# Patient Record
Sex: FEMALE | Race: WHITE | Employment: OTHER | URBAN - METROPOLITAN AREA
[De-identification: names, ages, dates, MRNs, and addresses within clinical notes are randomized per-mention and may not be internally consistent; named-entity substitution may affect disease eponyms.]

---

## 2017-08-06 ENCOUNTER — HOSPITAL ENCOUNTER (EMERGENCY)
Facility: CLINIC | Age: 67
Discharge: ANOTHER HOSPITAL | End: 2017-08-06
Attending: EMERGENCY MEDICINE
Payer: COMMERCIAL

## 2017-08-06 ENCOUNTER — APPOINTMENT (OUTPATIENT)
Dept: GENERAL RADIOLOGY | Facility: CLINIC | Age: 67
End: 2017-08-06
Payer: COMMERCIAL

## 2017-08-06 ENCOUNTER — HOSPITAL ENCOUNTER (INPATIENT)
Age: 67
LOS: 5 days | Discharge: HOME | DRG: 287 | End: 2017-08-11
Attending: INTERNAL MEDICINE | Admitting: INTERNAL MEDICINE
Payer: COMMERCIAL

## 2017-08-06 VITALS
TEMPERATURE: 97.9 F | SYSTOLIC BLOOD PRESSURE: 177 MMHG | WEIGHT: 192 LBS | BODY MASS INDEX: 35.33 KG/M2 | OXYGEN SATURATION: 95 % | HEIGHT: 62 IN | DIASTOLIC BLOOD PRESSURE: 94 MMHG | RESPIRATION RATE: 16 BRPM | HEART RATE: 76 BPM

## 2017-08-06 DIAGNOSIS — L97.929 ULCERS OF BOTH LOWER LEGS (HCC): ICD-10-CM

## 2017-08-06 DIAGNOSIS — I16.0 HYPERTENSIVE URGENCY: ICD-10-CM

## 2017-08-06 DIAGNOSIS — I50.23 ACUTE ON CHRONIC SYSTOLIC CONGESTIVE HEART FAILURE (HCC): ICD-10-CM

## 2017-08-06 DIAGNOSIS — L97.919 ULCERS OF BOTH LOWER LEGS (HCC): ICD-10-CM

## 2017-08-06 DIAGNOSIS — I89.0 LYMPHEDEMA: Primary | ICD-10-CM

## 2017-08-06 DIAGNOSIS — M79.89 LEG SWELLING: Primary | ICD-10-CM

## 2017-08-06 LAB
ABSOLUTE EOS #: 0.1 K/UL (ref 0–0.4)
ABSOLUTE LYMPH #: 0.8 K/UL (ref 1–4.8)
ABSOLUTE MONO #: 0.3 K/UL (ref 0.1–1.2)
ALBUMIN SERPL-MCNC: 3.8 G/DL (ref 3.5–5.2)
ALBUMIN/GLOBULIN RATIO: 1.1 (ref 1–2.5)
ALP BLD-CCNC: 74 U/L (ref 35–104)
ALT SERPL-CCNC: 11 U/L (ref 5–33)
ANION GAP SERPL CALCULATED.3IONS-SCNC: 16 MMOL/L (ref 9–17)
AST SERPL-CCNC: 18 U/L
BASOPHILS # BLD: 1 %
BASOPHILS ABSOLUTE: 0 K/UL (ref 0–0.2)
BILIRUB SERPL-MCNC: 0.6 MG/DL (ref 0.3–1.2)
BNP INTERPRETATION: ABNORMAL
BUN BLDV-MCNC: 18 MG/DL (ref 8–23)
BUN/CREAT BLD: ABNORMAL (ref 9–20)
CALCIUM SERPL-MCNC: 8.4 MG/DL (ref 8.6–10.4)
CHLORIDE BLD-SCNC: 104 MMOL/L (ref 98–107)
CO2: 23 MMOL/L (ref 20–31)
CREAT SERPL-MCNC: 0.8 MG/DL (ref 0.5–0.9)
DIFFERENTIAL TYPE: ABNORMAL
EOSINOPHILS RELATIVE PERCENT: 2 %
GFR AFRICAN AMERICAN: >60 ML/MIN
GFR NON-AFRICAN AMERICAN: >60 ML/MIN
GFR SERPL CREATININE-BSD FRML MDRD: ABNORMAL ML/MIN/{1.73_M2}
GFR SERPL CREATININE-BSD FRML MDRD: ABNORMAL ML/MIN/{1.73_M2}
GLUCOSE BLD-MCNC: 103 MG/DL (ref 70–99)
GLUCOSE BLD-MCNC: 127 MG/DL (ref 65–105)
HCT VFR BLD CALC: 40.1 % (ref 36–46)
HEMOGLOBIN: 12.8 G/DL (ref 12–16)
LYMPHOCYTES # BLD: 19 %
MCH RBC QN AUTO: 27 PG (ref 26–34)
MCHC RBC AUTO-ENTMCNC: 31.9 G/DL (ref 31–37)
MCV RBC AUTO: 84.5 FL (ref 80–100)
MONOCYTES # BLD: 8 %
PDW BLD-RTO: 16.7 % (ref 12.5–15.4)
PLATELET # BLD: 193 K/UL (ref 140–450)
PLATELET ESTIMATE: ABNORMAL
PMV BLD AUTO: 9 FL (ref 6–12)
POTASSIUM SERPL-SCNC: 3.7 MMOL/L (ref 3.7–5.3)
PRO-BNP: 3690 PG/ML
RBC # BLD: 4.75 M/UL (ref 4–5.2)
RBC # BLD: ABNORMAL 10*6/UL
SEG NEUTROPHILS: 70 %
SEGMENTED NEUTROPHILS ABSOLUTE COUNT: 3.2 K/UL (ref 1.8–7.7)
SODIUM BLD-SCNC: 143 MMOL/L (ref 135–144)
TOTAL PROTEIN: 7.4 G/DL (ref 6.4–8.3)
TROPONIN INTERP: NORMAL
TROPONIN T: <0.03 NG/ML
TSH SERPL DL<=0.05 MIU/L-ACNC: 3.29 MIU/L (ref 0.3–5)
WBC # BLD: 4.5 K/UL (ref 3.5–11)
WBC # BLD: ABNORMAL 10*3/UL

## 2017-08-06 PROCEDURE — 84484 ASSAY OF TROPONIN QUANT: CPT

## 2017-08-06 PROCEDURE — 83036 HEMOGLOBIN GLYCOSYLATED A1C: CPT

## 2017-08-06 PROCEDURE — 2580000003 HC RX 258: Performed by: FAMILY MEDICINE

## 2017-08-06 PROCEDURE — 36415 COLL VENOUS BLD VENIPUNCTURE: CPT

## 2017-08-06 PROCEDURE — 2060000000 HC ICU INTERMEDIATE R&B

## 2017-08-06 PROCEDURE — 99285 EMERGENCY DEPT VISIT HI MDM: CPT

## 2017-08-06 PROCEDURE — 85025 COMPLETE CBC W/AUTO DIFF WBC: CPT

## 2017-08-06 PROCEDURE — 87040 BLOOD CULTURE FOR BACTERIA: CPT

## 2017-08-06 PROCEDURE — 6370000000 HC RX 637 (ALT 250 FOR IP): Performed by: FAMILY MEDICINE

## 2017-08-06 PROCEDURE — 82947 ASSAY GLUCOSE BLOOD QUANT: CPT

## 2017-08-06 PROCEDURE — 80053 COMPREHEN METABOLIC PANEL: CPT

## 2017-08-06 PROCEDURE — 6360000002 HC RX W HCPCS

## 2017-08-06 PROCEDURE — 71010 XR CHEST PORTABLE: CPT

## 2017-08-06 PROCEDURE — 93005 ELECTROCARDIOGRAM TRACING: CPT

## 2017-08-06 PROCEDURE — 6360000002 HC RX W HCPCS: Performed by: FAMILY MEDICINE

## 2017-08-06 PROCEDURE — 83880 ASSAY OF NATRIURETIC PEPTIDE: CPT

## 2017-08-06 PROCEDURE — 84443 ASSAY THYROID STIM HORMONE: CPT

## 2017-08-06 RX ORDER — SODIUM CHLORIDE 0.9 % (FLUSH) 0.9 %
10 SYRINGE (ML) INJECTION EVERY 12 HOURS SCHEDULED
Status: DISCONTINUED | OUTPATIENT
Start: 2017-08-06 | End: 2017-08-11 | Stop reason: HOSPADM

## 2017-08-06 RX ORDER — SPIRONOLACTONE 25 MG/1
25 TABLET ORAL DAILY
Status: DISCONTINUED | OUTPATIENT
Start: 2017-08-06 | End: 2017-08-11 | Stop reason: HOSPADM

## 2017-08-06 RX ORDER — FAMOTIDINE 20 MG/1
20 TABLET, FILM COATED ORAL 2 TIMES DAILY
Status: DISCONTINUED | OUTPATIENT
Start: 2017-08-06 | End: 2017-08-11 | Stop reason: HOSPADM

## 2017-08-06 RX ORDER — POTASSIUM CHLORIDE 7.45 MG/ML
10 INJECTION INTRAVENOUS PRN
Status: DISCONTINUED | OUTPATIENT
Start: 2017-08-06 | End: 2017-08-11 | Stop reason: HOSPADM

## 2017-08-06 RX ORDER — CARVEDILOL 3.12 MG/1
3.12 TABLET ORAL 2 TIMES DAILY WITH MEALS
Status: DISCONTINUED | OUTPATIENT
Start: 2017-08-07 | End: 2017-08-07

## 2017-08-06 RX ORDER — MAGNESIUM SULFATE 1 G/100ML
1 INJECTION INTRAVENOUS PRN
Status: DISCONTINUED | OUTPATIENT
Start: 2017-08-06 | End: 2017-08-11 | Stop reason: HOSPADM

## 2017-08-06 RX ORDER — BISACODYL 10 MG
10 SUPPOSITORY, RECTAL RECTAL DAILY PRN
Status: DISCONTINUED | OUTPATIENT
Start: 2017-08-06 | End: 2017-08-11 | Stop reason: HOSPADM

## 2017-08-06 RX ORDER — ONDANSETRON 2 MG/ML
4 INJECTION INTRAMUSCULAR; INTRAVENOUS EVERY 6 HOURS PRN
Status: DISCONTINUED | OUTPATIENT
Start: 2017-08-06 | End: 2017-08-11

## 2017-08-06 RX ORDER — NITROGLYCERIN 0.4 MG/1
0.4 TABLET SUBLINGUAL EVERY 5 MIN PRN
Status: DISCONTINUED | OUTPATIENT
Start: 2017-08-06 | End: 2017-08-11 | Stop reason: HOSPADM

## 2017-08-06 RX ORDER — SODIUM CHLORIDE 0.9 % (FLUSH) 0.9 %
10 SYRINGE (ML) INJECTION PRN
Status: DISCONTINUED | OUTPATIENT
Start: 2017-08-06 | End: 2017-08-11 | Stop reason: HOSPADM

## 2017-08-06 RX ORDER — FUROSEMIDE 10 MG/ML
40 INJECTION INTRAMUSCULAR; INTRAVENOUS 2 TIMES DAILY
Status: DISCONTINUED | OUTPATIENT
Start: 2017-08-06 | End: 2017-08-08

## 2017-08-06 RX ORDER — DOCUSATE SODIUM 100 MG/1
100 CAPSULE, LIQUID FILLED ORAL 2 TIMES DAILY
Status: DISCONTINUED | OUTPATIENT
Start: 2017-08-06 | End: 2017-08-11 | Stop reason: HOSPADM

## 2017-08-06 RX ORDER — LISINOPRIL 5 MG/1
5 TABLET ORAL DAILY
Status: DISCONTINUED | OUTPATIENT
Start: 2017-08-07 | End: 2017-08-10

## 2017-08-06 RX ORDER — POTASSIUM CHLORIDE 20 MEQ/1
40 TABLET, EXTENDED RELEASE ORAL PRN
Status: DISCONTINUED | OUTPATIENT
Start: 2017-08-06 | End: 2017-08-11 | Stop reason: HOSPADM

## 2017-08-06 RX ORDER — ACETAMINOPHEN 325 MG/1
650 TABLET ORAL EVERY 4 HOURS PRN
Status: DISCONTINUED | OUTPATIENT
Start: 2017-08-06 | End: 2017-08-11 | Stop reason: HOSPADM

## 2017-08-06 RX ORDER — POTASSIUM CHLORIDE 20MEQ/15ML
40 LIQUID (ML) ORAL PRN
Status: DISCONTINUED | OUTPATIENT
Start: 2017-08-06 | End: 2017-08-11 | Stop reason: HOSPADM

## 2017-08-06 RX ADMIN — SPIRONOLACTONE 25 MG: 25 TABLET ORAL at 21:33

## 2017-08-06 RX ADMIN — FAMOTIDINE 20 MG: 20 TABLET, FILM COATED ORAL at 21:32

## 2017-08-06 RX ADMIN — Medication 10 ML: at 21:34

## 2017-08-06 RX ADMIN — FUROSEMIDE 40 MG: 10 INJECTION, SOLUTION INTRAMUSCULAR; INTRAVENOUS at 21:32

## 2017-08-06 RX ADMIN — ENOXAPARIN SODIUM 40 MG: 40 INJECTION SUBCUTANEOUS at 21:32

## 2017-08-06 ASSESSMENT — PAIN DESCRIPTION - ORIENTATION
ORIENTATION: MID;LOWER
ORIENTATION: LOWER;MID

## 2017-08-06 ASSESSMENT — PAIN DESCRIPTION - DESCRIPTORS
DESCRIPTORS: ACHING;CONSTANT
DESCRIPTORS: ACHING;THROBBING

## 2017-08-06 ASSESSMENT — PAIN DESCRIPTION - PAIN TYPE
TYPE: CHRONIC PAIN
TYPE: CHRONIC PAIN

## 2017-08-06 ASSESSMENT — PAIN DESCRIPTION - FREQUENCY
FREQUENCY: CONTINUOUS
FREQUENCY: CONTINUOUS

## 2017-08-06 ASSESSMENT — PAIN DESCRIPTION - LOCATION
LOCATION: BACK
LOCATION: BACK

## 2017-08-06 ASSESSMENT — PAIN DESCRIPTION - ONSET: ONSET: ON-GOING

## 2017-08-06 ASSESSMENT — PAIN SCALES - GENERAL
PAINLEVEL_OUTOF10: 5
PAINLEVEL_OUTOF10: 0
PAINLEVEL_OUTOF10: 10
PAINLEVEL_OUTOF10: 10

## 2017-08-06 NOTE — ED PROVIDER NOTES
Allergies. FAMILY HISTORY     has no family status information on file. family history is not on file. SOCIAL HISTORY      reports that she has never smoked. She does not have any smokeless tobacco history on file. She reports that she does not drink alcohol or use illicit drugs. PHYSICAL EXAM     INITIAL VITALS:  height is 5' 2\" (1.575 m) and weight is 87.1 kg (192 lb). Her oral temperature is 98.1 °F (36.7 °C). Her blood pressure is 174/100 (abnormal) and her pulse is 65. Her respiration is 18 and oxygen saturation is 96%. Patient is alert and oriented, in no apparent distress. HEENT is atraumatic. Pupils are PERRL at 4 mm. Mucous membranes moist.    Neck is supple with no lymphadenopathy. No JVD. No meningismus. Heart sounds regular rate and rhythm with no gallops, murmurs, or rubs. Lungs clear, no wheezes, rales or rhonchi. Abdomen: soft, nontender with no pain to palpation. Normal bowel sounds are noted. No rebound or guarding. Musculoskeletal exam shows edema and skin breakdown as noted below. Skin: 3+ edema in both legs with weeping and skin breakdown, without deep ulceration. Maceration of tissues and redness noted, circumferentially. Neurological exam reveals cranial nerves 2 through 12 grossly intact. Patient has equal  and normal deep tendon reflexes. Psychiatric: no hallucinations or suicidal ideation. Lymphatics.:  No lymphadenopathy. DIFFERENTIAL DIAGNOSIS/ MDM:     Lymphedema with skin maceration and breakdown, cellulitis, CHF, DM    DIAGNOSTIC RESULTS     EKG: All EKG's are interpreted by the Emergency Department Physician who either signs or Co-signs this chart in the absence of a cardiologist.    Sinus 63 with occasional PVCs. Nonspecific ST change. No old for comparison. Axis -21, , , .     RADIOLOGY:   I directly visualized the following  images and reviewed the radiologist interpretations:  XR Chest Portable   Final (Please note that portions of this note were completed with a voice recognition program.  Efforts were made to edit the dictations but occasionally words are mis-transcribed.)    Lomas MD   Attending Emergency Physician       Cheli Kasper MD  08/06/17 7113

## 2017-08-07 ENCOUNTER — APPOINTMENT (OUTPATIENT)
Dept: GENERAL RADIOLOGY | Age: 67
DRG: 287 | End: 2017-08-07
Attending: INTERNAL MEDICINE
Payer: COMMERCIAL

## 2017-08-07 PROBLEM — M79.89 LEG SWELLING: Status: ACTIVE | Noted: 2017-08-07

## 2017-08-07 PROBLEM — I16.0 HYPERTENSIVE URGENCY: Status: ACTIVE | Noted: 2017-08-07

## 2017-08-07 LAB
ALBUMIN SERPL-MCNC: 3.8 G/DL (ref 3.5–5.2)
ALBUMIN/GLOBULIN RATIO: 1.2 (ref 1–2.5)
ALP BLD-CCNC: 72 U/L (ref 35–104)
ALT SERPL-CCNC: 9 U/L (ref 5–33)
ANION GAP SERPL CALCULATED.3IONS-SCNC: 18 MMOL/L (ref 9–17)
AST SERPL-CCNC: 18 U/L
BILIRUB SERPL-MCNC: 0.57 MG/DL (ref 0.3–1.2)
BNP INTERPRETATION: ABNORMAL
BUN BLDV-MCNC: 14 MG/DL (ref 8–23)
BUN/CREAT BLD: ABNORMAL (ref 9–20)
CALCIUM SERPL-MCNC: 8.8 MG/DL (ref 8.6–10.4)
CHLORIDE BLD-SCNC: 103 MMOL/L (ref 98–107)
CHOLESTEROL/HDL RATIO: 4.7
CHOLESTEROL: 179 MG/DL
CO2: 23 MMOL/L (ref 20–31)
CREAT SERPL-MCNC: 0.74 MG/DL (ref 0.5–0.9)
ESTIMATED AVERAGE GLUCOSE: 117 MG/DL
GFR AFRICAN AMERICAN: >60 ML/MIN
GFR NON-AFRICAN AMERICAN: >60 ML/MIN
GFR SERPL CREATININE-BSD FRML MDRD: ABNORMAL ML/MIN/{1.73_M2}
GFR SERPL CREATININE-BSD FRML MDRD: ABNORMAL ML/MIN/{1.73_M2}
GLUCOSE BLD-MCNC: 89 MG/DL (ref 65–105)
GLUCOSE BLD-MCNC: 89 MG/DL (ref 70–99)
HBA1C MFR BLD: 5.7 % (ref 4–6)
HCT VFR BLD CALC: 37.8 % (ref 36–46)
HDLC SERPL-MCNC: 38 MG/DL
HEMOGLOBIN: 12.3 G/DL (ref 12–16)
LDL CHOLESTEROL: 117 MG/DL (ref 0–130)
LV EF: 29 %
LVEF MODALITY: NORMAL
MAGNESIUM: 2.1 MG/DL (ref 1.6–2.6)
MCH RBC QN AUTO: 27.1 PG (ref 26–34)
MCHC RBC AUTO-ENTMCNC: 32.5 G/DL (ref 31–37)
MCV RBC AUTO: 83.3 FL (ref 80–100)
PDW BLD-RTO: 17 % (ref 12.5–15.4)
PLATELET # BLD: 194 K/UL (ref 140–450)
PMV BLD AUTO: 9.4 FL (ref 6–12)
POTASSIUM SERPL-SCNC: 3.6 MMOL/L (ref 3.7–5.3)
PRO-BNP: 3845 PG/ML
RBC # BLD: 4.54 M/UL (ref 4–5.2)
SODIUM BLD-SCNC: 144 MMOL/L (ref 135–144)
TOTAL PROTEIN: 6.9 G/DL (ref 6.4–8.3)
TRIGL SERPL-MCNC: 122 MG/DL
TROPONIN INTERP: NORMAL
TROPONIN T: <0.03 NG/ML
TSH SERPL DL<=0.05 MIU/L-ACNC: 2.43 MIU/L (ref 0.3–5)
VLDLC SERPL CALC-MCNC: ABNORMAL MG/DL (ref 1–30)
WBC # BLD: 4.7 K/UL (ref 3.5–11)

## 2017-08-07 PROCEDURE — 83880 ASSAY OF NATRIURETIC PEPTIDE: CPT

## 2017-08-07 PROCEDURE — 97530 THERAPEUTIC ACTIVITIES: CPT

## 2017-08-07 PROCEDURE — 99211 OFF/OP EST MAY X REQ PHY/QHP: CPT

## 2017-08-07 PROCEDURE — 2060000000 HC ICU INTERMEDIATE R&B

## 2017-08-07 PROCEDURE — 99223 1ST HOSP IP/OBS HIGH 75: CPT | Performed by: INTERNAL MEDICINE

## 2017-08-07 PROCEDURE — 6370000000 HC RX 637 (ALT 250 FOR IP): Performed by: INTERNAL MEDICINE

## 2017-08-07 PROCEDURE — 36415 COLL VENOUS BLD VENIPUNCTURE: CPT

## 2017-08-07 PROCEDURE — G8988 SELF CARE GOAL STATUS: HCPCS

## 2017-08-07 PROCEDURE — G8978 MOBILITY CURRENT STATUS: HCPCS

## 2017-08-07 PROCEDURE — 83735 ASSAY OF MAGNESIUM: CPT

## 2017-08-07 PROCEDURE — G8979 MOBILITY GOAL STATUS: HCPCS

## 2017-08-07 PROCEDURE — 84484 ASSAY OF TROPONIN QUANT: CPT

## 2017-08-07 PROCEDURE — 2580000003 HC RX 258: Performed by: FAMILY MEDICINE

## 2017-08-07 PROCEDURE — 71020 XR CHEST STANDARD TWO VW: CPT

## 2017-08-07 PROCEDURE — 6360000002 HC RX W HCPCS: Performed by: FAMILY MEDICINE

## 2017-08-07 PROCEDURE — 82947 ASSAY GLUCOSE BLOOD QUANT: CPT

## 2017-08-07 PROCEDURE — 97535 SELF CARE MNGMENT TRAINING: CPT

## 2017-08-07 PROCEDURE — 6360000002 HC RX W HCPCS: Performed by: INTERNAL MEDICINE

## 2017-08-07 PROCEDURE — 93005 ELECTROCARDIOGRAM TRACING: CPT

## 2017-08-07 PROCEDURE — 85027 COMPLETE CBC AUTOMATED: CPT

## 2017-08-07 PROCEDURE — 97166 OT EVAL MOD COMPLEX 45 MIN: CPT

## 2017-08-07 PROCEDURE — 6370000000 HC RX 637 (ALT 250 FOR IP): Performed by: FAMILY MEDICINE

## 2017-08-07 PROCEDURE — G8987 SELF CARE CURRENT STATUS: HCPCS

## 2017-08-07 PROCEDURE — 97162 PT EVAL MOD COMPLEX 30 MIN: CPT

## 2017-08-07 PROCEDURE — 80053 COMPREHEN METABOLIC PANEL: CPT

## 2017-08-07 PROCEDURE — 84443 ASSAY THYROID STIM HORMONE: CPT

## 2017-08-07 PROCEDURE — 93306 TTE W/DOPPLER COMPLETE: CPT

## 2017-08-07 PROCEDURE — 80061 LIPID PANEL: CPT

## 2017-08-07 RX ORDER — CARVEDILOL 25 MG/1
25 TABLET ORAL 2 TIMES DAILY WITH MEALS
Status: DISCONTINUED | OUTPATIENT
Start: 2017-08-07 | End: 2017-08-08

## 2017-08-07 RX ADMIN — FUROSEMIDE 40 MG: 10 INJECTION, SOLUTION INTRAMUSCULAR; INTRAVENOUS at 13:36

## 2017-08-07 RX ADMIN — Medication 10 ML: at 21:33

## 2017-08-07 RX ADMIN — FUROSEMIDE 40 MG: 10 INJECTION, SOLUTION INTRAMUSCULAR; INTRAVENOUS at 09:32

## 2017-08-07 RX ADMIN — LISINOPRIL 5 MG: 5 TABLET ORAL at 09:25

## 2017-08-07 RX ADMIN — DOCUSATE SODIUM 100 MG: 100 CAPSULE, LIQUID FILLED ORAL at 09:26

## 2017-08-07 RX ADMIN — Medication 10 ML: at 09:31

## 2017-08-07 RX ADMIN — FAMOTIDINE 20 MG: 20 TABLET, FILM COATED ORAL at 09:25

## 2017-08-07 RX ADMIN — SPIRONOLACTONE 25 MG: 25 TABLET ORAL at 09:25

## 2017-08-07 RX ADMIN — ENOXAPARIN SODIUM 40 MG: 40 INJECTION SUBCUTANEOUS at 09:29

## 2017-08-07 RX ADMIN — CARVEDILOL 3.12 MG: 3.12 TABLET, FILM COATED ORAL at 09:26

## 2017-08-07 RX ADMIN — CARVEDILOL 25 MG: 25 TABLET, FILM COATED ORAL at 16:53

## 2017-08-07 ASSESSMENT — ENCOUNTER SYMPTOMS
VOMITING: 0
TROUBLE SWALLOWING: 0
SHORTNESS OF BREATH: 0
CHOKING: 0
SORE THROAT: 0
ABDOMINAL PAIN: 0
COUGH: 0
ABDOMINAL DISTENTION: 0
EYES NEGATIVE: 1
DIARRHEA: 0
NAUSEA: 0

## 2017-08-07 ASSESSMENT — PAIN DESCRIPTION - DESCRIPTORS: DESCRIPTORS: ACHING;THROBBING

## 2017-08-07 ASSESSMENT — PAIN DESCRIPTION - PAIN TYPE
TYPE: CHRONIC PAIN
TYPE: CHRONIC PAIN

## 2017-08-07 ASSESSMENT — PAIN SCALES - GENERAL
PAINLEVEL_OUTOF10: 8
PAINLEVEL_OUTOF10: 0
PAINLEVEL_OUTOF10: 8
PAINLEVEL_OUTOF10: 0

## 2017-08-07 ASSESSMENT — PAIN DESCRIPTION - LOCATION: LOCATION: BACK;NECK

## 2017-08-08 ENCOUNTER — APPOINTMENT (OUTPATIENT)
Dept: NUCLEAR MEDICINE | Age: 67
DRG: 287 | End: 2017-08-08
Attending: INTERNAL MEDICINE
Payer: COMMERCIAL

## 2017-08-08 PROBLEM — I50.23 ACUTE ON CHRONIC SYSTOLIC CONGESTIVE HEART FAILURE (HCC): Status: ACTIVE | Noted: 2017-08-08

## 2017-08-08 LAB
ANION GAP SERPL CALCULATED.3IONS-SCNC: 19 MMOL/L (ref 9–17)
BNP INTERPRETATION: ABNORMAL
BUN BLDV-MCNC: 18 MG/DL (ref 8–23)
BUN/CREAT BLD: ABNORMAL (ref 9–20)
CALCIUM SERPL-MCNC: 8.8 MG/DL (ref 8.6–10.4)
CHLORIDE BLD-SCNC: 104 MMOL/L (ref 98–107)
CO2: 22 MMOL/L (ref 20–31)
CREAT SERPL-MCNC: 0.87 MG/DL (ref 0.5–0.9)
GFR AFRICAN AMERICAN: >60 ML/MIN
GFR NON-AFRICAN AMERICAN: >60 ML/MIN
GFR SERPL CREATININE-BSD FRML MDRD: ABNORMAL ML/MIN/{1.73_M2}
GFR SERPL CREATININE-BSD FRML MDRD: ABNORMAL ML/MIN/{1.73_M2}
GLUCOSE BLD-MCNC: 103 MG/DL (ref 70–99)
POTASSIUM SERPL-SCNC: 3.7 MMOL/L (ref 3.7–5.3)
PRO-BNP: 2321 PG/ML
SODIUM BLD-SCNC: 145 MMOL/L (ref 135–144)
TROPONIN INTERP: NORMAL
TROPONIN INTERP: NORMAL
TROPONIN T: <0.03 NG/ML
TROPONIN T: <0.03 NG/ML

## 2017-08-08 PROCEDURE — 99232 SBSQ HOSP IP/OBS MODERATE 35: CPT | Performed by: INTERNAL MEDICINE

## 2017-08-08 PROCEDURE — 6370000000 HC RX 637 (ALT 250 FOR IP): Performed by: NURSE PRACTITIONER

## 2017-08-08 PROCEDURE — 80048 BASIC METABOLIC PNL TOTAL CA: CPT

## 2017-08-08 PROCEDURE — 6370000000 HC RX 637 (ALT 250 FOR IP): Performed by: FAMILY MEDICINE

## 2017-08-08 PROCEDURE — 6370000000 HC RX 637 (ALT 250 FOR IP): Performed by: INTERNAL MEDICINE

## 2017-08-08 PROCEDURE — 84484 ASSAY OF TROPONIN QUANT: CPT

## 2017-08-08 PROCEDURE — 2580000003 HC RX 258: Performed by: FAMILY MEDICINE

## 2017-08-08 PROCEDURE — 36415 COLL VENOUS BLD VENIPUNCTURE: CPT

## 2017-08-08 PROCEDURE — 6360000002 HC RX W HCPCS: Performed by: FAMILY MEDICINE

## 2017-08-08 PROCEDURE — A9500 TC99M SESTAMIBI: HCPCS | Performed by: NURSE PRACTITIONER

## 2017-08-08 PROCEDURE — 2060000000 HC ICU INTERMEDIATE R&B

## 2017-08-08 PROCEDURE — 2580000003 HC RX 258: Performed by: NURSE PRACTITIONER

## 2017-08-08 PROCEDURE — 3430000000 HC RX DIAGNOSTIC RADIOPHARMACEUTICAL: Performed by: NURSE PRACTITIONER

## 2017-08-08 PROCEDURE — 83880 ASSAY OF NATRIURETIC PEPTIDE: CPT

## 2017-08-08 PROCEDURE — 6360000002 HC RX W HCPCS: Performed by: INTERNAL MEDICINE

## 2017-08-08 RX ORDER — LIDOCAINE 50 MG/G
1 PATCH TOPICAL DAILY
Status: DISCONTINUED | OUTPATIENT
Start: 2017-08-08 | End: 2017-08-11 | Stop reason: HOSPADM

## 2017-08-08 RX ORDER — FUROSEMIDE 40 MG/1
40 TABLET ORAL 2 TIMES DAILY
Status: DISCONTINUED | OUTPATIENT
Start: 2017-08-08 | End: 2017-08-11 | Stop reason: HOSPADM

## 2017-08-08 RX ORDER — CYCLOBENZAPRINE HCL 10 MG
10 TABLET ORAL 3 TIMES DAILY PRN
Status: DISCONTINUED | OUTPATIENT
Start: 2017-08-08 | End: 2017-08-11

## 2017-08-08 RX ORDER — CARVEDILOL 6.25 MG/1
6.25 TABLET ORAL 2 TIMES DAILY WITH MEALS
Status: DISCONTINUED | OUTPATIENT
Start: 2017-08-08 | End: 2017-08-11 | Stop reason: HOSPADM

## 2017-08-08 RX ADMIN — FAMOTIDINE 20 MG: 20 TABLET, FILM COATED ORAL at 09:05

## 2017-08-08 RX ADMIN — ENOXAPARIN SODIUM 40 MG: 40 INJECTION SUBCUTANEOUS at 09:05

## 2017-08-08 RX ADMIN — TETRAKIS(2-METHOXYISOBUTYLISOCYANIDE)COPPER(I) TETRAFLUOROBORATE 32 MILLICURIE: 1 INJECTION, POWDER, LYOPHILIZED, FOR SOLUTION INTRAVENOUS at 12:55

## 2017-08-08 RX ADMIN — SODIUM CHLORIDE, PRESERVATIVE FREE 10 ML: 5 INJECTION INTRAVENOUS at 12:55

## 2017-08-08 RX ADMIN — SPIRONOLACTONE 25 MG: 25 TABLET ORAL at 09:05

## 2017-08-08 RX ADMIN — CARVEDILOL 6.25 MG: 6.25 TABLET, FILM COATED ORAL at 16:57

## 2017-08-08 RX ADMIN — CYCLOBENZAPRINE 10 MG: 10 TABLET, FILM COATED ORAL at 16:53

## 2017-08-08 RX ADMIN — Medication 10 ML: at 09:05

## 2017-08-08 RX ADMIN — Medication 10 ML: at 22:30

## 2017-08-08 RX ADMIN — FUROSEMIDE 40 MG: 40 TABLET ORAL at 16:57

## 2017-08-08 RX ADMIN — LISINOPRIL 5 MG: 5 TABLET ORAL at 09:05

## 2017-08-08 ASSESSMENT — ENCOUNTER SYMPTOMS
CHOKING: 0
COUGH: 0
TROUBLE SWALLOWING: 0
ABDOMINAL DISTENTION: 0
WHEEZING: 0
NAUSEA: 0
BACK PAIN: 1
VOMITING: 0
ALLERGIC/IMMUNOLOGIC NEGATIVE: 1
SHORTNESS OF BREATH: 0
SORE THROAT: 0
DIARRHEA: 0
ABDOMINAL PAIN: 0
EYES NEGATIVE: 1

## 2017-08-09 LAB
ANION GAP SERPL CALCULATED.3IONS-SCNC: 15 MMOL/L (ref 9–17)
BNP INTERPRETATION: ABNORMAL
BUN BLDV-MCNC: 20 MG/DL (ref 8–23)
BUN/CREAT BLD: ABNORMAL (ref 9–20)
CALCIUM SERPL-MCNC: 8.8 MG/DL (ref 8.6–10.4)
CHLORIDE BLD-SCNC: 103 MMOL/L (ref 98–107)
CO2: 26 MMOL/L (ref 20–31)
CREAT SERPL-MCNC: 0.87 MG/DL (ref 0.5–0.9)
EKG ATRIAL RATE: 55 BPM
EKG ATRIAL RATE: 63 BPM
EKG P AXIS: 55 DEGREES
EKG P AXIS: 68 DEGREES
EKG P-R INTERVAL: 134 MS
EKG P-R INTERVAL: 170 MS
EKG Q-T INTERVAL: 438 MS
EKG Q-T INTERVAL: 524 MS
EKG QRS DURATION: 108 MS
EKG QRS DURATION: 112 MS
EKG QTC CALCULATION (BAZETT): 448 MS
EKG QTC CALCULATION (BAZETT): 501 MS
EKG R AXIS: -21 DEGREES
EKG R AXIS: -3 DEGREES
EKG T AXIS: -164 DEGREES
EKG T AXIS: 144 DEGREES
EKG VENTRICULAR RATE: 55 BPM
EKG VENTRICULAR RATE: 63 BPM
GFR AFRICAN AMERICAN: >60 ML/MIN
GFR NON-AFRICAN AMERICAN: >60 ML/MIN
GFR SERPL CREATININE-BSD FRML MDRD: ABNORMAL ML/MIN/{1.73_M2}
GFR SERPL CREATININE-BSD FRML MDRD: ABNORMAL ML/MIN/{1.73_M2}
GLUCOSE BLD-MCNC: 82 MG/DL (ref 65–105)
GLUCOSE BLD-MCNC: 97 MG/DL (ref 70–99)
LV EF: 33 %
LVEF MODALITY: NORMAL
POTASSIUM SERPL-SCNC: 3.5 MMOL/L (ref 3.7–5.3)
PRO-BNP: 1194 PG/ML
SODIUM BLD-SCNC: 144 MMOL/L (ref 135–144)

## 2017-08-09 PROCEDURE — 6370000000 HC RX 637 (ALT 250 FOR IP): Performed by: FAMILY MEDICINE

## 2017-08-09 PROCEDURE — 36415 COLL VENOUS BLD VENIPUNCTURE: CPT

## 2017-08-09 PROCEDURE — 2580000003 HC RX 258: Performed by: FAMILY MEDICINE

## 2017-08-09 PROCEDURE — 97110 THERAPEUTIC EXERCISES: CPT

## 2017-08-09 PROCEDURE — A9500 TC99M SESTAMIBI: HCPCS | Performed by: NURSE PRACTITIONER

## 2017-08-09 PROCEDURE — 93017 CV STRESS TEST TRACING ONLY: CPT | Performed by: NURSE PRACTITIONER

## 2017-08-09 PROCEDURE — 99232 SBSQ HOSP IP/OBS MODERATE 35: CPT | Performed by: INTERNAL MEDICINE

## 2017-08-09 PROCEDURE — 6360000002 HC RX W HCPCS: Performed by: NURSE PRACTITIONER

## 2017-08-09 PROCEDURE — 2060000000 HC ICU INTERMEDIATE R&B

## 2017-08-09 PROCEDURE — 80048 BASIC METABOLIC PNL TOTAL CA: CPT

## 2017-08-09 PROCEDURE — 82947 ASSAY GLUCOSE BLOOD QUANT: CPT

## 2017-08-09 PROCEDURE — 83880 ASSAY OF NATRIURETIC PEPTIDE: CPT

## 2017-08-09 PROCEDURE — 6370000000 HC RX 637 (ALT 250 FOR IP): Performed by: NURSE PRACTITIONER

## 2017-08-09 PROCEDURE — 3430000000 HC RX DIAGNOSTIC RADIOPHARMACEUTICAL: Performed by: NURSE PRACTITIONER

## 2017-08-09 PROCEDURE — 6360000002 HC RX W HCPCS: Performed by: FAMILY MEDICINE

## 2017-08-09 PROCEDURE — 97116 GAIT TRAINING THERAPY: CPT

## 2017-08-09 PROCEDURE — 6370000000 HC RX 637 (ALT 250 FOR IP): Performed by: INTERNAL MEDICINE

## 2017-08-09 PROCEDURE — 2580000003 HC RX 258: Performed by: NURSE PRACTITIONER

## 2017-08-09 PROCEDURE — 6360000002 HC RX W HCPCS: Performed by: INTERNAL MEDICINE

## 2017-08-09 RX ORDER — SODIUM CHLORIDE 0.9 % (FLUSH) 0.9 %
10 SYRINGE (ML) INJECTION PRN
Status: DISCONTINUED | OUTPATIENT
Start: 2017-08-09 | End: 2017-08-09

## 2017-08-09 RX ORDER — SODIUM CHLORIDE 0.9 % (FLUSH) 0.9 %
10 SYRINGE (ML) INJECTION 2 TIMES DAILY
Status: DISCONTINUED | OUTPATIENT
Start: 2017-08-09 | End: 2017-08-11 | Stop reason: HOSPADM

## 2017-08-09 RX ORDER — HYDRALAZINE HYDROCHLORIDE 20 MG/ML
10 INJECTION INTRAMUSCULAR; INTRAVENOUS EVERY 6 HOURS PRN
Status: DISCONTINUED | OUTPATIENT
Start: 2017-08-09 | End: 2017-08-11 | Stop reason: HOSPADM

## 2017-08-09 RX ORDER — METOPROLOL TARTRATE 5 MG/5ML
2.5 INJECTION INTRAVENOUS PRN
Status: DISCONTINUED | OUTPATIENT
Start: 2017-08-09 | End: 2017-08-09

## 2017-08-09 RX ORDER — NITROGLYCERIN 0.4 MG/1
0.4 TABLET SUBLINGUAL EVERY 5 MIN PRN
Status: DISCONTINUED | OUTPATIENT
Start: 2017-08-09 | End: 2017-08-09

## 2017-08-09 RX ORDER — SODIUM CHLORIDE 9 MG/ML
INJECTION, SOLUTION INTRAVENOUS ONCE
Status: COMPLETED | OUTPATIENT
Start: 2017-08-09 | End: 2017-08-09

## 2017-08-09 RX ORDER — AMINOPHYLLINE DIHYDRATE 25 MG/ML
100 INJECTION, SOLUTION INTRAVENOUS
Status: COMPLETED | OUTPATIENT
Start: 2017-08-09 | End: 2017-08-09

## 2017-08-09 RX ADMIN — ENOXAPARIN SODIUM 40 MG: 40 INJECTION SUBCUTANEOUS at 09:08

## 2017-08-09 RX ADMIN — REGADENOSON 0.4 MG: 0.08 INJECTION, SOLUTION INTRAVENOUS at 11:35

## 2017-08-09 RX ADMIN — LISINOPRIL 5 MG: 5 TABLET ORAL at 09:08

## 2017-08-09 RX ADMIN — Medication 10 ML: at 11:35

## 2017-08-09 RX ADMIN — Medication 10 ML: at 09:09

## 2017-08-09 RX ADMIN — FUROSEMIDE 40 MG: 40 TABLET ORAL at 17:33

## 2017-08-09 RX ADMIN — SODIUM CHLORIDE: 9 INJECTION, SOLUTION INTRAVENOUS at 11:02

## 2017-08-09 RX ADMIN — ACETAMINOPHEN 650 MG: 325 TABLET ORAL at 20:44

## 2017-08-09 RX ADMIN — CYCLOBENZAPRINE 10 MG: 10 TABLET, FILM COATED ORAL at 15:30

## 2017-08-09 RX ADMIN — SPIRONOLACTONE 25 MG: 25 TABLET ORAL at 14:30

## 2017-08-09 RX ADMIN — FAMOTIDINE 20 MG: 20 TABLET, FILM COATED ORAL at 09:08

## 2017-08-09 RX ADMIN — Medication 10 ML: at 11:00

## 2017-08-09 RX ADMIN — CYCLOBENZAPRINE 10 MG: 10 TABLET, FILM COATED ORAL at 09:08

## 2017-08-09 RX ADMIN — HYDRALAZINE HYDROCHLORIDE 10 MG: 20 INJECTION INTRAMUSCULAR; INTRAVENOUS at 05:10

## 2017-08-09 RX ADMIN — POTASSIUM CHLORIDE 40 MEQ: 1500 TABLET, EXTENDED RELEASE ORAL at 15:28

## 2017-08-09 RX ADMIN — TETRAKIS(2-METHOXYISOBUTYLISOCYANIDE)COPPER(I) TETRAFLUOROBORATE 36.9 MILLICURIE: 1 INJECTION, POWDER, LYOPHILIZED, FOR SOLUTION INTRAVENOUS at 11:35

## 2017-08-09 RX ADMIN — Medication 10 ML: at 20:44

## 2017-08-09 RX ADMIN — AMINOPHYLLINE 100 MG: 25 INJECTION, SOLUTION INTRAVENOUS at 11:37

## 2017-08-09 RX ADMIN — CARVEDILOL 6.25 MG: 6.25 TABLET, FILM COATED ORAL at 17:33

## 2017-08-09 ASSESSMENT — ENCOUNTER SYMPTOMS
ABDOMINAL DISTENTION: 0
VOMITING: 0
WHEEZING: 0
BACK PAIN: 1
SHORTNESS OF BREATH: 0
ABDOMINAL PAIN: 0
EYES NEGATIVE: 1
NAUSEA: 0
CHOKING: 0
ALLERGIC/IMMUNOLOGIC NEGATIVE: 1
SORE THROAT: 0
DIARRHEA: 0
TROUBLE SWALLOWING: 0
COUGH: 0

## 2017-08-09 ASSESSMENT — PAIN SCALES - GENERAL
PAINLEVEL_OUTOF10: 3
PAINLEVEL_OUTOF10: 9
PAINLEVEL_OUTOF10: 3
PAINLEVEL_OUTOF10: 3

## 2017-08-10 LAB
ANION GAP SERPL CALCULATED.3IONS-SCNC: 17 MMOL/L (ref 9–17)
BNP INTERPRETATION: ABNORMAL
BUN BLDV-MCNC: 22 MG/DL (ref 8–23)
BUN/CREAT BLD: NORMAL (ref 9–20)
CALCIUM SERPL-MCNC: 9.2 MG/DL (ref 8.6–10.4)
CHLORIDE BLD-SCNC: 104 MMOL/L (ref 98–107)
CO2: 20 MMOL/L (ref 20–31)
CREAT SERPL-MCNC: 0.78 MG/DL (ref 0.5–0.9)
GFR AFRICAN AMERICAN: >60 ML/MIN
GFR NON-AFRICAN AMERICAN: >60 ML/MIN
GFR SERPL CREATININE-BSD FRML MDRD: NORMAL ML/MIN/{1.73_M2}
GFR SERPL CREATININE-BSD FRML MDRD: NORMAL ML/MIN/{1.73_M2}
GLUCOSE BLD-MCNC: 97 MG/DL (ref 70–99)
POTASSIUM SERPL-SCNC: 3.7 MMOL/L (ref 3.7–5.3)
PRO-BNP: 1139 PG/ML
SODIUM BLD-SCNC: 141 MMOL/L (ref 135–144)

## 2017-08-10 PROCEDURE — B2151ZZ FLUOROSCOPY OF LEFT HEART USING LOW OSMOLAR CONTRAST: ICD-10-PCS | Performed by: INTERNAL MEDICINE

## 2017-08-10 PROCEDURE — 6360000002 HC RX W HCPCS: Performed by: FAMILY MEDICINE

## 2017-08-10 PROCEDURE — 2580000003 HC RX 258: Performed by: FAMILY MEDICINE

## 2017-08-10 PROCEDURE — 6360000002 HC RX W HCPCS: Performed by: INTERNAL MEDICINE

## 2017-08-10 PROCEDURE — 6370000000 HC RX 637 (ALT 250 FOR IP): Performed by: INTERNAL MEDICINE

## 2017-08-10 PROCEDURE — 97110 THERAPEUTIC EXERCISES: CPT

## 2017-08-10 PROCEDURE — 97116 GAIT TRAINING THERAPY: CPT

## 2017-08-10 PROCEDURE — 80048 BASIC METABOLIC PNL TOTAL CA: CPT

## 2017-08-10 PROCEDURE — 4A023N7 MEASUREMENT OF CARDIAC SAMPLING AND PRESSURE, LEFT HEART, PERCUTANEOUS APPROACH: ICD-10-PCS | Performed by: INTERNAL MEDICINE

## 2017-08-10 PROCEDURE — 36415 COLL VENOUS BLD VENIPUNCTURE: CPT

## 2017-08-10 PROCEDURE — 6370000000 HC RX 637 (ALT 250 FOR IP): Performed by: NURSE PRACTITIONER

## 2017-08-10 PROCEDURE — B2111ZZ FLUOROSCOPY OF MULTIPLE CORONARY ARTERIES USING LOW OSMOLAR CONTRAST: ICD-10-PCS | Performed by: INTERNAL MEDICINE

## 2017-08-10 PROCEDURE — 83880 ASSAY OF NATRIURETIC PEPTIDE: CPT

## 2017-08-10 PROCEDURE — 99232 SBSQ HOSP IP/OBS MODERATE 35: CPT | Performed by: INTERNAL MEDICINE

## 2017-08-10 PROCEDURE — 6370000000 HC RX 637 (ALT 250 FOR IP): Performed by: FAMILY MEDICINE

## 2017-08-10 PROCEDURE — 2060000000 HC ICU INTERMEDIATE R&B

## 2017-08-10 RX ORDER — LISINOPRIL 10 MG/1
10 TABLET ORAL DAILY
Status: DISCONTINUED | OUTPATIENT
Start: 2017-08-11 | End: 2017-08-11 | Stop reason: HOSPADM

## 2017-08-10 RX ADMIN — FUROSEMIDE 40 MG: 40 TABLET ORAL at 09:31

## 2017-08-10 RX ADMIN — FAMOTIDINE 20 MG: 20 TABLET, FILM COATED ORAL at 09:31

## 2017-08-10 RX ADMIN — HYDRALAZINE HYDROCHLORIDE 10 MG: 20 INJECTION INTRAMUSCULAR; INTRAVENOUS at 07:16

## 2017-08-10 RX ADMIN — Medication 10 ML: at 20:39

## 2017-08-10 RX ADMIN — CARVEDILOL 6.25 MG: 6.25 TABLET, FILM COATED ORAL at 09:31

## 2017-08-10 RX ADMIN — FUROSEMIDE 40 MG: 40 TABLET ORAL at 18:15

## 2017-08-10 RX ADMIN — ENOXAPARIN SODIUM 40 MG: 40 INJECTION SUBCUTANEOUS at 09:31

## 2017-08-10 RX ADMIN — LISINOPRIL 5 MG: 5 TABLET ORAL at 09:31

## 2017-08-10 RX ADMIN — CARVEDILOL 6.25 MG: 6.25 TABLET, FILM COATED ORAL at 18:15

## 2017-08-10 RX ADMIN — SPIRONOLACTONE 25 MG: 25 TABLET ORAL at 09:31

## 2017-08-10 ASSESSMENT — ENCOUNTER SYMPTOMS
ABDOMINAL PAIN: 0
ALLERGIC/IMMUNOLOGIC NEGATIVE: 1
NAUSEA: 0
DIARRHEA: 0
SHORTNESS OF BREATH: 0
COUGH: 0
VOMITING: 0
BACK PAIN: 1
CHOKING: 0
EYES NEGATIVE: 1
TROUBLE SWALLOWING: 0
SORE THROAT: 0
ABDOMINAL DISTENTION: 0
WHEEZING: 0

## 2017-08-10 ASSESSMENT — PAIN DESCRIPTION - LOCATION: LOCATION: BACK;NECK

## 2017-08-10 ASSESSMENT — PAIN SCALES - GENERAL: PAINLEVEL_OUTOF10: 8

## 2017-08-10 ASSESSMENT — PAIN DESCRIPTION - PAIN TYPE: TYPE: CHRONIC PAIN

## 2017-08-11 ENCOUNTER — APPOINTMENT (OUTPATIENT)
Dept: CARDIAC CATH/INVASIVE PROCEDURES | Age: 67
DRG: 287 | End: 2017-08-11
Attending: INTERNAL MEDICINE
Payer: COMMERCIAL

## 2017-08-11 ENCOUNTER — APPOINTMENT (OUTPATIENT)
Dept: GENERAL RADIOLOGY | Age: 67
DRG: 287 | End: 2017-08-11
Attending: INTERNAL MEDICINE
Payer: COMMERCIAL

## 2017-08-11 VITALS
HEART RATE: 57 BPM | HEIGHT: 62 IN | TEMPERATURE: 97.7 F | RESPIRATION RATE: 16 BRPM | DIASTOLIC BLOOD PRESSURE: 49 MMHG | BODY MASS INDEX: 32.39 KG/M2 | OXYGEN SATURATION: 98 % | WEIGHT: 176 LBS | SYSTOLIC BLOOD PRESSURE: 116 MMHG

## 2017-08-11 LAB
ANION GAP SERPL CALCULATED.3IONS-SCNC: 15 MMOL/L (ref 9–17)
BNP INTERPRETATION: ABNORMAL
BUN BLDV-MCNC: 29 MG/DL (ref 8–23)
BUN/CREAT BLD: ABNORMAL (ref 9–20)
CALCIUM SERPL-MCNC: 9 MG/DL (ref 8.6–10.4)
CHLORIDE BLD-SCNC: 100 MMOL/L (ref 98–107)
CO2: 25 MMOL/L (ref 20–31)
CREAT SERPL-MCNC: 1.16 MG/DL (ref 0.5–0.9)
GFR AFRICAN AMERICAN: 56 ML/MIN
GFR NON-AFRICAN AMERICAN: 47 ML/MIN
GFR SERPL CREATININE-BSD FRML MDRD: ABNORMAL ML/MIN/{1.73_M2}
GFR SERPL CREATININE-BSD FRML MDRD: ABNORMAL ML/MIN/{1.73_M2}
GLUCOSE BLD-MCNC: 100 MG/DL (ref 70–99)
POTASSIUM SERPL-SCNC: 3.7 MMOL/L (ref 3.7–5.3)
PRO-BNP: 1021 PG/ML
SODIUM BLD-SCNC: 140 MMOL/L (ref 135–144)

## 2017-08-11 PROCEDURE — C1769 GUIDE WIRE: HCPCS

## 2017-08-11 PROCEDURE — 83880 ASSAY OF NATRIURETIC PEPTIDE: CPT

## 2017-08-11 PROCEDURE — 6370000000 HC RX 637 (ALT 250 FOR IP): Performed by: NURSE PRACTITIONER

## 2017-08-11 PROCEDURE — C1894 INTRO/SHEATH, NON-LASER: HCPCS

## 2017-08-11 PROCEDURE — 72100 X-RAY EXAM L-S SPINE 2/3 VWS: CPT

## 2017-08-11 PROCEDURE — C1760 CLOSURE DEV, VASC: HCPCS

## 2017-08-11 PROCEDURE — 6370000000 HC RX 637 (ALT 250 FOR IP): Performed by: INTERNAL MEDICINE

## 2017-08-11 PROCEDURE — 6370000000 HC RX 637 (ALT 250 FOR IP): Performed by: FAMILY MEDICINE

## 2017-08-11 PROCEDURE — C1725 CATH, TRANSLUMIN NON-LASER: HCPCS

## 2017-08-11 PROCEDURE — 80048 BASIC METABOLIC PNL TOTAL CA: CPT

## 2017-08-11 PROCEDURE — 93005 ELECTROCARDIOGRAM TRACING: CPT

## 2017-08-11 PROCEDURE — 93458 L HRT ARTERY/VENTRICLE ANGIO: CPT

## 2017-08-11 PROCEDURE — 97535 SELF CARE MNGMENT TRAINING: CPT

## 2017-08-11 PROCEDURE — 6360000002 HC RX W HCPCS: Performed by: FAMILY MEDICINE

## 2017-08-11 PROCEDURE — 99232 SBSQ HOSP IP/OBS MODERATE 35: CPT | Performed by: INTERNAL MEDICINE

## 2017-08-11 PROCEDURE — 36415 COLL VENOUS BLD VENIPUNCTURE: CPT

## 2017-08-11 RX ORDER — LISINOPRIL 10 MG/1
10 TABLET ORAL DAILY
Qty: 30 TABLET | Refills: 3 | Status: SHIPPED | OUTPATIENT
Start: 2017-08-11 | End: 2017-08-22 | Stop reason: SDUPTHER

## 2017-08-11 RX ORDER — METAXALONE 800 MG/1
800 TABLET ORAL 3 TIMES DAILY
Qty: 30 TABLET | Refills: 0 | Status: SHIPPED | OUTPATIENT
Start: 2017-08-11 | End: 2017-08-21

## 2017-08-11 RX ORDER — FUROSEMIDE 40 MG/1
40 TABLET ORAL 2 TIMES DAILY
Qty: 60 TABLET | Refills: 3 | Status: SHIPPED | OUTPATIENT
Start: 2017-08-11 | End: 2018-01-10 | Stop reason: SDUPTHER

## 2017-08-11 RX ORDER — CYCLOBENZAPRINE HCL 10 MG
10 TABLET ORAL 3 TIMES DAILY PRN
Qty: 30 TABLET | Refills: 0 | Status: SHIPPED | OUTPATIENT
Start: 2017-08-11 | End: 2017-08-11 | Stop reason: HOSPADM

## 2017-08-11 RX ORDER — CARVEDILOL 6.25 MG/1
6.25 TABLET ORAL 2 TIMES DAILY WITH MEALS
Qty: 60 TABLET | Refills: 3 | Status: SHIPPED | OUTPATIENT
Start: 2017-08-11 | End: 2017-08-22

## 2017-08-11 RX ORDER — LIDOCAINE 50 MG/G
1 PATCH TOPICAL DAILY
Qty: 30 PATCH | Refills: 0 | Status: ON HOLD | OUTPATIENT
Start: 2017-08-11 | End: 2019-03-21 | Stop reason: ALTCHOICE

## 2017-08-11 RX ORDER — ATORVASTATIN CALCIUM 20 MG/1
20 TABLET, FILM COATED ORAL NIGHTLY
Status: DISCONTINUED | OUTPATIENT
Start: 2017-08-11 | End: 2017-08-11 | Stop reason: HOSPADM

## 2017-08-11 RX ORDER — SPIRONOLACTONE 25 MG/1
25 TABLET ORAL DAILY
Qty: 30 TABLET | Refills: 3 | Status: SHIPPED | OUTPATIENT
Start: 2017-08-11 | End: 2018-01-10 | Stop reason: SDUPTHER

## 2017-08-11 RX ORDER — ATORVASTATIN CALCIUM 20 MG/1
20 TABLET, FILM COATED ORAL NIGHTLY
Qty: 30 TABLET | Refills: 3 | Status: SHIPPED | OUTPATIENT
Start: 2017-08-11 | End: 2017-12-14 | Stop reason: SDUPTHER

## 2017-08-11 RX ORDER — ASPIRIN 81 MG/1
81 TABLET ORAL DAILY
Status: DISCONTINUED | OUTPATIENT
Start: 2017-08-11 | End: 2017-08-11 | Stop reason: HOSPADM

## 2017-08-11 RX ADMIN — CARVEDILOL 6.25 MG: 6.25 TABLET, FILM COATED ORAL at 10:29

## 2017-08-11 RX ADMIN — ENOXAPARIN SODIUM 40 MG: 40 INJECTION SUBCUTANEOUS at 10:29

## 2017-08-11 RX ADMIN — SPIRONOLACTONE 25 MG: 25 TABLET ORAL at 10:29

## 2017-08-11 RX ADMIN — LISINOPRIL 10 MG: 10 TABLET ORAL at 10:29

## 2017-08-11 RX ADMIN — FUROSEMIDE 40 MG: 40 TABLET ORAL at 10:29

## 2017-08-11 RX ADMIN — FAMOTIDINE 20 MG: 20 TABLET, FILM COATED ORAL at 10:28

## 2017-08-11 RX ADMIN — ASPIRIN 81 MG: 81 TABLET, COATED ORAL at 10:29

## 2017-08-11 RX ADMIN — CARVEDILOL 6.25 MG: 6.25 TABLET, FILM COATED ORAL at 17:32

## 2017-08-11 RX ADMIN — FUROSEMIDE 40 MG: 40 TABLET ORAL at 17:32

## 2017-08-11 ASSESSMENT — PAIN DESCRIPTION - PAIN TYPE
TYPE: CHRONIC PAIN
TYPE: CHRONIC PAIN

## 2017-08-11 ASSESSMENT — ENCOUNTER SYMPTOMS
SHORTNESS OF BREATH: 0
NAUSEA: 0
TROUBLE SWALLOWING: 0
VOMITING: 0
DIARRHEA: 0
SORE THROAT: 0
ALLERGIC/IMMUNOLOGIC NEGATIVE: 1
EYES NEGATIVE: 1
BACK PAIN: 1
COUGH: 0
CHOKING: 0
ABDOMINAL DISTENTION: 0
WHEEZING: 0
ABDOMINAL PAIN: 0

## 2017-08-11 ASSESSMENT — PAIN SCALES - GENERAL
PAINLEVEL_OUTOF10: 8
PAINLEVEL_OUTOF10: 8
PAINLEVEL_OUTOF10: 7

## 2017-08-11 ASSESSMENT — PAIN DESCRIPTION - LOCATION: LOCATION: BACK

## 2017-08-11 ASSESSMENT — PAIN DESCRIPTION - FREQUENCY: FREQUENCY: CONTINUOUS

## 2017-08-11 ASSESSMENT — PAIN DESCRIPTION - DESCRIPTORS: DESCRIPTORS: ACHING;DISCOMFORT

## 2017-08-11 ASSESSMENT — PAIN DESCRIPTION - ORIENTATION: ORIENTATION: MID;LOWER

## 2017-08-12 LAB
CULTURE: NORMAL
Lab: NORMAL
SPECIMEN DESCRIPTION: NORMAL
STATUS: NORMAL
STATUS: NORMAL

## 2017-08-13 LAB
EKG ATRIAL RATE: 57 BPM
EKG P AXIS: 76 DEGREES
EKG P-R INTERVAL: 158 MS
EKG Q-T INTERVAL: 468 MS
EKG QRS DURATION: 106 MS
EKG QTC CALCULATION (BAZETT): 455 MS
EKG R AXIS: 62 DEGREES
EKG T AXIS: 159 DEGREES
EKG VENTRICULAR RATE: 57 BPM

## 2017-08-22 ENCOUNTER — OFFICE VISIT (OUTPATIENT)
Dept: FAMILY MEDICINE CLINIC | Age: 67
End: 2017-08-22
Payer: COMMERCIAL

## 2017-08-22 ENCOUNTER — HOSPITAL ENCOUNTER (OUTPATIENT)
Age: 67
Setting detail: SPECIMEN
Discharge: HOME | End: 2017-08-22
Payer: COMMERCIAL

## 2017-08-22 VITALS
DIASTOLIC BLOOD PRESSURE: 88 MMHG | HEART RATE: 84 BPM | BODY MASS INDEX: 32.85 KG/M2 | SYSTOLIC BLOOD PRESSURE: 157 MMHG | WEIGHT: 174 LBS | RESPIRATION RATE: 16 BRPM | HEIGHT: 61 IN

## 2017-08-22 DIAGNOSIS — Z11.59 NEED FOR HEPATITIS C SCREENING TEST: ICD-10-CM

## 2017-08-22 DIAGNOSIS — I50.23 ACUTE ON CHRONIC SYSTOLIC CONGESTIVE HEART FAILURE (HCC): ICD-10-CM

## 2017-08-22 DIAGNOSIS — Z09 HOSPITAL DISCHARGE FOLLOW-UP: Primary | ICD-10-CM

## 2017-08-22 DIAGNOSIS — R60.0 EDEMA OF BOTH LEGS: ICD-10-CM

## 2017-08-22 DIAGNOSIS — E87.6 HYPOKALEMIA: Primary | ICD-10-CM

## 2017-08-22 DIAGNOSIS — Z13.820 SCREENING FOR OSTEOPOROSIS: ICD-10-CM

## 2017-08-22 DIAGNOSIS — I10 ESSENTIAL HYPERTENSION: ICD-10-CM

## 2017-08-22 DIAGNOSIS — Z12.11 SCREENING FOR COLON CANCER: ICD-10-CM

## 2017-08-22 DIAGNOSIS — Z23 NEED FOR INFLUENZA VACCINATION: ICD-10-CM

## 2017-08-22 DIAGNOSIS — M47.26 OSTEOARTHRITIS OF SPINE WITH RADICULOPATHY, LUMBAR REGION: ICD-10-CM

## 2017-08-22 DIAGNOSIS — Z12.39 SCREENING FOR BREAST CANCER: ICD-10-CM

## 2017-08-22 LAB
ALBUMIN SERPL-MCNC: 4 G/DL (ref 3.5–5.2)
ANION GAP SERPL CALCULATED.3IONS-SCNC: 17 MMOL/L (ref 9–17)
BUN BLDV-MCNC: 23 MG/DL (ref 8–23)
BUN/CREAT BLD: ABNORMAL (ref 9–20)
CALCIUM SERPL-MCNC: 9.3 MG/DL (ref 8.6–10.4)
CHLORIDE BLD-SCNC: 103 MMOL/L (ref 98–107)
CO2: 22 MMOL/L (ref 20–31)
CREAT SERPL-MCNC: 1.03 MG/DL (ref 0.5–0.9)
GFR AFRICAN AMERICAN: >60 ML/MIN
GFR NON-AFRICAN AMERICAN: 53 ML/MIN
GFR SERPL CREATININE-BSD FRML MDRD: ABNORMAL ML/MIN/{1.73_M2}
GFR SERPL CREATININE-BSD FRML MDRD: ABNORMAL ML/MIN/{1.73_M2}
GLUCOSE BLD-MCNC: 102 MG/DL (ref 70–99)
HEPATITIS C ANTIBODY: NONREACTIVE
PHOSPHORUS: 2.5 MG/DL (ref 2.6–4.5)
POTASSIUM SERPL-SCNC: 3.7 MMOL/L (ref 3.7–5.3)
SODIUM BLD-SCNC: 142 MMOL/L (ref 135–144)

## 2017-08-22 PROCEDURE — 99495 TRANSJ CARE MGMT MOD F2F 14D: CPT | Performed by: FAMILY MEDICINE

## 2017-08-22 PROCEDURE — 90471 IMMUNIZATION ADMIN: CPT | Performed by: FAMILY MEDICINE

## 2017-08-22 PROCEDURE — 90662 IIV NO PRSV INCREASED AG IM: CPT | Performed by: FAMILY MEDICINE

## 2017-08-22 RX ORDER — LISINOPRIL 40 MG/1
40 TABLET ORAL DAILY
Qty: 30 TABLET | Refills: 0 | Status: SHIPPED | OUTPATIENT
Start: 2017-08-22 | End: 2017-09-18 | Stop reason: SDUPTHER

## 2017-08-22 RX ORDER — TRAMADOL HYDROCHLORIDE 50 MG/1
50 TABLET ORAL EVERY 6 HOURS PRN
Qty: 120 TABLET | Refills: 0 | Status: SHIPPED | OUTPATIENT
Start: 2017-08-22 | End: 2017-11-16 | Stop reason: SDUPTHER

## 2017-08-22 RX ORDER — POTASSIUM CHLORIDE 750 MG/1
10 CAPSULE, EXTENDED RELEASE ORAL DAILY
Qty: 30 CAPSULE | Refills: 5 | Status: SHIPPED | OUTPATIENT
Start: 2017-08-22 | End: 2018-01-10 | Stop reason: SDUPTHER

## 2017-08-22 RX ORDER — CYCLOBENZAPRINE HCL 10 MG
TABLET ORAL
Status: ON HOLD | COMMUNITY
Start: 2017-08-11 | End: 2019-03-21

## 2017-08-22 ASSESSMENT — PATIENT HEALTH QUESTIONNAIRE - PHQ9
1. LITTLE INTEREST OR PLEASURE IN DOING THINGS: 0
SUM OF ALL RESPONSES TO PHQ9 QUESTIONS 1 & 2: 0
2. FEELING DOWN, DEPRESSED OR HOPELESS: 0
SUM OF ALL RESPONSES TO PHQ QUESTIONS 1-9: 0

## 2017-09-18 DIAGNOSIS — I10 ESSENTIAL HYPERTENSION: ICD-10-CM

## 2017-09-18 RX ORDER — LISINOPRIL 40 MG/1
TABLET ORAL
Qty: 30 TABLET | Refills: 0 | Status: SHIPPED | OUTPATIENT
Start: 2017-09-18 | End: 2017-09-26

## 2017-09-19 ENCOUNTER — HOSPITAL ENCOUNTER (OUTPATIENT)
Dept: MAMMOGRAPHY | Facility: CLINIC | Age: 67
Discharge: HOME | End: 2017-09-19
Payer: COMMERCIAL

## 2017-09-19 DIAGNOSIS — Z13.820 SCREENING FOR OSTEOPOROSIS: ICD-10-CM

## 2017-09-19 PROCEDURE — 77080 DXA BONE DENSITY AXIAL: CPT

## 2017-09-26 ENCOUNTER — OFFICE VISIT (OUTPATIENT)
Dept: FAMILY MEDICINE CLINIC | Age: 67
End: 2017-09-26
Payer: COMMERCIAL

## 2017-09-26 VITALS
HEART RATE: 72 BPM | SYSTOLIC BLOOD PRESSURE: 184 MMHG | BODY MASS INDEX: 32.47 KG/M2 | DIASTOLIC BLOOD PRESSURE: 96 MMHG | RESPIRATION RATE: 16 BRPM | WEIGHT: 172 LBS | HEIGHT: 61 IN

## 2017-09-26 DIAGNOSIS — I10 ESSENTIAL HYPERTENSION: Primary | ICD-10-CM

## 2017-09-26 DIAGNOSIS — M79.604 LEG PAIN, ANTERIOR, RIGHT: ICD-10-CM

## 2017-09-26 PROCEDURE — 3017F COLORECTAL CA SCREEN DOC REV: CPT | Performed by: FAMILY MEDICINE

## 2017-09-26 PROCEDURE — 1123F ACP DISCUSS/DSCN MKR DOCD: CPT | Performed by: FAMILY MEDICINE

## 2017-09-26 PROCEDURE — 99214 OFFICE O/P EST MOD 30 MIN: CPT | Performed by: FAMILY MEDICINE

## 2017-09-26 PROCEDURE — 1090F PRES/ABSN URINE INCON ASSESS: CPT | Performed by: FAMILY MEDICINE

## 2017-09-26 PROCEDURE — 1036F TOBACCO NON-USER: CPT | Performed by: FAMILY MEDICINE

## 2017-09-26 PROCEDURE — G8417 CALC BMI ABV UP PARAM F/U: HCPCS | Performed by: FAMILY MEDICINE

## 2017-09-26 PROCEDURE — G8399 PT W/DXA RESULTS DOCUMENT: HCPCS | Performed by: FAMILY MEDICINE

## 2017-09-26 PROCEDURE — 3014F SCREEN MAMMO DOC REV: CPT | Performed by: FAMILY MEDICINE

## 2017-09-26 PROCEDURE — 4040F PNEUMOC VAC/ADMIN/RCVD: CPT | Performed by: FAMILY MEDICINE

## 2017-09-26 PROCEDURE — G8427 DOCREV CUR MEDS BY ELIG CLIN: HCPCS | Performed by: FAMILY MEDICINE

## 2017-09-26 RX ORDER — AMLODIPINE BESYLATE 10 MG/1
10 TABLET ORAL DAILY
Qty: 30 TABLET | Refills: 3 | Status: CANCELLED | OUTPATIENT
Start: 2017-09-26

## 2017-09-26 RX ORDER — AMLODIPINE BESYLATE 5 MG/1
5 TABLET ORAL DAILY
Qty: 30 TABLET | Refills: 3 | Status: SHIPPED | OUTPATIENT
Start: 2017-09-26 | End: 2017-11-07

## 2017-09-26 RX ORDER — VALSARTAN AND HYDROCHLOROTHIAZIDE 160; 25 MG/1; MG/1
1 TABLET ORAL DAILY
Qty: 30 TABLET | Refills: 3 | Status: SHIPPED | OUTPATIENT
Start: 2017-09-26 | End: 2017-11-07

## 2017-09-26 RX ORDER — VALSARTAN AND HYDROCHLOROTHIAZIDE 160; 12.5 MG/1; MG/1
1 TABLET, FILM COATED ORAL DAILY
Qty: 30 TABLET | Refills: 3 | Status: CANCELLED | OUTPATIENT
Start: 2017-09-26

## 2017-09-27 DIAGNOSIS — M79.604 LEG PAIN, ANTERIOR, RIGHT: ICD-10-CM

## 2017-11-07 ENCOUNTER — NURSE ONLY (OUTPATIENT)
Dept: FAMILY MEDICINE CLINIC | Age: 67
End: 2017-11-07
Payer: COMMERCIAL

## 2017-11-07 VITALS
DIASTOLIC BLOOD PRESSURE: 72 MMHG | HEART RATE: 52 BPM | SYSTOLIC BLOOD PRESSURE: 146 MMHG | RESPIRATION RATE: 16 BRPM | HEIGHT: 61 IN

## 2017-11-07 DIAGNOSIS — Z23 NEED FOR 23-POLYVALENT PNEUMOCOCCAL POLYSACCHARIDE VACCINE: ICD-10-CM

## 2017-11-07 DIAGNOSIS — I10 ESSENTIAL HYPERTENSION: Primary | ICD-10-CM

## 2017-11-07 DIAGNOSIS — I49.5 SICK SINUS SYNDROME (HCC): ICD-10-CM

## 2017-11-07 DIAGNOSIS — F42.9 OBSESSIVE-COMPULSIVE DISORDER, UNSPECIFIED TYPE: ICD-10-CM

## 2017-11-07 PROCEDURE — 99213 OFFICE O/P EST LOW 20 MIN: CPT | Performed by: FAMILY MEDICINE

## 2017-11-07 PROCEDURE — 90732 PPSV23 VACC 2 YRS+ SUBQ/IM: CPT | Performed by: FAMILY MEDICINE

## 2017-11-07 PROCEDURE — 90471 IMMUNIZATION ADMIN: CPT | Performed by: FAMILY MEDICINE

## 2017-11-07 RX ORDER — RISPERIDONE 1 MG/1
1 TABLET, FILM COATED ORAL 2 TIMES DAILY
Qty: 60 TABLET | Refills: 11 | Status: ON HOLD | OUTPATIENT
Start: 2017-11-07 | End: 2019-03-21 | Stop reason: ALTCHOICE

## 2017-11-07 RX ORDER — VALSARTAN AND HYDROCHLOROTHIAZIDE 320; 25 MG/1; MG/1
1 TABLET, FILM COATED ORAL DAILY
Qty: 30 TABLET | Refills: 3 | Status: SHIPPED | OUTPATIENT
Start: 2017-11-07 | End: 2017-12-13 | Stop reason: SDUPTHER

## 2017-11-07 NOTE — PROGRESS NOTES
[facility]  COMPREHENSIVE CARE  [phone]  [address] [address]  Dept: [phone]      Axel Lindo is a 79 y.o. female who presents today for follow up on her  medical conditions as noted below. Chief Complaint   Patient presents with    Blood Pressure Check     bp check, pt states feels fine, says taking medications. Patient Active Problem List:     Leg swelling     Hypertensive urgency     Acute on chronic systolic congestive heart failure [facility])     Past Medical History:   Diagnosis Date    Acute on chronic systolic congestive heart failure ([address] 8/8/2017    Anxiety     [place] (hard of hearing)     Hyperlipidemia     Hypertension     Hypertensive urgency 8/7/2017    Leg swelling 8/7/2017      Past Surgical History:   Procedure Laterality Date    CATARACT REMOVAL      CORNEAL TRANSPLANT      EYE SURGERY      NASAL SINUS SURGERY      TONSILLECTOMY AND ADENOIDECTOMY       No family history on file. Current Outpatient Prescriptions   Medication Sig Dispense Refill    valsartan-hydrochlorothiazide (DIOVAN-HCT) 320-25 MG per tablet Take 1 tablet by mouth daily 30 tablet 3    risperiDONE (RISPERDAL) 1 MG tablet Take 1 tablet by mouth 2 times daily 60 tablet 11    cyclobenzaprine (FLEXERIL) 10 MG tablet       aspirin 81 MG tablet Take 81 mg by mouth daily      potassium chloride (MICRO-K) 10 MEQ extended release capsule Take 1 capsule by mouth daily 30 capsule 5    atorvastatin (LIPITOR) 20 MG tablet Take 1 tablet by mouth nightly 30 tablet 3    lidocaine (LIDODERM) 5 % Place 1 patch onto the skin daily 12 hours on, 12 hours off. 30 patch 0    furosemide (LASIX) 40 MG tablet Take 1 tablet by mouth 2 times daily 60 tablet 3    spironolactone (ALDACTONE) 25 MG tablet Take 1 tablet by mouth daily 30 tablet 3     No current facility-administered medications for this visit.       ALLERGIES:  No Known Allergies    Social History   Substance Use Topics    Smoking status: Never Smoker    Smokeless tobacco: Not on file    Alcohol use No        LDL Cholesterol (mg/dL)   Date Value   08/07/2017 117     HDL (mg/dL)   Date Value   08/07/2017 38 (L)     BUN (mg/dL)   Date Value   08/22/2017 23     CREATININE (mg/dL)   Date Value   08/22/2017 1.03 (H)     Glucose (mg/dL)   Date Value   08/22/2017 102 (H)     Hemoglobin A1C (%)   Date Value   08/06/2017 5.7              Subjective:      HPI  She is here today for blood pressure check by nurses thought that her heart sound a little regular so they had her see me. She states she is feeling much better I had added amlodipine to her at the last blood pressure check and it is dropping her heart rate down as the beta blockers did. She also states that she would like some Risperdal to help her with OCD and anxiety she has not been on this on the past and really worked wonderful for her    Review of Systems:     Constitutional: Negative for fever, appetite change and fatigue. Family social and medical history reviewed and unchanged     HENT: Negative. Negative for nosebleeds, trouble swallowing and neck pain. Eyes: Negative for photophobia and visual disturbance. Respiratory: Negative. Negative for chest tightness and shortness of breath. Cardiovascular: Negative. Negative for chest pain and leg swelling. Gastrointestinal: Negative. Negative for abdominal pain and blood in stool. Endocrine: Negative for cold intolerance and polyuria. Genitourinary: Negative for dysuria and hematuria. Musculoskeletal: Negative. Skin: Negative for rash. Allergic/Immunologic: Negative. Neurological: Negative. Negative for dizziness, weakness and numbness. Hematological: Negative. Negative for adenopathy. Does not bruise/bleed easily. Psychiatric/Behavioral: Negative for sleep disturbance, dysphoric mood and  decreased concentration. The patient is not nervous/anxious.         Objective:     Physical Exam: Medications    valsartan-hydrochlorothiazide (DIOVAN-HCT) 320-25 MG per tablet     Sig: Take 1 tablet by mouth daily     Dispense:  30 tablet     Refill:  3    risperiDONE (RISPERDAL) 1 MG tablet     Sig: Take 1 tablet by mouth 2 times daily     Dispense:  60 tablet     Refill:  11      stop NOrvasc   Fu in 3-4 weeks   Given prevnar 23   Electronically signed by Kaitlin Guerra DO on 11/7/2017 at 9:53 AM

## 2017-11-16 DIAGNOSIS — M47.26 OSTEOARTHRITIS OF SPINE WITH RADICULOPATHY, LUMBAR REGION: ICD-10-CM

## 2017-11-16 RX ORDER — TRAMADOL HYDROCHLORIDE 50 MG/1
50 TABLET ORAL EVERY 6 HOURS PRN
Qty: 120 TABLET | Refills: 0 | Status: SHIPPED | OUTPATIENT
Start: 2017-11-16 | End: 2017-12-16

## 2017-12-13 ENCOUNTER — OFFICE VISIT (OUTPATIENT)
Dept: FAMILY MEDICINE CLINIC | Age: 67
End: 2017-12-13
Payer: COMMERCIAL

## 2017-12-13 VITALS
RESPIRATION RATE: 16 BRPM | WEIGHT: 171.96 LBS | HEIGHT: 61 IN | BODY MASS INDEX: 32.47 KG/M2 | HEART RATE: 64 BPM | DIASTOLIC BLOOD PRESSURE: 90 MMHG | SYSTOLIC BLOOD PRESSURE: 142 MMHG

## 2017-12-13 DIAGNOSIS — I10 ESSENTIAL HYPERTENSION: ICD-10-CM

## 2017-12-13 PROCEDURE — 99212 OFFICE O/P EST SF 10 MIN: CPT | Performed by: FAMILY MEDICINE

## 2017-12-13 RX ORDER — VALSARTAN AND HYDROCHLOROTHIAZIDE 320; 25 MG/1; MG/1
1 TABLET, FILM COATED ORAL DAILY
Qty: 30 TABLET | Refills: 3 | Status: SHIPPED | OUTPATIENT
Start: 2017-12-13 | End: 2018-02-01 | Stop reason: SDUPTHER

## 2017-12-13 RX ORDER — ATORVASTATIN CALCIUM 20 MG/1
20 TABLET, FILM COATED ORAL NIGHTLY
Qty: 90 TABLET | Refills: 1 | Status: CANCELLED | OUTPATIENT
Start: 2017-12-13

## 2017-12-13 RX ORDER — AMLODIPINE BESYLATE 5 MG/1
5 TABLET ORAL DAILY
Qty: 30 TABLET | Refills: 3 | Status: SHIPPED | OUTPATIENT
Start: 2017-12-13 | End: 2018-01-10 | Stop reason: SDUPTHER

## 2017-12-14 RX ORDER — ATORVASTATIN CALCIUM 20 MG/1
20 TABLET, FILM COATED ORAL NIGHTLY
Qty: 30 TABLET | Refills: 3 | Status: SHIPPED | OUTPATIENT
Start: 2017-12-14 | End: 2018-03-19 | Stop reason: SDUPTHER

## 2018-01-10 ENCOUNTER — OFFICE VISIT (OUTPATIENT)
Dept: FAMILY MEDICINE CLINIC | Age: 68
End: 2018-01-10
Payer: COMMERCIAL

## 2018-01-10 VITALS
OXYGEN SATURATION: 100 % | HEIGHT: 61 IN | SYSTOLIC BLOOD PRESSURE: 150 MMHG | BODY MASS INDEX: 35.12 KG/M2 | WEIGHT: 186 LBS | DIASTOLIC BLOOD PRESSURE: 80 MMHG | RESPIRATION RATE: 16 BRPM

## 2018-01-10 DIAGNOSIS — I10 ESSENTIAL HYPERTENSION: ICD-10-CM

## 2018-01-10 DIAGNOSIS — E87.6 HYPOKALEMIA: ICD-10-CM

## 2018-01-10 DIAGNOSIS — M79.89 LEG SWELLING: Primary | ICD-10-CM

## 2018-01-10 PROCEDURE — 99214 OFFICE O/P EST MOD 30 MIN: CPT | Performed by: FAMILY MEDICINE

## 2018-01-10 RX ORDER — AMLODIPINE BESYLATE 10 MG/1
10 TABLET ORAL DAILY
Qty: 30 TABLET | Refills: 0 | Status: SHIPPED | OUTPATIENT
Start: 2018-01-10 | End: 2018-02-01 | Stop reason: SDUPTHER

## 2018-01-10 RX ORDER — POTASSIUM CHLORIDE 750 MG/1
20 CAPSULE, EXTENDED RELEASE ORAL DAILY
Qty: 60 CAPSULE | Refills: 5 | Status: SHIPPED | OUTPATIENT
Start: 2018-01-10 | End: 2018-02-01 | Stop reason: SDUPTHER

## 2018-01-10 RX ORDER — SPIRONOLACTONE 25 MG/1
25 TABLET ORAL DAILY
Qty: 30 TABLET | Refills: 3 | Status: SHIPPED | OUTPATIENT
Start: 2018-01-10 | End: 2018-02-01 | Stop reason: SDUPTHER

## 2018-01-10 RX ORDER — FUROSEMIDE 40 MG/1
40 TABLET ORAL 2 TIMES DAILY
Qty: 60 TABLET | Refills: 3 | Status: SHIPPED | OUTPATIENT
Start: 2018-01-10 | End: 2018-02-01 | Stop reason: SDUPTHER

## 2018-01-10 NOTE — PROGRESS NOTES
reviewed. BP (!) 150/80   Resp 16   Ht 5' 1.02\" (1.55 m)   Wt 186 lb (84.4 kg)   SpO2 100%   Breastfeeding? No   BMI 35.12 kg/m²   Constitutional: She is oriented to person, place, and time. She   appears well-developed and well-nourished. HENT:   Head: Normocephalic and atraumatic. Right Ear: External ear normal. Tympanic membrane is not erythematous. No middle ear effusion. Left Ear: External ear normal. Tympanic membrane is not erythematous. No middle ear effusion. Nose: No mucosal edema. Mouth/Throat: Oropharynx is clear and moist. No posterior oropharyngeal erythema. Eyes: Conjunctivae and EOM are normal. Pupils are equal, round, and reactive to light. Neck: Normal range of motion. Neck supple. No thyromegaly present. Cardiovascular: Normal rate, regular rhythm and normal heart sounds. No murmur heard. Pulmonary/Chest: Effort normal and breath sounds normal. She has no wheezes. Shehas no rales. Abdominal: Soft. Bowel sounds are normal. She exhibits no distension and no mass. There is no tenderness. There is no rebound and no guarding. Genitourinary/Anorectal:deferred  Musculoskeletal: Normal range of motion. She exhibits 3+  Edema also has erythema and scaling of her entire lower extremities or  notenderness. Lymphadenopathy: She has no cervical adenopathy. Neurological: She is alert and oriented to person, place, and time. She has normal reflexes. Skin: Skin is warm and dry. No rash noted. Psychiatric: She has a normal mood and affect. Her   behavior is normal.       Assessment:      1. Leg swelling    2. Essential hypertension    3. Hypokalemia          Plan:      Call or return to clinic prn if these symptoms worsen or fail to improve as anticipated. I have reviewed the instructions with the patient, answering all questions to her satisfaction. No Follow-up on file. No orders of the defined types were placed in this encounter.     Orders Placed This Encounter Medications    amLODIPine (NORVASC) 10 MG tablet     Sig: Take 1 tablet by mouth daily     Dispense:  30 tablet     Refill:  0    spironolactone (ALDACTONE) 25 MG tablet     Sig: Take 1 tablet by mouth daily     Dispense:  30 tablet     Refill:  3    furosemide (LASIX) 40 MG tablet     Sig: Take 1 tablet by mouth 2 times daily     Dispense:  60 tablet     Refill:  3    potassium chloride (MICRO-K) 10 MEQ extended release capsule     Sig: Take 2 capsules by mouth daily     Dispense:  60 capsule     Refill:  5     As with patient the imperative nature of her needing to take these water pills.   And she needs to follow-up in the office in the next 2 weeks for recheck    Electronically signed by Adam Montes DO on 1/10/2018 at 11:40 AM

## 2018-02-01 DIAGNOSIS — M79.89 LEG SWELLING: ICD-10-CM

## 2018-02-01 DIAGNOSIS — I10 ESSENTIAL HYPERTENSION: ICD-10-CM

## 2018-02-01 DIAGNOSIS — E87.6 HYPOKALEMIA: ICD-10-CM

## 2018-02-01 RX ORDER — FUROSEMIDE 40 MG/1
40 TABLET ORAL 2 TIMES DAILY
Qty: 180 TABLET | Refills: 0 | Status: SHIPPED | OUTPATIENT
Start: 2018-02-01 | End: 2018-02-08 | Stop reason: SDUPTHER

## 2018-02-01 RX ORDER — SPIRONOLACTONE 25 MG/1
25 TABLET ORAL DAILY
Qty: 90 TABLET | Refills: 0 | Status: SHIPPED | OUTPATIENT
Start: 2018-02-01 | End: 2018-02-08 | Stop reason: SDUPTHER

## 2018-02-01 RX ORDER — AMLODIPINE BESYLATE 10 MG/1
10 TABLET ORAL DAILY
Qty: 90 TABLET | Refills: 0 | Status: SHIPPED | OUTPATIENT
Start: 2018-02-01 | End: 2018-02-08 | Stop reason: SDUPTHER

## 2018-02-01 RX ORDER — VALSARTAN AND HYDROCHLOROTHIAZIDE 320; 25 MG/1; MG/1
1 TABLET, FILM COATED ORAL DAILY
Qty: 90 TABLET | Refills: 0 | Status: SHIPPED | OUTPATIENT
Start: 2018-02-01 | End: 2018-02-08 | Stop reason: SDUPTHER

## 2018-02-01 RX ORDER — POTASSIUM CHLORIDE 750 MG/1
20 CAPSULE, EXTENDED RELEASE ORAL DAILY
Qty: 180 CAPSULE | Refills: 0 | Status: SHIPPED | OUTPATIENT
Start: 2018-02-01 | End: 2018-02-08 | Stop reason: SDUPTHER

## 2018-02-08 RX ORDER — SPIRONOLACTONE 25 MG/1
25 TABLET ORAL DAILY
Qty: 90 TABLET | Refills: 1 | Status: ON HOLD | OUTPATIENT
Start: 2018-02-08 | End: 2019-03-29 | Stop reason: HOSPADM

## 2018-02-08 RX ORDER — FUROSEMIDE 40 MG/1
40 TABLET ORAL 2 TIMES DAILY
Qty: 180 TABLET | Refills: 1 | Status: ON HOLD | OUTPATIENT
Start: 2018-02-08 | End: 2019-03-21 | Stop reason: DRUGHIGH

## 2018-02-08 RX ORDER — AMLODIPINE BESYLATE 10 MG/1
10 TABLET ORAL DAILY
Qty: 90 TABLET | Refills: 1 | Status: ON HOLD | OUTPATIENT
Start: 2018-02-08 | End: 2019-03-21 | Stop reason: ALTCHOICE

## 2018-02-08 RX ORDER — POTASSIUM CHLORIDE 750 MG/1
20 CAPSULE, EXTENDED RELEASE ORAL DAILY
Qty: 180 CAPSULE | Refills: 1 | Status: ON HOLD | OUTPATIENT
Start: 2018-02-08 | End: 2019-04-04 | Stop reason: SDUPTHER

## 2018-02-08 RX ORDER — VALSARTAN AND HYDROCHLOROTHIAZIDE 320; 25 MG/1; MG/1
1 TABLET, FILM COATED ORAL DAILY
Qty: 90 TABLET | Refills: 1 | Status: ON HOLD | OUTPATIENT
Start: 2018-02-08 | End: 2019-03-21 | Stop reason: CLARIF

## 2018-02-08 NOTE — TELEPHONE ENCOUNTER
Patient called in and said that these medicines were supposed to be sent to [facility]. Please resend them - because she says if she has to keep waiting for them, she is going to just stop taking them all together.

## 2018-03-19 RX ORDER — ATORVASTATIN CALCIUM 20 MG/1
20 TABLET, FILM COATED ORAL NIGHTLY
Qty: 90 TABLET | Refills: 1 | Status: ON HOLD | OUTPATIENT
Start: 2018-03-19 | End: 2019-03-21 | Stop reason: DRUGHIGH

## 2018-10-02 ENCOUNTER — TELEPHONE (OUTPATIENT)
Dept: FAMILY MEDICINE CLINIC | Age: 68
End: 2018-10-02

## 2019-03-21 ENCOUNTER — APPOINTMENT (OUTPATIENT)
Dept: GENERAL RADIOLOGY | Facility: CLINIC | Age: 69
DRG: 226 | End: 2019-03-21
Payer: MEDICARE

## 2019-03-21 ENCOUNTER — APPOINTMENT (OUTPATIENT)
Dept: ULTRASOUND IMAGING | Facility: CLINIC | Age: 69
DRG: 226 | End: 2019-03-21
Payer: MEDICARE

## 2019-03-21 ENCOUNTER — HOSPITAL ENCOUNTER (INPATIENT)
Age: 69
LOS: 8 days | Discharge: NURSING, REHAB OR LONG-TERM CARE | DRG: 226 | End: 2019-03-29
Attending: EMERGENCY MEDICINE | Admitting: INTERNAL MEDICINE
Payer: MEDICARE

## 2019-03-21 DIAGNOSIS — F41.0 PANIC DISORDER: ICD-10-CM

## 2019-03-21 DIAGNOSIS — I50.9 ACUTE ON CHRONIC CONGESTIVE HEART FAILURE, UNSPECIFIED HEART FAILURE TYPE (HCC): Primary | ICD-10-CM

## 2019-03-21 DIAGNOSIS — L03.115 CELLULITIS OF BOTH LOWER EXTREMITIES: ICD-10-CM

## 2019-03-21 DIAGNOSIS — L03.116 CELLULITIS OF BOTH LOWER EXTREMITIES: ICD-10-CM

## 2019-03-21 PROBLEM — F32.A DEPRESSION WITH SUICIDAL IDEATION: Status: ACTIVE | Noted: 2019-03-21

## 2019-03-21 PROBLEM — I21.4 NSTEMI (NON-ST ELEVATED MYOCARDIAL INFARCTION) (HCC): Status: ACTIVE | Noted: 2019-03-21

## 2019-03-21 PROBLEM — I50.23 ACUTE ON CHRONIC SYSTOLIC CONGESTIVE HEART FAILURE (HCC): Status: ACTIVE | Noted: 2019-03-21

## 2019-03-21 PROBLEM — R45.851 DEPRESSION WITH SUICIDAL IDEATION: Status: ACTIVE | Noted: 2019-03-21

## 2019-03-21 PROBLEM — I48.91 NEW ONSET A-FIB (HCC): Status: ACTIVE | Noted: 2019-03-21

## 2019-03-21 PROBLEM — I89.0 LYMPHEDEMA OF BOTH LOWER EXTREMITIES: Status: ACTIVE | Noted: 2019-03-21

## 2019-03-21 PROBLEM — I25.10 CORONARY ARTERY DISEASE INVOLVING NATIVE CORONARY ARTERY OF NATIVE HEART WITHOUT ANGINA PECTORIS: Status: ACTIVE | Noted: 2019-03-21

## 2019-03-21 LAB
-: ABNORMAL
-: NORMAL
ABSOLUTE EOS #: 0 K/UL (ref 0–0.4)
ABSOLUTE IMMATURE GRANULOCYTE: ABNORMAL K/UL (ref 0–0.3)
ABSOLUTE LYMPH #: 0.8 K/UL (ref 1–4.8)
ABSOLUTE MONO #: 0.4 K/UL (ref 0.1–1.2)
ALBUMIN SERPL-MCNC: 2.8 G/DL (ref 3.5–5.2)
ALBUMIN/GLOBULIN RATIO: 1 (ref 1–2.5)
ALP BLD-CCNC: 82 U/L (ref 35–104)
ALT SERPL-CCNC: 13 U/L (ref 5–33)
AMORPHOUS: ABNORMAL
ANION GAP SERPL CALCULATED.3IONS-SCNC: 14 MMOL/L (ref 9–17)
AST SERPL-CCNC: 20 U/L
BACTERIA: ABNORMAL
BASOPHILS # BLD: 1 % (ref 0–2)
BASOPHILS ABSOLUTE: 0.1 K/UL (ref 0–0.2)
BILIRUB SERPL-MCNC: 0.9 MG/DL (ref 0.3–1.2)
BILIRUBIN URINE: ABNORMAL
BNP INTERPRETATION: ABNORMAL
BUN BLDV-MCNC: 20 MG/DL (ref 8–23)
BUN/CREAT BLD: ABNORMAL (ref 9–20)
CALCIUM SERPL-MCNC: 7 MG/DL (ref 8.6–10.4)
CASTS UA: ABNORMAL /LPF (ref 0–2)
CHLORIDE BLD-SCNC: 115 MMOL/L (ref 98–107)
CO2: 17 MMOL/L (ref 20–31)
COLOR: YELLOW
COMMENT UA: ABNORMAL
CREAT SERPL-MCNC: 0.8 MG/DL (ref 0.5–0.9)
CRYSTALS, UA: ABNORMAL /HPF
DIFFERENTIAL TYPE: ABNORMAL
EOSINOPHILS RELATIVE PERCENT: 0 % (ref 1–4)
EPITHELIAL CELLS UA: ABNORMAL /HPF (ref 0–5)
GFR AFRICAN AMERICAN: >60 ML/MIN
GFR NON-AFRICAN AMERICAN: >60 ML/MIN
GFR SERPL CREATININE-BSD FRML MDRD: ABNORMAL ML/MIN/{1.73_M2}
GFR SERPL CREATININE-BSD FRML MDRD: ABNORMAL ML/MIN/{1.73_M2}
GLUCOSE BLD-MCNC: 140 MG/DL (ref 70–99)
GLUCOSE URINE: NEGATIVE
HCT VFR BLD CALC: 40.9 % (ref 36–46)
HEMOGLOBIN: 12.7 G/DL (ref 12–16)
IMMATURE GRANULOCYTES: ABNORMAL %
INR BLD: 1.4
KETONES, URINE: ABNORMAL
LACTIC ACID: 2.7 MMOL/L (ref 0.5–2.2)
LEUKOCYTE ESTERASE, URINE: NEGATIVE
LYMPHOCYTES # BLD: 10 % (ref 24–44)
MCH RBC QN AUTO: 26.8 PG (ref 26–34)
MCHC RBC AUTO-ENTMCNC: 31 G/DL (ref 31–37)
MCV RBC AUTO: 86.4 FL (ref 80–100)
MONOCYTES # BLD: 5 % (ref 2–11)
MUCUS: ABNORMAL
MYOGLOBIN: 26 NG/ML (ref 25–58)
NITRITE, URINE: NEGATIVE
NRBC AUTOMATED: ABNORMAL PER 100 WBC
OTHER OBSERVATIONS UA: ABNORMAL
PDW BLD-RTO: 17.4 % (ref 12.5–15.4)
PH UA: 5 (ref 5–8)
PLATELET # BLD: 239 K/UL (ref 140–450)
PLATELET ESTIMATE: ABNORMAL
PMV BLD AUTO: 9.4 FL (ref 6–12)
POTASSIUM SERPL-SCNC: 3.7 MMOL/L (ref 3.7–5.3)
PRO-BNP: ABNORMAL PG/ML
PROTEIN UA: ABNORMAL
PROTHROMBIN TIME: 13.9 SEC (ref 9.4–12.6)
RBC # BLD: 4.73 M/UL (ref 4–5.2)
RBC # BLD: ABNORMAL 10*6/UL
RBC UA: ABNORMAL /HPF (ref 0–2)
REASON FOR REJECTION: NORMAL
RENAL EPITHELIAL, UA: ABNORMAL /HPF
SEG NEUTROPHILS: 84 % (ref 36–66)
SEGMENTED NEUTROPHILS ABSOLUTE COUNT: 6.3 K/UL (ref 1.8–7.7)
SODIUM BLD-SCNC: 146 MMOL/L (ref 135–144)
SPECIFIC GRAVITY UA: 1.03 (ref 1–1.03)
TOTAL PROTEIN: 5.6 G/DL (ref 6.4–8.3)
TRICHOMONAS: ABNORMAL
TROPONIN INTERP: ABNORMAL
TROPONIN T: ABNORMAL NG/ML
TROPONIN, HIGH SENSITIVITY: 24 NG/L (ref 0–14)
TROPONIN, HIGH SENSITIVITY: 31 NG/L (ref 0–14)
TROPONIN, HIGH SENSITIVITY: 40 NG/L (ref 0–14)
TURBIDITY: CLEAR
URINE HGB: ABNORMAL
UROBILINOGEN, URINE: NORMAL
WBC # BLD: 7.5 K/UL (ref 3.5–11)
WBC # BLD: ABNORMAL 10*3/UL
WBC UA: ABNORMAL /HPF (ref 0–5)
YEAST: ABNORMAL
ZZ NTE CLEAN UP: ORDERED TEST: NORMAL
ZZ NTE WITH NAME CLEAN UP: SPECIMEN SOURCE: NORMAL

## 2019-03-21 PROCEDURE — 6360000002 HC RX W HCPCS: Performed by: INTERNAL MEDICINE

## 2019-03-21 PROCEDURE — 2580000003 HC RX 258: Performed by: EMERGENCY MEDICINE

## 2019-03-21 PROCEDURE — 84484 ASSAY OF TROPONIN QUANT: CPT

## 2019-03-21 PROCEDURE — 6370000000 HC RX 637 (ALT 250 FOR IP): Performed by: INTERNAL MEDICINE

## 2019-03-21 PROCEDURE — 81001 URINALYSIS AUTO W/SCOPE: CPT

## 2019-03-21 PROCEDURE — 99285 EMERGENCY DEPT VISIT HI MDM: CPT

## 2019-03-21 PROCEDURE — 96365 THER/PROPH/DIAG IV INF INIT: CPT

## 2019-03-21 PROCEDURE — 87040 BLOOD CULTURE FOR BACTERIA: CPT

## 2019-03-21 PROCEDURE — 80053 COMPREHEN METABOLIC PANEL: CPT

## 2019-03-21 PROCEDURE — 1200000000 HC SEMI PRIVATE

## 2019-03-21 PROCEDURE — 83605 ASSAY OF LACTIC ACID: CPT

## 2019-03-21 PROCEDURE — 6360000002 HC RX W HCPCS: Performed by: EMERGENCY MEDICINE

## 2019-03-21 PROCEDURE — 2580000003 HC RX 258: Performed by: INTERNAL MEDICINE

## 2019-03-21 PROCEDURE — 83874 ASSAY OF MYOGLOBIN: CPT

## 2019-03-21 PROCEDURE — 93970 EXTREMITY STUDY: CPT

## 2019-03-21 PROCEDURE — 99223 1ST HOSP IP/OBS HIGH 75: CPT | Performed by: INTERNAL MEDICINE

## 2019-03-21 PROCEDURE — 85610 PROTHROMBIN TIME: CPT

## 2019-03-21 PROCEDURE — 71045 X-RAY EXAM CHEST 1 VIEW: CPT

## 2019-03-21 PROCEDURE — 93005 ELECTROCARDIOGRAM TRACING: CPT

## 2019-03-21 PROCEDURE — 85025 COMPLETE CBC W/AUTO DIFF WBC: CPT

## 2019-03-21 PROCEDURE — 2500000003 HC RX 250 WO HCPCS: Performed by: INTERNAL MEDICINE

## 2019-03-21 PROCEDURE — 36415 COLL VENOUS BLD VENIPUNCTURE: CPT

## 2019-03-21 PROCEDURE — 96375 TX/PRO/DX INJ NEW DRUG ADDON: CPT

## 2019-03-21 PROCEDURE — 83880 ASSAY OF NATRIURETIC PEPTIDE: CPT

## 2019-03-21 RX ORDER — FUROSEMIDE 10 MG/ML
40 INJECTION INTRAMUSCULAR; INTRAVENOUS 2 TIMES DAILY
Status: DISCONTINUED | OUTPATIENT
Start: 2019-03-21 | End: 2019-03-23

## 2019-03-21 RX ORDER — SODIUM CHLORIDE 0.9 % (FLUSH) 0.9 %
10 SYRINGE (ML) INJECTION PRN
Status: DISCONTINUED | OUTPATIENT
Start: 2019-03-21 | End: 2019-03-29 | Stop reason: SDUPTHER

## 2019-03-21 RX ORDER — MULTIVITAMIN WITH FOLIC ACID 400 MCG
1 TABLET ORAL DAILY
Status: DISCONTINUED | OUTPATIENT
Start: 2019-03-21 | End: 2019-03-29 | Stop reason: HOSPADM

## 2019-03-21 RX ORDER — METOPROLOL SUCCINATE 25 MG/1
25 TABLET, EXTENDED RELEASE ORAL DAILY
Status: ON HOLD | COMMUNITY
End: 2019-03-29 | Stop reason: HOSPADM

## 2019-03-21 RX ORDER — METOPROLOL SUCCINATE 25 MG/1
25 TABLET, EXTENDED RELEASE ORAL DAILY
Status: DISCONTINUED | OUTPATIENT
Start: 2019-03-22 | End: 2019-03-23

## 2019-03-21 RX ORDER — ACETAMINOPHEN 325 MG/1
650 TABLET ORAL EVERY 4 HOURS PRN
Status: DISCONTINUED | OUTPATIENT
Start: 2019-03-21 | End: 2019-03-29 | Stop reason: SDUPTHER

## 2019-03-21 RX ORDER — ASPIRIN 81 MG/1
81 TABLET ORAL DAILY
Status: DISCONTINUED | OUTPATIENT
Start: 2019-03-21 | End: 2019-03-22

## 2019-03-21 RX ORDER — ATORVASTATIN CALCIUM 80 MG/1
80 TABLET, FILM COATED ORAL NIGHTLY
Status: DISCONTINUED | OUTPATIENT
Start: 2019-03-21 | End: 2019-03-29 | Stop reason: HOSPADM

## 2019-03-21 RX ORDER — ATORVASTATIN CALCIUM 20 MG/1
20 TABLET, FILM COATED ORAL NIGHTLY
Status: DISCONTINUED | OUTPATIENT
Start: 2019-03-21 | End: 2019-03-21

## 2019-03-21 RX ORDER — AMLODIPINE BESYLATE 10 MG/1
10 TABLET ORAL DAILY
Status: DISCONTINUED | OUTPATIENT
Start: 2019-03-21 | End: 2019-03-21 | Stop reason: CLARIF

## 2019-03-21 RX ORDER — ONDANSETRON 2 MG/ML
4 INJECTION INTRAMUSCULAR; INTRAVENOUS EVERY 6 HOURS PRN
Status: DISCONTINUED | OUTPATIENT
Start: 2019-03-21 | End: 2019-03-21 | Stop reason: CLARIF

## 2019-03-21 RX ORDER — SODIUM CHLORIDE 0.9 % (FLUSH) 0.9 %
10 SYRINGE (ML) INJECTION EVERY 12 HOURS SCHEDULED
Status: DISCONTINUED | OUTPATIENT
Start: 2019-03-21 | End: 2019-03-29 | Stop reason: SDUPTHER

## 2019-03-21 RX ORDER — VALSARTAN 320 MG/1
320 TABLET ORAL DAILY
Status: DISCONTINUED | OUTPATIENT
Start: 2019-03-21 | End: 2019-03-21 | Stop reason: CLARIF

## 2019-03-21 RX ORDER — ONDANSETRON 4 MG/1
4 TABLET, ORALLY DISINTEGRATING ORAL EVERY 6 HOURS PRN
Status: DISCONTINUED | OUTPATIENT
Start: 2019-03-21 | End: 2019-03-29 | Stop reason: HOSPADM

## 2019-03-21 RX ORDER — CARVEDILOL 3.12 MG/1
3.12 TABLET ORAL 2 TIMES DAILY WITH MEALS
Status: DISCONTINUED | OUTPATIENT
Start: 2019-03-22 | End: 2019-03-21 | Stop reason: ALTCHOICE

## 2019-03-21 RX ORDER — VALSARTAN AND HYDROCHLOROTHIAZIDE 320; 25 MG/1; MG/1
1 TABLET, FILM COATED ORAL DAILY
Status: DISCONTINUED | OUTPATIENT
Start: 2019-03-21 | End: 2019-03-21

## 2019-03-21 RX ORDER — ONDANSETRON 2 MG/ML
4 INJECTION INTRAMUSCULAR; INTRAVENOUS EVERY 6 HOURS PRN
Status: DISCONTINUED | OUTPATIENT
Start: 2019-03-21 | End: 2019-03-29 | Stop reason: HOSPADM

## 2019-03-21 RX ORDER — SPIRONOLACTONE 25 MG/1
25 TABLET ORAL DAILY
Status: DISCONTINUED | OUTPATIENT
Start: 2019-03-21 | End: 2019-03-26

## 2019-03-21 RX ORDER — ATORVASTATIN CALCIUM 80 MG/1
80 TABLET, FILM COATED ORAL NIGHTLY
Status: ON HOLD | COMMUNITY
End: 2019-03-29 | Stop reason: HOSPADM

## 2019-03-21 RX ORDER — FUROSEMIDE 10 MG/ML
40 INJECTION INTRAMUSCULAR; INTRAVENOUS ONCE
Status: COMPLETED | OUTPATIENT
Start: 2019-03-21 | End: 2019-03-21

## 2019-03-21 RX ORDER — RISPERIDONE 1 MG/1
1 TABLET, FILM COATED ORAL 2 TIMES DAILY
Status: DISCONTINUED | OUTPATIENT
Start: 2019-03-21 | End: 2019-03-21 | Stop reason: CLARIF

## 2019-03-21 RX ORDER — M-VIT,TX,IRON,MINS/CALC/FOLIC 27MG-0.4MG
1 TABLET ORAL DAILY
Status: ON HOLD | COMMUNITY
End: 2019-03-29 | Stop reason: HOSPADM

## 2019-03-21 RX ORDER — FUROSEMIDE 40 MG/1
40 TABLET ORAL DAILY
Status: ON HOLD | COMMUNITY
End: 2019-03-21 | Stop reason: DRUGHIGH

## 2019-03-21 RX ORDER — NICOTINE 21 MG/24HR
1 PATCH, TRANSDERMAL 24 HOURS TRANSDERMAL DAILY PRN
Status: DISCONTINUED | OUTPATIENT
Start: 2019-03-21 | End: 2019-03-29 | Stop reason: HOSPADM

## 2019-03-21 RX ORDER — HYDROCHLOROTHIAZIDE 25 MG/1
25 TABLET ORAL DAILY
Status: DISCONTINUED | OUTPATIENT
Start: 2019-03-21 | End: 2019-03-21 | Stop reason: CLARIF

## 2019-03-21 RX ORDER — FUROSEMIDE 20 MG/1
20 TABLET ORAL DAILY
Status: ON HOLD | COMMUNITY
End: 2019-03-29 | Stop reason: HOSPADM

## 2019-03-21 RX ADMIN — MULTIVITAMIN TABLET 1 TABLET: TABLET at 18:04

## 2019-03-21 RX ADMIN — TICAGRELOR 90 MG: 90 TABLET ORAL at 20:26

## 2019-03-21 RX ADMIN — ATORVASTATIN CALCIUM 80 MG: 80 TABLET, FILM COATED ORAL at 20:26

## 2019-03-21 RX ADMIN — MICONAZOLE NITRATE: 20.6 POWDER TOPICAL at 20:39

## 2019-03-21 RX ADMIN — Medication 10 ML: at 20:29

## 2019-03-21 RX ADMIN — SACUBITRIL AND VALSARTAN 1 TABLET: 49; 51 TABLET, FILM COATED ORAL at 20:35

## 2019-03-21 RX ADMIN — SPIRONOLACTONE 25 MG: 25 TABLET ORAL at 18:05

## 2019-03-21 RX ADMIN — ASPIRIN 81 MG: 81 TABLET ORAL at 18:05

## 2019-03-21 RX ADMIN — FUROSEMIDE 40 MG: 10 INJECTION, SOLUTION INTRAVENOUS at 10:15

## 2019-03-21 RX ADMIN — FUROSEMIDE 40 MG: 10 INJECTION, SOLUTION INTRAMUSCULAR; INTRAVENOUS at 20:26

## 2019-03-21 RX ADMIN — ENOXAPARIN SODIUM 40 MG: 40 INJECTION SUBCUTANEOUS at 18:05

## 2019-03-21 RX ADMIN — VANCOMYCIN HYDROCHLORIDE 2500 MG: 1 INJECTION, POWDER, LYOPHILIZED, FOR SOLUTION INTRAVENOUS at 11:48

## 2019-03-21 ASSESSMENT — PAIN SCALES - GENERAL
PAINLEVEL_OUTOF10: 3
PAINLEVEL_OUTOF10: 3
PAINLEVEL_OUTOF10: 4
PAINLEVEL_OUTOF10: 4

## 2019-03-21 ASSESSMENT — ENCOUNTER SYMPTOMS
EYE PAIN: 0
CONSTIPATION: 0
BACK PAIN: 0
VOMITING: 0
NAUSEA: 0
DIARRHEA: 0
COUGH: 0
ABDOMINAL PAIN: 0
SHORTNESS OF BREATH: 0
BLOOD IN STOOL: 0

## 2019-03-21 ASSESSMENT — PAIN DESCRIPTION - ORIENTATION: ORIENTATION: LOWER

## 2019-03-21 ASSESSMENT — PAIN DESCRIPTION - FREQUENCY
FREQUENCY: CONTINUOUS

## 2019-03-21 ASSESSMENT — PAIN DESCRIPTION - PROGRESSION
CLINICAL_PROGRESSION: NOT CHANGED
CLINICAL_PROGRESSION: NOT CHANGED

## 2019-03-21 ASSESSMENT — PAIN - FUNCTIONAL ASSESSMENT: PAIN_FUNCTIONAL_ASSESSMENT: INTOLERABLE, UNABLE TO DO ANY ACTIVE OR PASSIVE ACTIVITIES

## 2019-03-21 ASSESSMENT — PAIN DESCRIPTION - ONSET
ONSET: ON-GOING

## 2019-03-21 ASSESSMENT — PAIN DESCRIPTION - DESCRIPTORS
DESCRIPTORS: ACHING
DESCRIPTORS: SORE;BURNING

## 2019-03-21 ASSESSMENT — PAIN DESCRIPTION - PAIN TYPE
TYPE: ACUTE PAIN

## 2019-03-21 ASSESSMENT — PAIN DESCRIPTION - LOCATION
LOCATION: GENERALIZED;BACK;LEG
LOCATION: GENERALIZED
LOCATION: LEG
LOCATION: GENERALIZED;BACK

## 2019-03-21 NOTE — FLOWSHEET NOTE
RN contacted wound care via perfect serve message regarding new consult for desquamation edema of BLE. Awaiting response at this time. Will continue to monitor closely.

## 2019-03-21 NOTE — ED NOTES
Pt placed on continuous cardiac and pulse oximetry monitoring.         Amador Thomas RN  03/21/19 [id number]

## 2019-03-21 NOTE — ED NOTES
Pt presents to the ED with a c/c of weakness and not feeling well for the past few days. Pt states she has not been able to get up out of her chair for the past 2 days and she has not been taking her medicines. Pt states that she has been incontinent. Pt states she has lymphedema and a wound on her lower right extremity that has been weeping and needing dressed. Pt states that she has thought about ending her life \"to just get out of this mess. \" Pt states she has not made any plans however she does have a history of a suicide attempt a few years ago by taking pills. PT states that upon arrival of EMS she had \"crushing chest pain\" and EMS states that they gave her 4 baby ASA. PT denies any chest pain at this time however she states that gets SOB with talking and walking. Pt denies nausea, vomiting, diarrhea, fevers, or chills. Pt resting in bed in NAD, skin pink warm and dry, respirations even and unlabored and call light within reach.      Adal Kaur RN  03/21/19 [id number]

## 2019-03-21 NOTE — PROGRESS NOTES
Transitions of Care Pharmacy Service   Medication Review    The patient's list of current home medications has been reviewed. Source(s) of information: Patient/ [facility]/ [facility]    Based on information provided by the above source(s), I have updated the patient's home med list as described below. Please review the ACTION REQUESTED BY PHYSICIAN section of this note below for any discrepancies on current hospital orders. I changed or updated the following medications on the patient's home medication list:  Discontinued Norvasc 10mg daily - tx completed  Diovan -HCTZ 320/25mg daily - changed to Entresto  Risperal 1mg BID - taken over 1 yr ago  Flexeril 10mg - tx completed  Lidoderm patch - tx completed     Added Toprol XL 25mg daily  Entresto 49/51 1 BID  Multivitamin daily     Adjusted   Lasix from 40mg BID to 20mg daily  Lipitor from 20mg to 80 mg nightly   Other Notes Pt states both Plavix and Brilinta cause her shortness of breath so she stopped both. PHYSICIAN ACTION REQUESTED  Discrepancies on current hospital orders that need to be addressed by a physician:    Medication Action Requested   Norvasc  Diovan-HCTZ  Risperal  Toprol XL  Entresto  Multivitamin  Lipitor     Please DC as not current      Please review/reconcile and order as appropriate    Please adjust dose to home dosing as appropriate. Please feel free to call me with any questions about this encounter. Thank you.     Yon Olivares [facility]   Transitions of Care Pharmacy Service  Phone:  [phone]  Fax: [phone]      Electronically signed by Yon Olivares [facility] on 3/21/2019 at 5:40 PM           Medications Prior to Admission: atorvastatin (LIPITOR) 80 MG tablet, Take 80 mg by mouth nightly  metoprolol succinate (TOPROL XL) 25 MG extended release tablet, Take 25 mg by mouth daily  sacubitril-valsartan (ENTRESTO) 49-51 MG per tablet, Take 1 tablet by mouth 2 times daily  Multiple Vitamins-Minerals (THERAPEUTIC MULTIVITAMIN-MINERALS) tablet, Take 1 tablet by mouth daily  furosemide (LASIX) 20 MG tablet, Take 20 mg by mouth daily  potassium chloride (MICRO-K) 10 MEQ extended release capsule, Take 2 capsules by mouth daily  spironolactone (ALDACTONE) 25 MG tablet, Take 1 tablet by mouth daily  aspirin 81 MG tablet, Take 81 mg by mouth daily  [DISCONTINUED] cyclobenzaprine (FLEXERIL) 10 MG tablet,

## 2019-03-21 NOTE — H&P
[facility]    HISTORY AND PHYSICAL EXAMINATION            Date:   3/21/2019  Patient name:  Jersey Stubbs  Date of admission:  3/21/2019  8:40 AM  MRN:   [id number]  Account:  [de-identified]  YOB: 1950  PCP:    Irlanda Romeo DO  Room:   2036/2036-01  Code Status:    Full Code    Chief Complaint:     Chief Complaint   Patient presents with    Illness       History Obtained From:     patient, electronic medical record    History of Present Illness:   75 y/o brought in to [facility] ED with c/o SOB, generalized weakness , incontinence of urine in last 2 d. Patient indicates she has been off her medications for >1month , but unable to tell me why she chose to not take them. She states has been ' feeling miserable since last cath in 10/2018 and ' may be I saw this as a way out of my misery'. ' Felt like I was terminal and wanted to make some kind of arrangement'. She then went on to say' I want to feel better though!' Very emotional. Indicates has very minimal help at home from , son ' keeps busy with his work'. ' Have been peeing in bed unable to get up and use bathroom'. Per EMS house was filled with trash and 'essentially uninhabitable' . ED w/u: Afib, , SBP 170s, hypoxia 80s on RA, Na 146, CO2 17, BNP 59100, Hb 12.7, WBC 7.5, u/a unremarkable, HS trop 31, EKG: Afib, unspecific ST/T changes. CXR compatible with CHF .  - Has prior h/o CAD s/p PCI x2 latest 10/2018, chronic Sys CHF, HTN, HPL. Patient indicates she did not tolerate plavix/ bryllinta :unable to elaborate.     Past Medical History:     Past Medical History:   Diagnosis Date    Acute on chronic systolic congestive heart failure ([address] 8/8/2017    Anxiety     [place] (hard of hearing)     Hyperlipidemia     Hypertension     Hypertensive urgency 8/7/2017    Leg swelling 8/7/2017        Past Surgical History:     Past Surgical History:   Procedure Laterality Date  CATARACT REMOVAL      CORNEAL TRANSPLANT      EYE SURGERY      NASAL SINUS SURGERY      TONSILLECTOMY AND ADENOIDECTOMY          Medications Prior to Admission:     Prior to Admission medications    Medication Sig Start Date End Date Taking? Authorizing Provider   atorvastatin (LIPITOR) 80 MG tablet Take 80 mg by mouth nightly   Yes Historical Provider, MD   metoprolol succinate (TOPROL XL) 25 MG extended release tablet Take 25 mg by mouth daily   Yes Historical Provider, MD   sacubitril-valsartan (ENTRESTO) 49-51 MG per tablet Take 1 tablet by mouth 2 times daily   Yes Historical Provider, MD   Multiple Vitamins-Minerals (THERAPEUTIC MULTIVITAMIN-MINERALS) tablet Take 1 tablet by mouth daily   Yes Historical Provider, MD   furosemide (LASIX) 20 MG tablet Take 20 mg by mouth daily   Yes Historical Provider, MD   potassium chloride (MICRO-K) 10 MEQ extended release capsule Take 2 capsules by mouth daily 2/8/18  Yes Elise Muir,    spironolactone (ALDACTONE) 25 MG tablet Take 1 tablet by mouth daily 2/8/18  Yes Elise Muir, DO   aspirin 81 MG tablet Take 81 mg by mouth daily   Yes Historical Provider, MD        Allergies:     Patient has no known allergies. Social History:     Tobacco:    reports that she has never smoked. She has never used smokeless tobacco.  Alcohol:      reports that she does not drink alcohol. Drug Use:  reports that she does not use drugs. Family History:     History reviewed. No pertinent family history. Review of Systems:     Positive and Negative as described in HPI. CONSTITUTIONAL:  negative for fevers, chills, sweats, fatigue, weight loss  HEENT:  negative for vision, hearing changes, runny nose, throat pain  RESPIRATORY:  negative for  cough, congestion, wheezing. CARDIOVASCULAR:  negative for chest pain, palpitations.   GASTROINTESTINAL:  negative for nausea, vomiting, diarrhea, constipation, change in bowel habits, abdominal pain   GENITOURINARY:  negative for into lower thigh and oozing. Rt LE post calf with area of induration, warmth and small area of fluctuance. Investigations:      Laboratory Testing:  Recent Results (from the past 24 hour(s))   Comprehensive Metabolic Panel    Collection Time: 03/21/19  8:45 AM   Result Value Ref Range    Glucose 140 (H) 70 - 99 mg/dL    BUN 20 8 - 23 mg/dL    CREATININE 0.80 0.50 - 0.90 mg/dL    Bun/Cre Ratio NOT REPORTED 9 - 20    Calcium 7.0 (L) 8.6 - 10.4 mg/dL    Sodium 146 (H) 135 - 144 mmol/L    Potassium 3.7 3.7 - 5.3 mmol/L    Chloride 115 (H) 98 - 107 mmol/L    CO2 17 (L) 20 - 31 mmol/L    Anion Gap 14 9 - 17 mmol/L    Alkaline Phosphatase 82 35 - 104 U/L    ALT 13 5 - 33 U/L    AST 20 <32 U/L    Total Bilirubin 0.90 0.3 - 1.2 mg/dL    Total Protein 5.6 (L) 6.4 - 8.3 g/dL    Alb 2.8 (L) 3.5 - 5.2 g/dL    Albumin/Globulin Ratio 1.0 1.0 - 2.5    GFR Non-African American >60 >60 mL/min    GFR African American >60 >60 mL/min    GFR Comment          GFR Staging NOT REPORTED    Lactic Acid    Collection Time: 03/21/19  8:45 AM   Result Value Ref Range    Lactic Acid 2.7 (H) 0.5 - 2.2 mmol/L   Protime-INR    Collection Time: 03/21/19  8:45 AM   Result Value Ref Range    Protime 13.9 (H) 9.4 - 12.6 sec    INR 1.4    Myoglobin, Serum    Collection Time: 03/21/19  8:45 AM   Result Value Ref Range    Myoglobin 26 25 - 58 ng/mL   Troponin    Collection Time: 03/21/19  8:45 AM   Result Value Ref Range    Troponin, High Sensitivity 24 (H) 0 - 14 ng/L    Troponin T NOT REPORTED <0.03 ng/mL    Troponin Interp NOT REPORTED    Brain Natriuretic Peptide    Collection Time: 03/21/19  8:45 AM   Result Value Ref Range    Pro-BNP 12,553 (H) <300 pg/mL    BNP Interpretation Pro-BNP Reference Range:    SPECIMEN REJECTION    Collection Time: 03/21/19  8:45 AM   Result Value Ref Range    Specimen Source . BLOOD     Ordered Test CDP     Reason for Rejection Unable to perform testing: Specimen clotted.      - NOT REPORTED    EKG 12 Lead    Collection Time: 03/21/19  8:48 AM   Result Value Ref Range    Ventricular Rate 84 BPM    Atrial Rate 102 BPM    QRS Duration 102 ms    Q-T Interval 390 ms    QTc Calculation (Bazett) 460 ms    R Axis -20 degrees    T Axis 133 degrees   Culture Blood #1    Collection Time: 03/21/19  9:05 AM   Result Value Ref Range    Specimen Description . BLOOD     Special Requests LEFT ANTECUBE 20CC     Culture NO GROWTH 3 HOURS    Culture Blood #1    Collection Time: 03/21/19  9:15 AM   Result Value Ref Range    Specimen Description . BLOOD     Special Requests RIGHT HAND 20CC     Culture NO GROWTH 3 HOURS    CBC Auto Differential    Collection Time: 03/21/19  9:34 AM   Result Value Ref Range    WBC 7.5 3.5 - 11.0 k/uL    RBC 4.73 4.0 - 5.2 m/uL    Hemoglobin 12.7 12.0 - 16.0 g/dL    Hematocrit 40.9 36 - 46 %    MCV 86.4 80 - 100 fL    MCH 26.8 26 - 34 pg    MCHC 31.0 31 - 37 g/dL    RDW 17.4 (H) 12.5 - 15.4 %    Platelets 036 045 - 136 k/uL    MPV 9.4 6.0 - 12.0 fL    NRBC Automated NOT REPORTED per 100 WBC    Differential Type NOT REPORTED     Seg Neutrophils 84 (H) 36 - 66 %    Lymphocytes 10 (L) 24 - 44 %    Monocytes 5 2 - 11 %    Eosinophils % 0 (L) 1 - 4 %    Basophils 1 0 - 2 %    Immature Granulocytes NOT REPORTED 0 %    Segs Absolute 6.30 1.8 - 7.7 k/uL    Absolute Lymph # 0.80 (L) 1.0 - 4.8 k/uL    Absolute Mono # 0.40 0.1 - 1.2 k/uL    Absolute Eos # 0.00 0.0 - 0.4 k/uL    Basophils # 0.10 0.0 - 0.2 k/uL    Absolute Immature Granulocyte NOT REPORTED 0.00 - 0.30 k/uL    WBC Morphology NOT REPORTED     RBC Morphology NOT REPORTED     Platelet Estimate NOT REPORTED    Urinalysis    Collection Time: 03/21/19 10:00 AM   Result Value Ref Range    Color, UA YELLOW YELLOW    Turbidity UA CLEAR CLEAR    Glucose, Ur NEGATIVE NEGATIVE    Bilirubin Urine NEGATIVE  Verified by ictotest. (A) NEGATIVE    Ketones, Urine TRACE (A) NEGATIVE    Specific Gravity, UA 1.029 1.005 - 1.030    Urine Hgb TRACE (A) NEGATIVE    pH, UA 5.0 5.0 - 8.0 Protein, UA 3+ (A) NEGATIVE    Urobilinogen, Urine Normal Normal    Nitrite, Urine NEGATIVE NEGATIVE    Leukocyte Esterase, Urine NEGATIVE NEGATIVE    Urinalysis Comments NOT REPORTED    Microscopic Urinalysis    Collection Time: 03/21/19 10:00 AM   Result Value Ref Range    -          WBC, UA 0 TO 2 0 - 5 /HPF    RBC, UA None 0 - 2 /HPF    Casts UA NOT REPORTED 0 - 2 /LPF    Crystals UA NOT REPORTED None /HPF    Epithelial Cells UA 5 TO 10 0 - 5 /HPF    Renal Epithelial, Urine NOT REPORTED 0 /HPF    Bacteria, UA None None    Mucus, UA NOT REPORTED None    Trichomonas, UA NOT REPORTED None    Amorphous, UA NOT REPORTED None    Other Observations UA (A) NOT REQ. Utilizing a urinalysis as the only screening method to exclude a potential uropathogen can be unreliable in many patient populations. Rapid screening tests are less sensitive than culture and if UTI is a clinical possibility, culture should be considered despite a negative urinalysis.     Yeast, UA NOT REPORTED None   Troponin    Collection Time: 03/21/19  2:48 PM   Result Value Ref Range    Troponin, High Sensitivity 31 (H) 0 - 14 ng/L    Troponin T NOT REPORTED <0.03 ng/mL    Troponin Interp NOT REPORTED          Assessment :      Primary Problem  Acute on chronic systolic congestive heart failure [facility])    Active Hospital Problems    Diagnosis Date Noted    Acute on chronic systolic congestive heart failure (HCC) [I50.23] 03/21/2019    Lymphedema of both lower extremities [I89.0] 03/21/2019    Cellulitis of right leg [L03.115] 03/21/2019    Coronary artery disease involving native coronary artery of native heart without angina pectoris [I25.10] 03/21/2019    Depression with suicidal ideation [F32.9, R45.851] 03/21/2019    New onset a-fib [facility]) [I48.91] 03/21/2019    NSTEMI (non-ST elevated myocardial infarction) [facility]) [I21.4] 03/21/2019       Plan:     Patient status Admit as inpatient in the  Progressive Unit/Step down  - Resume ASA, Brillinta, Lasix 40mg IV BID, monitor I/O, daily weights. Continue Statin, BB, entresto. - Empiric abx:pharmacy to dose. Received vancomycin in ED. Continue and will monitor clinical progress. - Wound care consult for macerated LE and back. - Psychiatry consult, suicide precautions . - unclear if Afib is new. Discussed anticoagulation:patient asking to talk to cardiology and will ' think about it'   Consult Cardiology. - Discussed importance of compliance with medications an diet for managing symptoms of CHF .  - DVT prophylaxis. - monitor CBC, BMP.   - PT/OT:will benefit from placement. Consultations:   PHARMACY TO DOSE VANCOMYCIN  IP CONSULT TO HEART FAILURE NURSE/COORDINATOR  IP CONSULT TO DIETITIAN  PHARMACY TO DOSE VANCOMYCIN  IP CONSULT TO CARDIOLOGY  IP CONSULT TO PSYCHIATRY  IP CONSULT TO SOCIAL WORK  IP CONSULT TO [place]     Patient is admitted as inpatient status because of co-morbidities listed above, severity of signs and symptoms as outlined, requirement for current medical therapies and most importantly because of direct risk to patient if care not provided in a hospital setting.     Sinai Stanton MD  3/21/2019  6:56 PM    Copy sent to Dr. Charissa Dominguez DO

## 2019-03-21 NOTE — FLOWSHEET NOTE
RN paged cardiology Michael Edwards regarding new consult and troponin result of 3.1. Awaiting response at this time.

## 2019-03-21 NOTE — FLOWSHEET NOTE
1439: RN notified Dr. Gerlean Collet via perfect serve message of new consult for suicidal ideation. Awaiting response at this time. Will continue to monitor closely. 1655: Dr. Gerlean Collet returned message. Video meeting scheduled for March 22nd 2019 at 8:45 a.m. Consent obtained and placed in patient's chart.

## 2019-03-21 NOTE — ED PROVIDER NOTES
[facility]  [facility] [address]  Phone: [phone]        Pt Name: Christella Severin  MRN: [id number]  [place] 1950  Date of evaluation: 3/21/19      CHIEF COMPLAINT       Chief Complaint   Patient presents with    Illness         HISTORY OF PRESENT ILLNESS    Christella Severin is a 76 y.o. female who presents with complaints of weakness and inability to ambulate patient states that she has a history of congestive heart failure she's not taken her meds quite some time she doesn't like taking her meds because with her difficulty ambulating she can't make it to the bathroom in time she's not been out to get out of her chair for the last 2 or 3 days and has been urinating in the chair her  and son at home and would not be able to help her. She's had increased swelling of both legs on the right leg she said it started to blister and drained and she has been dressing herself    There has been no shortness of breath no fevers or chills just global weakness  Discussed the case with paramedics she was sitting in a chair that was soaked with urine the house was very disheveled A don't believe that she be have a little and also concerning that her son and  was there and were not able to help her with her activities of daily living  REVIEW OF SYSTEMS         Review of Systems   Constitutional: Positive for fever. Negative for chills. HENT: Negative for congestion and ear pain. Eyes: Negative for pain and visual disturbance. Respiratory: Negative for cough and shortness of breath. Cardiovascular: Positive for leg swelling. Negative for chest pain and palpitations. Gastrointestinal: Negative for abdominal pain, blood in stool, constipation, diarrhea, nausea and vomiting. Endocrine: Negative for polydipsia and polyuria. Genitourinary: Negative for difficulty urinating, dysuria, frequency, vaginal bleeding and vaginal discharge.    Musculoskeletal: Negative for back pain, joint swelling, myalgias, neck pain and neck stiffness. Leg swelling as mentioned both legs swelling especially right she says informs blisters and then weeps she's been using diapers his dressings on her right legs and bandage on the left leg   Skin: Negative for rash. Neurological: Negative for dizziness, weakness and headaches. Hematological: Negative for adenopathy. Does not bruise/bleed easily. Psychiatric/Behavioral: Negative for confusion, self-injury and suicidal ideas. PAST MEDICAL HISTORY    has a past medical history of Acute on chronic systolic congestive heart failure ([address] Anxiety, [place] (hard of hearing), Hyperlipidemia, Hypertension, Hypertensive urgency, and Leg swelling. SURGICAL HISTORY      has a past surgical history that includes Cataract removal; Corneal transplant; Nasal sinus surgery; eye surgery; and Tonsillectomy and adenoidectomy. CURRENT MEDICATIONS       Current Discharge Medication List      CONTINUE these medications which have NOT CHANGED    Details   atorvastatin (LIPITOR) 80 MG tablet Take 80 mg by mouth nightly      metoprolol succinate (TOPROL XL) 25 MG extended release tablet Take 25 mg by mouth daily      sacubitril-valsartan (ENTRESTO) 49-51 MG per tablet Take 1 tablet by mouth 2 times daily      Multiple Vitamins-Minerals (THERAPEUTIC MULTIVITAMIN-MINERALS) tablet Take 1 tablet by mouth daily      furosemide (LASIX) 20 MG tablet Take 20 mg by mouth daily      potassium chloride (MICRO-K) 10 MEQ extended release capsule Take 2 capsules by mouth daily  Qty: 180 capsule, Refills: 1    Associated Diagnoses: Hypokalemia      spironolactone (ALDACTONE) 25 MG tablet Take 1 tablet by mouth daily  Qty: 90 tablet, Refills: 1    Associated Diagnoses: Essential hypertension; Leg swelling      aspirin 81 MG tablet Take 81 mg by mouth daily             ALLERGIES     has No Known Allergies. FAMILY HISTORY     has no family status information on file.       family history is not on file. SOCIAL HISTORY      reports that she has never smoked. She has never used smokeless tobacco. She reports that she does not drink alcohol or use drugs. PHYSICAL EXAM     INITIAL VITALS:  height is 5' 2\" (1.575 m) and weight is 206 lb 11.2 oz (93.8 kg). Her temporal temperature is 97.9 °F (36.6 °C). Her blood pressure is 146/74 (abnormal) and her pulse is 61. Her respiration is 20 and oxygen saturation is 95%. Physical Exam   Constitutional: She is oriented to person, place, and time. She appears well-developed and well-nourished. HENT:   Head: Normocephalic and atraumatic. Right Ear: External ear normal.   Left Ear: External ear normal.   Mouth/Throat: Oropharynx is clear and moist.   Poor dentition   Eyes: Pupils are equal, round, and reactive to light. Conjunctivae and EOM are normal.   Neck: Normal range of motion. Cardiovascular: Normal rate. Irregularly irregular rhythm   Pulmonary/Chest: Effort normal. She has rales. Moving air well rales in the bases   Abdominal: Soft. Bowel sounds are normal.   Genitourinary:   Genitourinary Comments: Patient has some stage I pressure ulcers to the sacral area there is no ulcers   Musculoskeletal: Normal range of motion. She exhibits edema. She exhibits no tenderness. Both legs are edematous and erythematous the right leg worse than the left with erythema she has some serosanguineous weeping they were both dressed quite well with adult diapers dressing the right leg and a Band-Aid stressing the left there is no crepitus no bony deformity the edema is pitting and above the knees   Neurological: She is alert and oriented to person, place, and time. Skin: Skin is warm and dry. Psychiatric: She has a normal mood and affect.  Her behavior is normal.         DIFFERENTIAL DIAGNOSIS/ MDM:     Acute exacerbation of chronic CHF, A. fib, cellulitis of both lower extremities rule out DVT, failure to care for herself  Will do a workup treat CHF and infection and plan admission  DIAGNOSTIC RESULTS     EKG: All EKG's are interpreted by the Emergency Department Physician who either signs or Co-signs this chart in the absence of a cardiologist.    Atrial fibrillation rate of 84 bpm, QRS durations 2 ms QT corrected 460 ms axis is -20 is no acute ST or T-wave changes    RADIOLOGY:   Non-plain film images such as CT, Ultrasound and MRI are read by the radiologist. Plain radiographic images are visualized and the radiologist interpretations are reviewed as follows:        EXAMINATION:   SINGLE XRAY VIEW OF THE CHEST       3/21/2019 9:10 am       COMPARISON:   August 7, 2017       HISTORY:   ORDERING SYSTEM PROVIDED HISTORY: CHf   TECHNOLOGIST PROVIDED HISTORY:   CHf   Ordering Physician Provided Reason for Exam: Fatigue, weakness.    Acuity: Acute   Type of Exam: Initial   Relevant Medical/Surgical History: Heart attack x6 months ago       FINDINGS:   Cardiac silhouette enlargement is again noted although this appears more   prominent.  Findings of pulmonary edema are noted with probable small   effusions.  No pneumothorax identified.  No osseous abnormality identified.           Impression   Findings compatible pulmonary edema with probable small effusions.       Cardiac silhouette enlargement, which appears more prominent in the interval.   Underlying pericardial effusion should be considered.            EXAMINATION:   DUPLEX VENOUS ULTRASOUND OF THE BILATERAL LOWER EXTREMITIES, 3/21/2019 10:31   am       TECHNIQUE:   Duplex ultrasound and Doppler images were obtained of the bilateral lower   extremities       COMPARISON:   None.       HISTORY:   ORDERING SYSTEM PROVIDED HISTORY: Rule out DVT   TECHNOLOGIST PROVIDED HISTORY:   Rule out DVT   Ordering Physician Provided Reason for Exam: Bilateral leg, swelling,   redness, with blistering   Acuity: Acute   Type of Exam: Initial       FINDINGS:   Examination of the calves bilaterally limited by edema and hard blistering   scan with resultant non visualization of the bilateral proximal posterior   tibial and peroneal veins.       The visualized veins of the bilateral lower extremities are patent and free   of echogenic thrombus. The veins are normally compressible and have normal   phasic flow.           Impression   Suboptimal exam with no evidence of DVT in either visualized lower extremity.             LABS:  Results for orders placed or performed during the hospital encounter of 03/21/19   Culture Blood #1   Result Value Ref Range    Specimen Description . BLOOD     Special Requests LEFT ANTECUBE 20CC     Culture NO GROWTH 10 HOURS    Culture Blood #1   Result Value Ref Range    Specimen Description . BLOOD     Special Requests RIGHT HAND 20CC     Culture NO GROWTH 10 HOURS    Comprehensive Metabolic Panel   Result Value Ref Range    Glucose 140 (H) 70 - 99 mg/dL    BUN 20 8 - 23 mg/dL    CREATININE 0.80 0.50 - 0.90 mg/dL    Bun/Cre Ratio NOT REPORTED 9 - 20    Calcium 7.0 (L) 8.6 - 10.4 mg/dL    Sodium 146 (H) 135 - 144 mmol/L    Potassium 3.7 3.7 - 5.3 mmol/L    Chloride 115 (H) 98 - 107 mmol/L    CO2 17 (L) 20 - 31 mmol/L    Anion Gap 14 9 - 17 mmol/L    Alkaline Phosphatase 82 35 - 104 U/L    ALT 13 5 - 33 U/L    AST 20 <32 U/L    Total Bilirubin 0.90 0.3 - 1.2 mg/dL    Total Protein 5.6 (L) 6.4 - 8.3 g/dL    Alb 2.8 (L) 3.5 - 5.2 g/dL    Albumin/Globulin Ratio 1.0 1.0 - 2.5    GFR Non-African American >60 >60 mL/min    GFR African American >60 >60 mL/min    GFR Comment          GFR Staging NOT REPORTED    Urinalysis   Result Value Ref Range    Color, UA YELLOW YELLOW    Turbidity UA CLEAR CLEAR    Glucose, Ur NEGATIVE NEGATIVE    Bilirubin Urine NEGATIVE  Verified by ictotest. (A) NEGATIVE    Ketones, Urine TRACE (A) NEGATIVE    Specific Gravity, UA 1.029 1.005 - 1.030    Urine Hgb TRACE (A) NEGATIVE    pH, UA 5.0 5.0 - 8.0    Protein, UA 3+ (A) NEGATIVE    Urobilinogen, Urine Normal Normal    Nitrite, Urine NEGATIVE NEGATIVE    Leukocyte Esterase, Urine NEGATIVE NEGATIVE    Urinalysis Comments NOT REPORTED    Lactic Acid   Result Value Ref Range    Lactic Acid 2.7 (H) 0.5 - 2.2 mmol/L   Protime-INR   Result Value Ref Range    Protime 13.9 (H) 9.4 - 12.6 sec    INR 1.4    Myoglobin, Serum   Result Value Ref Range    Myoglobin 26 25 - 58 ng/mL   Troponin   Result Value Ref Range    Troponin, High Sensitivity 24 (H) 0 - 14 ng/L    Troponin T NOT REPORTED <0.03 ng/mL    Troponin Interp NOT REPORTED    Brain Natriuretic Peptide   Result Value Ref Range    Pro-BNP 12,553 (H) <300 pg/mL    BNP Interpretation Pro-BNP Reference Range:    SPECIMEN REJECTION   Result Value Ref Range    Specimen Source . BLOOD     Ordered Test CDP     Reason for Rejection Unable to perform testing: Specimen clotted.      - NOT REPORTED    CBC Auto Differential   Result Value Ref Range    WBC 7.5 3.5 - 11.0 k/uL    RBC 4.73 4.0 - 5.2 m/uL    Hemoglobin 12.7 12.0 - 16.0 g/dL    Hematocrit 40.9 36 - 46 %    MCV 86.4 80 - 100 fL    MCH 26.8 26 - 34 pg    MCHC 31.0 31 - 37 g/dL    RDW 17.4 (H) 12.5 - 15.4 %    Platelets 209 726 - 364 k/uL    MPV 9.4 6.0 - 12.0 fL    NRBC Automated NOT REPORTED per 100 WBC    Differential Type NOT REPORTED     Seg Neutrophils 84 (H) 36 - 66 %    Lymphocytes 10 (L) 24 - 44 %    Monocytes 5 2 - 11 %    Eosinophils % 0 (L) 1 - 4 %    Basophils 1 0 - 2 %    Immature Granulocytes NOT REPORTED 0 %    Segs Absolute 6.30 1.8 - 7.7 k/uL    Absolute Lymph # 0.80 (L) 1.0 - 4.8 k/uL    Absolute Mono # 0.40 0.1 - 1.2 k/uL    Absolute Eos # 0.00 0.0 - 0.4 k/uL    Basophils # 0.10 0.0 - 0.2 k/uL    Absolute Immature Granulocyte NOT REPORTED 0.00 - 0.30 k/uL    WBC Morphology NOT REPORTED     RBC Morphology NOT REPORTED     Platelet Estimate NOT REPORTED    Microscopic Urinalysis   Result Value Ref Range    -          WBC, UA 0 TO 2 0 - 5 /HPF    RBC, UA None 0 - 2 /HPF    Casts UA NOT REPORTED 0 - 2 /LPF    Crystals UA NOT REPORTED None /HPF    Epithelial Cells UA 5 TO 10 0 - 5 /HPF    Renal Epithelial, Urine NOT REPORTED 0 /HPF    Bacteria, UA None None    Mucus, UA NOT REPORTED None    Trichomonas, UA NOT REPORTED None    Amorphous, UA NOT REPORTED None    Other Observations UA (A) NOT REQ. Utilizing a urinalysis as the only screening method to exclude a potential uropathogen can be unreliable in many patient populations. Rapid screening tests are less sensitive than culture and if UTI is a clinical possibility, culture should be considered despite a negative urinalysis.     Yeast, UA NOT REPORTED None   Troponin   Result Value Ref Range    Troponin, High Sensitivity 31 (H) 0 - 14 ng/L    Troponin T NOT REPORTED <0.03 ng/mL    Troponin Interp NOT REPORTED    Troponin   Result Value Ref Range    Troponin, High Sensitivity 40 (H) 0 - 14 ng/L    Troponin T NOT REPORTED <0.03 ng/mL    Troponin Interp NOT REPORTED    Basic Metabolic Panel w/ Reflex to MG   Result Value Ref Range    Glucose 153 (H) 70 - 99 mg/dL    BUN 25 (H) 8 - 23 mg/dL    CREATININE 0.91 (H) 0.50 - 0.90 mg/dL    Bun/Cre Ratio 27 (H) 9 - 20    Calcium 8.4 (L) 8.6 - 10.4 mg/dL    Sodium 143 135 - 144 mmol/L    Potassium 3.3 (L) 3.7 - 5.3 mmol/L    Chloride 105 98 - 107 mmol/L    CO2 26 20 - 31 mmol/L    Anion Gap 12 9 - 17 mmol/L    GFR Non-African American >60 >60 mL/min    GFR African American >60 >60 mL/min    GFR Comment          GFR Staging NOT REPORTED    Magnesium   Result Value Ref Range    Magnesium 1.9 1.6 - 2.6 mg/dL   TSH without Reflex   Result Value Ref Range    TSH 2.14 0.30 - 5.00 mIU/L   Brain Natriuretic Peptide   Result Value Ref Range    Pro-BNP 10,467 (H) <300 pg/mL    BNP Interpretation Pro-BNP Reference Range:    Lipid panel - fasting   Result Value Ref Range    Cholesterol 116 <200 mg/dL    HDL 31 (L) >40 mg/dL    LDL Cholesterol 68 0 - 130 mg/dL    Chol/HDL Ratio 3.7 <5    Triglycerides 84 <150 mg/dL    VLDL NOT REPORTED 1 - 30 mg/dL   CBC   Result Value Ref Range    WBC 8.1 3.5 - 11.0 k/uL    RBC 4.40 4.0 - 5.2 m/uL    Hemoglobin 11.8 (L) 12.0 - 16.0 g/dL    Hematocrit 36.9 36 - 46 %    MCV 83.7 80 - 100 fL    MCH 26.7 26 - 34 pg    MCHC 31.9 31 - 37 g/dL    RDW 17.9 (H) 11.5 - 14.5 %    Platelets 314 060 - 077 k/uL    MPV 9.2 6.0 - 12.0 fL    NRBC Automated NOT REPORTED per 100 WBC   EKG 12 Lead   Result Value Ref Range    Ventricular Rate 84 BPM    Atrial Rate 102 BPM    QRS Duration 102 ms    Q-T Interval 390 ms    QTc Calculation (Bazett) 460 ms    R Axis -20 degrees    T Axis 133 degrees       Elevated lactic acid elevated BNP, normal renal function    EMERGENCY DEPARTMENT COURSE:   Vitals:    Vitals:    03/21/19 2000 03/22/19 0029 03/22/19 0410 03/22/19 0747   BP: 119/66 136/77 121/78 (!) 146/74   Pulse: 69 88 70 61   Resp: 16 16 16 20   Temp: 97.4 °F (36.3 °C) 98.2 °F (36.8 °C) 98.8 °F (37.1 °C) 97.9 °F (36.6 °C)   TempSrc: Temporal Temporal Temporal Temporal   SpO2: 93% 96% 99% 95%   Weight:       Height:         -------------------------  BP: (!) 146/74, Temp: 97.9 °F (36.6 °C), Pulse: 61, Resp: 20          CONSULTS:  Case was discussed with , hospitalist for [facility] and she is willing to admit    PROCEDURES:  None    FINAL IMPRESSION      1. Acute on chronic congestive heart failure, unspecified heart failure type ([facility]    2. Cellulitis of both lower extremities          DISPOSITION/PLAN   Admitted to [facility] in stabilized condition    PATIENT REFERRED TO:  Alfonso Alcazar DO  [address] [place] [address] 22.  [phone]            DISCHARGE MEDICATIONS:  Current Discharge Medication List          (Please note that portions of this note were completed with a voice recognition program.  Efforts were made to edit the dictations but occasionally words are mis-transcribed.)    Chavarria MD, F.A.A.E.M.   Attending Emergency Medicine Physician      Nguyen Lin MD  03/22/19 1476

## 2019-03-21 NOTE — ED NOTES
Dr. Roxana Coto speaking on the phone with Dr. Chava Ortiz for admitting patient to [facility].      Adal Kaur RN  03/21/19 5327

## 2019-03-21 NOTE — FLOWSHEET NOTE
Writer to unit after call from RN requesting that code status options be explained to patient. Patient resting in bed. One-on-one staff person present. Patient is receptive, and engages in conversation with writer. She shares about her health, as well as about he  and son. Writer explains DNR, DNRCC-A, and [facility] designations to patient, and explains that code status changes happen by virtue of a physician's order. Writer encourages patient to speak with a physician here about this if she has further questions, etc.    Patient also asks about medical decision making, She is unsure if she would like her  to be her decision maker, due to his reluctance to [facility] about these things. \" She has one son, with whom she will discuss the possibility of him assuming this responsbility. Writer offers to bring Advance Directive packet, but patient declines at this time. Writer offers information, support, and listening presence. Writer updates RN. Spiritual Care will follow as needed. 03/21/19 1611   Encounter Summary   Services provided to: Patient   Referral/Consult From: Nurse   Support System Spouse; Children   Continue Visiting   (3/21/19)   Complexity of Encounter Moderate   Length of Encounter 30 minutes   Spiritual Assessment Completed Yes   Routine   Type Initial   Assessment Approachable; Anxious   Intervention Active listening;Explored feelings, thoughts, concerns; Discussed illness/injury and it's impact   Outcome Engaged in conversation;Expressed feelings/needs/concerns;Expressed gratitude

## 2019-03-21 NOTE — ED NOTES
[employer] and spoke with Kavya Calzada RN to get patient admitted to [facility].  Tylor ennis hospitalist.     Scott Lucas RN  03/21/19 3000

## 2019-03-22 ENCOUNTER — APPOINTMENT (OUTPATIENT)
Dept: GENERAL RADIOLOGY | Age: 69
DRG: 226 | End: 2019-03-22
Payer: MEDICARE

## 2019-03-22 LAB
ANION GAP SERPL CALCULATED.3IONS-SCNC: 12 MMOL/L (ref 9–17)
BNP INTERPRETATION: ABNORMAL
BUN BLDV-MCNC: 25 MG/DL (ref 8–23)
BUN/CREAT BLD: 27 (ref 9–20)
CALCIUM SERPL-MCNC: 8.4 MG/DL (ref 8.6–10.4)
CHLORIDE BLD-SCNC: 105 MMOL/L (ref 98–107)
CHOLESTEROL/HDL RATIO: 3.7
CHOLESTEROL: 116 MG/DL
CO2: 26 MMOL/L (ref 20–31)
CREAT SERPL-MCNC: 0.91 MG/DL (ref 0.5–0.9)
EKG ATRIAL RATE: 102 BPM
EKG Q-T INTERVAL: 390 MS
EKG QRS DURATION: 102 MS
EKG QTC CALCULATION (BAZETT): 460 MS
EKG R AXIS: -20 DEGREES
EKG T AXIS: 133 DEGREES
EKG VENTRICULAR RATE: 84 BPM
GFR AFRICAN AMERICAN: >60 ML/MIN
GFR NON-AFRICAN AMERICAN: >60 ML/MIN
GFR SERPL CREATININE-BSD FRML MDRD: ABNORMAL ML/MIN/{1.73_M2}
GFR SERPL CREATININE-BSD FRML MDRD: ABNORMAL ML/MIN/{1.73_M2}
GLUCOSE BLD-MCNC: 153 MG/DL (ref 70–99)
HCT VFR BLD CALC: 36.9 % (ref 36–46)
HDLC SERPL-MCNC: 31 MG/DL
HEMOGLOBIN: 11.8 G/DL (ref 12–16)
LDL CHOLESTEROL: 68 MG/DL (ref 0–130)
LV EF: 25 %
LVEF MODALITY: NORMAL
MAGNESIUM: 1.9 MG/DL (ref 1.6–2.6)
MCH RBC QN AUTO: 26.7 PG (ref 26–34)
MCHC RBC AUTO-ENTMCNC: 31.9 G/DL (ref 31–37)
MCV RBC AUTO: 83.7 FL (ref 80–100)
NRBC AUTOMATED: ABNORMAL PER 100 WBC
PDW BLD-RTO: 17.9 % (ref 11.5–14.5)
PLATELET # BLD: 223 K/UL (ref 130–400)
PMV BLD AUTO: 9.2 FL (ref 6–12)
POTASSIUM SERPL-SCNC: 3.3 MMOL/L (ref 3.7–5.3)
PRO-BNP: ABNORMAL PG/ML
RBC # BLD: 4.4 M/UL (ref 4–5.2)
SODIUM BLD-SCNC: 143 MMOL/L (ref 135–144)
TRIGL SERPL-MCNC: 84 MG/DL
TSH SERPL DL<=0.05 MIU/L-ACNC: 2.14 MIU/L (ref 0.3–5)
VLDLC SERPL CALC-MCNC: ABNORMAL MG/DL (ref 1–30)
WBC # BLD: 8.1 K/UL (ref 3.5–11)

## 2019-03-22 PROCEDURE — 83735 ASSAY OF MAGNESIUM: CPT

## 2019-03-22 PROCEDURE — 97530 THERAPEUTIC ACTIVITIES: CPT

## 2019-03-22 PROCEDURE — 36415 COLL VENOUS BLD VENIPUNCTURE: CPT

## 2019-03-22 PROCEDURE — 99233 SBSQ HOSP IP/OBS HIGH 50: CPT | Performed by: INTERNAL MEDICINE

## 2019-03-22 PROCEDURE — 80061 LIPID PANEL: CPT

## 2019-03-22 PROCEDURE — 85027 COMPLETE CBC AUTOMATED: CPT

## 2019-03-22 PROCEDURE — 97116 GAIT TRAINING THERAPY: CPT

## 2019-03-22 PROCEDURE — 2580000003 HC RX 258: Performed by: INTERNAL MEDICINE

## 2019-03-22 PROCEDURE — 6360000002 HC RX W HCPCS: Performed by: INTERNAL MEDICINE

## 2019-03-22 PROCEDURE — 97110 THERAPEUTIC EXERCISES: CPT

## 2019-03-22 PROCEDURE — 6370000000 HC RX 637 (ALT 250 FOR IP): Performed by: NURSE PRACTITIONER

## 2019-03-22 PROCEDURE — 1200000000 HC SEMI PRIVATE

## 2019-03-22 PROCEDURE — 6370000000 HC RX 637 (ALT 250 FOR IP): Performed by: INTERNAL MEDICINE

## 2019-03-22 PROCEDURE — 71045 X-RAY EXAM CHEST 1 VIEW: CPT

## 2019-03-22 PROCEDURE — 80048 BASIC METABOLIC PNL TOTAL CA: CPT

## 2019-03-22 PROCEDURE — 99211 OFF/OP EST MAY X REQ PHY/QHP: CPT

## 2019-03-22 PROCEDURE — 83880 ASSAY OF NATRIURETIC PEPTIDE: CPT

## 2019-03-22 PROCEDURE — 84443 ASSAY THYROID STIM HORMONE: CPT

## 2019-03-22 PROCEDURE — 97163 PT EVAL HIGH COMPLEX 45 MIN: CPT

## 2019-03-22 PROCEDURE — 97166 OT EVAL MOD COMPLEX 45 MIN: CPT

## 2019-03-22 PROCEDURE — 90792 PSYCH DIAG EVAL W/MED SRVCS: CPT | Performed by: PSYCHIATRY & NEUROLOGY

## 2019-03-22 PROCEDURE — 93306 TTE W/DOPPLER COMPLETE: CPT

## 2019-03-22 RX ORDER — POTASSIUM CHLORIDE 7.45 MG/ML
10 INJECTION INTRAVENOUS PRN
Status: DISCONTINUED | OUTPATIENT
Start: 2019-03-22 | End: 2019-03-29 | Stop reason: HOSPADM

## 2019-03-22 RX ORDER — POTASSIUM CHLORIDE 20 MEQ/1
40 TABLET, EXTENDED RELEASE ORAL PRN
Status: DISCONTINUED | OUTPATIENT
Start: 2019-03-22 | End: 2019-03-29 | Stop reason: HOSPADM

## 2019-03-22 RX ORDER — HYDROXYZINE HYDROCHLORIDE 25 MG/1
25 TABLET, FILM COATED ORAL 3 TIMES DAILY PRN
Status: DISCONTINUED | OUTPATIENT
Start: 2019-03-22 | End: 2019-03-23

## 2019-03-22 RX ADMIN — Medication 10 ML: at 09:54

## 2019-03-22 RX ADMIN — VANCOMYCIN HYDROCHLORIDE 1500 MG: 1 INJECTION, POWDER, LYOPHILIZED, FOR SOLUTION INTRAVENOUS at 11:36

## 2019-03-22 RX ADMIN — SPIRONOLACTONE 25 MG: 25 TABLET ORAL at 09:57

## 2019-03-22 RX ADMIN — TICAGRELOR 90 MG: 90 TABLET ORAL at 09:57

## 2019-03-22 RX ADMIN — HYDROXYZINE HYDROCHLORIDE 25 MG: 25 TABLET ORAL at 20:38

## 2019-03-22 RX ADMIN — METOPROLOL SUCCINATE 25 MG: 25 TABLET, EXTENDED RELEASE ORAL at 09:58

## 2019-03-22 RX ADMIN — ASPIRIN 81 MG: 81 TABLET ORAL at 09:57

## 2019-03-22 RX ADMIN — MICONAZOLE NITRATE: 20.6 POWDER TOPICAL at 10:14

## 2019-03-22 RX ADMIN — POTASSIUM CHLORIDE 40 MEQ: 20 TABLET, EXTENDED RELEASE ORAL at 06:02

## 2019-03-22 RX ADMIN — FUROSEMIDE 40 MG: 10 INJECTION, SOLUTION INTRAMUSCULAR; INTRAVENOUS at 20:37

## 2019-03-22 RX ADMIN — MULTIVITAMIN TABLET 1 TABLET: TABLET at 17:46

## 2019-03-22 RX ADMIN — SERTRALINE HYDROCHLORIDE 50 MG: 50 TABLET ORAL at 09:58

## 2019-03-22 RX ADMIN — APIXABAN 5 MG: 5 TABLET, FILM COATED ORAL at 20:37

## 2019-03-22 RX ADMIN — TICAGRELOR 90 MG: 90 TABLET ORAL at 20:37

## 2019-03-22 RX ADMIN — FUROSEMIDE 40 MG: 10 INJECTION, SOLUTION INTRAMUSCULAR; INTRAVENOUS at 09:54

## 2019-03-22 RX ADMIN — ENOXAPARIN SODIUM 40 MG: 40 INJECTION SUBCUTANEOUS at 09:57

## 2019-03-22 RX ADMIN — SACUBITRIL AND VALSARTAN 1 TABLET: 49; 51 TABLET, FILM COATED ORAL at 09:57

## 2019-03-22 RX ADMIN — Medication 10 ML: at 20:38

## 2019-03-22 RX ADMIN — ATORVASTATIN CALCIUM 80 MG: 80 TABLET, FILM COATED ORAL at 20:37

## 2019-03-22 RX ADMIN — HYDROXYZINE HYDROCHLORIDE 25 MG: 25 TABLET ORAL at 11:58

## 2019-03-22 RX ADMIN — SACUBITRIL AND VALSARTAN 1 TABLET: 49; 51 TABLET, FILM COATED ORAL at 20:37

## 2019-03-22 ASSESSMENT — PAIN DESCRIPTION - LOCATION
LOCATION: BUTTOCKS
LOCATION: LEG

## 2019-03-22 ASSESSMENT — PAIN SCALES - GENERAL
PAINLEVEL_OUTOF10: 4
PAINLEVEL_OUTOF10: 0

## 2019-03-22 ASSESSMENT — PAIN DESCRIPTION - ORIENTATION: ORIENTATION: RIGHT;LOWER

## 2019-03-22 ASSESSMENT — PAIN DESCRIPTION - PAIN TYPE
TYPE: ACUTE PAIN
TYPE: ACUTE PAIN

## 2019-03-22 NOTE — PROGRESS NOTES
Reviewed: Yes  Patient assessed for rehabilitation services?: Yes  Additional Pertinent Hx: Lymphedema, history of attempted suicide  Response To Previous Treatment: Not applicable  Family / Caregiver Present: No  Diagnosis: CHF, a-fib, NSTEMI  Follows Commands: Within Functional Limits  General Comment  Comments: Lopez Liao RN reports patient appropriate for PT. Subjective  Subjective: Patient pleasant and agreeable to PT. Patient very appreciative of car and reports she feels much more hopeful than when she was at home. Pain Screening  Patient Currently in Pain: Denies  Vital Signs  Patient Currently in Pain: Denies  Pre Treatment Pain Screening  Intervention List: Patient able to continue with treatment    Orientation  Orientation  Overall Orientation Status: Within Functional Limits  Social/Functional History  Social/Functional History  Lives With: Spouse  Type of Home: House  Home Layout: Two level, Able to Live on Main level with bedroom/bathroom  Home Access: Stairs to enter with rails  Entrance Stairs - Number of Steps: 7  Entrance Stairs - Rails: Right  Bathroom Shower/Tub: Tub/Shower unit  Bathroom Toilet: Standard  Bathroom Equipment: (None)  Home Equipment: Cane(Rollator)  ADL Assistance: Independent  Homemaking Assistance: Independent(Light, son does heavier work)  Ambulation Assistance: Independent  Transfer Assistance: Independent  Active : No  Patient's  Info: Family drives- pt states she hasn't driven since recent eye surgeries  Occupation: Retired  Additional Comments: Patient had one fall 2 weeks ago, tripped on rug. Johananet feels she has been declining since heart attack 6 months ago. Cognition   Cognition  Overall Cognitive Status: WFL    Objective     Observation/Palpation  Posture: Fair(Flexed posture)  Observation: BLE with edema and very red.     AROM RLE (degrees)  RLE General AROM: Hip and knee WFL, ankle to neutral DF only  AROM LLE (degrees)  LLE AROM : WFL  AROM RUE goals: 12 visits  Short term goal 1: Patient will be indep with bed mobility and transfers. Short term goal 2: Patient will amb 200 feet with RW and indep. Short term goal 3: Patient will tolerate 30 minutes of ther-ex and ther-act. Short term goal 4: Patient will be indep with HEP.   Patient Goals   Patient goals : Improve breathing       Therapy Time   Individual Concurrent Group Co-treatment   Time In 1753         Time Out 1126         Minutes [address], [address] [facility]

## 2019-03-22 NOTE — PROGRESS NOTES
Occupational Therapy   Occupational Therapy Initial Assessment  Date: 3/22/2019   Patient Name: Maribell Eng  MRN: [id number]     : 1950    Date of Service: 3/22/2019    Discharge Recommendations:  Subacute/Skilled Nursing Facility       Assessment   Performance deficits / Impairments: Decreased functional mobility ; Decreased strength;Decreased endurance;Decreased ADL status; Decreased balance  Decision Making: Medium Complexity  Patient Education: Pt. educated re: OT goals/poc, pacing, pursed lip breathing, BUE exer, safety, relaxation techniques and dc planning. REQUIRES OT FOLLOW UP: Yes  Activity Tolerance  Activity Tolerance: Patient limited by fatigue  Safety Devices  Safety Devices in place: Yes  Type of devices: All fall risk precautions in place;Nurse notified; Patient at risk for falls;Call light within reach; Left in bed           Patient Diagnosis(es): The primary encounter diagnosis was Acute on chronic congestive heart failure, unspecified heart failure type ([address] A diagnosis of Cellulitis of both lower extremities was also pertinent to this visit. has a past medical history of Acute on chronic systolic congestive heart failure ([address] Anxiety, [place] (hard of hearing), Hyperlipidemia, Hypertension, Hypertensive urgency, and Leg swelling.   has a past surgical history that includes Cataract removal; Corneal transplant; Nasal sinus surgery; eye surgery; and Tonsillectomy and adenoidectomy. Restrictions  Restrictions/Precautions  Restrictions/Precautions: General Precautions, Up as Tolerated(with assist)  Required Braces or Orthoses?: No  Position Activity Restriction  Other position/activity restrictions:  sera    Subjective   General  Chart Reviewed: Yes  Patient assessed for rehabilitation services?: Yes  Family / Caregiver Present: No  Subjective  Subjective: \"I'm just dealing with some anxiety. \"  General Comment  Comments: Writer consulted Mekhi Patterson RN whom indicated pt. was medically stable for OT this date. Pain Assessment  Pain Assessment: 0-10  Pain Level: 4  Patient's Stated Pain Goal: No pain  Pain Type: Acute pain  Pain Location: Leg  Pain Orientation: Right, Lower  Pain Descriptors: Sore, Burning  Pain Frequency: Continuous  Pain Onset: On-going  Clinical Progression: Not changed  Functional Pain Assessment: Intolerable, unable to do any active or passive activities  Non-Pharmaceutical Pain Intervention(s): Repositioned  Response to Pain Intervention: Patient Satisfied  Oxygen Therapy  SpO2: 95 %  O2 Device: Nasal cannula  O2 Flow Rate (L/min): 3 L/min  Social/Functional History  Social/Functional History  Lives With: Spouse  Type of Home: House  Home Layout: Two level, Able to Live on Main level with bedroom/bathroom  Home Access: Stairs to enter with rails  Entrance Stairs - Number of Steps: 7  Entrance Stairs - Rails: Right  Bathroom Shower/Tub: Tub/Shower unit(sponge bathes)  Bathroom Toilet: Standard  Bathroom Equipment: (None)  Home Equipment: Cane(Rollator)  ADL Assistance: Independent  Homemaking Assistance: Independent(Light, son does heavier work)  Ambulation Assistance: Independent(used rollator in community otherwise just uses cane outside in yard and no device inside the home--just leans on surfaces and furniture)  Transfer Assistance: Independent  Active : No  Patient's  Info: Family drives- pt states she hasn't driven since recent eye surgeries  Occupation: Retired  Additional Comments: Patient had one fall 2 weeks ago, tripped on rug.  Patient feels she has been declining since heart attack ~6 months ago; pt. sleeps in a recliner       Objective   Vision: Impaired(Patient does not see well even with glasses, thinks she needs a new prescription)  Vision Exceptions: Wears glasses at all times  Hearing: Exceptions to [facility]  Hearing Exceptions: Hard of hearing/hearing concerns;Bilateral hearing aid    Orientation  Overall Orientation Status: Within Functional Limits  Observation/Palpation  Posture: Fair(Flexed posture)  Observation: BLE with edema and very red. Balance  Sitting Balance: Supervision  Standing Balance: Minimal assistance  Standing Balance  Time: ~1 min  Activity: standing assessment  Sit to stand: Minimal assistance  Stand to sit: Minimal assistance  Comment: with RW  ADL  Feeding: Setup  Grooming: Setup  UE Bathing: Minimal assistance  LE Bathing: Moderate assistance  UE Dressing: Minimal assistance  LE Dressing: Moderate assistance  Toileting: (zamora+)  Tone RUE  RUE Tone: Normotonic  Tone LUE  LUE Tone: Normotonic     Bed mobility  Rolling to Left: Minimal assistance  Rolling to Right: Minimal assistance  Supine to Sit: Minimal assistance  Sit to Supine: Minimal assistance  Transfers  Sit to stand: Minimal assistance  Stand to sit: Minimal assistance  Transfer Comments: with RW     Cognition  Overall Cognitive Status: WFL        Sensation  Overall Sensation Status: WNL  Type of ROM/Therapeutic Exercise  Type of ROM/Therapeutic Exercise: AROM  Comment: pt. instructed in BUE ther exer     LUE AROM (degrees)  LUE AROM : WFL  RUE AROM (degrees)  RUE AROM : WFL  LUE Strength  Gross LUE Strength: Exceptions to WFL(shoulder 4-/5)  RUE Strength  Gross RUE Strength: Exceptions to WFL(shoulder 4-/5)                   Plan   Plan  Times per week: 4-6x/week  Times per day: Daily  Current Treatment Recommendations: Strengthening, Functional Mobility Training, Endurance Training, Balance Training, Safety Education & Training, Self-Care / ADL, Patient/Caregiver Education & Training    G-Code     OutComes Score                                                  AM-PAC Score             Goals  Short term goals  Short term goal 1: Pt. will demo Mod-I with ADL transfers using RW with good safety. Short term goal 2: Pt. will demo Mod-I with functional mob using RW for ADL completion with good safety/pacing.   Short term goal 3: Pt. will demo Mod-I with toileting routine using RW and appropriate DME. Short term goal 4: Pt. will improve standing tolerance to 6-8 min for increased activity ginny for completion of sink side ADLs. Short term goal 5: Pt. will engage in 20 min< BUE ther activity/exer to improve activity tolerance to [facility] for increased INDEP with func mob and ADLs.        Therapy Time   Individual Concurrent Group Co-treatment   Time In 1600         Time Out 1640         Minutes [facility], OT

## 2019-03-22 NOTE — PROGRESS NOTES
[facility]    Progress Note    3/22/2019     Name:   Marco Antonio Waddell  MRN:     [id number]     Acct:      [de-identified]   Room:   2036/2036-01  IP Day:  1  Admit Date:  3/21/2019  8:40 AM    PCP:   Rayne Burgos DO  Code Status:  Full Code    Subjective:     C/C:   Chief Complaint   Patient presents with    Illness     Interval History Status: improved. Patient had issues with insomnia, anxiety last night. Sitter at bedside.  ' I think I am having panic attack and feel like this is the end of road for me'. Denies chest pain/ palpitations. Afebrile, hemodynamically stable,   I/O: 3.4 L. Labs: Na 143, K 3.3, BUN/Cr 25/0.91, LDL 68, hb 11.8,   Rpt CX: partial clearing pulm edema. Brief History:   77 y/o brought in to [facility] ED with c/o SOB, generalized weakness , incontinence of urine in last 2 d. Noncomplaince with medications in last 1-2 mo. Per EMS house was filled with trash and 'essentially uninhabitable' . ED w/u: Afib, , SBP 170s, hypoxia 80s on RA, Na 146, CO2 17, BNP 16144, Hb 12.7, WBC 7.5, u/a unremarkable, HS trop 31, EKG: Afib, unspecific ST/T changes. CXR compatible with CHF .  - Has prior h/o CAD s/p PCI x2 latest 10/2018, chronic Sys CHF, HTN, HPL. Patient indicates she did not tolerate plavix/ bryllinta :unable to elaborate    Review of Systems:     Constitutional:  negative for chills, fevers, sweats  Respiratory:  negative for cough, dyspnea on exertion, +shortness of breath, no wheezing  Cardiovascular:  negative for chest pain, chest pressure/discomfort,+lower extremity edema, no palpitations  Gastrointestinal:  negative for abdominal pain, constipation, diarrhea, nausea, vomiting  Neurological:  negative for dizziness, headache    Medications:      Allergies:  No Known Allergies    Current Meds:   Scheduled Meds:    sertraline  50 mg Oral Daily    aspirin  81 mg Oral Daily    spironolactone  25 mg Oral Daily    murmur  Abdomen:  soft, nontender, nondistended, normal bowel sounds, no masses, hepatomegaly, splenomegaly  Extremities:  +b/l LE  edema, +redness is improved, Skin is peeling now with no seeping noted. no tenderness in the calves    Assessment:        Primary Problem  Acute on chronic systolic congestive heart failure [facility])    Active Hospital Problems    Diagnosis Date Noted    Acute on chronic congestive heart failure (HCC) [I50.9]     Acute on chronic systolic congestive heart failure (HCC) [I50.23] 03/21/2019    Lymphedema of both lower extremities [I89.0] 03/21/2019    Cellulitis of right leg [L03.115] 03/21/2019    Coronary artery disease involving native coronary artery of native heart without angina pectoris [I25.10] 03/21/2019    Depression with suicidal ideation [F32.9, R45.851] 03/21/2019    New onset a-fib [address] [I48.91] 03/21/2019    NSTEMI (non-ST elevated myocardial infarction) ([address] [I21.4] 03/21/2019       Plan:        - continue ASA, Brillinta, Lasix 40mg IV BID,Statin, BB, entresto. monitor I/O, daily weights,   A/w ECHO and cardiology eval.  - continue Vancomycin for 1 today: will likely d/c in AM.   - Psychiatric eval pending. Continue suicide precautions for now. - Afib is rate controlled. Patient wants to discuss with cardiologist before deciding on [facility].   - PT/OT.        Salina Crowe MD  3/22/2019

## 2019-03-22 NOTE — PROGRESS NOTES
[facility] Nurse  Consult Note       NAME:  Abe Sainz  MEDICAL RECORD NUMBER:  [id number]  AGE: 76 y.o. GENDER: female  : 1950  TODAY'S DATE:  3/22/2019    Subjective:     Reason for WOCN Evaluation and Assessment: BLE edema, lower extremity venous disease, right lower leg venous ulcerations. Abe Sainz is a 76 y.o. female referred by:   [x] Physician  [] Nursing  [] Other:     Wound Identification:  Wound Type: venous  Contributing Factors: edema, venous stasis, decreased mobility, obesity, decreased tissue oxygenation, poor hygiene and non-adherence     Patient reports long history of LEVD. Has been prescribed compression garments. Did not obtain due to cost. Does utilize a 10-15 mmhg hose she purchased OTC. PAST MEDICAL HISTORY        Diagnosis Date    Acute on chronic systolic congestive heart failure ([address] 2017    Anxiety     [place] (hard of hearing)     Hyperlipidemia     Hypertension     Hypertensive urgency 2017    Leg swelling 2017       PAST SURGICAL HISTORY    Past Surgical History:   Procedure Laterality Date    CATARACT REMOVAL      CORNEAL TRANSPLANT      EYE SURGERY      NASAL SINUS SURGERY      TONSILLECTOMY AND ADENOIDECTOMY         FAMILY HISTORY    History reviewed. No pertinent family history.     SOCIAL HISTORY    Social History     Tobacco Use    Smoking status: Never Smoker    Smokeless tobacco: Never Used   Substance Use Topics    Alcohol use: No    Drug use: No       ALLERGIES    No Known Allergies        Objective:      BP (!) 146/74   Pulse 61   Temp 97.9 °F (36.6 °C) (Temporal)   Resp 20   Ht 5' 2\" (1.575 m)   Wt 206 lb 11.2 oz (93.8 kg)   SpO2 95%   BMI 37.81 kg/m²   David Risk Score: David Scale Score: 13    LABS    CBC:   Lab Results   Component Value Date    WBC 8.1 2019    RBC 4.40 2019    HGB 11.8 2019     CMP:  Albumin:    Lab Results   Component Value Date    LABALBU 2.8 03/21/2019     PT/INR:    Lab Results   Component Value Date    PROTIME 13.9 03/21/2019    INR 1.4 03/21/2019     HgBA1c:    Lab Results   Component Value Date    LABA1C 5.7 08/06/2017     PTT: No components found for: LABPTT      Assessment:     Patient Active Problem List   Diagnosis    Leg swelling    Hypertensive urgency    Acute on chronic systolic congestive heart failure (HCC)    Acute on chronic systolic congestive heart failure (HCC)    Lymphedema of both lower extremities    Cellulitis of right leg    Coronary artery disease involving native coronary artery of native heart without angina pectoris    Depression with suicidal ideation    New onset a-fib ([address]    NSTEMI (non-ST elevated myocardial infarction) ([facility]       Measurements:     03/22/19 0834   Wound 03/22/19 Leg Right;Medial   Date First Assessed/Time First Assessed: 03/22/19 0833   Present on Hospital Admission: Yes  Primary Wound Type: Venous Ulcer  Location: Leg  Wound Location Orientation: Right;Medial   Wound Venous   Dressing Status Changed   Dressing Changed Changed/New   Dressing/Treatment Foam   Wound Cleansed Rinsed/Irrigated with saline   Dressing Change Due 03/25/19   Wound Length (cm) 9 cm   Wound Width (cm) 11 cm   Wound Depth (cm) 0.2 cm   Wound Surface Area (cm^2) 99 cm^2   Wound Volume (cm^3) 19.8 cm^3   Wound Assessment [place];Fibrin   Drainage Amount Small   Drainage Description Serous   Odor None   Susanne-wound Assessment Dry;Edema; Hyperpigmented  (flaking epidermis)   [place]%Wound Bed 100   Wound 03/22/19 Leg Proximal;Right; Lower   Date First Assessed/Time First Assessed: 03/22/19 0833   Present on Hospital Admission: Yes  Primary Wound Type: Venous Ulcer  Location: Leg  Wound Location Orientation: Proximal;Right; Lower   Wound Venous   Dressing Status Changed   Dressing Changed Changed/New   Dressing/Treatment Foam   Wound Cleansed Rinsed/Irrigated with saline   Dressing Change Due 03/25/19   Wound Length (cm) 1.5 cm Wound Width (cm) 2 cm   Wound Depth (cm) 0.2 cm   Wound Surface Area (cm^2) 3 cm^2   Wound Volume (cm^3) 0.6 cm^3   Wound Assessment Dry;Fibrin;Yellow   Drainage Amount None   Odor None   Susanne-wound Assessment Dry;Hyperpigmented;Edema  (flaking epidermis)       Response to treatment:  Well tolerated by patient. Plan:     Plan of Care:   Cleanse BLE with foam cleanser or blue package dimethicone wipes daily. Change the Mepilex foam dressings every 3 days and prn soilage. Elevate BLE at rest  Encourage ambulation  PINKY hose at discretion of physician.     Specialty Bed Required : No   [] Low Air Loss   [] Pressure Redistribution  [] Fluid Immersion  [] Bariatric  [] Total Pressure Relief  [] Other:     Discharge Plan:  TBD    Patient/Caregiver Teaching:    [] Indicates understanding       [] Needs reinforcement  [] Unsuccessful      [x] Verbal Understanding  [] Demonstrated understanding       [] No evidence of learning  [] Refused teaching         [] N/A       Electronically signed by Medina Rahman RN, CWON on 3/22/2019 at 8:36 AM

## 2019-03-22 NOTE — PLAN OF CARE
Problem: Falls - Risk of:  Goal: Absence of physical injury  Description  Absence of physical injury  Outcome: Ongoing     Problem: Risk for Impaired Skin Integrity  Goal: Tissue integrity - skin and mucous membranes  Description  Structural intactness and normal physiological function of skin and  mucous membranes. 3/22/2019 1148 by Murray Cohen RN  Outcome: Ongoing  3/22/2019 0035 by Nancy Jacobson RN  Outcome: Ongoing  Note:   Mepilex in place on sacrum. Turning pt q 2 hours. Skin assessment ongoing. Pressure ulcer preventions being practiced - see charting for details. Patient turned every two hours. Problem: Pain:  Goal: Pain level will decrease  Description  Pain level will decrease  3/22/2019 1148 by Murray Cohen RN  Outcome: Ongoing  3/22/2019 0035 by Nancy Jacobson RN  Outcome: Ongoing     Problem: Pain:  Goal: Pain level will decrease  Description  Pain level will decrease  3/22/2019 1148 by Murray Cohen RN  Outcome: Ongoing  3/22/2019 0035 by Nancy Jacobson RN  Outcome: Ongoing   Pt medicated with pain medication prn. Assessed all pain characteristics including level, type, location, frequency, and onset. Non-pharmacologic interventions offered to pt as well. Pt states pain is tolerable at this time.  Will continue to monitor

## 2019-03-22 NOTE — CONSULTS
Provider, MD   potassium chloride (MICRO-K) 10 MEQ extended release capsule Take 2 capsules by mouth daily 2/8/18  Yes Elise Muir, DO   spironolactone (ALDACTONE) 25 MG tablet Take 1 tablet by mouth daily 2/8/18  Yes Elise Muir, DO   aspirin 81 MG tablet Take 81 mg by mouth daily   Yes Historical Provider, MD     Current Medications:    Current Facility-Administered Medications   Medication Dose Route Frequency Provider Last Rate Last Dose    potassium chloride (KLOR-CON M) extended release tablet 40 mEq  40 mEq Oral PRN Lucille Zeus APRN - CNP   40 mEq at 03/22/19 0602    Or    potassium bicarb-citric acid (EFFER-K) effervescent tablet 40 mEq  40 mEq Oral PRN Lucille Zeus APRN - CNP        Or    potassium chloride 10 mEq/100 mL IVPB (Peripheral Line)  10 mEq Intravenous PRN Lucille Schulz, APRN - CNP        sertraline (ZOLOFT) tablet 50 mg  50 mg Oral Daily Pa Goodman MD   50 mg at 03/22/19 0958    hydrOXYzine (ATARAX) tablet 25 mg  25 mg Oral TID PRN Pa Goodman MD   25 mg at 03/22/19 1158    aspirin EC tablet 81 mg  81 mg Oral Daily Brittni Rust MD   81 mg at 03/22/19 0957    spironolactone (ALDACTONE) tablet 25 mg  25 mg Oral Daily Pa Goodman MD   25 mg at 03/22/19 0957    sodium chloride flush 0.9 % injection 10 mL  10 mL Intravenous 2 times per day Pa Goodman MD   10 mL at 03/22/19 0954    sodium chloride flush 0.9 % injection 10 mL  10 mL Intravenous PRN Pa Goodman MD        magnesium hydroxide (MILK OF MAGNESIA) 400 MG/5ML suspension 30 mL  30 mL Oral Daily PRN Pa Goodman MD        nicotine (NICODERM CQ) 21 MG/24HR 1 patch  1 patch Transdermal Daily PRN Brittni Garcia MD        enoxaparin (LOVENOX) injection 40 mg  40 mg Subcutaneous Daily Pa Goodman MD   40 mg at 03/22/19 0957    acetaminophen (TYLENOL) tablet 650 mg  650 mg Oral Q4H PRN Pa Goodman MD        furosemide (LASIX) injection 40 mg  40 mg Intravenous BID Brittni Relationships    Social connections:     Talks on phone: Not on file     Gets together: Not on file     Attends Islam service: Not on file     Active member of club or organization: Not on file     Attends meetings of clubs or organizations: Not on file     Relationship status: Not on file    Intimate partner violence:     Fear of current or ex partner: Not on file     Emotionally abused: Not on file     Physically abused: Not on file     Forced sexual activity: Not on file   Other Topics Concern    Not on file   Social History Narrative    Not on file     Family History:   History reviewed. No pertinent family history.     · REVIEW OF SYSTEMS   CONSTITUTIONAL: positive for  fatigue  EYES:  negative  HEENT:  negative  RESPIRATORY: positive for  dyspnea   CARDIOVASCULAR:  positive for  dyspnea  negative for  chest pain, no syncope any syncope  GASTROINTESTINAL:  negative  INTEGUMENT/BREAST:  negative  HEMATOLOGIC/LYMPHATIC:  negative  ALLERGIC/IMMUNOLOGIC:  negative  ENDOCRINE:  negative  MUSCULOSKELETAL:  negative  NEUROLOGICAL:  negative  BEHAVIOR/PSYCH:  positive for anxiety    PHYSICAL EXAM:    Vitals:    VITALS:  BP (!) 119/54   Pulse 78   Temp 97.4 °F (36.3 °C) (Temporal)   Resp 20   Ht 5' 2\" (1.575 m)   Wt 206 lb 11.2 oz (93.8 kg)   SpO2 95%   BMI 37.81 kg/m²   24HR INTAKE/OUTPUT:      Intake/Output Summary (Last 24 hours) at 3/22/2019 1855  Last data filed at 3/22/2019 1833  Gross per 24 hour   Intake 175 ml   Output 7550 ml   Net -7375 ml       CONSTITUTIONAL:  awake, alert, cooperative, no apparent distress, and appears stated age  EYES: Pupils equal, round and reactive to light, extra ocular muscles intact, sclera clear, conjunctiva normal  ENT:  normocepalic, without obvious abnormality  NECK:  supple, symmetrical, trachea midline, no carotid bruit ,   No  JVD  BACK:  symmetric  LUNGS: Non-labored, good air exchange, clear to auscultation bilaterally, no crackles or wheezing  CARDIOVASCULAR:  Normal apical impulse, irregular rate and rhythm, normal S1 and S2, no S3  and no 1/6 apical systolic murmur   dorsalis pedis, posterior tibial and bilateralbarely palpable  ABDOMEN:  No scars, normal bowel sounds, soft. MUSCULOSKELETAL:  there is no redness, warmth, or swelling of the joints   1+ leg edema. NEUROLOGIC:  Awake, alert, oriented to name, place and time. SKIN:  no bruising or bleeding, normal skin color, texture, turgor and no jaundice    DATA:   ECG:  Date:  March 21, 2019  I have reviewed EKG with the following interpretation: Atrial fibrillation with moderate ventricular response, one PVC, incomplete right bundle-branch block  ECHO: Date 3/22/2019  Left ventricle is mildly enlarged. Mild to moderate left ventricular hypertrophy. Global left ventricular systolic function is severely reduced with an  estimated ejection fraction of 25 % . Severe global hypokinesis. Grade III diastolic dysfunction. Left atrium is severely dilated. Right atrium is severely dilated . Right ventricle is normal in size with mildly reduced function. Thickened mitral valve leaflets. Moderate mitral regurgitation. Mild tricuspid regurgitation. Estimated right ventricular systolic pressure  of 34 mmHg.     Cardiology Labs:  Recent Labs     03/21/19  0845 03/21/19  1448 03/21/19  1812   MYOGLOBIN 26  --   --    TROPONINT NOT REPORTED NOT REPORTED NOT REPORTED     Warfarin PT/INR:  Lab Results   Component Value Date    PROTIME 13.9 03/21/2019    INR 1.4 03/21/2019     CBC:  Lab Results   Component Value Date    WBC 8.1 03/22/2019    RBC 4.40 03/22/2019    HGB 11.8 03/22/2019    HCT 36.9 03/22/2019    MCV 83.7 03/22/2019    MCH 26.7 03/22/2019    MCHC 31.9 03/22/2019    RDW 17.9 03/22/2019     03/22/2019    MPV 9.2 03/22/2019     CMP:  Lab Results   Component Value Date     03/22/2019    K 3.3 03/22/2019     03/22/2019    CO2 26 03/22/2019    BUN 25 03/22/2019    CREATININE 0.91 03/22/2019    GFRAA >60 03/22/2019    LABGLOM >60 03/22/2019    GLUCOSE 153 03/22/2019    CALCIUM 8.4 03/22/2019     Magnesium:    Lab Results   Component Value Date    MG 1.9 03/22/2019     PTT:  No results found for: APTT, PTT  TSH:    Lab Results   Component Value Date    TSH 2.14 03/22/2019     BMP:  Lab Results   Component Value Date     03/22/2019    K 3.3 03/22/2019     03/22/2019    CO2 26 03/22/2019    BUN 25 03/22/2019    LABALBU 2.8 03/21/2019    CREATININE 0.91 03/22/2019    CALCIUM 8.4 03/22/2019    GFRAA >60 03/22/2019    LABGLOM >60 03/22/2019    GLUCOSE 153 03/22/2019     LIVER PROFILE:  Recent Labs     03/21/19  0845   AST 20   ALT 13   LABALBU 2.8*   ALKPHOS 80   BILITOT 0.90   PROT 5.6*   ALBUMIN 1.0     FLP:    Lab Results   Component Value Date    CHOL 116 03/22/2019    TRIG 84 03/22/2019    HDL 31 03/22/2019    LDLCHOLESTEROL 68 03/22/2019             IMPRESSION  · Coronary artery disease with prior coronary stenting done in September 2018 by Dr. Marianela Strong, at [facility]  · Atrial fibrillation of unknown duration  · Hypertension  · Systolic heart failure acute on chronic, severe cardiomyopathy with ejection fraction of 25% on echocardiogram done on 3/22/2019  · Lymphedema  · Dyslipidemia  · History of depression  · Implants with medications      Patient Active Problem List   Diagnosis    Leg swelling    Hypertensive urgency    Acute on chronic systolic congestive heart failure ([address]    Acute on chronic systolic congestive heart failure (HCC)    Lymphedema of both lower extremities    Cellulitis of right leg    Coronary artery disease involving native coronary artery of native heart without angina pectoris    Depression with suicidal ideation    New onset a-fib ([address]    NSTEMI (non-ST elevated myocardial infarction) ([address]    Acute on chronic congestive heart failure ([address]           RECOMMENDATIONS:     Continue current medications, Discussed patient atrial fibrillation importance of oral anticoagulation, risks and benefits were explained to him prefers DOAC her Coumadin. So emphasized the importance of compliance with medications. We'll treat with diuretics and I'm resuming antiplatelet therapy was initiated, we'll stop the aspirin and start Eliquis 5 mg by mouth twice a day   Management plan was discussed with patient Her nurse. I'm hoping for discharge and they are to and patient will follow-up with her cardiologist Dr. Vicky Mercedes.           Electronically signed by Pierre Roper MD on 3/22/2019 at 6:55 PM     CC: Baldev Cross DO

## 2019-03-22 NOTE — CARE COORDINATION
Social work: Advised by Yoselyn/anson planner patient interested in SNF. Gave choices of 1. [facility] 2. [facility] 3. [facility]. Referral faxed to all three to check benefits. ZO started.

## 2019-03-22 NOTE — CARE COORDINATION
Case Management Initial Discharge Plan  [facility],         Readmission Risk              Risk of Unplanned Readmission:        16             Met with:patient to discuss discharge plans. Information verified: address, contacts, phone number, , insurance Yes  PCP: Lea Churchill DO  Date of last visit: about 1-2 years,   Sees her cardiologist that she not recall the name    Insurance Provider: Meghan Jett Medicare    Discharge Planning  Current Residence:  house  Living Arrangements:    family  Home has 2 stories/5-7 stairs to climb  Support Systems:     Current Services PTA:  none Agency: none  Patient able to perform ADL's:Assisted by son at times  DME in home:  [employer] w/ seat, canes  DME used to aid ambulation prior to admission:   walker  DME used during admission:  tbd    Potential Assistance Needed:       Pharmacy: [facility]   Potential Assistance Purchasing Medications:     Does patient want to participate in local refill/ meds to beds program?       Patient agreeable to home care: No  [place] of choice provided:  no      Type of Home Care Services:     Patient expects to be discharged to:       Prior SNF/Rehab Placement and Facility: none  Agreeable to SNF/Rehab: Yes  [place] of choice provided: yes   Evaluation: yes    Expected Discharge date:      Follow Up Appointment: Best Day/ Time:      Transportation provider: yes  Transportation arrangements needed for discharge: Yes will need to be arranged    Discharge Plan: placement  Lives w/ spouse and son in a 2 level home  Her b/b main floor w/ 5-7 steps w/ rails to get into the home  Has not been out of the house for about 2 months d/t leg swelling and drainage and son will help when needed   Asked why she did not go to her doctor or er, said no body to help her, did not get along w/ her doctor and wants a new pcp, list provided  Pt/ot rec placement  List of snf/ecf provided, she picked 3 places= [facility]

## 2019-03-22 NOTE — DISCHARGE INSTR - COC
Continuity of Care Form    Patient Name: Gretchen Galvin   :  1950  MRN:  [id number]    Admit date:  3/21/2019  Discharge date:  3/29/2019    Code Status Order: Full Code   Advance Directives:   [facility] Documentation     Date/Time Healthcare Directive Type of Healthcare Directive Copy in [address] Agent's Name Healthcare Agent's Phone Number    19 1443  No, patient does not have an advance directive for healthcare treatment -- -- -- -- --          Admitting Physician:  Darline Gibbs MD  PCP: Sha Grace DO    Discharging Nurse: [facility] Unit/Room#: 2036/2036-01  Discharging Unit Phone Number: [phone]    Emergency Contact:   Extended Emergency Contact Information  Primary Emergency Contact: Deangelo Ramires [address] Phone: [phone]  Relation: Child    Past Surgical History:  Past Surgical History:   Procedure Laterality Date    CATARACT REMOVAL      CORNEAL TRANSPLANT      EYE SURGERY      NASAL SINUS SURGERY      TONSILLECTOMY AND ADENOIDECTOMY         Immunization History:   Immunization History   Administered Date(s) Administered    Influenza, High Dose (Fluzone 65 yrs and older) 2017    Pneumococcal Polysaccharide (Dnmneqchv44) 2017    Zoster Live (Zostavax) 2014       Active Problems:  Patient Active Problem List   Diagnosis Code    Leg swelling M79.89    Hypertensive urgency I16.0    Acute on chronic systolic congestive heart failure ([address] I50.23    Acute on chronic systolic congestive heart failure (HCC) I50.23    Lymphedema of both lower extremities I89.0    Cellulitis of right leg L03.115    Coronary artery disease involving native coronary artery of native heart without angina pectoris I25.10    Depression with suicidal ideation F32.9, R45.851    New onset a-fib ([address] I48.91    NSTEMI (non-ST elevated myocardial infarction) ([address] I21.4    Acute on chronic {[id number]}    CASE MANAGEMENT/SOCIAL WORK SECTION    Inpatient Status Date: ***    Readmission Risk Assessment Score:  Readmission Risk              Risk of Unplanned Readmission:        16           Discharging to Facility/ Agency   · Name: [facility]   · Address: [address] [place] [address]   · Phone: [phone]  · Fax: [phone]    Dialysis Facility (if applicable)   · Name:  · Address:  · Dialysis Schedule:  · Phone:  · Fax:    / signature: Electronically signed by AB Boone on 3/22/19 at 4:11 PM    PHYSICIAN SECTION    Prognosis: Fair    Condition at Discharge: Stable    Rehab Potential (if transferring to Rehab): Good    Recommended Labs or Other Treatments After Discharge: CBC, BMP in 1 week    Physician Certification: I certify the above information and transfer of Eliza Dumont  is necessary for the continuing treatment of the diagnosis listed and that she requires [place] for less 30 days.      Update Admission H&P: No change in H&P    PHYSICIAN SIGNATURE:  Electronically signed by Rica Guadarrama DO 3/29/2019

## 2019-03-22 NOTE — VIRTUAL HEALTH
Inpatient consult to Psychiatry  Consult performed by: Albert Aguilar MD  Consult ordered by: Laura Wolff MD  Reason for consult: SI  Assessment/Recommendations: Persistent Depressive Disorder  Generalized Anxiety Disorder  Insomnia related to mood disorder    1. Pt is currently not suicidal and has no plan or thoughts of self harm. I do not believe that she would meet commitment criteria  2. I do suggest that the pt be started on an SSRI while in the hospital. I would recommend zoloft 50mg Qday for anxiety and depression  3. I also recommend that she be started on something as needed for anxiety such as vistaril 25mg PO TID PRN anxiety  4. For sleep I would recommend starting with melatonin 5-10mg HS as it wouldn't be to sedating  5. If she is able to go to a short term rehab I think it would really help and also home health sounds like it would be beneficial.  6. When she is medically stable she did agree to get back into mental health treatment. Please have her scheduled for outpatient mental health. I would recommend she get therapy, case management and medication. Thank you for the consult. If any further questions or issues arise please reconsult        Patient Location:  [address] [place] [address]    Provider Location ([facility]/State):   [place], [place]    This virtual visit was conducted via interactive/real-time audio/video. Department of Psychiatry  Attending Psychiatric Assessment       CHIEF COMPLAINT:  RFC: SI    History obtained from:  patient, electronic medical record    HISTORY OF PRESENT ILLNESS:          The patient is a 76 y.o. female with significant past psych hx of depression who was admitted to the hospital for SOB, weakness and inconteneince. She was a hx of depression but is not on any medications. I did see that she was once on risperdal. She reported that she hasn't taken any medications in over a month.  She was reported saying that she thought it would be a [place] out of my misery\". EMS noted that her house was filled with trash and she was laying in the bed which was soiled with urine. She lives with  but he doesn't help care for patient. Pt tells me that she had given up hope of ever getting better. She said that she didn't really want to die but felt like that may have been her only way out. She said that she never had any plan of hurting herself or suicide though. Depression: Pt said that she has been depressed for about the last 35 years. She said that it will vary in intensitiy but never really goes away. Keyla Forget that it has been worseing since she her health started to decline the last year or two. Pt said that she thought that she would already be dead due to her health problems. She said that she doesn't sleep much at all. Maybe only a few hours a night. Her appetite is up and down. She reports very low energy and motivation. She denies any SI/HI currently. Had been having thoughts of wishing she was dead but not thoughts of acutally hurting or killing self. Anxiety: Pt said that her anxiety is really bad. She reports that she will worry about her declining health all the time. She said that is what keeps her up at night as well. Said that she always worries about it and always feels uneasy. Has been this way for last year. Said that at times she will worry so much that she gets SOB and her heart will race. Denies any psychosis or vani sxs    PSYCHIATRIC HISTORY:      Anatoly Servin said that she has been in and out of treatment for the last 35 years. She said that she hasn't found anyone or any combination of meds that have helped for long. She said that she stopped mental health tx about 3 or 4 years ago because she didn't see the point. She has done therapy and med management with multiple providers. She said that she had one SA via OD in the early [de-identified]. None since    The patient is not currently receiving care for the above psychiatric illness.     Past mental health outpatient care includes:  [facility] 5 treatment centers    Past mental health hospitalizations: 1-5 admissions    Past psychiatric medications include:  Xanax, Ativan, Risperdal, Prozac and doesn't remember the rest    Adverse reactions from psychotropic medications:  prozac made her no have emotions    Past Medical History:        Diagnosis Date    Acute on chronic systolic congestive heart failure ([address] 8/8/2017    Anxiety     [place] (hard of hearing)     Hyperlipidemia     Hypertension     Hypertensive urgency 8/7/2017    Leg swelling 8/7/2017     Past Surgical History:        Procedure Laterality Date    CATARACT REMOVAL      CORNEAL TRANSPLANT      EYE SURGERY      NASAL SINUS SURGERY      TONSILLECTOMY AND ADENOIDECTOMY       Medications Prior to Admission:   Medications Prior to Admission: atorvastatin (LIPITOR) 80 MG tablet, Take 80 mg by mouth nightly  metoprolol succinate (TOPROL XL) 25 MG extended release tablet, Take 25 mg by mouth daily  sacubitril-valsartan (ENTRESTO) 49-51 MG per tablet, Take 1 tablet by mouth 2 times daily  Multiple Vitamins-Minerals (THERAPEUTIC MULTIVITAMIN-MINERALS) tablet, Take 1 tablet by mouth daily  furosemide (LASIX) 20 MG tablet, Take 20 mg by mouth daily  potassium chloride (MICRO-K) 10 MEQ extended release capsule, Take 2 capsules by mouth daily  spironolactone (ALDACTONE) 25 MG tablet, Take 1 tablet by mouth daily  aspirin 81 MG tablet, Take 81 mg by mouth daily  [DISCONTINUED] cyclobenzaprine (FLEXERIL) 10 MG tablet,   Allergies:  Patient has no known allergies. Social History:  Lives with . Has a son age 43. Used to work as medical . Denies any drugs, etoh or nicotine use. Family History:   History reviewed. No pertinent family history.   Psychiatric Family History  Son-Depression, anxiety and OCD    REVIEW OF SYSTEMS:  BEHAVIOR/PSYCH:  positive for poor appetite, decreased sleep, decreased energy level, depressed mood and anxiety    PHYSICAL EXAM:    Vitals:  BP (!) 146/74   Pulse 61   Temp 97.9 °F (36.6 °C) (Temporal)   Resp 20   Ht 5' 2\" (1.575 m)   Wt 206 lb 11.2 oz (93.8 kg)   SpO2 95%   BMI 37.81 kg/m²     Mental Status Examination:  Level of consciousness:  within normal limits  Appearance:  well-appearing, hospital attire and seated in bed  Behavior/Motor:  no abnormalities noted  Attitude toward examiner:  cooperative, attentive and good eye contact  Speech:  spontaneous, normal rate, normal volume and well articulated  Mood:  \"A little better\"  Affect:  mood congruent  Thought processes:  linear, goal directed and coherent  Thought content:  Suicidal Ideation:  denies suicidal ideation  Cognition:  Insight:  Fair, Judgement: Poor    CONSTITUTIONAL:  awake, alert, cooperative, no apparent distress, and appears stated age    DATA:  Labs:    CBC with Differential:    Lab Results   Component Value Date    WBC 8.1 03/22/2019    RBC 4.40 03/22/2019    HGB 11.8 03/22/2019    HCT 36.9 03/22/2019     03/22/2019    MCV 83.7 03/22/2019    MCH 26.7 03/22/2019    MCHC 31.9 03/22/2019    RDW 17.9 03/22/2019    LYMPHOPCT 10 03/21/2019    MONOPCT 5 03/21/2019    BASOPCT 1 03/21/2019    MONOSABS 0.40 03/21/2019    LYMPHSABS 0.80 03/21/2019    EOSABS 0.00 03/21/2019    BASOSABS 0.10 03/21/2019    DIFFTYPE NOT REPORTED 03/21/2019     CMP:    Lab Results   Component Value Date     03/22/2019    K 3.3 03/22/2019     03/22/2019    CO2 26 03/22/2019    BUN 25 03/22/2019    CREATININE 0.91 03/22/2019    GFRAA >60 03/22/2019    LABGLOM >60 03/22/2019    GLUCOSE 153 03/22/2019    PROT 5.6 03/21/2019    LABALBU 2.8 03/21/2019    CALCIUM 8.4 03/22/2019    BILITOT 0.90 03/21/2019    ALKPHOS 82 03/21/2019    AST 20 03/21/2019    ALT 13 03/21/2019     U/A:    Lab Results   Component Value Date    COLORU YELLOW 03/21/2019    PROTEINU 3+ 03/21/2019    PHUR 5.0 03/21/2019    WBCUA 0 TO 2 03/21/2019    RBCUA None 03/21/2019 MUCUS NOT REPORTED 03/21/2019    TRICHOMONAS NOT REPORTED 03/21/2019    YEAST NOT REPORTED 03/21/2019    BACTERIA None 03/21/2019    SPECGRAV 1.029 03/21/2019    LEUKOCYTESUR NEGATIVE 03/21/2019    UROBILINOGEN Normal 03/21/2019    BILIRUBINUR NEGATIVE  Verified by ictotest. 03/21/2019    GLUCOSEU NEGATIVE 03/21/2019    AMORPHOUS NOT REPORTED 03/21/2019     TSH:    Lab Results   Component Value Date    TSH 2.14 03/22/2019     VITAMIN B12: No components found for: B12  FOLATE:  No results found for: FOLATE  Urine Toxicology:  No components found for: IAMMENTA, IBARBIT, IBENZO, ICOCAINE, IMARTHC, IOPIATES, IPHENCYC    DSM-V DIAGNOSIS:    Impression     ASSESSMENT AND PLAN:    Persistent Depressive Disorder  Generalized Anxiety Disorder  Insomnia related to mood disorder    1. Pt is currently not suicidal and has no plan or thoughts of self harm. I do not believe that she would meet commitment criteria  2. I do suggest that the pt be started on an SSRI while in the hospital. I would recommend zoloft 50mg Qday for anxiety and depression  3. I also recommend that she be started on something as needed for anxiety such as vistaril 25mg PO TID PRN anxiety  4. For sleep I would recommend starting with melatonin 5-10mg HS as it wouldn't be to sedating  5. If she is able to go to a short term rehab I think it would really help and also home health sounds like it would be beneficial.  6. When she is medically stable she did agree to get back into mental health treatment. Please have her scheduled for outpatient mental health. I would recommend she get therapy, case management and medication. Thank you for the consult.  If any further questions or issues arise please reconsult

## 2019-03-22 NOTE — CARE COORDINATION
Social work: Lorrie/shobha here for onsite visit. Delisa Curiel states they are out of network but patient has same in/out of network benefits, total OOP for patient is $1,500. Lorrie explained financial information to patient and her son and patient is agreeable. Leesa will start precert. ZO done.

## 2019-03-22 NOTE — PROGRESS NOTES
Pharmacy Note  Vancomycin Consult - Brief Note     Vancomycin Day: 2  Current Dose:  Vancomycin 1500mg IV q 24 hours  Patient's labs, cultures, vitals, and vancomycin regimen reviewed. No changes today. Vancomycin trough ordered for tomorrow 3/23 at 1100. Current diagnosis for which MRSA is suspected/confirmed: cellulitis           Temp Readings from Last 3 Encounters:   03/22/19 97.7 °F (36.5 °C) (Infrared)   08/11/17 97.7 °F (36.5 °C) (Oral)   08/06/17 97.9 °F (36.6 °C)     Recent Labs     03/21/19  0934 03/22/19  0459   WBC 7.5 8.1     Recent Labs     03/21/19  0845 03/22/19  0459   CREATININE 0.80 0.91*     Estimated Creatinine Clearance: 63 mL/min (A) (based on SCr of 0.91 mg/dL (H)). Intake/Output Summary (Last 24 hours) at 3/22/2019 1405  Last data filed at 3/22/2019 3198  Gross per 24 hour   Intake 560 ml   Output 4600 ml   Net -4040 ml         Thank you for the consult. Pharmacy will continue to follow.   Devika Noel RPh/PharmD  3/22/2019 2:05 PM

## 2019-03-23 PROBLEM — F41.0 PANIC DISORDER: Status: ACTIVE | Noted: 2019-03-23

## 2019-03-23 PROBLEM — F32.9 MAJOR DEPRESSIVE DISORDER: Status: ACTIVE | Noted: 2019-03-21

## 2019-03-23 PROCEDURE — 97530 THERAPEUTIC ACTIVITIES: CPT

## 2019-03-23 PROCEDURE — 2580000003 HC RX 258: Performed by: INTERNAL MEDICINE

## 2019-03-23 PROCEDURE — 97116 GAIT TRAINING THERAPY: CPT

## 2019-03-23 PROCEDURE — 6370000000 HC RX 637 (ALT 250 FOR IP): Performed by: NURSE PRACTITIONER

## 2019-03-23 PROCEDURE — 6370000000 HC RX 637 (ALT 250 FOR IP): Performed by: INTERNAL MEDICINE

## 2019-03-23 PROCEDURE — 99232 SBSQ HOSP IP/OBS MODERATE 35: CPT | Performed by: INTERNAL MEDICINE

## 2019-03-23 PROCEDURE — 97110 THERAPEUTIC EXERCISES: CPT

## 2019-03-23 PROCEDURE — 1200000000 HC SEMI PRIVATE

## 2019-03-23 RX ORDER — LORAZEPAM 0.5 MG/1
0.5 TABLET ORAL 3 TIMES DAILY
Status: DISCONTINUED | OUTPATIENT
Start: 2019-03-23 | End: 2019-03-29 | Stop reason: HOSPADM

## 2019-03-23 RX ORDER — OXYBUTYNIN CHLORIDE 5 MG/1
5 TABLET ORAL 3 TIMES DAILY
Status: DISCONTINUED | OUTPATIENT
Start: 2019-03-23 | End: 2019-03-29 | Stop reason: HOSPADM

## 2019-03-23 RX ORDER — CARVEDILOL 12.5 MG/1
12.5 TABLET ORAL 2 TIMES DAILY WITH MEALS
Status: DISCONTINUED | OUTPATIENT
Start: 2019-03-23 | End: 2019-03-24

## 2019-03-23 RX ORDER — FUROSEMIDE 40 MG/1
40 TABLET ORAL 2 TIMES DAILY
Status: DISCONTINUED | OUTPATIENT
Start: 2019-03-23 | End: 2019-03-29 | Stop reason: HOSPADM

## 2019-03-23 RX ADMIN — Medication 10 ML: at 08:34

## 2019-03-23 RX ADMIN — SERTRALINE HYDROCHLORIDE 50 MG: 50 TABLET ORAL at 08:35

## 2019-03-23 RX ADMIN — SACUBITRIL AND VALSARTAN 1 TABLET: 49; 51 TABLET, FILM COATED ORAL at 08:35

## 2019-03-23 RX ADMIN — OXYBUTYNIN CHLORIDE 5 MG: 5 TABLET ORAL at 13:28

## 2019-03-23 RX ADMIN — FUROSEMIDE 40 MG: 40 TABLET ORAL at 09:17

## 2019-03-23 RX ADMIN — LORAZEPAM 0.5 MG: 0.5 TABLET ORAL at 13:28

## 2019-03-23 RX ADMIN — TICAGRELOR 90 MG: 90 TABLET ORAL at 08:35

## 2019-03-23 RX ADMIN — SACUBITRIL AND VALSARTAN 1 TABLET: 49; 51 TABLET, FILM COATED ORAL at 19:52

## 2019-03-23 RX ADMIN — MULTIVITAMIN TABLET 1 TABLET: TABLET at 18:24

## 2019-03-23 RX ADMIN — OXYBUTYNIN CHLORIDE 5 MG: 5 TABLET ORAL at 09:17

## 2019-03-23 RX ADMIN — LORAZEPAM 0.5 MG: 0.5 TABLET ORAL at 09:17

## 2019-03-23 RX ADMIN — METOPROLOL SUCCINATE 25 MG: 25 TABLET, EXTENDED RELEASE ORAL at 08:35

## 2019-03-23 RX ADMIN — FUROSEMIDE 40 MG: 40 TABLET ORAL at 18:24

## 2019-03-23 RX ADMIN — APIXABAN 5 MG: 5 TABLET, FILM COATED ORAL at 19:51

## 2019-03-23 RX ADMIN — TICAGRELOR 90 MG: 90 TABLET ORAL at 19:52

## 2019-03-23 RX ADMIN — ATORVASTATIN CALCIUM 80 MG: 80 TABLET, FILM COATED ORAL at 19:52

## 2019-03-23 RX ADMIN — LORAZEPAM 0.5 MG: 0.5 TABLET ORAL at 19:53

## 2019-03-23 RX ADMIN — SPIRONOLACTONE 25 MG: 25 TABLET ORAL at 08:35

## 2019-03-23 RX ADMIN — Medication 10 ML: at 19:57

## 2019-03-23 RX ADMIN — OXYBUTYNIN CHLORIDE 5 MG: 5 TABLET ORAL at 19:53

## 2019-03-23 RX ADMIN — MICONAZOLE NITRATE: 20.6 POWDER TOPICAL at 08:34

## 2019-03-23 RX ADMIN — MICONAZOLE NITRATE: 20.6 POWDER TOPICAL at 19:51

## 2019-03-23 RX ADMIN — APIXABAN 5 MG: 5 TABLET, FILM COATED ORAL at 08:35

## 2019-03-23 ASSESSMENT — PAIN SCALES - GENERAL
PAINLEVEL_OUTOF10: 0
PAINLEVEL_OUTOF10: 0
PAINLEVEL_OUTOF10: 4
PAINLEVEL_OUTOF10: 0
PAINLEVEL_OUTOF10: 0
PAINLEVEL_OUTOF10: 2
PAINLEVEL_OUTOF10: 2
PAINLEVEL_OUTOF10: 0

## 2019-03-23 ASSESSMENT — PAIN DESCRIPTION - LOCATION
LOCATION: BUTTOCKS
LOCATION: BUTTOCKS

## 2019-03-23 ASSESSMENT — PAIN DESCRIPTION - ORIENTATION: ORIENTATION: RIGHT;LEFT

## 2019-03-23 ASSESSMENT — PAIN DESCRIPTION - PAIN TYPE: TYPE: ACUTE PAIN

## 2019-03-23 NOTE — PLAN OF CARE
Problem: Falls - Risk of:  Goal: Will remain free from falls  Description  Will remain free from falls  Outcome: Ongoing  Goal: Absence of physical injury  Description  Absence of physical injury  Outcome: Ongoing     Problem: Risk for Impaired Skin Integrity  Goal: Tissue integrity - skin and mucous membranes  Description  Structural intactness and normal physiological function of skin and  mucous membranes. Outcome: Ongoing     Problem: Pain:  Description  Pain management should include both nonpharmacologic and pharmacologic interventions.   Goal: Pain level will decrease  Description  Pain level will decrease  Outcome: Ongoing  Goal: Control of acute pain  Description  Control of acute pain  Outcome: Ongoing  Goal: Control of chronic pain  Description  Control of chronic pain  Outcome: Ongoing     Problem: IP BALANCE  Goal: LTG - Patient will maintain balance to allow for safe/functional mobility  Outcome: Ongoing     Problem: Musculor/Skeletal Functional Status  Goal: Highest potential functional level  Outcome: Ongoing

## 2019-03-23 NOTE — FLOWSHEET NOTE
Patient was sitting up in a chair. Patient shares her worries, fears anxieties, feeling lonely. Patient states she feels not much support from some of the family members. States not much support from spouse.  helps patient explore thoughts feelings she is going through. Patient shares about her jimbo back ground.  shared in presence, listening, support, prayers. Follow up as needed. 03/23/19 1531   Encounter Summary   Services provided to: Patient   Referral/Consult From: [address] [place] [address] Visiting   (3-23-19)   Complexity of Encounter Moderate   Length of Encounter 30 minutes   Spiritual Assessment Completed Yes   Routine   Type Follow up   Assessment Passive; Anxious; Fearful;Spiritual struggle; Helplessness;Sad;Loneliness   Intervention Active listening;Explored feelings, thoughts, concerns;Nurtured hope;Prayer;Sustaining presence/ Ministry of presence; Discussed illness/injury and it's impact; Discussed relationship with God;Discussed belief system/Latter day practices/jimbo   Outcome Expressed gratitude;Engaged in conversation; Shared life review;Expressed feelings/needs/concerns;Receptive;Venting emotion

## 2019-03-23 NOTE — PROGRESS NOTES
Physical Therapy  Facility/Department: hospitals CVICU  Daily Treatment Note  NAME: Loren Noyola  : 1950  MRN: [id number]    Date of Service: 3/23/2019    Discharge Recommendations:  [address]        Patient Diagnosis(es): The primary encounter diagnosis was Acute on chronic congestive heart failure, unspecified heart failure type ([facility] [address] A diagnosis of Cellulitis of both lower extremities was also pertinent to this visit. has a past medical history of Acute on chronic systolic congestive heart failure ([address] Anxiety, Cardiomyopathy ([address] H/O heart artery stent, [place] (hard of hearing), Hyperlipidemia, Hypertension, Hypertensive urgency, and Leg swelling.   has a past surgical history that includes Cataract removal; Corneal transplant; Nasal sinus surgery; eye surgery; and Tonsillectomy and adenoidectomy. Restrictions  Restrictions/Precautions  Restrictions/Precautions: General Precautions, Up as Tolerated  Required Braces or Orthoses?: No  Position Activity Restriction  Other position/activity restrictions: 02, zamora, RW & gait belt  Subjective   General  Chart Reviewed: Yes  Additional Pertinent Hx: Lymphedema, history of attempted suicide  Response To Previous Treatment: Patient with no complaints from previous session. Family / Caregiver Present: No  Subjective  Subjective: Patient pleasant and agreeable to PT. General Comment  Comments: Hudson Mathews RN reports patient appropriate for PT.   Pain Screening  Patient Currently in Pain: Yes  Pain Assessment  Pain Assessment: 0-10  Pain Level: 2  Patient's Stated Pain Goal: No pain  Pain Type: Acute pain  Pain Location: Buttocks  Pain Orientation: Right;Left  Vital Signs  Patient Currently in Pain: Yes       Orientation  Orientation  Overall Orientation Status: Within Functional Limits  Cognition   Cognition  Overall Cognitive Status: WFL  Objective   Bed mobility  Rolling to Left: Contact guard assistance  Rolling to Right: Contact guard with bed mobility and transfers. Short term goal 2: Patient will amb 200 feet with RW and indep. Short term goal 3: Patient will tolerate 30 minutes of ther-ex and ther-act. Short term goal 4: Patient will be indep with HEP. Patient Goals   Patient goals : Improve breathing    Plan    Plan  Times per week: 1x/d, 5-6 d/wk  Current Treatment Recommendations: Strengthening, Balance Training, Functional Mobility Training, Transfer Training, Endurance Training, Gait Training, Neuromuscular Re-education, Home Exercise Program, Safety Education & Training, Patient/Caregiver Education & Training  Safety Devices  Type of devices:  All fall risk precautions in place, Call light within reach, Gait belt, Nurse notified, Chair alarm in place     Therapy Time   Individual Concurrent Group Co-treatment   Time In 1215         Time Out [address] [place], [place]

## 2019-03-23 NOTE — PROGRESS NOTES
[facility]    Progress Note    3/23/2019     Name:   Becky Santillan  MRN:     [id number]     Acct:      [de-identified]   Room:   2036/2036-01  IP Day:  2  Admit Date:  3/21/2019  8:40 AM    PCP:   Baldev Cross DO  Code Status:  Full Code    Subjective:     C/C:   Chief Complaint   Patient presents with    Illness     Interval History Status: improved. Patient had issues with bladder spasms. No acute events overnight. +anxiety/panic episodes. Denies chest pain/ palpitations. Afebrile, hemodynamically stable, on 3 lit NC O2.  I/O: 5.5 L  Brief History:   77 y/o brought in to [place] ED with c/o SOB, generalized weakness , incontinence of urine in last 2 d. Noncomplaince with medications in last 1-2 mo. Per EMS house was filled with trash and 'essentially uninhabitable' . ED w/u: Afib, , SBP 170s, hypoxia 80s on RA, Na 146, CO2 17, BNP 61089, Hb 12.7, WBC 7.5, u/a unremarkable, HS trop 31, EKG: Afib, unspecific ST/T changes. CXR compatible with CHF .  - Has prior h/o CAD s/p PCI x2 latest 10/2018, chronic Sys CHF, HTN, HPL. Patient indicates she did not tolerate plavix/ bryllinta :unable to elaborate. Would like to change cardiologists. Review of Systems:     Constitutional:  negative for chills, fevers, sweats  Respiratory:  negative for cough, dyspnea on exertion, +shortness of breath, no wheezing  Cardiovascular:  negative for chest pain, chest pressure/discomfort,+lower extremity edema, no palpitations  Gastrointestinal:  negative for abdominal pain, constipation, diarrhea, nausea, vomiting  Neurological:  negative for dizziness, headache    Medications:      Allergies:  No Known Allergies    Current Meds:   Scheduled Meds:    LORazepam  0.5 mg Oral TID    furosemide  40 mg Oral BID    oxybutynin  5 mg Oral TID    carvedilol  12.5 mg Oral BID WC    sertraline  50 mg Oral Daily    apixaban  5 mg Oral BID    spironolactone  25 mg Oral Daily    sodium chloride flush  10 mL Intravenous 2 times per day    miconazole   Topical BID    ticagrelor  90 mg Oral BID    atorvastatin  80 mg Oral Nightly    sacubitril-valsartan  1 tablet Oral BID    multivitamin  1 tablet Oral Daily     Continuous Infusions:   PRN Meds: potassium chloride **OR** potassium alternative oral replacement **OR** potassium chloride, sodium chloride flush, magnesium hydroxide, nicotine, acetaminophen, ondansetron **OR** ondansetron    Data:     Past Medical History:   has a past medical history of Acute on chronic systolic congestive heart failure ([facility] [address] Anxiety, Cardiomyopathy ([facility] [address] H/O heart artery stent, [place] (hard of hearing), Hyperlipidemia, Hypertension, Hypertensive urgency, and Leg swelling. Social History:   reports that she has never smoked. She has never used smokeless tobacco. She reports that she does not drink alcohol or use drugs. Family History: History reviewed. No pertinent family history. Vitals:  /68   Pulse 56   Temp 97.9 °F (36.6 °C) (Temporal)   Resp 18   Ht 5' 2\" (1.575 m)   Wt 189 lb 8 oz (86 kg)   SpO2 93%   BMI 34.66 kg/m²   Temp (24hrs), Av.7 °F (36.5 °C), Min:97.4 °F (36.3 °C), Max:98.2 °F (36.8 °C)    No results for input(s): POCGLU in the last 72 hours. I/O (24Hr): Intake/Output Summary (Last 24 hours) at 3/23/2019 1728  Last data filed at 3/23/2019 1355  Gross per 24 hour   Intake 720 ml   Output 6200 ml   Net -5480 ml       Physical Examination:        General appearance:  alert, cooperative and no distress, obese body habitus. On NC O2. Mental Status:  oriented to person, place and time and anxious affect  Lungs:  Decreased air entry at bases,+crackles . Heart:  regular rate and rhythm, no murmur  Abdomen:  soft, nontender, nondistended, normal bowel sounds, no masses, hepatomegaly, splenomegaly  Extremities:  +b/l LE  edema, +redness is improved, Skin is peeling now with no seeping noted.    no tenderness in the calves    Assessment:        Primary Problem  Acute on chronic systolic congestive heart failure [facility])    Active Hospital Problems    Diagnosis Date Noted    Panic disorder [F41.0] 03/23/2019    Acute on chronic congestive heart failure ([facility] [I50.9]     Acute on chronic systolic congestive heart failure (HCC) [I50.23] 03/21/2019    Lymphedema of both lower extremities [I89.0] 03/21/2019    Cellulitis of right leg [L03.115] 03/21/2019    Coronary artery disease involving native coronary artery of native heart without angina pectoris [I25.10] 03/21/2019    Major depressive disorder [F32.9] 03/21/2019    New onset a-fib [facility]) [I48.91] 03/21/2019    NSTEMI (non-ST elevated myocardial infarction) ([facility] [I21.4] 03/21/2019       Plan:        - continue ASA, Brillinta, Statin, BB, entresto. Switch to PO lasix. CBC, BMP in AM.   monitor I/O, daily weights. - Was started on Eliquis by cardiology. - start Ditropan to help bladder spasms and incontinence. - Was evaluated by Dr Anshu Benito, psychiatry:a/w input. D/c Atarax. Start Ativan PO.   - PT/OT.        Sinai Stanton MD  3/23/2019

## 2019-03-23 NOTE — FLOWSHEET NOTE
Dr. Elio Olivarez arrived to the patient's bedside for evaluation and was updated on the patient's current status via RN. Patient to continue with corbin. Dr. Elio Olivarez will not see patient outside of hospital. Patient is okay to follow-up with another partner in Dr. Elio Olivarez' practice. Patient is to remain under our care per cardiology. Not appropriate for discharge today due to shortness of breath. Will continue to monitor closely.

## 2019-03-23 NOTE — FLOWSHEET NOTE
Dr. Mckinley Camacho arrived to the patient's bedside and was updated on the patient's current status via RN and updates from cardiologist. New order received for consult to Dr. Ga Poster psychology. Will continue to monitor closely.

## 2019-03-23 NOTE — PROGRESS NOTES
Section of Cardiology  Progress Note      Date:  3/23/2019  Patient: Pauline Cardoso  Admission:  3/21/2019  8:40 AM  Admit DX: Congestive heart failure, severe ([address] [I50.9]  Age:  76 y.o., 1950     LOS: 2 days     Reason for evaluation:   CHF and coronary artery disease      SUBJECTIVE:     The patient was seen and examined. Notes and labs reviewed. Patient does not feel she is ready to go home, she has not ambulated adequately, she still states that she is mildly short of breath. Denies chest pain denies palpitation. He remained in atrial fibrillation without any significant dysrhythmia on the monitor. And she has been doing fine with current medications. OBJECTIVE:      EXAM:   Vitals:    VITALS:  /74   Pulse 68   Temp 98.2 °F (36.8 °C) (Temporal)   Resp 17   Ht 5' 2\" (1.575 m)   Wt 189 lb 8 oz (86 kg)   SpO2 91%   BMI 34.66 kg/m²   24HR INTAKE/OUTPUT:      Intake/Output Summary (Last 24 hours) at 3/23/2019 1156  Last data filed at 3/23/2019 0933  Gross per 24 hour   Intake 630 ml   Output 5500 ml   Net -4870 ml       CONSTITUTIONAL:  awake, alert, cooperative, no apparent distress, and appears stated age. HEENT: Normal jugular venous pulsations  LUNGS: Good respiratory effort On auscultation: clear to auscultation bilaterally and diminished breath sounds bibasilar  CARDIOVASCULAR:  Irregular rate and rhythm, normal S1 and S2, no S3 1/6 apical systolic murmur. SKIN: Warm and dry. EXTREMITIES:1+ lower extremity edema.      Current Inpatient Medications:   LORazepam  0.5 mg Oral TID    furosemide  40 mg Oral BID    oxybutynin  5 mg Oral TID    sertraline  50 mg Oral Daily    apixaban  5 mg Oral BID    spironolactone  25 mg Oral Daily    sodium chloride flush  10 mL Intravenous 2 times per day    miconazole   Topical BID    ticagrelor  90 mg Oral BID    atorvastatin  80 mg Oral Nightly    metoprolol succinate  25 mg Oral Daily    sacubitril-valsartan  1 tablet Oral BID  multivitamin  1 tablet Oral Daily       IV Infusions (if any):      Diagnostics:   Telemetry: Atrial fibrillation with occasional premature ventricular    Labs:   CBC:  Recent Labs     03/21/19  0934 03/22/19  0459   WBC 7.5 8.1   HGB 12.7 11.8*   HCT 40.9 36.9    223     Magnesium:  Recent Labs     03/22/19  0459   MG 1.9     BMP:  Recent Labs     03/21/19  0845 03/22/19  0459   * 143   K 3.7 3.3*   CALCIUM 7.0* 8.4*   CO2 17* 26   BUN 20 25*   CREATININE 0.80 0.91*   LABGLOM >60 >60   GLUCOSE 140* 153*     BNP:No results for input(s): BNP in the last 72 hours. PT/INR:  Recent Labs     03/21/19  0845   PROTIME 13.9*   INR 1.4     APTT:No results for input(s): APTT in the last 72 hours. CARDIAC ENZYMES:  Recent Labs     03/21/19  0845 03/21/19  1448 03/21/19  1812   TROPONINT NOT REPORTED NOT REPORTED NOT REPORTED     FASTING LIPID PANEL:  Lab Results   Component Value Date    HDL 31 03/22/2019    TRIG 84 03/22/2019     LIVER PROFILE:  Recent Labs     03/21/19  0845   AST 20   ALT 13   LABALBU 2.8*   ALKPHOS 80   BILITOT 0.90   PROT 5.6*   ALBUMIN 1.0        ASSESSMENT:  · Coronary artery disease with prior coronary stenting done in September 2018 by Dr. Delfin Neri, at [facility]  · Atrial fibrillation of unknown duration, tolerating Eliquis which was started yesterday  · Hypertension  · Systolic heart failure acute on chronic, severe cardiomyopathy with ejection fraction of 25% on echocardiogram done on 3/22/2019, breathing is gradually improving at patient does not feel ready to go home  · Hypokalemia, replaced and managed by others  · Lymphedema  · Dyslipidemia  · History of depression  · Non-compliance with medications    PLAN:  1. Continue current medications. 2. Emphasized to the patient importance of compliance of medications, because of her recent stenting she will need [place] is given for atrial fibrillation. She is on appropriate cardiac medications.   Because of her congestive heart failure and systolic dysfunction I will be stopping metoprolol and put her on carvedilol, continue Entresto. Increase ambulation and use physical therapy and hoping for discharge in one to 2 days. I explained all of the results of the patient and thus was present during the visit and that discussion. Patient will follow-up with her previous cardiologist postdischarge. This was explained to the patient.       Chyna Gonzalez MD

## 2019-03-24 LAB
ABSOLUTE EOS #: 0.2 K/UL (ref 0–0.4)
ABSOLUTE IMMATURE GRANULOCYTE: ABNORMAL K/UL (ref 0–0.3)
ABSOLUTE LYMPH #: 1.1 K/UL (ref 1–4.8)
ABSOLUTE MONO #: 0.6 K/UL (ref 0.2–0.8)
ANION GAP SERPL CALCULATED.3IONS-SCNC: 12 MMOL/L (ref 9–17)
BASOPHILS # BLD: 0 % (ref 0–2)
BASOPHILS ABSOLUTE: 0 K/UL (ref 0–0.2)
BNP INTERPRETATION: ABNORMAL
BUN BLDV-MCNC: 24 MG/DL (ref 8–23)
BUN/CREAT BLD: 27 (ref 9–20)
CALCIUM IONIZED: 1.11 MMOL/L (ref 1.13–1.33)
CALCIUM SERPL-MCNC: 7.7 MG/DL (ref 8.6–10.4)
CHLORIDE BLD-SCNC: 100 MMOL/L (ref 98–107)
CO2: 32 MMOL/L (ref 20–31)
CREAT SERPL-MCNC: 0.88 MG/DL (ref 0.5–0.9)
DIFFERENTIAL TYPE: ABNORMAL
EOSINOPHILS RELATIVE PERCENT: 3 % (ref 1–4)
GFR AFRICAN AMERICAN: >60 ML/MIN
GFR NON-AFRICAN AMERICAN: >60 ML/MIN
GFR SERPL CREATININE-BSD FRML MDRD: ABNORMAL ML/MIN/{1.73_M2}
GFR SERPL CREATININE-BSD FRML MDRD: ABNORMAL ML/MIN/{1.73_M2}
GLUCOSE BLD-MCNC: 108 MG/DL (ref 70–99)
HCT VFR BLD CALC: 38.7 % (ref 36–46)
HEMOGLOBIN: 12.4 G/DL (ref 12–16)
IMMATURE GRANULOCYTES: ABNORMAL %
LYMPHOCYTES # BLD: 14 % (ref 24–44)
MAGNESIUM: 1.8 MG/DL (ref 1.6–2.6)
MCH RBC QN AUTO: 27.1 PG (ref 26–34)
MCHC RBC AUTO-ENTMCNC: 32.1 G/DL (ref 31–37)
MCV RBC AUTO: 84.6 FL (ref 80–100)
MONOCYTES # BLD: 7 % (ref 1–7)
NRBC AUTOMATED: ABNORMAL PER 100 WBC
PDW BLD-RTO: 17.5 % (ref 11.5–14.5)
PLATELET # BLD: 284 K/UL (ref 130–400)
PLATELET ESTIMATE: ABNORMAL
PMV BLD AUTO: 8.9 FL (ref 6–12)
POTASSIUM SERPL-SCNC: 3 MMOL/L (ref 3.7–5.3)
POTASSIUM SERPL-SCNC: 3.7 MMOL/L (ref 3.7–5.3)
PRO-BNP: 3418 PG/ML
RBC # BLD: 4.58 M/UL (ref 4–5.2)
RBC # BLD: ABNORMAL 10*6/UL
SEG NEUTROPHILS: 76 % (ref 36–66)
SEGMENTED NEUTROPHILS ABSOLUTE COUNT: 5.8 K/UL (ref 1.8–7.7)
SODIUM BLD-SCNC: 144 MMOL/L (ref 135–144)
WBC # BLD: 7.6 K/UL (ref 3.5–11)
WBC # BLD: ABNORMAL 10*3/UL

## 2019-03-24 PROCEDURE — 83735 ASSAY OF MAGNESIUM: CPT

## 2019-03-24 PROCEDURE — 85025 COMPLETE CBC W/AUTO DIFF WBC: CPT

## 2019-03-24 PROCEDURE — 6370000000 HC RX 637 (ALT 250 FOR IP): Performed by: NURSE PRACTITIONER

## 2019-03-24 PROCEDURE — 97116 GAIT TRAINING THERAPY: CPT

## 2019-03-24 PROCEDURE — 1200000000 HC SEMI PRIVATE

## 2019-03-24 PROCEDURE — 84132 ASSAY OF SERUM POTASSIUM: CPT

## 2019-03-24 PROCEDURE — 2580000003 HC RX 258: Performed by: INTERNAL MEDICINE

## 2019-03-24 PROCEDURE — 97110 THERAPEUTIC EXERCISES: CPT

## 2019-03-24 PROCEDURE — 80048 BASIC METABOLIC PNL TOTAL CA: CPT

## 2019-03-24 PROCEDURE — 99233 SBSQ HOSP IP/OBS HIGH 50: CPT | Performed by: INTERNAL MEDICINE

## 2019-03-24 PROCEDURE — 6370000000 HC RX 637 (ALT 250 FOR IP): Performed by: INTERNAL MEDICINE

## 2019-03-24 PROCEDURE — 83880 ASSAY OF NATRIURETIC PEPTIDE: CPT

## 2019-03-24 PROCEDURE — 36415 COLL VENOUS BLD VENIPUNCTURE: CPT

## 2019-03-24 PROCEDURE — 6360000002 HC RX W HCPCS: Performed by: NURSE PRACTITIONER

## 2019-03-24 PROCEDURE — 82330 ASSAY OF CALCIUM: CPT

## 2019-03-24 PROCEDURE — 6360000002 HC RX W HCPCS: Performed by: INTERNAL MEDICINE

## 2019-03-24 RX ORDER — MAGNESIUM SULFATE 1 G/100ML
1 INJECTION INTRAVENOUS PRN
Status: DISCONTINUED | OUTPATIENT
Start: 2019-03-24 | End: 2019-03-29 | Stop reason: HOSPADM

## 2019-03-24 RX ORDER — POTASSIUM CHLORIDE 20 MEQ/1
40 TABLET, EXTENDED RELEASE ORAL ONCE
Status: COMPLETED | OUTPATIENT
Start: 2019-03-24 | End: 2019-03-24

## 2019-03-24 RX ORDER — METOPROLOL SUCCINATE 25 MG/1
25 TABLET, EXTENDED RELEASE ORAL DAILY
Status: DISCONTINUED | OUTPATIENT
Start: 2019-03-24 | End: 2019-03-25

## 2019-03-24 RX ADMIN — LORAZEPAM 0.5 MG: 0.5 TABLET ORAL at 09:27

## 2019-03-24 RX ADMIN — LORAZEPAM 0.5 MG: 0.5 TABLET ORAL at 13:58

## 2019-03-24 RX ADMIN — MICONAZOLE NITRATE: 20.6 POWDER TOPICAL at 11:14

## 2019-03-24 RX ADMIN — MICONAZOLE NITRATE: 20.6 POWDER TOPICAL at 19:57

## 2019-03-24 RX ADMIN — MAGNESIUM SULFATE HEPTAHYDRATE 1 G: 1 INJECTION, SOLUTION INTRAVENOUS at 17:47

## 2019-03-24 RX ADMIN — POTASSIUM CHLORIDE 40 MEQ: 20 TABLET, EXTENDED RELEASE ORAL at 11:13

## 2019-03-24 RX ADMIN — SPIRONOLACTONE 25 MG: 25 TABLET ORAL at 09:26

## 2019-03-24 RX ADMIN — FUROSEMIDE 40 MG: 40 TABLET ORAL at 17:47

## 2019-03-24 RX ADMIN — TICAGRELOR 90 MG: 90 TABLET ORAL at 19:57

## 2019-03-24 RX ADMIN — APIXABAN 5 MG: 5 TABLET, FILM COATED ORAL at 19:57

## 2019-03-24 RX ADMIN — FUROSEMIDE 40 MG: 40 TABLET ORAL at 09:27

## 2019-03-24 RX ADMIN — TICAGRELOR 90 MG: 90 TABLET ORAL at 09:26

## 2019-03-24 RX ADMIN — OXYBUTYNIN CHLORIDE 5 MG: 5 TABLET ORAL at 13:58

## 2019-03-24 RX ADMIN — OXYBUTYNIN CHLORIDE 5 MG: 5 TABLET ORAL at 09:26

## 2019-03-24 RX ADMIN — OXYBUTYNIN CHLORIDE 5 MG: 5 TABLET ORAL at 19:57

## 2019-03-24 RX ADMIN — ATORVASTATIN CALCIUM 80 MG: 80 TABLET, FILM COATED ORAL at 19:57

## 2019-03-24 RX ADMIN — Medication 10 ML: at 09:31

## 2019-03-24 RX ADMIN — MULTIVITAMIN TABLET 1 TABLET: TABLET at 17:47

## 2019-03-24 RX ADMIN — METOPROLOL SUCCINATE 25 MG: 25 TABLET, EXTENDED RELEASE ORAL at 14:51

## 2019-03-24 RX ADMIN — CALCIUM GLUCONATE 1 G: 98 INJECTION, SOLUTION INTRAVENOUS at 13:58

## 2019-03-24 RX ADMIN — SACUBITRIL AND VALSARTAN 1 TABLET: 49; 51 TABLET, FILM COATED ORAL at 09:26

## 2019-03-24 RX ADMIN — APIXABAN 5 MG: 5 TABLET, FILM COATED ORAL at 09:26

## 2019-03-24 RX ADMIN — SERTRALINE HYDROCHLORIDE 50 MG: 50 TABLET ORAL at 09:26

## 2019-03-24 ASSESSMENT — PAIN SCALES - GENERAL
PAINLEVEL_OUTOF10: 0

## 2019-03-24 NOTE — FLOWSHEET NOTE
Pt sitting in chair complained that she stooled her self. RN cleaned patient up and asked her if she knows when she has to go and to let staff know before stooling self. Patient states she went to pass gas and stool came out instead. New gown and telemetry patches applied, cleansed under breast and abdominal fold applied miconazole powder. Patinet requested to goback to bed up with one assist and walker safety maintained, both feet elevated onto pillow with pad overlay pillow, new mepilex applied to left lower oozing ulcer, side rails upx2, call light in reach, pt appears to be short of breath after moving from chair to bed, oxygen reading 88-90% with 2liter oxygen, increased oxygen to 3L now 95-96% SpO2, head of bed 30-45 degrees.    Electronically signed by Tiera Monzon RN on 3/23/19 at 11:44 PM

## 2019-03-24 NOTE — PROGRESS NOTES
Section of Cardiology  Progress Note      Date:  3/24/2019  Patient: Shira Hudson  Admission:  3/21/2019  8:40 AM  Admit DX: Congestive heart failure, severe ([facility] [address] [I50.9]  Age:  76 y.o., 1950     LOS: 3 days     Reason for evaluation:   CHF and coronary artery disease      SUBJECTIVE:     The patient was seen and examined. Notes and labs reviewed. Pt refused Coreg last night due to reports she previously did not tolerate the medication. Continues to diurese well. Breathing is improved and patient slept better last night, continues to be on 3L/NC. OBJECTIVE:      EXAM:   Vitals:    VITALS:  BP (!) 121/58   Pulse 70   Temp 97.3 °F (36.3 °C) (Temporal)   Resp 18   Ht 5' 2\" (1.575 m)   Wt 189 lb 8 oz (86 kg)   SpO2 98%   BMI 34.66 kg/m²   24HR INTAKE/OUTPUT:      Intake/Output Summary (Last 24 hours) at 3/24/2019 0920  Last data filed at 3/24/2019 0400  Gross per 24 hour   Intake 920 ml   Output 3000 ml   Net -2080 ml       CONSTITUTIONAL:  Awake, alert, no apparent distress. HEENT: Normal jugular venous pulsations  LUNGS: decreased in bilateral lower lobes otherwise clear, non-labored  CARDIOVASCULAR:  Irregular rate and WVCCQI,1/9 systolic murmur at the apex  SKIN: Warm and dry.   EXTREMITIES: mild bilateral lower extremity edema with chronic skin changes    Current Inpatient Medications:   LORazepam  0.5 mg Oral TID    furosemide  40 mg Oral BID    oxybutynin  5 mg Oral TID    carvedilol  12.5 mg Oral BID     sertraline  50 mg Oral Daily    apixaban  5 mg Oral BID    spironolactone  25 mg Oral Daily    sodium chloride flush  10 mL Intravenous 2 times per day    miconazole   Topical BID    ticagrelor  90 mg Oral BID    atorvastatin  80 mg Oral Nightly    sacubitril-valsartan  1 tablet Oral BID    multivitamin  1 tablet Oral Daily       IV Infusions (if any):      Diagnostics:   Telemetry: atrial fibrillation with controlled VR with NSVT    Labs:   CBC:  Recent Labs - CNP

## 2019-03-24 NOTE — FLOWSHEET NOTE
RN contacted Dr. Whit Hays via telephone and informed her of patient's increased frequency of runs of vtach. Patient asymptomatic. RN reviewed potassium results from this morning. New order received for STAT K+ lab draw. Will continue to monitor closely.

## 2019-03-24 NOTE — FLOWSHEET NOTE
Dr. Ney Coppola arrived to the patient's bedside for evaluation and was updated on the patient's current status via RN. Patient's K+ resulted in 3.0 today. K+ replacement orders in place. Orders received to remove zamora catheter today and ionic Ca+ lab. Will continue to monitor closely.

## 2019-03-24 NOTE — FLOWSHEET NOTE
Pt had a 5 beat run of VT, pt appears to be asleep and no distress noted, telemetry slip posted in chart. Appears she had been having runs VT (telemetry strips in chart from previous shifts).

## 2019-03-24 NOTE — PROGRESS NOTES
Psychiatry consultation. Chart reviewed and patient interviewed. This is a 42-year-old white female patient who was admitted on 21 March 2019 because of congestive heart failure and states that she has been feeling very depressed and despondent and overwhelmed and suicidal over her medical problems. She states that she has had problems with depression and obsessive-compulsive disorder for 35 years and used to take antidepressant medication but hasn't been taking any medications for last 3-4 years. She states that she used to have inpatient psychiatric treatment but hasn't needed that for many years. She denies any substance abuse. She denies any family history of mental illness. She reports that field born and raised in [place]. She graduated from high school and has 2 years of college. She states that she had various jobs still about 10 years ago and hasn't worked since then. She has been  for 37 years and lives with her  and their son lives with them. BP (!) 121/58   Pulse 70   Temp 97.3 °F (36.3 °C) (Temporal)   Resp 18   Ht 5' 2\" (1.575 m)   Wt 189 lb 8 oz (86 kg)   SpO2 98%   BMI 34.66 kg/m²            On mental status examination She is of average height and built, cooperative and informative, talking clearly in a conversational tone. Her affect is appropriate and thought process is organized and goal oriented, responds to questions spontaneously relevantly and coherently. She has low self-esteem expressing feelings of helplessness about her medical problems as well as depression and has been having suicidal thoughts but denies any intentions or plans. She denies any homicidal feelings, delusions, ideas of reference or hallucinations and none were observed during the examination. She is alert and oriented to time place person and situation. Her memory for recent as well as remote events seems fair. Intellectually she is average.   She has superficial judgment and partial insight. Diagnostic impression  : Major depression    #2 obsessive-compulsive disorder    Will manage her  psych. Medication and provide supportive therapy as well as develop insight. Side effects of medications reviewed and medication education provided.

## 2019-03-24 NOTE — FLOWSHEET NOTE
Annell Aase, NP called and was updated on the patient's current status via RN. New order received to consult Dr. Cecilia Valdes for EP consult. Patient recently had a 5 beat run of vtach and was asymptomatic. Notify cardiology this evening if vtach persists. Will continue to monitor closely.

## 2019-03-24 NOTE — PROGRESS NOTES
[facility]    Progress Note    3/24/2019     Name:   Kristie Yip  MRN:     [id number]     Acct:      [de-identified]   Room:   2036/2036-01   Day:  3  Admit Date:  3/21/2019  8:40 AM    PCP:   Ava Melendez DO  Code Status:  Full Code    Subjective:     C/C:   Chief Complaint   Patient presents with    Illness     Interval History Status: improved. Patient states improved bladder spasms. No acute events overnight. anxiety/panic episodes also improved per patient. States 'was able to sleep for a while last night in a long time'. RN reported incontinence of stool: patient states 'thought it was gas but stool came out!'  Refused to take carvedilol as ordered by cardiology: had a ' reaction to it a while ago'   Denies chest pain/ palpitations. Improved SOB. Afebrile, hemodynamically stable, on 3 lit NC O2.  I/O: 2 L last 24 hr.   Brief History:   75 y/o brought in to [place] ED with c/o SOB, generalized weakness , incontinence of urine in last 2 d. Noncomplaince with medications in last 1-2 mo. Per EMS house was filled with trash and 'essentially uninhabitable' . ED w/u: Afib, , SBP 170s, hypoxia 80s on RA, Na 146, CO2 17, BNP 73858, Hb 12.7, WBC 7.5, u/a unremarkable, HS trop 31, EKG: Afib, unspecific ST/T changes. CXR compatible with CHF .  - Has prior h/o CAD s/p PCI x2 latest 10/2018, chronic Sys CHF, HTN, HPL. Patient indicates she did not tolerate plavix/ bryllinta :unable to elaborate. Would like to change cardiologists. - started on diuresis, Eliquis ,restarted on Brillinta which she is tolerating well.    Review of Systems:     Constitutional:  negative for chills, fevers, sweats  Respiratory:  negative for cough, dyspnea on exertion, +shortness of breath, no wheezing  Cardiovascular:  negative for chest pain, chest pressure/discomfort,+lower extremity edema, no palpitations  Gastrointestinal:  negative for abdominal pain, constipation, diarrhea, nausea, vomiting  Neurological:  negative for dizziness, headache    Medications: Allergies:  No Known Allergies    Current Meds:   Scheduled Meds:    metoprolol succinate  25 mg Oral Daily    LORazepam  0.5 mg Oral TID    furosemide  40 mg Oral BID    oxybutynin  5 mg Oral TID    sertraline  50 mg Oral Daily    apixaban  5 mg Oral BID    spironolactone  25 mg Oral Daily    sodium chloride flush  10 mL Intravenous 2 times per day    miconazole   Topical BID    ticagrelor  90 mg Oral BID    atorvastatin  80 mg Oral Nightly    sacubitril-valsartan  1 tablet Oral BID    multivitamin  1 tablet Oral Daily     Continuous Infusions:   PRN Meds: magnesium sulfate, potassium chloride **OR** potassium alternative oral replacement **OR** potassium chloride, sodium chloride flush, magnesium hydroxide, nicotine, acetaminophen, ondansetron **OR** ondansetron    Data:     Past Medical History:   has a past medical history of Acute on chronic systolic congestive heart failure (HCC), Anxiety, Cardiomyopathy ([address] H/O heart artery stent, [place] (hard of hearing), Hyperlipidemia, Hypertension, Hypertensive urgency, and Leg swelling. Social History:   reports that she has never smoked. She has never used smokeless tobacco. She reports that she does not drink alcohol or use drugs. Family History: History reviewed. No pertinent family history. Vitals:  /62   Pulse 70   Temp 98.1 °F (36.7 °C) (Temporal)   Resp 19   Ht 5' 2\" (1.575 m)   Wt 189 lb 8 oz (86 kg)   SpO2 98%   BMI 34.66 kg/m²   Temp (24hrs), Av.6 °F (36.4 °C), Min:97.3 °F (36.3 °C), Max:98.1 °F (36.7 °C)    No results for input(s): POCGLU in the last 72 hours. I/O (24Hr):     Intake/Output Summary (Last 24 hours) at 3/24/2019 1724  Last data filed at 3/24/2019 1600  Gross per 24 hour   Intake 880 ml   Output 2950 ml   Net -2070 ml       Physical Examination:        General appearance:  alert, cooperative and no distress, obese body habitus. On NC O2. Mental Status:  oriented to person, place and time and anxious affect  Lungs:  Decreased air entry at bases,+crackles . Heart:  regular rate and rhythm, no murmur  Abdomen:  soft, nontender, nondistended, normal bowel sounds, no masses, hepatomegaly, splenomegaly  Extremities:  +b/l LE  edema, +redness is improved, Skin is peeling now with no seeping noted. no tenderness in the calves    Assessment:        Primary Problem  Acute on chronic systolic congestive heart failure [facility])    Active Hospital Problems    Diagnosis Date Noted    Panic disorder [F41.0] 03/23/2019    Acute on chronic congestive heart failure ([address] [I50.9]     Acute on chronic systolic congestive heart failure (HCC) [I50.23] 03/21/2019    Lymphedema of both lower extremities [I89.0] 03/21/2019    Cellulitis of right leg [L03.115] 03/21/2019    Coronary artery disease involving native coronary artery of native heart without angina pectoris [I25.10] 03/21/2019    Major depressive disorder [F32.9] 03/21/2019    New onset a-fib [facility]) [I48.91] 03/21/2019    NSTEMI (non-ST elevated myocardial infarction) ([email or web address] [I21.4] 03/21/2019       Plan:        - continue ASA, Brillinta, Statin, BB, entresto. Continue  PO lasix. monitor I/O, daily weights.   - monitor off abx.   - continue Eliquis,   - continue Ditropan to help bladder spasms and incontinence. Remove zamora and void trial.    - Was evaluated by Dr Lincoln Friday, psychiatry:a/w input. D/c Atarax. Started on Ativan PO.   - PT/OT.   - a/w insurance precert for SNF.        Marquita Hernandez MD  3/24/2019

## 2019-03-24 NOTE — FLOWSHEET NOTE
RN contacted Cullen Lora NP via telephone and informed her that patient's runs of vtach have subsided at this time. Patient tolerating toprol xl 25 mg well. RN informed Bren Davenport of K+ recheck of 3.8 and magnesium result of 1.8. New order received for 1 gram magnesium replacement I.v. Contact Cullen Lora NP tonight of vtach persists. Possible need to start patient on amiodarone. Will continue to monitor closely.

## 2019-03-24 NOTE — PROGRESS NOTES
Physical Therapy  Facility/Department: [facility]  Daily Treatment Note  NAME: Kayley Meza  : 1950  MRN: [id number]    Date of Service: 3/24/2019    Discharge Recommendations:  [address]        Patient Diagnosis(es): The primary encounter diagnosis was Acute on chronic congestive heart failure, unspecified heart failure type ([address] A diagnosis of Cellulitis of both lower extremities was also pertinent to this visit. has a past medical history of Acute on chronic systolic congestive heart failure ([address] Anxiety, Cardiomyopathy ([address] H/O heart artery stent, [place] (hard of hearing), Hyperlipidemia, Hypertension, Hypertensive urgency, and Leg swelling.   has a past surgical history that includes Cataract removal; Corneal transplant; Nasal sinus surgery; eye surgery; and Tonsillectomy and adenoidectomy. Restrictions  Restrictions/Precautions  Restrictions/Precautions: General Precautions, Up as Tolerated  Required Braces or Orthoses?: No  Position Activity Restriction  Other position/activity restrictions: 02, zamora, RW & gait belt  Subjective   General  Chart Reviewed: Yes  Additional Pertinent Hx: Lymphedema, history of attempted suicide  Response To Previous Treatment: Patient with no complaints from previous session. Family / Caregiver Present: No  Subjective  Subjective: Patient pleasant and agreeable to PT. General Comment  Comments: Ines Green RN reports patient appropriate for PT.   Pain Screening  Patient Currently in Pain: Denies  Pain Assessment  Pain Assessment: 0-10  Pain Level: 0  Patient's Stated Pain Goal: No pain  Vital Signs  Patient Currently in Pain: Denies       Orientation  Orientation  Overall Orientation Status: Within Functional Limits  Cognition   Cognition  Overall Cognitive Status: WFL  Objective   Bed mobility  Rolling to Left: Contact guard assistance  Rolling to Right: Contact guard assistance  Supine to Sit: Contact guard assistance  Sit to Supine: Contact guard assistance  Scooting: Contact guard assistance  Comment: Patient did not needed as many VCs for technique and hand placement. Patient requires extended time for all bed mobility. Transfers  Sit to Stand: Minimal Assistance  Stand to sit: Minimal Assistance  Bed to Chair: Minimal assistance  Stand Pivot Transfers: Minimal Assistance  Lateral Transfers: Minimal Assistance  Ambulation  Ambulation?: Yes  Ambulation 1  Surface: level tile  Device: Rolling Walker  Other Apparatus: O2  Assistance: Moderate assistance  Quality of Gait: Patient with flexed posture and leans elbows on walker. Patient very SOB post ambulation. Distance: 15' x 1  Comments: No LOB noted today but patient left RW and attempted to get into chair without it. Patient require Mod A for safety and Educated to keep RW in front of her at all time, until she is ready to sit to increase support for her balance deficits. Patient very SOB during and post ambulating. Stairs/Curb  Stairs?: No     Balance  Sitting - Static: Good  Sitting - Dynamic: Good  Standing - Static: Fair  Standing - Dynamic: Fair;-  Comments: w/ RW for UB support  Exercises  Comments: Seated elsa LE AROM x 10 reps with several rest breaks due SOB, VCs for pursed lip breathing. Assessment   Body structures, Functions, Activity limitations: Decreased functional mobility ; Decreased ADL status; Decreased strength;Decreased endurance;Decreased balance  Assessment: Patient demonstrates improve ambulation distance today but is very SOB and had 2 moderate LOB during gait. Patient would benefit from SNF discharge to improve balance and endurance. Prognosis: Good  Patient Education: Patient educated to PT POC, fall prevention.   REQUIRES PT FOLLOW UP: Yes  Activity Tolerance  Activity Tolerance: Patient Tolerated treatment well;Patient limited by endurance     AM-PAC Score  AM-PAC Inpatient Mobility Raw Score : 13  AM-PAC Inpatient T-Scale Score : 36.74  Mobility Inpatient CMS 0-100% Score: 64.91  Mobility Inpatient CMS G-Code Modifier : CL          Goals  Short term goals  Time Frame for Short term goals: 12 visits  Short term goal 1: Patient will be indep with bed mobility and transfers. Short term goal 2: Patient will amb 200 feet with RW and indep. Short term goal 3: Patient will tolerate 30 minutes of ther-ex and ther-act. Short term goal 4: Patient will be indep with HEP. Patient Goals   Patient goals : Improve breathing    Plan    Plan  Times per week: 1x/d, 5-6 d/wk  Current Treatment Recommendations: Strengthening, Balance Training, Functional Mobility Training, Transfer Training, Endurance Training, Gait Training, Neuromuscular Re-education, Home Exercise Program, Safety Education & Training, Patient/Caregiver Education & Training  Safety Devices  Type of devices:  All fall risk precautions in place, Call light within reach, Gait belt, Nurse notified, Chair alarm in place     Therapy Time   Individual Concurrent Group Co-treatment   Time In 0805         Time Out 0828         Minutes [address] [place] [address]

## 2019-03-24 NOTE — FLOWSHEET NOTE
Patient shares about her family, worries and fears. Patient shares about her relationship with her . Patient states her feelings that she feels her  is really not there for her. States  doesn't understand her and what she is going through. Patient states her loneliness sometimes.  helps patient explore her feelings, thoughts.  shared in presence, listening, prayers. Follow up as needed. 03/24/19 154   Encounter Summary   Services provided to: Patient   Referral/Consult From: Adarsh   Continue Visiting   (3-24-19)   Complexity of Encounter Moderate   Length of Encounter 15 minutes   Spiritual Assessment Completed Yes   Routine   Type Follow up   Assessment Approachable;Calm   Intervention Active listening;Explored feelings, thoughts, concerns;Prayer;Discussed illness/injury and it's impact; Discussed belief system/[employer] practices/jimbo;Sustaining presence/ Ministry of presence   Outcome Venting emotion;Receptive; Expressed gratitude;Engaged in conversation;Expressed feelings/needs/concerns; Shared life review

## 2019-03-25 LAB
ABSOLUTE EOS #: 0.4 K/UL (ref 0–0.4)
ABSOLUTE IMMATURE GRANULOCYTE: ABNORMAL K/UL (ref 0–0.3)
ABSOLUTE LYMPH #: 1.2 K/UL (ref 1–4.8)
ABSOLUTE MONO #: 0.6 K/UL (ref 0.2–0.8)
ANION GAP SERPL CALCULATED.3IONS-SCNC: 12 MMOL/L (ref 9–17)
BASOPHILS # BLD: 0 % (ref 0–2)
BASOPHILS ABSOLUTE: 0 K/UL (ref 0–0.2)
BUN BLDV-MCNC: 26 MG/DL (ref 8–23)
BUN/CREAT BLD: 29 (ref 9–20)
CALCIUM SERPL-MCNC: 8.3 MG/DL (ref 8.6–10.4)
CHLORIDE BLD-SCNC: 98 MMOL/L (ref 98–107)
CO2: 31 MMOL/L (ref 20–31)
CREAT SERPL-MCNC: 0.91 MG/DL (ref 0.5–0.9)
DIFFERENTIAL TYPE: ABNORMAL
EOSINOPHILS RELATIVE PERCENT: 6 % (ref 1–4)
GFR AFRICAN AMERICAN: >60 ML/MIN
GFR NON-AFRICAN AMERICAN: >60 ML/MIN
GFR SERPL CREATININE-BSD FRML MDRD: ABNORMAL ML/MIN/{1.73_M2}
GFR SERPL CREATININE-BSD FRML MDRD: ABNORMAL ML/MIN/{1.73_M2}
GLUCOSE BLD-MCNC: 123 MG/DL (ref 70–99)
HCT VFR BLD CALC: 39.6 % (ref 36–46)
HEMOGLOBIN: 12.7 G/DL (ref 12–16)
IMMATURE GRANULOCYTES: ABNORMAL %
LYMPHOCYTES # BLD: 16 % (ref 24–44)
MAGNESIUM: 2 MG/DL (ref 1.6–2.6)
MCH RBC QN AUTO: 27.1 PG (ref 26–34)
MCHC RBC AUTO-ENTMCNC: 32 G/DL (ref 31–37)
MCV RBC AUTO: 84.5 FL (ref 80–100)
MONOCYTES # BLD: 8 % (ref 1–7)
NRBC AUTOMATED: ABNORMAL PER 100 WBC
PDW BLD-RTO: 18 % (ref 11.5–14.5)
PLATELET # BLD: 295 K/UL (ref 130–400)
PLATELET ESTIMATE: ABNORMAL
PMV BLD AUTO: 8.7 FL (ref 6–12)
POTASSIUM SERPL-SCNC: 3.9 MMOL/L (ref 3.7–5.3)
RBC # BLD: 4.68 M/UL (ref 4–5.2)
RBC # BLD: ABNORMAL 10*6/UL
SEG NEUTROPHILS: 70 % (ref 36–66)
SEGMENTED NEUTROPHILS ABSOLUTE COUNT: 5.1 K/UL (ref 1.8–7.7)
SODIUM BLD-SCNC: 141 MMOL/L (ref 135–144)
WBC # BLD: 7.3 K/UL (ref 3.5–11)
WBC # BLD: ABNORMAL 10*3/UL

## 2019-03-25 PROCEDURE — 97116 GAIT TRAINING THERAPY: CPT

## 2019-03-25 PROCEDURE — 97530 THERAPEUTIC ACTIVITIES: CPT | Performed by: NURSE PRACTITIONER

## 2019-03-25 PROCEDURE — 85025 COMPLETE CBC W/AUTO DIFF WBC: CPT

## 2019-03-25 PROCEDURE — 2709999900 HC NON-CHARGEABLE SUPPLY

## 2019-03-25 PROCEDURE — 6360000002 HC RX W HCPCS: Performed by: NURSE PRACTITIONER

## 2019-03-25 PROCEDURE — 1200000000 HC SEMI PRIVATE

## 2019-03-25 PROCEDURE — 97110 THERAPEUTIC EXERCISES: CPT

## 2019-03-25 PROCEDURE — 97530 THERAPEUTIC ACTIVITIES: CPT

## 2019-03-25 PROCEDURE — 80048 BASIC METABOLIC PNL TOTAL CA: CPT

## 2019-03-25 PROCEDURE — 2580000003 HC RX 258: Performed by: NURSE PRACTITIONER

## 2019-03-25 PROCEDURE — 6370000000 HC RX 637 (ALT 250 FOR IP): Performed by: INTERNAL MEDICINE

## 2019-03-25 PROCEDURE — 83735 ASSAY OF MAGNESIUM: CPT

## 2019-03-25 PROCEDURE — 99212 OFFICE O/P EST SF 10 MIN: CPT

## 2019-03-25 PROCEDURE — 6370000000 HC RX 637 (ALT 250 FOR IP): Performed by: NURSE PRACTITIONER

## 2019-03-25 PROCEDURE — 36415 COLL VENOUS BLD VENIPUNCTURE: CPT

## 2019-03-25 PROCEDURE — 2580000003 HC RX 258: Performed by: INTERNAL MEDICINE

## 2019-03-25 PROCEDURE — 99232 SBSQ HOSP IP/OBS MODERATE 35: CPT | Performed by: INTERNAL MEDICINE

## 2019-03-25 RX ORDER — METOPROLOL SUCCINATE 25 MG/1
25 TABLET, EXTENDED RELEASE ORAL DAILY
Status: DISCONTINUED | OUTPATIENT
Start: 2019-03-26 | End: 2019-03-29 | Stop reason: HOSPADM

## 2019-03-25 RX ORDER — METOPROLOL SUCCINATE 50 MG/1
50 TABLET, EXTENDED RELEASE ORAL ONCE
Status: COMPLETED | OUTPATIENT
Start: 2019-03-25 | End: 2019-03-25

## 2019-03-25 RX ORDER — 0.9 % SODIUM CHLORIDE 0.9 %
250 INTRAVENOUS SOLUTION INTRAVENOUS ONCE
Status: COMPLETED | OUTPATIENT
Start: 2019-03-25 | End: 2019-03-25

## 2019-03-25 RX ORDER — METOPROLOL SUCCINATE 50 MG/1
50 TABLET, EXTENDED RELEASE ORAL DAILY
Status: DISCONTINUED | OUTPATIENT
Start: 2019-03-25 | End: 2019-03-25

## 2019-03-25 RX ORDER — 0.9 % SODIUM CHLORIDE 0.9 %
250 INTRAVENOUS SOLUTION INTRAVENOUS ONCE
Status: DISCONTINUED | OUTPATIENT
Start: 2019-03-25 | End: 2019-03-29 | Stop reason: HOSPADM

## 2019-03-25 RX ADMIN — AMIODARONE HYDROCHLORIDE 1 MG/MIN: 50 INJECTION, SOLUTION INTRAVENOUS at 05:50

## 2019-03-25 RX ADMIN — SODIUM CHLORIDE 250 ML: 9 INJECTION, SOLUTION INTRAVENOUS at 10:07

## 2019-03-25 RX ADMIN — Medication 10 ML: at 20:17

## 2019-03-25 RX ADMIN — TICAGRELOR 90 MG: 90 TABLET ORAL at 10:15

## 2019-03-25 RX ADMIN — MICONAZOLE NITRATE: 20.6 POWDER TOPICAL at 20:16

## 2019-03-25 RX ADMIN — AMIODARONE HYDROCHLORIDE 150 MG: 50 INJECTION, SOLUTION INTRAVENOUS at 05:39

## 2019-03-25 RX ADMIN — METOPROLOL SUCCINATE 50 MG: 50 TABLET, FILM COATED, EXTENDED RELEASE ORAL at 03:11

## 2019-03-25 RX ADMIN — TICAGRELOR 90 MG: 90 TABLET ORAL at 20:16

## 2019-03-25 RX ADMIN — ATORVASTATIN CALCIUM 80 MG: 80 TABLET, FILM COATED ORAL at 20:16

## 2019-03-25 RX ADMIN — APIXABAN 5 MG: 5 TABLET, FILM COATED ORAL at 10:15

## 2019-03-25 RX ADMIN — MICONAZOLE NITRATE: 20.6 POWDER TOPICAL at 10:19

## 2019-03-25 RX ADMIN — APIXABAN 5 MG: 5 TABLET, FILM COATED ORAL at 20:16

## 2019-03-25 RX ADMIN — Medication 10 ML: at 09:24

## 2019-03-25 ASSESSMENT — PAIN - FUNCTIONAL ASSESSMENT: PAIN_FUNCTIONAL_ASSESSMENT: ACTIVITIES ARE NOT PREVENTED

## 2019-03-25 ASSESSMENT — PAIN SCALES - GENERAL
PAINLEVEL_OUTOF10: 0
PAINLEVEL_OUTOF10: 3
PAINLEVEL_OUTOF10: 0
PAINLEVEL_OUTOF10: 2
PAINLEVEL_OUTOF10: 0

## 2019-03-25 ASSESSMENT — PAIN DESCRIPTION - LOCATION: LOCATION: BUTTOCKS

## 2019-03-25 ASSESSMENT — PAIN DESCRIPTION - ONSET: ONSET: ON-GOING

## 2019-03-25 ASSESSMENT — PAIN DESCRIPTION - PROGRESSION: CLINICAL_PROGRESSION: NOT CHANGED

## 2019-03-25 ASSESSMENT — PAIN DESCRIPTION - ORIENTATION: ORIENTATION: RIGHT;LEFT

## 2019-03-25 ASSESSMENT — PAIN DESCRIPTION - FREQUENCY: FREQUENCY: CONTINUOUS

## 2019-03-25 ASSESSMENT — PAIN DESCRIPTION - PAIN TYPE: TYPE: ACUTE PAIN

## 2019-03-25 ASSESSMENT — PAIN DESCRIPTION - DESCRIPTORS: DESCRIPTORS: SORE

## 2019-03-25 NOTE — FLOWSHEET NOTE
03/25/19 1145   Vitals   Pulse 58   Heart Rate Source Monitor   Resp 16   BP (!) 92/29   MAP (mmHg) 45   BP Location Left upper arm   BP Upper/Lower Upper   BP Method Automatic   Patient Position Semi fowlers   Level of Consciousness 0   Oxygen Therapy   SpO2 97 %   Pulse Oximeter Device Mode Intermittent   Pulse Oximeter Device Location Finger;Right   O2 Device Nasal cannula   O2 Flow Rate (L/min) 3 L/min   Pain Assessment   Pain Assessment 0-10   Pain Level 0   Pt remains asymptomatic at this time.

## 2019-03-25 NOTE — PROGRESS NOTES
Physical Therapy  Facility/Department: [facility]  Daily Treatment Note  NAME: Liset Marrero  : 1950  MRN: [id number]    Date of Service: 3/25/2019    Discharge Recommendations:  [address]        Patient Diagnosis(es): The primary encounter diagnosis was Acute on chronic congestive heart failure, unspecified heart failure type ([address] A diagnosis of Cellulitis of both lower extremities was also pertinent to this visit. has a past medical history of Acute on chronic systolic congestive heart failure ([address] Anxiety, Cardiomyopathy ([address] H/O heart artery stent, [place] (hard of hearing), Hyperlipidemia, Hypertension, Hypertensive urgency, and Leg swelling.   has a past surgical history that includes Cataract removal; Corneal transplant; Nasal sinus surgery; eye surgery; and Tonsillectomy and adenoidectomy. Restrictions  Restrictions/Precautions  Restrictions/Precautions: General Precautions, Up as Tolerated  Required Braces or Orthoses?: No  Position Activity Restriction  Other position/activity restrictions: Up with assist, IV, O2  Subjective   General  Chart Reviewed: Yes  Additional Pertinent Hx: Lymphedema, history of attempted suicide  Response To Previous Treatment: Patient with no complaints from previous session. Family / Caregiver Present: No  Subjective  Subjective: Patient pleasant and agreeable to PT. General Comment  Comments: Srinivasan Fernandez RN reports patient appropriate for PT. Pain Screening  Patient Currently in Pain: Yes  Pain Assessment  Pain Assessment: 0-10  Pain Level: 3  Patient's Stated Pain Goal: No pain  Pain Type: Acute pain  Pain Location: Buttocks  Pain Orientation: Right;Left  Pain Descriptors: Sore  Pain Frequency: Continuous  Pain Onset: On-going  Clinical Progression: Not changed  Functional Pain Assessment: Activities are not prevented  Non-Pharmaceutical Pain Intervention(s): Ambulation/Increased Activity; Elevation; Emotional support;Repositioned  Vital Signs  Patient Currently in Pain: Yes       Orientation  Orientation  Overall Orientation Status: Within Functional Limits  Cognition   Cognition  Overall Cognitive Status: WFL  Objective   Bed mobility  Rolling to Left: Contact guard assistance  Rolling to Right: Contact guard assistance  Sit to Supine: Contact guard assistance  Scooting: Contact guard assistance  Comment: VCs for technique and hand placement with good carryover, Patient requires extended time and use of bed rails. Patient very SOB post bed mobility, SpO2 WNL. Transfers  Sit to Stand: Moderate Assistance(Low and deep recliner for patient's height)  Stand to sit: Minimal Assistance  Bed to Chair: Minimal assistance  Stand Pivot Transfers: Minimal Assistance  Lateral Transfers: Minimal Assistance  Comment: Transferred patient back to bed from chair per RN request as patient's BP was low. Ambulation  Ambulation?: Yes  More Ambulation?: No  Ambulation 1  Surface: level tile  Device: Rolling Walker  Other Apparatus: O2  Assistance: Moderate assistance  Quality of Gait: Patient with flexed posture and leans elbows on walker. Patient very SOB post ambulation. Distance: 5' x 1  Comments: Patient feeling Lightheaded prior to transfers but RN feels its best to assist Paient to bed with 2 A for safety as patient's BP is low and getting lower. RN present to assist and observe patient. Patient SOB and fatigued after ambulation to bed. Stairs/Curb  Stairs?: No     Balance  Sitting - Static: Good  Sitting - Dynamic: Good  Standing - Static: Fair  Standing - Dynamic: Fair;-  Comments: w/ RW for UB support  Exercises  Comments: Supine elsa LE A/AROM x 10 reps, while waiting for RN to check out patient, extended time for rest breaks and VCs for pursed lip breathing, due to writer noting low BP (89/42) and lightheaded, RN did reports patient was asymptomatic this am with low HR.  RN retook her BP and it was lower (CHECK RN's noted for vitals), RN request patient back to bed. Assessment   Body structures, Functions, Activity limitations: Decreased functional mobility ; Decreased ADL status; Decreased strength;Decreased endurance;Decreased balance  Assessment: Patient limited due to low B/P and faituge. Patient would greatly benefit from continued PT services at a SNF to improve endurance, strength and overall functional mobility. Prognosis: Good  REQUIRES PT FOLLOW UP: Yes  Activity Tolerance  Activity Tolerance: Patient limited by endurance;Treatment limited secondary to medical complications (free text); Patient limited by fatigue     AM-PAC Score  AM-PAC Inpatient Mobility Raw Score : 13  AM-PAC Inpatient T-Scale Score : 36.74  Mobility Inpatient CMS 0-100% Score: 64.91  Mobility Inpatient CMS G-Code Modifier : CL          Goals  Short term goals  Time Frame for Short term goals: 12 visits  Short term goal 1: Patient will be indep with bed mobility and transfers. Short term goal 2: Patient will amb 200 feet with RW and indep. Short term goal 3: Patient will tolerate 30 minutes of ther-ex and ther-act. Short term goal 4: Patient will be indep with HEP. Patient Goals   Patient goals : Improve breathing    Plan    Plan  Times per week: 1x/d, 5-6 d/wk  Current Treatment Recommendations: Strengthening, Balance Training, Functional Mobility Training, Transfer Training, Endurance Training, Gait Training, Neuromuscular Re-education, Home Exercise Program, Safety Education & Training, Patient/Caregiver Education & Training  Safety Devices  Type of devices:  All fall risk precautions in place, Call light within reach, Gait belt, Nurse notified, Chair alarm in place     Therapy Time   Individual Concurrent Group Co-treatment   Time In 0903         Time Out 0942         Minutes [address] [place], [place]

## 2019-03-25 NOTE — FLOWSHEET NOTE
03/25/19 1006   Provider Notification   Reason for Communication Review case   Provider Name Soham Christine   Provider Notification Nurse Practitioner   Method of Communication Call   Response See orders   Notification Time [phone]   NP returned page at this time. Advised NP of admit for CHF, trend in BP/HR w/symptoms, non-administration of AM meds and paused amiodarone. (See flowsheets and progress notes for further details.)    Per NP:   Give 250 mL bolus at this time. OK to continue to hold AM meds and amiodarone gtt. Call for more orders if pt condition doesn't improve. Ensure that consult to EP/Dr. Haja Foster has been called. Writer will continue to monitor.

## 2019-03-25 NOTE — FLOWSHEET NOTE
03/25/19 0418   Provider Notification   Reason for Communication Review case;New orders   Provider Name Mary Alonso   Provider Notification Nurse Practitioner   Method of Communication Call   Response See orders   Notification Time [phone]   NP returned page and RN updated NP on pt status. RN discussed with her that the pt had a  20 beat run of vtach. Pt asymptomatic and sleeping at time of vtach. Vital signs stable. K 3.9 and Mag 2.0.  RN received orders to start an amiodarone gtt and 150 mg bolus. See orders. Will continue to monitor.

## 2019-03-25 NOTE — PROGRESS NOTES
[facility] - Progress Note    3/25/2019   7:40 PM    Name:  Alex Holden  :    1950  Age:  76 y.o. female  MRN:    [id number]     Acct:     [de-identified]   Room:     Day: Jarod Aragon 149: [place] Date: 3/21/2019  8:40 AM  PCP: Catherine Parker DO    Subjective:     C/C:   Chief Complaint   Patient presents with    Illness       Interval History: Status: improved. Patient denies shortness of breath. She had multiple episodes of SVT and NSVT earlier today. She was placed on amiodarone drip as well as Toprol with subsequent hypotension and bradycardia requiring fluid boluses. She has a history of noncompliance with medications over the last several months. ICD had been recommended earlier for her ischemic cardiomyopathy and she had refused. This afternoon she has agreed to have ICD placement and it is tentatively scheduled for tomorrow. She denies chest pain or sensation of palpitations. History: (From 3/24/19 sign out note)  \"69 y/o brought in to [place] ED with c/o SOB, generalized weakness , incontinence of urine in last 2 d. Noncomplaince with medications in last 1-2 mo. Per EMS house was filled with trash and 'essentially uninhabitable' . ED w/u: Afib, , SBP 170s, hypoxia 80s on RA, Na 146, CO2 17, BNP 71031, Hb 12.7, WBC 7.5, u/a unremarkable, HS trop 31, EKG: Afib, unspecific ST/T changes. CXR compatible with CHF .  - Has prior h/o CAD s/p PCI x2 latest 10/2018, chronic Sys CHF, HTN, HPL. Patient indicates she did not tolerate plavix/ bryllinta :unable to elaborate. Would like to change cardiologists. - started on diuresis, Eliquis ,restarted on Brillinta which she is tolerating well. \"    ROS:  Constitutional: Negative for chills, diaphoresis, fever, malaise/fatigue and weight loss. HENT: Negative for ear pain, hearing loss, nosebleeds, sore throat and tinnitus. Eyes: Negative for blurred vision, double vision, photophobia and pain. Respiratory: Negative for cough, hemoptysis, sputum production, shortness of breath and wheezing. Cardiovascular: Negative for palpitations, orthopnea, claudication and PND. Positive for chronic lymphedema   Gastrointestinal: Negative for abdominal pain, blood in stool, constipation, diarrhea, heartburn, melena, nausea and vomiting. Genitourinary: Negative for dysuria, flank pain, frequency, hematuria and urgency. Musculoskeletal: Negative for back pain, falls, joint pain, myalgias and neck pain. Skin: Negative for itching and rash. Neurological: Negative for dizziness, tingling, tremors, sensory change, focal weakness, seizures, weakness and headaches. Endo/Heme/Allergies: Does not bruise/bleed easily. Psychiatric/Behavioral: Negative for depression. The patient is not nervous/anxious. Medications:      Allergies: No Known Allergies    Current Meds:    [START ON 3/26/2019] metoprolol succinate  25 mg Oral Daily    sodium chloride  250 mL Intravenous Once    LORazepam  0.5 mg Oral TID    furosemide  40 mg Oral BID    oxybutynin  5 mg Oral TID    sertraline  50 mg Oral Daily    apixaban  5 mg Oral BID    spironolactone  25 mg Oral Daily    sodium chloride flush  10 mL Intravenous 2 times per day    miconazole   Topical BID    ticagrelor  90 mg Oral BID    atorvastatin  80 mg Oral Nightly    sacubitril-valsartan  1 tablet Oral BID    multivitamin  1 tablet Oral Daily     PRN Meds:   magnesium sulfate 1 g Intravenous PRN   potassium chloride 40 mEq Oral PRN   Or      potassium alternative oral replacement 40 mEq Oral PRN   Or      potassium chloride 10 mEq Intravenous PRN   sodium chloride flush 10 mL Intravenous PRN   magnesium hydroxide 30 mL Oral Daily PRN   nicotine 1 patch Transdermal Daily PRN   acetaminophen 650 mg Oral Q4H PRN   ondansetron 4 mg Oral Q6H PRN   Or      ondansetron 4 mg Intravenous Q6H PRN       Data:     Code Status:  Full Code    Labs:    Hematology:  Recent Labs     03/24/19  0355 03/25/19 0317   WBC 7.6 7.3   RBC 4.58 4.68   HGB 12.4 12.7   HCT 38.7 39.6   MCV 84.6 84.5   MCH 27.1 27.1   MCHC 32.1 32.0   RDW 17.5* 18.0*    295   MPV 8.9 8.7     Chemistry:  Recent Labs     03/24/19  0355 03/24/19  0919 03/24/19  1507 03/25/19  0317     --   --  141   K 3.0*  --  3.7 3.9     --   --  98   CO2 32*  --   --  31   GLUCOSE 108*  --   --  123*   BUN 24*  --   --  26*   CREATININE 0.88  --   --  0.91*   MG 1.8  --   --  2.0   ANIONGAP 12  --   --  12   LABGLOM >60  --   --  >60   GFRAA >60  --   --  >60   CALCIUM 7.7*  --   --  8.3*   CAION  --  1.11*  --   --    PROBNP 3,418*  --   --   --      Glucose:No results for input(s): LABA1C, POCGLU in the last 72 hours.     Physical Examination:    BP (!) 108/45   Pulse (!) 49   Temp 97.8 °F (36.6 °C) (Temporal)   Resp 16   Ht 5' 2\" (1.575 m)   Wt 189 lb 8 oz (86 kg)   SpO2 100%   BMI 34.66 kg/m²     Intake/Output Summary (Last 24 hours) at 3/25/2019 1940  Last data filed at 3/25/2019 1000  Gross per 24 hour   Intake 350 ml   Output 650 ml   Net -300 ml       General Appearance:    Alert, cooperative, no distress, appears stated age   Head:    Normocephalic, without obvious abnormality, atraumatic   Eyes:    PERRL, conjunctiva/corneas clear, EOM's intact        Ears:    Normal external ear canals, both ears   Nose:   Nares normal, septum midline, mucosa normal, no drainage    or sinus tenderness   Throat:   Lips, mucosa, and tongue normal   Neck:   Supple, symmetrical, trachea midline, no carotid    bruit or JVD   Back:     Not evaluated at this time    Lungs:     Clear to auscultation bilaterally, minimal rhonchi, respirations unlabored   Chest wall:    No tenderness or deformity   Heart:    Regular rate and rhythm, S1 and S2 normal, no murmur, rub   or gallop   Abdomen:     Soft, non-tender, bowel sounds active all four quadrants,     no masses, no organomegaly   Extremities:   Extremities normal, atraumatic, no cyanosis. Positive for chronic lymphedema    Pulses:   2+ and symmetric all extremities   Skin:   Skin color, texture, turgor normal, no rashes or lesions   Lymph nodes:   Cervical, supraclavicular, and axillary nodes normal   Neurologic:   CNII-XII intact       Assessment:     Primary Problem  Acute on chronic systolic congestive heart failure [facility])     Active Hospital Problems    Diagnosis Date Noted    Panic disorder [F41.0] 03/23/2019    Acute on chronic congestive heart failure ([address] [I50.9]     Acute on chronic systolic congestive heart failure ([address] [I50.23] 03/21/2019    Lymphedema of both lower extremities [I89.0] 03/21/2019    Cellulitis of right leg [L03.115] 03/21/2019    Coronary artery disease involving native coronary artery of native heart without angina pectoris [I25.10] 03/21/2019    Major depressive disorder [F32.9] 03/21/2019    New onset a-fib ([address] [I48.91] 03/21/2019    NSTEMI (non-ST elevated myocardial infarction) ([address] [I21.4] 03/21/2019     Past Medical History:   Diagnosis Date    Acute on chronic systolic congestive heart failure ([address] 8/8/2017    Anxiety     Cardiomyopathy ([address]     H/O heart artery stent     [place]     Hyperlipidemia     Hypertension     Hypertensive urgency 8/7/2017    Leg swelling 8/7/2017        Consultations:     PHARMACY TO DOSE VANCOMYCIN  IP CONSULT TO HEART FAILURE NURSE/COORDINATOR  IP CONSULT TO DIETITIAN  PHARMACY TO DOSE VANCOMYCIN  IP CONSULT TO CARDIOLOGY  IP CONSULT TO PSYCHIATRY  IP CONSULT TO SOCIAL WORK  IP CONSULT TO PSYCHIATRY  IP CONSULT TO CARDIOLOGY    Plan:     1. Plan for AICD in the morning  2. DVT prophylaxis  3. GI prophylaxis  4. Blood pressure control  5. Monitor control arrhythmia  6.  Recheck laboratories in the morning      Electronically signed by Xuan Esqiuvel DO on 3/25/2019 at 7:40 PM

## 2019-03-25 NOTE — PROGRESS NOTES
8828: Writer advised clinical lead paused amiodarone gtt d/t HR in upper 30s. HR resolved when writer came to room, pt asymptomatic.     0913: BP in 48S systolic. HR maintaining in Continued to hold amiodarone and all AM meds. Pt received toprol XL at 0311 this AM and amiodarone gtt was originally initiated for runs SVT/VT. Pt remains asymptomatic. Will continue to monitor. 4591: BP continuing downward trend. HR is Afib, sustained in 60s. Pt now stating feeling \"lightheaded\" and appears drowsy. IV fluids prepped, not given d/t CHF exacerbation w/SOB at this time. Pt remains alert & oriented x4, but is alertness is decreased from initial AM assessment. NP paged. Awaiting response. 0513: Pt now exhibiting increased alertness from previous assessment, remains oriented x4, pt continues to state feeling \"lightheaded\". Q5Min BPs indicate 2 consecutive BPs in the upper 90s. Writer will continue to monitor. See flowsheets for further info.

## 2019-03-25 NOTE — PROGRESS NOTES
m)   Wt 189 lb 8 oz (86 kg)   SpO2 100%   BMI 34.66 kg/m²   David Risk Score: David Scale Score: 15    LABS    CBC:   Lab Results   Component Value Date    WBC 7.3 03/25/2019    RBC 4.68 03/25/2019    HGB 12.7 03/25/2019     CMP:  Albumin:    Lab Results   Component Value Date    LABALBU 2.8 03/21/2019     PT/INR:    Lab Results   Component Value Date    PROTIME 13.9 03/21/2019    INR 1.4 03/21/2019     HgBA1c:    Lab Results   Component Value Date    LABA1C 5.7 08/06/2017     PTT: No components found for: LABPTT      Assessment:     Patient Active Problem List   Diagnosis    Leg swelling    Hypertensive urgency    Acute on chronic systolic congestive heart failure (HCC)    Acute on chronic systolic congestive heart failure (HCC)    Lymphedema of both lower extremities    Cellulitis of right leg    Coronary artery disease involving native coronary artery of native heart without angina pectoris    Major depressive disorder    [facility] onset [facility]-fib [address]    NSTEMI (non-ST elevated myocardial infarction) ([address]    Acute on chronic congestive heart failure ([address]    Panic disorder       Measurements:     03/25/19 1616   Wound 03/22/19 Leg Right;Medial   Date First Assessed/Time First Assessed: 03/22/19 0833   Present on Hospital Admission: Yes  Primary Wound Type: Venous Ulcer  Location: Leg  Wound Location Orientation: Right;Medial   Wound Venous   Dressing Status Clean;Dry; Intact   Dressing/Treatment Foam   Dressing Change Due 03/28/19   Wound Assessment Clean;Pale;Pink;Red;Drainage   Drainage Amount Moderate   Drainage Description Serosanguinous; Sanguinous   Odor None   Susanne-wound Assessment Dry;Hyperpigmented;Edema  (flaking epidermis)   Wound 03/22/19 Leg Proximal;Right; Lower; Posterior   Date First Assessed/Time First Assessed: 03/22/19 0833   Present on Hospital Admission: Yes  Primary Wound Type: Venous Ulcer  Location: Leg  Wound Location Orientation: Proximal;Right; Lower; Posterior   Wound Venous Dressing Status Changed   Dressing Changed Changed/New   Dressing/Treatment Foam   Dressing Change Due 03/28/19   Wound Assessment Clean;Red;Bleeding   Drainage Amount Small   Drainage Description Sanguinous   Odor None   Susanne-wound Assessment Hyperpigmented;Dry  (flaking epidermis)   Red%Wound Bed 100   Wound 03/23/19 Leg Lower;Left; Anterior;Mid; Inner  LLE superficial oozing serosanguinous draninage   Date First Assessed/Time First Assessed: 03/23/19 0730   Present on Hospital Admission: Yes  Primary Wound Type: Venous Ulcer  Location: Leg  Wound Location Orientation: Lower;Left; Anterior;Mid; Inner  Wound Description (Comments):  LLE superficial ooz. .. Wound Venous   Drainage Amount None   Susanne-wound Assessment Dry;Hyperpigmented;Pink       Response to treatment:  Well tolerated by patient.               Plan:      Plan of Care:   Cleanse BLE with foam cleanser or blue package dimethicone wipes daily. Change the Mepilex foam dressings every 3 days and prn soilage. Elevate BLE at rest  Encourage ambulation when able.   PINKY hose at discretion of physician.     Specialty Bed Required : No   [] Low Air Loss   [] Pressure Redistribution  [] Fluid Immersion  [] Bariatric  [] Total Pressure Relief  [] Other:      Discharge Plan:  TBD     Patient/Caregiver Teaching:     [] Indicates understanding        [] Needs reinforcement  [] Unsuccessful                          [x] Verbal Understanding  [] Demonstrated understanding            [] No evidence of learning  [] Refused teaching                               [] N/A             Electronically signed by Piyush Panchal RN, CWON on 3/25/2019 at 4:19 PM

## 2019-03-25 NOTE — CONSULTS
10 MEQ extended release capsule, Take 2 capsules by mouth daily  spironolactone (ALDACTONE) 25 MG tablet, Take 1 tablet by mouth daily  aspirin 81 MG tablet, Take 81 mg by mouth daily  [DISCONTINUED] cyclobenzaprine (FLEXERIL) 10 MG tablet,   Allergies:    Patient has no known allergies. Social History:     reports that she has never smoked. She has never used smokeless tobacco. She reports that she does not drink alcohol or use drugs. Family History:   family history is not on file. REVIEW OF SYSTEMS:    CONSTITUTIONAL:  negative  HEENT:  negative  RESPIRATORY:  positive for  cough with sputum and dyspnea  CARDIOVASCULAR:  positive for  dyspnea, edema  GASTROINTESTINAL:  negative for nausea, vomiting and diarrhea  GENITOURINARY:  negative  ENDOCRINE:  negative  MUSCULOSKELETAL:  negative for  myalgias and arthralgias  NEUROLOGICAL:  negative for dizziness and syncope  BEHAVIOR/PSYCH:  negative    PHYSICAL EXAM:      Vitals:    BP (!) 98/42   Pulse 54   Temp 97.4 °F (36.3 °C) (Temporal)   Resp 16   Ht 5' 2\" (1.575 m)   Wt 189 lb 8 oz (86 kg)   SpO2 99%   BMI 34.66 kg/m²   CONSTITUTIONAL:  awake, alert, cooperative, no apparent distress, and appears stated age  ENT:  normocepalic, without obvious abnormality  NECK:  supple, symmetrical, trachea midline  LUNGS:  No increased work of breathing, good respiratory effort, bibasilar crackles  CARDIOVASCULAR:  Normal apical impulse, irregular rate and rhythm, normal S1 and S2, no S3 or S4, and no murmur noted  ABDOMEN:  Soft, nontender, +BS  MUSCULOSKELETAL:  Bilateral lower extremity +1 edema, legs reddened and scaly, with ulcers  NEUROLOGIC:  Awake, alert, oriented to name, place and time.         DATA:    LABS:  Lab Results   Component Value Date    WBC 7.3 03/25/2019    HGB 12.7 03/25/2019     03/25/2019     Lab Results   Component Value Date     03/25/2019    K 3.9 03/25/2019    CL 98 03/25/2019    CO2 31 03/25/2019    BUN 26 03/25/2019 CREATININE 0.91 03/25/2019    GFRAA >60 03/25/2019    GLUCOSE 123 03/25/2019    AST 20 03/21/2019    ALT 13 03/21/2019     Lab Results   Component Value Date    PROTIME 13.9 03/21/2019    INR 1.4 03/21/2019       Lab Results   Component Value Date    MG 2.0 03/25/2019     Lab Results   Component Value Date    LABA1C 5.7 08/06/2017     Lab Results   Component Value Date    TRIG 84 03/22/2019    HDL 31 03/22/2019     Cardiac cath: [address] S 09/27/2018   LAD 50% mid; LCX plaque; RCA plaque, SHANTE branch 100%-80% with POBA, residual dissection    CARDIAC CATHETERIZATION 10/10/2018   LAD mid 60 (iFR 0.80)-0% EES (Xience), LCX plaque; RCA plaque, SHANTE 90% with dissection and KYLE 2 distal flow    ECG: I have reviewed EKG with the following interpretation: Atrial fibrillation with PVCs    IMPRESSION/RECOMMENDATIONS:      1. Acute on chronic systolic congestive heart failure  2. Severe ischemic cardiomyopathy ef 25%  3. Atrial fibrillation of unknown duration  4. Coronary artery disease s/p PCI in 2018 with Dr Jonathan Calderon  5. Lymphedema  6. Dyslipidemia  7. Hypertension  8. Non-sustained Vtach      Discussed with pt, her son, and Dr Jose R Luna ICD for prevention of sudden cardiac death  Consider EP study for non-sustained Vtach  Continue eliquis 5bid, lipitor 80, toprol 50mg daily (She is intolerated to coreg), entresto 49-51, aldactone 25mg daily, Brilinta 90 bid, lasix 40bid  Patient given reading material on ICD and she will review it with her son and decide.   Discussed with RN        Electronically signed by PRICILA Garcia CNP on 3/25/2019 at 10:55 AM

## 2019-03-25 NOTE — FLOWSHEET NOTE
03/25/19 1405   Provider Notification   Reason for Communication Review case   Provider Name  Brookfield Manner   Provider Notification Physician   Method of Communication Face to face   Response At bedside   Notification Time [phone]   MD rounded at this time. Writer updated on patient status. Per MD:   Give a 2nd 250 mL bolus of NS at this time. Pt to remain on bedrest while experiencing BP/HR lability. Ensure wound care is consulted if not already. Continue Brilinta, discuss holding of [place] w/Dr. Umer Mckinney if procedure to be done. Writer will continue to monitor.

## 2019-03-25 NOTE — PLAN OF CARE
Pt remains free of falls and verbalizes understanding of individual fall risks. Pt wearing non skid footwear, bed locked and in lowest position. Side rails up 2/4. Pt has call light and tray table within reach and encouraged to seek out staff for any assistance needed. Pt uses call light appropriately. No new insults to physical integrity noted for the duration of the shift. Patient able to turn self, nutrition adequate to support skin integrity. No new insults to skin noted for the duration of the shift. Patient active participant in control of pain, states interventions are adequately meeting patient's needs for the duration of the shift.

## 2019-03-25 NOTE — FLOWSHEET NOTE
03/25/19 1100   Vitals   Pulse 53   Heart Rate Source Monitor   Resp 16   BP (!) 105/43   MAP (mmHg) 58   BP Location Left upper arm   BP Upper/Lower Upper   BP Method Automatic   Patient Position Semi fowlers   Oxygen Therapy   SpO2 96 %   Pulse Oximeter Device Mode Intermittent   Pulse Oximeter Device Location Right;Finger   O2 Device Nasal cannula   O2 Flow Rate (L/min) 3 L/min   Decreasing VS checking frequency to Q15 min at this time. Pt asymptomatic at this time. Writer will continue to monitor.

## 2019-03-25 NOTE — PROGRESS NOTES
Section of Cardiology  CARDIOLOGY PROGRESS NOTE (Coverage for Dr. Juan Jose Ann)          Date:  3/25/2019  Patient: Adi Ball  Admission:  3/21/2019  8:40 AM                           LOS: 4 days   Admit DX: Congestive heart failure, severe ([address] [I50.9]  Age:  76 y.o., 1950          REASON OF EVALUATION   CHF and CAD      SUBJECTIVE     The patient was seen and examined. Notes and labs reviewed. There were complications over night. She continued to have runs of nonsustained VT over night, and was started on IVD Amiodarone and EP was consulted. However, this AM she developed bradycardia down to 30bpm and hypotension and IVD Amiodarone was stopped. She did have Toprol Xl 50mg overnight, but all BP reducing meds were held this AM. Patient was dizzy. She was given 250mL IV fluid bolus and dizziness improved as well as SBP 90's. Patient's cardiac review of systems: negative. The patient is generally feeling stable. Patient denies any chest pain, palpitations, or syncope.     MEDICATONS     Current Inpatient Medications:   metoprolol succinate  50 mg Oral Daily    LORazepam  0.5 mg Oral TID    furosemide  40 mg Oral BID    oxybutynin  5 mg Oral TID    sertraline  50 mg Oral Daily    apixaban  5 mg Oral BID    spironolactone  25 mg Oral Daily    sodium chloride flush  10 mL Intravenous 2 times per day    miconazole   Topical BID    ticagrelor  90 mg Oral BID    atorvastatin  80 mg Oral Nightly    sacubitril-valsartan  1 tablet Oral BID    multivitamin  1 tablet Oral Daily       IV Infusions (if any):   amiodarone 1 mg/min (03/25/19 0550)    Followed by    amiodarone         OBJECTIVE     Vitals:    Vitals:    03/24/19 2046 03/24/19 2337 03/25/19 0311 03/25/19 0800   BP: (!) 92/40 103/65 (!) 111/53 115/68   Pulse: 50 72 59 65   Resp: 18 17 18 18   Temp: 98.2 °F (36.8 °C) 98.3 °F (36.8 °C) 98.8 °F (37.1 °C) 97.4 °F (36.3 °C)   TempSrc: Temporal Temporal Temporal Temporal   SpO2: 98% 98% 96% 95% Weight:       Height:             Intake/Output Summary (Last 24 hours) at 3/25/2019 4555  Last data filed at 3/25/2019 4969  Gross per 24 hour   Intake 240 ml   Output 1550 ml   Net -1310 ml       Exam:  CONSTITUTIONAL:  awake, alert, cooperative, no apparent distress, and appears stated age. HEENT: Normal jugular venous pulsations, no carotid bruits. Head is atraumatic, normocephalic. Eyes and oral mucosa are normal.  LUNGS: Good respiratory effort On auscultation: clear to auscultation bilaterally  CARDIOVASCULAR:  Normal apical impulse, regular rate and rhythm, normal S1 and S2, no S3 or S4, and no murmur or rub noted. ABDOMEN: Soft, nontender, nondistended. Bowel sounds present. No masses or tenderness. No bruit. SKIN: Warm and dry. EXTREMITIES: No bilateral lower extremity edema. Motor movement grossly intact. No cyanosis or clubbing.       DIAGNOSTICS     Studies:    Telemetry: afib with slow ventricular response, PVC's, nonsustained VT (longest 19 beats)    EKG: afib, PVC's    Echocardiogram: EF 25%, severe global hypokinesis, LVH (m/M), DD3, MR (M)      Laboratory testing:  Lab Results   Component Value Date     03/25/2019     03/24/2019     03/22/2019    K 3.9 03/25/2019    K 3.7 03/24/2019    K 3.0 (L) 03/24/2019    CL 98 03/25/2019     03/24/2019     03/22/2019    CO2 31 03/25/2019    CO2 32 (H) 03/24/2019    CO2 26 03/22/2019    BUN 26 (H) 03/25/2019    BUN 24 (H) 03/24/2019    BUN 25 (H) 03/22/2019    CREATININE 0.91 (H) 03/25/2019    CREATININE 0.88 03/24/2019    CREATININE 0.91 (H) 03/22/2019    CALCIUM 8.3 (L) 03/25/2019    CALCIUM 7.7 (L) 03/24/2019    CALCIUM 8.4 (L) 03/22/2019    LABALBU 2.8 (L) 03/21/2019    LABALBU 4.0 08/22/2017    LABALBU 3.8 08/07/2017    PROT 5.6 (L) 03/21/2019    PROT 6.9 08/07/2017    PROT 7.4 08/06/2017    BILITOT 0.90 03/21/2019    BILITOT 0.57 08/07/2017    BILITOT 0.60 08/06/2017    ALKPHOS 82 03/21/2019    ALKPHOS 72 08/07/2017 ALKPHOS 74 08/06/2017    ALT 13 03/21/2019    ALT 9 08/07/2017    ALT 11 08/06/2017    AST 20 03/21/2019    AST 18 08/07/2017    AST 18 08/06/2017    GLUCOSE 123 (H) 03/25/2019    GLUCOSE 108 (H) 03/24/2019    GLUCOSE 153 (H) 03/22/2019     Lab Results   Component Value Date    WBC 7.3 03/25/2019    WBC 7.6 03/24/2019    WBC 8.1 03/22/2019    HGB 12.7 03/25/2019    HGB 12.4 03/24/2019    HGB 11.8 (L) 03/22/2019    HCT 39.6 03/25/2019    HCT 38.7 03/24/2019    HCT 36.9 03/22/2019     03/25/2019     03/24/2019     03/22/2019     Lab Results   Component Value Date    MG 2.0 03/25/2019    MG 1.8 03/24/2019    MG 1.9 03/22/2019     Lab Results   Component Value Date    TROPONINT NOT REPORTED 03/21/2019    TROPONINT NOT REPORTED 03/21/2019    TROPONINT NOT REPORTED 03/21/2019     Lab Results   Component Value Date    PROBNP 3,418 (H) 03/24/2019    PROBNP 10,467 (H) 03/22/2019    PROBNP 12,553 (H) 03/21/2019     Lab Results   Component Value Date    CHOL 116 03/22/2019    HDL 31 03/22/2019    TRIG 84 03/22/2019     Lab Results   Component Value Date    INR 1.4 03/21/2019    TSH 2.14 03/22/2019    TSH 2.43 08/07/2017    TSH 3.29 08/06/2017     Patient Active Problem List   Diagnosis    Leg swelling    Hypertensive urgency    Acute on chronic systolic congestive heart failure (HCC)    Acute on chronic systolic congestive heart failure (HCC)    Lymphedema of both lower extremities    Cellulitis of right leg    Coronary artery disease involving native coronary artery of native heart without angina pectoris    Major depressive disorder    [facility] onset [address]    NSTEMI (non-ST elevated myocardial infarction) ([address]    Acute on chronic congestive heart failure (HCC)    Panic disorder         ASSESSMENT and PLAN     · Nonsustained ventricular tachycardia, recurrent; awaiting EP input- IVD amiodarone discontinued due to hypotension and bradycardia  · Hypotension, on IVD amiodarone and symptomatic-

## 2019-03-25 NOTE — PROGRESS NOTES
Occupational Therapy  Facility/Department: [facility] CVICU  Daily Treatment Note  NAME: Eliza Dumont  : 1950  MRN: [id number]    Date of Service: 3/25/2019    Discharge Recommendations:  [address]    Patient would benefit from SNF for continued occupational therapy to increase independence with  ADL of bathing, dressing, toileting and grooming. Writer recommending SNF placement for for activity tolerance and strength which will increase independence with ADL's coordinated with bed mobility and chair transfers. Continued skilled OT services to address decreased safety awareness with ADL and IADL tasks and for education and increased independence with DME and AE for fall prevention and ec/ws techniques prior to d/c home. Assessment   Performance deficits / Impairments: Decreased functional mobility ; Decreased strength;Decreased endurance;Decreased ADL status; Decreased balance  Prognosis: Good  Patient Education:  Educated patient on pursed lip breathing to increase control of breathing. Initiated by breathing through the nose and blowing out with pursed lip to increase O2 into lungs. Edu on cycle of anxiety and SOB. Written and verbal edu on stress/anxiety triggers, recognition, and tech to increase function. Written and verbal tech on anxiety reduction, stress management, and relaxation tech. Tech include breathing tech, meaningful activity and participation, and health management. Patient verbalize and/or demonstrate good engagement and understanding of edu provided. REQUIRES OT FOLLOW UP: Yes  Activity Tolerance  Activity Tolerance: Patient Tolerated treatment well  Activity Tolerance: SOB noted with transfer  Safety Devices  Safety Devices in place: Yes  Type of devices: All fall risk precautions in place;Nurse notified; Patient at risk for falls;Call light within reach; Left in chair         Patient Diagnosis(es): The primary encounter diagnosis was Acute on chronic congestive Plan   Plan  Times per week: 4-6x/week  Times per day: Daily  Current Treatment Recommendations: Strengthening, Functional Mobility Training, Endurance Training, Balance Training, Safety Education & Training, Self-Care / ADL, Patient/Caregiver Education & Training    AM-PAC Score        AM-PAC Inpatient Daily Activity Raw Score: 16  AM-PAC Inpatient ADL T-Scale Score : 35.96  ADL Inpatient CMS 0-100% Score: 53.32  ADL Inpatient CMS G-Code Modifier : CK    Goals  Short term goals  Short term goal 1: Pt. will demo Mod-I with ADL transfers using RW with good safety. Short term goal 2: Pt. will demo Mod-I with functional mob using RW for ADL completion with good safety/pacing. Short term goal 3: Pt. will demo Mod-I with toileting routine using RW and appropriate DME. Short term goal 4: Pt. will improve standing tolerance to 6-8 min for increased activity ginny for completion of sink side ADLs. Short term goal 5: Pt. will engage in 20 min< BUE ther activity/exer to improve activity tolerance to [facility] for increased INDEP with func mob and ADLs.        Therapy Time   Individual Concurrent Group Co-treatment   Time In [address] [facility]         Time Out 0849         Minutes [address] [place] [address]

## 2019-03-25 NOTE — PROGRESS NOTES
Pt stated she feels less lightheaded at this time. Pt exhibiting increased alertness from previous assessment at this time. 250 mL bolus, per Yonathan Alonso NP initiated at [phone], w/pt tolerating. Decreasing BP monitoring to Q10Min, as pt BPs exhibiting increased stability and symptoms reduced at this time. Writer will continue to monitor.

## 2019-03-25 NOTE — FLOWSHEET NOTE
03/25/19 0238   Provider Notification   Reason for Communication Review case;New orders   Provider Name Maribel Castellanos   Provider Notification Nurse Practitioner   Method of Communication Call   Response See orders   Notification Time [phone]   RN notified NP of runs of vtach. RN received orders to increase metoprolol to 50 mg daily, give 50 mg metoprolol now, and check potassium and magnesium labs. RN to call NP if pt continues to have long runs of vtach. Will continue to monitor closely.

## 2019-03-25 NOTE — PROGRESS NOTES
Pt provided copy of ICD info sheet - additionally added to AVS - for further info, per conversation w/KLuis Antonio Knight NP.

## 2019-03-26 LAB
ABSOLUTE EOS #: 0.5 K/UL (ref 0–0.4)
ABSOLUTE IMMATURE GRANULOCYTE: ABNORMAL K/UL (ref 0–0.3)
ABSOLUTE LYMPH #: 1.1 K/UL (ref 1–4.8)
ABSOLUTE MONO #: 0.6 K/UL (ref 0.2–0.8)
ANION GAP SERPL CALCULATED.3IONS-SCNC: 11 MMOL/L (ref 9–17)
BASOPHILS # BLD: 1 % (ref 0–2)
BASOPHILS ABSOLUTE: 0 K/UL (ref 0–0.2)
BNP INTERPRETATION: ABNORMAL
BUN BLDV-MCNC: 27 MG/DL (ref 8–23)
BUN/CREAT BLD: 32 (ref 9–20)
CALCIUM SERPL-MCNC: 8.7 MG/DL (ref 8.6–10.4)
CHLORIDE BLD-SCNC: 102 MMOL/L (ref 98–107)
CO2: 30 MMOL/L (ref 20–31)
CREAT SERPL-MCNC: 0.85 MG/DL (ref 0.5–0.9)
DIFFERENTIAL TYPE: ABNORMAL
EOSINOPHILS RELATIVE PERCENT: 8 % (ref 1–4)
GFR AFRICAN AMERICAN: >60 ML/MIN
GFR NON-AFRICAN AMERICAN: >60 ML/MIN
GFR SERPL CREATININE-BSD FRML MDRD: ABNORMAL ML/MIN/{1.73_M2}
GFR SERPL CREATININE-BSD FRML MDRD: ABNORMAL ML/MIN/{1.73_M2}
GLUCOSE BLD-MCNC: 109 MG/DL (ref 70–99)
HCT VFR BLD CALC: 37.9 % (ref 36–46)
HEMOGLOBIN: 12.3 G/DL (ref 12–16)
IMMATURE GRANULOCYTES: ABNORMAL %
LYMPHOCYTES # BLD: 16 % (ref 24–44)
MCH RBC QN AUTO: 26.9 PG (ref 26–34)
MCHC RBC AUTO-ENTMCNC: 32.4 G/DL (ref 31–37)
MCV RBC AUTO: 82.8 FL (ref 80–100)
MONOCYTES # BLD: 9 % (ref 1–7)
NRBC AUTOMATED: ABNORMAL PER 100 WBC
PDW BLD-RTO: 17.7 % (ref 11.5–14.5)
PLATELET # BLD: 296 K/UL (ref 130–400)
PLATELET ESTIMATE: ABNORMAL
PMV BLD AUTO: 9 FL (ref 6–12)
POTASSIUM SERPL-SCNC: 3.5 MMOL/L (ref 3.7–5.3)
PRO-BNP: 3462 PG/ML
RBC # BLD: 4.57 M/UL (ref 4–5.2)
RBC # BLD: ABNORMAL 10*6/UL
SEG NEUTROPHILS: 66 % (ref 36–66)
SEGMENTED NEUTROPHILS ABSOLUTE COUNT: 4.4 K/UL (ref 1.8–7.7)
SODIUM BLD-SCNC: 143 MMOL/L (ref 135–144)
WBC # BLD: 6.7 K/UL (ref 3.5–11)
WBC # BLD: ABNORMAL 10*3/UL

## 2019-03-26 PROCEDURE — 83880 ASSAY OF NATRIURETIC PEPTIDE: CPT

## 2019-03-26 PROCEDURE — 6370000000 HC RX 637 (ALT 250 FOR IP): Performed by: NURSE PRACTITIONER

## 2019-03-26 PROCEDURE — 1200000000 HC SEMI PRIVATE

## 2019-03-26 PROCEDURE — 36415 COLL VENOUS BLD VENIPUNCTURE: CPT

## 2019-03-26 PROCEDURE — 99232 SBSQ HOSP IP/OBS MODERATE 35: CPT | Performed by: INTERNAL MEDICINE

## 2019-03-26 PROCEDURE — 80048 BASIC METABOLIC PNL TOTAL CA: CPT

## 2019-03-26 PROCEDURE — 2580000003 HC RX 258: Performed by: INTERNAL MEDICINE

## 2019-03-26 PROCEDURE — 6360000002 HC RX W HCPCS: Performed by: NURSE PRACTITIONER

## 2019-03-26 PROCEDURE — 97110 THERAPEUTIC EXERCISES: CPT

## 2019-03-26 PROCEDURE — 85025 COMPLETE CBC W/AUTO DIFF WBC: CPT

## 2019-03-26 PROCEDURE — 97530 THERAPEUTIC ACTIVITIES: CPT

## 2019-03-26 PROCEDURE — 6370000000 HC RX 637 (ALT 250 FOR IP): Performed by: INTERNAL MEDICINE

## 2019-03-26 RX ADMIN — Medication 10 ML: at 09:00

## 2019-03-26 RX ADMIN — TICAGRELOR 90 MG: 90 TABLET ORAL at 20:23

## 2019-03-26 RX ADMIN — ENOXAPARIN SODIUM 90 MG: 100 INJECTION SUBCUTANEOUS at 20:24

## 2019-03-26 RX ADMIN — MICONAZOLE NITRATE: 20.6 POWDER TOPICAL at 20:24

## 2019-03-26 RX ADMIN — ATORVASTATIN CALCIUM 80 MG: 80 TABLET, FILM COATED ORAL at 20:23

## 2019-03-26 RX ADMIN — POTASSIUM CHLORIDE 40 MEQ: 20 TABLET, EXTENDED RELEASE ORAL at 14:31

## 2019-03-26 RX ADMIN — MICONAZOLE NITRATE: 20.6 POWDER TOPICAL at 09:00

## 2019-03-26 RX ADMIN — Medication 10 ML: at 20:23

## 2019-03-26 RX ADMIN — MULTIVITAMIN TABLET 1 TABLET: TABLET at 18:00

## 2019-03-26 ASSESSMENT — PAIN SCALES - GENERAL
PAINLEVEL_OUTOF10: 0

## 2019-03-26 NOTE — PLAN OF CARE
Pt remains free of falls and verbalizes understanding of individual fall risks. Pt wearing non skid footwear, bed locked and in lowest position. Side rails up 2/4. Pt has call light and tray table within reach and encouraged to seek out staff for any assistance needed. Pt uses call light appropriately. Patient able to turn self, nutrition adequate to support skin integrity. No new insults to skin noted for the duration of the shift. Patient active participant in control of pain, states interventions are adequately meeting patient's needs for the duration of the shift. No new insults to physical integrity noted for the duration of the shift.

## 2019-03-26 NOTE — PROGRESS NOTES
[facility] - Progress Note    3/26/2019   9:11 AM    Name:  Fabiano Lambert  :    1950  Age:  76 y.o. female  MRN:    [id number]     Acct:     [de-identified]   [facility]:     [facility] Date: 3/21/2019  8:40 AM  PCP: Howard Vance DO    Subjective:     C/C:   Chief Complaint   Patient presents with    Illness       Interval History: Status: improved. Patient denies shortness of breath. She had multiple episodes of SVT and NSVT yesterday. She was placed on amiodarone drip as well as Toprol with subsequent hypotension and bradycardia requiring fluid boluses. She has a history of noncompliance with medications over the last several months. ICD had been recommended earlier for her ischemic cardiomyopathy and she had refused. This yesterday she has agreed to have ICD placement and was tentatively scheduled for today. Cardiology was unable to perform this and is rescheduled for tomorrow. She denies chest pain or sensation of palpitations. History: (From 3/24/19 sign out note)  \"67 y/o brought in to [facility] ED with c/o SOB, generalized weakness , incontinence of urine in last 2 d. Noncomplaince with medications in last 1-2 mo. Per EMS house was filled with trash and 'essentially uninhabitable' . ED w/u: Afib, , SBP 170s, hypoxia 80s on RA, Na 146, CO2 17, BNP 42236, Hb 12.7, WBC 7.5, u/a unremarkable, HS trop 31, EKG: Afib, unspecific ST/T changes. CXR compatible with CHF .  - Has prior h/o CAD s/p PCI x2 latest 10/2018, chronic Sys CHF, HTN, HPL. Patient indicates she did not tolerate plavix/ bryllinta :unable to elaborate. Would like to change cardiologists. - started on diuresis, Eliquis ,restarted on Brillinta which she is tolerating well. \"    ROS:  Constitutional: Negative for chills, diaphoresis, fever, malaise/fatigue and weight loss. HENT: Negative for ear pain, hearing loss, nosebleeds, sore throat and tinnitus.     Eyes: Negative for blurred vision, double vision, photophobia and pain. Respiratory: Negative for cough, hemoptysis, sputum production, shortness of breath and wheezing. Cardiovascular: Negative for palpitations, orthopnea, claudication and PND. Positive for chronic lymphedema   Gastrointestinal: Negative for abdominal pain, blood in stool, constipation, diarrhea, heartburn, melena, nausea and vomiting. Genitourinary: Negative for dysuria, flank pain, frequency, hematuria and urgency. Musculoskeletal: Negative for back pain, falls, joint pain, myalgias and neck pain. Skin: Negative for itching and rash. Neurological: Negative for dizziness, tingling, tremors, sensory change, focal weakness, seizures, weakness and headaches. Endo/Heme/Allergies: Does not bruise/bleed easily. Psychiatric/Behavioral: Negative for depression. The patient is not nervous/anxious. Medications:      Allergies: No Known Allergies    Current Meds:    metoprolol succinate  25 mg Oral Daily    sodium chloride  250 mL Intravenous Once    LORazepam  0.5 mg Oral TID    furosemide  40 mg Oral BID    oxybutynin  5 mg Oral TID    sertraline  50 mg Oral Daily    apixaban  5 mg Oral BID    spironolactone  25 mg Oral Daily    sodium chloride flush  10 mL Intravenous 2 times per day    miconazole   Topical BID    ticagrelor  90 mg Oral BID    atorvastatin  80 mg Oral Nightly    sacubitril-valsartan  1 tablet Oral BID    multivitamin  1 tablet Oral Daily     PRN Meds:     magnesium sulfate 1 g Intravenous PRN   potassium chloride 40 mEq Oral PRN   Or      potassium alternative oral replacement 40 mEq Oral PRN   Or      potassium chloride 10 mEq Intravenous PRN   sodium chloride flush 10 mL Intravenous PRN   magnesium hydroxide 30 mL Oral Daily PRN   nicotine 1 patch Transdermal Daily PRN   acetaminophen 650 mg Oral Q4H PRN   ondansetron 4 mg Oral Q6H PRN   Or      ondansetron 4 mg Intravenous Q6H PRN       Data:     Code normal, no murmur, rub   or gallop   Abdomen:     Soft, non-tender, bowel sounds active all four quadrants,     no masses, no organomegaly   Extremities:   Extremities normal, atraumatic, no cyanosis. Positive for chronic lymphedema    Pulses:   2+ and symmetric all extremities   Skin:   Skin color, texture, turgor normal, no rashes or lesions   Lymph nodes:   Cervical, supraclavicular, and axillary nodes normal   Neurologic:   CNII-XII intact       Assessment:     Primary Problem  Acute on chronic systolic congestive heart failure [facility])     Active Hospital Problems    Diagnosis Date Noted    Panic disorder [F41.0] 03/23/2019    Acute on chronic congestive heart failure ([address] [I50.9]     Acute on chronic systolic congestive heart failure ([address] [I50.23] 03/21/2019    Lymphedema of both lower extremities [I89.0] 03/21/2019    Cellulitis of right leg [L03.115] 03/21/2019    Coronary artery disease involving native coronary artery of native heart without angina pectoris [I25.10] 03/21/2019    Major depressive disorder [F32.9] 03/21/2019    New onset a-fib ([address] [I48.91] 03/21/2019    NSTEMI (non-ST elevated myocardial infarction) ([address] [I21.4] 03/21/2019     Past Medical History:   Diagnosis Date    Acute on chronic systolic congestive heart failure ([address] 8/8/2017    Anxiety     Cardiomyopathy ([address]     H/O heart artery stent     [place] (hard of hearing)     Hyperlipidemia     Hypertension     Hypertensive urgency 8/7/2017    Leg swelling 8/7/2017        Consultations:     PHARMACY TO DOSE VANCOMYCIN  IP CONSULT TO HEART FAILURE NURSE/COORDINATOR  IP CONSULT TO DIETITIAN  PHARMACY TO DOSE VANCOMYCIN  IP CONSULT TO CARDIOLOGY  IP CONSULT TO PSYCHIATRY  IP CONSULT TO SOCIAL WORK  IP CONSULT TO PSYCHIATRY  IP CONSULT TO CARDIOLOGY    Plan:     1. Plan for AICD in the morning  2. DVT prophylaxis  3. GI prophylaxis  4. Blood pressure control  5. Monitor control arrhythmia  6.  Recheck laboratories in the

## 2019-03-26 NOTE — FLOWSHEET NOTE
03/25/19 1921   Provider Notification   Reason for Communication Review case;Evaluate   Provider Name Dr. La Nena Wilcox   Provider Notification Physician   Method of Communication Call   Response No new orders   Notification Time 1921   RN spoke with MD regarding pt's decision to have an ICD. MD stated that cardiology will see her tomorrow to plan this.

## 2019-03-26 NOTE — FLOWSHEET NOTE
03/26/19 0843   Provider Notification   Reason for Communication Review case   Provider Name Dr. Jessica Strong   Provider Notification Physician   Method of Communication Secure Message   Response Waiting for response   Notification Time [phone]   LVM for Dr. Jessica Strong at this time. Advised pt interested in ICD placement at this time, pt was given anticoagulants at 2016 last night (3/25/19), but has been NPO since. Advised will keep pt NPO and not give anticoagulants until return call in case MD wishes to do procedure today. Awaiting response at this time.

## 2019-03-26 NOTE — PROGRESS NOTES
Section of Cardiology  CARDIOLOGY PROGRESS NOTE          Date:  3/26/2019  Patient: Alex Holden  Admission:  3/21/2019  8:40 AM                           LOS: 5 days   Admit DX: Congestive heart failure, severe ([address] [I50.9]  Age:  76 y.o., 1950          REASON OF EVALUATION   CHF, CAD      SUBJECTIVE     The patient was seen and examined. Notes and labs reviewed. There were not complications over night. Pt feels SOB is improved. BP is improved today and no recurrent dizziness. She has been NPO today for possible ICD. Patient's cardiac review of systems: negative. The patient is generally feeling gradually improving. Patient denies any chest pain, palpitations, dizziness or syncope.     MEDICATONS     Current Inpatient Medications:   metoprolol succinate  25 mg Oral Daily    sodium chloride  250 mL Intravenous Once    LORazepam  0.5 mg Oral TID    furosemide  40 mg Oral BID    oxybutynin  5 mg Oral TID    sertraline  50 mg Oral Daily    apixaban  5 mg Oral BID    spironolactone  25 mg Oral Daily    sodium chloride flush  10 mL Intravenous 2 times per day    miconazole   Topical BID    ticagrelor  90 mg Oral BID    atorvastatin  80 mg Oral Nightly    sacubitril-valsartan  1 tablet Oral BID    multivitamin  1 tablet Oral Daily       IV Infusions (if any):   amiodarone         OBJECTIVE     Vitals:    Vitals:    03/25/19 2300 03/26/19 0459 03/26/19 0830 03/26/19 1213   BP: (!) 112/47 (!) 102/44 105/62 114/65   Pulse: 65 59 67 61   Resp: 14 16 16 16   Temp: 97.2 °F (36.2 °C) 98.3 °F (36.8 °C) 98.2 °F (36.8 °C) 98.2 °F (36.8 °C)   TempSrc: Temporal Temporal Temporal Temporal   SpO2: 100% 98%  100%   Weight:       Height:             Intake/Output Summary (Last 24 hours) at 3/26/2019 1555  Last data filed at 3/26/2019 1405  Gross per 24 hour   Intake 10 ml   Output 300 ml   Net -290 ml       Exam:  CONSTITUTIONAL:  awake, alert, cooperative, no apparent distress, and appears stated age.  HEENT: Normal jugular venous pulsations, no carotid bruits. Head is atraumatic, normocephalic. Eyes and oral mucosa are normal.  LUNGS: Good respiratory effort On auscultation: fine bibasilar rales  CARDIOVASCULAR:  Normal apical impulse, regular rate and rhythm, normal S1 and S2, no S3 or S4, and no murmur or rub noted. ABDOMEN: Soft, nontender, nondistended. Bowel sounds present. No masses or tenderness. No bruit. SKIN: Warm and dry. EXTREMITIES: No bilateral lower extremity edema. Motor movement grossly intact. No cyanosis or clubbing.       DIAGNOSTICS     Studies:    Telemetry: afib with slow ventricular response, PVC's     EKG: afib, PVC's     Echocardiogram: EF 25%, severe global hypokinesis, LVH (m/M), DD3, MR (M)      Laboratory testing:  Lab Results   Component Value Date     03/26/2019     03/25/2019     03/24/2019    K 3.5 (L) 03/26/2019    K 3.9 03/25/2019    K 3.7 03/24/2019     03/26/2019    CL 98 03/25/2019     03/24/2019    CO2 30 03/26/2019    CO2 31 03/25/2019    CO2 32 (H) 03/24/2019    BUN 27 (H) 03/26/2019    BUN 26 (H) 03/25/2019    BUN 24 (H) 03/24/2019    CREATININE 0.85 03/26/2019    CREATININE 0.91 (H) 03/25/2019    CREATININE 0.88 03/24/2019    CALCIUM 8.7 03/26/2019    CALCIUM 8.3 (L) 03/25/2019    CALCIUM 7.7 (L) 03/24/2019    LABALBU 2.8 (L) 03/21/2019    LABALBU 4.0 08/22/2017    LABALBU 3.8 08/07/2017    PROT 5.6 (L) 03/21/2019    PROT 6.9 08/07/2017    PROT 7.4 08/06/2017    BILITOT 0.90 03/21/2019    BILITOT 0.57 08/07/2017    BILITOT 0.60 08/06/2017    ALKPHOS 82 03/21/2019    ALKPHOS 72 08/07/2017    ALKPHOS 74 08/06/2017    ALT 13 03/21/2019    ALT 9 08/07/2017    ALT 11 08/06/2017    AST 20 03/21/2019    AST 18 08/07/2017    AST 18 08/06/2017    GLUCOSE 109 (H) 03/26/2019    GLUCOSE 123 (H) 03/25/2019    GLUCOSE 108 (H) 03/24/2019     Lab Results   Component Value Date    WBC 6.7 03/26/2019    WBC 7.3 03/25/2019    WBC 7.6 03/24/2019 HGB 12.3 03/26/2019    HGB 12.7 03/25/2019    HGB 12.4 03/24/2019    HCT 37.9 03/26/2019    HCT 39.6 03/25/2019    HCT 38.7 03/24/2019     03/26/2019     03/25/2019     03/24/2019     Lab Results   Component Value Date    MG 2.0 03/25/2019    MG 1.8 03/24/2019    MG 1.9 03/22/2019     Lab Results   Component Value Date    TROPONINT NOT REPORTED 03/21/2019    TROPONINT NOT REPORTED 03/21/2019    TROPONINT NOT REPORTED 03/21/2019     Lab Results   Component Value Date    PROBNP 3,462 (H) 03/26/2019    PROBNP 3,418 (H) 03/24/2019    PROBNP 10,467 (H) 03/22/2019     Lab Results   Component Value Date    CHOL 116 03/22/2019    HDL 31 03/22/2019    TRIG 84 03/22/2019     Lab Results   Component Value Date    INR 1.4 03/21/2019    TSH 2.14 03/22/2019    TSH 2.43 08/07/2017    TSH 3.29 08/06/2017     Patient Active Problem List   Diagnosis    Leg swelling    Hypertensive urgency    Acute on chronic systolic congestive heart failure (HCC)    Acute on chronic systolic congestive heart failure (HCC)    Lymphedema of both lower extremities    Cellulitis of right leg    Coronary artery disease involving native coronary artery of native heart without angina pectoris    Major depressive disorder    New onset a-fib ([address]    NSTEMI (non-ST elevated myocardial infarction) ([address]    Acute on chronic congestive heart failure ([facility])    Panic disorder       ASSESSMENT and PLAN     · Nonsustained ventricular tachycardia, recurrent; awaiting EP input- IVD amiodarone discontinued due to hypotension and bradycardia  · Hypotension, on IVD amiodarone and symptomatic- improved with IV fluid bolus  · Acute on chronic systolic heart failure- improved with good urine output  · Severe ischemic cardiomyopathy with EF 25% on current echo  · Coronary artery disease without angina  · H/O recent PCI with stenting to LAD (10/2018) by Dr. Thao Lyman at [facility]   · H/O angioplasty to branch of SHANTE (9/2018)  · H/O NSTEMI

## 2019-03-26 NOTE — CARE COORDINATION
Social Work-[facility] received precert. Will follow to assist with transfer. Lilliam Novak

## 2019-03-26 NOTE — PROGRESS NOTES
Physical Therapy  Facility/Department: [facility] CVICU  Daily Treatment Note  NAME: Valdez Escamilla  : 1950  MRN: [id number]    Date of Service: 3/26/2019    Discharge Recommendations:  [address]        Patient Diagnosis(es): The primary encounter diagnosis was Acute on chronic congestive heart failure, unspecified heart failure type ([address] A diagnosis of Cellulitis of both lower extremities was also pertinent to this visit. has a past medical history of Acute on chronic systolic congestive heart failure ([address] Anxiety, Cardiomyopathy ([address] H/O heart artery stent, [place] (hard of hearing), Hyperlipidemia, Hypertension, Hypertensive urgency, and Leg swelling.   has a past surgical history that includes Cataract removal; Corneal transplant; Nasal sinus surgery; eye surgery; and Tonsillectomy and adenoidectomy. Restrictions  Restrictions/Precautions  Restrictions/Precautions: General Precautions, Up as Tolerated  Required Braces or Orthoses?: No  Position Activity Restriction  Other position/activity restrictions: RN reports that she would like patient to stay in bed today due to yesterday's episode of decreased BP and lightheadeness. IV Lt hand and O2. Subjective   General  Chart Reviewed: Yes  Additional Pertinent Hx: Lymphedema, history of attempted suicide  Response To Previous Treatment: Patient with no complaints from previous session. Family / Caregiver Present: No  Subjective  Subjective: Patient pleasant and agreeable to PT. General Comment  Comments: Green Signs, RN reports patient appropriate for PT.   Pain Screening  Patient Currently in Pain: Denies(Resting in bed)  Pain Assessment  Pain Assessment: 0-10  Pain Level: 0  Vital Signs  Patient Currently in Pain: Denies(Resting in bed)       Orientation  Orientation  Overall Orientation Status: Within Functional Limits  Cognition   Cognition  Overall Cognitive Status: WFL  Objective   Bed mobility  Bridging: Contact guard assistance  Rolling to Left: Contact guard assistance  Rolling to Right: Contact guard assistance  Comment: Extended time for bed mobility as patient becomes SOB, Rolling LT<> RT x 5 reps with rest break in between. Transfers  Comment: Not appropraite to ambulated currently per RN   Ambulation  Ambulation?: No  Exercises  Comments: Supine elsa LE AROM x 20 reps with several rest breaks and elsa UE medium resistance tband x 10 reps requires rest breaks at each set of exercise. Bridging 2 x 4 reps with rest breaks x 1 min each. Education on pressure relief and getting up to chair with RN assist when cleared to do so. Assessment   Body structures, Functions, Activity limitations: Decreased functional mobility ; Decreased ADL status; Decreased strength;Decreased endurance;Decreased balance  Assessment: Patient limited today due to low endurance. Paitent appropriate for SNF discharge to build up aerobic capacity and overall strength. Prognosis: Good  REQUIRES PT FOLLOW UP: Yes  Activity Tolerance  Activity Tolerance: Patient limited by endurance;Treatment limited secondary to medical complications (free text); Patient limited by fatigue     AM-PAC Score  AM-PAC Inpatient Mobility Raw Score : 13  AM-PAC Inpatient T-Scale Score : 36.74  Mobility Inpatient CMS 0-100% Score: 64.91  Mobility Inpatient CMS G-Code Modifier : CL    Goals  Short term goals  Time Frame for Short term goals: 12 visits  Short term goal 1: Patient will be indep with bed mobility and transfers. Short term goal 2: Patient will amb 200 feet with RW and indep. Short term goal 3: Patient will tolerate 30 minutes of ther-ex and ther-act. Short term goal 4: Patient will be indep with HEP.   Patient Goals   Patient goals : Improve breathing    Plan    Plan  Times per week: 1x/d, 5-6 d/wk  Current Treatment Recommendations: Strengthening, Balance Training, Functional Mobility Training, Transfer Training, Endurance Training, Gait Training, Neuromuscular Re-education, Home Exercise Program, Safety Education & Training, Patient/Caregiver Education & Training  Safety Devices  Type of devices:  All fall risk precautions in place, Gait belt, Nurse notified, Call light within reach, Left in bed, Bed alarm in place     Therapy Time   Individual Concurrent Group Co-treatment   Time In 1219         Time Out 1258         Minutes [address] [place], [place]

## 2019-03-27 LAB
ABSOLUTE EOS #: 0.4 K/UL (ref 0–0.4)
ABSOLUTE IMMATURE GRANULOCYTE: ABNORMAL K/UL (ref 0–0.3)
ABSOLUTE LYMPH #: 1.2 K/UL (ref 1–4.8)
ABSOLUTE MONO #: 0.5 K/UL (ref 0.2–0.8)
ANION GAP SERPL CALCULATED.3IONS-SCNC: 10 MMOL/L (ref 9–17)
BASOPHILS # BLD: 1 % (ref 0–2)
BASOPHILS ABSOLUTE: 0 K/UL (ref 0–0.2)
BUN BLDV-MCNC: 25 MG/DL (ref 8–23)
BUN/CREAT BLD: 32 (ref 9–20)
CALCIUM SERPL-MCNC: 8.8 MG/DL (ref 8.6–10.4)
CHLORIDE BLD-SCNC: 103 MMOL/L (ref 98–107)
CO2: 29 MMOL/L (ref 20–31)
CREAT SERPL-MCNC: 0.79 MG/DL (ref 0.5–0.9)
CULTURE: NORMAL
CULTURE: NORMAL
DIFFERENTIAL TYPE: ABNORMAL
EOSINOPHILS RELATIVE PERCENT: 6 % (ref 1–4)
GFR AFRICAN AMERICAN: >60 ML/MIN
GFR NON-AFRICAN AMERICAN: >60 ML/MIN
GFR SERPL CREATININE-BSD FRML MDRD: ABNORMAL ML/MIN/{1.73_M2}
GFR SERPL CREATININE-BSD FRML MDRD: ABNORMAL ML/MIN/{1.73_M2}
GLUCOSE BLD-MCNC: 116 MG/DL (ref 70–99)
HCT VFR BLD CALC: 39.2 % (ref 36–46)
HEMOGLOBIN: 12.4 G/DL (ref 12–16)
IMMATURE GRANULOCYTES: ABNORMAL %
LYMPHOCYTES # BLD: 18 % (ref 24–44)
Lab: NORMAL
Lab: NORMAL
MCH RBC QN AUTO: 26.6 PG (ref 26–34)
MCHC RBC AUTO-ENTMCNC: 31.8 G/DL (ref 31–37)
MCV RBC AUTO: 83.9 FL (ref 80–100)
MONOCYTES # BLD: 8 % (ref 1–7)
NRBC AUTOMATED: ABNORMAL PER 100 WBC
PDW BLD-RTO: 18.1 % (ref 11.5–14.5)
PLATELET # BLD: 293 K/UL (ref 130–400)
PLATELET ESTIMATE: ABNORMAL
PMV BLD AUTO: 8.7 FL (ref 6–12)
POTASSIUM SERPL-SCNC: 4 MMOL/L (ref 3.7–5.3)
RBC # BLD: 4.67 M/UL (ref 4–5.2)
RBC # BLD: ABNORMAL 10*6/UL
SEG NEUTROPHILS: 67 % (ref 36–66)
SEGMENTED NEUTROPHILS ABSOLUTE COUNT: 4.3 K/UL (ref 1.8–7.7)
SODIUM BLD-SCNC: 142 MMOL/L (ref 135–144)
SPECIMEN DESCRIPTION: NORMAL
SPECIMEN DESCRIPTION: NORMAL
WBC # BLD: 6.4 K/UL (ref 3.5–11)
WBC # BLD: ABNORMAL 10*3/UL

## 2019-03-27 PROCEDURE — 97116 GAIT TRAINING THERAPY: CPT

## 2019-03-27 PROCEDURE — 80048 BASIC METABOLIC PNL TOTAL CA: CPT

## 2019-03-27 PROCEDURE — 36415 COLL VENOUS BLD VENIPUNCTURE: CPT

## 2019-03-27 PROCEDURE — 97110 THERAPEUTIC EXERCISES: CPT

## 2019-03-27 PROCEDURE — 97530 THERAPEUTIC ACTIVITIES: CPT

## 2019-03-27 PROCEDURE — 6370000000 HC RX 637 (ALT 250 FOR IP): Performed by: INTERNAL MEDICINE

## 2019-03-27 PROCEDURE — 97530 THERAPEUTIC ACTIVITIES: CPT | Performed by: NURSE PRACTITIONER

## 2019-03-27 PROCEDURE — 97535 SELF CARE MNGMENT TRAINING: CPT | Performed by: NURSE PRACTITIONER

## 2019-03-27 PROCEDURE — 6360000002 HC RX W HCPCS: Performed by: NURSE PRACTITIONER

## 2019-03-27 PROCEDURE — 99232 SBSQ HOSP IP/OBS MODERATE 35: CPT | Performed by: INTERNAL MEDICINE

## 2019-03-27 PROCEDURE — 2580000003 HC RX 258: Performed by: INTERNAL MEDICINE

## 2019-03-27 PROCEDURE — 85025 COMPLETE CBC W/AUTO DIFF WBC: CPT

## 2019-03-27 PROCEDURE — 6370000000 HC RX 637 (ALT 250 FOR IP): Performed by: NURSE PRACTITIONER

## 2019-03-27 PROCEDURE — 1200000000 HC SEMI PRIVATE

## 2019-03-27 RX ADMIN — MICONAZOLE NITRATE: 20.6 POWDER TOPICAL at 10:00

## 2019-03-27 RX ADMIN — LORAZEPAM 0.5 MG: 0.5 TABLET ORAL at 15:09

## 2019-03-27 RX ADMIN — ATORVASTATIN CALCIUM 80 MG: 80 TABLET, FILM COATED ORAL at 21:36

## 2019-03-27 RX ADMIN — TICAGRELOR 90 MG: 90 TABLET ORAL at 08:45

## 2019-03-27 RX ADMIN — ENOXAPARIN SODIUM 90 MG: 100 INJECTION SUBCUTANEOUS at 21:35

## 2019-03-27 RX ADMIN — METOPROLOL SUCCINATE 25 MG: 25 TABLET, EXTENDED RELEASE ORAL at 08:45

## 2019-03-27 RX ADMIN — TICAGRELOR 90 MG: 90 TABLET ORAL at 21:36

## 2019-03-27 RX ADMIN — OXYBUTYNIN CHLORIDE 5 MG: 5 TABLET ORAL at 21:36

## 2019-03-27 RX ADMIN — OXYBUTYNIN CHLORIDE 5 MG: 5 TABLET ORAL at 08:45

## 2019-03-27 RX ADMIN — MULTIVITAMIN TABLET 1 TABLET: TABLET at 17:25

## 2019-03-27 RX ADMIN — MICONAZOLE NITRATE: 20.6 POWDER TOPICAL at 21:39

## 2019-03-27 RX ADMIN — SACUBITRIL AND VALSARTAN 1 TABLET: 49; 51 TABLET, FILM COATED ORAL at 08:45

## 2019-03-27 RX ADMIN — LORAZEPAM 0.5 MG: 0.5 TABLET ORAL at 08:45

## 2019-03-27 RX ADMIN — FUROSEMIDE 40 MG: 40 TABLET ORAL at 17:25

## 2019-03-27 RX ADMIN — Medication 10 ML: at 08:00

## 2019-03-27 RX ADMIN — OXYBUTYNIN CHLORIDE 5 MG: 5 TABLET ORAL at 15:09

## 2019-03-27 RX ADMIN — LORAZEPAM 0.5 MG: 0.5 TABLET ORAL at 21:36

## 2019-03-27 RX ADMIN — SACUBITRIL AND VALSARTAN 1 TABLET: 49; 51 TABLET, FILM COATED ORAL at 21:36

## 2019-03-27 RX ADMIN — SERTRALINE HYDROCHLORIDE 50 MG: 50 TABLET ORAL at 08:45

## 2019-03-27 RX ADMIN — FUROSEMIDE 40 MG: 40 TABLET ORAL at 08:45

## 2019-03-27 ASSESSMENT — PAIN SCALES - GENERAL
PAINLEVEL_OUTOF10: 0

## 2019-03-27 NOTE — PROGRESS NOTES
Physical Therapy  Facility/Department: [facility] CVICU  Daily Treatment Note  NAME: Alex Holden  : 1950  MRN: [id number]    Date of Service: 3/27/2019    Discharge Recommendations:  [address]        Patient Diagnosis(es): The primary encounter diagnosis was Acute on chronic congestive heart failure, unspecified heart failure type ([address] A diagnosis of Cellulitis of both lower extremities was also pertinent to this visit. has a past medical history of Acute on chronic systolic congestive heart failure ([address] Anxiety, Cardiomyopathy ([address] H/O heart artery stent, [place] (hard of hearing), Hyperlipidemia, Hypertension, Hypertensive urgency, and Leg swelling.   has a past surgical history that includes Cataract removal; Corneal transplant; Nasal sinus surgery; eye surgery; and Tonsillectomy and adenoidectomy. Restrictions  Restrictions/Precautions  Restrictions/Precautions: General Precautions, Up as Tolerated  Required Braces or Orthoses?: No  Position Activity Restriction  Other position/activity restrictions: Up with assist, monitor BP and HR, IV, telemetry, O2  Subjective   General  Chart Reviewed: Yes  Additional Pertinent Hx: Lymphedema, history of attempted suicide  Response To Previous Treatment: Patient with no complaints from previous session. Family / Caregiver Present: No  Subjective  Subjective: Patient pleasant and agreeable to PT. General Comment  Comments: Renea Dominguez RN reports patient appropriate for PT.   Pain Screening  Patient Currently in Pain: Denies  Pain Assessment  Pain Assessment: 0-10  Pain Level: 0  Vital Signs  Patient Currently in Pain: Denies       Orientation  Orientation  Overall Orientation Status: Within Functional Limits  Cognition   Cognition  Overall Cognitive Status: WFL  Objective   Bed mobility  Bridging: Contact guard assistance  Rolling to Left: Contact guard assistance  Rolling to Right: Contact guard assistance  Supine to Sit: Stand by assistance  Sit to Supine: Stand by assistance  Scooting: Contact guard assistance  Transfers  Sit to Stand: Minimal Assistance  Stand to sit: Minimal Assistance  Bed to Chair: Minimal assistance  Stand Pivot Transfers: Minimal Assistance  Lateral Transfers: Minimal Assistance  Ambulation  Ambulation?: Yes  More Ambulation?: No  Ambulation 1  Surface: level tile  Device: Rolling Walker  Other Apparatus: O2  Assistance: Minimal assistance  Quality of Gait: Patient with flexed posture. Improved SOB with ambulation. Distance: 10' x 2  Comments: Patient ambulated into the rest room and then back out to chair. Stairs/Curb  Stairs?: No     Balance  Sitting - Static: Good  Sitting - Dynamic: Good  Standing - Static: Fair  Standing - Dynamic: Fair;-  Comments: w/ RW for UB support  Exercises  Comments: Attempted standing exercises but patient become very SOB post ambulation and then lunch arrived. Patient would like to hold exercises to eat lunch. Other exercises  Other exercises?: No      Strength RLE  Comment: 4-/5  Strength LLE  Comment: 4-/5  Strength RUE  Comment: 4-/5  Strength LUE  Comment: 4-/5  Comment: Toilet transfers with mIn A for hand placement and RW placement. Patient able to perfrom [facility]. Assessment   Body structures, Functions, Activity limitations: Decreased functional mobility ; Decreased ADL status; Decreased strength;Decreased endurance;Decreased balance  Assessment: Patient able to progress toward her goals today. Patient is still limited by her endurance and decreased strength. Patient appropriate for SNF discharge. Prognosis: Good  REQUIRES PT FOLLOW UP: Yes  Activity Tolerance  Activity Tolerance: Patient limited by endurance; Patient limited by fatigue     AM-PAC Score  AM-PAC Inpatient Mobility Raw Score : 14  AM-PAC Inpatient T-Scale Score : 38.1  Mobility Inpatient CMS 0-100% Score: 61.29  Mobility Inpatient CMS G-Code Modifier : CL          Goals  Short term goals  Time Frame for Short term goals: 12 visits  Short term goal 1: Patient will be indep with bed mobility and transfers. Short term goal 2: Patient will amb 200 feet with RW and indep. Short term goal 3: Patient will tolerate 30 minutes of ther-ex and ther-act. Short term goal 4: Patient will be indep with HEP. Patient Goals   Patient goals : Improve breathing    Plan    Plan  Times per week: 1x/d, 5-6 d/wk  Current Treatment Recommendations: Strengthening, Balance Training, Functional Mobility Training, Transfer Training, Endurance Training, Gait Training, Neuromuscular Re-education, Home Exercise Program, Safety Education & Training, Patient/Caregiver Education & Training  Safety Devices  Type of devices:  All fall risk precautions in place, Gait belt, Nurse notified, Call light within reach, Chair alarm in place     Therapy Time   Individual Concurrent Group Co-treatment   Time In 1216         Time Out 1255         Minutes [address], [place]

## 2019-03-27 NOTE — PROGRESS NOTES
Occupational Therapy  Facility/Department: [facility]  Daily Treatment Note  NAME: Augusto Prater  : 1950  MRN: [id number]    Date of Service: 3/27/2019    Discharge Recommendations:  [address]    Patient would benefit from SNF for continued occupational therapy to increase independence with  ADL of bathing, dressing, toileting and grooming. Writer recommending SNF placement for for activity tolerance and strength which will increase independence with ADL's coordinated with bed mobility and chair transfers. Continued skilled OT services to address decreased safety awareness with ADL and IADL tasks and for education and increased independence with DME and AE for fall prevention and ec/ws techniques prior to d/c home. Assessment   Performance deficits / Impairments: Decreased functional mobility ; Decreased strength;Decreased endurance;Decreased ADL status; Decreased balance  Prognosis: Good  Patient Education: Written and verbal edu provided on pacemaker/sternal precautions. Pt encouraged to discuss precautions with doctor today. REQUIRES OT FOLLOW UP: Yes  Activity Tolerance  Activity Tolerance: Patient Tolerated treatment well  Safety Devices  Safety Devices in place: Yes  Type of devices: All fall risk precautions in place;Nurse notified; Patient at risk for falls;Call light within reach; Left in chair         Patient Diagnosis(es): The primary encounter diagnosis was Acute on chronic congestive heart failure, unspecified heart failure type ([address] A diagnosis of Cellulitis of both lower extremities was also pertinent to this visit.       has a past medical history of Acute on chronic systolic congestive heart failure ([address] Anxiety, Cardiomyopathy ([address] H/O heart artery stent, [place] (hard of hearing), Hyperlipidemia, Hypertension, Hypertensive urgency, and Leg swelling.   has a past surgical history that includes Cataract removal; Corneal transplant; Nasal sinus surgery; eye surgery; and Tonsillectomy and adenoidectomy. Restrictions  Restrictions/Precautions  Restrictions/Precautions: General Precautions, Up as Tolerated  Required Braces or Orthoses?: No  Position Activity Restriction  Other position/activity restrictions: Up with assist, monitor BP and HR, IV, telemetry, O2  Subjective   General  Chart Reviewed: Yes  Patient assessed for rehabilitation services?: Yes  Response to previous treatment: Patient with no complaints from previous session  Family / Caregiver Present: No  Subjective  Subjective: \"I'm just dealing with some anxiety. \"  General Comment  Comments: Writer consulted Kelin Evans RN whom indicated pt. was medically stable for OT this date. Vital Signs  Pulse: 64  BP: 120/60  BP Location: Left upper arm  BP Upper/Lower: Upper  MAP (mmHg): 72  Patient Position: Sitting  Patient Currently in Pain: Denies  Oxygen Therapy  SpO2: 98 %  O2 Device: Nasal cannula  O2 Flow Rate (L/min): 2 L/min     Vitals:    03/27/19 0904   BP: 120/60   Pulse: 64   Resp:    Temp:    SpO2: 98%       Orientation  Orientation  Overall Orientation Status: Within Functional Limits  Objective    ADL  Feeding: Independent        Balance  Sitting Balance: Supervision  Standing Balance: Stand by assistance  Standing Balance  Sit to stand: Stand by assistance  Stand to sit: Stand by assistance  Comment: From EOB to RW  Functional Mobility  Functional - Mobility Device: Rolling Walker  Bed mobility  Supine to Sit: Stand by assistance  Sit to Supine: Stand by assistance  Comment: With HOB raised and use of bed rails  Transfers  Stand Step Transfers: Stand by assistance  Sit to stand: Stand by assistance  Stand to sit: Stand by assistance  Transfer Comments: From EOB to recliner                       Cognition  Overall Cognitive Status: WFL     Perception  Overall Perceptual Status: WFL        Additional Activities Comment  Additional Activities:      Upon writer exit, call light within reach, pt retired to chair. All lines intact and patient positioned comfortably. All patient needs addressed prior to ending therapy session. Chart reviewed prior to treatment and patient is agreeable for therapy. RN reports patient is medically stable for therapy treatment this date. Plan   Plan  Times per week: 4-6x/week  Times per day: Daily  Current Treatment Recommendations: Strengthening, Functional Mobility Training, Endurance Training, Balance Training, Safety Education & Training, Self-Care / ADL, Patient/Caregiver Education & Training    AM-PAC Score        AM-PAC Inpatient Daily Activity Raw Score: 16  AM-PAC Inpatient ADL T-Scale Score : 35.96  ADL Inpatient CMS 0-100% Score: 53.32  ADL Inpatient CMS G-Code Modifier : CK    Goals  Short term goals  Short term goal 1: Pt. will demo Mod-I with ADL transfers using RW with good safety. Short term goal 2: Pt. will demo Mod-I with functional mob using RW for ADL completion with good safety/pacing. Short term goal 3: Pt. will demo Mod-I with toileting routine using RW and appropriate DME. Short term goal 4: Pt. will improve standing tolerance to 6-8 min for increased activity ginny for completion of sink side ADLs. Short term goal 5: Pt. will engage in 20 min< BUE ther activity/exer to improve activity tolerance to [facility] for increased INDEP with func mob and ADLs.        Therapy Time   Individual Concurrent Group Co-treatment   Time In 0833         Time Out 0911         Minutes [address], JOSE MIGUEL

## 2019-03-27 NOTE — PROGRESS NOTES
Section of Cardiology  CARDIOLOGY PROGRESS NOTE          Date:  3/27/2019  Patient: Marco Antonio Waddell  Admission:  3/21/2019  8:40 AM                           LOS: 6 days   Admit DX: Congestive heart failure, severe ([address] [I50.9]  Age:  76 y.o., 1950          REASON OF EVALUATION   CHF and coronary artery disease      SUBJECTIVE     The patient was seen and examined. Notes and labs reviewed. There were not complications over night. Patient is sitting up in the chair and feels her breathing is stable today. Plans for ICD tomorrow at 11am.   Patient's cardiac review of systems: negative. The patient is generally feeling unchanged. Patient denies any chest pain, palpitations, dizziness or syncope. MEDICATONS     Current Inpatient Medications:   enoxaparin  1 mg/kg Subcutaneous BID    metoprolol succinate  25 mg Oral Daily    sodium chloride  250 mL Intravenous Once    LORazepam  0.5 mg Oral TID    furosemide  40 mg Oral BID    oxybutynin  5 mg Oral TID    sertraline  50 mg Oral Daily    sodium chloride flush  10 mL Intravenous 2 times per day    miconazole   Topical BID    ticagrelor  90 mg Oral BID    atorvastatin  80 mg Oral Nightly    sacubitril-valsartan  1 tablet Oral BID    multivitamin  1 tablet Oral Daily       IV Infusions (if any):   amiodarone         OBJECTIVE     Vitals:    Vitals:    03/26/19 2000 03/27/19 0000 03/27/19 0400 03/27/19 0800   BP: 118/65 (!) 122/58 124/77 135/65   Pulse: 51 73 69 85   Resp: 17 16 16 14   Temp: 97.4 °F (36.3 °C) 98.1 °F (36.7 °C) 97.2 °F (36.2 °C) 97.8 °F (36.6 °C)   TempSrc: Temporal Temporal Temporal Temporal   SpO2: 96% 98% 96% 100%   Weight:       Height:             Intake/Output Summary (Last 24 hours) at 3/27/2019 0855  Last data filed at 3/26/2019 2023  Gross per 24 hour   Intake 10 ml   Output 250 ml   Net -240 ml       Exam:  CONSTITUTIONAL:  awake, alert, cooperative, no apparent distress, and appears stated age.   HEENT: Normal jugular venous pulsations, no carotid bruits. Head is atraumatic, normocephalic. Eyes and oral mucosa are normal.  LUNGS: Good respiratory effort On auscultation: fine bibasilar rales  CARDIOVASCULAR:  Normal apical impulse, regular rate and rhythm, normal S1 and S2, no S3 or S4, and no murmur or rub noted. ABDOMEN: Soft, nontender, nondistended. Bowel sounds present. No masses or tenderness. No bruit. SKIN: Warm and dry. EXTREMITIES: No bilateral lower extremity edema. Motor movement grossly intact. No cyanosis or clubbing.       DIAGNOSTICS     Studies:    Telemetry: afib with slow ventricular response, PVC's     EKG: afib, PVC's     Echocardiogram: EF 25%, severe global hypokinesis, LVH (m/M), DD3, MR (M)      Laboratory testing:  Lab Results   Component Value Date     03/27/2019     03/26/2019     03/25/2019    K 4.0 03/27/2019    K 3.5 (L) 03/26/2019    K 3.9 03/25/2019     03/27/2019     03/26/2019    CL 98 03/25/2019    CO2 29 03/27/2019    CO2 30 03/26/2019    CO2 31 03/25/2019    BUN 25 (H) 03/27/2019    BUN 27 (H) 03/26/2019    BUN 26 (H) 03/25/2019    CREATININE 0.79 03/27/2019    CREATININE 0.85 03/26/2019    CREATININE 0.91 (H) 03/25/2019    CALCIUM 8.8 03/27/2019    CALCIUM 8.7 03/26/2019    CALCIUM 8.3 (L) 03/25/2019    LABALBU 2.8 (L) 03/21/2019    LABALBU 4.0 08/22/2017    LABALBU 3.8 08/07/2017    PROT 5.6 (L) 03/21/2019    PROT 6.9 08/07/2017    PROT 7.4 08/06/2017    BILITOT 0.90 03/21/2019    BILITOT 0.57 08/07/2017    BILITOT 0.60 08/06/2017    ALKPHOS 82 03/21/2019    ALKPHOS 72 08/07/2017    ALKPHOS 74 08/06/2017    ALT 13 03/21/2019    ALT 9 08/07/2017    ALT 11 08/06/2017    AST 20 03/21/2019    AST 18 08/07/2017    AST 18 08/06/2017    GLUCOSE 116 (H) 03/27/2019    GLUCOSE 109 (H) 03/26/2019    GLUCOSE 123 (H) 03/25/2019     Lab Results   Component Value Date    WBC 6.4 03/27/2019    WBC 6.7 03/26/2019    WBC 7.3 03/25/2019    HGB 12.4 03/27/2019    HGB 12.3 03/26/2019    HGB 12.7 03/25/2019    HCT 39.2 03/27/2019    HCT 37.9 03/26/2019    HCT 39.6 03/25/2019     03/27/2019     03/26/2019     03/25/2019     Lab Results   Component Value Date    MG 2.0 03/25/2019    MG 1.8 03/24/2019    MG 1.9 03/22/2019     Lab Results   Component Value Date    TROPONINT NOT REPORTED 03/21/2019    TROPONINT NOT REPORTED 03/21/2019    TROPONINT NOT REPORTED 03/21/2019     Lab Results   Component Value Date    PROBNP 3,462 (H) 03/26/2019    PROBNP 3,418 (H) 03/24/2019    PROBNP 10,467 (H) 03/22/2019     Lab Results   Component Value Date    CHOL 116 03/22/2019    HDL 31 03/22/2019    TRIG 84 03/22/2019     Lab Results   Component Value Date    INR 1.4 03/21/2019    TSH 2.14 03/22/2019    TSH 2.43 08/07/2017    TSH 3.29 08/06/2017           Patient Active Problem List   Diagnosis    Leg swelling    Hypertensive urgency    Acute on chronic systolic congestive heart failure (HCC)    Acute on chronic systolic congestive heart failure (HCC)    Lymphedema of both lower extremities    Cellulitis of right leg    Coronary artery disease involving native coronary artery of native heart without angina pectoris    Major depressive disorder    New onset a-fib ([address]    NSTEMI (non-ST elevated myocardial infarction) ([address]    Acute on chronic congestive heart failure ([facility])    Panic disorder         ASSESSMENT and PLAN     · Nonsustained ventricular tachycardia, recurrent; IVD amiodarone discontinued due to hypotension and bradycardia  · Hypotension, on IVD amiodarone and symptomatic- improved with IV fluid bolus  · Acute on chronic systolic heart failure- improved with good urine output  · Severe ischemic cardiomyopathy with EF 25% on current echo  · Coronary artery disease without angina  · H/O recent PCI with stenting to LAD (10/2018) by Dr. [place] at [facility]  · H/O angioplasty to branch of SHANTE (9/2018)  · H/O NSTEMI (9/2018)  · Atrial fibrillation of unknown duration  · Chronic anticoagulation on Eliquis  · Hypokalemia-improved  · Hyperlipidemia  · H/O noncompliance    Patient has decided to proceed with ICD and plans for ICD implantation tomorrow at 11am  Anticoagulation recommendations per EP and will resume Eliquis ASAP post procedure  NPO after MN  Continue Lasix 40mg BID, Toprol XL 25mg, Entresto 49/51mg BID, Lipitor 80mg and Brilinta 90mg BID  Continue supportive care  Discussed with Dr. Zena Hawkins the plan for ICD tomorrow with Dr. Kwaku Camilo  Discussed with RN, patient and Dr. Tim Sewell    The note was completed by using EMR. However, inadvertent computerized transcription errors may be present. Although every effort was made to ensure accuracy, no guarantees can be provided that every mistake has been identified and corrected by editing.       Tete Syed, PRICILA-CNP

## 2019-03-27 NOTE — PROGRESS NOTES
[facility] - Progress Note    3/27/2019   2:00 PM    Name:  Alex Holden  :    1950  Age:  76 y.o. female  MRN:    [id number]     Acct:     [de-identified]   Room:     Day: [address]: [facility] Date: 3/21/2019  8:40 AM  PCP: Catherine Parker DO    Subjective:     C/C:   Chief Complaint   Patient presents with    Illness       Interval History: Status: improved. Patient denies shortness of breath. She had multiple episodes of SVT and NSVT 19. She was placed on amiodarone drip as well as Toprol with subsequent hypotension and bradycardia requiring fluid boluses. She has a history of noncompliance with medications over the last several months. ICD had been recommended earlier for her ischemic cardiomyopathy and she had refused. She has agreed to have ICD placement and was tentatively scheduled for today. Cardiology was unable to perform this and is rescheduled for tomorrow at 11 AM. She denies chest pain or sensation of palpitations. She is currently up in chair. She has chronic lymphedema which is at baseline. History: (From 3/24/19 sign out note)  \"69 y/o brought in to [place] ED with c/o SOB, generalized weakness , incontinence of urine in last 2 d. Noncomplaince with medications in last 1-2 mo. Per EMS house was filled with trash and 'essentially uninhabitable' . ED w/u: Afib, , SBP 170s, hypoxia 80s on RA, Na 146, CO2 17, BNP 34680, Hb 12.7, WBC 7.5, u/a unremarkable, HS trop 31, EKG: Afib, unspecific ST/T changes. CXR compatible with CHF .  - Has prior h/o CAD s/p PCI x2 latest 10/2018, chronic Sys CHF, HTN, HPL. Patient indicates she did not tolerate plavix/ bryllinta :unable to elaborate. Would like to change cardiologists. - started on diuresis, Eliquis ,restarted on Brillinta which she is tolerating well. \"    ROS:  Constitutional: Negative for chills, diaphoresis, fever, malaise/fatigue and weight loss.    HENT: Negative for ear pain, hearing loss, nosebleeds, sore throat and tinnitus. Eyes: Negative for blurred vision, double vision, photophobia and pain. Respiratory: Negative for cough, hemoptysis, sputum production, shortness of breath and wheezing. Cardiovascular: Negative for palpitations, orthopnea, claudication and PND. Positive for chronic lymphedema   Gastrointestinal: Negative for abdominal pain, blood in stool, constipation, diarrhea, heartburn, melena, nausea and vomiting. Genitourinary: Negative for dysuria, flank pain, frequency, hematuria and urgency. Musculoskeletal: Negative for back pain, falls, joint pain, myalgias and neck pain. Skin: Negative for itching and rash. Neurological: Negative for dizziness, tingling, tremors, sensory change, focal weakness, seizures, weakness and headaches. Endo/Heme/Allergies: Does not bruise/bleed easily. Psychiatric/Behavioral: Negative for depression. The patient is not nervous/anxious. Medications:      Allergies: No Known Allergies    Current Meds:    enoxaparin  1 mg/kg Subcutaneous BID    metoprolol succinate  25 mg Oral Daily    sodium chloride  250 mL Intravenous Once    LORazepam  0.5 mg Oral TID    furosemide  40 mg Oral BID    oxybutynin  5 mg Oral TID    sertraline  50 mg Oral Daily    sodium chloride flush  10 mL Intravenous 2 times per day    miconazole   Topical BID    ticagrelor  90 mg Oral BID    atorvastatin  80 mg Oral Nightly    sacubitril-valsartan  1 tablet Oral BID    multivitamin  1 tablet Oral Daily     PRN Meds:     magnesium sulfate 1 g Intravenous PRN   potassium chloride 40 mEq Oral PRN   Or      potassium alternative oral replacement 40 mEq Oral PRN   Or      potassium chloride 10 mEq Intravenous PRN   sodium chloride flush 10 mL Intravenous PRN   magnesium hydroxide 30 mL Oral Daily PRN   nicotine 1 patch Transdermal Daily PRN   acetaminophen 650 mg Oral Q4H PRN   ondansetron 4 mg Oral Q6H PRN   Or      ondansetron 4 mg Intravenous Q6H PRN       Data:     Code Status:  Full Code    Labs:    Hematology:  Recent Labs     03/25/19  0317 03/26/19  0607 03/27/19  0452   WBC 7.3 6.7 6.4   RBC 4.68 4.57 4.67   HGB 12.7 12.3 12.4   HCT 39.6 37.9 39.2   MCV 84.5 82.8 83.9   MCH 27.1 26.9 26.6   MCHC 32.0 32.4 31.8   RDW 18.0* 17.7* 18.1*    296 293   MPV 8.7 9.0 8.7     Chemistry:  Recent Labs     03/25/19  0317 03/26/19  0607 03/27/19  0452    143 142   K 3.9 3.5* 4.0   CL 98 102 103   CO2 31 30 29   GLUCOSE 123* 109* 116*   BUN 26* 27* 25*   CREATININE 0.91* 0.85 0.79   MG 2.0  --   --    ANIONGAP 12 11 10   LABGLOM >60 >60 >60   GFRAA >60 >60 >60   CALCIUM 8.3* 8.7 8.8   PROBNP  --  3,462*  --        Glucose:No results for input(s): LABA1C, POCGLU in the last 72 hours.     Physical Examination:    BP (!) 105/57   Pulse 70   Temp 97.7 °F (36.5 °C) (Temporal)   Resp 20   Ht 5' 2\" (1.575 m)   Wt 189 lb 8 oz (86 kg)   SpO2 100%   BMI 34.66 kg/m²     Intake/Output Summary (Last 24 hours) at 3/27/2019 1400  Last data filed at 3/26/2019 2023  Gross per 24 hour   Intake --   Output 250 ml   Net -250 ml       General Appearance:    Alert, cooperative, no distress, appears stated age   Head:    Normocephalic, without obvious abnormality, atraumatic   Eyes:    PERRL, conjunctiva/corneas clear, EOM's intact        Ears:    Normal external ear canals, both ears   Nose:   Nares normal, septum midline, mucosa normal, no drainage    or sinus tenderness   Throat:   Lips, mucosa, and tongue normal   Neck:   Supple, symmetrical, trachea midline, no carotid    bruit or JVD   Back:     Not evaluated at this time    Lungs:     Clear to auscultation bilaterally, minimal rhonchi, respirations unlabored   Chest wall:    Tenderness over the incisional site    Heart:    Regular rate and rhythm, S1 and S2 normal, no murmur, rub   or gallop   Abdomen:     Soft, non-tender, bowel sounds active all four quadrants,     no masses, no organomegaly Extremities:   Extremities normal, atraumatic, no cyanosis. Positive for chronic lymphedema    Pulses:   2+ and symmetric all extremities   Skin:   Skin color, texture, turgor normal, no rashes or lesions   Lymph nodes:   Cervical, supraclavicular, and axillary nodes normal   Neurologic:   CNII-XII intact       Assessment:     Primary Problem  Acute on chronic systolic congestive heart failure [facility])     Active Hospital Problems    Diagnosis Date Noted    Panic disorder [F41.0] 03/23/2019    Acute on chronic congestive heart failure ([address] [I50.9]     Acute on chronic systolic congestive heart failure ([address] [I50.23] 03/21/2019    Lymphedema of both lower extremities [I89.0] 03/21/2019    Cellulitis of right leg [L03.115] 03/21/2019    Coronary artery disease involving native coronary artery of native heart without angina pectoris [I25.10] 03/21/2019    Major depressive disorder [F32.9] 03/21/2019    New onset a-fib ([address] [I48.91] 03/21/2019    NSTEMI (non-ST elevated myocardial infarction) ([address] [I21.4] 03/21/2019     Past Medical History:   Diagnosis Date    Acute on chronic systolic congestive heart failure ([address] 8/8/2017    Anxiety     Cardiomyopathy ([address]     H/O heart artery stent     [place] (hard of hearing)     Hyperlipidemia     Hypertension     Hypertensive urgency 8/7/2017    Leg swelling 8/7/2017        Consultations:     PHARMACY TO DOSE VANCOMYCIN  IP CONSULT TO HEART FAILURE NURSE/COORDINATOR  IP CONSULT TO DIETITIAN  PHARMACY TO DOSE VANCOMYCIN  IP CONSULT TO CARDIOLOGY  IP CONSULT TO PSYCHIATRY  IP CONSULT TO SOCIAL WORK  IP CONSULT TO PSYCHIATRY  IP CONSULT TO CARDIOLOGY    Plan:     1. Plan for AICD in the morning (11:00 am)  2. DVT prophylaxis  3. GI prophylaxis  4. Blood pressure control  5. Monitor control arrhythmia  6.  Recheck laboratories in the morning      Electronically signed by Pamela Hansen DO on 3/27/2019 at 2:00 PM

## 2019-03-28 ENCOUNTER — HOSPITAL ENCOUNTER (INPATIENT)
Dept: CARDIAC CATH/INVASIVE PROCEDURES | Age: 69
Discharge: HOME | DRG: 226 | End: 2019-03-28
Payer: MEDICARE

## 2019-03-28 VITALS — HEIGHT: 62 IN | BODY MASS INDEX: 33.35 KG/M2 | WEIGHT: 181.22 LBS

## 2019-03-28 LAB
ABSOLUTE EOS #: 0.2 K/UL (ref 0–0.4)
ABSOLUTE IMMATURE GRANULOCYTE: ABNORMAL K/UL (ref 0–0.3)
ABSOLUTE LYMPH #: 1 K/UL (ref 1–4.8)
ABSOLUTE MONO #: 0.4 K/UL (ref 0.2–0.8)
ANION GAP SERPL CALCULATED.3IONS-SCNC: 11 MMOL/L (ref 9–17)
BASOPHILS # BLD: 1 % (ref 0–2)
BASOPHILS ABSOLUTE: 0.1 K/UL (ref 0–0.2)
BUN BLDV-MCNC: 20 MG/DL (ref 8–23)
BUN/CREAT BLD: 26 (ref 9–20)
CALCIUM SERPL-MCNC: 8.5 MG/DL (ref 8.6–10.4)
CHLORIDE BLD-SCNC: 103 MMOL/L (ref 98–107)
CO2: 30 MMOL/L (ref 20–31)
CREAT SERPL-MCNC: 0.77 MG/DL (ref 0.5–0.9)
DIFFERENTIAL TYPE: ABNORMAL
EOSINOPHILS RELATIVE PERCENT: 4 % (ref 1–4)
GFR AFRICAN AMERICAN: >60 ML/MIN
GFR NON-AFRICAN AMERICAN: >60 ML/MIN
GFR SERPL CREATININE-BSD FRML MDRD: ABNORMAL ML/MIN/{1.73_M2}
GFR SERPL CREATININE-BSD FRML MDRD: ABNORMAL ML/MIN/{1.73_M2}
GLUCOSE BLD-MCNC: 111 MG/DL (ref 70–99)
HCT VFR BLD CALC: 38.6 % (ref 36–46)
HEMOGLOBIN: 12.5 G/DL (ref 12–16)
IMMATURE GRANULOCYTES: ABNORMAL %
LYMPHOCYTES # BLD: 19 % (ref 24–44)
MCH RBC QN AUTO: 26.8 PG (ref 26–34)
MCHC RBC AUTO-ENTMCNC: 32.3 G/DL (ref 31–37)
MCV RBC AUTO: 83.1 FL (ref 80–100)
MONOCYTES # BLD: 7 % (ref 1–7)
NRBC AUTOMATED: ABNORMAL PER 100 WBC
PDW BLD-RTO: 18.2 % (ref 11.5–14.5)
PLATELET # BLD: 305 K/UL (ref 130–400)
PLATELET ESTIMATE: ABNORMAL
PMV BLD AUTO: 8.6 FL (ref 6–12)
POTASSIUM SERPL-SCNC: 3.2 MMOL/L (ref 3.7–5.3)
RBC # BLD: 4.65 M/UL (ref 4–5.2)
RBC # BLD: ABNORMAL 10*6/UL
SEG NEUTROPHILS: 69 % (ref 36–66)
SEGMENTED NEUTROPHILS ABSOLUTE COUNT: 3.7 K/UL (ref 1.8–7.7)
SODIUM BLD-SCNC: 144 MMOL/L (ref 135–144)
WBC # BLD: 5.3 K/UL (ref 3.5–11)
WBC # BLD: ABNORMAL 10*3/UL

## 2019-03-28 PROCEDURE — 99232 SBSQ HOSP IP/OBS MODERATE 35: CPT | Performed by: INTERNAL MEDICINE

## 2019-03-28 PROCEDURE — 33225 L VENTRIC PACING LEAD ADD-ON: CPT | Performed by: INTERNAL MEDICINE

## 2019-03-28 PROCEDURE — 0JH609Z INSERTION OF CARDIAC RESYNCHRONIZATION DEFIBRILLATOR PULSE GENERATOR INTO CHEST SUBCUTANEOUS TISSUE AND FASCIA, OPEN APPROACH: ICD-10-PCS | Performed by: INTERNAL MEDICINE

## 2019-03-28 PROCEDURE — 02H43KZ INSERTION OF DEFIBRILLATOR LEAD INTO CORONARY VEIN, PERCUTANEOUS APPROACH: ICD-10-PCS | Performed by: INTERNAL MEDICINE

## 2019-03-28 PROCEDURE — 6370000000 HC RX 637 (ALT 250 FOR IP): Performed by: NURSE PRACTITIONER

## 2019-03-28 PROCEDURE — 1200000000 HC SEMI PRIVATE

## 2019-03-28 PROCEDURE — C1882 AICD, OTHER THAN SING/DUAL: HCPCS

## 2019-03-28 PROCEDURE — 6360000002 HC RX W HCPCS: Performed by: NURSE PRACTITIONER

## 2019-03-28 PROCEDURE — 2709999900 HC NON-CHARGEABLE SUPPLY

## 2019-03-28 PROCEDURE — 36415 COLL VENOUS BLD VENIPUNCTURE: CPT

## 2019-03-28 PROCEDURE — 80048 BASIC METABOLIC PNL TOTAL CA: CPT

## 2019-03-28 PROCEDURE — 2580000003 HC RX 258: Performed by: INTERNAL MEDICINE

## 2019-03-28 PROCEDURE — C1887 CATHETER, GUIDING: HCPCS

## 2019-03-28 PROCEDURE — 6370000000 HC RX 637 (ALT 250 FOR IP): Performed by: INTERNAL MEDICINE

## 2019-03-28 PROCEDURE — C1777 LEAD, AICD, ENDO SINGLE COIL: HCPCS

## 2019-03-28 PROCEDURE — 33249 INSJ/RPLCMT DEFIB W/LEAD(S): CPT | Performed by: INTERNAL MEDICINE

## 2019-03-28 PROCEDURE — 02H63KZ INSERTION OF DEFIBRILLATOR LEAD INTO RIGHT ATRIUM, PERCUTANEOUS APPROACH: ICD-10-PCS | Performed by: INTERNAL MEDICINE

## 2019-03-28 PROCEDURE — C1898 LEAD, PMKR, OTHER THAN TRANS: HCPCS

## 2019-03-28 PROCEDURE — 02HK3KZ INSERTION OF DEFIBRILLATOR LEAD INTO RIGHT VENTRICLE, PERCUTANEOUS APPROACH: ICD-10-PCS | Performed by: INTERNAL MEDICINE

## 2019-03-28 PROCEDURE — 85025 COMPLETE CBC W/AUTO DIFF WBC: CPT

## 2019-03-28 RX ORDER — VANCOMYCIN HYDROCHLORIDE 1 G/200ML
1000 INJECTION, SOLUTION INTRAVENOUS ONCE
Status: DISCONTINUED | OUTPATIENT
Start: 2019-03-28 | End: 2019-03-29 | Stop reason: HOSPADM

## 2019-03-28 RX ADMIN — MICONAZOLE NITRATE: 20.6 POWDER TOPICAL at 20:32

## 2019-03-28 RX ADMIN — Medication 10 ML: at 22:00

## 2019-03-28 RX ADMIN — MULTIVITAMIN TABLET 1 TABLET: TABLET at 17:58

## 2019-03-28 RX ADMIN — SACUBITRIL AND VALSARTAN 1 TABLET: 49; 51 TABLET, FILM COATED ORAL at 20:31

## 2019-03-28 RX ADMIN — ACETAMINOPHEN 650 MG: 325 TABLET ORAL at 20:31

## 2019-03-28 RX ADMIN — LORAZEPAM 0.5 MG: 0.5 TABLET ORAL at 20:31

## 2019-03-28 RX ADMIN — TICAGRELOR 90 MG: 90 TABLET ORAL at 20:31

## 2019-03-28 RX ADMIN — ATORVASTATIN CALCIUM 80 MG: 80 TABLET, FILM COATED ORAL at 20:31

## 2019-03-28 RX ADMIN — OXYBUTYNIN CHLORIDE 5 MG: 5 TABLET ORAL at 20:31

## 2019-03-28 RX ADMIN — METOPROLOL SUCCINATE 25 MG: 25 TABLET, EXTENDED RELEASE ORAL at 09:11

## 2019-03-28 RX ADMIN — MICONAZOLE NITRATE: 20.6 POWDER TOPICAL at 09:11

## 2019-03-28 RX ADMIN — ENOXAPARIN SODIUM 90 MG: 100 INJECTION SUBCUTANEOUS at 20:32

## 2019-03-28 RX ADMIN — Medication 10 ML: at 09:11

## 2019-03-28 RX ADMIN — SERTRALINE HYDROCHLORIDE 50 MG: 50 TABLET ORAL at 09:11

## 2019-03-28 RX ADMIN — POTASSIUM BICARBONATE 40 MEQ: 782 TABLET, EFFERVESCENT ORAL at 06:15

## 2019-03-28 RX ADMIN — FUROSEMIDE 40 MG: 40 TABLET ORAL at 17:58

## 2019-03-28 RX ADMIN — Medication 10 ML: at 17:58

## 2019-03-28 RX ADMIN — SACUBITRIL AND VALSARTAN 1 TABLET: 49; 51 TABLET, FILM COATED ORAL at 09:11

## 2019-03-28 RX ADMIN — OXYBUTYNIN CHLORIDE 5 MG: 5 TABLET ORAL at 09:11

## 2019-03-28 ASSESSMENT — PAIN SCALES - GENERAL
PAINLEVEL_OUTOF10: 0
PAINLEVEL_OUTOF10: 5

## 2019-03-28 ASSESSMENT — PAIN DESCRIPTION - DESCRIPTORS: DESCRIPTORS: SORE

## 2019-03-28 ASSESSMENT — PAIN DESCRIPTION - PAIN TYPE: TYPE: ACUTE PAIN;SURGICAL PAIN

## 2019-03-28 ASSESSMENT — PAIN DESCRIPTION - ONSET: ONSET: ON-GOING

## 2019-03-28 ASSESSMENT — PAIN DESCRIPTION - FREQUENCY: FREQUENCY: INTERMITTENT

## 2019-03-28 ASSESSMENT — PAIN DESCRIPTION - ORIENTATION: ORIENTATION: LEFT

## 2019-03-28 ASSESSMENT — PAIN DESCRIPTION - LOCATION: LOCATION: CHEST

## 2019-03-28 ASSESSMENT — PAIN DESCRIPTION - PROGRESSION: CLINICAL_PROGRESSION: NOT CHANGED

## 2019-03-28 ASSESSMENT — PAIN - FUNCTIONAL ASSESSMENT: PAIN_FUNCTIONAL_ASSESSMENT: ACTIVITIES ARE NOT PREVENTED

## 2019-03-28 NOTE — CARE COORDINATION
Social Work-Contacted [address] [place] [address]. They have asked [employer] for an extension on authorization. They are waiting for reply.  Tere Ball

## 2019-03-28 NOTE — OP NOTE
PHYSICIAN: Isis Johnston MD         DATE OF PROCEDURE: 3/28/19     PROCEDURE: Biventricular ICD implantation.     INDICATIONS: Severe  ischemic cardiomyopathy with an ejection fraction of  25%, persistent atrial fibrillation with anticipated >40% RV pacing due to sick sinus syndrome and significant bradycardia.     PROCEDURE NOTE: The patient was brought to the electrophysiology lab in a  fasting state and hooked to continuous hemodynamic monitoring. This includes  continuous pulse oximetry, telemetry, and intermittent blood pressure  recording. She was prepped and draped in the usual sterile fashion. A left  axillary venogram was then performed to evaluate for the location and patency  of the vein.      The left infraclavicular area was infiltrated with 1% lidocaine. Using the  modified Seldinger technique and under fluoroscopy, the left axillary vein was  accessed 3 times and 3 guidewires were placed in the vein.   A 4-cm incision was then made over the infraclavicular area and dissected down  to the fascia. A pocket was then fashioned for the pulse generator. Hemostasis was achieved with electrocautery.      A 9French sheath was advanced across one guidewire and the right ventricular  lead was advanced to the right ventricular low septal region. This was a medtronic [id number] 55cm lead serial number [id number]. Sensing was 4.4millivolts, threshold was 0.5 volts at 0.5 milliseconds with an impedance of 566 ohms. This lead was sutured to the prepectoral fascia.     A 7French sheath was advanced across the second guidewire. The right atrial  lead was advanced to right atrial appendage. Sensing was 0.9 millivolts,and an iimpedance of 566 ohms. This lead was also sutured to the prepectoral fascia. This was a medtronic [id number]   ,45cm lead, serial number [id number].     The Medtronic [id number] his  sheath was then advanced across the third guidewire to the right atrium.  The medtronic HIS lead serial number [id number] was used

## 2019-03-28 NOTE — PROGRESS NOTES
[facility] - Progress Note    3/28/2019   5:59 PM    Name:  Alex Holden  :    1950  Age:  76 y.o. female  MRN:    [id number]     Acct:     [de-identified]   Room:     Day: [address]: [facility] Date: 3/21/2019  8:40 AM  PCP: Catherine Parker DO    Subjective:     C/C:   Chief Complaint   Patient presents with    Illness       Interval History: Status: improved. POD #0 implantation of dual lead AICD. Patient has minor pain at the incisional site but otherwise feels well. Patient denies shortness of breath. She had multiple episodes of SVT and NSVT 19. She was placed on amiodarone drip as well as Toprol with subsequent hypotension and bradycardia requiring fluid boluses. She has a history of noncompliance with medications over the last several months. ICD had been recommended earlier for her ischemic cardiomyopathy and she had refused. She has chronic lymphedema which is at baseline. Patient is stable. Possible discharge tomorrow if okay with all services. Potassium has been replaced. History: (From 3/24/19 sign out note)  \"69 y/o brought in to [place] ED with c/o SOB, generalized weakness , incontinence of urine in last 2 d. Noncomplaince with medications in last 1-2 mo. Per EMS house was filled with trash and 'essentially uninhabitable' . ED w/u: Afib, , SBP 170s, hypoxia 80s on RA, Na 146, CO2 17, BNP 93296, Hb 12.7, WBC 7.5, u/a unremarkable, HS trop 31, EKG: Afib, unspecific ST/T changes. CXR compatible with CHF .  - Has prior h/o CAD s/p PCI x2 latest 10/2018, chronic Sys CHF, HTN, HPL. Patient indicates she did not tolerate plavix/ bryllinta :unable to elaborate. Would like to change cardiologists. - started on diuresis, Eliquis ,restarted on Brillinta which she is tolerating well. \"    ROS:  Constitutional: Negative for chills, diaphoresis, fever, malaise/fatigue and weight loss.    HENT: Negative for ear pain, hearing loss, nosebleeds, sore throat and tinnitus. Eyes: Negative for blurred vision, double vision, photophobia and pain. Respiratory: Negative for cough, hemoptysis, sputum production, shortness of breath and wheezing. Cardiovascular: Negative for palpitations, orthopnea, claudication and PND. Positive for chronic lymphedema   Gastrointestinal: Negative for abdominal pain, blood in stool, constipation, diarrhea, heartburn, melena, nausea and vomiting. Genitourinary: Negative for dysuria, flank pain, frequency, hematuria and urgency. Musculoskeletal: Negative for back pain, falls, joint pain, myalgias and neck pain. Skin: Negative for itching and rash. Neurological: Negative for dizziness, tingling, tremors, sensory change, focal weakness, seizures, weakness and headaches. Endo/Heme/Allergies: Does not bruise/bleed easily. Psychiatric/Behavioral: Negative for depression. The patient is not nervous/anxious. Medications:      Allergies: No Known Allergies    Current Meds:    enoxaparin  1 mg/kg Subcutaneous BID    metoprolol succinate  25 mg Oral Daily    sodium chloride  250 mL Intravenous Once    LORazepam  0.5 mg Oral TID    furosemide  40 mg Oral BID    oxybutynin  5 mg Oral TID    sertraline  50 mg Oral Daily    sodium chloride flush  10 mL Intravenous 2 times per day    miconazole   Topical BID    ticagrelor  90 mg Oral BID    atorvastatin  80 mg Oral Nightly    sacubitril-valsartan  1 tablet Oral BID    multivitamin  1 tablet Oral Daily     PRN Meds:     magnesium sulfate 1 g Intravenous PRN   potassium chloride 40 mEq Oral PRN   Or      potassium alternative oral replacement 40 mEq Oral PRN   Or      potassium chloride 10 mEq Intravenous PRN   sodium chloride flush 10 mL Intravenous PRN   magnesium hydroxide 30 mL Oral Daily PRN   nicotine 1 patch Transdermal Daily PRN   acetaminophen 650 mg Oral Q4H PRN   ondansetron 4 mg Oral Q6H PRN   Or      ondansetron 4 mg Intravenous Q6H PRN cyanosis. Positive for chronic lymphedema    Pulses:   2+ and symmetric all extremities   Skin:   Skin color, texture, turgor normal, no rashes or lesions   Lymph nodes:   Cervical, supraclavicular, and axillary nodes normal   Neurologic:   CNII-XII intact       Assessment:     Primary Problem  Acute on chronic systolic congestive heart failure [facility])     Active Hospital Problems    Diagnosis Date Noted    Panic disorder [F41.0] 03/23/2019    Acute on chronic congestive heart failure ([address] [I50.9]     Acute on chronic systolic congestive heart failure ([address] [I50.23] 03/21/2019    Lymphedema of both lower extremities [I89.0] 03/21/2019    Cellulitis of right leg [L03.115] 03/21/2019    Coronary artery disease involving native coronary artery of native heart without angina pectoris [I25.10] 03/21/2019    Major depressive disorder [F32.9] 03/21/2019    New onset a-fib ([address] [I48.91] 03/21/2019    NSTEMI (non-ST elevated myocardial infarction) ([address] [I21.4] 03/21/2019     Past Medical History:   Diagnosis Date    Acute on chronic systolic congestive heart failure ([address] 8/8/2017    Anxiety     Cardiomyopathy ([address]     H/O heart artery stent     [place] (hard of hearing)     Hyperlipidemia     Hypertension     Hypertensive urgency 8/7/2017    Leg swelling 8/7/2017        Consultations:     PHARMACY TO DOSE VANCOMYCIN  IP CONSULT TO HEART FAILURE NURSE/COORDINATOR  IP CONSULT TO DIETITIAN  PHARMACY TO DOSE VANCOMYCIN  IP CONSULT TO CARDIOLOGY  IP CONSULT TO PSYCHIATRY  IP CONSULT TO SOCIAL WORK  IP CONSULT TO PSYCHIATRY  IP CONSULT TO CARDIOLOGY    Plan:     1. POD #02-lead AICD  2. DVT prophylaxis  3. Replace potassium  4. GI prophylaxis  5. Blood pressure control  6. Monitor control arrhythmia  7.  Recheck laboratories in the morning      Electronically signed by Shaina Cheung DO on 3/28/2019 at 5:59 PM

## 2019-03-29 ENCOUNTER — APPOINTMENT (OUTPATIENT)
Dept: GENERAL RADIOLOGY | Age: 69
DRG: 226 | End: 2019-03-29
Payer: MEDICARE

## 2019-03-29 VITALS
WEIGHT: 188.5 LBS | TEMPERATURE: 97.8 F | DIASTOLIC BLOOD PRESSURE: 67 MMHG | OXYGEN SATURATION: 95 % | HEART RATE: 73 BPM | HEIGHT: 62 IN | SYSTOLIC BLOOD PRESSURE: 120 MMHG | BODY MASS INDEX: 34.69 KG/M2 | RESPIRATION RATE: 18 BRPM

## 2019-03-29 LAB
ABSOLUTE EOS #: 0.2 K/UL (ref 0–0.4)
ABSOLUTE IMMATURE GRANULOCYTE: ABNORMAL K/UL (ref 0–0.3)
ABSOLUTE LYMPH #: 1 K/UL (ref 1–4.8)
ABSOLUTE MONO #: 0.4 K/UL (ref 0.2–0.8)
ANION GAP SERPL CALCULATED.3IONS-SCNC: 11 MMOL/L (ref 9–17)
BASOPHILS # BLD: 0 % (ref 0–2)
BASOPHILS ABSOLUTE: 0 K/UL (ref 0–0.2)
BUN BLDV-MCNC: 19 MG/DL (ref 8–23)
BUN/CREAT BLD: 26 (ref 9–20)
CALCIUM SERPL-MCNC: 8.8 MG/DL (ref 8.6–10.4)
CHLORIDE BLD-SCNC: 103 MMOL/L (ref 98–107)
CO2: 28 MMOL/L (ref 20–31)
CREAT SERPL-MCNC: 0.72 MG/DL (ref 0.5–0.9)
DIFFERENTIAL TYPE: ABNORMAL
EOSINOPHILS RELATIVE PERCENT: 3 % (ref 1–4)
GFR AFRICAN AMERICAN: >60 ML/MIN
GFR NON-AFRICAN AMERICAN: >60 ML/MIN
GFR SERPL CREATININE-BSD FRML MDRD: ABNORMAL ML/MIN/{1.73_M2}
GFR SERPL CREATININE-BSD FRML MDRD: ABNORMAL ML/MIN/{1.73_M2}
GLUCOSE BLD-MCNC: 120 MG/DL (ref 70–99)
HCT VFR BLD CALC: 39.7 % (ref 36–46)
HEMOGLOBIN: 13.1 G/DL (ref 12–16)
IMMATURE GRANULOCYTES: ABNORMAL %
LYMPHOCYTES # BLD: 14 % (ref 24–44)
MCH RBC QN AUTO: 26.9 PG (ref 26–34)
MCHC RBC AUTO-ENTMCNC: 32.9 G/DL (ref 31–37)
MCV RBC AUTO: 81.8 FL (ref 80–100)
MONOCYTES # BLD: 5 % (ref 1–7)
NRBC AUTOMATED: ABNORMAL PER 100 WBC
PDW BLD-RTO: 17.4 % (ref 11.5–14.5)
PLATELET # BLD: 323 K/UL (ref 130–400)
PLATELET ESTIMATE: ABNORMAL
PMV BLD AUTO: 8.9 FL (ref 6–12)
POTASSIUM SERPL-SCNC: 3.9 MMOL/L (ref 3.7–5.3)
RBC # BLD: 4.85 M/UL (ref 4–5.2)
RBC # BLD: ABNORMAL 10*6/UL
SEG NEUTROPHILS: 78 % (ref 36–66)
SEGMENTED NEUTROPHILS ABSOLUTE COUNT: 5.9 K/UL (ref 1.8–7.7)
SODIUM BLD-SCNC: 142 MMOL/L (ref 135–144)
WBC # BLD: 7.6 K/UL (ref 3.5–11)
WBC # BLD: ABNORMAL 10*3/UL

## 2019-03-29 PROCEDURE — 97535 SELF CARE MNGMENT TRAINING: CPT | Performed by: NURSE PRACTITIONER

## 2019-03-29 PROCEDURE — 2580000003 HC RX 258: Performed by: INTERNAL MEDICINE

## 2019-03-29 PROCEDURE — 85025 COMPLETE CBC W/AUTO DIFF WBC: CPT

## 2019-03-29 PROCEDURE — 36415 COLL VENOUS BLD VENIPUNCTURE: CPT

## 2019-03-29 PROCEDURE — 6370000000 HC RX 637 (ALT 250 FOR IP): Performed by: NURSE PRACTITIONER

## 2019-03-29 PROCEDURE — 80048 BASIC METABOLIC PNL TOTAL CA: CPT

## 2019-03-29 PROCEDURE — 71046 X-RAY EXAM CHEST 2 VIEWS: CPT

## 2019-03-29 PROCEDURE — 97530 THERAPEUTIC ACTIVITIES: CPT | Performed by: NURSE PRACTITIONER

## 2019-03-29 PROCEDURE — 6370000000 HC RX 637 (ALT 250 FOR IP): Performed by: INTERNAL MEDICINE

## 2019-03-29 PROCEDURE — 99239 HOSP IP/OBS DSCHRG MGMT >30: CPT | Performed by: INTERNAL MEDICINE

## 2019-03-29 RX ORDER — SODIUM CHLORIDE 0.9 % (FLUSH) 0.9 %
10 SYRINGE (ML) INJECTION EVERY 12 HOURS SCHEDULED
Status: DISCONTINUED | OUTPATIENT
Start: 2019-03-29 | End: 2019-03-29 | Stop reason: HOSPADM

## 2019-03-29 RX ORDER — HYDROCODONE BITARTRATE AND ACETAMINOPHEN 5; 325 MG/1; MG/1
2 TABLET ORAL EVERY 4 HOURS PRN
Status: DISCONTINUED | OUTPATIENT
Start: 2019-03-29 | End: 2019-03-29 | Stop reason: HOSPADM

## 2019-03-29 RX ORDER — LORAZEPAM 0.5 MG/1
0.5 TABLET ORAL 3 TIMES DAILY
Qty: 10 TABLET | Refills: 0 | Status: ON HOLD | OUTPATIENT
Start: 2019-03-29 | End: 2019-04-04 | Stop reason: SDUPTHER

## 2019-03-29 RX ORDER — METOPROLOL SUCCINATE 25 MG/1
25 TABLET, EXTENDED RELEASE ORAL DAILY
Qty: 30 TABLET | Refills: 3 | DISCHARGE
Start: 2019-03-30 | End: 2019-05-23 | Stop reason: SDUPTHER

## 2019-03-29 RX ORDER — MULTIVITAMIN WITH FOLIC ACID 400 MCG
1 TABLET ORAL DAILY
Refills: 0 | Status: ON HOLD | DISCHARGE
Start: 2019-03-29 | End: 2019-04-04 | Stop reason: SDUPTHER

## 2019-03-29 RX ORDER — ACETAMINOPHEN 325 MG/1
650 TABLET ORAL EVERY 4 HOURS PRN
Status: DISCONTINUED | OUTPATIENT
Start: 2019-03-29 | End: 2019-03-29 | Stop reason: HOSPADM

## 2019-03-29 RX ORDER — OXYBUTYNIN CHLORIDE 5 MG/1
5 TABLET ORAL 3 TIMES DAILY
Qty: 90 TABLET | Refills: 3 | Status: ON HOLD | DISCHARGE
Start: 2019-03-29 | End: 2019-04-04 | Stop reason: SDUPTHER

## 2019-03-29 RX ORDER — HYDROCODONE BITARTRATE AND ACETAMINOPHEN 5; 325 MG/1; MG/1
1 TABLET ORAL EVERY 4 HOURS PRN
Status: DISCONTINUED | OUTPATIENT
Start: 2019-03-29 | End: 2019-03-29 | Stop reason: HOSPADM

## 2019-03-29 RX ORDER — SODIUM CHLORIDE 0.9 % (FLUSH) 0.9 %
10 SYRINGE (ML) INJECTION PRN
Status: DISCONTINUED | OUTPATIENT
Start: 2019-03-29 | End: 2019-03-29 | Stop reason: HOSPADM

## 2019-03-29 RX ORDER — ATORVASTATIN CALCIUM 80 MG/1
80 TABLET, FILM COATED ORAL NIGHTLY
Qty: 30 TABLET | Refills: 3 | Status: ON HOLD | DISCHARGE
Start: 2019-03-29 | End: 2019-04-04 | Stop reason: SDUPTHER

## 2019-03-29 RX ORDER — ONDANSETRON 4 MG/1
4 TABLET, ORALLY DISINTEGRATING ORAL EVERY 6 HOURS PRN
Status: ON HOLD | DISCHARGE
Start: 2019-03-29 | End: 2019-04-04 | Stop reason: SDUPTHER

## 2019-03-29 RX ORDER — FUROSEMIDE 40 MG/1
40 TABLET ORAL 2 TIMES DAILY
Qty: 60 TABLET | Refills: 3 | Status: ON HOLD | DISCHARGE
Start: 2019-03-29 | End: 2019-04-04 | Stop reason: SDUPTHER

## 2019-03-29 RX ADMIN — METOPROLOL SUCCINATE 25 MG: 25 TABLET, EXTENDED RELEASE ORAL at 09:08

## 2019-03-29 RX ADMIN — FUROSEMIDE 40 MG: 40 TABLET ORAL at 16:35

## 2019-03-29 RX ADMIN — LORAZEPAM 0.5 MG: 0.5 TABLET ORAL at 09:07

## 2019-03-29 RX ADMIN — OXYBUTYNIN CHLORIDE 5 MG: 5 TABLET ORAL at 09:07

## 2019-03-29 RX ADMIN — Medication 10 ML: at 08:00

## 2019-03-29 RX ADMIN — TICAGRELOR 90 MG: 90 TABLET ORAL at 09:06

## 2019-03-29 RX ADMIN — SACUBITRIL AND VALSARTAN 1 TABLET: 49; 51 TABLET, FILM COATED ORAL at 09:06

## 2019-03-29 RX ADMIN — Medication 10 ML: at 07:59

## 2019-03-29 RX ADMIN — FUROSEMIDE 40 MG: 40 TABLET ORAL at 09:06

## 2019-03-29 RX ADMIN — MULTIVITAMIN TABLET 1 TABLET: TABLET at 16:35

## 2019-03-29 RX ADMIN — OXYBUTYNIN CHLORIDE 5 MG: 5 TABLET ORAL at 15:44

## 2019-03-29 RX ADMIN — LORAZEPAM 0.5 MG: 0.5 TABLET ORAL at 15:44

## 2019-03-29 RX ADMIN — ACETAMINOPHEN 650 MG: 325 TABLET ORAL at 11:29

## 2019-03-29 RX ADMIN — SERTRALINE HYDROCHLORIDE 50 MG: 50 TABLET ORAL at 09:07

## 2019-03-29 ASSESSMENT — PAIN DESCRIPTION - DESCRIPTORS: DESCRIPTORS: SORE

## 2019-03-29 ASSESSMENT — PAIN SCALES - GENERAL
PAINLEVEL_OUTOF10: 0
PAINLEVEL_OUTOF10: 5
PAINLEVEL_OUTOF10: 0

## 2019-03-29 ASSESSMENT — PAIN DESCRIPTION - ORIENTATION: ORIENTATION: LEFT

## 2019-03-29 ASSESSMENT — PAIN DESCRIPTION - PAIN TYPE: TYPE: SURGICAL PAIN

## 2019-03-29 ASSESSMENT — PAIN DESCRIPTION - LOCATION: LOCATION: CHEST

## 2019-03-29 NOTE — PROGRESS NOTES
malaise/fatigue and weight loss. HENT: Negative for ear pain, hearing loss, nosebleeds, sore throat and tinnitus. Eyes: Negative for blurred vision, double vision, photophobia and pain. Respiratory: Negative for cough, hemoptysis, sputum production, shortness of breath and wheezing. Cardiovascular: Negative for palpitations, orthopnea, claudication and PND. Positive for chronic lymphedema   Gastrointestinal: Negative for abdominal pain, blood in stool, constipation, diarrhea, heartburn, melena, nausea and vomiting. Genitourinary: Negative for dysuria, flank pain, frequency, hematuria and urgency. Musculoskeletal: Negative for back pain, falls, joint pain, myalgias and neck pain. Skin: Negative for itching and rash. Neurological: Negative for dizziness, tingling, tremors, sensory change, focal weakness, seizures, weakness and headaches. Endo/Heme/Allergies: Does not bruise/bleed easily. Psychiatric/Behavioral: Negative for depression. The patient is not nervous/anxious. Medications:      Allergies: No Known Allergies    Current Meds:    enoxaparin  1 mg/kg Subcutaneous BID    metoprolol succinate  25 mg Oral Daily    sodium chloride  250 mL Intravenous Once    LORazepam  0.5 mg Oral TID    furosemide  40 mg Oral BID    oxybutynin  5 mg Oral TID    sertraline  50 mg Oral Daily    sodium chloride flush  10 mL Intravenous 2 times per day    miconazole   Topical BID    ticagrelor  90 mg Oral BID    atorvastatin  80 mg Oral Nightly    sacubitril-valsartan  1 tablet Oral BID    multivitamin  1 tablet Oral Daily     PRN Meds:     magnesium sulfate 1 g Intravenous PRN   potassium chloride 40 mEq Oral PRN   Or      potassium alternative oral replacement 40 mEq Oral PRN   Or      potassium chloride 10 mEq Intravenous PRN   sodium chloride flush 10 mL Intravenous PRN   magnesium hydroxide 30 mL Oral Daily PRN   nicotine 1 patch Transdermal Daily PRN   acetaminophen 650 mg Oral Q4H PRN ondansetron 4 mg Oral Q6H PRN   Or      ondansetron 4 mg Intravenous Q6H PRN       Data:     Code Status:  Full Code    Labs:    Hematology:  Recent Labs     03/27/19  0452 03/28/19  0506 03/29/19  0506   WBC 6.4 5.3 7.6   RBC 4.67 4.65 4.85   HGB 12.4 12.5 13.1   HCT 39.2 38.6 39.7   MCV 83.9 83.1 81.8   MCH 26.6 26.8 26.9   MCHC 31.8 32.3 32.9   RDW 18.1* 18.2* 17.4*    305 323   MPV 8.7 8.6 8.9     Chemistry:  Recent Labs     03/27/19  0452 03/28/19  0506 03/29/19  0506    144 142   K 4.0 3.2* 3.9    103 103   CO2 29 30 28   GLUCOSE 116* 111* 120*   BUN 25* 20 19   CREATININE 0.79 0.77 0.72   ANIONGAP 10 11 11   LABGLOM >60 >60 >60   GFRAA >60 >60 >60   CALCIUM 8.8 8.5* 8.8       Glucose:No results for input(s): LABA1C, POCGLU in the last 72 hours.     Physical Examination:    /82   Pulse 84   Temp 97.7 °F (36.5 °C) (Temporal)   Resp 14   Ht 5' 2\" (1.575 m)   Wt 188 lb 8 oz (85.5 kg)   SpO2 94%   BMI 34.69 kg/m²     Intake/Output Summary (Last 24 hours) at 3/29/2019 0920  Last data filed at 3/28/2019 2116  Gross per 24 hour   Intake 840 ml   Output --   Net 840 ml       General Appearance:    Alert, cooperative, no distress, appears stated age   Head:    Normocephalic, without obvious abnormality, atraumatic   Eyes:    PERRL, conjunctiva/corneas clear, EOM's intact        Ears:    Normal external ear canals, both ears   Nose:   Nares normal, septum midline, mucosa normal, no drainage    or sinus tenderness   Throat:   Lips, mucosa, and tongue normal   Neck:   Supple, symmetrical, trachea midline, no carotid    bruit or JVD   Back:     Not evaluated at this time    Lungs:     Clear to auscultation bilaterally, minimal rhonchi, respirations unlabored   Chest wall:    Tenderness over the incision site but otherwise unremarkable    Heart:    Regular rate and rhythm, S1 and S2 normal, no murmur, rub   or gallop   Abdomen:     Soft, non-tender, bowel sounds active all four quadrants, no masses, no organomegaly   Extremities:   Extremities normal, atraumatic, no cyanosis. Positive for chronic lymphedema    Pulses:   2+ and symmetric all extremities   Skin:   Skin color, texture, turgor normal, no rashes or lesions   Lymph nodes:   Cervical, supraclavicular, and axillary nodes normal   Neurologic:   CNII-XII intact       Assessment:     Primary Problem  Acute on chronic systolic congestive heart failure [facility])     Active Hospital Problems    Diagnosis Date Noted    Panic disorder [F41.0] 03/23/2019    Acute on chronic congestive heart failure ([address] [I50.9]     Acute on chronic systolic congestive heart failure ([address] [I50.23] 03/21/2019    Lymphedema of both lower extremities [I89.0] 03/21/2019    Cellulitis of right leg [L03.115] 03/21/2019    Coronary artery disease involving native coronary artery of native heart without angina pectoris [I25.10] 03/21/2019    Major depressive disorder [F32.9] 03/21/2019    New onset a-fib ([address] [I48.91] 03/21/2019    NSTEMI (non-ST elevated myocardial infarction) ([address] [I21.4] 03/21/2019     Past Medical History:   Diagnosis Date    Acute on chronic systolic congestive heart failure ([address] 8/8/2017    Anxiety     Cardiomyopathy ([address]     H/O heart artery stent     [place] (hard of hearing)     Hyperlipidemia     Hypertension     Hypertensive urgency 8/7/2017    Leg swelling 8/7/2017        Consultations:     PHARMACY TO DOSE VANCOMYCIN  IP CONSULT TO HEART FAILURE NURSE/COORDINATOR  IP CONSULT TO DIETITIAN  PHARMACY TO DOSE VANCOMYCIN  IP CONSULT TO CARDIOLOGY  IP CONSULT TO PSYCHIATRY  IP CONSULT TO SOCIAL WORK  IP CONSULT TO PSYCHIATRY  IP CONSULT TO CARDIOLOGY    Plan:     1. POD #1  2-lead AICD  2. DVT prophylaxis  3. Replace potassium  4. GI prophylaxis  5. Blood pressure control  6. Monitor control arrhythmia  7.  Discharge to SNF when arrangements      Electronically signed by Trang Espana DO on 3/29/2019 at 9:20 AM

## 2019-03-29 NOTE — PROGRESS NOTES
Patient confirms that she has all of her personal belongings that she brought to the hospital in her possession, including her glasses, hearing aides,phone, and . Patient assured that the SNF, [facility], will also receive similar information for her care and also in their packet is her prescription for Ativan so they may obtain the medication for her while at facility. Patient transported via wheelchair by [employer], to be transported to the SNF, [facility]. Patient alert, oriented and in no apparent distress upon departure of unit.

## 2019-03-29 NOTE — DISCHARGE SUMMARY
[facility]    Discharge Summary     Patient ID: Kayley Meza  :  1950   MRN: [id number]     ACCOUNT:  [de-identified]   Patient's PCP: Devan Hollins DO  Admit Date: 3/21/2019   Discharge Date: 3/29/2019     Length of Stay: 8  Code Status:  Full Code  Admitting Physician: Fareed Sandoval MD  Discharge Physician: Krista Dodd DO     Active Discharge Diagnoses:     Hospital Problem Lists:  Principal Problem:    Acute on chronic systolic congestive heart failure ([facility] [address]  Active Problems:    Lymphedema of both lower extremities    Cellulitis of right leg    Coronary artery disease involving native coronary artery of native heart without angina pectoris    Major depressive disorder    New onset a-fib ([facility] [address]    NSTEMI (non-ST elevated myocardial infarction) ([facility] [address]    Acute on chronic congestive heart failure ([facility] [address]    Panic disorder  Resolved Problems:    * No resolved hospital problems. *      Admission Condition:  fair     Discharged Condition: good    Hospital Stay:     Hospital Course:      C/C:       Chief Complaint   Patient presents with    Illness         Interval History: Status: improved. POD #1 implantation of dual lead AICD. Patient has minor pain at the incisional site but otherwise feels well. Patient denies shortness of breath. She had multiple episodes of SVT and NSVT 19. She was placed on amiodarone drip as well as Toprol with subsequent hypotension and bradycardia requiring fluid boluses. She has a history of noncompliance with medications over the last several months. ICD had been recommended earlier for her ischemic cardiomyopathy and she had refused. She has chronic lymphedema which is at baseline. Patient is stable. Possible discharge today if okay with all services and insurance precertification is received. Discharge planning is to SNF.  Potassium has been replaced.     History: (From 3/24/19 sign out note)  \"69 y/o brought in to [place] ED with c/o SOB, generalized weakness , incontinence of urine in last 2 d. Noncomplaince with medications in last 1-2 mo. Per EMS house was filled with trash and 'essentially uninhabitable' . ED w/u: Afib, , SBP 170s, hypoxia 80s on RA, Na 146, CO2 17, BNP 20355, Hb 12.7, WBC 7.5, u/a unremarkable, HS trop 31, EKG: Afib, unspecific ST/T changes. CXR compatible with CHF .  - Has prior h/o CAD s/p PCI x2 latest 10/2018, chronic Sys CHF, HTN, HPL. Patient indicates she did not tolerate plavix/ bryllinta :unable to elaborate. Would like to change cardiologists.   - started on diuresis, Eliquis ,restarted on Brillinta which she is tolerating well. \"      Significant therapeutic interventions:  Insertion of dual lead AICD    Significant Diagnostic Studies:     Labs / Micro:  CBC:   Lab Results   Component Value Date    WBC 7.6 03/29/2019    RBC 4.85 03/29/2019    HGB 13.1 03/29/2019    HCT 39.7 03/29/2019    MCV 81.8 03/29/2019    MCH 26.9 03/29/2019    MCHC 32.9 03/29/2019    RDW 17.4 03/29/2019     03/29/2019     BMP:    Lab Results   Component Value Date    GLUCOSE 120 03/29/2019     03/29/2019    K 3.9 03/29/2019     03/29/2019    CO2 28 03/29/2019    ANIONGAP 11 03/29/2019    BUN 19 03/29/2019    CREATININE 0.72 03/29/2019    BUNCRER 26 03/29/2019    CALCIUM 8.8 03/29/2019    LABGLOM >60 03/29/2019    GFRAA >60 03/29/2019    GFR      03/29/2019    GFR NOT REPORTED 03/29/2019     HFP:    Lab Results   Component Value Date    PROT 5.6 03/21/2019     CMP:    Lab Results   Component Value Date    GLUCOSE 120 03/29/2019     03/29/2019    K 3.9 03/29/2019     03/29/2019    CO2 28 03/29/2019    BUN 19 03/29/2019    CREATININE 0.72 03/29/2019    ANIONGAP 11 03/29/2019    ALKPHOS 82 03/21/2019    ALT 13 03/21/2019    AST 20 03/21/2019    BILITOT 0.90 03/21/2019    LABALBU 2.8 03/21/2019    ALBUMIN 1.0 03/21/2019    LABGLOM >60 03/29/2019    GFRAA >60 03/29/2019    GFR 03/29/2019    GFR NOT REPORTED 03/29/2019    PROT 5.6 03/21/2019    CALCIUM 8.8 03/29/2019     FLP:    Lab Results   Component Value Date    CHOL 116 03/22/2019    TRIG 84 03/22/2019    HDL 31 03/22/2019     U/A:    Lab Results   Component Value Date    COLORU YELLOW 03/21/2019    TURBIDITY CLEAR 03/21/2019    SPECGRAV 1.029 03/21/2019    HGBUR TRACE 03/21/2019    PHUR 5.0 03/21/2019    PROTEINU 3+ 03/21/2019    GLUCOSEU NEGATIVE 03/21/2019    KETUA TRACE 03/21/2019    BILIRUBINUR NEGATIVE  Verified by ictotest. 03/21/2019    UROBILINOGEN Normal 03/21/2019    NITRU NEGATIVE 03/21/2019    LEUKOCYTESUR NEGATIVE 03/21/2019     TSH:    Lab Results   Component Value Date    TSH 2.14 03/22/2019       Radiology:    Xr Chest Portable  Result Date: 3/22/2019  EXAMINATION: SINGLE XRAY VIEW OF THE CHEST 3/22/2019 11:05 am COMPARISON: 03/21/2019 HISTORY: ORDERING SYSTEM PROVIDED HISTORY: CHF TECHNOLOGIST PROVIDED HISTORY: CHF Ordering Physician Provided Reason for Exam: Patient states having sob and coughing. Acuity: Acute Type of Exam: Unknown Additional signs and symptoms: Patient states having sob and coughing. FINDINGS: The heart is moderately enlarged but unchanged. Pulmonary vessels are borderline to mildly congested but improved from the recent prior study. There is patchy basilar airspace disease greater right lower lobe more linear and likely atelectasis. Impression:  Moderate cardiomegaly with mild vascular congestion, with partial clearing from the prior study. Xr Chest Portable  Result Date: 3/21/2019  EXAMINATION: SINGLE XRAY VIEW OF THE CHEST 3/21/2019 9:10 am COMPARISON: August 7, 2017 HISTORY: ORDERING SYSTEM PROVIDED HISTORY: CHf TECHNOLOGIST PROVIDED HISTORY: CHf Ordering Physician Provided Reason for Exam: Fatigue, weakness.  Acuity: Acute Type of Exam: Initial Relevant Medical/Surgical History: Heart attack x6 months ago FINDINGS: Cardiac silhouette enlargement is again noted although this appears more prominent. Findings of pulmonary edema are noted with probable small effusions. No pneumothorax identified. No osseous abnormality identified. Impression:  Findings compatible pulmonary edema with probable small effusions. Cardiac silhouette enlargement, which appears more prominent in the interval. Underlying pericardial effusion should be considered. Us Dup Lower Extremities Bilateral Venous  Result Date: 3/21/2019  EXAMINATION: DUPLEX VENOUS ULTRASOUND OF THE BILATERAL LOWER EXTREMITIES, 3/21/2019 10:31 am TECHNIQUE: Duplex ultrasound and Doppler images were obtained of the bilateral lower extremities COMPARISON: None. HISTORY: ORDERING SYSTEM PROVIDED HISTORY: Rule out DVT TECHNOLOGIST PROVIDED HISTORY: Rule out DVT Ordering Physician Provided Reason for Exam: Bilateral leg, swelling, redness, with blistering Acuity: Acute Type of Exam: Initial FINDINGS: Examination of the calves bilaterally limited by edema and hard blistering scan with resultant non visualization of the bilateral proximal posterior tibial and peroneal veins. The visualized veins of the bilateral lower extremities are patent and free of echogenic thrombus. The veins are normally compressible and have normal phasic flow. Impression:  Suboptimal exam with no evidence of DVT in either visualized lower extremity. Consultations:    Consults:     Final Specialist Recommendations/Findings:   PHARMACY TO DOSE VANCOMYCIN  IP CONSULT TO HEART FAILURE NURSE/COORDINATOR  IP CONSULT TO DIETITIAN  PHARMACY TO DOSE VANCOMYCIN  IP CONSULT TO CARDIOLOGY  IP CONSULT TO PSYCHIATRY  IP CONSULT TO SOCIAL WORK  IP CONSULT TO PSYCHIATRY  IP CONSULT TO CARDIOLOGY      The patient was seen and examined on day of discharge and this discharge summary is in conjunction with any daily progress note from day of discharge.     Discharge plan:     Disposition: Skilled Facility    Physician Follow Up:     Scott Jauregui DO  [address] [facility]   [place] [address]  [address] [address]  [phone]    Schedule an appointment as soon as possible for a visit in 2 weeks         Requiring Further Evaluation/Follow Up POST HOSPITALIZATION/Incidental Findings:  Make appointment for follow-up with cardiology within 1 week    Diet: regular diet    Activity: As tolerated with left arm movement restrictions as noted    Instructions to Patient:   Follow-up with cardiology  Left arm movement restrictions as instructed    Discharge Medications:      Medication List      START taking these medications    LORazepam 0.5 MG tablet  Commonly known as:  ATIVAN  Take 1 tablet by mouth three times daily for 30 days. miconazole 2 % powder  Commonly known as:  MICOTIN  Apply topically 2 times daily. multivitamin tablet  Take 1 tablet by mouth daily     ondansetron 4 MG disintegrating tablet  Commonly known as:  ZOFRAN-ODT  Take 1 tablet by mouth every 6 hours as needed for Nausea     oxybutynin 5 MG tablet  Commonly known as:  DITROPAN  Take 1 tablet by mouth 3 times daily     sertraline 50 MG tablet  Commonly known as:  ZOLOFT  Take 1 tablet by mouth daily  Start taking on:  3/30/2019     ticagrelor 90 MG Tabs tablet  Commonly known as:  BRILINTA  Take 1 tablet by mouth 2 times daily        CHANGE how you take these medications    atorvastatin 80 MG tablet  Commonly known as:  LIPITOR  Take 1 tablet by mouth nightly  What changed:    · medication strength  · how much to take  · Another medication with the same name was removed. Continue taking this medication, and follow the directions you see here.      furosemide 40 MG tablet  Commonly known as:  LASIX  Take 1 tablet by mouth 2 times daily  What changed:    · medication strength  · how much to take  · when to take this        CONTINUE taking these medications    metoprolol succinate 25 MG extended release tablet  Commonly known as:  TOPROL XL  Take 1 tablet by mouth daily  Start taking on:  3/30/2019     potassium chloride 10 MEQ extended release capsule  Commonly known as:  MICRO-K  Take 2 capsules by mouth daily     sacubitril-valsartan 49-51 MG per tablet  Commonly known as:  ENTRESTO  Take 1 tablet by mouth 2 times daily        STOP taking these medications    amLODIPine 10 MG tablet  Commonly known as:  NORVASC     aspirin 81 MG tablet     lidocaine 5 %  Commonly known as:  LIDODERM     risperiDONE 1 MG tablet  Commonly known as:  RISPERDAL     spironolactone 25 MG tablet  Commonly known as:  ALDACTONE     therapeutic multivitamin-minerals tablet           Where to Get Your Medications      You can get these medications from any pharmacy    Bring a paper prescription for each of these medications  · LORazepam 0.5 MG tablet     Information about where to get these medications is not yet available    Ask your nurse or doctor about these medications  · atorvastatin 80 MG tablet  · furosemide 40 MG tablet  · metoprolol succinate 25 MG extended release tablet  · miconazole 2 % powder  · multivitamin tablet  · ondansetron 4 MG disintegrating tablet  · oxybutynin 5 MG tablet  · sacubitril-valsartan 49-51 MG per tablet  · sertraline 50 MG tablet  · ticagrelor 90 MG Tabs tablet         Time Spent on discharge is  45 mins in patient examination, evaluation, counseling as well as medication reconciliation, prescriptions for required medications, discharge plan and follow up. Electronically signed by   Gerard Clemons DO  3/29/2019  11:49 AM      Thank you Dr. Agusto Vance DO for the opportunity to be involved in this patient's care.

## 2019-03-29 NOTE — CARE COORDINATION
Social Work-[facility] will admit pt today. [facility] to transport at 400. HENS completed. Nurse to call report to [phone]. Requested nurse to fax Magnolia Treviño to [phone]. Pt is agreeable with dc plans.  Saritha Pinzon

## 2019-03-29 NOTE — PROGRESS NOTES
Discharge Summary reviewed with patient and she verbalized understanding. Awaiting transportation to SNF, [address] [place] [address]. Report to [facility] nurse, Jaden Smart, completed.

## 2019-03-29 NOTE — PROGRESS NOTES
Occupational Therapy. snf  Facility/Department: [facility]  Daily Treatment Note  NAME: Shira Hudson  : 1950  MRN: [id number]    Date of Service: 3/29/2019    Discharge Recommendations:  [address]    Patient would benefit from SNF for continued occupational therapy to increase independence with  ADL of bathing, dressing, toileting and grooming. Writer recommending SNF placement for for activity tolerance and strength which will increase independence with ADL's coordinated with bed mobility and chair transfers. Continued skilled OT services to address decreased safety awareness with ADL and IADL tasks and for education and increased independence with DME and AE for fall prevention and ec/ws techniques prior to d/c home. Assessment   Performance deficits / Impairments: Decreased functional mobility ; Decreased strength;Decreased endurance;Decreased ADL status; Decreased balance  Prognosis: Good  Patient Education: Edu provided on compensatory dressing tech s/p ICD placement. REQUIRES OT FOLLOW UP: Yes  Activity Tolerance  Activity Tolerance: Patient Tolerated treatment well  Safety Devices  Type of devices: All fall risk precautions in place;Nurse notified; Patient at risk for falls;Call light within reach; Left in chair         Patient Diagnosis(es): The primary encounter diagnosis was Acute on chronic congestive heart failure, unspecified heart failure type ([address] A diagnosis of Cellulitis of both lower extremities was also pertinent to this visit.       has a past medical history of Acute on chronic systolic congestive heart failure ([address] Anxiety, Cardiomyopathy ([address] H/O heart artery stent, [place] (hard of hearing), Hyperlipidemia, Hypertension, Hypertensive urgency, and Leg swelling.   has a past surgical history that includes Cataract removal; Corneal transplant; Nasal sinus surgery; eye surgery; and Tonsillectomy and Chart reviewed prior to treatment and patient is agreeable for therapy. RN reports patient is medically stable for therapy treatment this date. Plan   Plan  Times per week: 4-6x/week  Times per day: Daily  Current Treatment Recommendations: Strengthening, Functional Mobility Training, Endurance Training, Balance Training, Safety Education & Training, Self-Care / ADL, Patient/Caregiver Education & Training    AM-PAC Score        AM-PAC Inpatient Daily Activity Raw Score: 16  AM-PAC Inpatient ADL T-Scale Score : 35.96  ADL Inpatient CMS 0-100% Score: 53.32  ADL Inpatient CMS G-Code Modifier : CK    Goals  Short term goals  Short term goal 1: Pt. will demo Mod-I with ADL transfers using RW with good safety. Short term goal 2: Pt. will demo Mod-I with functional mob using RW for ADL completion with good safety/pacing. Short term goal 3: Pt. will demo Mod-I with toileting routine using RW and appropriate DME. Short term goal 4: Pt. will improve standing tolerance to 6-8 min for increased activity ginny for completion of sink side ADLs. Short term goal 5: Pt. will engage in 20 min< BUE ther activity/exer to improve activity tolerance to [facility] for increased INDEP with func mob and ADLs.        Therapy Time   Individual Concurrent Group Co-treatment   Time In 0920         Time Out 0943         Minutes 17[address] JOSE MIGUEL Abbasi

## 2019-04-02 ENCOUNTER — TELEPHONE (OUTPATIENT)
Dept: OTHER | Facility: CLINIC | Age: 69
End: 2019-04-02

## 2019-04-02 ENCOUNTER — APPOINTMENT (OUTPATIENT)
Dept: MRI IMAGING | Age: 69
End: 2019-04-02
Payer: MEDICARE

## 2019-04-02 ENCOUNTER — APPOINTMENT (OUTPATIENT)
Dept: GENERAL RADIOLOGY | Age: 69
End: 2019-04-02
Payer: MEDICARE

## 2019-04-02 ENCOUNTER — HOSPITAL ENCOUNTER (EMERGENCY)
Age: 69
Discharge: ANOTHER HOSPITAL | End: 2019-04-02
Attending: EMERGENCY MEDICINE
Payer: MEDICARE

## 2019-04-02 ENCOUNTER — APPOINTMENT (OUTPATIENT)
Dept: CT IMAGING | Age: 69
End: 2019-04-02
Payer: MEDICARE

## 2019-04-02 ENCOUNTER — HOSPITAL ENCOUNTER (INPATIENT)
Age: 69
LOS: 2 days | Discharge: HOME | DRG: 065 | End: 2019-04-04
Attending: EMERGENCY MEDICINE | Admitting: INTERNAL MEDICINE
Payer: MEDICARE

## 2019-04-02 VITALS
TEMPERATURE: 97.7 F | BODY MASS INDEX: 31.47 KG/M2 | WEIGHT: 171 LBS | OXYGEN SATURATION: 97 % | SYSTOLIC BLOOD PRESSURE: 102 MMHG | HEIGHT: 62 IN | RESPIRATION RATE: 21 BRPM | DIASTOLIC BLOOD PRESSURE: 72 MMHG | HEART RATE: 84 BPM

## 2019-04-02 DIAGNOSIS — I95.9 HYPOTENSION, UNSPECIFIED HYPOTENSION TYPE: ICD-10-CM

## 2019-04-02 DIAGNOSIS — E87.6 HYPOKALEMIA: ICD-10-CM

## 2019-04-02 DIAGNOSIS — F41.0 PANIC DISORDER: ICD-10-CM

## 2019-04-02 DIAGNOSIS — S06.5XAA SUBDURAL HEMATOMA: Primary | ICD-10-CM

## 2019-04-02 DIAGNOSIS — S06.5XAA SDH (SUBDURAL HEMATOMA): Primary | ICD-10-CM

## 2019-04-02 LAB
ABSOLUTE EOS #: 0.1 K/UL (ref 0–0.4)
ABSOLUTE IMMATURE GRANULOCYTE: ABNORMAL K/UL (ref 0–0.3)
ABSOLUTE LYMPH #: 1.2 K/UL (ref 1–4.8)
ABSOLUTE MONO #: 0.5 K/UL (ref 0.2–0.8)
ANION GAP SERPL CALCULATED.3IONS-SCNC: 16 MMOL/L (ref 9–17)
BASOPHILS # BLD: 1 % (ref 0–2)
BASOPHILS ABSOLUTE: 0.1 K/UL (ref 0–0.2)
BNP INTERPRETATION: ABNORMAL
BUN BLDV-MCNC: 27 MG/DL (ref 8–23)
BUN/CREAT BLD: 23 (ref 9–20)
CALCIUM SERPL-MCNC: 9.2 MG/DL (ref 8.6–10.4)
CHLORIDE BLD-SCNC: 102 MMOL/L (ref 98–107)
CO2: 25 MMOL/L (ref 20–31)
COLLAGEN ADENOSINE-5'-DIPHOSPHATE (ADP) TIME: 95 SEC (ref 67–112)
COLLAGEN EPINEPHRINE TIME: 105 SEC (ref 85–172)
CREAT SERPL-MCNC: 1.18 MG/DL (ref 0.5–0.9)
DIFFERENTIAL TYPE: ABNORMAL
EOSINOPHILS RELATIVE PERCENT: 1 % (ref 1–4)
GFR AFRICAN AMERICAN: 55 ML/MIN
GFR NON-AFRICAN AMERICAN: 46 ML/MIN
GFR SERPL CREATININE-BSD FRML MDRD: ABNORMAL ML/MIN/{1.73_M2}
GFR SERPL CREATININE-BSD FRML MDRD: ABNORMAL ML/MIN/{1.73_M2}
GLUCOSE BLD-MCNC: 100 MG/DL (ref 70–99)
HCT VFR BLD CALC: 42.7 % (ref 36–46)
HCT VFR BLD CALC: 43.6 % (ref 36–46)
HEMOGLOBIN: 13.4 G/DL (ref 12–16)
HEMOGLOBIN: 14.4 G/DL (ref 12–16)
IMMATURE GRANULOCYTES: ABNORMAL %
INR BLD: 1.1
LYMPHOCYTES # BLD: 13 % (ref 24–44)
MAGNESIUM: 2.1 MG/DL (ref 1.6–2.6)
MCH RBC QN AUTO: 26.1 PG (ref 26–34)
MCH RBC QN AUTO: 27 PG (ref 26–34)
MCHC RBC AUTO-ENTMCNC: 31.5 G/DL (ref 31–37)
MCHC RBC AUTO-ENTMCNC: 33.1 G/DL (ref 31–37)
MCV RBC AUTO: 81.5 FL (ref 80–100)
MCV RBC AUTO: 82.8 FL (ref 80–100)
MONOCYTES # BLD: 5 % (ref 1–7)
MYOGLOBIN: 40 NG/ML (ref 25–58)
NRBC AUTOMATED: ABNORMAL PER 100 WBC
NRBC AUTOMATED: ABNORMAL PER 100 WBC
PARTIAL THROMBOPLASTIN TIME: 25.8 SEC (ref 23–31)
PDW BLD-RTO: 18.6 % (ref 11.5–14.5)
PDW BLD-RTO: 18.7 % (ref 11.5–14.5)
PLATELET # BLD: 307 K/UL (ref 130–400)
PLATELET # BLD: 331 K/UL (ref 130–400)
PLATELET ESTIMATE: ABNORMAL
PLATELET FUNCTION INTERP: NORMAL
PMV BLD AUTO: 9.8 FL (ref 6–12)
PMV BLD AUTO: 9.9 FL (ref 6–12)
POTASSIUM SERPL-SCNC: 3.7 MMOL/L (ref 3.7–5.3)
PRO-BNP: 2489 PG/ML
PROTHROMBIN TIME: 11 SEC (ref 9.7–11.6)
RBC # BLD: 5.15 M/UL (ref 4–5.2)
RBC # BLD: 5.35 M/UL (ref 4–5.2)
RBC # BLD: ABNORMAL 10*6/UL
SEG NEUTROPHILS: 80 % (ref 36–66)
SEGMENTED NEUTROPHILS ABSOLUTE COUNT: 7.2 K/UL (ref 1.8–7.7)
SODIUM BLD-SCNC: 143 MMOL/L (ref 135–144)
TROPONIN INTERP: ABNORMAL
TROPONIN T: ABNORMAL NG/ML
TROPONIN, HIGH SENSITIVITY: 29 NG/L (ref 0–14)
TROPONIN, HIGH SENSITIVITY: 35 NG/L (ref 0–14)
TROPONIN, HIGH SENSITIVITY: 40 NG/L (ref 0–14)
WBC # BLD: 10 K/UL (ref 3.5–11)
WBC # BLD: 9 K/UL (ref 3.5–11)
WBC # BLD: ABNORMAL 10*3/UL

## 2019-04-02 PROCEDURE — 85576 BLOOD PLATELET AGGREGATION: CPT

## 2019-04-02 PROCEDURE — 83874 ASSAY OF MYOGLOBIN: CPT

## 2019-04-02 PROCEDURE — 2580000003 HC RX 258: Performed by: NURSE PRACTITIONER

## 2019-04-02 PROCEDURE — 2060000000 HC ICU INTERMEDIATE R&B

## 2019-04-02 PROCEDURE — 83735 ASSAY OF MAGNESIUM: CPT

## 2019-04-02 PROCEDURE — 85027 COMPLETE CBC AUTOMATED: CPT

## 2019-04-02 PROCEDURE — 83880 ASSAY OF NATRIURETIC PEPTIDE: CPT

## 2019-04-02 PROCEDURE — 71045 X-RAY EXAM CHEST 1 VIEW: CPT

## 2019-04-02 PROCEDURE — 85730 THROMBOPLASTIN TIME PARTIAL: CPT

## 2019-04-02 PROCEDURE — 80048 BASIC METABOLIC PNL TOTAL CA: CPT

## 2019-04-02 PROCEDURE — 99285 EMERGENCY DEPT VISIT HI MDM: CPT

## 2019-04-02 PROCEDURE — C9132 KCENTRA, PER I.U.: HCPCS | Performed by: EMERGENCY MEDICINE

## 2019-04-02 PROCEDURE — 84484 ASSAY OF TROPONIN QUANT: CPT

## 2019-04-02 PROCEDURE — 36415 COLL VENOUS BLD VENIPUNCTURE: CPT

## 2019-04-02 PROCEDURE — 93005 ELECTROCARDIOGRAM TRACING: CPT

## 2019-04-02 PROCEDURE — 2580000003 HC RX 258: Performed by: INTERNAL MEDICINE

## 2019-04-02 PROCEDURE — 85025 COMPLETE CBC W/AUTO DIFF WBC: CPT

## 2019-04-02 PROCEDURE — 96365 THER/PROPH/DIAG IV INF INIT: CPT

## 2019-04-02 PROCEDURE — 85610 PROTHROMBIN TIME: CPT

## 2019-04-02 PROCEDURE — 70450 CT HEAD/BRAIN W/O DYE: CPT

## 2019-04-02 PROCEDURE — 6360000002 HC RX W HCPCS: Performed by: EMERGENCY MEDICINE

## 2019-04-02 PROCEDURE — 2580000003 HC RX 258: Performed by: EMERGENCY MEDICINE

## 2019-04-02 PROCEDURE — 96361 HYDRATE IV INFUSION ADD-ON: CPT

## 2019-04-02 RX ORDER — POTASSIUM CHLORIDE 20 MEQ/1
40 TABLET, EXTENDED RELEASE ORAL PRN
Status: DISCONTINUED | OUTPATIENT
Start: 2019-04-02 | End: 2019-04-04 | Stop reason: HOSPADM

## 2019-04-02 RX ORDER — 0.9 % SODIUM CHLORIDE 0.9 %
500 INTRAVENOUS SOLUTION INTRAVENOUS ONCE
Status: COMPLETED | OUTPATIENT
Start: 2019-04-02 | End: 2019-04-03

## 2019-04-02 RX ORDER — SODIUM CHLORIDE 0.9 % (FLUSH) 0.9 %
10 SYRINGE (ML) INJECTION PRN
Status: DISCONTINUED | OUTPATIENT
Start: 2019-04-02 | End: 2019-04-04 | Stop reason: HOSPADM

## 2019-04-02 RX ORDER — SODIUM CHLORIDE 9 MG/ML
INJECTION, SOLUTION INTRAVENOUS CONTINUOUS
Status: DISCONTINUED | OUTPATIENT
Start: 2019-04-02 | End: 2019-04-03

## 2019-04-02 RX ORDER — SODIUM CHLORIDE 0.9 % (FLUSH) 0.9 %
10 SYRINGE (ML) INJECTION EVERY 12 HOURS SCHEDULED
Status: DISCONTINUED | OUTPATIENT
Start: 2019-04-02 | End: 2019-04-04 | Stop reason: HOSPADM

## 2019-04-02 RX ORDER — 0.9 % SODIUM CHLORIDE 0.9 %
500 INTRAVENOUS SOLUTION INTRAVENOUS ONCE
Status: COMPLETED | OUTPATIENT
Start: 2019-04-02 | End: 2019-04-02

## 2019-04-02 RX ORDER — 0.9 % SODIUM CHLORIDE 0.9 %
250 INTRAVENOUS SOLUTION INTRAVENOUS ONCE
Status: COMPLETED | OUTPATIENT
Start: 2019-04-02 | End: 2019-04-02

## 2019-04-02 RX ORDER — ATORVASTATIN CALCIUM 80 MG/1
80 TABLET, FILM COATED ORAL NIGHTLY
Status: DISCONTINUED | OUTPATIENT
Start: 2019-04-02 | End: 2019-04-04 | Stop reason: HOSPADM

## 2019-04-02 RX ORDER — ACETAMINOPHEN 325 MG/1
650 TABLET ORAL EVERY 4 HOURS PRN
Status: DISCONTINUED | OUTPATIENT
Start: 2019-04-02 | End: 2019-04-04 | Stop reason: HOSPADM

## 2019-04-02 RX ORDER — 0.9 % SODIUM CHLORIDE 0.9 %
1000 INTRAVENOUS SOLUTION INTRAVENOUS ONCE
Status: COMPLETED | OUTPATIENT
Start: 2019-04-02 | End: 2019-04-02

## 2019-04-02 RX ORDER — ACETAMINOPHEN 325 MG/1
650 TABLET ORAL EVERY 4 HOURS PRN
Status: DISCONTINUED | OUTPATIENT
Start: 2019-04-02 | End: 2019-04-02

## 2019-04-02 RX ORDER — SKIN PROTECTANT 44 G/100G
OINTMENT TOPICAL 2 TIMES DAILY
Status: ON HOLD | COMMUNITY
End: 2019-04-04 | Stop reason: SDUPTHER

## 2019-04-02 RX ORDER — ROSUVASTATIN CALCIUM 40 MG/1
40 TABLET, COATED ORAL EVERY EVENING
Status: ON HOLD | COMMUNITY
End: 2019-04-04 | Stop reason: HOSPADM

## 2019-04-02 RX ORDER — ONDANSETRON 2 MG/ML
4 INJECTION INTRAMUSCULAR; INTRAVENOUS EVERY 6 HOURS PRN
Status: DISCONTINUED | OUTPATIENT
Start: 2019-04-02 | End: 2019-04-04 | Stop reason: HOSPADM

## 2019-04-02 RX ORDER — LORAZEPAM 0.5 MG/1
0.5 TABLET ORAL 3 TIMES DAILY
Status: DISCONTINUED | OUTPATIENT
Start: 2019-04-03 | End: 2019-04-04 | Stop reason: HOSPADM

## 2019-04-02 RX ORDER — SODIUM CHLORIDE 9 MG/ML
50 INJECTION, SOLUTION INTRAVENOUS ONCE
Status: DISCONTINUED | OUTPATIENT
Start: 2019-04-02 | End: 2019-04-02 | Stop reason: HOSPADM

## 2019-04-02 RX ORDER — POTASSIUM CHLORIDE 7.45 MG/ML
10 INJECTION INTRAVENOUS PRN
Status: DISCONTINUED | OUTPATIENT
Start: 2019-04-02 | End: 2019-04-04 | Stop reason: HOSPADM

## 2019-04-02 RX ADMIN — SODIUM CHLORIDE 250 ML: 9 INJECTION, SOLUTION INTRAVENOUS at 18:42

## 2019-04-02 RX ADMIN — Medication 10 ML: at 22:32

## 2019-04-02 RX ADMIN — PROTHROMBIN, COAGULATION FACTOR VII HUMAN, COAGULATION FACTOR IX HUMAN, COAGULATION FACTOR X HUMAN, PROTEIN C, PROTEIN S HUMAN, AND WATER 4000 UNITS: KIT at 19:00

## 2019-04-02 RX ADMIN — SODIUM CHLORIDE 1000 ML: 9 INJECTION, SOLUTION INTRAVENOUS at 20:08

## 2019-04-02 RX ADMIN — SODIUM CHLORIDE 500 ML: 9 INJECTION, SOLUTION INTRAVENOUS at 15:34

## 2019-04-02 RX ADMIN — SODIUM CHLORIDE 500 ML: 9 INJECTION, SOLUTION INTRAVENOUS at 17:12

## 2019-04-02 ASSESSMENT — ENCOUNTER SYMPTOMS
ABDOMINAL PAIN: 0
COUGH: 0
SHORTNESS OF BREATH: 0
PHOTOPHOBIA: 0
RHINORRHEA: 0
EYE PAIN: 0
VOMITING: 0
DIARRHEA: 0
SHORTNESS OF BREATH: 0
NAUSEA: 1
ABDOMINAL PAIN: 0
COLOR CHANGE: 0
NAUSEA: 0
EYE PAIN: 0
COUGH: 0
VOMITING: 0

## 2019-04-02 ASSESSMENT — PAIN SCALES - GENERAL: PAINLEVEL_OUTOF10: 0

## 2019-04-02 NOTE — PROGRESS NOTES
KCentra Dose Rounding    KCentra dose is based on Factor IX units. KCentra 500 unit vials do not contain exactly 500 units of Factor IX, but for emergent dose calculations, the pharmacist proceeds as if the vials contain 500 or 1000 units. The purpose of this rounding is to shorten the calculation time, expediting delivery to the patient. The dose rounding should not be clinically significant to the patient. The actual units will be recorded in the Pharmacy log and in this progress note. The recorded dose for administration on 4/2/2019 MAR =  4000 units. The actual units administered = 3916 units. Additionally, because of the significant residual in IV tubing, a saline flush is ordered after administration of KCentra. Protocol orders are as follows: When infusion complete flush IV line with 50ml bag of 0.9% Sodium Chloride, infuse at 500 mL/hr. Thank you.   Juanjose Kasper  4/2/2019 6:52 PM

## 2019-04-02 NOTE — ED PROVIDER NOTES
[facility] ED  Emergency Department Encounter  EmergencyMedicine Resident     Pt Name:Neeta Stewart  MRN: [id number]  [place] 1950  Date of evaluation: 4/2/19  PCP:  No primary care provider on file. CHIEF COMPLAINT       Chief Complaint   Patient presents with    Dizziness       HISTORY OF PRESENT ILLNESS  (Location/Symptom, Timing/Onset, Context/Setting, Quality, Duration, Modifying Factors, Severity.)      Sonia Alvarez is a 76 y.o. female who presents as a transfer from [facility]. [facility] due to concern for acute on subacute subdural hematoma. Patient currently in rehab facility status post AICD placement 5 days ago per report. Had been complaining of lightheadedness since yesterday. On to be hypotensive and taken at [facility]. [facility] today. CT head concerning for left acute on subacute subdural hematoma. No recent falls or trauma. Denies any symptoms currently. PAST MEDICAL / SURGICAL / SOCIAL / FAMILY HISTORY      has a past medical history of Acute on chronic systolic congestive heart failure ([address] Anxiety, Cardiomyopathy ([address] Depression, H/O heart artery stent, [place] (hard of hearing), Hyperlipidemia, Hypertension, Hypertensive urgency, and Leg swelling.     has a past surgical history that includes Cataract removal; Corneal transplant; Nasal sinus surgery; eye surgery; Tonsillectomy and adenoidectomy; and Cardiac defibrillator placement.     Social History     Socioeconomic History    Marital status:      Spouse name: Not on file    Number of children: Not on file    Years of education: Not on file    Highest education level: Not on file   Occupational History    Not on file   Social Needs    Financial resource strain: Not on file    Food insecurity:     Worry: Not on file     Inability: Not on file    Transportation needs:     Medical: Not on file     Non-medical: Not on file   Tobacco Use    Smoking status: Never Smoker    Smokeless tobacco: Never Used   Substance and Sexual Activity    Alcohol use: No    Drug use: No    Sexual activity: Not on file   Lifestyle    Physical activity:     Days per week: Not on file     Minutes per session: Not on file    Stress: Not on file   Relationships    Social connections:     Talks on phone: Not on file     Gets together: Not on file     Attends Anabaptist service: Not on file     Active member of club or organization: Not on file     Attends meetings of clubs or organizations: Not on file     Relationship status: Not on file    Intimate partner violence:     Fear of current or ex partner: Not on file     Emotionally abused: Not on file     Physically abused: Not on file     Forced sexual activity: Not on file   Other Topics Concern    Not on file   Social History Narrative    Not on file       History reviewed. No pertinent family history. Allergies:  Patient has no known allergies. Home Medications:  Prior to Admission medications    Medication Sig Start Date End Date Taking? Authorizing Provider   [facility]) OINT ointment Apply topically 2 times daily Apply to [place] [address] Provider, MD   rosuvastatin (CRESTOR) 40 MG tablet Take 40 mg by mouth every evening    Historical Provider, MD   LORazepam (ATIVAN) 0.5 MG tablet Take 1 tablet by mouth three times daily for 30 days.  3/29/19 4/28/19  Blanche Garcia, DO   sertraline (ZOLOFT) 50 MG tablet Take 1 tablet by mouth daily 3/30/19   Blanche Garcia, DO   ondansetron (ZOFRAN-ODT) 4 MG disintegrating tablet Take 1 tablet by mouth every 6 hours as needed for Nausea 3/29/19   Blanche Garcia, DO   atorvastatin (LIPITOR) 80 MG tablet Take 1 tablet by mouth nightly 3/29/19   Blanche Garcia, DO   metoprolol succinate (TOPROL XL) 25 MG extended release tablet Take 1 tablet by mouth daily 3/30/19   Blanche Garcia, DO   sacubitril-valsartan (ENTRESTO) 49-51 MG per tablet Take 1 tablet by mouth 2 times daily 3/29/19   Blanche Garcia, DO   miconazole (MICOTIN) 2 % regular rhythm, normal heart sounds and intact distal pulses. Exam reveals no gallop and no friction rub. No murmur heard. Pulmonary/Chest: Effort normal and breath sounds normal. No respiratory distress. She has no wheezes. She has no rales. Abdominal: Soft. She exhibits no distension and no mass. There is no tenderness. There is no rebound and no guarding. Musculoskeletal: She exhibits no edema, tenderness or deformity. Neurological: She is alert and oriented to person, place, and time. No cranial nerve deficit or sensory deficit. She exhibits normal muscle tone. Coordination normal.   Skin: Skin is warm and dry. No rash noted. She is not diaphoretic. DIFFERENTIAL  DIAGNOSIS     PLAN (LABS / IMAGING / EKG):  Orders Placed This Encounter   Procedures    Inpatient consult to Neurosurgery       MEDICATIONS ORDERED:  No orders of the defined types were placed in this encounter. DDX: SDH, ICH, acute on chronic subdural    DIAGNOSTIC RESULTS / EMERGENCY DEPARTMENT COURSE / MDM     LABS:  No results found for this visit on 04/02/19. IMPRESSION: Patient evaluated by myself and the attending physician. Patient appears in no acute distress. Hypotensive on arrival 98/50. No neural deficits on exam.  Motor strength out of 5 bilateral upper and lower extremities. Pupils 3 mm and equally reactive bilaterally. Patient asymptomatic. Heart rate normal.  Regular rhythm. Lungs clear auscultation bilaterally. Did review labs from Replaced by [facility]. [facility] and patient was not anemic. Troponin 35 but recent AICD placement and history of CHF. Will recheck troponin. We'll admit to medicine for hypotension and medical management. Neurosurgery to evaluate.     RADIOLOGY:      Ct Head Wo Contrast    Result Date: 4/2/2019  EXAMINATION: CT OF THE HEAD WITHOUT CONTRAST  4/2/2019 5:58 pm TECHNIQUE: CT of the head was performed without the administration of intravenous contrast. Dose modulation, iterative reconstruction, and/or weight based adjustment of the mA/kV was utilized to reduce the radiation dose to as low as reasonably achievable. COMPARISON: None. HISTORY: ORDERING SYSTEM PROVIDED HISTORY: dizziness, weakness TECHNOLOGIST PROVIDED HISTORY: FINDINGS: BRAIN/VENTRICLES: There is no acute intracranial hemorrhage, mass effect or midline shift. If there is a left frontal parietal extra-axial collection identified measuring 14 mm in transverse dimension on the axial images. Dependently layering hyperdensity relatively isodense to brain parenchyma. This could represent a subdural hygroma with secondary acute hemorrhage, evolving chronic hematoma, or a subdural hemorrhage in the eligio patient. .  The gray-white differentiation is maintained without evidence of an acute infarct. There is no evidence of hydrocephalus. Mild-to-moderate periventricular subcortical white matter hypoattenuation identified which is nonspecific but can be seen within the underlying vasculitis or chronic small vessel disease. Intracranial vascular calcifications. ORBITS: The visualized portion of the orbits demonstrate no acute abnormality. SINUSES: The visualized paranasal sinuses and mastoid air cells demonstrate no acute abnormality. SOFT TISSUES/SKULL:  No acute abnormality of the visualized skull or soft tissues. Left frontoparietal extra-axial collection may represent a chronic subdural hematoma, acute subdural hygroma with secondary hemorrhage or subacute subdural hematoma. Please correlate exam findings and history. Mild-to-moderate patchy white matter changes suggestive chronic small vessel ischemic disease. Critical results were called by Dr. Meka Morrison to [facility] on 4/2/2019 at 18:23. EKG  None    All EKG's are interpreted by the Emergency Department Physician who either signs or Co-signs this chart in the absence of a cardiologist.    EMERGENCY DEPARTMENT COURSE:  7:58 PM: Spoke to internal medicine.   Patient exhibited permission. Neurosurgery in the ED to assess patient. PROCEDURES:  None    CONSULTS:  IP CONSULT TO NEUROSURGERY    CRITICAL CARE:  None    FINAL IMPRESSION      1. SDH (subdural hematoma) (HCC)          DISPOSITION / PLAN     DISPOSITION Decision To Admit 04/02/2019 07:47:48 PM      PATIENT REFERRED TO:  No follow-up provider specified.     DISCHARGE MEDICATIONS:  New Prescriptions    No medications on file       Annie Edge DO  Emergency Medicine Resident    (Please note that portions of thisnote were completed with a voice recognition program.  Efforts were made to edit the dictations but occasionally words are mis-transcribed.)      Annie Edge DO  04/02/19 0245

## 2019-04-02 NOTE — TELEPHONE ENCOUNTER
Writer contacted Lemuel Suh, ED provider to inform of 30 day readmission risk. ED provider informed writer admit or discharge has not been determined. Continue to follow-up.

## 2019-04-02 NOTE — CONSULTS
Department of Neurosurgery                                         Resident Consult Note      Reason for Consult:  Concern for SDH   Requesting Physician:  Dr. Maggie Arias  Neurosurgeon: Dr. Harris Ramos    History Obtained From:  patient, electronic medical record, ED provider    CHIEF COMPLAINT:      ICH, subdural     HISTORY OF PRESENT ILLNESS:       The patient is a 76 y.o. female who presents with as transfer from [address] [place] [address],[address] after presenting from rehab facility with 2 days of lightheadedness, hypotension. S/p AICD placement 5 days prior to arrival.  Pt denies new injury trauma. On outpatient Eliquis, Brilinta, ASA. Hx of CHF, CLD, HTN. Pt notes nurses at rehab facility unable to maintain blood pressure, pt persistently hypotensive for past few days. Denies symptoms on arrival.  States lightheadedness resolved prior to arrival. No upper, lower extremity involvement. No bilateral deficits. No fever. No recent head and neck infection. No hemineglect. No difficulty speaking. No headache. No vision changes. No difficulty swallowing. No vertigo. Positive lightheadedness. Recent AICD placement 3/28/19. No connective tissue disease. No recent chiropractic neck manipulation. No recent neck trauma. No neck pain. No droopy eyes. No IV drugs. No saddle anesthesia. No motor deficits. No sensory deficits. No urinary retention. No urinary incontinence, No fecal incontinence. No weight loss. No diaphoresis. No unusual feeling when wiping the bottom. No deep bone pain at night, not woken up from sleep from bone pain.     PAST MEDICAL HISTORY :       Past Medical History:        Diagnosis Date    Acute on chronic systolic congestive heart failure ([address] 8/8/2017    Anxiety     Cardiomyopathy ([address]     Depression     H/O heart artery stent     [place] (hard of hearing)     Hyperlipidemia     Hypertension     Hypertensive urgency 8/7/2017    Leg swelling 8/7/2017       Past Surgical History: Procedure Laterality Date    CARDIAC DEFIBRILLATOR PLACEMENT      CATARACT REMOVAL      CORNEAL TRANSPLANT      EYE SURGERY      NASAL SINUS SURGERY      TONSILLECTOMY AND ADENOIDECTOMY         Social History:   Social History     Socioeconomic History    Marital status:      Spouse name: Not on file    Number of children: Not on file    Years of education: Not on file    Highest education level: Not on file   Occupational History    Not on file   Social Needs    Financial resource strain: Not on file    Food insecurity:     Worry: Not on file     Inability: Not on file    Transportation needs:     Medical: Not on file     Non-medical: Not on file   Tobacco Use    Smoking status: Never Smoker    Smokeless tobacco: Never Used   Substance and Sexual Activity    Alcohol use: No    Drug use: No    Sexual activity: Not on file   Lifestyle    Physical activity:     Days per week: Not on file     Minutes per session: Not on file    Stress: Not on file   Relationships    Social connections:     Talks on phone: Not on file     Gets together: Not on file     Attends Synagogue service: Not on file     Active member of club or organization: Not on file     Attends meetings of clubs or organizations: Not on file     Relationship status: Not on file    Intimate partner violence:     Fear of current or ex partner: Not on file     Emotionally abused: Not on file     Physically abused: Not on file     Forced sexual activity: Not on file   Other Topics Concern    Not on file   Social History Narrative    Not on file       Family History:   History reviewed. No pertinent family history. Allergies:  Patient has no known allergies. Home Medications:  Prior to Admission medications    Medication Sig Start Date End Date Taking?  Authorizing Provider   [facility]) OINT ointment Apply topically 2 times daily Apply to Est[place] Santillan Provider, MD   rosuvastatin (CRESTOR) 40 MG tablet Take 40 mg by mouth every evening    Historical Provider, MD   LORazepam (ATIVAN) 0.5 MG tablet Take 1 tablet by mouth three times daily for 30 days. 3/29/19 4/28/19  Rica Guadarrama,    sertraline (ZOLOFT) 50 MG tablet Take 1 tablet by mouth daily 3/30/19   Rica Guadarrama, DO   ondansetron (ZOFRAN-ODT) 4 MG disintegrating tablet Take 1 tablet by mouth every 6 hours as needed for Nausea 3/29/19   Rica Guadarrama, DO   atorvastatin (LIPITOR) 80 MG tablet Take 1 tablet by mouth nightly 3/29/19   Rica Guadarrama, DO   metoprolol succinate (TOPROL XL) 25 MG extended release tablet Take 1 tablet by mouth daily 3/30/19   Rica Guadarrama, DO   sacubitril-valsartan (ENTRESTO) 49-51 MG per tablet Take 1 tablet by mouth 2 times daily 3/29/19   Smooth Dias,    miconazole (MICOTIN) 2 % powder Apply topically 2 times daily.  3/29/19   Rica Guadarrama,    furosemide (LASIX) 40 MG tablet Take 1 tablet by mouth 2 times daily 3/29/19   Rica Guadarrama, DO   ticagrelor [facility]) 90 MG TABS tablet Take 1 tablet by mouth 2 times daily 3/29/19   Rica Guadarrama, DO   Multiple Vitamin (MULTIVITAMIN) tablet Take 1 tablet by mouth daily 3/29/19   Rica Guadarrama, DO   oxybutynin (DITROPAN) 5 MG tablet Take 1 tablet by mouth 3 times daily 3/29/19   Rica Guadarrama, DO   apixaban (ELIQUIS) 5 MG TABS tablet Take 1 tablet by mouth 2 times daily 3/29/19   Nithin Dietrich, APRN - CNP   potassium chloride (MICRO-K) 10 MEQ extended release capsule Take 2 capsules by mouth daily 2/8/18   Elise Muir DO       Current Medications:   Current Facility-Administered Medications: 0.9 % sodium chloride bolus, 1,000 mL, Intravenous, Once    REVIEW OF SYSTEMS:       CONSTITUTIONAL: negative for fatigue and malaise   EYES: negative for double vision and photophobia    HEENT: negative for tinnitus and sore throat   RESPIRATORY: negative for cough, shortness of breath   CARDIOVASCULAR: negative for chest pain, palpitations   GASTROINTESTINAL: Positive for nausea, negative for vomiting   GENITOURINARY: negative for incontinence   MUSCULOSKELETAL: negative for neck or back pain   NEUROLOGICAL: negative for headaches, dizziness, seizures, memory problems, visual disturbance, weakness, numbness, pain and tingling  Positive for near syncope and lightheadedness     Review of systems otherwise negative. PHYSICAL EXAM:       BP (!) 98/50   Pulse 87   Temp 98.1 °F (36.7 °C) (Oral)   Resp 14   Ht 5' 2\" (1.575 m)   Wt 171 lb (77.6 kg)   SpO2 97%   BMI 31.28 kg/m²     CONSTITUTIONAL:  Well developed, well nourished, alert and oriented x 3, in no acute distress. GCS 15, nontoxic. No dysarthria, no aphasia. EOMI. HEAD:  normocephalic, atraumatic    EYES:  PERRLA, EOMI, no lateral or horizontal nystagmus bilaterally    ENT:  moist mucous membranes    NECK:   no midline tenderness to palpation     BACK:  without midline tenderness, step-offs or deformities    LUNGS:  Equal air entry bilaterally     CARDIOVASCULAR:  normal s1 / s2    ABDOMEN:  Soft, no rigidity    NEUROLOGIC:  Level of consciousness: alert, awake and oriented x 3. Pattern of breathing: regular. Memory intact. . No bilateral deficits. Cranial nerves II-XII grossly intact. Motor strength intact for bilateral upper and lower extremity. Sensation to touch and pinprick intact all over. Orozco's sign negative. No dysmetria. No dysdiadochokinesia. No quadrantanopia. Test of skew (cover/uncover): devoid of misalignment. No lateral or horizontal nystagmus bilaterally. Plantar reflex down going. Pronator drift negative. Clonus negative.          5/5 plantar flexion of the right ankle  5/5 dorsi flexion of the right ankle  5/5 plantar flexion of the left ankle  5/5 dorsi flexion of the left ankle  5/5 flexion of the right knee  5/5 flexion of the left knee  5/5  strength on the right  5/5  strength on the left  5/5 flexion of the right elbow  5/5 flexion of the left elbow  Shoulder elevation 5/5 bilaterally     SKIN:  no rash              LABS AND IMAGING:       CBC:   Lab Results   Component Value Date    WBC 10.0 04/02/2019    RBC 5.15 04/02/2019    HGB 13.4 04/02/2019    HCT 42.7 04/02/2019    MCV 82.8 04/02/2019    MCH 26.1 04/02/2019    MCHC 31.5 04/02/2019    RDW 18.7 04/02/2019     04/02/2019    MPV 9.9 04/02/2019     CBC with Differential:    Lab Results   Component Value Date    WBC 10.0 04/02/2019    RBC 5.15 04/02/2019    HGB 13.4 04/02/2019    HCT 42.7 04/02/2019     04/02/2019    MCV 82.8 04/02/2019    MCH 26.1 04/02/2019    MCHC 31.5 04/02/2019    RDW 18.7 04/02/2019    LYMPHOPCT 13 04/02/2019    MONOPCT 5 04/02/2019    BASOPCT 1 04/02/2019    MONOSABS 0.50 04/02/2019    LYMPHSABS 1.20 04/02/2019    EOSABS 0.10 04/02/2019    BASOSABS 0.10 04/02/2019    DIFFTYPE NOT REPORTED 04/02/2019     WBC:    Lab Results   Component Value Date    WBC 10.0 04/02/2019     Platelets:    Lab Results   Component Value Date     04/02/2019     Hemoglobin/Hematocrit:    Lab Results   Component Value Date    HGB 13.4 04/02/2019    HCT 42.7 04/02/2019     CMP:    Lab Results   Component Value Date     04/02/2019    K 3.7 04/02/2019     04/02/2019    CO2 25 04/02/2019    BUN 27 04/02/2019    CREATININE 1.18 04/02/2019    GFRAA 55 04/02/2019    LABGLOM 46 04/02/2019    GLUCOSE 100 04/02/2019    PROT 5.6 03/21/2019    LABALBU 2.8 03/21/2019    CALCIUM 9.2 04/02/2019    BILITOT 0.90 03/21/2019    ALKPHOS 82 03/21/2019    AST 20 03/21/2019    ALT 13 03/21/2019     BMP:    Lab Results   Component Value Date     04/02/2019    K 3.7 04/02/2019     04/02/2019    CO2 25 04/02/2019    BUN 27 04/02/2019    LABALBU 2.8 03/21/2019    CREATININE 1.18 04/02/2019    CALCIUM 9.2 04/02/2019    GFRAA 55 04/02/2019    LABGLOM 46 04/02/2019    GLUCOSE 100 04/02/2019     Radiology Review:      Ct Head Wo Contrast    Result Date: 4/2/2019  EXAMINATION: CT OF THE HEAD WITHOUT CONTRAST  4/2/2019 5:58 pm TECHNIQUE: CT of the head was performed without the administration of intravenous contrast. Dose modulation, iterative reconstruction, and/or weight based adjustment of the mA/kV was utilized to reduce the radiation dose to as low as reasonably achievable. COMPARISON: None. HISTORY: ORDERING SYSTEM PROVIDED HISTORY: dizziness, weakness TECHNOLOGIST PROVIDED HISTORY: FINDINGS: BRAIN/VENTRICLES: There is no acute intracranial hemorrhage, mass effect or midline shift. If there is a left frontal parietal extra-axial collection identified measuring 14 mm in transverse dimension on the axial images. Dependently layering hyperdensity relatively isodense to brain parenchyma. This could represent a subdural hygroma with secondary acute hemorrhage, evolving chronic hematoma, or a subdural hemorrhage in the eligio patient. .  The gray-white differentiation is maintained without evidence of an acute infarct. There is no evidence of hydrocephalus. Mild-to-moderate periventricular subcortical white matter hypoattenuation identified which is nonspecific but can be seen within the underlying vasculitis or chronic small vessel disease. Intracranial vascular calcifications. ORBITS: The visualized portion of the orbits demonstrate no acute abnormality. SINUSES: The visualized paranasal sinuses and mastoid air cells demonstrate no acute abnormality. SOFT TISSUES/SKULL:  No acute abnormality of the visualized skull or soft tissues. Left frontoparietal extra-axial collection may represent a chronic subdural hematoma, acute subdural hygroma with secondary hemorrhage or subacute subdural hematoma. Please correlate exam findings and history. Mild-to-moderate patchy white matter changes suggestive chronic small vessel ischemic disease. Critical results were called by Dr. Freda Robison to [facility] [place] on 4/2/2019 at 18:23.      Xr Chest Portable    Result Date: 4/2/2019  EXAMINATION: SINGLE XRAY VIEW OF THE CHEST 4/2/2019 2:56 pm COMPARISON: 03/29/2018 HISTORY: ORDERING SYSTEM PROVIDED HISTORY: weakness TECHNOLOGIST PROVIDED HISTORY: weakness Ordering Physician Provided Reason for Exam: Pt c/o weakness, AP UPRIGHT PORTABLE Acuity: Acute Type of Exam: Initial FINDINGS: There is a left subclavian ICD, with 3 lead wires, projecting in stable positions. Left heart border is prominent. Hilar contours are normal. There is no focal airspace disease or pneumothorax. No acute cardiopulmonary disease. Stable cardiomegaly. ASSESSMENT AND PLAN:       Patient Active Problem List   Diagnosis    Leg swelling    Hypertensive urgency    Acute on chronic systolic congestive heart failure (HCC)    Acute on chronic systolic congestive heart failure (HCC)    Lymphedema of both lower extremities    Cellulitis of right leg    Coronary artery disease involving native coronary artery of native heart without angina pectoris    Major depressive disorder    New onset a-fib [address]    NSTEMI (non-ST elevated myocardial infarction) ([address]    Acute on chronic congestive heart failure (HCC)    Panic disorder    SDH (subdural hematoma) ([address]       76 y.o. female who presents as transfer from [address] [place], [place] [address] ED after presenting with lightheadedness, nausea, hypotension. CT head concerning for left frontoparietal extra-axial collection with possible etiologies including chronic subdural hematoma, acute subdural hygroma, or subacute subdural hematoma. Neurosurgery consulted for further evaluation and management of intracranial imaging abnormalities.   Patient care will be discussed with attending, will reevaluate patient along with attending.         - Recommend medical admission for management of hypotension, hx of CHF s/p AICD placement  - CTLS recommendations: Clear  - HOB: 30 degrees  - Hold all antiplatelets and anticoagulants  - No neurosurgical interventions planned for now  - We recommend SBP < 140   - Determine the lower limit of SBP clinically based

## 2019-04-02 NOTE — ED NOTES
Three episodes of 3-5 beat runs of VT noted on monitor. Pt asymptomatic lying on cart. Za Barksdale NP notified. Will continue to monitor.       Francia Frazier RN  04/02/19 [id number]

## 2019-04-02 NOTE — ED PROVIDER NOTES
[address] ED  eMERGENCY dEPARTMENT eNCOUnter      Pt Name: Eliza Dumont  MRN: [id number]  [place] 1950  Date of evaluation: 4/2/2019  Provider: PRICILA Guerrero [id number]       Chief Complaint   Patient presents with    Dizziness    Hypotension         HISTORY OF PRESENT ILLNESS  (Location/Symptom, Timing/Onset, Context/Setting, Quality, Duration, Modifying Factors, Severity.)   Eliza Dumont is a 76 y.o. female who presents to the emergency department via EMS for hypotension, feeling lightheaded with nausea. She had an episode yesterday when sitting on the side of her bed, then another again today. She was admitted on 3-21-19 for CHF, cellulitis. She is on Brilinta and Eliquis s/p cardiac stent placement last week. Denies fever, chills, chest pain, SOB, headache, vomiting, diarrhea. Rates her pain 0/10. Nursing Notes were reviewed. ALLERGIES     Patient has no known allergies. CURRENT MEDICATIONS       Previous Medications    APIXABAN (ELIQUIS) 5 MG TABS TABLET    Take 1 tablet by mouth 2 times daily    ATORVASTATIN (LIPITOR) 80 MG TABLET    Take 1 tablet by mouth nightly    EMOLLIENT (DERMAPHOR) OINT OINTMENT    Apply topically 2 times daily Apply to BLE    FUROSEMIDE (LASIX) 40 MG TABLET    Take 1 tablet by mouth 2 times daily    LORAZEPAM (ATIVAN) 0.5 MG TABLET    Take 1 tablet by mouth three times daily for 30 days. METOPROLOL SUCCINATE (TOPROL XL) 25 MG EXTENDED RELEASE TABLET    Take 1 tablet by mouth daily    MICONAZOLE (MICOTIN) 2 % POWDER    Apply topically 2 times daily.     MULTIPLE VITAMIN (MULTIVITAMIN) TABLET    Take 1 tablet by mouth daily    ONDANSETRON (ZOFRAN-ODT) 4 MG DISINTEGRATING TABLET    Take 1 tablet by mouth every 6 hours as needed for Nausea    OXYBUTYNIN (DITROPAN) 5 MG TABLET    Take 1 tablet by mouth 3 times daily    POTASSIUM CHLORIDE (MICRO-K) 10 MEQ EXTENDED RELEASE CAPSULE    Take 2 capsules by mouth daily ROSUVASTATIN (CRESTOR) 40 MG TABLET    Take 40 mg by mouth every evening    SACUBITRIL-VALSARTAN (ENTRESTO) 49-51 MG PER TABLET    Take 1 tablet by mouth 2 times daily    SERTRALINE (ZOLOFT) 50 MG TABLET    Take 1 tablet by mouth daily    TICAGRELOR (BRILINTA) 90 MG TABS TABLET    Take 1 tablet by mouth 2 times daily       PAST MEDICAL HISTORY         Diagnosis Date    Acute on chronic systolic congestive heart failure (HCC) 8/8/2017    Anxiety     Cardiomyopathy [facility] [address]     Depression     H/O heart artery stent     [place] (hard of hearing)     Hyperlipidemia     Hypertension     Hypertensive urgency 8/7/2017    Leg swelling 8/7/2017       SURGICAL HISTORY           Procedure Laterality Date    CARDIAC DEFIBRILLATOR PLACEMENT      CATARACT REMOVAL      CORNEAL TRANSPLANT      EYE SURGERY      NASAL SINUS SURGERY      TONSILLECTOMY AND ADENOIDECTOMY           FAMILY HISTORY     History reviewed. No pertinent family history. No family status information on file. SOCIAL HISTORY      reports that she has never smoked. She has never used smokeless tobacco. She reports that she does not drink alcohol or use drugs. REVIEW OF SYSTEMS    (2-9 systems for level 4, 10 or more for level 5)     Review of Systems   Constitutional: Negative for chills, diaphoresis, fatigue and fever. Eyes: Negative for photophobia, pain and visual disturbance. Respiratory: Negative for cough and shortness of breath. Cardiovascular: Negative for chest pain. Gastrointestinal: Positive for nausea. Negative for abdominal pain, diarrhea and vomiting. Genitourinary: Negative for dysuria. Skin: Negative for color change, rash and wound. Neurological: Positive for dizziness and light-headedness. Negative for syncope, facial asymmetry, speech difficulty, weakness, numbness and headaches. Psychiatric/Behavioral: Negative for confusion.      Except as noted above the remainder of the review of systems was reviewed and negative. PHYSICAL EXAM    (up to 7 for level 4, 8 or more for level 5)     ED Triage Vitals   BP Temp Temp src Pulse Resp SpO2 Height Weight   -- -- -- -- -- -- -- --     Physical Exam   Constitutional: She is oriented to person, place, and time. She appears well-developed and well-nourished. No distress. HENT:   Mouth/Throat: Oropharynx is clear and moist.   Eyes: Conjunctivae are normal.   Neck: Neck supple. Cardiovascular: Normal rate, regular rhythm and normal heart sounds. Pulmonary/Chest: Effort normal and breath sounds normal. No respiratory distress. She has no wheezes. She has no rales. Abdominal: Soft. Bowel sounds are normal. She exhibits no distension. There is no tenderness. Musculoskeletal: She exhibits no edema. Neurological: She is alert and oriented to person, place, and time. She has normal strength. No cranial nerve deficit. Coordination normal. GCS eye subscore is 4. GCS verbal subscore is 5. GCS motor subscore is 6. Skin: Skin is warm and dry. Capillary refill takes 2 to 3 seconds. No rash noted. She is not diaphoretic. Psychiatric: She has a normal mood and affect. Her behavior is normal.   Vitals reviewed. DIAGNOSTIC RESULTS     EKG: All EKG's are interpreted by the Emergency Department Physician who either signs or Co-signs this chart in the absence of a cardiologist.  EKG was interpreted by Dr. Megan Hess after completion.       RADIOLOGY:   Non-plain film images such as CT, Ultrasound and MRI are read by the radiologist. [facility] radiographic images are visualized and preliminarily interpreted by the emergency physician with the below findings:    Interpretation per the Radiologist below, if available at the time of this note:    Ct Head Wo Contrast    Result Date: 4/2/2019  EXAMINATION: CT OF THE HEAD WITHOUT CONTRAST  4/2/2019 5:58 pm TECHNIQUE: CT of the head was performed without the administration of intravenous contrast. Dose modulation, iterative reconstruction, and/or weight based adjustment of the mA/kV was utilized to reduce the radiation dose to as low as reasonably achievable. COMPARISON: None. HISTORY: ORDERING SYSTEM PROVIDED HISTORY: dizziness, weakness TECHNOLOGIST PROVIDED HISTORY: FINDINGS: BRAIN/VENTRICLES: There is no acute intracranial hemorrhage, mass effect or midline shift. If there is a left frontal parietal extra-axial collection identified measuring 14 mm in transverse dimension on the axial images. Dependently layering hyperdensity relatively isodense to brain parenchyma. This could represent a subdural hygroma with secondary acute hemorrhage, evolving chronic hematoma, or a subdural hemorrhage in the eligio patient. .  The gray-white differentiation is maintained without evidence of an acute infarct. There is no evidence of hydrocephalus. Mild-to-moderate periventricular subcortical white matter hypoattenuation identified which is nonspecific but can be seen within the underlying vasculitis or chronic small vessel disease. Intracranial vascular calcifications. ORBITS: The visualized portion of the orbits demonstrate no acute abnormality. SINUSES: The visualized paranasal sinuses and mastoid air cells demonstrate no acute abnormality. SOFT TISSUES/SKULL:  No acute abnormality of the visualized skull or soft tissues. Left frontoparietal extra-axial collection may represent a chronic subdural hematoma, acute subdural hygroma with secondary hemorrhage or subacute subdural hematoma. Please correlate exam findings and history. Mild-to-moderate patchy white matter changes suggestive chronic small vessel ischemic disease. Critical results were called by Dr. Ana Helms to [facility] [place] on 4/2/2019 at 18:23.      Xr Chest Portable    Result Date: 4/2/2019  EXAMINATION: SINGLE XRAY VIEW OF THE CHEST 4/2/2019 2:56 pm COMPARISON: 03/29/2018 HISTORY: ORDERING SYSTEM PROVIDED HISTORY: weakness TECHNOLOGIST PROVIDED HISTORY: weakness Ordering Physician Provided Reason for Exam: Pt c/o weakness, AP UPRIGHT PORTABLE Acuity: Acute Type of Exam: Initial FINDINGS: There is a left subclavian ICD, with 3 lead wires, projecting in stable positions. Left heart border is prominent. Hilar contours are normal. There is no focal airspace disease or pneumothorax. No acute cardiopulmonary disease. Stable cardiomegaly. LABS:  Labs Reviewed   BASIC METABOLIC PANEL - Abnormal; Notable for the following components:       Result Value    Glucose 100 (*)     BUN 27 (*)     CREATININE 1.18 (*)     Bun/Cre Ratio 23 (*)     GFR Non- 46 (*)     GFR  55 (*)     All other components within normal limits   BRAIN NATRIURETIC PEPTIDE - Abnormal; Notable for the following components:    Pro-BNP 2,489 (*)     All other components within normal limits   CBC WITH AUTO DIFFERENTIAL - Abnormal; Notable for the following components:    RBC 5.35 (*)     RDW 18.6 (*)     Seg Neutrophils 80 (*)     Lymphocytes 13 (*)     All other components within normal limits   TROPONIN - Abnormal; Notable for the following components:    Troponin, High Sensitivity 35 (*)     All other components within normal limits   TROP/MYOGLOBIN - Abnormal; Notable for the following components:    Troponin, High Sensitivity 40 (*)     All other components within normal limits   MAGNESIUM   APTT   CBC   PROTIME-INR   PLATELET FUNCTION TEST       All other labs were within normal range or not returned as of this dictation.     EMERGENCY DEPARTMENT COURSE and DIFFERENTIAL DIAGNOSIS/MDM:   Vitals:    Vitals:    04/02/19 1738 04/02/19 1842 04/02/19 1852 04/02/19 1853   BP: (!) 100/54 (!) 86/51 116/74    Pulse: 83 87  90   Resp: 23 27  22   Temp:       TempSrc:       SpO2: 97% 98%  95%   Weight:       Height:             MEDICATIONS GIVEN IN THE ED:  Medications   0.9 % sodium chloride bolus (250 mLs Intravenous New Bag 4/2/19 1842)   0.9 % sodium chloride infusion (has no administration in time range)   0.9 % sodium chloride bolus (0 mLs Intravenous Stopped 4/2/19 1634)   0.9 % sodium chloride bolus (0 mLs Intravenous Stopped 4/2/19 1812)   prothrombin complex concentrate (human) (KCENTRA) infusion 4,000 Units (4,000 Units Intravenous New Bag 4/2/19 1900)       CLINICAL DECISION MAKING:  The patient presented alert with a nontoxic appearance and was seen in conjunction with Dr. Sonya Cobian. The patient is taking Brilinta and Eliquis per records. CT scan showed signs of bleeding. She cannot have an MRI due to having a defibrillator. She will be transferred to [facility] for further evaluation and treatment. CRITICAL CARE TIME     Due to the high probability of sudden and clinically significant deterioration in the patient's condition she required highest level of my preparedness to intervene urgently. I provided critical care time including documentation time, medication orders and management, reevaluation, vital sign assessment, ordering and reviewing of of lab tests ordering and reviewing of x-ray studies, and admission orders. Aggregate critical care time is 30 minutes including only time during which I was engaged in work directly related to her care and did not include time spent treating other patients simultaneously. FINAL IMPRESSION      1.  Subdural hematoma (HCC)            Problem List  Patient Active Problem List   Diagnosis Code    Leg swelling M79.89    Hypertensive urgency I16.0    Acute on chronic systolic congestive heart failure (HCC) I50.23    Acute on chronic systolic congestive heart failure (HCC) I50.23    Lymphedema of both lower extremities I89.0    Cellulitis of right leg L03.115    Coronary artery disease involving native coronary artery of native heart without angina pectoris I25.10    Major depressive disorder F32.9    New onset a-fib ([address] I48.91    NSTEMI (non-ST elevated myocardial infarction) ([address] I21.4    Acute on chronic congestive heart failure ([address] I50.9    Panic disorder F41.0         DISPOSITION/PLAN   DISPOSITION  04/02/2019 07:01:11 PM      PATIENT REFERRED TO:   No follow-up provider specified.     DISCHARGE MEDICATIONS:     New Prescriptions    No medications on file           (Please note that portions of this note were completed with a voice recognition program.  Efforts were made to edit the dictations but occasionally words are mis-transcribed.)    PRICILA Wood CNP, APRN - CNP  04/02/19 2827

## 2019-04-02 NOTE — ED NOTES
Bed: 22  Expected date:   Expected time:   Means of arrival:   Comments:  [address] Livia Estrada RN  04/02/19 2429

## 2019-04-03 ENCOUNTER — APPOINTMENT (OUTPATIENT)
Dept: CT IMAGING | Age: 69
DRG: 065 | End: 2019-04-03
Payer: MEDICARE

## 2019-04-03 PROBLEM — Z95.810 S/P IMPLANTATION OF AUTOMATIC CARDIOVERTER/DEFIBRILLATOR (AICD): Status: ACTIVE | Noted: 2019-04-03

## 2019-04-03 PROBLEM — Z98.61 HISTORY OF PERCUTANEOUS CORONARY INTERVENTION: Status: ACTIVE | Noted: 2019-04-03

## 2019-04-03 PROBLEM — I50.22 CHRONIC SYSTOLIC HEART FAILURE (HCC): Status: ACTIVE | Noted: 2018-10-19

## 2019-04-03 PROBLEM — I48.20 CHRONIC ATRIAL FIBRILLATION (HCC): Status: ACTIVE | Noted: 2019-04-03

## 2019-04-03 PROBLEM — I95.9 HYPOTENSION: Status: ACTIVE | Noted: 2019-04-03

## 2019-04-03 PROBLEM — E78.5 DYSLIPIDEMIA: Status: ACTIVE | Noted: 2019-04-03

## 2019-04-03 PROBLEM — I10 HYPERTENSION: Status: ACTIVE | Noted: 2019-04-03

## 2019-04-03 PROBLEM — R42 DIZZINESS: Status: ACTIVE | Noted: 2019-04-03

## 2019-04-03 PROBLEM — I25.10 CORONARY ATHEROSCLEROSIS OF NATIVE CORONARY ARTERY: Status: ACTIVE | Noted: 2019-04-03

## 2019-04-03 LAB
ABSOLUTE EOS #: 0.12 K/UL (ref 0–0.44)
ABSOLUTE IMMATURE GRANULOCYTE: <0.03 K/UL (ref 0–0.3)
ABSOLUTE LYMPH #: 1.27 K/UL (ref 1.1–3.7)
ABSOLUTE MONO #: 0.52 K/UL (ref 0.1–1.2)
ANION GAP SERPL CALCULATED.3IONS-SCNC: 12 MMOL/L (ref 9–17)
ANION GAP SERPL CALCULATED.3IONS-SCNC: 15 MMOL/L (ref 9–17)
BASOPHILS # BLD: 1 % (ref 0–2)
BASOPHILS ABSOLUTE: 0.1 K/UL (ref 0–0.2)
BUN BLDV-MCNC: 21 MG/DL (ref 8–23)
BUN BLDV-MCNC: 23 MG/DL (ref 8–23)
BUN/CREAT BLD: ABNORMAL (ref 9–20)
BUN/CREAT BLD: ABNORMAL (ref 9–20)
CALCIUM SERPL-MCNC: 8.7 MG/DL (ref 8.6–10.4)
CALCIUM SERPL-MCNC: 9 MG/DL (ref 8.6–10.4)
CHLORIDE BLD-SCNC: 108 MMOL/L (ref 98–107)
CHLORIDE BLD-SCNC: 110 MMOL/L (ref 98–107)
CO2: 22 MMOL/L (ref 20–31)
CO2: 23 MMOL/L (ref 20–31)
CREAT SERPL-MCNC: 0.93 MG/DL (ref 0.5–0.9)
CREAT SERPL-MCNC: 0.94 MG/DL (ref 0.5–0.9)
DIFFERENTIAL TYPE: ABNORMAL
EKG ATRIAL RATE: 80 BPM
EKG ATRIAL RATE: 91 BPM
EKG P AXIS: -21 DEGREES
EKG P-R INTERVAL: 324 MS
EKG Q-T INTERVAL: 396 MS
EKG Q-T INTERVAL: 414 MS
EKG QRS DURATION: 100 MS
EKG QRS DURATION: 122 MS
EKG QTC CALCULATION (BAZETT): 456 MS
EKG QTC CALCULATION (BAZETT): 509 MS
EKG R AXIS: -12 DEGREES
EKG R AXIS: -22 DEGREES
EKG T AXIS: 155 DEGREES
EKG T AXIS: 180 DEGREES
EKG VENTRICULAR RATE: 80 BPM
EKG VENTRICULAR RATE: 91 BPM
EOSINOPHILS RELATIVE PERCENT: 2 % (ref 1–4)
GFR AFRICAN AMERICAN: >60 ML/MIN
GFR AFRICAN AMERICAN: >60 ML/MIN
GFR NON-AFRICAN AMERICAN: 59 ML/MIN
GFR NON-AFRICAN AMERICAN: 60 ML/MIN
GFR SERPL CREATININE-BSD FRML MDRD: ABNORMAL ML/MIN/{1.73_M2}
GLUCOSE BLD-MCNC: 110 MG/DL (ref 70–99)
GLUCOSE BLD-MCNC: 117 MG/DL (ref 70–99)
HCT VFR BLD CALC: 41.4 % (ref 36.3–47.1)
HEMOGLOBIN: 12.1 G/DL (ref 11.9–15.1)
IMMATURE GRANULOCYTES: 0 %
LYMPHOCYTES # BLD: 18 % (ref 24–43)
MAGNESIUM: 2 MG/DL (ref 1.6–2.6)
MCH RBC QN AUTO: 26.2 PG (ref 25.2–33.5)
MCHC RBC AUTO-ENTMCNC: 29.2 G/DL (ref 28.4–34.8)
MCV RBC AUTO: 89.6 FL (ref 82.6–102.9)
MONOCYTES # BLD: 7 % (ref 3–12)
NRBC AUTOMATED: 0 PER 100 WBC
PDW BLD-RTO: 17.1 % (ref 11.8–14.4)
PLATELET # BLD: 268 K/UL (ref 138–453)
PLATELET ESTIMATE: ABNORMAL
PMV BLD AUTO: 11.4 FL (ref 8.1–13.5)
POTASSIUM SERPL-SCNC: 3.2 MMOL/L (ref 3.7–5.3)
POTASSIUM SERPL-SCNC: 3.8 MMOL/L (ref 3.7–5.3)
RBC # BLD: 4.62 M/UL (ref 3.95–5.11)
RBC # BLD: ABNORMAL 10*6/UL
SEG NEUTROPHILS: 72 % (ref 36–65)
SEGMENTED NEUTROPHILS ABSOLUTE COUNT: 5.03 K/UL (ref 1.5–8.1)
SODIUM BLD-SCNC: 145 MMOL/L (ref 135–144)
SODIUM BLD-SCNC: 145 MMOL/L (ref 135–144)
TROPONIN INTERP: ABNORMAL
TROPONIN INTERP: ABNORMAL
TROPONIN T: ABNORMAL NG/ML
TROPONIN T: ABNORMAL NG/ML
TROPONIN, HIGH SENSITIVITY: 32 NG/L (ref 0–14)
TROPONIN, HIGH SENSITIVITY: 38 NG/L (ref 0–14)
WBC # BLD: 7.1 K/UL (ref 3.5–11.3)
WBC # BLD: ABNORMAL 10*3/UL

## 2019-04-03 PROCEDURE — 70450 CT HEAD/BRAIN W/O DYE: CPT

## 2019-04-03 PROCEDURE — 6360000002 HC RX W HCPCS: Performed by: STUDENT IN AN ORGANIZED HEALTH CARE EDUCATION/TRAINING PROGRAM

## 2019-04-03 PROCEDURE — 97166 OT EVAL MOD COMPLEX 45 MIN: CPT

## 2019-04-03 PROCEDURE — 36415 COLL VENOUS BLD VENIPUNCTURE: CPT

## 2019-04-03 PROCEDURE — 97535 SELF CARE MNGMENT TRAINING: CPT

## 2019-04-03 PROCEDURE — 80048 BASIC METABOLIC PNL TOTAL CA: CPT

## 2019-04-03 PROCEDURE — 6370000000 HC RX 637 (ALT 250 FOR IP): Performed by: STUDENT IN AN ORGANIZED HEALTH CARE EDUCATION/TRAINING PROGRAM

## 2019-04-03 PROCEDURE — 99221 1ST HOSP IP/OBS SF/LOW 40: CPT | Performed by: NEUROLOGICAL SURGERY

## 2019-04-03 PROCEDURE — 96365 THER/PROPH/DIAG IV INF INIT: CPT

## 2019-04-03 PROCEDURE — 2580000003 HC RX 258: Performed by: STUDENT IN AN ORGANIZED HEALTH CARE EDUCATION/TRAINING PROGRAM

## 2019-04-03 PROCEDURE — 83735 ASSAY OF MAGNESIUM: CPT

## 2019-04-03 PROCEDURE — 85025 COMPLETE CBC W/AUTO DIFF WBC: CPT

## 2019-04-03 PROCEDURE — 2060000000 HC ICU INTERMEDIATE R&B

## 2019-04-03 PROCEDURE — 97530 THERAPEUTIC ACTIVITIES: CPT

## 2019-04-03 PROCEDURE — 84484 ASSAY OF TROPONIN QUANT: CPT

## 2019-04-03 PROCEDURE — 97161 PT EVAL LOW COMPLEX 20 MIN: CPT

## 2019-04-03 PROCEDURE — 99223 1ST HOSP IP/OBS HIGH 75: CPT | Performed by: INTERNAL MEDICINE

## 2019-04-03 PROCEDURE — 96361 HYDRATE IV INFUSION ADD-ON: CPT

## 2019-04-03 PROCEDURE — 99212 OFFICE O/P EST SF 10 MIN: CPT

## 2019-04-03 PROCEDURE — 96366 THER/PROPH/DIAG IV INF ADDON: CPT

## 2019-04-03 PROCEDURE — 93005 ELECTROCARDIOGRAM TRACING: CPT

## 2019-04-03 RX ORDER — SODIUM CHLORIDE 450 MG/100ML
INJECTION, SOLUTION INTRAVENOUS CONTINUOUS
Status: DISCONTINUED | OUTPATIENT
Start: 2019-04-03 | End: 2019-04-04 | Stop reason: HOSPADM

## 2019-04-03 RX ORDER — LABETALOL HYDROCHLORIDE 5 MG/ML
5 INJECTION, SOLUTION INTRAVENOUS EVERY 6 HOURS PRN
Status: DISCONTINUED | OUTPATIENT
Start: 2019-04-03 | End: 2019-04-04 | Stop reason: HOSPADM

## 2019-04-03 RX ORDER — MAGNESIUM SULFATE 1 G/100ML
1 INJECTION INTRAVENOUS ONCE
Status: COMPLETED | OUTPATIENT
Start: 2019-04-03 | End: 2019-04-03

## 2019-04-03 RX ORDER — POTASSIUM CHLORIDE 20 MEQ/1
40 TABLET, EXTENDED RELEASE ORAL ONCE
Status: COMPLETED | OUTPATIENT
Start: 2019-04-03 | End: 2019-04-03

## 2019-04-03 RX ORDER — MAGNESIUM SULFATE 1 G/100ML
2 INJECTION INTRAVENOUS ONCE
Status: DISCONTINUED | OUTPATIENT
Start: 2019-04-03 | End: 2019-04-03

## 2019-04-03 RX ORDER — MAGNESIUM SULFATE 1 G/100ML
1 INJECTION INTRAVENOUS
Status: COMPLETED | OUTPATIENT
Start: 2019-04-03 | End: 2019-04-03

## 2019-04-03 RX ADMIN — SODIUM CHLORIDE 500 ML: 9 INJECTION, SOLUTION INTRAVENOUS at 01:27

## 2019-04-03 RX ADMIN — POTASSIUM CHLORIDE 40 MEQ: 20 TABLET, EXTENDED RELEASE ORAL at 09:37

## 2019-04-03 RX ADMIN — SODIUM CHLORIDE: 9 INJECTION, SOLUTION INTRAVENOUS at 03:54

## 2019-04-03 RX ADMIN — ATORVASTATIN CALCIUM 80 MG: 80 TABLET, FILM COATED ORAL at 20:39

## 2019-04-03 RX ADMIN — SODIUM CHLORIDE: 4.5 INJECTION, SOLUTION INTRAVENOUS at 10:38

## 2019-04-03 RX ADMIN — Medication 10 ML: at 08:11

## 2019-04-03 RX ADMIN — LORAZEPAM 0.5 MG: 0.5 TABLET ORAL at 15:05

## 2019-04-03 RX ADMIN — Medication 10 ML: at 01:28

## 2019-04-03 RX ADMIN — LORAZEPAM 0.5 MG: 0.5 TABLET ORAL at 08:11

## 2019-04-03 RX ADMIN — MAGNESIUM SULFATE HEPTAHYDRATE 1 G: 1 INJECTION, SOLUTION INTRAVENOUS at 06:23

## 2019-04-03 RX ADMIN — SERTRALINE 50 MG: 50 TABLET, FILM COATED ORAL at 08:11

## 2019-04-03 RX ADMIN — MAGNESIUM SULFATE HEPTAHYDRATE 1 G: 1 INJECTION, SOLUTION INTRAVENOUS at 07:52

## 2019-04-03 RX ADMIN — Medication 10 ML: at 20:40

## 2019-04-03 RX ADMIN — MAGNESIUM SULFATE HEPTAHYDRATE 1 G: 1 INJECTION, SOLUTION INTRAVENOUS at 19:09

## 2019-04-03 RX ADMIN — LORAZEPAM 0.5 MG: 0.5 TABLET ORAL at 20:39

## 2019-04-03 ASSESSMENT — PAIN DESCRIPTION - PAIN TYPE
TYPE: ACUTE PAIN

## 2019-04-03 ASSESSMENT — PAIN SCALES - GENERAL
PAINLEVEL_OUTOF10: 3
PAINLEVEL_OUTOF10: 5
PAINLEVEL_OUTOF10: 0
PAINLEVEL_OUTOF10: 0

## 2019-04-03 ASSESSMENT — PAIN DESCRIPTION - LOCATION
LOCATION: BACK

## 2019-04-03 ASSESSMENT — PAIN - FUNCTIONAL ASSESSMENT: PAIN_FUNCTIONAL_ASSESSMENT: ACTIVITIES ARE NOT PREVENTED

## 2019-04-03 ASSESSMENT — PAIN DESCRIPTION - ORIENTATION
ORIENTATION: LOWER;MID
ORIENTATION: LOWER;MID

## 2019-04-03 ASSESSMENT — PAIN DESCRIPTION - ONSET: ONSET: OTHER (COMMENT)

## 2019-04-03 ASSESSMENT — PAIN DESCRIPTION - DESCRIPTORS: DESCRIPTORS: DULL;ACHING;STABBING

## 2019-04-03 ASSESSMENT — PAIN DESCRIPTION - FREQUENCY: FREQUENCY: INTERMITTENT

## 2019-04-03 NOTE — ED NOTES
Report given to [facility] RN. RN updated on pt's status and denied further questions/needs at this time, pt to floor at this time.      Tiara Chavez RN  04/02/19 8328

## 2019-04-03 NOTE — ED NOTES
Pt brought to ED by [facility] [employer] as a transfer from [address] [place] [address] with the CO of dizziness/hypotension. Pt recently had a AICD placed x5 days ago and complained of light headedness x1 day. Pt from [address] [place] [address], per RN pt has been hypotensive. Pt CT showed frontal parietal subdural hematoma, pt on Eliquis and given Kcentra 4,000 units PTA. Pt has 20G IV Rt AC and 20G Lt hand PTA. Pt A&Ox4, pt denies any pain or n/t at this time. Pt hypotensive 98/50 on assessment. Pt connected to full monitor, blood drawn, will continue to monitor pt.      Anson Collins RN  04/02/19 2036

## 2019-04-03 NOTE — DISCHARGE INSTR - COC
I21.4    Acute on chronic congestive heart failure ([facility]) I50.9    Panic disorder F41.0    SDH (subdural hematoma) ([facility]) S06.5X9A    Hypotension I95.9    Dizziness R42    Dyslipidemia E78.5    Chronic systolic heart failure ([facility]) I50.22    Coronary atherosclerosis of native coronary artery I25.10    Hypertension I10    Chronic atrial fibrillation ([facility]) I48.2    S/P implantation of automatic cardioverter/defibrillator (AICD) Z95.810    History of percutaneous coronary intervention Z98.890       Isolation/Infection:   Isolation          No Isolation            Nurse Assessment:  Last Vital Signs: /63   Pulse 91   Temp 97.4 °F (36.3 °C) (Oral)   Resp 20   Ht 5' 2\" (1.575 m)   Wt 171 lb (77.6 kg)   SpO2 98%   BMI 31.28 kg/m²     Last documented pain score (0-10 scale): Pain Level: 0  Last Weight:   Wt Readings from Last 1 Encounters:   04/02/19 171 lb (77.6 kg)     Mental Status:  {IP PT MENTAL STATUS:11517}    IV Access:  - None    Nursing Mobility/ADLs:  Walking   Independent  Transfer  Independent  Bathing  Independent  Dressing  Independent  Toileting  Independent  Feeding  Independent  Med Admin  Independent  Med Delivery   whole    Wound Care Documentation and Therapy:  Wound 03/22/19 Leg Right;Medial (Active)   Wound Venous 3/28/2019  4:00 PM   Dressing Status Clean; Intact;Dry 4/3/2019  4:00 AM   Dressing Changed Changed/New 3/29/2019  3:00 PM   Dressing/Treatment Dry Dressing 4/3/2019  4:00 AM   Wound Cleansed Not Cleansed 3/28/2019  4:00 AM   Dressing Change Due 03/30/19 3/29/2019 12:00 PM   Wound Length (cm) 9 cm 3/22/2019  8:34 AM   Wound Width (cm) 11 cm 3/22/2019  8:34 AM   Wound Depth (cm) 0.2 cm 3/22/2019  8:34 AM   Wound Surface Area (cm^2) 99 cm^2 3/22/2019  8:34 AM   Wound Volume (cm^3) 19.8 cm^3 3/22/2019  8:34 AM   Wound Assessment Drainage;Granulation tissue 3/28/2019  4:00 AM   Drainage Amount None 4/3/2019  4:00 AM   Drainage Description Serosanguinous 3/29/2019 12:00 PM Odor None 4/3/2019  4:00 AM   Susanne-wound Assessment Dry;Edema 4/3/2019  7:48 AM   [place]%Wound Bed 100 3/22/2019  8:34 AM   Number of days: 12       Wound 03/22/19 Leg Proximal;Right; Lower; Posterior (Active)   Wound Venous 3/28/2019  4:00 PM   Dressing Status Clean;Dry; Intact 4/3/2019  4:00 AM   Dressing Changed Changed/New 3/29/2019  3:00 PM   Dressing/Treatment Dry Dressing 4/3/2019  4:00 AM   Wound Cleansed Rinsed/Irrigated with saline 3/27/2019  4:00 PM   Dressing Change Due 03/30/19 3/29/2019 12:00 PM   Wound Length (cm) 1.5 cm 3/22/2019  8:34 AM   Wound Width (cm) 2 cm 3/22/2019  8:34 AM   Wound Depth (cm) 0.2 cm 3/22/2019  8:34 AM   Wound Surface Area (cm^2) 3 cm^2 3/22/2019  8:34 AM   Wound Volume (cm^3) 0.6 cm^3 3/22/2019  8:34 AM   Wound Assessment Bleeding;Granulation tissue 3/28/2019  4:00 AM   Drainage Amount None 4/3/2019  4:00 AM   Drainage Description Serosanguinous 3/29/2019 12:00 PM   Odor None 4/3/2019  4:00 AM   Susanne-wound Assessment Dry;Edema 4/3/2019  7:48 AM   Red%Wound Bed 100 3/25/2019  4:16 PM   Number of days: 12       Wound 03/23/19 Leg Lower;Left; Anterior;Mid; Inner  LLE superficial oozing serosanguinous draninage (Active)   Wound Venous 3/28/2019  4:00 PM   Dressing Status Clean;Dry; Intact 4/3/2019  4:00 AM   Dressing Changed Changed/New 3/29/2019  3:00 PM   Dressing/Treatment Dry Dressing 4/3/2019  4:00 AM   Wound Cleansed Rinsed/Irrigated with saline 3/27/2019  4:00 PM   Dressing Change Due 03/25/19 3/25/2019  4:00 AM   Wound Assessment Dry 4/3/2019  4:00 AM   Drainage Amount Scant 3/29/2019 12:00 PM   Drainage Description Serosanguinous 3/29/2019 12:00 PM   Odor None 4/3/2019  4:00 AM   Margins ERIKA 3/22/2019  2:54 PM   Susanne-wound Assessment Dry 4/3/2019  4:00 AM   Number of days: 11        Elimination:  Continence:   · Bowel:  Yes  · Bladder: Yes  Urinary Catheter: None   Colostomy/Ileostomy/Ileal Conduit: No       Date of Last BM: 4/4/19    Intake/Output Summary (Last 24 hours) at 4/3/2019 0907  Last data filed at 4/3/2019 0847  Gross per 24 hour   Intake 1110 ml   Output --   Net 1110 ml     I/O last 3 completed shifts: In: 1000 [IV Piggyback:1000]  Out: -     Safety Concerns: At Risk for Falls    Impairments/Disabilities:      Rafiq ROSE Impairments/Disabilities:[id number]}    Nutrition Therapy:  Current Nutrition Therapy:   - Oral Diet:  General    Routes of Feeding: Oral  Liquids: No Restrictions  Daily Fluid Restriction: no  Last Modified Barium Swallow with Video (Video Swallowing Test): not done    Treatments at the Time of Hospital Discharge:   Respiratory Treatments: ***  Oxygen Therapy:  is not on home oxygen therapy. Ventilator:    - No ventilator support    Rehab Therapies: Physical Therapy and Occupational Therapy  Weight Bearing Status/Restrictions: No weight bearing restirctions  Other Medical Equipment (for information only, NOT a DME order):  {EQUIPMENT:[id number]}  Other Treatments: Daily hygiene to legs using Remedy barrier cream cloths. Foam dressings to wounds; change every 2-3 days.   Knee high Art hose bilaterally.           Patient's personal belongings (please select all that are sent with patient):  {[facility] DME Belongings:[id number]}    RN SIGNATURE:  Electronically signed by Eduard Fontenot RN on 4/4/19 at 3:26 PM    CASE MANAGEMENT/SOCIAL WORK SECTION    Inpatient Status Date: ***    Readmission Risk Assessment Score:  Readmission Risk              Risk of Unplanned Readmission:        15           Discharging to Facility/ Agency   Name: [facility]  :  [address] [facility] [address]        Phone: [phone]       Fax: [phone]            Dialysis Facility (if applicable)   · Name:  · Address:  · Dialysis Schedule:  · Phone:  · Fax:    / signature: Electronically signed by Katie Beckwith RN on 4/4/19 at 11:32 AM    PHYSICIAN SECTION    Prognosis: Fair    Condition at Discharge: Stable    Rehab Potential (if transferring to Rehab): Fair    Recommended Labs or Other Treatments After Discharge: [address] wo contrast in 1 week     Physician Certification: I certify the above information and transfer of Aimee Jamison  is necessary for the continuing treatment of the diagnosis listed and that she requires [place] for greater 30 days.      Update Admission H&P: No change in H&P    PHYSICIAN SIGNATURE:  Electronically signed by Blanca Choudhary MD on 4/4/19 at 11:25 AM

## 2019-04-03 NOTE — PROGRESS NOTES
amb [de-identified]' using RW with CGA. Minimal SOB per decreased endurance. Prognosis: Good  Decision Making: Low Complexity  REQUIRES PT FOLLOW UP: Yes  Activity Tolerance  Activity Tolerance: Patient Tolerated treatment well;Patient limited by endurance       Patient Diagnosis(es): The primary encounter diagnosis was SDH (subdural hematoma) ([address] A diagnosis of Hypotension, unspecified hypotension type was also pertinent to this visit. has a past medical history of Acute on chronic systolic congestive heart failure ([address] Anxiety, Cardiomyopathy ([address] Depression, H/O heart artery stent, [place] (hard of hearing), Hyperlipidemia, Hypertension, Hypertensive urgency, and Leg swelling.   has a past surgical history that includes Cataract removal; Corneal transplant; Nasal sinus surgery; eye surgery; Tonsillectomy and adenoidectomy; and Cardiac defibrillator placement. Restrictions  Restrictions/Precautions  Required Braces or Orthoses?: No  Position Activity Restriction  Other position/activity restrictions:  Up with assist  Vision/Hearing  Vision: Impaired  Vision Exceptions: Wears glasses at all times  Hearing: Exceptions to [facility]  Hearing Exceptions: Bilateral hearing aid     Subjective  General  Chart Reviewed: Yes  Patient assessed for rehabilitation services?: Yes  Response To Previous Treatment: Not applicable  Family / Caregiver Present: No  Follows Commands: Within Functional Limits  Subjective  Subjective: Pt and RN agreeable to PT  Pain Screening  Patient Currently in Pain: Denies  Pain Assessment  Patient's Stated Pain Goal: 3  Pain Location: Back  Pain Orientation: Lower;Mid  Response to Pain Intervention: Patient Satisfied  Vital Signs  Patient Currently in Pain: Denies  Pre Treatment Pain Screening  Comments / Details: Pt denies any acute pain, she reports chronic pain in her back from scoliosis     Orientation  Orientation  Overall Orientation Status: Within Normal Limits  Social/Functional

## 2019-04-03 NOTE — PROGRESS NOTES
[address]    PROGRESS NOTE             Date:   4/3/2019  Patient name:  Aimee Jamison  Date of admission:  4/2/2019  7:44 PM  MRN:   [id number]  YOB: 1950    CHIEF COMPLAINT     History Obtained From:  Patient and chart review. HISTORY OF PRESENT ILLNESS      The patient is a 76 y.o.  female with past medical history of hypertension, coronary artery disease status post PCI in October 2018, chronic congestive systolic heart failure status post AICD placement on 28 March 2019, anxiety, depression, bilateral lower extremity lymphedema who is admitted to the hospital for lightheadedness. Patient was at [facility] when the nurses noted the patient was hypotensive. She states having lightheadedness today. Denies any episodes of syncope, chest pain, shortness of breath, nausea, vomiting or diarrhea. She denies any noticeable active bleeding in the form of epistaxis, hematuria, or any blood in the stool. She went to [facility] due to the complain of lightheadedness and hypotension. She denies any correlation of lightheadedness on getting up from sitting position. States that it comes and goes on its own. CT scan done there showed an acute subdural hygroma with thickened rehab or H or subacute subdural hematoma. She denies any trauma or falls. She recently had AICD placed in [facility] on 28 March 2019. She was switched from Coumadin to Eliquis at that time. Also to be related patient is on Brilinta at home after her PCI done in October 2018. She denies any similar episodes in the past.    Neurosurgery consult today, to withhold any antiplatelet or anticoagulants and to repeat CT head without contrast tomorrow. Patient currently denies any lightheadedness or dizziness on my examination.   No neurological deficits present on exam.  She denies any numbness and tingling in any of the strength, numbness/tingling, change in gait, balance, coordination,   · Psychiatric: negative for change in mood, affect, memory, mentation, behavior. · Endocrine: negative for temperature intolerance, excessive thirst, fluid intake, or urination, tremor. · Hematologic/Lymphatic: negative for abnormal bruising or bleeding, blood clots, swollen lymph nodes. · Allergic/Immunologic: negative for nasal congestion, pruritis, hives. PHYSICAL EXAM      /63   Pulse 91   Temp 97.4 °F (36.3 °C) (Oral)   Resp 20   Ht 5' 2\" (1.575 m)   Wt 171 lb (77.6 kg)   SpO2 98%   BMI 31.28 kg/m²      · General appearance: well nourished  · HEENT: Head: Normocephalic, no lesions, without obvious abnormality. · Lungs: clear to auscultation bilaterally  · Heart: regular rate and rhythm, S1, S2 normal, no murmur, click, rub or gallop  · Abdomen: soft, non-tender; bowel sounds normal; no masses,  no organomegaly  · Extremities: extremities normal, atraumatic, no cyanosis , bilateral chronic leg swelling present   · Neurological: Gait normal. Reflexes normal and symmetric. Sensation grossly normal  · Skin - no rash, no lump   · Eye no icterus no redness  · Psych-normal affect   · NEURO-no limb weakness  No facial droop  · Lymphatic system-no lymphadenopathy no splenomegaly     DIAGNOSTICS      Laboratory Testing:  CBC:   Recent Labs     04/03/19  0547   WBC 7.1   HGB 12.1        BMP:    Recent Labs     04/02/19  1449 04/03/19  0547    145*   K 3.7 3.2*    108*   CO2 25 22   BUN 27* 23   CREATININE 1.18* 0.94*   GLUCOSE 100* 110*     S. Calcium:  Recent Labs     04/03/19  0547   CALCIUM 8.7     S. Ionized Calcium:No results for input(s): IONCA in the last 72 hours. S. Magnesium:  Recent Labs     04/03/19  0547   MG 2.0     S. Phosphorus:No results for input(s): PHOS in the last 72 hours. S. Glucose:No results for input(s): POCGLU in the last 72 hours.   Glycosylated hemoglobin A1C: No results for input(s): LABA1C in the last 72 hours. INR:   Recent Labs     04/02/19  1919   INR 1.1     Hepatic functions: No results for input(s): ALKPHOS, ALT, AST, PROT, BILITOT, BILIDIR, LABALBU in the last 72 hours. Pancreatic functions:No results for input(s): LACTA, AMYLASE in the last 72 hours. S. Lactic Acid: No results for input(s): LACTA in the last 72 hours. Cardiac enzymes:No results for input(s): CKTOTAL, CKMB, CKMBINDEX, TROPONINI in the last 72 hours. BNP:No results for input(s): BNP in the last 72 hours. Lipid profile: No results for input(s): CHOL, TRIG, HDL, LDLCALC in the last 72 hours. Invalid input(s): LDL  Blood Gases: No results found for: PH, PCO2, PO2, HCO3, O2SAT  Thyroid functions:   Lab Results   Component Value Date    TSH 2.14 03/22/2019        Imaging/Diagonstics:      CXR: No acute cardiopulmonary findings. Cardiomegaly    CT head without contrast: April 2, 2019    Left frontoparietal extra-axial collection may represent a chronic subdural   hematoma, acute subdural hygroma with secondary hemorrhage or subacute   subdural hematoma.  Please correlate exam findings and history.       Mild-to-moderate patchy white matter changes suggestive chronic small vessel   ischemic disease. ASSESSMENT     Principal Problem:    SDH (subdural hematoma) (HCC)  Active Problems:    Lymphedema of both lower extremities    Coronary artery disease involving native coronary artery of native heart without angina pectoris    Hypotension    Dizziness    Dyslipidemia    Hypertension    Chronic atrial fibrillation (HCC)    S/P implantation of automatic cardioverter/defibrillator (AICD)    History of percutaneous coronary intervention  Resolved Problems:    * No resolved hospital problems. *    PLAN      1. Acute subdural hematoma: Neurosurgery consulted, appreciate recommendations, systolic blood pressure less than 140, neurovascular checks every 4 hours, CT head without contrast today.   Withhold any anticoagulation on antiplatelet therapy. Can resume diet once okay with neurosurgery. 2.  lightheadedness: Cause to be determined, likely secondary to dehydration. We'll give her gentle hydration given her history of systolic congestive heart failure. Fluid bolus of 500 mL. 3. History of hypertension: Monitor blood pressure, call systolic blood pressure less than 1 40 mmHg, labetalol 5 mg IV when necessary for systolic blood pressure more than 140, hold all antihypertensive medications for now due to hypotension. 4. Coronary artery disease status post PCI in October 2018 : Patient is on Lipitor 80 mg, Brilinta 90 mg twice a day, Toprol-XL 25 mg once a day,Entresto 49/51 mg twice a day. 5. History of chronic systolic congestive heart failure status post AICD placement in March 2019  6. History of chronic systolic congestive heart failure with ejection fraction 25%: Continue Lipitor 80 mg nightly, at home patient is on Toprol-XL 25 mg daily, ENTRESTO 49-51 milligram twice a day, Lasix 40 mg twice a day  7. History of atrial fibrillation: Patient is on Eliquis 5 mg twice a day. Hold off on Eliquis for now. CHADSVASC score of 5.   8. We will consult cardiology for recommendations regarding anticoagulation. 9. DVT prophylaxis: EPC cuffs  10. PT/OT  11. Discharge planning consult case management    Ezekiel Mcdonald MD      Department of Internal Medicine  [address] [place] [address]          4/3/2019, 8:55 AM    Attestation and add on       I have discussed the care of Alis Mack , including pertinent history and exam findings,    today with the resident. I have seen and examined the patient and the key elements of all parts of the encounter have been performed by me . I agree with the assessment, plan and orders as documented by the resident.      Principal Problem:    SDH (subdural hematoma) (HCC)  Active Problems:    Lymphedema of both lower extremities    Coronary artery disease involving native coronary artery of native heart without angina pectoris    Hypotension    Dizziness    Dyslipidemia    Hypertension    Chronic atrial fibrillation (HCC)    S/P implantation of automatic cardioverter/defibrillator (AICD)    History of percutaneous coronary intervention  Resolved Problems:    * No resolved hospital problems. *       Hx fall about a month ago . Frontoparietal subdural hematoma . ---- ;     [address] [facility]  [address], [address] [place] [address].    Phone [phone]   Fax: [phone]  Answering Service: [phone]

## 2019-04-03 NOTE — PLAN OF CARE
Problem: Falls - Risk of:  Goal: Will remain free from falls  Description  Will remain free from falls  4/3/2019 1749 by Oliva Theodore RN  Outcome: Met This Shift  4/3/2019 0439 by Rashida García RN  Outcome: Met This Shift  Goal: Absence of physical injury  Description  Absence of physical injury  4/3/2019 1749 by Oliva Theodore RN  Outcome: Met This Shift  4/3/2019 0439 by Rashida García RN  Outcome: Met This Shift

## 2019-04-03 NOTE — ED NOTES
Blood drawn at this time, pt IV fluids started, will continue to monitor pt.      James Raymond RN  04/02/19 2022

## 2019-04-03 NOTE — ED NOTES
RN at bedside, pt repositioned, pt vitals taken, pt denies any complaints at this time, pt updated on plan of care, will continue to monitor pt.      James Raymond RN  04/02/19 3377

## 2019-04-03 NOTE — H&P
[address]    HISTORY AND PHYSICAL EXAMINATION            Date:   4/3/2019  Patient name:  Carlos Cranker  Date of admission:  4/2/2019  7:44 PM  MRN:   [id number]  YOB: 1950    CHIEF COMPLAINT     History Obtained From:  Patient and chart review. HISTORY OF PRESENT ILLNESS      The patient is a 76 y.o.  female with past medical history of hypertension, coronary artery disease status post PCI in October 2018, chronic congestive systolic heart failure status post AICD placement on 28 March 2019, anxiety, depression, bilateral lower extremity lymphedema who is admitted to the hospital for lightheadedness. Patient was at [facility] when the nurses noted the patient was hypotensive. She states having lightheadedness today. Denies any episodes of syncope, chest pain, shortness of breath, nausea, vomiting or diarrhea. She denies any noticeable active bleeding in the form of epistaxis, hematuria, or any blood in the stool. She went to [facility] due to the complain of lightheadedness and hypotension. She denies any correlation of lightheadedness on getting up from sitting position. States that it comes and goes on its own. CT scan done there showed an acute subdural hygroma with thickened rehab or H or subacute subdural hematoma. She denies any trauma or falls. She recently had AICD placed in [facility] on 28 March 2019. She was switched from Coumadin to Eliquis at that time. Also to be related patient is on Brilinta at home after her PCI done in October 2018. She denies any similar episodes in the past.    Neurosurgery consult today, to withhold any antiplatelet or anticoagulants and to repeat CT head without contrast tomorrow. Patient currently denies any lightheadedness or dizziness on my examination.   No neurological deficits present on exam.  She denies any numbness and tingling in any of the extremities. PAST MEDICAL HISTORY       has a past medical history of Acute on chronic systolic congestive heart failure ([address] Anxiety, Cardiomyopathy ([address] Depression, H/O heart artery stent, [place] (hard of hearing), Hyperlipidemia, Hypertension, Hypertensive urgency, and Leg swelling. PAST SURGICAL HISTORY       has a past surgical history that includes Cataract removal; Corneal transplant; Nasal sinus surgery; eye surgery; Tonsillectomy and adenoidectomy; and Cardiac defibrillator placement. HOME MEDICATIONS        Prior to Admission medications    Medication Sig Start Date End Date Taking? Authorizing Provider   rosuvastatin (CRESTOR) 40 MG tablet Take 40 mg by mouth every evening   Yes Historical Provider, MD   sertraline (ZOLOFT) 50 MG tablet Take 1 tablet by mouth daily 3/30/19  Yes Kranthi Johnston DO   atorvastatin (LIPITOR) 80 MG tablet Take 1 tablet by mouth nightly 3/29/19  Yes Kranthi Johnston DO   furosemide (LASIX) 40 MG tablet Take 1 tablet by mouth 2 times daily 3/29/19  Yes Kranthi Johnston DO   Multiple Vitamin (MULTIVITAMIN) tablet Take 1 tablet by mouth daily 3/29/19  Yes Kranthi Johnston DO   apixaban (ELIQUIS) 5 MG TABS tablet Take 1 tablet by mouth 2 times daily 3/29/19  Yes PRICILA Pennington - CNP   [facility]) OINT ointment Apply topically 2 times daily Apply to [facility], MD   LORazepam (ATIVAN) 0.5 MG tablet Take 1 tablet by mouth three times daily for 30 days. 3/29/19 4/28/19  Mayra Dias DO   ondansetron (ZOFRAN-ODT) 4 MG disintegrating tablet Take 1 tablet by mouth every 6 hours as needed for Nausea 3/29/19   Kranthi Johnston DO   metoprolol succinate (TOPROL XL) 25 MG extended release tablet Take 1 tablet by mouth daily 3/30/19   Kranthi Johnston DO   sacubitril-valsartan (ENTRESTO) 49-51 MG per tablet Take 1 tablet by mouth 2 times daily 3/29/19   Mayra Dias DO   miconazole (MICOTIN) 2 % powder Apply topically 2 times daily.  3/29/19 Pulse 93   Temp 97.4 °F (36.3 °C) (Oral)   Resp 22   Ht 5' 2\" (1.575 m)   Wt 171 lb (77.6 kg)   SpO2 95%   BMI 31.28 kg/m²      · General appearance: well nourished  · HEENT: Head: Normocephalic, no lesions, without obvious abnormality. · Lungs: clear to auscultation bilaterally  · Heart: regular rate and rhythm, S1, S2 normal, no murmur, click, rub or gallop  · Abdomen: soft, non-tender; bowel sounds normal; no masses,  no organomegaly  · Extremities: extremities normal, atraumatic, no cyanosis , bilateral chronic leg swelling present   · Neurological: Gait normal. Reflexes normal and symmetric. Sensation grossly normal  · Skin - no rash, no lump   · Eye no icterus no redness  · Psych-normal affect   · NEURO-no limb weakness  No facial droop  · Lymphatic system-no lymphadenopathy no splenomegaly     DIAGNOSTICS      Laboratory Testing:  CBC:   Recent Labs     04/02/19 1919   WBC 10.0   HGB 13.4        BMP:    Recent Labs     04/02/19  1449      K 3.7      CO2 25   BUN 27*   CREATININE 1.18*   GLUCOSE 100*     S. Calcium:  Recent Labs     04/02/19  1449   CALCIUM 9.2     S. Ionized Calcium:No results for input(s): IONCA in the last 72 hours. S. Magnesium:  Recent Labs     04/02/19  1449   MG 2.1     S. Phosphorus:No results for input(s): PHOS in the last 72 hours. S. Glucose:No results for input(s): POCGLU in the last 72 hours. Glycosylated hemoglobin A1C: No results for input(s): LABA1C in the last 72 hours. INR:   Recent Labs     04/02/19 1919   INR 1.1     Hepatic functions: No results for input(s): ALKPHOS, ALT, AST, PROT, BILITOT, BILIDIR, LABALBU in the last 72 hours. Pancreatic functions:No results for input(s): LACTA, AMYLASE in the last 72 hours. S. Lactic Acid: No results for input(s): LACTA in the last 72 hours. Cardiac enzymes:No results for input(s): CKTOTAL, CKMB, CKMBINDEX, TROPONINI in the last 72 hours.   BNP:No results for input(s): BNP in the last 72 hours. Lipid profile: No results for input(s): CHOL, TRIG, HDL, LDLCALC in the last 72 hours. Invalid input(s): LDL  Blood Gases: No results found for: PH, PCO2, PO2, HCO3, O2SAT  Thyroid functions:   Lab Results   Component Value Date    TSH 2.14 03/22/2019        Imaging/Diagonstics:      CXR: No acute cardiopulmonary findings. Cardiomegaly    CT head without contrast: April 2, 2019    Left frontoparietal extra-axial collection may represent a chronic subdural   hematoma, acute subdural hygroma with secondary hemorrhage or subacute   subdural hematoma.  Please correlate exam findings and history.       Mild-to-moderate patchy white matter changes suggestive chronic small vessel   ischemic disease. ASSESSMENT     Principal Problem:    SDH (subdural hematoma) (HCC)  Active Problems:    Lymphedema of both lower extremities    Coronary artery disease involving native coronary artery of native heart without angina pectoris    Hypotension    Dizziness    Dyslipidemia    Hypertension    Chronic atrial fibrillation (HCC)    S/P implantation of automatic cardioverter/defibrillator (AICD)    History of percutaneous coronary intervention  Resolved Problems:    * No resolved hospital problems. *    PLAN      1. Acute subdural hematoma: Neurosurgery consulted, appreciate recommendations, call systolic blood pressure less than 140, neurovascular checks every 4 hours, CT head without contrast to be repeated tomorrow morning. Withhold any anticoagulation on antiplatelet therapy. Nothing by mouth after midnight in case of any intervention tomorrow pending CT results. 2.  lightheadedness: Cause to be determined, likely secondary to dehydration. We'll give her gentle hydration given her history of systolic congestive heart failure. Fluid bolus of 500 mL.   3. History of hypertension: Monitor blood pressure, call systolic blood pressure less than 1 40 mmHg, labetalol 5 mg IV when necessary for systolic blood pressure more than 140, hold all antihypertensive medications for now due to hypotension. 4. Coronary artery disease status post PCI in October 2018 : Patient is on Lipitor 80 mg, Brilinta 90 mg twice a day, Toprol-XL 25 mg once a day,Entresto 49/51 mg twice a day. 5. History of chronic systolic congestive heart failure status post AICD placement in March 2019  6. History of chronic systolic congestive heart failure with ejection fraction 25%: Continue Lipitor 80 mg nightly, at home patient is on Toprol-XL 25 mg daily, ENTRESTO 49-51 milligram twice a day, Lasix 40 mg twice a day  7. History of atrial fibrillation: Patient is on Eliquis 5 mg twice a day. Hold off on Eliquis for now. 8. We will consult cardiology for recommendations regarding anticoagulation. 9. DVT prophylaxis: EPC cuffs  10. PT/OT  11. Discharge planning consult case management    Rehan Villareal MD      Department of Internal Medicine  [facility]         4/3/2019, 2:59 AM    Attestation and add on       I have discussed the care of Reji Banks , including pertinent history and exam findings,    today with the resident. I have seen and examined the patient and the key elements of all parts of the encounter have been performed by me . I agree with the assessment, plan and orders as documented by the resident. Principal Problem:    SDH (subdural hematoma) (HCC)  Active Problems:    Lymphedema of both lower extremities    Coronary artery disease involving native coronary artery of native heart without angina pectoris    Hypotension    Dizziness    Dyslipidemia    Hypertension    Chronic atrial fibrillation (HCC)    S/P implantation of automatic cardioverter/defibrillator (AICD)    History of percutaneous coronary intervention  Resolved Problems:    * No resolved hospital problems. *       Neurosurgery notes noted      ---- ;     [address]. Phone [phone]   Fax: [phone]  Answering Service: [phone]

## 2019-04-03 NOTE — PROGRESS NOTES
Smoking Cessation - topics covered   []  Health Risks  []  Benefits of Quitting   []  Smoking Cessation  [x]  Patient has no history of tobacco use  []  Patient is former smoker. [x]  No need for tobacco cessation education. []  Booklet given  []  Patient verbalizes understanding. []  Patient denies need for tobacco cessation education. []  Unable to meet with patient today. Will follow up as able.   Luigi Vines  9:58 AM

## 2019-04-03 NOTE — PROGRESS NOTES
Occupational Therapy   Occupational Therapy Initial Assessment  Date: 4/3/2019   Patient Name: Carlos Cranker  MRN: [id number]     : 1950    Date of Service: 4/3/2019    Discharge Recommendations: Further therapy recommended at discharge. Pt coming from a SNF, pt doing well this date. OT Equipment Recommendations  Equipment Needed: Yes  ADL Assistive Devices: Sock-Aid Hard;Transfer Tub Bench;Long-handled Shoe Horn;Long-handled Sponge    Assessment   Performance deficits / Impairments: Decreased functional mobility ; Decreased high-level IADLs;Decreased ADL status; Decreased endurance;Decreased balance  Prognosis: Good  Decision Making: Medium Complexity  Patient Education: Safety awareness, OTPOC, log roll technique to reduce back pain-good return  REQUIRES OT FOLLOW UP: Yes  Activity Tolerance  Activity Tolerance: Patient Tolerated treatment well;Patient limited by fatigue  Safety Devices  Safety Devices in place: Yes  Type of devices: All fall risk precautions in place; Left in bed;Nurse notified;Call light within reach;Gait belt  Restraints  Initially in place: No        Patient Diagnosis(es): The primary encounter diagnosis was SDH (subdural hematoma) ([address] A diagnosis of Hypotension, unspecified hypotension type was also pertinent to this visit. has a past medical history of Acute on chronic systolic congestive heart failure ([address] Anxiety, Cardiomyopathy ([address] Depression, H/O heart artery stent, [place] (hard of hearing), Hyperlipidemia, Hypertension, Hypertensive urgency, and Leg swelling.   has a past surgical history that includes Cataract removal; Corneal transplant; Nasal sinus surgery; eye surgery; Tonsillectomy and adenoidectomy; and Cardiac defibrillator placement. Restrictions  Restrictions/Precautions  Restrictions/Precautions: General Precautions, Fall Risk  Required Braces or Orthoses?: No  Position Activity Restriction  Other position/activity restrictions:  Up with assist, CTLS clear    Subjective   General  Patient assessed for rehabilitation services?: Yes  Family / Caregiver Present: No  Diagnosis: L SDH, CHF  Pain Assessment  Pain Assessment: 0-10  Patient's Stated Pain Goal: 3  Pain Type: Acute pain  Pain Location: Back  Pain Orientation: Lower, Mid  Pain Descriptors: Dull, Aching, Stabbing  Pain Frequency: Intermittent  Non-Pharmaceutical Pain Intervention(s): Distraction, Ambulation, Increased activity  Response to Pain Intervention: Patient Satisfied  Oxygen Therapy  SpO2: 98 %  O2 Device: None (Room air)  Social/Functional History  Social/Functional History  Lives With: Spouse(and 42 yo son)  Type of Home: House  Home Layout: Two level, Able to Live on Main level with bedroom/bathroom, Performs ADL's on one level  Home Access: Stairs to enter with rails  Entrance Stairs - Number of Steps: 6  Entrance Stairs - Rails: Both  Bathroom Shower/Tub: Tub/Shower unit  Bathroom Toilet: Standard  Home Equipment: 4 wheeled walker(Only used for community mobility PRN)  ADL Assistance: Independent  Homemaking Assistance: Independent  Ambulation Assistance: Independent  Transfer Assistance: Independent  Active : No  Patient's  Info: Family drives- pt states she hasn't driven since recent eye surgeries  Mode of Transportation: Family  Occupation: Retired  Leisure & Hobbies: Twitter, reading, 3 cats  Additional Comments: Pt reports that she came here from a SNF ([facility]), and has only been there for 4 days per recent decline in mobility. The above information is obtained prior to her stay at the SNF. Pt reports that her plan is to return to the SNF upon discharge, and then return home upon return to [facility].      Objective   Vision: Impaired  Vision Exceptions: Wears glasses at all times  Hearing: Exceptions to [facility]  Hearing Exceptions: Bilateral hearing aid    Orientation  Overall Orientation Status: Within Functional Limits     Balance  Sitting Balance: Modified independent   Standing

## 2019-04-03 NOTE — CARE COORDINATION
Case Management Initial Discharge Plan  Carlos Cranker,             Met with:patient to discuss discharge plans. Information verified: address, contacts, phone number, , insurance Yes  PCP: No primary care provider on file. Date of last visit:     Insurance Provider: [employer] medicare    Discharge Planning    Living Arrangements:      Support Systems:  Family Members    Home has 2 stories  6 stairs to climb to get into front door, 12stairs to climb to reach second floor  Location of bedroom/bathroom in home on 1st floor    Patient able to perform ADL's:Independent    Current Services (outpatient & in home) from [facility]  DME equipment: walker  DME provider: Tyra    Pharmacy: [facility]   Potential Assistance Purchasing Medications:  No  Does patient want to participate in local refill/ meds to beds program?  Not Assessed    Potential Assistance Needed:  N/A    Patient agreeable to home care: No  [place] of choice provided:  n/a    Prior SNF/Rehab Placement and Facility: [facility]  Agreeable to SNF/Rehab: Yes  [place] of choice provided: yes   Evaluation: no    Expected Discharge date:  19  Patient expects to be discharged to:  [facility]  Follow Up Appointment: Best Day/ Time: Thursday AM    Transportation provider: chele  Transportation arrangements needed for discharge: Yes    Readmission Risk              Risk of Unplanned Readmission:        14             Does patient have a readmission risk score greater than 14?: No  If yes, follow-up appointment must be made within 7 days of discharge. Discharge Plan: plan is to return to [facility].           Electronically signed by Melia Beach RN on 4/3/19 at 4:42 PM

## 2019-04-03 NOTE — PROGRESS NOTES
[address] [facility] Wound Ostomy  Nurse  Consult Note       NAME:  [address] [place] [address]  S RECORD NUMBER:  [id number]  AGE: 76 y.o. GENDER: female  : 1950  TODAY'S DATE:  4/3/2019    Subjective   Reason for [id number] Nurse Evaluation and Assessment:   Lower extremity venous disease with venous ulcers to bilateral legs. Lymphedema. Recently seen at [facility] for same wounds; also presented with fluid overload and weeping edema. Wound Identification:  Wound Type: venous  Contributing Factors: edema, venous stasis, decreased mobility, obesity, decreased tissue oxygenation, poor hygiene and non-adherence      Patient reports long history of LEVD. Has been prescribed compression garments.  Did not obtain due to cost. Does utilize a 10-15 mmhg hose she purchased OTC.          Objective    BP (!) 111/52   Pulse 83   Temp 97.4 °F (36.3 °C) (Oral)   Resp 16   Ht 5' 2\" (1.575 m)   Wt 171 lb (77.6 kg)   SpO2 98%   BMI 31.28 kg/m²     LABS:  WBC:    Lab Results   Component Value Date    WBC 7.1 2019     H/H:    Lab Results   Component Value Date    HGB 12.1 2019    HCT 41.4 2019     PTT:    Lab Results   Component Value Date    APTT 25.8 2019   [APTT}  PT/INR:    Lab Results   Component Value Date    PROTIME 11.0 2019    INR 1.1 2019     HgBA1c:    Lab Results   Component Value Date    LABA1C 5.7 2017       Assessment   David Risk Score: David Scale Score: 17    Patient Active Problem List   Diagnosis Code    Leg swelling M79.89    Hypertensive urgency I16.0    Acute on chronic systolic congestive heart failure (HCC) I50.23    Acute on chronic systolic congestive heart failure (HCC) I50.23    Lymphedema of both lower extremities I89.0    Cellulitis of right leg L03.115    Coronary artery disease involving native coronary artery of native heart without angina pectoris I25.10    Major depressive disorder F32.9    New onset a-fib (HCC) I48.91    NSTEMI (non-ST elevated myocardial infarction) ([address] I21.4    Acute on chronic congestive heart failure ([facility]) I50.9    Panic disorder F41.0    SDH (subdural hematoma) ([facility]) S06.5X9A    Hypotension I95.9    Dizziness R42    Dyslipidemia E78.5    Chronic systolic heart failure ([facility]) I50.22    Coronary atherosclerosis of native coronary artery I25.10    Hypertension I10    Chronic atrial fibrillation ([facility]) I48.2    S/P implantation of automatic cardioverter/defibrillator (AICD) Z95.810    History of percutaneous coronary intervention Z98.890       Measurements:  Wound 03/22/19 Leg Right; Lower;Medial (Active)   Wound Image   4/3/2019  2:26 PM   Wound Venous 4/3/2019  2:26 PM   Dressing Status Clean; Intact;Dry 4/3/2019  8:00 AM   Dressing Changed Changed/New 3/29/2019  3:00 PM   Dressing/Treatment Foam 4/3/2019  2:26 PM   Wound Cleansed Not Cleansed 3/28/2019  4:00 AM   Dressing Change Due 04/05/19 4/3/2019  2:26 PM   Wound Length (cm) 5 cm 4/3/2019  2:26 PM   Wound Width (cm) 5 cm 4/3/2019  2:26 PM   Wound Depth (cm) 0.1 cm 4/3/2019  2:26 PM   Wound Surface Area (cm^2) 25 cm^2 4/3/2019  2:26 PM   Change in Wound Size % (l*w) 74.75 4/3/2019  2:26 PM   Wound Volume (cm^3) 2.5 cm^3 4/3/2019  2:26 PM   Wound Healing % 87 4/3/2019  2:26 PM   Wound Assessment Dusky; Red 4/3/2019  2:26 PM   Drainage Amount Scant 4/3/2019  2:26 PM   Drainage Description Serosanguinous 4/3/2019  2:26 PM   Odor None 4/3/2019  8:00 AM   Susanne-wound Assessment Hyperpigmented;Edema 4/3/2019  2:26 PM        Number of days: 12       Wound 03/22/19 Leg Right; Lower; Posterior; Lateral (Active)   Wound Image   4/3/2019  2:26 PM   Wound Venous 4/3/2019  2:26 PM   Dressing Status Clean;Dry; Intact 4/3/2019  8:00 AM   Dressing Changed Changed/New 4/3/2019  2:26 PM   Dressing/Treatment Foam 4/3/2019  2:26 PM   Wound Cleansed Rinsed/Irrigated with saline 3/27/2019  4:00 PM   Dressing Change Due 04/05/19 4/3/2019  2:26 PM   Wound Length (cm) 0.6 cm 4/3/2019  2:26 PM   Wound Bariatric  [] Total Pressure Relief  [] Other:     Current Diet: DIET GENERAL;    Discharge Plan:  Placement for patient upon discharge: home with support        Patient/Caregiver Teaching:  Level of patient/caregiver understanding able to:   [] Indicates understanding       [] Needs reinforcement  [] Unsuccessful      [x] Verbal Understanding  [] Demonstrated understanding       [] No evidence of learning  [] Refused teaching         [] N/A

## 2019-04-04 VITALS
OXYGEN SATURATION: 97 % | SYSTOLIC BLOOD PRESSURE: 138 MMHG | HEART RATE: 93 BPM | WEIGHT: 171 LBS | TEMPERATURE: 98.3 F | BODY MASS INDEX: 31.47 KG/M2 | RESPIRATION RATE: 16 BRPM | DIASTOLIC BLOOD PRESSURE: 75 MMHG | HEIGHT: 62 IN

## 2019-04-04 LAB
ABSOLUTE EOS #: 0.16 K/UL (ref 0–0.44)
ABSOLUTE IMMATURE GRANULOCYTE: 0.03 K/UL (ref 0–0.3)
ABSOLUTE LYMPH #: 1.43 K/UL (ref 1.1–3.7)
ABSOLUTE MONO #: 0.5 K/UL (ref 0.1–1.2)
ANION GAP SERPL CALCULATED.3IONS-SCNC: 11 MMOL/L (ref 9–17)
BASOPHILS # BLD: 1 % (ref 0–2)
BASOPHILS ABSOLUTE: 0.09 K/UL (ref 0–0.2)
BUN BLDV-MCNC: 22 MG/DL (ref 8–23)
BUN/CREAT BLD: ABNORMAL (ref 9–20)
CALCIUM SERPL-MCNC: 8.9 MG/DL (ref 8.6–10.4)
CHLORIDE BLD-SCNC: 106 MMOL/L (ref 98–107)
CO2: 24 MMOL/L (ref 20–31)
CREAT SERPL-MCNC: 0.84 MG/DL (ref 0.5–0.9)
DIFFERENTIAL TYPE: ABNORMAL
EKG ATRIAL RATE: 192 BPM
EKG Q-T INTERVAL: 392 MS
EKG QRS DURATION: 102 MS
EKG QTC CALCULATION (BAZETT): 471 MS
EKG R AXIS: -17 DEGREES
EKG T AXIS: 174 DEGREES
EKG VENTRICULAR RATE: 87 BPM
EOSINOPHILS RELATIVE PERCENT: 2 % (ref 1–4)
GFR AFRICAN AMERICAN: >60 ML/MIN
GFR NON-AFRICAN AMERICAN: >60 ML/MIN
GFR SERPL CREATININE-BSD FRML MDRD: ABNORMAL ML/MIN/{1.73_M2}
GFR SERPL CREATININE-BSD FRML MDRD: ABNORMAL ML/MIN/{1.73_M2}
GLUCOSE BLD-MCNC: 111 MG/DL (ref 70–99)
HCT VFR BLD CALC: 39.6 % (ref 36.3–47.1)
HEMOGLOBIN: 11.9 G/DL (ref 11.9–15.1)
IMMATURE GRANULOCYTES: 0 %
LYMPHOCYTES # BLD: 20 % (ref 24–43)
MCH RBC QN AUTO: 26.4 PG (ref 25.2–33.5)
MCHC RBC AUTO-ENTMCNC: 30.1 G/DL (ref 28.4–34.8)
MCV RBC AUTO: 88 FL (ref 82.6–102.9)
MONOCYTES # BLD: 7 % (ref 3–12)
NRBC AUTOMATED: 0 PER 100 WBC
PDW BLD-RTO: 16.7 % (ref 11.8–14.4)
PLATELET # BLD: 240 K/UL (ref 138–453)
PLATELET ESTIMATE: ABNORMAL
PMV BLD AUTO: 12.1 FL (ref 8.1–13.5)
POTASSIUM SERPL-SCNC: 4 MMOL/L (ref 3.7–5.3)
RBC # BLD: 4.5 M/UL (ref 3.95–5.11)
RBC # BLD: ABNORMAL 10*6/UL
SEG NEUTROPHILS: 70 % (ref 36–65)
SEGMENTED NEUTROPHILS ABSOLUTE COUNT: 5.08 K/UL (ref 1.5–8.1)
SODIUM BLD-SCNC: 141 MMOL/L (ref 135–144)
WBC # BLD: 7.3 K/UL (ref 3.5–11.3)
WBC # BLD: ABNORMAL 10*3/UL

## 2019-04-04 PROCEDURE — 97535 SELF CARE MNGMENT TRAINING: CPT

## 2019-04-04 PROCEDURE — 99233 SBSQ HOSP IP/OBS HIGH 50: CPT | Performed by: INTERNAL MEDICINE

## 2019-04-04 PROCEDURE — 94761 N-INVAS EAR/PLS OXIMETRY MLT: CPT

## 2019-04-04 PROCEDURE — 2580000003 HC RX 258: Performed by: STUDENT IN AN ORGANIZED HEALTH CARE EDUCATION/TRAINING PROGRAM

## 2019-04-04 PROCEDURE — 36415 COLL VENOUS BLD VENIPUNCTURE: CPT

## 2019-04-04 PROCEDURE — 6370000000 HC RX 637 (ALT 250 FOR IP): Performed by: STUDENT IN AN ORGANIZED HEALTH CARE EDUCATION/TRAINING PROGRAM

## 2019-04-04 PROCEDURE — 2580000003 HC RX 258: Performed by: INTERNAL MEDICINE

## 2019-04-04 PROCEDURE — 85025 COMPLETE CBC W/AUTO DIFF WBC: CPT

## 2019-04-04 PROCEDURE — 80048 BASIC METABOLIC PNL TOTAL CA: CPT

## 2019-04-04 RX ORDER — FUROSEMIDE 20 MG/1
20 TABLET ORAL 2 TIMES DAILY
Qty: 60 TABLET | Refills: 1 | Status: ON HOLD | OUTPATIENT
Start: 2019-04-04 | End: 2019-07-23 | Stop reason: HOSPADM

## 2019-04-04 RX ORDER — OXYBUTYNIN CHLORIDE 5 MG/1
5 TABLET ORAL 3 TIMES DAILY
Qty: 90 TABLET | Refills: 3 | Status: ON HOLD | OUTPATIENT
Start: 2019-04-04 | End: 2019-07-23 | Stop reason: HOSPADM

## 2019-04-04 RX ORDER — POTASSIUM CHLORIDE 750 MG/1
20 CAPSULE, EXTENDED RELEASE ORAL DAILY
Qty: 180 CAPSULE | Refills: 1 | Status: ON HOLD | OUTPATIENT
Start: 2019-04-04 | End: 2019-08-22 | Stop reason: SDUPTHER

## 2019-04-04 RX ORDER — MULTIVITAMIN WITH FOLIC ACID 400 MCG
1 TABLET ORAL DAILY
Qty: 30 TABLET | Refills: 0 | Status: ON HOLD | OUTPATIENT
Start: 2019-04-04 | End: 2019-08-22 | Stop reason: HOSPADM

## 2019-04-04 RX ORDER — SKIN PROTECTANT 44 G/100G
OINTMENT TOPICAL 2 TIMES DAILY
Qty: 1 TUBE | Refills: 1 | Status: SHIPPED | OUTPATIENT
Start: 2019-04-04 | End: 2019-04-29 | Stop reason: ALTCHOICE

## 2019-04-04 RX ORDER — ONDANSETRON 4 MG/1
4 TABLET, ORALLY DISINTEGRATING ORAL EVERY 6 HOURS PRN
Qty: 30 TABLET | Refills: 0 | Status: SHIPPED | OUTPATIENT
Start: 2019-04-04 | End: 2019-07-20 | Stop reason: ALTCHOICE

## 2019-04-04 RX ORDER — ATORVASTATIN CALCIUM 80 MG/1
80 TABLET, FILM COATED ORAL NIGHTLY
Qty: 30 TABLET | Refills: 3 | Status: SHIPPED | OUTPATIENT
Start: 2019-04-04

## 2019-04-04 RX ORDER — METOPROLOL SUCCINATE 25 MG/1
25 TABLET, EXTENDED RELEASE ORAL DAILY
Status: DISCONTINUED | OUTPATIENT
Start: 2019-04-04 | End: 2019-04-04 | Stop reason: HOSPADM

## 2019-04-04 RX ORDER — LORAZEPAM 0.5 MG/1
0.5 TABLET ORAL 3 TIMES DAILY
Qty: 5 TABLET | Refills: 0 | Status: SHIPPED | OUTPATIENT
Start: 2019-04-04 | End: 2019-04-14

## 2019-04-04 RX ORDER — OXYBUTYNIN CHLORIDE 5 MG/1
5 TABLET ORAL 3 TIMES DAILY
Status: DISCONTINUED | OUTPATIENT
Start: 2019-04-04 | End: 2019-04-04 | Stop reason: HOSPADM

## 2019-04-04 RX ADMIN — LORAZEPAM 0.5 MG: 0.5 TABLET ORAL at 09:05

## 2019-04-04 RX ADMIN — SERTRALINE 50 MG: 50 TABLET, FILM COATED ORAL at 09:05

## 2019-04-04 RX ADMIN — WHITE PETROLATUM: 1.75 OINTMENT TOPICAL at 09:06

## 2019-04-04 RX ADMIN — METOPROLOL SUCCINATE 25 MG: 25 TABLET, FILM COATED, EXTENDED RELEASE ORAL at 09:05

## 2019-04-04 RX ADMIN — OXYBUTYNIN CHLORIDE 5 MG: 5 TABLET ORAL at 09:05

## 2019-04-04 RX ADMIN — Medication 10 ML: at 09:06

## 2019-04-04 NOTE — PROGRESS NOTES
As per RN, she talked with Cardiology who is okay with stopping Anticoagulation for 1 week as per neurosurgery.      Vickie Gonzales MD      Department of Internal Medicine  [facility]         4/4/2019, 11:34 AM

## 2019-04-04 NOTE — PLAN OF CARE
Problem: Falls - Risk of:  Goal: Will remain free from falls  Description  Will remain free from falls  4/4/2019 1442 by Jesus Cherry RN  Outcome: Ongoing  4/4/2019 0559 by Agnieszka Strauss RN  Outcome: Met This Shift  Goal: Absence of physical injury  Description  Absence of physical injury  4/4/2019 1442 by Jesus Cherry RN  Outcome: Ongoing  4/4/2019 0559 by Agnieszka Strauss RN  Outcome: Met This Shift     Problem: Risk for Impaired Skin Integrity  Goal: Tissue integrity - skin and mucous membranes  Description  Structural intactness and normal physiological function of skin and  mucous membranes.   Outcome: Ongoing     Problem: Neurological  Goal: Maximum potential motor/sensory/cognitive function  Outcome: Ongoing

## 2019-04-04 NOTE — PROGRESS NOTES
[address]    PROGRESS NOTE             Date:   4/4/2019  Patient name:  Dunia Valentine  Date of admission:  4/2/2019  7:44 PM  MRN:   [id number]  YOB: 1950    CHIEF COMPLAINT     Hypotension     HISTORY OF PRESENT ILLNESS      The patient is a 76 y.o.  female with past medical history of hypertension, coronary artery disease status post PCI in October 2018, chronic congestive systolic heart failure status post AICD placement on 28 March 2019, anxiety, depression, bilateral lower extremity lymphedema who is admitted to the hospital for lightheadedness. Patient was at [facility] when the nurses noted the patient was hypotensive. She states having lightheadedness today. Denies any episodes of syncope, chest pain, shortness of breath, nausea, vomiting or diarrhea. She denies any noticeable active bleeding in the form of epistaxis, hematuria, or any blood in the stool. She went to [facility]. [facility] due to the complain of lightheadedness and hypotension. She denies any correlation of lightheadedness on getting up from sitting position. States that it comes and goes on its own. CT scan done there showed an acute subdural hygroma with thickened rehab or H or subacute subdural hematoma. She denies any trauma or falls. She recently had AICD placed in [facility] on 28 March 2019. She was switched from Coumadin to Eliquis at that time. Also to be related patient is on Brilinta at home after her PCI done in October 2018. She denies any similar episodes in the past.    Neurosurgery consult today, to withhold any antiplatelet or anticoagulants and to repeat CT head without contrast tomorrow. Patient currently denies any lightheadedness or dizziness on my examination. No neurological deficits present on exam.  She denies any numbness and tingling in any of the extremities.     Subjective : 04/04/19    Patient seen and examined at the bedside. No acute events overnight. She denies any more episodes of lightheadedness and dizziness. Good PO intake. Her BP has improved significantly after fluid hydration. Vitals : stable   Afebrile     PAST MEDICAL HISTORY       has a past medical history of Acute on chronic systolic congestive heart failure ([address] Anxiety, Cardiomyopathy ([address] Depression, H/O heart artery stent, [place] (hard of hearing), Hyperlipidemia, Hypertension, Hypertensive urgency, and Leg swelling. PAST SURGICAL HISTORY       has a past surgical history that includes Cataract removal; Corneal transplant; Nasal sinus surgery; eye surgery; Tonsillectomy and adenoidectomy; and Cardiac defibrillator placement. HOME MEDICATIONS        Prior to Admission medications    Medication Sig Start Date End Date Taking? Authorizing Provider   rosuvastatin (CRESTOR) 40 MG tablet Take 40 mg by mouth every evening   Yes Historical Provider, MD   sertraline (ZOLOFT) 50 MG tablet Take 1 tablet by mouth daily 3/30/19  Yes Saida Certain, DO   atorvastatin (LIPITOR) 80 MG tablet Take 1 tablet by mouth nightly 3/29/19  Yes Saida Certain, DO   furosemide (LASIX) 40 MG tablet Take 1 tablet by mouth 2 times daily 3/29/19  Yes Saida Certain, DO   Multiple Vitamin (MULTIVITAMIN) tablet Take 1 tablet by mouth daily 3/29/19  Yes Saida Certain, DO   apixaban (ELIQUIS) 5 MG TABS tablet Take 1 tablet by mouth 2 times daily 3/29/19  Yes Katy Tom APRN - CNP   [facility]) OINT ointment Apply topically 2 times daily Apply to [place]  Provider, MD   LORazepam (ATIVAN) 0.5 MG tablet Take 1 tablet by mouth three times daily for 30 days.  3/29/19 4/28/19  Christin Dias, DO   ondansetron (ZOFRAN-ODT) 4 MG disintegrating tablet Take 1 tablet by mouth every 6 hours as needed for Nausea 3/29/19   Saida Certain, DO   metoprolol succinate (TOPROL XL) 25 MG extended release tablet Take 1 tablet by mouth daily 3/30/19 Fareed Dias DO   sacubitril-valsartan (ENTRESTO) 49-51 MG per tablet Take 1 tablet by mouth 2 times daily 3/29/19   Fareed Dias DO   miconazole (MICOTIN) 2 % powder Apply topically 2 times daily. 3/29/19   Renetta Juarez DO   ticagrelor (BRILINTA) 90 MG TABS tablet Take 1 tablet by mouth 2 times daily 3/29/19   Renetta Juarez DO   oxybutynin (DITROPAN) 5 MG tablet Take 1 tablet by mouth 3 times daily 3/29/19   Renetta Juarez DO   potassium chloride (MICRO-K) 10 MEQ extended release capsule Take 2 capsules by mouth daily 2/8/18   Elise Muir DO       ALLERGIES      Patient has no known allergies. SOCIAL HISTORY       reports that she has never smoked. She has never used smokeless tobacco. She reports that she does not drink alcohol or use drugs. FAMILY HISTORY      family history is not on file. REVIEW OF SYSTEMS      · Constitutional: Negative for weight loss  · Eyes: Negative for visual changes, diplopia, scleral icterus. · ENT: Negative for Headaches, hearing loss, vertigo, mouth sores, sore throat. · Cardiovascular: negative  for lightheadedness ,chest pain, dyspnea on exertion, palpitations or loss of consciousness. · Respiratory: Negative for cough or wheezing, sputum production, hemoptysis, pleuritic pain. · Gastrointestinal: Negative for nausea/vomiting, change in bowel habits, abdominal pain, dysphagia/appetite loss, hematemesis, blood in stools. · Genitourinary:Negative for change in bladder habits, dysuria, trouble voiding, hematuria. · Musculoskeletal: Negative for gait disturbance, weakness, joint complaints. · Integumentary: Negative for rash, pruritis. · Neurological: Negative for headache, dizziness, change in muscle strength, numbness/tingling, change in gait, balance, coordination,   · Psychiatric: negative for change in mood, affect, memory, mentation, behavior.   · Endocrine: negative for temperature intolerance, excessive thirst, fluid intake, or urination, hours. Pancreatic functions:No results for input(s): LACTA, AMYLASE in the last 72 hours. S. Lactic Acid: No results for input(s): LACTA in the last 72 hours. Cardiac enzymes:No results for input(s): CKTOTAL, CKMB, CKMBINDEX, TROPONINI in the last 72 hours. BNP:No results for input(s): BNP in the last 72 hours. Lipid profile: No results for input(s): CHOL, TRIG, HDL, LDLCALC in the last 72 hours. Invalid input(s): LDL  Blood Gases: No results found for: PH, PCO2, PO2, HCO3, O2SAT  Thyroid functions:   Lab Results   Component Value Date    TSH 2.14 03/22/2019        Imaging/Diagonstics:      CXR: No acute cardiopulmonary findings. Cardiomegaly    CT head without contrast: April 2, 2019    Left frontoparietal extra-axial collection may represent a chronic subdural   hematoma, acute subdural hygroma with secondary hemorrhage or subacute   subdural hematoma.  Please correlate exam findings and history.       Mild-to-moderate patchy white matter changes suggestive chronic small vessel   ischemic disease. ASSESSMENT     Principal Problem:    SDH (subdural hematoma) (HCC)  Active Problems:    Lymphedema of both lower extremities    Coronary artery disease involving native coronary artery of native heart without angina pectoris    Hypotension    Dizziness    Dyslipidemia    Hypertension    Chronic atrial fibrillation (HCC)    S/P implantation of automatic cardioverter/defibrillator (AICD)    History of percutaneous coronary intervention  Resolved Problems:    * No resolved hospital problems. *    PLAN      1. Acute subdural hematoma: repeat CTH does not show an increase in size of hematoma, to hold anticoagulation as per neurosurgery for 7 days, cardiology consulted. patient denies any new neurological deficits. 2.  lightheadedness: likely due to dehydration , improved with fluids.      3. History of hypertension: Monitor blood pressure, will start back the patient on Toprol XL, if the BP tolerates, will resume back the [place]. Patient's lasix was recently increased to 40 mg BID after discharge on 03/29/2019, will cut back on the dose and have her follow up with cardiology. 4. Coronary artery disease status post PCI in October 2018 : Patient is on Lipitor 80 mg, Brilinta 90 mg twice a day, Toprol-XL 25 mg once a day,Entresto 49/51 mg twice a day. 5. History of chronic systolic congestive heart failure status post AICD placement in March 2019  6. History of chronic systolic congestive heart failure with ejection fraction 25%: Continue Lipitor 80 mg nightly, at home patient is on Toprol-XL 25 mg daily, ENTRESTO 49-51 milligram twice a day, Lasix 40 mg twice a day. 7. History of atrial fibrillation: Patient is on Eliquis 5 mg twice a day. Hold off on Eliquis for now. 8. DVT prophylaxis: EPC cuffs  9. PT/OT  10. Discharge planning : patient to be discharged back to [facility], will discharge today. Repeat CTH WO contrast as per neurosurgery, to hold off on anticoagulation for 7 days as per them. Nilson Flores MD      Department of Internal Medicine  [facility]         4/4/2019, 10:18 AM      Attestation and add on       I have discussed the care of Shira Hudson , including pertinent history and exam findings,    today with the resident. I have seen and examined the patient and the key elements of all parts of the encounter have been performed by me . I agree with the assessment, plan and orders as documented by the resident.      Principal Problem:    SDH (subdural hematoma) (HCC)  Active Problems:    Lymphedema of both lower extremities    Coronary artery disease involving native coronary artery of native heart without angina pectoris    Hypotension    Dizziness    Dyslipidemia    Hypertension    Chronic atrial fibrillation (HCC)    S/P implantation of automatic cardioverter/defibrillator (AICD)    History of percutaneous coronary intervention  Resolved Problems:    * No resolved hospital problems. *            ---- ;        Overall  course ;                                   are improving over time. [address] [place] [address], [address] [place] [address].    Phone [phone]   Fax: [phone]  Answering Service: [phone]

## 2019-04-04 NOTE — PROGRESS NOTES
ADL  Grooming: Setup;Supervision; Increased time to complete  UE Bathing: Setup; Increased time to complete;Supervision  LE Bathing: Setup;Minimal assistance; Increased time to complete  UE Dressing: Setup; Increased time to complete;Supervision  LE Dressing: Setup; Increased time to complete;Maximum assistance(saúl hose and footies donned) pt used special bath wipes for B LE and placed prescribed lotion on B LE's d/t pt's B LE very dry/open wounds ( wound care on board addressing the wounds)   Additional Comments: pt completed ADLs seated on EOB   Balance  Sitting Balance: Supervision  Standing Balance: Stand by assistance  Standing Balance  Sit to stand: Stand by assistance  Stand to sit: Stand by assistance  Bed mobility  Supine to Sit: Stand by assistance  Sit to Supine: Stand by assistance  Scooting: Stand by assistance  Transfers  Sit to stand: Stand by assistance  Stand to sit: Stand by assistance  Attendance  Participation: Active participation       Plan   Plan  Times per week: 3-4x  Current Treatment Recommendations: Balance Training, Functional Mobility Training, Endurance Training, Equipment Evaluation, Education, & procurement, Patient/Caregiver Education & Training, Self-Care / ADL, Home Management Training, Safety Education & Training       Goals  Short term goals  Time Frame for Short term goals: Pt will by discharge   Short term goal 1: demo ADL UB/LB dressing/bathing activity seated with mod I  Short term goal 2: demo activity tolerance for 30 min  Short term goal 3: demo good safety awareness during func activity around room with mod I and RW  Short term goal 4: demo (I) with all bed mob/transfers  Short term goal 5: demo EC/WS tech's during func activity with 1 vc       Therapy Time   Individual Concurrent Group Co-treatment   Time In  9:00         Time Out  10:02         Minutes                   NENA Lazcano/SHALINI

## 2019-04-04 NOTE — FLOWSHEET NOTE
Spoke on phone to Dr. Deep Ga with Cardiology concerning consult and suggestions for anticoagulation. He stated he would not be able to round until tomorrow since he already rounded here today. He stated he was agreeable to whatever Neurosurgery suggested with anticoagulation.

## 2019-04-04 NOTE — FLOWSHEET NOTE
Spoke on phone with Daniel Cook NP with Neurosurgery concerning plans for anticoagulation. Instructed him  Cardiology agreeable to whatever Neurosurgery recommends. Jamar Chatters stated that they want a repeat CT brain 1 week from initial CT. Based on results will decide on anticoagulation.

## 2019-04-05 NOTE — DISCHARGE SUMMARY
[address] [place] [address]     Department of Internal Medicine - Staff Internal Medicine Service    INPATIENT DISCHARGE SUMMARY        Patient Identification:    Tre Robertson is a 76 y.o. female. :  1950  MRN: [id number]     Acct: [de-identified]   Admit Date:  2019  Discharge date and time: 2019  7:43 PM   Attending Provider: No att. providers found                                     Admission Diagnoses:     SDH (subdural hematoma) ([facility] [address] [S06.5X9A]    Discharge Diagnoses:     Principal Problem:    SDH (subdural hematoma) ([facility] [address]  Active Problems:    Lymphedema of both lower extremities    Coronary artery disease involving native coronary artery of native heart without angina pectoris    Hypotension    Dizziness    Dyslipidemia    Hypertension    Chronic atrial fibrillation (HCC)    S/P implantation of automatic cardioverter/defibrillator (AICD)    History of percutaneous coronary intervention  Resolved Problems:    * No resolved hospital problems. *       Consults:   Neurosurgery     Brief Inpatient course: This is a 69-year-old female with past medical history of hypertension, CAD status post PCI in 2018, chronic congestive systolic heart failure status post AICD placed in 2019, presented with lightheadedness and was found to be hypotensive. CT head done which showed acute subdural hematoma. Neurosurgery was consulted, repeat CT the next day did not show any new bleeding. Patient's blood pressure improved with fluid resuscitation. She was restarted on her home dose of Toprol XL, her Lasix was decreased from 40 mg twice a day to 20 mg twice a day, and Entresto was stopped, to be followed up with her cardiologist as outpatient. Eliquis and Brilinta was stopped and patient to follow up with neurosurgery in 1 week with a repeat CT head. Patient was discharged back to [facility] in a stable condition.     Procedures:  None     Any Hospital Acquired Infections: none    Discharge Functional Status:  stable    Disposition: [facility]    Patient Instructions:   Discharge Medication List as of 4/4/2019  6:53 PM      CONTINUE these medications which have CHANGED    Details   furosemide (LASIX) 20 MG tablet Take 1 tablet by mouth 2 times daily, Disp-60 tablet, R-1Normal      ondansetron (ZOFRAN-ODT) 4 MG disintegrating tablet Take 1 tablet by mouth every 6 hours as needed for Nausea, Disp-30 tablet, R-0Normal      Multiple Vitamin (MULTIVITAMIN) tablet Take 1 tablet by mouth daily, Disp-30 tablet, R-0Normal      LORazepam (ATIVAN) 0.5 MG tablet Take 1 tablet by mouth three times daily for 30 doses. , Disp-5 tablet, R-0Print      oxybutynin (DITROPAN) 5 MG tablet Take 1 tablet by mouth 3 times daily, Disp-90 tablet, R-3Normal      sertraline (ZOLOFT) 50 MG tablet Take 1 tablet by mouth daily, Disp-30 tablet, R-1Normal      potassium chloride (MICRO-K) 10 MEQ extended release capsule Take 2 capsules by mouth daily, Disp-180 capsule, R-1Normal      Emollient (DERMAPHOR) OINT ointment Apply topically 2 times daily Apply to BLE, Topical, 2 TIMES DAILY Starting Thu 4/4/2019, Disp-1 Tube, R-1, Normal      atorvastatin (LIPITOR) 80 MG tablet Take 1 tablet by mouth nightly, Disp-30 tablet, R-3Normal      miconazole (MICOTIN) 2 % powder Apply topically 2 times daily. , Disp-45 g, R-1, Normal         CONTINUE these medications which have NOT CHANGED    Details   metoprolol succinate (TOPROL XL) 25 MG extended release tablet Take 1 tablet by mouth daily, Disp-30 tablet, R-3DC to [facility]         STOP taking these medications       rosuvastatin (CRESTOR) 40 MG tablet Comments:   Reason for Stopping:         sacubitril-valsartan (ENTRESTO) 49-51 MG per tablet Comments:   Reason for Stopping:         ticagrelor (BRILINTA) 90 MG TABS tablet Comments:   Reason for Stopping:         apixaban (ELIQUIS) 5 MG TABS tablet Comments:   Reason for Stopping:               Activity: activity as tolerated    Diet: cardiac diet    Follow-up:     Yoko Ratliff, DO  [address], [address]  [address]  [phone]    Schedule an appointment as soon as possible for a visit in 1 week  follow up for SDH, hospital discharge    Jolanta Levy MD  [facility], [address]    Schedule an appointment as soon as possible for a visit in 1 week  hospital discharge follow up, Entresto and ; Eliquis and Brilinta stopped due to hypotension and SDH respectively, lasix decreased due to hypotension      Follow up labs: none     Follow up imaging: CTH wo contrast in 1 week.      Note that over 30 minutes was spent in preparing discharge papers, discussing discharge with patient, medication review, etc.      Yinka Bond MD         Department of Internal Medicine  [facility]         4/5/2019, 9:00 AM

## 2019-04-08 ENCOUNTER — TELEPHONE (OUTPATIENT)
Dept: FAMILY MEDICINE CLINIC | Age: 69
End: 2019-04-08

## 2019-04-08 NOTE — TELEPHONE ENCOUNTER
ok
extremities     Cellulitis of right leg     Coronary artery disease involving native coronary artery of native heart without angina pectoris     Major depressive disorder     New onset a-fib ([facility])     NSTEMI (non-ST elevated myocardial infarction) ([facility] [address]     Acute on chronic congestive heart failure ([facility])     Panic disorder     SDH (subdural hematoma) ([facility])     Hypotension     Dizziness     Dyslipidemia     Chronic systolic heart failure ([facility])     Coronary atherosclerosis of native coronary artery     Hypertension     Chronic atrial fibrillation ([facility])     S/P implantation of automatic cardioverter/defibrillator (AICD)     History of percutaneous coronary intervention

## 2019-04-12 ENCOUNTER — HOSPITAL ENCOUNTER (OUTPATIENT)
Dept: CT IMAGING | Facility: CLINIC | Age: 69
Discharge: HOME | End: 2019-04-14
Payer: MEDICARE

## 2019-04-12 DIAGNOSIS — S06.5XAA SDH (SUBDURAL HEMATOMA): ICD-10-CM

## 2019-04-12 PROCEDURE — 70450 CT HEAD/BRAIN W/O DYE: CPT

## 2019-04-29 ENCOUNTER — APPOINTMENT (OUTPATIENT)
Dept: GENERAL RADIOLOGY | Age: 69
End: 2019-04-29
Payer: MEDICARE

## 2019-04-29 ENCOUNTER — APPOINTMENT (OUTPATIENT)
Dept: CT IMAGING | Age: 69
End: 2019-04-29
Payer: MEDICARE

## 2019-04-29 ENCOUNTER — HOSPITAL ENCOUNTER (INPATIENT)
Age: 69
LOS: 7 days | Discharge: NURSING, REHAB OR LONG-TERM CARE | DRG: 175 | End: 2019-05-06
Attending: INTERNAL MEDICINE | Admitting: INTERNAL MEDICINE
Payer: MEDICARE

## 2019-04-29 ENCOUNTER — HOSPITAL ENCOUNTER (EMERGENCY)
Age: 69
Discharge: ANOTHER HOSPITAL | End: 2019-04-29
Attending: EMERGENCY MEDICINE
Payer: MEDICARE

## 2019-04-29 VITALS
BODY MASS INDEX: 33.86 KG/M2 | RESPIRATION RATE: 20 BRPM | HEART RATE: 71 BPM | SYSTOLIC BLOOD PRESSURE: 133 MMHG | WEIGHT: 184 LBS | OXYGEN SATURATION: 100 % | HEIGHT: 62 IN | DIASTOLIC BLOOD PRESSURE: 82 MMHG | TEMPERATURE: 97.7 F

## 2019-04-29 DIAGNOSIS — I26.99 OTHER ACUTE PULMONARY EMBOLISM WITHOUT ACUTE COR PULMONALE (HCC): Primary | ICD-10-CM

## 2019-04-29 DIAGNOSIS — I50.9 CONGESTIVE HEART FAILURE, UNSPECIFIED HF CHRONICITY, UNSPECIFIED HEART FAILURE TYPE (HCC): ICD-10-CM

## 2019-04-29 DIAGNOSIS — L03.90 CELLULITIS, UNSPECIFIED CELLULITIS SITE: ICD-10-CM

## 2019-04-29 DIAGNOSIS — F41.0 PANIC DISORDER: Primary | ICD-10-CM

## 2019-04-29 LAB
ABSOLUTE EOS #: 0.1 K/UL (ref 0–0.4)
ABSOLUTE IMMATURE GRANULOCYTE: ABNORMAL K/UL (ref 0–0.3)
ABSOLUTE LYMPH #: 0.8 K/UL (ref 1–4.8)
ABSOLUTE MONO #: 0.4 K/UL (ref 0.2–0.8)
ANION GAP SERPL CALCULATED.3IONS-SCNC: 12 MMOL/L (ref 9–17)
ANION GAP SERPL CALCULATED.3IONS-SCNC: 17 MMOL/L (ref 9–17)
BASOPHILS # BLD: 0 % (ref 0–2)
BASOPHILS ABSOLUTE: 0 K/UL (ref 0–0.2)
BNP INTERPRETATION: ABNORMAL
BUN BLDV-MCNC: 23 MG/DL (ref 8–23)
BUN BLDV-MCNC: 26 MG/DL (ref 8–23)
BUN/CREAT BLD: 30 (ref 9–20)
BUN/CREAT BLD: ABNORMAL (ref 9–20)
CALCIUM SERPL-MCNC: 8 MG/DL (ref 8.6–10.4)
CALCIUM SERPL-MCNC: 8.9 MG/DL (ref 8.6–10.4)
CHLORIDE BLD-SCNC: 106 MMOL/L (ref 98–107)
CHLORIDE BLD-SCNC: 109 MMOL/L (ref 98–107)
CO2: 22 MMOL/L (ref 20–31)
CO2: 23 MMOL/L (ref 20–31)
CREAT SERPL-MCNC: 0.82 MG/DL (ref 0.5–0.9)
CREAT SERPL-MCNC: 0.88 MG/DL (ref 0.5–0.9)
D-DIMER QUANTITATIVE: 16.71 MG/L FEU
DIFFERENTIAL TYPE: ABNORMAL
EOSINOPHILS RELATIVE PERCENT: 1 % (ref 1–4)
GFR AFRICAN AMERICAN: >60 ML/MIN
GFR AFRICAN AMERICAN: >60 ML/MIN
GFR NON-AFRICAN AMERICAN: >60 ML/MIN
GFR NON-AFRICAN AMERICAN: >60 ML/MIN
GFR SERPL CREATININE-BSD FRML MDRD: ABNORMAL ML/MIN/{1.73_M2}
GLUCOSE BLD-MCNC: 109 MG/DL (ref 70–99)
GLUCOSE BLD-MCNC: 135 MG/DL (ref 70–99)
HCT VFR BLD CALC: 38.4 % (ref 36.3–47.1)
HCT VFR BLD CALC: 39.9 % (ref 36–46)
HEMOGLOBIN: 10.8 G/DL (ref 11.9–15.1)
HEMOGLOBIN: 12.6 G/DL (ref 12–16)
IMMATURE GRANULOCYTES: ABNORMAL %
LYMPHOCYTES # BLD: 8 % (ref 24–44)
MCH RBC QN AUTO: 26.2 PG (ref 25.2–33.5)
MCH RBC QN AUTO: 26.5 PG (ref 26–34)
MCHC RBC AUTO-ENTMCNC: 28.1 G/DL (ref 28.4–34.8)
MCHC RBC AUTO-ENTMCNC: 31.5 G/DL (ref 31–37)
MCV RBC AUTO: 84.1 FL (ref 80–100)
MCV RBC AUTO: 93 FL (ref 82.6–102.9)
MONOCYTES # BLD: 4 % (ref 1–7)
MYOGLOBIN: 122 NG/ML (ref 25–58)
NRBC AUTOMATED: 0.3 PER 100 WBC
NRBC AUTOMATED: ABNORMAL PER 100 WBC
PARTIAL THROMBOPLASTIN TIME: 21.5 SEC (ref 20.5–30.5)
PDW BLD-RTO: 19.9 % (ref 11.8–14.4)
PDW BLD-RTO: 21.1 % (ref 11.5–14.5)
PLATELET # BLD: 218 K/UL (ref 130–400)
PLATELET # BLD: ABNORMAL K/UL (ref 138–453)
PLATELET ESTIMATE: ABNORMAL
PLATELET, FLUORESCENCE: NORMAL K/UL (ref 138–453)
PLATELET, IMMATURE FRACTION: NORMAL % (ref 1.1–10.3)
PMV BLD AUTO: 9.7 FL (ref 6–12)
PMV BLD AUTO: ABNORMAL FL (ref 8.1–13.5)
POTASSIUM SERPL-SCNC: 3.6 MMOL/L (ref 3.7–5.3)
POTASSIUM SERPL-SCNC: 3.6 MMOL/L (ref 3.7–5.3)
PRO-BNP: ABNORMAL PG/ML
RBC # BLD: 4.13 M/UL (ref 3.95–5.11)
RBC # BLD: 4.74 M/UL (ref 4–5.2)
RBC # BLD: ABNORMAL 10*6/UL
SEG NEUTROPHILS: 87 % (ref 36–66)
SEGMENTED NEUTROPHILS ABSOLUTE COUNT: 8.6 K/UL (ref 1.8–7.7)
SODIUM BLD-SCNC: 144 MMOL/L (ref 135–144)
SODIUM BLD-SCNC: 145 MMOL/L (ref 135–144)
TROPONIN INTERP: ABNORMAL
TROPONIN T: ABNORMAL NG/ML
TROPONIN, HIGH SENSITIVITY: 42 NG/L (ref 0–14)
WBC # BLD: 7.6 K/UL (ref 3.5–11.3)
WBC # BLD: 9.9 K/UL (ref 3.5–11)
WBC # BLD: ABNORMAL 10*3/UL

## 2019-04-29 PROCEDURE — 96365 THER/PROPH/DIAG IV INF INIT: CPT

## 2019-04-29 PROCEDURE — 70450 CT HEAD/BRAIN W/O DYE: CPT

## 2019-04-29 PROCEDURE — 6370000000 HC RX 637 (ALT 250 FOR IP): Performed by: PHYSICIAN ASSISTANT

## 2019-04-29 PROCEDURE — 80048 BASIC METABOLIC PNL TOTAL CA: CPT

## 2019-04-29 PROCEDURE — 6360000002 HC RX W HCPCS: Performed by: NURSE PRACTITIONER

## 2019-04-29 PROCEDURE — 84484 ASSAY OF TROPONIN QUANT: CPT

## 2019-04-29 PROCEDURE — 6360000004 HC RX CONTRAST MEDICATION: Performed by: NURSE PRACTITIONER

## 2019-04-29 PROCEDURE — 71260 CT THORAX DX C+: CPT

## 2019-04-29 PROCEDURE — 36415 COLL VENOUS BLD VENIPUNCTURE: CPT

## 2019-04-29 PROCEDURE — 71045 X-RAY EXAM CHEST 1 VIEW: CPT

## 2019-04-29 PROCEDURE — 96367 TX/PROPH/DG ADDL SEQ IV INF: CPT

## 2019-04-29 PROCEDURE — 2060000000 HC ICU INTERMEDIATE R&B

## 2019-04-29 PROCEDURE — 6360000002 HC RX W HCPCS: Performed by: INTERNAL MEDICINE

## 2019-04-29 PROCEDURE — 83874 ASSAY OF MYOGLOBIN: CPT

## 2019-04-29 PROCEDURE — 85027 COMPLETE CBC AUTOMATED: CPT

## 2019-04-29 PROCEDURE — 99223 1ST HOSP IP/OBS HIGH 75: CPT | Performed by: INTERNAL MEDICINE

## 2019-04-29 PROCEDURE — 85730 THROMBOPLASTIN TIME PARTIAL: CPT

## 2019-04-29 PROCEDURE — 83880 ASSAY OF NATRIURETIC PEPTIDE: CPT

## 2019-04-29 PROCEDURE — 6360000002 HC RX W HCPCS: Performed by: PHYSICIAN ASSISTANT

## 2019-04-29 PROCEDURE — 99285 EMERGENCY DEPT VISIT HI MDM: CPT

## 2019-04-29 PROCEDURE — 93005 ELECTROCARDIOGRAM TRACING: CPT

## 2019-04-29 PROCEDURE — 2580000003 HC RX 258: Performed by: INTERNAL MEDICINE

## 2019-04-29 PROCEDURE — 93970 EXTREMITY STUDY: CPT

## 2019-04-29 PROCEDURE — 2580000003 HC RX 258: Performed by: NURSE PRACTITIONER

## 2019-04-29 PROCEDURE — 2500000003 HC RX 250 WO HCPCS: Performed by: NURSE PRACTITIONER

## 2019-04-29 PROCEDURE — 85025 COMPLETE CBC W/AUTO DIFF WBC: CPT

## 2019-04-29 PROCEDURE — 2580000003 HC RX 258: Performed by: PHYSICIAN ASSISTANT

## 2019-04-29 PROCEDURE — 85379 FIBRIN DEGRADATION QUANT: CPT

## 2019-04-29 PROCEDURE — 85055 RETICULATED PLATELET ASSAY: CPT

## 2019-04-29 RX ORDER — ONDANSETRON 4 MG/1
4 TABLET, ORALLY DISINTEGRATING ORAL EVERY 6 HOURS PRN
Status: DISCONTINUED | OUTPATIENT
Start: 2019-04-29 | End: 2019-05-06 | Stop reason: HOSPADM

## 2019-04-29 RX ORDER — HEPARIN SODIUM 1000 [USP'U]/ML
80 INJECTION, SOLUTION INTRAVENOUS; SUBCUTANEOUS PRN
Status: DISCONTINUED | OUTPATIENT
Start: 2019-04-29 | End: 2019-05-02

## 2019-04-29 RX ORDER — HEPARIN SODIUM 10000 [USP'U]/100ML
18 INJECTION, SOLUTION INTRAVENOUS CONTINUOUS
Status: DISCONTINUED | OUTPATIENT
Start: 2019-04-29 | End: 2019-05-01

## 2019-04-29 RX ORDER — SODIUM CHLORIDE 0.9 % (FLUSH) 0.9 %
10 SYRINGE (ML) INJECTION PRN
Status: DISCONTINUED | OUTPATIENT
Start: 2019-04-29 | End: 2019-04-29 | Stop reason: HOSPADM

## 2019-04-29 RX ORDER — ONDANSETRON 2 MG/ML
4 INJECTION INTRAMUSCULAR; INTRAVENOUS EVERY 6 HOURS PRN
Status: DISCONTINUED | OUTPATIENT
Start: 2019-04-29 | End: 2019-05-06 | Stop reason: HOSPADM

## 2019-04-29 RX ORDER — POTASSIUM CHLORIDE 7.45 MG/ML
10 INJECTION INTRAVENOUS PRN
Status: DISCONTINUED | OUTPATIENT
Start: 2019-04-29 | End: 2019-05-06 | Stop reason: HOSPADM

## 2019-04-29 RX ORDER — HEPARIN SODIUM 1000 [USP'U]/ML
80 INJECTION, SOLUTION INTRAVENOUS; SUBCUTANEOUS ONCE
Status: COMPLETED | OUTPATIENT
Start: 2019-04-29 | End: 2019-04-29

## 2019-04-29 RX ORDER — SODIUM CHLORIDE 0.9 % (FLUSH) 0.9 %
10 SYRINGE (ML) INJECTION EVERY 12 HOURS SCHEDULED
Status: DISCONTINUED | OUTPATIENT
Start: 2019-04-29 | End: 2019-05-06 | Stop reason: HOSPADM

## 2019-04-29 RX ORDER — ATORVASTATIN CALCIUM 80 MG/1
80 TABLET, FILM COATED ORAL NIGHTLY
Status: DISCONTINUED | OUTPATIENT
Start: 2019-04-29 | End: 2019-05-06 | Stop reason: HOSPADM

## 2019-04-29 RX ORDER — MAGNESIUM SULFATE 1 G/100ML
1 INJECTION INTRAVENOUS PRN
Status: DISCONTINUED | OUTPATIENT
Start: 2019-04-29 | End: 2019-05-06 | Stop reason: HOSPADM

## 2019-04-29 RX ORDER — NICOTINE 21 MG/24HR
1 PATCH, TRANSDERMAL 24 HOURS TRANSDERMAL DAILY PRN
Status: DISCONTINUED | OUTPATIENT
Start: 2019-04-29 | End: 2019-05-06 | Stop reason: HOSPADM

## 2019-04-29 RX ORDER — FUROSEMIDE 20 MG/1
20 TABLET ORAL 2 TIMES DAILY
Status: DISCONTINUED | OUTPATIENT
Start: 2019-04-29 | End: 2019-05-06 | Stop reason: HOSPADM

## 2019-04-29 RX ORDER — POTASSIUM CHLORIDE 20 MEQ/1
40 TABLET, EXTENDED RELEASE ORAL PRN
Status: DISCONTINUED | OUTPATIENT
Start: 2019-04-29 | End: 2019-05-06 | Stop reason: HOSPADM

## 2019-04-29 RX ORDER — SODIUM CHLORIDE 0.9 % (FLUSH) 0.9 %
10 SYRINGE (ML) INJECTION PRN
Status: DISCONTINUED | OUTPATIENT
Start: 2019-04-29 | End: 2019-05-06 | Stop reason: HOSPADM

## 2019-04-29 RX ORDER — OXYBUTYNIN CHLORIDE 5 MG/1
5 TABLET ORAL 3 TIMES DAILY
Status: DISCONTINUED | OUTPATIENT
Start: 2019-04-29 | End: 2019-05-06 | Stop reason: HOSPADM

## 2019-04-29 RX ORDER — POTASSIUM CHLORIDE 750 MG/1
20 CAPSULE, EXTENDED RELEASE ORAL DAILY
Status: DISCONTINUED | OUTPATIENT
Start: 2019-04-29 | End: 2019-05-06 | Stop reason: HOSPADM

## 2019-04-29 RX ORDER — HEPARIN SODIUM 1000 [USP'U]/ML
40 INJECTION, SOLUTION INTRAVENOUS; SUBCUTANEOUS PRN
Status: DISCONTINUED | OUTPATIENT
Start: 2019-04-29 | End: 2019-05-02

## 2019-04-29 RX ORDER — METOPROLOL SUCCINATE 25 MG/1
25 TABLET, EXTENDED RELEASE ORAL DAILY
Status: DISCONTINUED | OUTPATIENT
Start: 2019-04-29 | End: 2019-05-06 | Stop reason: HOSPADM

## 2019-04-29 RX ORDER — 0.9 % SODIUM CHLORIDE 0.9 %
80 INTRAVENOUS SOLUTION INTRAVENOUS ONCE
Status: COMPLETED | OUTPATIENT
Start: 2019-04-29 | End: 2019-04-29

## 2019-04-29 RX ORDER — ACETAMINOPHEN 325 MG/1
650 TABLET ORAL EVERY 4 HOURS PRN
Status: DISCONTINUED | OUTPATIENT
Start: 2019-04-29 | End: 2019-05-06 | Stop reason: HOSPADM

## 2019-04-29 RX ADMIN — Medication 10 ML: at 12:48

## 2019-04-29 RX ADMIN — HEPARIN SODIUM 6680 UNITS: 1000 INJECTION INTRAVENOUS; SUBCUTANEOUS at 21:54

## 2019-04-29 RX ADMIN — VANCOMYCIN HYDROCHLORIDE 2000 MG: 1 INJECTION, POWDER, LYOPHILIZED, FOR SOLUTION INTRAVENOUS at 15:56

## 2019-04-29 RX ADMIN — OXYBUTYNIN CHLORIDE 5 MG: 5 TABLET ORAL at 21:47

## 2019-04-29 RX ADMIN — IOPAMIDOL 75 ML: 755 INJECTION, SOLUTION INTRAVENOUS at 12:48

## 2019-04-29 RX ADMIN — FUROSEMIDE 20 MG: 20 TABLET ORAL at 19:29

## 2019-04-29 RX ADMIN — POTASSIUM CHLORIDE 20 MEQ: 10 CAPSULE, COATED, EXTENDED RELEASE ORAL at 19:29

## 2019-04-29 RX ADMIN — TAZOBACTAM SODIUM AND PIPERACILLIN SODIUM 3.38 G: 375; 3 INJECTION, SOLUTION INTRAVENOUS at 15:06

## 2019-04-29 RX ADMIN — SODIUM CHLORIDE 80 ML: 0.9 INJECTION, SOLUTION INTRAVENOUS at 12:48

## 2019-04-29 RX ADMIN — Medication 10 ML: at 22:02

## 2019-04-29 RX ADMIN — ATORVASTATIN CALCIUM 80 MG: 80 TABLET, FILM COATED ORAL at 21:47

## 2019-04-29 RX ADMIN — PIPERACILLIN AND TAZOBACTAM 3.38 G: 3; .375 INJECTION, POWDER, FOR SOLUTION INTRAVENOUS at 23:00

## 2019-04-29 RX ADMIN — HEPARIN SODIUM AND DEXTROSE 18 UNITS/KG/HR: 10000; 5 INJECTION INTRAVENOUS at 21:55

## 2019-04-29 ASSESSMENT — ENCOUNTER SYMPTOMS
SINUS PRESSURE: 0
SHORTNESS OF BREATH: 1
CONSTIPATION: 0
NAUSEA: 0
ABDOMINAL PAIN: 0
COLOR CHANGE: 0
WHEEZING: 0
COUGH: 1
DIARRHEA: 0
RHINORRHEA: 0
VOMITING: 0
SORE THROAT: 0

## 2019-04-29 ASSESSMENT — PAIN DESCRIPTION - DESCRIPTORS: DESCRIPTORS: ACHING

## 2019-04-29 ASSESSMENT — PAIN SCALES - GENERAL
PAINLEVEL_OUTOF10: 0
PAINLEVEL_OUTOF10: 3

## 2019-04-29 ASSESSMENT — PAIN DESCRIPTION - LOCATION: LOCATION: GENERALIZED

## 2019-04-29 ASSESSMENT — PAIN DESCRIPTION - PAIN TYPE: TYPE: ACUTE PAIN

## 2019-04-29 NOTE — PROGRESS NOTES
and clinical response. Thank you for the consult. Will continue to follow.     Jacques Bravo PharmD, BCPS 4/29/2019 6:58 PM

## 2019-04-29 NOTE — PROGRESS NOTES
Direct Admit from [facility] AND [facility] in [place], oriented to room telemetry monitor on.   Electronically signed by Tin Hendrickson RN on 4/29/2019 at 5:47 PM

## 2019-04-29 NOTE — H&P
[facility]    HISTORY AND PHYSICAL EXAMINATION            Date:   4/29/2019  Patient name:  Shira To  Date of admission:  4/29/2019  4:44 PM  MRN:   [id number]  Account:  [de-identified]  YOB: 1950  PCP:    No primary care provider on file. Room:   [address] [place] [address]  Code Status:    Full Code    Chief Complaint:     Cough and shortness of breath    History Obtained From:     patient, electronic medical record    History of Present Illness:     Admitted to [facility]. [facility]'s ER with following history from mid-level provider and ER attending Dr. Donovan Reynoso;    Shira To is a 76 y.o. female who presents to the emergency department today by private vehicle for evaluation of a cough and shortness of breath. She states that for the last 1-2 weeks she has had a cough and shortness of breath. She states that she is short of breath with rest and exertion. She just recently had a defibrillator placed like a month ago. She also states that she is nauseous without any vomiting. She denies any fevers or chills. Was supposed to see Dr. Jessica Strong in the office today for an ICD check but she felt so ill that she came to the emergency room for evaluation. CT scan of the chest shows small pulmonary emboli in the right upper and lower lobes. There is no sign of right heart strain. She also has underlying congestive heart failure. Patient has cellulitis of both lower extremities which are chronically edematous with some superficial sores. CT scan of the brain shows interval resolution of her subdural hematoma which has shrunk in size. Patient is referred to the hospitalist service at [facility] for admission and I also spoke with Dr. Bong Flowers who has been consulted for neurosurgery management. He feels that she does not have any absolute contraindication to heparinization at this time.   Patient's findings and disposition also discussed with Dr. Aguilar Salgado at that facility who wants anticoagulation held for now. Patient is started on IV Zosyn and IV vancomycin for cellulitis in her legs. Venous Dopplers of the legs were negative for DVT. Patient states her leg swelling has worsened for the last 2 weeks since she is not ambulating because of current illness but has this swelling and infection for quite a while  She denies any fever or chills. Past Medical History:     Past Medical History:   Diagnosis Date    Acute on chronic systolic congestive heart failure ([address] 8/8/2017    Anxiety     Cardiomyopathy [address] [address]     Depression     H/O heart artery stent     [place] (hard of hearing)     Hyperlipidemia     Hypertension     Hypertensive urgency 8/7/2017    Leg swelling 8/7/2017        Past Surgical History:slightly distended lady with pulmonary embolism she has some cellulitis extending ER doctor      Past Surgical History:   Procedure Laterality Date    CARDIAC DEFIBRILLATOR PLACEMENT      CATARACT REMOVAL      CORNEAL TRANSPLANT      EYE SURGERY      NASAL SINUS SURGERY      TONSILLECTOMY AND ADENOIDECTOMY          Medications Prior to Admission:     Prior to Admission medications    Medication Sig Start Date End Date Taking?  Authorizing Provider   furosemide (LASIX) 20 MG tablet Take 1 tablet by mouth 2 times daily 4/4/19   Odell Bowden MD   ondansetron (ZOFRAN-ODT) 4 MG disintegrating tablet Take 1 tablet by mouth every 6 hours as needed for Nausea 4/4/19   Catrina Malave MD   Multiple Vitamin (MULTIVITAMIN) tablet Take 1 tablet by mouth daily 4/4/19   Catrina Malave MD   oxybutynin (DITROPAN) 5 MG tablet Take 1 tablet by mouth 3 times daily 4/4/19   Catrina Malave MD   sertraline (ZOLOFT) 50 MG tablet Take 1 tablet by mouth daily 4/4/19   Catrina Malave MD   potassium chloride (MICRO-K) 10 MEQ extended release capsule Take 2 capsules by mouth daily 4/4/19   Catrina Malave MD   atorvastatin (LIPITOR) 80 MG tablet Take 1 tablet by mouth nightly 19   Gilson Whitten MD   metoprolol succinate (TOPROL XL) 25 MG extended release tablet Take 1 tablet by mouth daily 3/30/19   Rica Guadarrama DO        Allergies:     Patient has no known allergies. Social History:     Tobacco:    reports that she has never smoked. She has never used smokeless tobacco.  Alcohol:      reports that she does not drink alcohol. Drug Use:  reports that she does not use drugs. Family History:     No family history on file. Review of Systems:     Positive and Negative as described in HPI. CONSTITUTIONAL:  negative for fevers, chills, sweats, fatigue, weight loss  HEENT:  negative for vision, hearing changes, runny nose, throat pain  RESPIRATORY:  + for shortness of breath, cough, congestion. CARDIOVASCULAR:  negative for chest pain, palpitations  GASTROINTESTINAL:  negative for nausea, vomiting, diarrhea, constipation, change in bowel habits, abdominal pain   GENITOURINARY:  negative for difficulty of urination, burning with urination, frequency   INTEGUMENT:  negative for rash, skin lesions, easy bruising   HEMATOLOGIC/LYMPHATIC:  + for bilateral lower extremity redness/swelling/edema   ALLERGIC/IMMUNOLOGIC:  negative for urticaria , itching  ENDOCRINE:  negative increase in drinking, increase in urination, hot or cold intolerance  MUSCULOSKELETAL:  negative joint pains, muscle aches, swelling of joints  NEUROLOGICAL:  negative for headaches, dizziness, lightheadedness, numbness, pain, tingling extremities  BEHAVIOR/PSYCH:  negative for depression, anxiety    Physical Exam:   /61   Pulse 84   Temp 98.6 °F (37 °C) (Temporal)   Resp 20   Ht 5' 2\" (1.575 m)   Wt 184 lb (83.5 kg)   BMI 33.65 kg/m²   Temp (24hrs), Av °F (36.7 °C), Min:97.6 °F (36.4 °C), Max:98.6 °F (37 °C)    No results for input(s): POCGLU in the last 72 hours.   No intake or output data in the 24 hours ending [id number]    General Appearance:  alert, well appearing, and in mild distress  Mental status: oriented to person, place, and time with normal affect  Head:  normocephalic, atraumatic. Eye: no icterus, redness, pupils equal and reactive, extraocular eye movements intact, conjunctiva clear  Ear: normal external ear, no discharge, hearing intact  Nose:  no drainage noted  Mouth: mucous membranes moist  Neck: supple, no carotid bruits, thyroid not palpable  Lungs: Bilateral equal air entry, clear to ausculation, no wheezing, rales or rhonchi, normal effort  Cardiovascular: normal rate, Irregular rhythm, no murmur, gallop, rub,+ AICD in place.   Abdomen: Soft, nontender, nondistended, normal bowel sounds, no hepatomegaly or splenomegaly  Neurologic: There are no new focal motor or sensory deficits, normal muscle tone and bulk, no abnormal sensation, normal speech, cranial nerves II through XII grossly intact  Skin: No gross lesions, rashes, bruising or bleeding on exposed skin area  Extremities:  + Extensive redness and swelling of both lower extremities extending just above the knees with some superficial wounds on both lower legs  Psych: normal affect    Investigations:      Laboratory Testing:  Recent Results (from the past 24 hour(s))   CBC Auto Differential    Collection Time: 04/29/19 11:29 AM   Result Value Ref Range    WBC 9.9 3.5 - 11.0 k/uL    RBC 4.74 4.0 - 5.2 m/uL    Hemoglobin 12.6 12.0 - 16.0 g/dL    Hematocrit 39.9 36 - 46 %    MCV 84.1 80 - 100 fL    MCH 26.5 26 - 34 pg    MCHC 31.5 31 - 37 g/dL    RDW 21.1 (H) 11.5 - 14.5 %    Platelets 265 511 - 171 k/uL    MPV 9.7 6.0 - 12.0 fL    NRBC Automated NOT REPORTED per 100 WBC    Differential Type NOT REPORTED     Immature Granulocytes NOT REPORTED 0 %    Absolute Immature Granulocyte NOT REPORTED 0.00 - 0.30 k/uL    WBC Morphology NOT REPORTED     RBC Morphology NOT REPORTED     Platelet Estimate NOT REPORTED     Seg Neutrophils 87 (H) 36 - 66 %    Lymphocytes 8 (L) 24 - 44 %    Monocytes 4 1 - 7 %    Eosinophils % 1 1 - 4 %    Basophils 0 0 - 2 %    Segs Absolute 8.60 (H) 1.8 - 7.7 k/uL    Absolute Lymph # 0.80 (L) 1.0 - 4.8 k/uL    Absolute Mono # 0.40 0.2 - 0.8 k/uL    Absolute Eos # 0.10 0.0 - 0.4 k/uL    Basophils # 0.00 0.0 - 0.2 k/uL   Basic Metabolic Panel    Collection Time: 04/29/19 11:29 AM   Result Value Ref Range    Glucose 109 (H) 70 - 99 mg/dL    BUN 26 (H) 8 - 23 mg/dL    CREATININE 0.88 0.50 - 0.90 mg/dL    Bun/Cre Ratio 30 (H) 9 - 20    Calcium 8.9 8.6 - 10.4 mg/dL    Sodium 145 (H) 135 - 144 mmol/L    Potassium 3.6 (L) 3.7 - 5.3 mmol/L    Chloride 106 98 - 107 mmol/L    CO2 22 20 - 31 mmol/L    Anion Gap 17 9 - 17 mmol/L    GFR Non-African American >60 >60 mL/min    GFR African American >60 >60 mL/min    GFR Comment          GFR Staging NOT REPORTED    Trop/Myoglobin    Collection Time: 04/29/19 11:29 AM   Result Value Ref Range    Troponin, High Sensitivity 42 (H) 0 - 14 ng/L    Troponin T NOT REPORTED <0.03 ng/mL    Troponin Interp NOT REPORTED     Myoglobin 122 (H) 25 - 58 ng/mL   Brain Natriuretic Peptide    Collection Time: 04/29/19 11:29 AM   Result Value Ref Range    Pro-BNP 14,260 (H) <300 pg/mL    BNP Interpretation Pro-BNP Reference Range:    D-Dimer, Quantitative    Collection Time: 04/29/19 11:29 AM   Result Value Ref Range    D-Dimer, Quant 16.71 mg/L FEU   EKG 12 Lead    Collection Time: 04/29/19 11:40 AM   Result Value Ref Range    Ventricular Rate 78 BPM    Atrial Rate 68 BPM    QRS Duration 102 ms    Q-T Interval 432 ms    QTc Calculation (Bazett) 492 ms    R Axis -40 degrees    T Axis 163 degrees       Imaging/Diagnostics:    Ct Head Wo Contrast    Result Date: 4/29/2019  EXAMINATION: CT OF THE HEAD WITHOUT CONTRAST  4/29/2019 1:47 pm TECHNIQUE: CT of the head was performed without the administration of intravenous contrast. Dose modulation, iterative reconstruction, and/or weight based adjustment of the mA/kV was utilized to reduce the radiation dose to as low as reasonably achievable. COMPARISON: 04/12/2019 HISTORY: ORDERING SYSTEM PROVIDED HISTORY: previous blled TECHNOLOGIST PROVIDED HISTORY: FINDINGS: BRAIN/VENTRICLES: Previously identified extra-axial blood products about the left parietal region near the vertex is again demonstrated and smaller. This measures up to 6 mm in thickness (previously 8 mm). This has also decreased in attenuation. No new or acute appearing blood products are appreciated. Cortical atrophy and white matter changes in the brain are again demonstrated. No new findings identified in the brain. ORBITS: The visualized portion of the orbits demonstrate no acute abnormality. SINUSES: The visualized paranasal sinuses and mastoid air cells demonstrate no acute abnormality. SOFT TISSUES/SKULL:  No acute abnormality of the visualized skull or soft tissues. Continued reduction in size of extra-axial hematoma about the left parietal vertex measuring up to 6 mm in thickness. No new abnormality identified. Ct Head Wo Contrast    Result Date: 4/12/2019  EXAMINATION: CT OF THE HEAD WITHOUT CONTRAST  4/12/2019 10:53 am TECHNIQUE: CT of the head was performed without the administration of intravenous contrast. Dose modulation, iterative reconstruction, and/or weight based adjustment of the mA/kV was utilized to reduce the radiation dose to as low as reasonably achievable. COMPARISON: CT head April 3, 2019 and April 2, 2019 HISTORY: ORDERING SYSTEM PROVIDED HISTORY: SDH (subdural hematoma) ([address] TECHNOLOGIST PROVIDED HISTORY: follow up for SDH Ordering Physician Provided Reason for Exam: F/U for subdural hematoma Acuity: Chronic Type of Exam: Subsequent/Follow-up FINDINGS: BRAIN/VENTRICLES: There is a mixed aged high convexity extra-axial hematoma along the left frontal/parietal convexity measuring up to 8.4 mm in maximal thickness, compared to 10 mm on April 3, 2019 and 7.7 mm on April 2, 2019. There is no mass effect or midline shift.  There is mild parenchymal volume loss.  There is periventricular white matter low attenuation, likely related to mild chronic microvascular disease. The gray-white differentiation is maintained without evidence of an acute infarct. There is no hydrocephalus. ORBITS: The visualized portion of the orbits demonstrate no acute abnormality. SINUSES: The visualized paranasal sinuses and mastoid air cells demonstrate no acute abnormality. SOFT TISSUES/SKULL:  No acute abnormality of the visualized skull or soft tissues. Mixed aged high convexity extra-axial hematoma along the left frontal/parietal convexity measuring up to 8.4 mm in maximal thickness, compared to 10 mm on April 3, 2019 and 7.7 mm on April 2, 2019. No midline shift. Continued short interval follow-up is recommended. Mild parenchymal volume loss. Mild chronic microvascular disease. Ct Head Wo Contrast    Result Date: 4/3/2019  EXAMINATION: CT OF THE HEAD WITHOUT CONTRAST  4/3/2019 9:17 am TECHNIQUE: CT of the head was performed without the administration of intravenous contrast. Dose modulation, iterative reconstruction, and/or weight based adjustment of the mA/kV was utilized to reduce the radiation dose to as low as reasonably achievable. COMPARISON: 04/02/2019. HISTORY: ORDERING SYSTEM PROVIDED HISTORY: INTRACRANIAL HEMORRHAGE TECHNOLOGIST PROVIDED HISTORY: Initial evaluation. FINDINGS: BRAIN/VENTRICLES: There is perhaps minimal increase in size in the extra-axial collection along the left parietal convexity measuring 10 mm in thickness. This previously measured 8 mm in thickness. There is slight decrease in the layering hyperdensity. No new focus of intracranial hemorrhage. There is no significant mass effect or midline shift. There are areas of hypoattenuation in the periventricular and subcortical white matter, which is nonspecific, but may represent chronic microvasvular ischemic change. There is minimal global parenchymal volume loss.   The gray-white differentiation appears maintained. Minimal atherosclerosis of the intracranial vasculature. ORBITS: The visualized portion of the orbits demonstrate no acute abnormality. SINUSES: The visualized paranasal sinuses and mastoid air cells demonstrate no acute abnormality. SOFT TISSUES/SKULL:  No acute abnormality of the visualized skull or soft tissues. 1. There is perhaps minimal increase in size in the extra-axial collection along the left parietal convexity with interval decrease in the layering hyperdensity. 2. No significant mass effect or midline shift. 3. No new intracranial hemorrhage identified. 4. Mild-to-moderate chronic microvascular ischemic changes. Ct Head Wo Contrast    Result Date: 4/2/2019  EXAMINATION: CT OF THE HEAD WITHOUT CONTRAST  4/2/2019 5:58 pm TECHNIQUE: CT of the head was performed without the administration of intravenous contrast. Dose modulation, iterative reconstruction, and/or weight based adjustment of the mA/kV was utilized to reduce the radiation dose to as low as reasonably achievable. COMPARISON: None. HISTORY: ORDERING SYSTEM PROVIDED HISTORY: dizziness, weakness TECHNOLOGIST PROVIDED HISTORY: FINDINGS: BRAIN/VENTRICLES: There is no acute intracranial hemorrhage, mass effect or midline shift. If there is a left frontal parietal extra-axial collection identified measuring 14 mm in transverse dimension on the axial images. Dependently layering hyperdensity relatively isodense to brain parenchyma. This could represent a subdural hygroma with secondary acute hemorrhage, evolving chronic hematoma, or a subdural hemorrhage in the eligio patient. .  The gray-white differentiation is maintained without evidence of an acute infarct. There is no evidence of hydrocephalus. Mild-to-moderate periventricular subcortical white matter hypoattenuation identified which is nonspecific but can be seen within the underlying vasculitis or chronic small vessel disease.   Intracranial vascular calcifications. ORBITS: The visualized portion of the orbits demonstrate no acute abnormality. SINUSES: The visualized paranasal sinuses and mastoid air cells demonstrate no acute abnormality. SOFT TISSUES/SKULL:  No acute abnormality of the visualized skull or soft tissues. Left frontoparietal extra-axial collection may represent a chronic subdural hematoma, acute subdural hygroma with secondary hemorrhage or subacute subdural hematoma. Please correlate exam findings and history. Mild-to-moderate patchy white matter changes suggestive chronic small vessel ischemic disease. Critical results were called by Dr. Ana Guerra to [facility] on 4/2/2019 at 18:23. Xr Chest Portable    Result Date: 4/29/2019  EXAMINATION: SINGLE XRAY VIEW OF THE CHEST 4/29/2019 11:47 am COMPARISON: April 2, 2018, March 29, 2019 HISTORY: ORDERING SYSTEM PROVIDED HISTORY: Chest Pain TECHNOLOGIST PROVIDED HISTORY: Chest Pain Ordering Physician Provided Reason for Exam: Port upright AP CXR - chest pain Acuity: Unknown Type of Exam: Unknown FINDINGS: Cardiac silhouette enlargement again noted. Left subclavian AICD in place. Bilateral perihilar and lower lung field alveolar and interstitial opacities have developed. No pneumothorax. No significant effusion. No acute osseous abnormality identified. Bilateral airspace disease has developed, which may represent edema versus inflammatory process or infection. Xr Chest Portable    Result Date: 4/2/2019  EXAMINATION: SINGLE XRAY VIEW OF THE CHEST 4/2/2019 2:56 pm COMPARISON: 03/29/2018 HISTORY: ORDERING SYSTEM PROVIDED HISTORY: weakness TECHNOLOGIST PROVIDED HISTORY: weakness Ordering Physician Provided Reason for Exam: Pt c/o weakness, AP UPRIGHT PORTABLE Acuity: Acute Type of Exam: Initial FINDINGS: There is a left subclavian ICD, with 3 lead wires, projecting in stable positions. Left heart border is prominent.   Hilar contours are normal. There is no focal airspace disease or pneumothorax. No acute cardiopulmonary disease. Stable cardiomegaly. Ct Chest Pulmonary Embolism W Contrast    Result Date: 4/29/2019  EXAMINATION: CTA OF THE CHEST 4/29/2019 12:31 pm TECHNIQUE: CTA of the chest was performed after the administration of intravenous contrast.  Multiplanar reformatted images are provided for review. MIP images are provided for review. Dose modulation, iterative reconstruction, and/or weight based adjustment of the mA/kV was utilized to reduce the radiation dose to as low as reasonably achievable. COMPARISON: None. HISTORY: ORDERING SYSTEM PROVIDED HISTORY: shortness of breath FINDINGS: Pulmonary Arteries: No central pulmonary embolus. Small peripheral right lower lobe pulmonary emboli. Right upper lobe pulmonary emboli. No definite evidence of right heart strain. No definite pulmonary infarct. Mediastinum: Severe cardiomegaly. Small pericardial effusion. No adenopathy. Lungs/pleura: Diffuse ground-glass opacities, septal thickening. No pleural effusions. Upper Abdomen: Right adrenal nodule. Soft Tissues/Bones: Left pacemaker. No axillary adenopathy. Lumbar spine degenerative changes. Right upper lobe and right lower lobe pulmonary emboli. Diffuse ground-glass opacities are nonspecific and may be related to pulmonary edema with infection or an inflammatory process not excluded. Critical results were called by Dr. Michael Last MD to Dr. Kiel Gu on 4/29/2019 at 13:03.      Vl Dup Lower Extremity Venous Bilateral    Result Date: 4/29/2019    [facility]  Vascular Lower Extremities DVT Study Procedure   Patient Name    Brit Bliss      Date of Study             04/29/2019                  [facility]   Date of Birth   1950   Gender                    Female   Age             76 year(s)   Race                         Room Number     SASHA   Corporate ID #  [id number]   Patient Acct #  [de-identified]   MR #            [id number]      [id number] Brenda Rodriguez   Accession #     [id number]    Interpreting Physician    Jie Berg   Referring Nurse              Referring Physician       ER NO FAMILY DR *  Practitioner  Procedure Type of Study:   Veins: Lower Extremities DVT Study, Venous Scan Lower Bilateral.  Indications for Study:R/O DVT. Patient Status:ER. Technical Quality:Limited visualization. Conclusions   Summary   No evidence of superficial or deep venous thrombosis in both lower  extremities. Multilevel edema noted. Signature   ----------------------------------------------------------------  Electronically signed by Brenda Rodriguez(Sonographer) on  04/29/2019 03:10 PM  ----------------------------------------------------------------   ----------------------------------------------------------------  Electronically signed by Avila Bragg(Interpreting  physician) on 04/29/2019 05:04 PM  ----------------------------------------------------------------  Findings:   Right Impression:                     Left Impression:  The common femoral, superficial       The common femoral, femoral,  femoral, popliteal, tibials and       popliteal, tibials and saphenous  saphenous veins are compressible with veins are compressible with normal  normal doppler responses. doppler responses. Thigh, calf and ankle edema noted. Thigh, calf, and ankle edema noted. Allergies   - Allergy:*No Known Allergies(Miscellaneous). Velocities are measured in cm/s ; Diameters are measured in cm Right Lower Extremities DVT Study Measurements Right 2D Measurements +------------------------------------+----------+---------------+----------+ ! Location                            ! Visualized! Compressibility! Thrombosis! +------------------------------------+----------+---------------+----------+ ! Common Femoral                      !Yes       ! Yes            ! None      ! +------------------------------------+----------+---------------+----------+ ! Prox Femoral !Yes       !Yes            ! None      ! +------------------------------------+----------+---------------+----------+ ! Mid Femoral                         !Yes       ! Yes            ! None      ! +------------------------------------+----------+---------------+----------+ ! Dist Femoral                        !Yes       ! Yes            ! None      ! +------------------------------------+----------+---------------+----------+ ! Deep Femoral                        !Yes       ! Yes            ! None      ! +------------------------------------+----------+---------------+----------+ ! Popliteal                           !Yes       ! Yes            ! None      ! +------------------------------------+----------+---------------+----------+ ! Sapheno Femoral Junction            ! Yes       ! Yes            ! None      ! +------------------------------------+----------+---------------+----------+ ! PTV                                 ! Yes       ! Yes            ! None      ! +------------------------------------+----------+---------------+----------+ ! Peroneal                            !Yes       ! Yes            ! None      ! +------------------------------------+----------+---------------+----------+ ! Gastroc                             ! Yes       ! Yes            ! None      ! +------------------------------------+----------+---------------+----------+ ! GSV Thigh                           ! Yes       ! Yes            ! None      ! +------------------------------------+----------+---------------+----------+ ! GSV Knee                            ! Yes       ! Yes            ! None      ! +------------------------------------+----------+---------------+----------+ ! GSV Ankle                           ! Yes       ! Yes            ! None      ! +------------------------------------+----------+---------------+----------+ ! SSV                                 ! Yes       ! Yes            ! None      ! +------------------------------------+----------+---------------+----------+ Right Doppler Measurements +---------------------------+------+------+--------------------------------+ ! Location                   ! Signal!Reflux! Reflux (msec)                   ! +---------------------------+------+------+--------------------------------+ ! Common Femoral             !Phasic!      !                                ! +---------------------------+------+------+--------------------------------+ ! Prox Femoral               !Phasic!      !                                ! +---------------------------+------+------+--------------------------------+ ! Popliteal                  !Phasic!      !                                ! +---------------------------+------+------+--------------------------------+ Left Lower Extremities DVT Study Measurements Left 2D Measurements +------------------------------------+----------+---------------+----------+ ! Location                            ! Visualized! Compressibility! Thrombosis! +------------------------------------+----------+---------------+----------+ ! Common Femoral                      !Yes       ! Yes            ! None      ! +------------------------------------+----------+---------------+----------+ ! Prox Femoral                        !Yes       ! Yes            ! None      ! +------------------------------------+----------+---------------+----------+ ! Mid Femoral                         !Yes       ! Yes            ! None      ! +------------------------------------+----------+---------------+----------+ ! Dist Femoral                        !Yes       ! Yes            ! None      ! +------------------------------------+----------+---------------+----------+ ! Deep Femoral                        !Yes       ! Yes            ! None      ! +------------------------------------+----------+---------------+----------+ ! Popliteal                           !Yes       ! Yes            ! None      ! +------------------------------------+----------+---------------+----------+ ! Sapheno Femoral Junction            ! Yes       ! Yes            ! None      ! +------------------------------------+----------+---------------+----------+ ! PTV                                 ! Yes       ! Yes            ! None      ! +------------------------------------+----------+---------------+----------+ ! Peroneal                            !Yes       ! Yes            ! None      ! +------------------------------------+----------+---------------+----------+ ! Gastroc                             ! Yes       ! Yes            ! None      ! +------------------------------------+----------+---------------+----------+ ! GSV Thigh                           ! Yes       ! Yes            ! None      ! +------------------------------------+----------+---------------+----------+ ! GSV Knee                            ! Yes       ! Yes            ! None      ! +------------------------------------+----------+---------------+----------+ ! GSV Ankle                           ! Yes       ! Yes            ! None      ! +------------------------------------+----------+---------------+----------+ ! SSV                                 ! Yes       ! Yes            ! None      ! +------------------------------------+----------+---------------+----------+ Left Doppler Measurements +---------------------------+------+------+--------------------------------+ ! Location                   ! Signal!Reflux! Reflux (msec)                   ! +---------------------------+------+------+--------------------------------+ ! Common Femoral             !Phasic!      !                                ! +---------------------------+------+------+--------------------------------+ ! Prox Femoral               !Phasic!      !                                ! +---------------------------+------+------+--------------------------------+ ! Popliteal                  !Phasic!      ! ! +---------------------------+------+------+--------------------------------+      Assessment :      Primary Problem  Pulmonary embolism, bilateral [facility])    Active Hospital Problems    Diagnosis Date Noted    Pulmonary embolism, bilateral ([facility] [I26.99] 04/29/2019     Priority: High    Cellulitis [L03.90] 04/29/2019     Priority: High    S/P implantation of automatic cardioverter/defibrillator (AICD) [Z95.810] 04/03/2019     Priority: High    SDH (subdural hematoma) ([facility] [S06.5X9A] 04/02/2019     Priority: High    Chronic atrial fibrillation ([facility] [I48.2] 04/03/2019     Priority: Medium    Lymphedema of both lower extremities [I89.0] 03/21/2019     Priority: Medium    Acute on chronic systolic congestive heart failure (HCC) [I50.23] 08/08/2017     Priority: Medium    Coronary atherosclerosis of native coronary artery [I25.10] 04/03/2019    Dyslipidemia [E78.5] 04/03/2019    Hypertension [I10] 04/03/2019       Plan:     Patient status Admit as inpatient in the  Progressive Unit/Step down    1. .  Start heparin drip, has been cleared by neurosurgery to start it  2. Continue Zosyn and vancomycin  3. Stat EKG and fax it to cardiology  4. Consult cardiology  5. Wound care consult  6. Monitor daily labs  7. Consult ID also    Consultations:   IP CONSULT TO NEUROSURGERY  PHARMACY TO DOSE VANCOMYCIN  IP CONSULT TO CARDIOLOGY     Patient is admitted as inpatient status because of co-morbidities listed above, severity of signs and symptoms as outlined, requirement for current medical therapies and most importantly because of direct risk to patient if care not provided in a hospital setting. Roderick Harada, MD  4/29/2019  6:52 PM    Copy sent to Dr. Last primary care provider on file.

## 2019-04-29 NOTE — PROGRESS NOTES
Emergency Department Pharmacist Note    Patient presents to the ED with complaints of:   Chief Complaint   Patient presents with    Fatigue    Nausea    Shortness of Breath     Patient was seen by the ED pharmacist and offered admission counseling, patient being transferred to [facility]. At this time the patient has no additional medication related problems or concerns. There is no need for further pharmacist intervention.     Counseled on Santosh Carcamo, Jose, [facility]  4/29/2019 3:59 PM

## 2019-04-29 NOTE — PROGRESS NOTES
Transitions of Care Pharmacy Service   Medication Review    The patient's list of current home medications has been reviewed. Her medications are prescribed in Epic. Source(s) of information: patient, Nbarijose, EPIC    Please feel free to call with any questions about this encounter. Thank you.     Kenji Gomez [facility]  Transitions of Care Pharmacy Service  Phone:  [phone]  Fax: [phone]            Prior to Admission medications    Medication Sig       furosemide (LASIX) 20 MG tablet Take 1 tablet by mouth 2 times daily       ondansetron (ZOFRAN-ODT) 4 MG disintegrating tablet Take 1 tablet by mouth every 6 hours as needed for Nausea       Multiple Vitamin (MULTIVITAMIN) tablet Take 1 tablet by mouth daily       oxybutynin (DITROPAN) 5 MG tablet Take 1 tablet by mouth 3 times daily       sertraline (ZOLOFT) 50 MG tablet Take 1 tablet by mouth daily       potassium chloride (MICRO-K) 10 MEQ extended release capsule Take 2 capsules by mouth daily       atorvastatin (LIPITOR) 80 MG tablet Take 1 tablet by mouth nightly                       metoprolol succinate (TOPROL XL) 25 MG extended release tablet Take 1 tablet by mouth daily

## 2019-04-29 NOTE — CONSULTS
Department of Neurosurgery                                       Resident Consult Note      Reason for Consult:  Prior subdural with new PE, anticoagulation recommendations  Requesting Physician:  Dr. Alesia Holcomb:   []Dr. Dian Dobbins    History Obtained From:  patient, electronic medical record    CHIEF COMPLAINT:         Shortness of breath. HISTORY OF PRESENT ILLNESS:       The patient is a 76 y.o. female who presents with shortness of breath, general malaise and URI symptoms for past 2 weeks. Patient with PMH HTN, CAD s/p PCI, CHF s/p AICD transferred here from outside hospital for further evaluation after being found to have pulmonary emboli. Recent admission in early April 2019 for subdural hematoma, therefore neurosurgery is consulted for anticoagulation recommendations. CT head today with [id number] reduction in size of extra-axial hematoma. Currently patient denies any headache, vision or speech changes, focal weakness numbness or tingling, dizziness or lightheadedness or recent falls or head injuries. Of note, the record states that patient was on Eliquis and Brilinta prior to her subdural hematoma but [facility].     PAST MEDICAL HISTORY :       Past Medical History:        Diagnosis Date    Acute on chronic systolic congestive heart failure ([address] 8/8/2017    Anxiety     Cardiomyopathy ([address]     Depression     H/O heart artery stent     Jena (hard of hearing)     Hyperlipidemia     Hypertension     Hypertensive urgency 8/7/2017    Leg swelling 8/7/2017       Past Surgical History:        Procedure Laterality Date    CARDIAC DEFIBRILLATOR PLACEMENT      CATARACT REMOVAL      CORNEAL TRANSPLANT      EYE SURGERY      NASAL SINUS SURGERY      TONSILLECTOMY AND ADENOIDECTOMY         Social History:   Social History     Socioeconomic History    Marital status:      Spouse name: Not on file    Number of children: Not on file    Years of education: Not on file    Highest education level: Not on file   Occupational History    Not on file   Social Needs    Financial resource strain: Not on file    Food insecurity:     Worry: Not on file     Inability: Not on file    Transportation needs:     Medical: Not on file     Non-medical: Not on file   Tobacco Use    Smoking status: Never Smoker    Smokeless tobacco: Never Used   Substance and Sexual Activity    Alcohol use: No    Drug use: No    Sexual activity: Not on file   Lifestyle    Physical activity:     Days per week: Not on file     Minutes per session: Not on file    Stress: Not on file   Relationships    Social connections:     Talks on phone: Not on file     Gets together: Not on file     Attends Christianity service: Not on file     Active member of club or organization: Not on file     Attends meetings of clubs or organizations: Not on file     Relationship status: Not on file    Intimate partner violence:     Fear of current or ex partner: Not on file     Emotionally abused: Not on file     Physically abused: Not on file     Forced sexual activity: Not on file   Other Topics Concern    Not on file   Social History Narrative    Not on file       Family History:   No family history on file. Allergies:  Patient has no known allergies. Home Medications:  Prior to Admission medications    Medication Sig Start Date End Date Taking?  Authorizing Provider   furosemide (LASIX) 20 MG tablet Take 1 tablet by mouth 2 times daily 4/4/19   Ophelia Persaud MD   ondansetron (ZOFRAN-ODT) 4 MG disintegrating tablet Take 1 tablet by mouth every 6 hours as needed for Nausea 4/4/19   Arnel Billings MD   Multiple Vitamin (MULTIVITAMIN) tablet Take 1 tablet by mouth daily 4/4/19   Arnel Billings MD   oxybutynin (DITROPAN) 5 MG tablet Take 1 tablet by mouth 3 times daily 4/4/19   Arnel Billings MD   sertraline (ZOLOFT) 50 MG tablet Take 1 tablet by mouth daily 4/4/19   Arnel Billings MD   potassium chloride ([address]) normal    Deep Tendon Reflexes:    Right Bicep:  1+  Left Bicep:  1+  Right Knee:  1+  Left Knee:  1+    Plantar Response:    Right:  equivocal  Left:  equivocal    Coordination/Dysmetria:  Finger to Nose:   Right:  normal  Left:  normal   Dysdiadochokinesia:  N/A    Gait:  Not tested   SKIN:  Cellulitis to bilateral lower extremities      LABS AND IMAGING:     CBC with Differential:    Lab Results   Component Value Date    WBC 9.9 04/29/2019    RBC 4.74 04/29/2019    HGB 12.6 04/29/2019    HCT 39.9 04/29/2019     04/29/2019    MCV 84.1 04/29/2019    MCH 26.5 04/29/2019    MCHC 31.5 04/29/2019    RDW 21.1 04/29/2019    LYMPHOPCT 8 04/29/2019    MONOPCT 4 04/29/2019    BASOPCT 0 04/29/2019    MONOSABS 0.40 04/29/2019    LYMPHSABS 0.80 04/29/2019    EOSABS 0.10 04/29/2019    BASOSABS 0.00 04/29/2019    DIFFTYPE NOT REPORTED 04/29/2019     BMP:    Lab Results   Component Value Date     04/29/2019    K 3.6 04/29/2019     04/29/2019    CO2 22 04/29/2019    BUN 26 04/29/2019    LABALBU 2.8 03/21/2019    CREATININE 0.88 04/29/2019    CALCIUM 8.9 04/29/2019    GFRAA >60 04/29/2019    LABGLOM >60 04/29/2019    GLUCOSE 109 04/29/2019       Radiology Review:     CT Head without contrast     Mixed aged high convexity extra-axial hematoma along the left   frontal/parietal convexity measuring up to 8.4 mm in maximal thickness,   compared to 10 mm on April 3, 2019 and 7.7 mm on April 2, 2019.  No midline   shift.  Continued short interval follow-up is recommended.       Mild parenchymal volume loss.       Mild chronic microvascular disease.          ASSESSMENT AND PLAN:       Patient Active Problem List   Diagnosis    Leg swelling    Hypertensive urgency    Acute on chronic systolic congestive heart failure (HCC)    Acute on chronic systolic congestive heart failure (HCC)    Lymphedema of both lower extremities    Cellulitis of right leg    Coronary artery disease involving native coronary artery of native heart without angina pectoris    Major depressive disorder    New onset a-fib ([address]    NSTEMI (non-ST elevated myocardial infarction) ([address]    Acute on chronic congestive heart failure (HCC)    Panic disorder    SDH (subdural hematoma) (HCC)    Hypotension    Dizziness    Dyslipidemia    Chronic systolic heart failure (HCC)    Coronary atherosclerosis of native coronary artery    Hypertension    Chronic atrial fibrillation (HCC)    S/P implantation of automatic cardioverter/defibrillator (AICD)    History of percutaneous coronary intervention    Pulmonary embolism, bilateral (HCC)         A/P:  This is a 76 y.o. female with right sided pulmonary emboli in setting of resolving extra-axial hematoma along left frontal/parietal convexity. Patient care will be discussed with attending, will reevaluate patient along with attending     - No neurosurgical interventions planned for now   - Neuro checks per protocol  - Ok to begin anticoagulation from neurosurgery stand point with no new abnormalities identified on CT head     Additional recommendations may follow    Please contact neurosurgery with any changes in patients neurologic status. Thank you for your consult.        Georgie Costa MD   NS pager [phone]  4/29/2019  5:55 PM

## 2019-04-29 NOTE — ED PROVIDER NOTES
[address] [place] [address] ED  eMERGENCY dEPARTMENT eNCOUnter      Pt Name: Eliza Dumont  MRN: [id number]  [place] 1950  Date of evaluation: 4/29/2019  Provider: Naveen Tucker NP, [facility]       Chief Complaint   Patient presents with    Fatigue    Nausea    Shortness of Breath         HISTORY OF PRESENT ILLNESS  (Location/Symptom, Timing/Onset, Context/Setting, Quality, Duration, Modifying Factors, Severity.)   Eliza Dumont is a 76 y.o. female who presents to the emergency department today by private vehicle for evaluation of a cough and shortness of breath. She states that for the last 1-2 weeks she has had a cough and shortness of breath. She states that she is short of breath with rest and exertion. She just recently had a defibrillator placed like a month ago. She also states that she is nauseous without any vomiting. She denies any fevers or chills. Was supposed to see Dr. Js Morales in the office today for an ICD check but she felt so ill that she came to the emergency room for evaluation. Nursing Notes were reviewed. ALLERGIES     Patient has no known allergies.     CURRENT MEDICATIONS       Previous Medications    ATORVASTATIN (LIPITOR) 80 MG TABLET    Take 1 tablet by mouth nightly    FUROSEMIDE (LASIX) 20 MG TABLET    Take 1 tablet by mouth 2 times daily    METOPROLOL SUCCINATE (TOPROL XL) 25 MG EXTENDED RELEASE TABLET    Take 1 tablet by mouth daily    MULTIPLE VITAMIN (MULTIVITAMIN) TABLET    Take 1 tablet by mouth daily    ONDANSETRON (ZOFRAN-ODT) 4 MG DISINTEGRATING TABLET    Take 1 tablet by mouth every 6 hours as needed for Nausea    OXYBUTYNIN (DITROPAN) 5 MG TABLET    Take 1 tablet by mouth 3 times daily    POTASSIUM CHLORIDE (MICRO-K) 10 MEQ EXTENDED RELEASE CAPSULE    Take 2 capsules by mouth daily    SERTRALINE (ZOLOFT) 50 MG TABLET    Take 1 tablet by mouth daily       PAST MEDICAL HISTORY         Diagnosis Date    Acute on chronic systolic congestive heart failure ([facility] [address] 8/8/2017    Anxiety     Cardiomyopathy ([facility] [address]     Depression     H/O heart artery stent     [place] (hard of hearing)     Hyperlipidemia     Hypertension     Hypertensive urgency 8/7/2017    Leg swelling 8/7/2017       SURGICAL HISTORY           Procedure Laterality Date    CARDIAC DEFIBRILLATOR PLACEMENT      CATARACT REMOVAL      CORNEAL TRANSPLANT      EYE SURGERY      NASAL SINUS SURGERY      TONSILLECTOMY AND ADENOIDECTOMY           FAMILY HISTORY     History reviewed. No pertinent family history. No family status information on file. SOCIAL HISTORY      reports that she has never smoked. She has never used smokeless tobacco. She reports that she does not drink alcohol or use drugs. REVIEW OF SYSTEMS    (2-9 systems for level 4, 10 or more for level 5)     Review of Systems   Constitutional: Positive for fatigue. Negative for chills, fever and unexpected weight change. HENT: Negative for congestion, rhinorrhea, sinus pressure and sore throat. Respiratory: Positive for cough and shortness of breath. Negative for wheezing. Cardiovascular: Negative for chest pain and palpitations. Gastrointestinal: Negative for abdominal pain, constipation, diarrhea, nausea and vomiting. Genitourinary: Negative for dysuria and hematuria. Musculoskeletal: Negative for arthralgias and myalgias. Skin: Negative for color change and rash. Neurological: Negative for dizziness, weakness and headaches. Hematological: Negative for adenopathy. Except as noted above the remainder of the review of systems was reviewed and negative. PHYSICAL EXAM    (up to 7 for level 4, 8 or more for level 5)     ED Triage Vitals [04/29/19 1115]   BP Temp Temp Source Pulse Resp SpO2 Height Weight   133/76 97.6 °F (36.4 °C) Oral 83 22 95 % 5' 2\" (1.575 m) 184 lb (83.5 kg)       Physical Exam   Constitutional: She is oriented to person, place, and time.  She appears well-developed and compared to 10 mm on April 3, 2019 and 7.7 mm on April 2, 2019. No midline shift. Continued short interval follow-up is recommended. Mild parenchymal volume loss. Mild chronic microvascular disease. Ct Head Wo Contrast    Result Date: 4/3/2019  EXAMINATION: CT OF THE HEAD WITHOUT CONTRAST  4/3/2019 9:17 am TECHNIQUE: CT of the head was performed without the administration of intravenous contrast. Dose modulation, iterative reconstruction, and/or weight based adjustment of the mA/kV was utilized to reduce the radiation dose to as low as reasonably achievable. COMPARISON: 04/02/2019. HISTORY: ORDERING SYSTEM PROVIDED HISTORY: INTRACRANIAL HEMORRHAGE TECHNOLOGIST PROVIDED HISTORY: Initial evaluation. FINDINGS: BRAIN/VENTRICLES: There is perhaps minimal increase in size in the extra-axial collection along the left parietal convexity measuring 10 mm in thickness. This previously measured 8 mm in thickness. There is slight decrease in the layering hyperdensity. No new focus of intracranial hemorrhage. There is no significant mass effect or midline shift. There are areas of hypoattenuation in the periventricular and subcortical white matter, which is nonspecific, but may represent chronic microvasvular ischemic change. There is minimal global parenchymal volume loss. The gray-white differentiation appears maintained. Minimal atherosclerosis of the intracranial vasculature. ORBITS: The visualized portion of the orbits demonstrate no acute abnormality. SINUSES: The visualized paranasal sinuses and mastoid air cells demonstrate no acute abnormality. SOFT TISSUES/SKULL:  No acute abnormality of the visualized skull or soft tissues. 1. There is perhaps minimal increase in size in the extra-axial collection along the left parietal convexity with interval decrease in the layering hyperdensity. 2. No significant mass effect or midline shift. 3. No new intracranial hemorrhage identified.  4. Mild-to-moderate chronic microvascular ischemic changes. Ct Head Wo Contrast    Result Date: 4/2/2019  EXAMINATION: CT OF THE HEAD WITHOUT CONTRAST  4/2/2019 5:58 pm TECHNIQUE: CT of the head was performed without the administration of intravenous contrast. Dose modulation, iterative reconstruction, and/or weight based adjustment of the mA/kV was utilized to reduce the radiation dose to as low as reasonably achievable. COMPARISON: None. HISTORY: ORDERING SYSTEM PROVIDED HISTORY: dizziness, weakness TECHNOLOGIST PROVIDED HISTORY: FINDINGS: BRAIN/VENTRICLES: There is no acute intracranial hemorrhage, mass effect or midline shift. If there is a left frontal parietal extra-axial collection identified measuring 14 mm in transverse dimension on the axial images. Dependently layering hyperdensity relatively isodense to brain parenchyma. This could represent a subdural hygroma with secondary acute hemorrhage, evolving chronic hematoma, or a subdural hemorrhage in the eligio patient. .  The gray-white differentiation is maintained without evidence of an acute infarct. There is no evidence of hydrocephalus. Mild-to-moderate periventricular subcortical white matter hypoattenuation identified which is nonspecific but can be seen within the underlying vasculitis or chronic small vessel disease. Intracranial vascular calcifications. ORBITS: The visualized portion of the orbits demonstrate no acute abnormality. SINUSES: The visualized paranasal sinuses and mastoid air cells demonstrate no acute abnormality. SOFT TISSUES/SKULL:  No acute abnormality of the visualized skull or soft tissues. Left frontoparietal extra-axial collection may represent a chronic subdural hematoma, acute subdural hygroma with secondary hemorrhage or subacute subdural hematoma. Please correlate exam findings and history. Mild-to-moderate patchy white matter changes suggestive chronic small vessel ischemic disease.  Critical results were called by Dr. Reuben Membreno to Veronica Valleswall on 4/2/2019 at 18:23. Xr Chest Portable    Result Date: 4/29/2019  EXAMINATION: SINGLE XRAY VIEW OF THE CHEST 4/29/2019 11:47 am COMPARISON: April 2, 2018, March 29, 2019 HISTORY: ORDERING SYSTEM PROVIDED HISTORY: Chest Pain TECHNOLOGIST PROVIDED HISTORY: Chest Pain Ordering Physician Provided Reason for Exam: Port upright AP CXR - chest pain Acuity: Unknown Type of Exam: Unknown FINDINGS: Cardiac silhouette enlargement again noted. Left subclavian AICD in place. Bilateral perihilar and lower lung field alveolar and interstitial opacities have developed. No pneumothorax. No significant effusion. No acute osseous abnormality identified. Bilateral airspace disease has developed, which may represent edema versus inflammatory process or infection. Xr Chest Portable    Result Date: 4/2/2019  EXAMINATION: SINGLE XRAY VIEW OF THE CHEST 4/2/2019 2:56 pm COMPARISON: 03/29/2018 HISTORY: ORDERING SYSTEM PROVIDED HISTORY: weakness TECHNOLOGIST PROVIDED HISTORY: weakness Ordering Physician Provided Reason for Exam: Pt c/o weakness, AP UPRIGHT PORTABLE Acuity: Acute Type of Exam: Initial FINDINGS: There is a left subclavian ICD, with 3 lead wires, projecting in stable positions. Left heart border is prominent. Hilar contours are normal. There is no focal airspace disease or pneumothorax. No acute cardiopulmonary disease. Stable cardiomegaly. Ct Chest Pulmonary Embolism W Contrast    Result Date: 4/29/2019  EXAMINATION: CTA OF THE CHEST 4/29/2019 12:31 pm TECHNIQUE: CTA of the chest was performed after the administration of intravenous contrast.  Multiplanar reformatted images are provided for review. MIP images are provided for review. Dose modulation, iterative reconstruction, and/or weight based adjustment of the mA/kV was utilized to reduce the radiation dose to as low as reasonably achievable. COMPARISON: None.  HISTORY: ORDERING SYSTEM PROVIDED HISTORY: shortness of breath FINDINGS: Pulmonary Arteries: No central pulmonary embolus. Small peripheral right lower lobe pulmonary emboli. Right upper lobe pulmonary emboli. No definite evidence of right heart strain. No definite pulmonary infarct. Mediastinum: Severe cardiomegaly. Small pericardial effusion. No adenopathy. Lungs/pleura: Diffuse ground-glass opacities, septal thickening. No pleural effusions. Upper Abdomen: Right adrenal nodule. Soft Tissues/Bones: Left pacemaker. No axillary adenopathy. Lumbar spine degenerative changes. Right upper lobe and right lower lobe pulmonary emboli. Diffuse ground-glass opacities are nonspecific and may be related to pulmonary edema with infection or an inflammatory process not excluded. Critical results were called by Dr. Tristen Reyes MD to Dr. Aamir Amato on 4/29/2019 at 13:03. Interpretation per the Radiologist below, if available at the time of this note:    CT Head WO Contrast   Final Result   Continued reduction in size of extra-axial hematoma about the left parietal   vertex measuring up to 6 mm in thickness. No new abnormality identified. CT Chest Pulmonary Embolism W Contrast   Final Result   Right upper lobe and right lower lobe pulmonary emboli. Diffuse ground-glass opacities are nonspecific and may be related to   pulmonary edema with infection or an inflammatory process not excluded. Critical results were called by Dr. Tristen Reyes MD to Dr. Aamir Amato on   4/29/2019 at 13:03. XR CHEST PORTABLE   Final Result   Bilateral airspace disease has developed, which may represent edema versus   inflammatory process or infection.          VL DUP LOWER EXTREMITY VENOUS BILATERAL    (Results Pending)           LABS:  Labs Reviewed   CBC WITH AUTO DIFFERENTIAL - Abnormal; Notable for the following components:       Result Value    RDW 21.1 (*)     Seg Neutrophils 87 (*)     Lymphocytes 8 (*)     Segs Absolute 8.60 (*)     Absolute Lymph # 0.80 (*)     All other components within normal limits   BASIC METABOLIC PANEL - Abnormal; Notable for the following components:    Glucose 109 (*)     BUN 26 (*)     Bun/Cre Ratio 30 (*)     Sodium 145 (*)     Potassium 3.6 (*)     All other components within normal limits   TROP/MYOGLOBIN - Abnormal; Notable for the following components:    Troponin, High Sensitivity 42 (*)     Myoglobin 122 (*)     All other components within normal limits   BRAIN NATRIURETIC PEPTIDE - Abnormal; Notable for the following components:    Pro-BNP 14,260 (*)     All other components within normal limits   D-DIMER, QUANTITATIVE       All other labs were within normal range or not returned as of this dictation. EMERGENCY DEPARTMENT COURSE and DIFFERENTIAL DIAGNOSIS/MDM:   Vitals:    Vitals:    04/29/19 1115 04/29/19 1403 04/29/19 1500 04/29/19 1554   BP: 133/76 137/64 135/72 133/82   Pulse: 83 70 71    Resp: 22 22 20    Temp: 97.6 °F (36.4 °C) 97.7 °F (36.5 °C)     TempSrc: Oral Oral     SpO2: 95%  100%    Weight: 184 lb (83.5 kg)      Height: 5' 2\" (1.575 m)          Medical Decision Making: Patient has a history of a bleed. There is concern about starting anticoagulation for this patient. She also has a significant cellulitis to her bilateral lower extremities. She was placed on vancomycin and Zosyn. Per the hospitalist they would like her transferred for because of her recent bleeding And need for anticoagulation. We spoke withThe hospitalist at [address] [place] [address]     Due to the high probability of sudden and clinically significant deterioration in the patient's condition she required highest level of my preparedness to intervene urgently. I provided critical care time including documentation time, medication orders and management, reevaluation, vital sign assessment, ordering and reviewing of of lab tests ordering and reviewing of x-ray studies, and admission orders.  Aggregate critical care time is 30 minutes including only time during which I was engaged in work directly related to her care and did not include time spent treating other patients simultaneously. CONSULTS:  IP CONSULT TO HOSPITALIST  IP CONSULT TO NEUROSURGERY      FINAL IMPRESSION      1. Other acute pulmonary embolism without acute cor pulmonale ([facility] [address]    2. Congestive heart failure, unspecified HF chronicity, unspecified heart failure type ([facility] [address]    3. Cellulitis, unspecified cellulitis site          DISPOSITION/PLAN   DISPOSITION Decision To Transfer 04/29/2019 03:03:34 PM      PATIENT REFERRED TO:   No follow-up provider specified.     DISCHARGE MEDICATIONS:     New Prescriptions    No medications on file           (Please note that portions of this note were completed with a voice recognition program.  Efforts were made to edit the dictations but occasionally words are mis-transcribed.)    [id number] NP, APRN - CNP  Certified Nurse Practitioner          PRICILA Celestin CNP  04/29/19 4496

## 2019-04-29 NOTE — ED NOTES
Pt states x 2 weeks started with a tickle in her throat, sinus drainage, green, productive cough, SOB has increased over length of cold. Pt had appt at Dr. Rhona Morales for a ICD check, just didn't think she could make it.   Lungs CTA     Matthew Piedra, RN  04/29/19 0468

## 2019-04-29 NOTE — ED PROVIDER NOTES
Attending Supervisory Note/Shared Visit   I have personally performed a face to face diagnostic evaluation on this patient. I have reviewed the mid-levels findings and agree. Patient reports a 2 week history of exertional dyspnea. CT scan of the chest shows small pulmonary emboli in the right upper and lower lobes. There is no sign of right heart strain. She also has underlying congestive heart failure. Patient has cellulitis of both lower extremities which are chronically edematous with some superficial sores. CT scan of the brain shows interval resolution of her subdural hematoma which has shrunk in size. Patient is referred to the hospitalist service at [facility] for admission and I also spoke with Dr. Simeon Crane who has been consulted for neurosurgery management. He feels that she does not have any absolute contraindication to heparinization at this time. Patient's findings and disposition also discussed with Dr. Liya Mohamud at that facility who wants anticoagulation held for now. Patient is started on IV Zosyn and IV vancomycin for cellulitis in her legs. Venous Dopplers of the legs were negative for DVT.       (Please note that portions of this note were completed with a voice recognition program.  Efforts were made to edit the dictations but occasionally words are mis-transcribed.)    Aimee Calvillo MD  Attending Emergency Physician        Aimee Calvillo MD  04/29/19 1406

## 2019-04-30 LAB
ANION GAP SERPL CALCULATED.3IONS-SCNC: 11 MMOL/L (ref 9–17)
BUN BLDV-MCNC: 23 MG/DL (ref 8–23)
BUN/CREAT BLD: ABNORMAL (ref 9–20)
CALCIUM SERPL-MCNC: 7.9 MG/DL (ref 8.6–10.4)
CHLORIDE BLD-SCNC: 109 MMOL/L (ref 98–107)
CO2: 21 MMOL/L (ref 20–31)
CREAT SERPL-MCNC: 0.89 MG/DL (ref 0.5–0.9)
EKG ATRIAL RATE: 68 BPM
EKG ATRIAL RATE: 91 BPM
EKG Q-T INTERVAL: 410 MS
EKG Q-T INTERVAL: 432 MS
EKG QRS DURATION: 102 MS
EKG QRS DURATION: 106 MS
EKG QTC CALCULATION (BAZETT): 492 MS
EKG QTC CALCULATION (BAZETT): 498 MS
EKG R AXIS: -40 DEGREES
EKG R AXIS: 83 DEGREES
EKG T AXIS: 163 DEGREES
EKG T AXIS: 180 DEGREES
EKG VENTRICULAR RATE: 78 BPM
EKG VENTRICULAR RATE: 89 BPM
GFR AFRICAN AMERICAN: >60 ML/MIN
GFR NON-AFRICAN AMERICAN: >60 ML/MIN
GFR SERPL CREATININE-BSD FRML MDRD: ABNORMAL ML/MIN/{1.73_M2}
GFR SERPL CREATININE-BSD FRML MDRD: ABNORMAL ML/MIN/{1.73_M2}
GLUCOSE BLD-MCNC: 117 MG/DL (ref 70–99)
HCT VFR BLD CALC: 36.5 % (ref 36.3–47.1)
HEMOGLOBIN: 10.1 G/DL (ref 11.9–15.1)
INR BLD: 1.3
MCH RBC QN AUTO: 26.4 PG (ref 25.2–33.5)
MCHC RBC AUTO-ENTMCNC: 27.7 G/DL (ref 28.4–34.8)
MCV RBC AUTO: 95.3 FL (ref 82.6–102.9)
NRBC AUTOMATED: 0.4 PER 100 WBC
PARTIAL THROMBOPLASTIN TIME: 100.1 SEC (ref 20.5–30.5)
PARTIAL THROMBOPLASTIN TIME: 41.4 SEC (ref 20.5–30.5)
PARTIAL THROMBOPLASTIN TIME: 45.8 SEC (ref 20.5–30.5)
PARTIAL THROMBOPLASTIN TIME: 59.3 SEC (ref 20.5–30.5)
PDW BLD-RTO: 19.9 % (ref 11.8–14.4)
PLATELET # BLD: 159 K/UL (ref 138–453)
PMV BLD AUTO: 11.3 FL (ref 8.1–13.5)
POTASSIUM SERPL-SCNC: 3.6 MMOL/L (ref 3.7–5.3)
PROTHROMBIN TIME: 13.6 SEC (ref 9–12)
RBC # BLD: 3.83 M/UL (ref 3.95–5.11)
SODIUM BLD-SCNC: 141 MMOL/L (ref 135–144)
WBC # BLD: 6.9 K/UL (ref 3.5–11.3)

## 2019-04-30 PROCEDURE — 80048 BASIC METABOLIC PNL TOTAL CA: CPT

## 2019-04-30 PROCEDURE — 2060000000 HC ICU INTERMEDIATE R&B

## 2019-04-30 PROCEDURE — 94762 N-INVAS EAR/PLS OXIMTRY CONT: CPT

## 2019-04-30 PROCEDURE — 85610 PROTHROMBIN TIME: CPT

## 2019-04-30 PROCEDURE — 6370000000 HC RX 637 (ALT 250 FOR IP): Performed by: PHYSICIAN ASSISTANT

## 2019-04-30 PROCEDURE — 36415 COLL VENOUS BLD VENIPUNCTURE: CPT

## 2019-04-30 PROCEDURE — 2580000003 HC RX 258: Performed by: INTERNAL MEDICINE

## 2019-04-30 PROCEDURE — 6360000002 HC RX W HCPCS: Performed by: PHYSICIAN ASSISTANT

## 2019-04-30 PROCEDURE — 99222 1ST HOSP IP/OBS MODERATE 55: CPT | Performed by: INTERNAL MEDICINE

## 2019-04-30 PROCEDURE — 6370000000 HC RX 637 (ALT 250 FOR IP): Performed by: NURSE PRACTITIONER

## 2019-04-30 PROCEDURE — 6360000002 HC RX W HCPCS: Performed by: INTERNAL MEDICINE

## 2019-04-30 PROCEDURE — 2700000000 HC OXYGEN THERAPY PER DAY

## 2019-04-30 PROCEDURE — 99232 SBSQ HOSP IP/OBS MODERATE 35: CPT | Performed by: INTERNAL MEDICINE

## 2019-04-30 PROCEDURE — 85730 THROMBOPLASTIN TIME PARTIAL: CPT

## 2019-04-30 PROCEDURE — 85027 COMPLETE CBC AUTOMATED: CPT

## 2019-04-30 PROCEDURE — 2580000003 HC RX 258: Performed by: PHYSICIAN ASSISTANT

## 2019-04-30 PROCEDURE — 99212 OFFICE O/P EST SF 10 MIN: CPT

## 2019-04-30 RX ORDER — HYDROCODONE BITARTRATE AND ACETAMINOPHEN 5; 325 MG/1; MG/1
2 TABLET ORAL EVERY 4 HOURS PRN
Status: DISCONTINUED | OUTPATIENT
Start: 2019-04-30 | End: 2019-05-06 | Stop reason: HOSPADM

## 2019-04-30 RX ORDER — HYDROCODONE BITARTRATE AND ACETAMINOPHEN 5; 325 MG/1; MG/1
1 TABLET ORAL EVERY 4 HOURS PRN
Status: DISCONTINUED | OUTPATIENT
Start: 2019-04-30 | End: 2019-05-06 | Stop reason: HOSPADM

## 2019-04-30 RX ADMIN — FUROSEMIDE 20 MG: 20 TABLET ORAL at 17:07

## 2019-04-30 RX ADMIN — OXYBUTYNIN CHLORIDE 5 MG: 5 TABLET ORAL at 13:32

## 2019-04-30 RX ADMIN — HEPARIN SODIUM 3340 UNITS: 1000 INJECTION INTRAVENOUS; SUBCUTANEOUS at 10:21

## 2019-04-30 RX ADMIN — ATORVASTATIN CALCIUM 80 MG: 80 TABLET, FILM COATED ORAL at 20:28

## 2019-04-30 RX ADMIN — Medication 10 ML: at 20:29

## 2019-04-30 RX ADMIN — SERTRALINE 50 MG: 50 TABLET, FILM COATED ORAL at 08:42

## 2019-04-30 RX ADMIN — OXYBUTYNIN CHLORIDE 5 MG: 5 TABLET ORAL at 08:44

## 2019-04-30 RX ADMIN — HYDROCODONE BITARTRATE AND ACETAMINOPHEN 1 TABLET: 5; 325 TABLET ORAL at 03:35

## 2019-04-30 RX ADMIN — PIPERACILLIN AND TAZOBACTAM 3.38 G: 3; .375 INJECTION, POWDER, FOR SOLUTION INTRAVENOUS at 07:41

## 2019-04-30 RX ADMIN — OXYBUTYNIN CHLORIDE 5 MG: 5 TABLET ORAL at 20:28

## 2019-04-30 RX ADMIN — POTASSIUM CHLORIDE 20 MEQ: 10 CAPSULE, COATED, EXTENDED RELEASE ORAL at 08:44

## 2019-04-30 RX ADMIN — HEPARIN SODIUM 3340 UNITS: 1000 INJECTION INTRAVENOUS; SUBCUTANEOUS at 23:30

## 2019-04-30 RX ADMIN — METOPROLOL SUCCINATE 25 MG: 25 TABLET, FILM COATED, EXTENDED RELEASE ORAL at 08:44

## 2019-04-30 RX ADMIN — HEPARIN SODIUM 3340 UNITS: 1000 INJECTION INTRAVENOUS; SUBCUTANEOUS at 17:14

## 2019-04-30 RX ADMIN — FUROSEMIDE 20 MG: 20 TABLET ORAL at 08:45

## 2019-04-30 ASSESSMENT — ENCOUNTER SYMPTOMS
VOMITING: 0
SHORTNESS OF BREATH: 0
EYE PAIN: 0
NAUSEA: 0
WHEEZING: 0
SORE THROAT: 0
TROUBLE SWALLOWING: 0
EYE DISCHARGE: 0
DIARRHEA: 0
ABDOMINAL DISTENTION: 0
BACK PAIN: 0
COUGH: 1
APNEA: 0
ABDOMINAL PAIN: 0
CHEST TIGHTNESS: 0

## 2019-04-30 ASSESSMENT — PAIN SCALES - GENERAL
PAINLEVEL_OUTOF10: 2
PAINLEVEL_OUTOF10: 5

## 2019-04-30 NOTE — CONSULTS
worsening shortness of breath and cough. Patient was found to have bilateral pulmonary embolism. Patient was recently discharged from the hospital on 4/4/19 with subdural hematoma. Patient also reports that she follows up with Cardiology because of systolic CHF. She is status post AICD placement. Patient was supposed to follow up with Dr. Tiera Jones for ICD check on 4-29-19 however she felt worsening shortness of breath so she decided to come to the ED. In the ED CT chest was significant for small pulmonary emboli in the right upper and lower lobes. Patient was started on heparin infusion. Patient was also noted to have bilateral lower extremity cellulitis with edema and discoloration of both legs. She was started on IV Zosyn and IV vancomycin. Venous Dopplers of lower extremities were obtained which were unremarkable for DVT. Patient reports that she has had multiple blisters on bilateral lower extremities that were present during the last hospital admission. She denies any swelling or erythema during the last hospital admission. She reports that she started developing lower extremity swelling and worsening in the past 2 weeks since she's not ambulating because of the current illness. She reports that the erythema mainly developed yesterday. Denies any fever or chills, nausea or vomiting. Denies any recent antibiotic use. Patient examined with Wound Care  Care discussed and coordinated with Wound Care    Labs and X rays reviewed: 4/30/2019    Labs:  WBC 9.9-7.6->6.9  Hb 10.1    Cr 0.89  BUN 23    Micro:    Imaging:  Venous Doppler lower extremities negative for DVT    Media Information                Media Information                    I have personally reviewed the past medical history, past surgical history, medications, social history, and family history, and I haveupdated the database accordingly.   Past Medical History:     Past Medical History:   Diagnosis Date    Acute on chronic systolic congestive heart failure ([facility] [address] 8/8/2017    Anxiety     Cardiomyopathy ([facility] [address]     Depression     H/O heart artery stent     [place] (hard of hearing)     Hyperlipidemia     Hypertension     Hypertensive urgency 8/7/2017    Leg swelling 8/7/2017       Past Surgical  History:     Past Surgical History:   Procedure Laterality Date    CARDIAC DEFIBRILLATOR PLACEMENT      CATARACT REMOVAL      CORNEAL TRANSPLANT      EYE SURGERY      NASAL SINUS SURGERY      TONSILLECTOMY AND ADENOIDECTOMY         Medications:      atorvastatin  80 mg Oral Nightly    furosemide  20 mg Oral BID    metoprolol succinate  25 mg Oral Daily    oxybutynin  5 mg Oral TID    potassium chloride  20 mEq Oral Daily    sertraline  50 mg Oral Daily    sodium chloride flush  10 mL Intravenous 2 times per day    piperacillin-tazobactam  3.375 g Intravenous Q8H    vancomycin (VANCOCIN) intermittent dosing (placeholder)   Other RX Placeholder    vancomycin  1,500 mg Intravenous Q24H       Social History:     Social History     Socioeconomic History    Marital status:      Spouse name: Not on file    Number of children: Not on file    Years of education: Not on file    Highest education level: Not on file   Occupational History    Not on file   Social Needs    Financial resource strain: Not on file    Food insecurity:     Worry: Not on file     Inability: Not on file    Transportation needs:     Medical: Not on file     Non-medical: Not on file   Tobacco Use    Smoking status: Never Smoker    Smokeless tobacco: Never Used   Substance and Sexual Activity    Alcohol use: No    Drug use: No    Sexual activity: Not on file   Lifestyle    Physical activity:     Days per week: Not on file     Minutes per session: Not on file    Stress: Not on file   Relationships    Social connections:     Talks on phone: Not on file     Gets together: Not on file     Attends Buddhism service: Not on file     Active member of club or organization: Not on file     Attends meetings of clubs or organizations: Not on file     Relationship status: Not on file    Intimate partner violence:     Fear of current or ex partner: Not on file     Emotionally abused: Not on file     Physically abused: Not on file     Forced sexual activity: Not on file   Other Topics Concern    Not on file   Social History Narrative    Not on file       Family History:   No family history on file. Allergies:   Patient has no known allergies. Review of Systems:     Review of Systems   Constitutional: Negative for chills, diaphoresis, fatigue and fever. HENT: Negative for congestion, sore throat and trouble swallowing. Eyes: Negative for pain and discharge. Respiratory: Positive for cough. Negative for apnea, chest tightness, shortness of breath and wheezing. Cardiovascular: Positive for leg swelling. Negative for chest pain and palpitations. Gastrointestinal: Negative for abdominal distention, abdominal pain, diarrhea, nausea and vomiting. Endocrine: Negative for polyuria. Genitourinary: Negative for urgency. Complaining of urinary discomfort and dark urine   Musculoskeletal: Negative for back pain and joint swelling. Skin: Positive for rash and wound. Blisters in bilateral lower extremities   Allergic/Immunologic: Negative for environmental allergies and food allergies. Neurological: Negative for dizziness, weakness and headaches. Hematological: Negative for adenopathy. Psychiatric/Behavioral: Negative for agitation and confusion. Physical Examination :     Patient Vitals for the past 8 hrs:   BP Temp Temp src Pulse Resp SpO2   04/30/19 0844 139/81 -- -- 91 -- --   04/30/19 0400 119/74 98.4 °F (36.9 °C) Oral 78 18 99 %       Physical Exam   Constitutional: She is oriented to person, place, and time. She appears well-developed and well-nourished. No distress. HENT:   Head: Normocephalic and atraumatic.    Eyes: actually reflects the content of the visit, the document can still have some errors including those of syntax and sound a like substitutions which may escape proof reading. It such instances, actual meaningcan be extrapolated by contextual diversion.     Benny Marshall MD    Office: [phone]

## 2019-04-30 NOTE — CONSULTS
Cardiology consult dictated. Impression:    1. Acute pulmonary emboli.    2. S/p ICD implant for dilated cardiomyopathy. 3. Atrial fibrillation. Plan:    Continue iv heparin,    ICD check.

## 2019-04-30 NOTE — PROGRESS NOTES
[facility] Wound Ostomy Continence Nurse  Consult Note       NAME:  [address] [place] [address] Dr CAVAZOS RECORD NUMBER:  [id number]  AGE: 76 y.o. GENDER: female  : 1950  TODAY'S DATE:  2019    Subjective:     Reason for WOCN Evaluation and Assessment: bilateral lower extremity venous disease with ulcerations. Gladys Hutson is a 76 y.o. female referred by:   [x] Physician  [] Nursing  [] Other:     Wound Identification:  Wound Type: venous  Contributing Factors: edema, venous stasis, chronic pressure, decreased mobility, shear force, obesity, decreased tissue oxygenation, poor hygiene and non-adherence     Known from prior admissions. Improved condition of BLE since last admission. Patient states she has been wearing her compression stockings since returning home though they are uncomfortable. PAST MEDICAL HISTORY        Diagnosis Date    Acute on chronic systolic congestive heart failure (HCC) 2017    Anxiety     Cardiomyopathy ([address]     Depression     H/O heart artery stent     Resighini (hard of hearing)     Hyperlipidemia     Hypertension     Hypertensive urgency 2017    Leg swelling 2017       PAST SURGICAL HISTORY    Past Surgical History:   Procedure Laterality Date    CARDIAC DEFIBRILLATOR PLACEMENT      CATARACT REMOVAL      CORNEAL TRANSPLANT      EYE SURGERY      NASAL SINUS SURGERY      TONSILLECTOMY AND ADENOIDECTOMY         FAMILY HISTORY    No family history on file.     SOCIAL HISTORY    Social History     Tobacco Use    Smoking status: Never Smoker    Smokeless tobacco: Never Used   Substance Use Topics    Alcohol use: No    Drug use: No       ALLERGIES    No Known Allergies        Objective:      /79   Pulse 69   Temp 98.7 °F (37.1 °C) (Oral)   Resp 14   Ht 5' 2\" (1.575 m)   Wt 184 lb (83.5 kg)   SpO2 100%   BMI 33.65 kg/m²   David Risk Score: David Scale Score: 19    LABS    CBC:   Lab Results   Component Value Date    WBC 6.9 2019    RBC 3.83 04/30/2019    HGB 10.1 04/30/2019     CMP:  Albumin:    Lab Results   Component Value Date    LABALBU 2.8 03/21/2019     PT/INR:    Lab Results   Component Value Date    PROTIME 13.6 04/30/2019    INR 1.3 04/30/2019     HgBA1c:    Lab Results   Component Value Date    LABA1C 5.7 08/06/2017     PTT: No components found for: LABPTT      Assessment:     Patient Active Problem List   Diagnosis    Leg swelling    Hypertensive urgency    Acute on chronic systolic congestive heart failure (HCC)    Acute on chronic systolic congestive heart failure (HCC)    Lymphedema of both lower extremities    Cellulitis of right leg    Coronary artery disease involving native coronary artery of native heart without angina pectoris    Major depressive disorder    New onset a-fib ([facility] [address]    NSTEMI (non-ST elevated myocardial infarction) ([facility] [address]    Acute on chronic [facility]ive heart failure [facility]    Panic disorder    SDH (subdural hematoma) ([facility])    Hypotension    Dizziness    Dyslipidemia    Chronic systolic heart failure ([facility])    Coronary atherosclerosis of native coronary artery    Hypertension    Chronic atrial fibrillation ([facility] [address]    S/P implantation of automatic cardioverter/defibrillator (AICD)    History of percutaneous coronary intervention    Pulmonary embolism, bilateral ([facility] [address]    Cellulitis       Measurements:     04/30/19 1233   Wound 03/22/19 Leg Right; Lower;Medial   Date First Assessed/Time First Assessed: 03/22/19 0833   Present on Hospital Admission: Yes  Primary Wound Type: Venous Ulcer  Location: Leg  Wound Location Orientation: Right; Lower;Medial   Wound Image    Wound Venous   Dressing Status Changed   Dressing/Treatment Foam   Wound Cleansed   (soap & water)   Dressing Change Due 05/03/19   Wound Assessment Dry;Red   Drainage Amount None   Susanne-wound Assessment Hyperpigmented;Edema;Fragile   Wound 03/22/19 Leg Right; Lower; Posterior; Lateral   Date First Assessed/Time First Assessed: 03/22/19 6388

## 2019-04-30 NOTE — PROGRESS NOTES
Pt. Had 11 run beat of VTa, asymptomatic  to beside to evaluate, orders received. cardio consulted,   notified of EKG results. NP Riley Carter on call for , notified to modify BMP order.   Electronically signed by Sanyd Hall RN on 4/29/2019 at 8:19 PM

## 2019-04-30 NOTE — PROGRESS NOTES
Smoking Cessation - topics covered   []  Health Risks  []  Benefits of Quitting   []  Smoking Cessation  [x]  Patient has no history of tobacco use  []  Patient is former smoker. [x]  No need for tobacco cessation education. []  Booklet given  []  Patient verbalizes understanding. []  Patient denies need for tobacco cessation education. []  Unable to meet with patient today. Will follow up as able.   Chico Ash  8:00 AM

## 2019-04-30 NOTE — PROGRESS NOTES
Physical Therapy  DATE: 2019    NAME: Brittany Cintronr  MRN: [id number]   : 1950    Patient not seen this date for Physical Therapy due to:  [] Blood transfusion in progress  [] Hemodialysis  []  Patient Declined  [] Spine Precautions   [] Strict Bedrest  [] Surgery/ Procedure  [] Testing      [x] Other- Pt with acute PE, heparin administered  at 21:55, not yet therapeutic. PT will check back 19. [] PT being discontinued at this time. Patient independent. No further needs. [] PT being discontinued at this time as the patient has been transferred to palliative care. No further needs.     Oz Danielson, PT

## 2019-04-30 NOTE — PROGRESS NOTES
Yue Fernandez 19    Progress Note    4/30/2019    7:38 AM    Name:   Gerlean Opitz  MRN:     [id number]     Acct:      [de-identified]   Room:   [facility]/[address] [place] [address]  IP Day:  1  Admit Date:  4/29/2019  4:44 PM    PCP:   No primary care provider on file. Code Status:  Full Code    Subjective:     C/C: Cough and shortness of breath    Interval History Status:    Feels she is breathing better  Currently on heparin drip for PE, seen by neurosurgery due to previous SDH and cleared for continuing heparin drip  Leg swelling has decreased a little, redness of both lower extremities also is little lesser  Brief History:   Admitted to [facility] ER with following history from mid-level provider and ER attending Dr. Lana Keith;     Neeta Andre is a 76 y. o. female who presents to the emergency department today by private vehicle for evaluation of a cough and shortness of breath.  She states that for the last 1-2 weeks she has had a cough and shortness of breath.  She states that she is short of breath with rest and exertion.  She just recently had a defibrillator placed like a month ago. Lis Abbott also states that she is nauseous without any vomiting.  She denies any fevers or chills.  Was supposed to see Dr. Lourdes Valera the office today for an ICD check but she felt so ill that she came to the emergency room for evaluation.      CT scan of the chest shows small pulmonary emboli in the right upper and lower lobes. Bob Bearded is no sign of right heart strain. Lis Abbott also has underlying congestive heart failure.  Patient has cellulitis of both lower extremities which are chronically edematous with some superficial sores.  CT scan of the brain shows interval resolution of her subdural hematoma which has shrunk in size.  Patient is referred to the hospitalist service at [facility] for admission and I also spoke with Dr. Too Eller has been consulted for neurosurgery Sylvia Oshea feels that she does not have any absolute contraindication to heparinization at this time.  Patient's findings and disposition also discussed with Dr. Venkat Castillo that facility who wants anticoagulation held for now. Agustina Gonzalez is started on IV Zosyn and IV vancomycin for cellulitis in her legs.  Venous Dopplers of the legs were negative for DVT.     Patient states her leg swelling has worsened for the last 2 weeks since she is not ambulating because of current illness but has this swelling and infection for quite a while  She denies any fever or chills. Review of Systems:     Constitutional:  negative for chills, fevers, sweats  Respiratory:  + for cough,  shortness of breath, denies wheezing  Cardiovascular:  negative for chest pain, chest pressure/discomfort, lower extremity edema, palpitations  Gastrointestinal:  negative for abdominal pain, constipation, diarrhea, nausea, vomiting  Neurological:  negative for dizziness, headache  + Bilateral lower extremity swelling and redness  Medications:      Allergies:  No Known Allergies    Current Meds:   Scheduled Meds:    atorvastatin  80 mg Oral Nightly    furosemide  20 mg Oral BID    metoprolol succinate  25 mg Oral Daily    oxybutynin  5 mg Oral TID    potassium chloride  20 mEq Oral Daily    sertraline  50 mg Oral Daily    sodium chloride flush  10 mL Intravenous 2 times per day    piperacillin-tazobactam  3.375 g Intravenous Q8H    vancomycin (VANCOCIN) intermittent dosing (placeholder)   Other RX Placeholder    vancomycin  1,500 mg Intravenous Q24H     Continuous Infusions:    heparin (porcine) 15 Units/kg/hr (04/30/19 0317)     PRN Meds: HYDROcodone 5 mg - acetaminophen **OR** HYDROcodone 5 mg - acetaminophen, ondansetron, sodium chloride flush, potassium chloride **OR** potassium alternative oral replacement **OR** potassium chloride, magnesium sulfate, magnesium hydroxide, ondansetron, nicotine, acetaminophen, heparin (porcine), heparin (porcine)    Data:     Past Medical History:   has a past medical history of Acute on chronic systolic congestive heart failure ([facility] [address] Anxiety, Cardiomyopathy ([facility] [address] Depression, H/O heart artery stent, [place] (hard of hearing), Hyperlipidemia, Hypertension, Hypertensive urgency, and Leg swelling. Social History:   reports that she has never smoked. She has never used smokeless tobacco. She reports that she does not drink alcohol or use drugs. Family History: No family history on file. Vitals:  /74   Pulse 78   Temp 98.4 °F (36.9 °C) (Oral)   Resp 18   Ht 5' 2\" (1.575 m)   Wt 184 lb (83.5 kg)   SpO2 99%   BMI 33.65 kg/m²   Temp (24hrs), Av.3 °F (36.8 °C), Min:97.6 °F (36.4 °C), Max:98.8 °F (37.1 °C)    No results for input(s): POCGLU in the last 72 hours. I/O (24Hr): No intake or output data in the 24 hours ending 19 0738    Labs:    Hematology:  Recent Labs     19  1129 19  1852 19  0628   WBC 9.9 7.6 6.9   HGB 12.6 10.8* 10.1*   HCT 39.9 38.4 36.5    See Reflexed IPF Result 159   INR  --   --  1.3     Chemistry:  Recent Labs     19  1129 19  2106 19  0628   * 144 141   K 3.6* 3.6* 3.6*    109* 109*   CO2 22 23 21   GLUCOSE 109* 135* 117*   BUN 26* 23 23   CREATININE 0.88 0.82 0.89   ANIONGAP 17 12 11   LABGLOM >60 >60 >60   GFRAA >60 >60 >60   CALCIUM 8.9 8.0* 7.9*   MYOGLOBIN 122*  --   --      No results for input(s): PROT, LABALBU, LABA1C, F8FYRRU, K3GAKHO, FT4, TSH, AST, ALT, LDH, GGT, ALKPHOS, BILITOT, BILIDIR, AMMONIA, AMYLASE, LIPASE, LACTATE, CHOL, HDL, LDLCHOLESTEROL, CHOLHDLRATIO, TRIG, VLDL, PHENYTOIN, PHENYF in the last 72 hours.       Lab Results   Component Value Date/Time    [facility] 2019 09:15 AM     Lab Results   Component Value Date/Time    CULTURE NO GROWTH 6 DAYS 2019 09:15 AM       No results found for: POCPH, PHART, PH, POCPCO2, BJF9SZZ, PCO2, POCPO2, PO2ART, PO2, POCHCO3, OIM7FBN, HCO3, NBEA, PBEA, BEART, BE, THGBART, THB, MCK1VND, HCAK6SMY, O8EWDTXJ, O2SAT, FIO2    Radiology:    Ct Head Wo Contrast    Result Date: 4/29/2019  EXAMINATION: CT OF THE HEAD WITHOUT CONTRAST  4/29/2019 1:47 pm TECHNIQUE: CT of the head was performed without the administration of intravenous contrast. Dose modulation, iterative reconstruction, and/or weight based adjustment of the mA/kV was utilized to reduce the radiation dose to as low as reasonably achievable. COMPARISON: 04/12/2019 HISTORY: ORDERING SYSTEM PROVIDED HISTORY: previous blled TECHNOLOGIST PROVIDED HISTORY: FINDINGS: BRAIN/VENTRICLES: Previously identified extra-axial blood products about the left parietal region near the vertex is again demonstrated and smaller. This measures up to 6 mm in thickness (previously 8 mm). This has also decreased in attenuation. No new or acute appearing blood products are appreciated. Cortical atrophy and white matter changes in the brain are again demonstrated. No new findings identified in the brain. ORBITS: The visualized portion of the orbits demonstrate no acute abnormality. SINUSES: The visualized paranasal sinuses and mastoid air cells demonstrate no acute abnormality. SOFT TISSUES/SKULL:  No acute abnormality of the visualized skull or soft tissues. Continued reduction in size of extra-axial hematoma about the left parietal vertex measuring up to 6 mm in thickness. No new abnormality identified. Ct Head Wo Contrast    Result Date: 4/12/2019  EXAMINATION: CT OF THE HEAD WITHOUT CONTRAST  4/12/2019 10:53 am TECHNIQUE: CT of the head was performed without the administration of intravenous contrast. Dose modulation, iterative reconstruction, and/or weight based adjustment of the mA/kV was utilized to reduce the radiation dose to as low as reasonably achievable.  COMPARISON: CT head April 3, 2019 and April 2, 2019 HISTORY: ORDERING SYSTEM PROVIDED HISTORY: SDH (subdural hematoma) ([facility] [address] convexity measuring 10 mm in thickness. This previously measured 8 mm in thickness. There is slight decrease in the layering hyperdensity. No new focus of intracranial hemorrhage. There is no significant mass effect or midline shift. There are areas of hypoattenuation in the periventricular and subcortical white matter, which is nonspecific, but may represent chronic microvasvular ischemic change. There is minimal global parenchymal volume loss. The gray-white differentiation appears maintained. Minimal atherosclerosis of the intracranial vasculature. ORBITS: The visualized portion of the orbits demonstrate no acute abnormality. SINUSES: The visualized paranasal sinuses and mastoid air cells demonstrate no acute abnormality. SOFT TISSUES/SKULL:  No acute abnormality of the visualized skull or soft tissues. 1. There is perhaps minimal increase in size in the extra-axial collection along the left parietal convexity with interval decrease in the layering hyperdensity. 2. No significant mass effect or midline shift. 3. No new intracranial hemorrhage identified. 4. Mild-to-moderate chronic microvascular ischemic changes. Ct Head Wo Contrast    Result Date: 4/2/2019  EXAMINATION: CT OF THE HEAD WITHOUT CONTRAST  4/2/2019 5:58 pm TECHNIQUE: CT of the head was performed without the administration of intravenous contrast. Dose modulation, iterative reconstruction, and/or weight based adjustment of the mA/kV was utilized to reduce the radiation dose to as low as reasonably achievable. COMPARISON: None. HISTORY: ORDERING SYSTEM PROVIDED HISTORY: dizziness, weakness TECHNOLOGIST PROVIDED HISTORY: FINDINGS: BRAIN/VENTRICLES: There is no acute intracranial hemorrhage, mass effect or midline shift. If there is a left frontal parietal extra-axial collection identified measuring 14 mm in transverse dimension on the axial images. Dependently layering hyperdensity relatively isodense to brain parenchyma.  This could Venous Bilateral    Result Date: 4/29/2019    [facility]  Vascular Lower Extremities DVT Study Procedure   Patient Name    London Taylor      Date of Study             04/29/2019                  [facility]   Date of Birth   1950   Gender                    Female   Age             76 year(s)   Race                         Room Number     SASHA   Corporate ID #  [id number]   Patient Acct #  [de-identified]   MR #            [id number]      Sonographer               Brenda Rodriguez   Accession #     [id number]    Interpreting Physician    [place]   Referring Nurse              Referring Physician       ER NO FAMILY DR *  Practitioner  Procedure Type of Study:   Veins: Lower Extremities DVT Study, Venous Scan Lower Bilateral.  Indications for Study:R/O DVT. Patient Status:ER. Technical Quality:Limited visualization. Conclusions   Summary   No evidence of superficial or deep venous thrombosis in both lower  extremities. Multilevel edema noted. Signature   ----------------------------------------------------------------  Electronically signed by Brenda Rodriguez(Sonographer) on  04/29/2019 03:10 PM  ----------------------------------------------------------------   ----------------------------------------------------------------  Electronically signed by Maik Bragg(Interpreting  physician) on 04/29/2019 05:04 PM  ----------------------------------------------------------------  Findings:   Right Impression:                     Left Impression:  The common femoral, superficial       The common femoral, femoral,  femoral, popliteal, tibials and       popliteal, tibials and saphenous  saphenous veins are compressible with veins are compressible with normal  normal doppler responses. doppler responses. Thigh, calf and ankle edema noted. Thigh, calf, and ankle edema noted. Allergies   - Allergy:*No Known Allergies(Miscellaneous).  Velocities are measured in cm/s ; Diameters are measured in cm Right Lower Extremities DVT Study Measurements Right 2D Measurements +------------------------------------+----------+---------------+----------+ ! Location                            ! Visualized! Compressibility! Thrombosis! +------------------------------------+----------+---------------+----------+ ! Common Femoral                      !Yes       ! Yes            ! None      ! +------------------------------------+----------+---------------+----------+ ! Prox Femoral                        !Yes       ! Yes            ! None      ! +------------------------------------+----------+---------------+----------+ ! Mid Femoral                         !Yes       ! Yes            ! None      ! +------------------------------------+----------+---------------+----------+ ! Dist Femoral                        !Yes       ! Yes            ! None      ! +------------------------------------+----------+---------------+----------+ ! Deep Femoral                        !Yes       ! Yes            ! None      ! +------------------------------------+----------+---------------+----------+ ! Popliteal                           !Yes       ! Yes            ! None      ! +------------------------------------+----------+---------------+----------+ ! Sapheno Femoral Junction            ! Yes       ! Yes            ! None      ! +------------------------------------+----------+---------------+----------+ ! PTV                                 ! Yes       ! Yes            ! None      ! +------------------------------------+----------+---------------+----------+ ! Peroneal                            !Yes       ! Yes            ! None      ! +------------------------------------+----------+---------------+----------+ ! Gastroc                             ! Yes       ! Yes            ! None      ! +------------------------------------+----------+---------------+----------+ ! GSJACKLYN Espinoza                           ! Yes       ! Yes            ! None      ! +------------------------------------+----------+---------------+----------+ ! GSV Knee                            ! Yes       ! Yes            ! None      ! +------------------------------------+----------+---------------+----------+ ! GSV Ankle                           ! Yes       ! Yes            ! None      ! +------------------------------------+----------+---------------+----------+ ! SSV                                 ! Yes       ! Yes            ! None      ! +------------------------------------+----------+---------------+----------+ Right Doppler Measurements +---------------------------+------+------+--------------------------------+ ! Location                   ! Signal!Reflux! Reflux (msec)                   ! +---------------------------+------+------+--------------------------------+ ! Common Femoral             !Phasic!      !                                ! +---------------------------+------+------+--------------------------------+ ! Prox Femoral               !Phasic!      !                                ! +---------------------------+------+------+--------------------------------+ ! Popliteal                  !Phasic!      !                                ! +---------------------------+------+------+--------------------------------+ Left Lower Extremities DVT Study Measurements Left 2D Measurements +------------------------------------+----------+---------------+----------+ ! Location                            ! Visualized! Compressibility! Thrombosis! +------------------------------------+----------+---------------+----------+ ! Common Femoral                      !Yes       ! Yes            ! None      ! +------------------------------------+----------+---------------+----------+ ! Prox Femoral                        !Yes       ! Yes            ! None      ! +------------------------------------+----------+---------------+----------+ ! Mid Femoral                         !Yes       ! Yes            ! None      ! +------------------------------------+----------+---------------+----------+ ! Dist Femoral                        !Yes       ! Yes            ! None      ! +------------------------------------+----------+---------------+----------+ ! Deep Femoral                        !Yes       ! Yes            ! None      ! +------------------------------------+----------+---------------+----------+ ! Popliteal                           !Yes       ! Yes            ! None      ! +------------------------------------+----------+---------------+----------+ ! Sapheno Femoral Junction            ! Yes       ! Yes            ! None      ! +------------------------------------+----------+---------------+----------+ ! PTV                                 ! Yes       ! Yes            ! None      ! +------------------------------------+----------+---------------+----------+ ! Peroneal                            !Yes       ! Yes            ! None      ! +------------------------------------+----------+---------------+----------+ ! Gastroc                             ! Yes       ! Yes            ! None      ! +------------------------------------+----------+---------------+----------+ ! GSV Thigh                           ! Yes       ! Yes            ! None      ! +------------------------------------+----------+---------------+----------+ ! GSV Knee                            ! Yes       ! Yes            ! None      ! +------------------------------------+----------+---------------+----------+ ! GSV Ankle                           ! Yes       ! Yes            ! None      ! +------------------------------------+----------+---------------+----------+ ! SSV                                 ! Yes       ! Yes            ! None      ! +------------------------------------+----------+---------------+----------+ Left Doppler Measurements +---------------------------+------+------+--------------------------------+ ! Location                   ! Signal!Reflux! Reflux (msec) ! +---------------------------+------+------+--------------------------------+ ! Common Femoral             !Phasic!      !                                ! +---------------------------+------+------+--------------------------------+ ! Prox Femoral               !Phasic!      !                                ! +---------------------------+------+------+--------------------------------+ ! Popliteal                  !Phasic!      !                                ! +---------------------------+------+------+--------------------------------+      Physical Examination:        General appearance:  alert, cooperative and no distress  Mental Status:  oriented to person, place and time and normal affect  Lungs:  clear to auscultation bilaterally, normal effort  Heart:  Irregular rhythm, no murmur  Abdomen:  soft, nontender, nondistended, normal bowel sounds, no masses, hepatomegaly, splenomegaly  Extremities:  + edema, redness both lower extremities, no tenderness in the calves  Skin:  no gross lesions, rashes, induration except as above    Assessment:        Primary Problem  Pulmonary embolism, bilateral [facility])    Active Hospital Problems    Diagnosis Date Noted    Pulmonary embolism, bilateral ([facility] [address] [I26.99] 04/29/2019     Priority: High    Cellulitis [L03.90] 04/29/2019     Priority: High    S/P implantation of automatic cardioverter/defibrillator (AICD) [Z95.810] 04/03/2019     Priority: High    SDH (subdural hematoma) ([facility] [address] [S06.5X9A] 04/02/2019     Priority: High    [facility] [I48.2] 04/03/2019     Priority: Medium    Lymphedema of both lower extremities [I89.0] 03/21/2019     Priority: Medium    Acute on chronic systolic congestive heart failure (HCC) [I50.23] 08/08/2017     Priority: Medium    Coronary atherosclerosis of native coronary artery [I25.10] 04/03/2019    Dyslipidemia [E78.5] 04/03/2019    Hypertension [I10] 04/03/2019       Plan:        1.  Continue heparin drip for today, will switch to oral anticoagulation possibly tomorrow  2. Continue IV Zosyn and vancomycin pending ID evaluation  3.  Cardiology evaluation in progress    Leodan Choudhary MD  4/30/2019  7:38 AM

## 2019-04-30 NOTE — PROGRESS NOTES
Occupational Therapy Not Seen Note    DATE: 2019  Name: Loren Noyola  : 1950  MRN: [id number]    Patient not available for Occupational Therapy due to:     Other: pt with new PE, started on heparin  @2155, will wait until after 24hrs to eval     Next Scheduled Treatment: check back 19    Electronically signed by JUDY Avelar on 2019 at 10:46 AM

## 2019-04-30 NOTE — PLAN OF CARE
Fall risk precautions in place. Bed in lowest position with wheels locked, bed alarm in place and activated, non-skid socks on pt, fall risk id on pt, call light in reach, pt encouraged to call before getting out of bed and for any other needs or c/o.

## 2019-05-01 LAB
ANION GAP SERPL CALCULATED.3IONS-SCNC: 12 MMOL/L (ref 9–17)
BUN BLDV-MCNC: 22 MG/DL (ref 8–23)
BUN/CREAT BLD: ABNORMAL (ref 9–20)
CALCIUM SERPL-MCNC: 8.1 MG/DL (ref 8.6–10.4)
CHLORIDE BLD-SCNC: 106 MMOL/L (ref 98–107)
CO2: 23 MMOL/L (ref 20–31)
CREAT SERPL-MCNC: 0.96 MG/DL (ref 0.5–0.9)
GFR AFRICAN AMERICAN: >60 ML/MIN
GFR NON-AFRICAN AMERICAN: 58 ML/MIN
GFR SERPL CREATININE-BSD FRML MDRD: ABNORMAL ML/MIN/{1.73_M2}
GFR SERPL CREATININE-BSD FRML MDRD: ABNORMAL ML/MIN/{1.73_M2}
GLUCOSE BLD-MCNC: 106 MG/DL (ref 70–99)
HCT VFR BLD CALC: 37.4 % (ref 36.3–47.1)
HEMOGLOBIN: 10.5 G/DL (ref 11.9–15.1)
MCH RBC QN AUTO: 26 PG (ref 25.2–33.5)
MCHC RBC AUTO-ENTMCNC: 28.1 G/DL (ref 28.4–34.8)
MCV RBC AUTO: 92.6 FL (ref 82.6–102.9)
NRBC AUTOMATED: 0.3 PER 100 WBC
PARTIAL THROMBOPLASTIN TIME: 70.4 SEC (ref 20.5–30.5)
PDW BLD-RTO: 19.7 % (ref 11.8–14.4)
PLATELET # BLD: 186 K/UL (ref 138–453)
PMV BLD AUTO: 11.3 FL (ref 8.1–13.5)
POTASSIUM SERPL-SCNC: 3.9 MMOL/L (ref 3.7–5.3)
PROCALCITONIN: 0.06 NG/ML
RBC # BLD: 4.04 M/UL (ref 3.95–5.11)
SODIUM BLD-SCNC: 141 MMOL/L (ref 135–144)
WBC # BLD: 6.5 K/UL (ref 3.5–11.3)

## 2019-05-01 PROCEDURE — 36415 COLL VENOUS BLD VENIPUNCTURE: CPT

## 2019-05-01 PROCEDURE — 2060000000 HC ICU INTERMEDIATE R&B

## 2019-05-01 PROCEDURE — 2580000003 HC RX 258: Performed by: PHYSICIAN ASSISTANT

## 2019-05-01 PROCEDURE — 6370000000 HC RX 637 (ALT 250 FOR IP): Performed by: PHYSICIAN ASSISTANT

## 2019-05-01 PROCEDURE — 85730 THROMBOPLASTIN TIME PARTIAL: CPT

## 2019-05-01 PROCEDURE — 6360000002 HC RX W HCPCS: Performed by: PHYSICIAN ASSISTANT

## 2019-05-01 PROCEDURE — 84145 PROCALCITONIN (PCT): CPT

## 2019-05-01 PROCEDURE — 80048 BASIC METABOLIC PNL TOTAL CA: CPT

## 2019-05-01 PROCEDURE — 6370000000 HC RX 637 (ALT 250 FOR IP): Performed by: INTERNAL MEDICINE

## 2019-05-01 PROCEDURE — 99232 SBSQ HOSP IP/OBS MODERATE 35: CPT | Performed by: INTERNAL MEDICINE

## 2019-05-01 PROCEDURE — 85027 COMPLETE CBC AUTOMATED: CPT

## 2019-05-01 RX ORDER — ALPRAZOLAM 0.25 MG/1
0.25 TABLET ORAL 3 TIMES DAILY PRN
Status: DISCONTINUED | OUTPATIENT
Start: 2019-05-01 | End: 2019-05-06 | Stop reason: HOSPADM

## 2019-05-01 RX ADMIN — HEPARIN SODIUM AND DEXTROSE 19 UNITS/KG/HR: 10000; 5 INJECTION INTRAVENOUS at 11:48

## 2019-05-01 RX ADMIN — SERTRALINE 50 MG: 50 TABLET, FILM COATED ORAL at 09:07

## 2019-05-01 RX ADMIN — FUROSEMIDE 20 MG: 20 TABLET ORAL at 09:08

## 2019-05-01 RX ADMIN — OXYBUTYNIN CHLORIDE 5 MG: 5 TABLET ORAL at 09:07

## 2019-05-01 RX ADMIN — ALPRAZOLAM 0.25 MG: 0.25 TABLET ORAL at 11:45

## 2019-05-01 RX ADMIN — ALPRAZOLAM 0.25 MG: 0.25 TABLET ORAL at 19:47

## 2019-05-01 RX ADMIN — Medication 10 ML: at 20:40

## 2019-05-01 RX ADMIN — ATORVASTATIN CALCIUM 80 MG: 80 TABLET, FILM COATED ORAL at 20:39

## 2019-05-01 RX ADMIN — POTASSIUM CHLORIDE 20 MEQ: 10 CAPSULE, COATED, EXTENDED RELEASE ORAL at 09:07

## 2019-05-01 RX ADMIN — FUROSEMIDE 20 MG: 20 TABLET ORAL at 18:43

## 2019-05-01 RX ADMIN — RIVAROXABAN 15 MG: 15 TABLET, FILM COATED ORAL at 18:43

## 2019-05-01 RX ADMIN — OXYBUTYNIN CHLORIDE 5 MG: 5 TABLET ORAL at 20:40

## 2019-05-01 RX ADMIN — METOPROLOL SUCCINATE 25 MG: 25 TABLET, FILM COATED, EXTENDED RELEASE ORAL at 09:07

## 2019-05-01 RX ADMIN — OXYBUTYNIN CHLORIDE 5 MG: 5 TABLET ORAL at 16:02

## 2019-05-01 NOTE — PROGRESS NOTES
Occupational Therapy Not Seen Note    DATE: 2019  Name: Gladys Hutson  : 1950  MRN: [id number]    Patient not available for Occupational Therapy due to:     Other: per RN, bedrest due to blood clots     Next Scheduled Treatment: check back 19    Electronically signed by JUDY Soto on 2019 at 3:36 PM

## 2019-05-01 NOTE — PROGRESS NOTES
Section of Cardiology  Progress Note      Date:  5/1/2019  Patient: Abe Sainz  Admission:  4/29/2019  4:44 PM  Admit DX: Pulmonary embolism, bilateral ([address] [I26.99]  Age:  76 y.o., 1950     LOS: 2 days     Reason for evaluation:   ACUTE PULMONARY EMBOLISM  ATRIAL FIBRILLATION    SUBJECTIVE:     The patient was seen and examined. Notes and labs reviewed. There were not complications over night. Patient's cardiac review of systems: positive for fatigue. The patient is generally feeling unchanged. OBJECTIVE:      EXAM:   Vitals:    VITALS:  /76   Pulse 71   Temp 97.7 °F (36.5 °C) (Oral)   Resp 17   Ht 5' 2\" (1.575 m)   Wt 183 lb 7 oz (83.2 kg)   SpO2 96%   BMI 33.55 kg/m²   24HR INTAKE/OUTPUT:      Intake/Output Summary (Last 24 hours) at 5/1/2019 1647  Last data filed at 5/1/2019 0631  Gross per 24 hour   Intake 1227 ml   Output 0 ml   Net 1227 ml       CONSTITUTIONAL:  awake, alert, cooperative, no apparent distress, and appears stated age. HEENT: Normal jugular venous pulsations, no carotid bruits. Head is atraumatic, normocephalic. Eyes and oral mucosa are normal.  LUNGS: Good respiratory effort On auscultation: clear to auscultation bilaterally and diminished breath sounds bibasilar  CARDIOVASCULAR:  Normal apical impulse, irregular rate and rhythm, normal S1 and S2,IRREGULAR.dorsalis pedis and bilateralpresent 2+  ABDOMEN: Soft, nontender, nondistended. Bowel sounds present. No masses or tenderness. No bruit. SKIN: Warm and dry. EXTREMITIES:No lower extremity edema. Motor movement grossly intact. No cyanosis or clubbing.     Current Inpatient Medications:   rivaroxaban  15 mg Oral BID WC    atorvastatin  80 mg Oral Nightly    furosemide  20 mg Oral BID    metoprolol succinate  25 mg Oral Daily    oxybutynin  5 mg Oral TID    potassium chloride  20 mEq Oral Daily    sertraline  50 mg Oral Daily    sodium chloride flush  10 mL Intravenous 2 times per day       IV Infusions (if any):      Diagnostics:   Telemetry:ATRIAL FIBRILLATION, WITH VENTRICULAR PACED RHYTHM. .    Labs:   CBC:  Recent Labs     04/30/19  0628 05/01/19  0640   WBC 6.9 6.5   HGB 10.1* 10.5*   HCT 36.5 37.4    186     Magnesium:No results for input(s): MG in the last 72 hours. BMP:  Recent Labs     04/30/19  0628 05/01/19  0640    141   K 3.6* 3.9   CALCIUM 7.9* 8.1*   CO2 21 23   BUN 23 22   CREATININE 0.89 0.96*   LABGLOM >60 58*   GLUCOSE 117* 106*     BNP:No results for input(s): BNP in the last 72 hours. PT/INR:  Recent Labs     04/30/19  0628   PROTIME 13.6*   INR 1.3     APTT:  Recent Labs     04/30/19  2243 05/01/19  0640   APTT 59.3* 70.4*     CARDIAC ENZYMES:  Recent Labs     04/29/19  1129   TROPONINT NOT REPORTED     FASTING LIPID PANEL:  Lab Results   Component Value Date    HDL 31 03/22/2019    TRIG 84 03/22/2019     LIVER PROFILE:No results for input(s): AST, ALT, LABALBU, ALKPHOS, BILITOT, BILIDIR, IBILI, PROT, GLOB, ALBUMIN in the last 72 hours. ASSESSMENT:  1. ACUTE PULMONARY EMBOLISM. 2. PERSISTENT ATRIAL FIBRILLATION.     Patient Active Problem List   Diagnosis    Leg swelling    Hypertensive urgency    Acute on chronic systolic congestive heart failure (HCC)    Acute on chronic systolic congestive heart failure (HCC)    Lymphedema of both lower extremities    Cellulitis of right leg    Coronary artery disease involving native coronary artery of native heart without angina pectoris    Major depressive disorder    [address]    NSTEMI (non-ST elevated myocardial infarction) ([address]    Acute on chronic congestive [facility] failure ([facility])    Panic disorder    SDH (subdural hematoma) ([facility])    Hypotension    Dizziness    Dyslipidemia    Chronic systolic heart failure ([facility])    Coronary atherosclerosis of native coronary artery    Hypertension    Chronic atrial fibrillation ([address]    S/P implantation of automatic cardioverter/defibrillator (AICD)    History of percutaneous coronary intervention    Pulmonary embolism, bilateral (HCC)    Cellulitis    Venous stasis dermatitis of both lower extremities       PLAN:  1. Continue current medications. 2. OK FOR ANTICOAGULATION FOR PULMONARY EMBOLISM PER NEUROSURGERY. 3. I DISCUSSED ALL AVAILABLE ANTICOAGULANTS, AT THIS POINT WILL START PT ON [place]. 4. I DISCUSSED ALL POTENTIAL RISKS OF ANTICOAGULANTS INCLUDING FATAL BLEEDING. 5. PT UNDERSTANDS, AND AGREES TO PROCEED. Discussed with patient and nursing.     Bigg Eckert MD

## 2019-05-01 NOTE — PROGRESS NOTES
[facility] - Daily Progress Note  Today's Date and Time: 2019, 2:12 PM    Impression :   Current: 2019  · Bilateral LE lymphedema  · Acute on chronic skin changes secondary to edema and scratching  · No apparent bacterial cellulitis  · Bilateral Pulmonary embolism   · Acute on chronic systolic CHF s/p defibrillator (2019)    · Recent SDH   · Chronic a fib   · HTN   Recommendations:     · Continue to monitor off abx  · Leg elevation, compression therapy to reduce edema  · Creatinine worsening slightly, consider reducing dose of Lasix    Medical Decision Makin2019       · Patient with chronic lymphedema  · Has developed worsening of edema as an outpatient, leading to skin breaks and irritation  · The above compounded by scratching leading to more abrasions  · Currently does not have secondary bacterial cellulitis  · Main problem is CHF with fluid retention and associated lymphedema. · Will need to decrease the leg edema to improve the skin condition. Note:  · Labs, medications, radiologic studies were reviewed with personal review of films  · Moderate amounts of data were reviewed  · Discussed with nursing Staff, Discharge planner  · Infection Control and Prevention measures reviewed  · All prior entries were reviewed  · Administer medications as ordered. No antibiotics needed. · Prognosis: Good  · Discharge planning reviewed  · Follow up as outpatient. History Present Illness:     INITIAL HISTORY:     Mario Hernandez is a 76y.o.-year-old female who presented from [facility] with worsening shortness of breath and cough.     Patient was found to have bilateral pulmonary embolism. Patient was recently discharged from the hospital on 19 with subdural hematoma. Patient also reports that she follows up with Cardiology because of systolic CHF.  She is status post AICD placement.      Patient was supposed to follow up with Dr. Rosamaria Bain for ICD check on 4-29-19 however she felt worsening shortness of breath so she decided to come to the ED. In the ED CT chest was significant for small pulmonary emboli in the right upper and lower lobes. Patient was started on heparin infusion.     Patient was also noted to have bilateral lower extremity cellulitis with edema and discoloration of both legs. She was started on IV Zosyn and IV vancomycin. Venous Dopplers of lower extremities were obtained which were unremarkable for DVT.     Patient reports that she has had multiple blisters on bilateral lower extremities that were present during the last hospital admission. She denies any swelling or erythema during the last hospital admission. She reports that she started developing lower extremity swelling and worsening in the past 2 weeks since she's not ambulating because of the current illness. She reports that the erythema mainly developed yesterday.     Denies any fever or chills, nausea or vomiting. Denies any recent antibiotic use.      Patient examined with Wound Care  Care discussed and coordinated with Wound Care    CURRENT EVALUATION: 5/1/2019    Pt seen and examined  Afebrile, VS stable  Pt reports legs still sore, but swelling and redness has decreased significantly  Denies fevers, chills, nausea, vomiting, palpitations, urine output is good, BM appropriate      Discussed with patient, RN, IM.       I have personally reviewed the past medical history, past surgical history, medications, social history, and family history, and I have updated the database accordingly. ROS:      1. Constitutional: Negative for chills, diaphoresis, fatigue and fever. 2. HENT: Negative for congestion, sore throat and trouble swallowing. 3. Eyes: Negative for pain and discharge. 4. Respiratory: Positive for cough. Negative for apnea, chest tightness, shortness of breath and wheezing. 5. Cardiovascular: Positive for leg swelling.  Negative for chest pain and edema.  Bilateral leg discoloration. No apparent secondary bacterial cellulitis   Psychiatric: She has a normal mood and affect. Her behavior is normal.         Medical Decision Making:   I have independently reviewed/ordered the following labs:    CBC with Differential:   Recent Labs     04/29/19  1129  04/30/19  0628 05/01/19  0640   WBC 9.9   < > 6.9 6.5   HGB 12.6   < > 10.1* 10.5*   HCT 39.9   < > 36.5 37.4      < > 159 186   LYMPHOPCT 8*  --   --   --    MONOPCT 4  --   --   --     < > = values in this interval not displayed. BMP:   Recent Labs     04/30/19  0628 05/01/19  0640    141   K 3.6* 3.9   * 106   CO2 21 23   BUN 23 22   CREATININE 0.89 0.96*                        Media Information                     Medications:      atorvastatin  80 mg Oral Nightly    furosemide  20 mg Oral BID    metoprolol succinate  25 mg Oral Daily    oxybutynin  5 mg Oral TID    potassium chloride  20 mEq Oral Daily    sertraline  50 mg Oral Daily    sodium chloride flush  10 mL Intravenous 2 times per day         Thank you for allowing us to participate in the care of this patient. Please call with questions.     Bell Ribeiro MD  Pager: [phone]  - Office: [phone]

## 2019-05-01 NOTE — PROGRESS NOTES
I also spoke with Dr. Pranav Blancas has been consulted for neurosurgery management. Simin Wilkinson feels that she does not have any absolute contraindication to heparinization at this time.  Patient's findings and disposition also discussed with Dr. Dianelys Potts that facility who wants anticoagulation held for now. Milton Bennett is started on IV Zosyn and IV vancomycin for cellulitis in her legs.  Venous Dopplers of the legs were negative for DVT.     Patient states her leg swelling has worsened for the last 2 weeks since she is not ambulating because of current illness but has this swelling and infection for quite a while  She denies any fever or chills. Review of Systems:     Constitutional:  negative for chills, fevers, sweats  Respiratory:  + for cough,  shortness of breath, denies wheezing  Cardiovascular:  negative for chest pain, chest pressure/discomfort, lower extremity edema, palpitations  Gastrointestinal:  negative for abdominal pain, constipation, diarrhea, nausea, vomiting  Neurological:  negative for dizziness, headache  + Bilateral lower extremity swelling and redness-improving  Medications:      Allergies:  No Known Allergies    Current Meds:   Scheduled Meds:    atorvastatin  80 mg Oral Nightly    furosemide  20 mg Oral BID    metoprolol succinate  25 mg Oral Daily    oxybutynin  5 mg Oral TID    potassium chloride  20 mEq Oral Daily    sertraline  50 mg Oral Daily    sodium chloride flush  10 mL Intravenous 2 times per day     Continuous Infusions:    heparin (porcine) 19 Units/kg/hr (04/30/19 2330)     PRN Meds: HYDROcodone 5 mg - acetaminophen **OR** HYDROcodone 5 mg - acetaminophen, ondansetron, sodium chloride flush, potassium chloride **OR** potassium alternative oral replacement **OR** potassium chloride, magnesium sulfate, magnesium hydroxide, ondansetron, nicotine, acetaminophen, heparin (porcine), heparin (porcine)    Data:     Past Medical History:   has a past medical history of Acute on POCHCO3, NKR2UBO, HCO3, NBEA, PBEA, BEART, BE, THGBART, THB, OWK4SBG, TQJQ9TXP, M6CGQNCX, O2SAT, FIO2    Radiology:    Ct Head Wo Contrast    Result Date: 4/29/2019  EXAMINATION: CT OF THE HEAD WITHOUT CONTRAST  4/29/2019 1:47 pm TECHNIQUE: CT of the head was performed without the administration of intravenous contrast. Dose modulation, iterative reconstruction, and/or weight based adjustment of the mA/kV was utilized to reduce the radiation dose to as low as reasonably achievable. COMPARISON: 04/12/2019 HISTORY: ORDERING SYSTEM PROVIDED HISTORY: previous blled TECHNOLOGIST PROVIDED HISTORY: FINDINGS: BRAIN/VENTRICLES: Previously identified extra-axial blood products about the left parietal region near the vertex is again demonstrated and smaller. This measures up to 6 mm in thickness (previously 8 mm). This has also decreased in attenuation. No new or acute appearing blood products are appreciated. Cortical atrophy and white matter changes in the brain are again demonstrated. No new findings identified in the brain. ORBITS: The visualized portion of the orbits demonstrate no acute abnormality. SINUSES: The visualized paranasal sinuses and mastoid air cells demonstrate no acute abnormality. SOFT TISSUES/SKULL:  No acute abnormality of the visualized skull or soft tissues. Continued reduction in size of extra-axial hematoma about the left parietal vertex measuring up to 6 mm in thickness. No new abnormality identified. Ct Head Wo Contrast    Result Date: 4/12/2019  EXAMINATION: CT OF THE HEAD WITHOUT CONTRAST  4/12/2019 10:53 am TECHNIQUE: CT of the head was performed without the administration of intravenous contrast. Dose modulation, iterative reconstruction, and/or weight based adjustment of the mA/kV was utilized to reduce the radiation dose to as low as reasonably achievable.  COMPARISON: CT head April 3, 2019 and April 2, 2019 HISTORY: ORDERING SYSTEM PROVIDED HISTORY: SDH (subdural hematoma) ([facility] [address] TECHNOLOGIST PROVIDED HISTORY: follow up for SDH Ordering Physician Provided Reason for Exam: F/U for subdural hematoma Acuity: Chronic Type of Exam: Subsequent/Follow-up FINDINGS: BRAIN/VENTRICLES: There is a mixed aged high convexity extra-axial hematoma along the left frontal/parietal convexity measuring up to 8.4 mm in maximal thickness, compared to 10 mm on April 3, 2019 and 7.7 mm on April 2, 2019. There is no mass effect or midline shift. There is mild parenchymal volume loss. There is periventricular white matter low attenuation, likely related to mild chronic microvascular disease. The gray-white differentiation is maintained without evidence of an acute infarct. There is no hydrocephalus. ORBITS: The visualized portion of the orbits demonstrate no acute abnormality. SINUSES: The visualized paranasal sinuses and mastoid air cells demonstrate no acute abnormality. SOFT TISSUES/SKULL:  No acute abnormality of the visualized skull or soft tissues. Mixed aged high convexity extra-axial hematoma along the left frontal/parietal convexity measuring up to 8.4 mm in maximal thickness, compared to 10 mm on April 3, 2019 and 7.7 mm on April 2, 2019. No midline shift. Continued short interval follow-up is recommended. Mild parenchymal volume loss. Mild chronic microvascular disease. Ct Head Wo Contrast    Result Date: 4/3/2019  EXAMINATION: CT OF THE HEAD WITHOUT CONTRAST  4/3/2019 9:17 am TECHNIQUE: CT of the head was performed without the administration of intravenous contrast. Dose modulation, iterative reconstruction, and/or weight based adjustment of the mA/kV was utilized to reduce the radiation dose to as low as reasonably achievable. COMPARISON: 04/02/2019. HISTORY: ORDERING SYSTEM PROVIDED HISTORY: INTRACRANIAL HEMORRHAGE TECHNOLOGIST PROVIDED HISTORY: Initial evaluation.  FINDINGS: BRAIN/VENTRICLES: There is perhaps minimal increase in size in the extra-axial collection along the left parietal represent a subdural hygroma with secondary acute hemorrhage, evolving chronic hematoma, or a subdural hemorrhage in the eligio patient. .  The gray-white differentiation is maintained without evidence of an acute infarct. There is no evidence of hydrocephalus. Mild-to-moderate periventricular subcortical white matter hypoattenuation identified which is nonspecific but can be seen within the underlying vasculitis or chronic small vessel disease. Intracranial vascular calcifications. ORBITS: The visualized portion of the orbits demonstrate no acute abnormality. SINUSES: The visualized paranasal sinuses and mastoid air cells demonstrate no acute abnormality. SOFT TISSUES/SKULL:  No acute abnormality of the visualized skull or soft tissues. Left frontoparietal extra-axial collection may represent a chronic subdural hematoma, acute subdural hygroma with secondary hemorrhage or subacute subdural hematoma. Please correlate exam findings and history. Mild-to-moderate patchy white matter changes suggestive chronic small vessel ischemic disease. Critical results were called by Dr. Jonnathan Polanco to [facility] on 4/2/2019 at 18:23. Xr Chest Portable    Result Date: 4/29/2019  EXAMINATION: SINGLE XRAY VIEW OF THE CHEST 4/29/2019 11:47 am COMPARISON: April 2, 2018, March 29, 2019 HISTORY: ORDERING SYSTEM PROVIDED HISTORY: Chest Pain TECHNOLOGIST PROVIDED HISTORY: Chest Pain Ordering Physician Provided Reason for Exam: Port upright AP CXR - chest pain Acuity: Unknown Type of Exam: Unknown FINDINGS: Cardiac silhouette enlargement again noted. Left subclavian AICD in place. Bilateral perihilar and lower lung field alveolar and interstitial opacities have developed. No pneumothorax. No significant effusion. No acute osseous abnormality identified. Bilateral airspace disease has developed, which may represent edema versus inflammatory process or infection.      Xr Chest Portable    Result Date: 4/2/2019  EXAMINATION: SINGLE XRAY VIEW OF THE CHEST 4/2/2019 2:56 pm COMPARISON: 03/29/2018 HISTORY: ORDERING SYSTEM PROVIDED HISTORY: weakness TECHNOLOGIST PROVIDED HISTORY: weakness Ordering Physician Provided Reason for Exam: Pt c/o weakness, AP UPRIGHT PORTABLE Acuity: Acute Type of Exam: Initial FINDINGS: There is a left subclavian ICD, with 3 lead wires, projecting in stable positions. Left heart border is prominent. Hilar contours are normal. There is no focal airspace disease or pneumothorax. No acute cardiopulmonary disease. Stable cardiomegaly. Ct Chest Pulmonary Embolism W Contrast    Result Date: 4/29/2019  EXAMINATION: CTA OF THE CHEST 4/29/2019 12:31 pm TECHNIQUE: CTA of the chest was performed after the administration of intravenous contrast.  Multiplanar reformatted images are provided for review. MIP images are provided for review. Dose modulation, iterative reconstruction, and/or weight based adjustment of the mA/kV was utilized to reduce the radiation dose to as low as reasonably achievable. COMPARISON: None. HISTORY: ORDERING SYSTEM PROVIDED HISTORY: shortness of breath FINDINGS: Pulmonary Arteries: No central pulmonary embolus. Small peripheral right lower lobe pulmonary emboli. Right upper lobe pulmonary emboli. No definite evidence of right heart strain. No definite pulmonary infarct. Mediastinum: Severe cardiomegaly. Small pericardial effusion. No adenopathy. Lungs/pleura: Diffuse ground-glass opacities, septal thickening. No pleural effusions. Upper Abdomen: Right adrenal nodule. Soft Tissues/Bones: Left pacemaker. No axillary adenopathy. Lumbar spine degenerative changes. Right upper lobe and right lower lobe pulmonary emboli. Diffuse ground-glass opacities are nonspecific and may be related to pulmonary edema with infection or an inflammatory process not excluded. Critical results were called by Dr. Diamante Vu MD to Dr. Kush Maher on 4/29/2019 at 13:03.      Vl Dup Lower Extremity Venous Bilateral    Result Date: 4/29/2019    [facility]  Vascular Lower Extremities DVT Study Procedure   Patient Name    Eliza Stout      Date of Study             04/29/2019                  [facility]   Date of Birth   1950   Gender                    Female   Age             76 year(s)   Race                         Room Number     SASHA   Corporate ID #  [id number]   Patient Acct #  [de-identified]   MR #            [id number]      Sonographer               MichaelBrenda   Accession #     [id number]    Interpreting Physician    Denver Wayne   Referring Nurse              Referring Physician       ER NO FAMILY DR *  Practitioner  Procedure Type of Study:   Veins: Lower Extremities DVT Study, Venous Scan Lower Bilateral.  Indications for Study:R/O DVT. Patient Status:ER. Technical Quality:Limited visualization. Conclusions   Summary   No evidence of superficial or deep venous thrombosis in both lower  extremities. Multilevel edema noted. Signature   ----------------------------------------------------------------  Electronically signed by Brenda Rodriguez(Sonographer) on  04/29/2019 03:10 PM  ----------------------------------------------------------------   ----------------------------------------------------------------  Electronically signed by Anushka Bragg(Interpreting  physician) on 04/29/2019 05:04 PM  ----------------------------------------------------------------  Findings:   Right Impression:                     Left Impression:  The common femoral, superficial       The common femoral, femoral,  femoral, popliteal, tibials and       popliteal, tibials and saphenous  saphenous veins are compressible with veins are compressible with normal  normal doppler responses. doppler responses. Thigh, calf and ankle edema noted. Thigh, calf, and ankle edema noted. Allergies   - Allergy:*No Known Allergies(Miscellaneous).  Velocities are measured in cm/s ; Diameters are measured in cm +------------------------------------+----------+---------------+----------+ ! GSV Knee                            ! Yes       ! Yes            ! None      ! +------------------------------------+----------+---------------+----------+ ! GSV Ankle                           ! Yes       ! Yes            ! None      ! +------------------------------------+----------+---------------+----------+ ! SSV                                 ! Yes       ! Yes            ! None      ! +------------------------------------+----------+---------------+----------+ Right Doppler Measurements +---------------------------+------+------+--------------------------------+ ! Location                   ! Signal!Reflux! Reflux (msec)                   ! +---------------------------+------+------+--------------------------------+ ! Common Femoral             !Phasic!      !                                ! +---------------------------+------+------+--------------------------------+ ! Prox Femoral               !Phasic!      !                                ! +---------------------------+------+------+--------------------------------+ ! Popliteal                  !Phasic!      !                                ! +---------------------------+------+------+--------------------------------+ Left Lower Extremities DVT Study Measurements Left 2D Measurements +------------------------------------+----------+---------------+----------+ ! Location                            ! Visualized! Compressibility! Thrombosis! +------------------------------------+----------+---------------+----------+ ! Common Femoral                      !Yes       ! Yes            ! None      ! +------------------------------------+----------+---------------+----------+ ! Prox Femoral                        !Yes       ! Yes            ! None      ! +------------------------------------+----------+---------------+----------+ ! Mid Femoral                         !Yes       ! Yes            ! None      ! +------------------------------------+----------+---------------+----------+ ! Dist Femoral                        !Yes       ! Yes            ! None      ! +------------------------------------+----------+---------------+----------+ ! Deep Femoral                        !Yes       ! Yes            ! None      ! +------------------------------------+----------+---------------+----------+ ! Popliteal                           !Yes       ! Yes            ! None      ! +------------------------------------+----------+---------------+----------+ ! Sapheno Femoral Junction            ! Yes       ! Yes            ! None      ! +------------------------------------+----------+---------------+----------+ ! PTV                                 ! Yes       ! Yes            ! None      ! +------------------------------------+----------+---------------+----------+ ! Peroneal                            !Yes       ! Yes            ! None      ! +------------------------------------+----------+---------------+----------+ ! Gastroc                             ! Yes       ! Yes            ! None      ! +------------------------------------+----------+---------------+----------+ ! GSV Thigh                           ! Yes       ! Yes            ! None      ! +------------------------------------+----------+---------------+----------+ ! GSV Knee                            ! Yes       ! Yes            ! None      ! +------------------------------------+----------+---------------+----------+ ! GSV Ankle                           ! Yes       ! Yes            ! None      ! +------------------------------------+----------+---------------+----------+ ! SSV                                 ! Yes       ! Yes            ! None      ! +------------------------------------+----------+---------------+----------+ Left Doppler Measurements +---------------------------+------+------+--------------------------------+ ! Location                   ! Signal!Reflux! Reflux (msec) ! +---------------------------+------+------+--------------------------------+ ! Common Femoral             !Phasic!      !                                ! +---------------------------+------+------+--------------------------------+ ! Prox Femoral               !Phasic!      !                                ! +---------------------------+------+------+--------------------------------+ ! Popliteal                  !Phasic!      !                                ! +---------------------------+------+------+--------------------------------+      Physical Examination:        General appearance:  alert, cooperative and no distress  Mental Status:  oriented to person, place and time and quite anxious  Lungs:  clear to auscultation bilaterally, normal effort  Heart:  Irregular rhythm, no murmur  Abdomen:  soft, nontender, nondistended, normal bowel sounds, no masses, hepatomegaly, splenomegaly  Extremities:  + edema, redness both lower extremities which is improving, no tenderness in the calves  Skin:  no gross lesions, rashes, induration except as above    Assessment:        Primary Problem  Pulmonary embolism, bilateral ([facility] [address]  [facility] Problems    Diagnosis Date Noted    Pulmonary embolism[facility] ([facility] [I26.99] 04/29/2019     Priority: High    Cellulitis [L03.90] 04/29/2019     Priority: High    S/P implantation of automatic cardioverter/defibrillator (AICD) [Z95.810] 04/03/2019     Priority: High    SDH (subdural hematoma) ([facility] [S06.5X9A] 04/02/2019     Priority: High    Chronic atrial fibrillation ([facility] [I48.2] 04/03/2019     Priority: Medium    Lymphedema of both lower extremities [I89.0] 03/21/2019     Priority: Medium    Acute on chronic systolic congestive heart failure (HCC) [I50.23] 08/08/2017     Priority: Medium    Venous stasis dermatitis of both lower extremities [I87.2]     Coronary atherosclerosis of native coronary artery [I25.10] 04/03/2019    Dyslipidemia [E78.5] 04/03/2019  Hypertension [I10] 04/03/2019       Plan:        1. Continue heparin drip for ,switch to oral anticoagulation in consultation with cardiology  2. Continue oral diuretics  3. Add Xanax 0.25 mg 3 times a day when necessary  4. Monitor off antibiotics  5.  Wound care    Possible discharge in morning if remains stable    Orvilla Kanner, MD  5/1/2019  8:07 AM

## 2019-05-01 NOTE — CONSULTS
[address]                  [facility]                                  CONSULTATION    PATIENT NAME: Christy Murphy                   :        1950  MED REC NO:   [id number]                             ROOM:       0424  ACCOUNT NO:   [de-identified]                           ADMIT DATE: 2019  PROVIDER:     Jose Luis Martell    CONSULT DATE:  2019    CARDIOLOGY CONSULTATION    REASON FOR CONSULTATION:  Shortness of breath and fatigue. HISTORY OF PRESENT ILLNESS:  The patient is a 42-year-old female who was  admitted to [facility] with symptoms of progressive  shortness of breath. She had a CT of the chest, which shows small  pulmonary emboli in the right upper and lower lobes. The CT scan also  did not report any right ventricular strain pattern. The patient was  admitted and Cardiology service was consulted. The patient has history  of subdural hematoma in the past, hence Neurosurgery Service was  consulted prior to anticoagulation. Dr. Sully Swanson from Neurosurgery  felt that there is no contraindication for heparinization at this time  and the patient was started on heparin. Lower extremity venous Dopplers  were ordered and apparently they were negative for DVT. PAST MEDICAL HISTORY:  1. Cardiomyopathy. 2.  History of percutaneous coronary intervention. 3.  Dyslipidemia. 4.  Hypertension. 5.  Chronic systolic congestive heart failure. PAST SURGICAL HISTORY:  Positive for a defibrillator implantation,  corneal transplant, eye surgery, nasal surgery, tonsillectomy and  adenoidectomy. ALLERGIES:  No known drug allergies. SOCIAL HISTORY:  Negative for current smoking or drinking excess alcohol  or using illicit drugs. FAMILY HISTORY:  Reviewed and is negative for sudden cardiac death or  significant coronary artery disease.     REVIEW OF SYSTEMS:  A 15-point system review was performed and pertinent  findings are as noted. CONSTITUTIONAL:  Positive for fatigue. HEENT:   No runny nose. No throat pain. No visual changes. RESPIRATORY:   Positive for shortness of breath. No cough, hemoptysis, pleurisy. CARDIOVASCULAR:  Positive for shortness of breath. GASTROINTESTINAL:   No nausea, vomiting, diarrhea or frequency. GENITOURINARY:  No dysuria,  frequency or hematuria. INTEGUMENT:  Negative for skin rashes or skin  lesions. ALLERGY/IMMUNOLOGY:  Negative for urticaria and itching. ENDOCRINE:  Negative for excessive thirst or urination. MUSCULOSKELETAL:  Negative for joint pains. NEUROLOGIC:  Negative for  headaches, dizziness. PSYCHIATRIC:  Negative for anxiety and  depression. PHYSICAL EXAMINATION:  GENERAL APPEARANCE:  Currently demonstrates the patient comfortable at  bedrest.  The patient is awake, alert and is in no apparent distress. MENTAL STATUS:  The patient is awake, alert and oriented. Normal  affect. VITAL SIGNS:  Blood pressure 128/60, pulse rate of 84, respiratory rate  of 20, weight is 184 pounds. BMI 33.65 kg/m2. HEENT:  Cranium is atraumatic, normocephalic. NECK:  There is no jugular venous distention. Normal carotid upstrokes. No bruits. HEART:  Examination of the heart reveals S1, S2, irregularly irregular. There is a grade 2-0/1 systolic murmur at cardiac apex. LUNGS:  Reveal diminished air entry at bases. ABDOMEN:  Obese. Bowel sounds are present. EXTREMITIES:  Demonstrate signs of lower extremity venous stasis and  cellulitis with some superficial wounds on both lower extremities. LABORATORY DATA:  INR 1.3. PTT is 45.8. BUN 23, creatinine 0.89. Potassium is 3.6. IMPRESSION:  1. Acute pulmonary emboli. 2.  Dilated cardiomyopathy. 3.  History of defibrillator implantation. 4.  Ischemic cardiomyopathy. 5.  Persistent atrial fibrillation. 6.  Hypertension.     PLAN:  Continue anticoagulation for acute pulmonary embolism and atrial  fibrillation. The patient will need long-term anticoagulation with  direct anticoagulant if the patient is able to obtain it, if not  warfarin to maintain INR between 2 and 3. Lower extremity venous  Dopplers were negative. ID Service was consulted for lower extremity  cellulitis management. Thank you for allowing us to participate in the care of the patient and  we will follow the patient with you.         Ruthie Dominguez    D: 04/30/2019 17:55:50       T: 04/30/2019 18:01:20     [email or web address]  Job#: [id number]     Doc#: [id number]    CC:

## 2019-05-01 NOTE — PLAN OF CARE
Problem: Falls - Risk of:  Goal: Will remain free from falls  Description  Will remain free from falls  5/1/2019 0415 by Chase Bob RN  Outcome: Ongoing  Note:   Pt remains free of falls at this time. Bed locked in lowest position, siderails x2, call light in reach. Non-skid footwear applied. Encouraged pt to call for assistance as needed for safety. Falling star posted outside of room. Will continue to monitor.

## 2019-05-02 LAB
ANION GAP SERPL CALCULATED.3IONS-SCNC: 12 MMOL/L (ref 9–17)
BUN BLDV-MCNC: 23 MG/DL (ref 8–23)
BUN/CREAT BLD: ABNORMAL (ref 9–20)
CALCIUM SERPL-MCNC: 8.3 MG/DL (ref 8.6–10.4)
CHLORIDE BLD-SCNC: 104 MMOL/L (ref 98–107)
CO2: 23 MMOL/L (ref 20–31)
CREAT SERPL-MCNC: 0.85 MG/DL (ref 0.5–0.9)
GFR AFRICAN AMERICAN: >60 ML/MIN
GFR NON-AFRICAN AMERICAN: >60 ML/MIN
GFR SERPL CREATININE-BSD FRML MDRD: ABNORMAL ML/MIN/{1.73_M2}
GFR SERPL CREATININE-BSD FRML MDRD: ABNORMAL ML/MIN/{1.73_M2}
GLUCOSE BLD-MCNC: 141 MG/DL (ref 70–99)
HCT VFR BLD CALC: 37.3 % (ref 36.3–47.1)
HEMOGLOBIN: 10.5 G/DL (ref 11.9–15.1)
MCH RBC QN AUTO: 25.9 PG (ref 25.2–33.5)
MCHC RBC AUTO-ENTMCNC: 28.2 G/DL (ref 28.4–34.8)
MCV RBC AUTO: 91.9 FL (ref 82.6–102.9)
NRBC AUTOMATED: 0 PER 100 WBC
PDW BLD-RTO: 19.6 % (ref 11.8–14.4)
PLATELET # BLD: 195 K/UL (ref 138–453)
PMV BLD AUTO: 11.7 FL (ref 8.1–13.5)
POTASSIUM SERPL-SCNC: 3.9 MMOL/L (ref 3.7–5.3)
RBC # BLD: 4.06 M/UL (ref 3.95–5.11)
SODIUM BLD-SCNC: 139 MMOL/L (ref 135–144)
WBC # BLD: 6.1 K/UL (ref 3.5–11.3)

## 2019-05-02 PROCEDURE — 85027 COMPLETE CBC AUTOMATED: CPT

## 2019-05-02 PROCEDURE — 94760 N-INVAS EAR/PLS OXIMETRY 1: CPT

## 2019-05-02 PROCEDURE — 6370000000 HC RX 637 (ALT 250 FOR IP): Performed by: PHYSICIAN ASSISTANT

## 2019-05-02 PROCEDURE — 36415 COLL VENOUS BLD VENIPUNCTURE: CPT

## 2019-05-02 PROCEDURE — 97162 PT EVAL MOD COMPLEX 30 MIN: CPT

## 2019-05-02 PROCEDURE — 97530 THERAPEUTIC ACTIVITIES: CPT

## 2019-05-02 PROCEDURE — 80048 BASIC METABOLIC PNL TOTAL CA: CPT

## 2019-05-02 PROCEDURE — 97166 OT EVAL MOD COMPLEX 45 MIN: CPT

## 2019-05-02 PROCEDURE — 2580000003 HC RX 258: Performed by: PHYSICIAN ASSISTANT

## 2019-05-02 PROCEDURE — 6370000000 HC RX 637 (ALT 250 FOR IP): Performed by: INTERNAL MEDICINE

## 2019-05-02 PROCEDURE — 99232 SBSQ HOSP IP/OBS MODERATE 35: CPT | Performed by: INTERNAL MEDICINE

## 2019-05-02 PROCEDURE — 2700000000 HC OXYGEN THERAPY PER DAY

## 2019-05-02 PROCEDURE — 97535 SELF CARE MNGMENT TRAINING: CPT

## 2019-05-02 PROCEDURE — 99212 OFFICE O/P EST SF 10 MIN: CPT

## 2019-05-02 PROCEDURE — 2060000000 HC ICU INTERMEDIATE R&B

## 2019-05-02 RX ORDER — ALPRAZOLAM 0.25 MG/1
0.25 TABLET ORAL 3 TIMES DAILY PRN
Qty: 10 TABLET | Refills: 0 | Status: SHIPPED | OUTPATIENT
Start: 2019-05-02 | End: 2019-05-06

## 2019-05-02 RX ADMIN — ALPRAZOLAM 0.25 MG: 0.25 TABLET ORAL at 05:16

## 2019-05-02 RX ADMIN — RIVAROXABAN 15 MG: 15 TABLET, FILM COATED ORAL at 08:28

## 2019-05-02 RX ADMIN — POTASSIUM CHLORIDE 20 MEQ: 10 CAPSULE, COATED, EXTENDED RELEASE ORAL at 08:29

## 2019-05-02 RX ADMIN — METOPROLOL SUCCINATE 25 MG: 25 TABLET, FILM COATED, EXTENDED RELEASE ORAL at 08:30

## 2019-05-02 RX ADMIN — Medication 10 ML: at 09:00

## 2019-05-02 RX ADMIN — FUROSEMIDE 20 MG: 20 TABLET ORAL at 17:41

## 2019-05-02 RX ADMIN — FUROSEMIDE 20 MG: 20 TABLET ORAL at 08:29

## 2019-05-02 RX ADMIN — ATORVASTATIN CALCIUM 80 MG: 80 TABLET, FILM COATED ORAL at 20:28

## 2019-05-02 RX ADMIN — OXYBUTYNIN CHLORIDE 5 MG: 5 TABLET ORAL at 14:00

## 2019-05-02 RX ADMIN — OXYBUTYNIN CHLORIDE 5 MG: 5 TABLET ORAL at 08:29

## 2019-05-02 RX ADMIN — RIVAROXABAN 15 MG: 15 TABLET, FILM COATED ORAL at 17:41

## 2019-05-02 RX ADMIN — Medication 10 ML: at 20:28

## 2019-05-02 RX ADMIN — SERTRALINE 50 MG: 50 TABLET, FILM COATED ORAL at 08:31

## 2019-05-02 RX ADMIN — OXYBUTYNIN CHLORIDE 5 MG: 5 TABLET ORAL at 20:28

## 2019-05-02 ASSESSMENT — PAIN DESCRIPTION - LOCATION
LOCATION: BACK
LOCATION: BACK

## 2019-05-02 ASSESSMENT — PAIN SCALES - GENERAL
PAINLEVEL_OUTOF10: 6
PAINLEVEL_OUTOF10: 6

## 2019-05-02 ASSESSMENT — PAIN DESCRIPTION - PAIN TYPE
TYPE: CHRONIC PAIN
TYPE: CHRONIC PAIN

## 2019-05-02 ASSESSMENT — PAIN DESCRIPTION - DESCRIPTORS: DESCRIPTORS: ACHING;DISCOMFORT

## 2019-05-02 NOTE — PROGRESS NOTES
[facility] - Daily Progress Note  Today's Date and Time: 2019, 10:56 AM    Impression :   Current: 2019  · Bilateral LE lymphedema  · Acute on chronic skin changes secondary to edema and scratching  · No apparent bacterial cellulitis  · Bilateral Pulmonary embolism   · Acute on chronic systolic CHF s/p defibrillator (2019)    · Recent SDH   · Chronic a fib   · HTN   Recommendations:     · Continue to monitor off abx  · Leg elevation, compression therapy to reduce edema  · Wound care   · Office f/up in 4 weeks with Dr Hardwick Or for infection. Please call [phone] for appointment    Medical Decision Makin2019       · Patient with chronic lymphedema  · Has developed worsening of edema as an outpatient, leading to skin breaks and irritation  · The above compounded by scratching leading to more abrasions  · Currently does not have secondary bacterial cellulitis  · Main problem is CHF with fluid retention and associated lymphedema. · Will need to decrease the leg edema to improve the skin condition. · 19 She indicates improvement with current treatment      Note:  · Labs, medications, radiologic studies were reviewed with personal review of films  · Moderate amounts of data were reviewed  · Discussed with nursing Staff,   · Infection Control and Prevention measures reviewed  · All prior entries were reviewed  · Administer medications as ordered. No antibiotics needed. · Prognosis: Good  · Discharge planning reviewed  · Follow up as outpatient. History Present Illness:     INITIAL HISTORY:     Brittany Cintronr is a 76y.o.-year-old female who presented from [facility] with worsening shortness of breath and cough.     Patient was found to have bilateral pulmonary embolism. Patient was recently discharged from the hospital on 19 with subdural hematoma. Patient also reports that she follows up with Cardiology because of systolic CHF.  She 4. Respiratory: Positive for cough. Negative for apnea, chest tightness, shortness of breath and wheezing. 5. Cardiovascular: Positive for leg swelling. Negative for chest pain and palpitations. 6. Gastrointestinal: Negative for abdominal distention, abdominal pain, diarrhea, nausea and vomiting. 7. Endocrine: Negative for polyuria. 8. Genitourinary: Negative for urgency. 9. Musculoskeletal: Negative for back pain and joint swelling. 10. Skin: Positive for rash and wound. ·      Blisters in bilateral lower extremities   11. Allergic/Immunologic: Negative for environmental allergies and food allergies. 12. Neurological: Negative for dizziness, weakness and headaches. 13. Hematological: Negative for adenopathy. 14. Psychiatric/Behavioral: Negative for agitation and confusion. Physical Examination :     Patient Vitals for the past 8 hrs:   BP Temp Temp src Pulse Resp SpO2 Weight   19 1011 -- -- -- -- 19 94 % --   19 0515 -- -- -- -- -- -- 184 lb 3.2 oz (83.6 kg)   19 0347 124/68 97.5 °F (36.4 °C) Oral 70 15 91 % --     Temp (24hrs), Av.6 °F (36.4 °C), Min:97.4 °F (36.3 °C), Max:97.8 °F (36.6 °C)    Constitutional: She is oriented to person, place, and time. She appears well-developed and well-nourished. No distress. HENT:   Head: Normocephalic and atraumatic. Eyes: Pupils are equal, round, and reactive to light. EOM are normal.   Neck: Normal range of motion. Neck supple. Cardiovascular: Normal rate, regular rhythm and normal heart sounds. No murmur heard. AICD in place   Pulmonary/Chest: Effort normal and breath sounds normal. No respiratory distress. She has no wheezes. Abdominal: Soft. Bowel sounds are normal. She exhibits no distension. Musculoskeletal: Normal range of motion. She exhibits edema. Neurological: She is alert and oriented to person, place, and time. Skin: Skin is warm. She is not diaphoretic. There is erythema. No pallor.    Chronic venous stasis dermatitis with few excoriated areas from scratching and edema. Bilateral leg discoloration. No apparent secondary bacterial cellulitis   Psychiatric: She has a normal mood and affect. Her behavior is normal.         Medical Decision Making:   I have independently reviewed/ordered the following labs:    CBC with Differential:   Recent Labs     04/29/19  1129  05/01/19  0640 05/02/19  0637   WBC 9.9   < > 6.5 6.1   HGB 12.6   < > 10.5* 10.5*   HCT 39.9   < > 37.4 37.3      < > 186 195   LYMPHOPCT 8*  --   --   --    MONOPCT 4  --   --   --     < > = values in this interval not displayed. BMP:   Recent Labs     05/01/19  0640 05/02/19  0637    139   K 3.9 3.9    104   CO2 23 23   BUN 22 23   CREATININE 0.96* 0.85                        Media Information                     Medications:      rivaroxaban  15 mg Oral BID WC    atorvastatin  80 mg Oral Nightly    furosemide  20 mg Oral BID    metoprolol succinate  25 mg Oral Daily    oxybutynin  5 mg Oral TID    potassium chloride  20 mEq Oral Daily    sertraline  50 mg Oral Daily    sodium chloride flush  10 mL Intravenous 2 times per day         Thank you for allowing us to participate in the care of this patient. Please call with questions.     Maximo Larson MD  Pager: [phone]  - Office: [phone]

## 2019-05-02 NOTE — CARE COORDINATION
Attempted to speak with Admissions, both have gone for the day, will need to call Friday to see if accepted, follow.

## 2019-05-02 NOTE — PROGRESS NOTES
Occupational Therapy   Occupational Therapy Initial Assessment  Date: 2019   Patient Name: Brittany Cintronr  MRN: [id number]     : 1950    Date of Service: 2019    Discharge Recommendations:    Further therapy recommended at discharge. Assessment   Performance deficits / Impairments: Decreased functional mobility ; Decreased strength;Decreased endurance;Decreased ADL status; Decreased high-level IADLs  Assessment: pt limited by endurance and unsteadiness on feet impacts pt ability to safely complete functional mobility as well as ADLs. Pt would benefit from continued therapy at discharge   Treatment Diagnosis: pulmonary embolism   Prognosis: Good  Decision Making: Medium Complexity  Patient Education: pt ed on POC, purpose of eval, importance of continued movement, benefits of therapy, safety during functional transfers/functional mobility, and pursed lip breathing tech. good return   REQUIRES OT FOLLOW UP: Yes  Activity Tolerance  Activity Tolerance: Patient Tolerated treatment well;Patient limited by fatigue  Activity Tolerance: pt fatigued by the end of session   Safety Devices  Safety Devices in place: Yes  Type of devices: Gait belt;Call light within reach; Patient at risk for falls; Chair alarm in place; Left in chair  Restraints  Initially in place: No           Patient Diagnosis(es): The encounter diagnosis was Panic disorder. has a past medical history of Acute on chronic systolic congestive heart failure ([address] Anxiety, Cardiomyopathy ([address] Depression, H/O heart artery stent, [place] (hard of hearing), Hyperlipidemia, Hypertension, Hypertensive urgency, and Leg swelling.   has a past surgical history that includes Cataract removal; Corneal transplant; Nasal sinus surgery; eye surgery; Tonsillectomy and adenoidectomy; and Cardiac defibrillator placement.     Treatment Diagnosis: pulmonary embolism       Restrictions  Restrictions/Precautions  Restrictions/Precautions: Fall Risk  Required Braces or Orthoses?: No  Position Activity Restriction  Other position/activity restrictions: up with assist, acute PE- heparin therapeutic    Subjective   General  Chart Reviewed: No  Patient assessed for rehabilitation services?: Yes  Family / Caregiver Present: No  Diagnosis: pulmonary embolism   General Comment  Comments: RN ok'd for therapy this morning. Pt agreeable to participate in session and cooperative/pleasant throughout   Pain Assessment  Pain Assessment: 0-10  Pain Level: 6  Pain Type: Chronic pain  Pain Location: Back  Non-Pharmaceutical Pain Intervention(s): Therapeutic presence;Distraction; Ambulation/Increased Activity  Response to Pain Intervention: Patient Satisfied    Oxygen Therapy  O2 Device: Nasal cannula  O2 Flow Rate (L/min): 3 L/min     Social/Functional History  Social/Functional History  Lives With: Family(Son and Spouse)  Type of Home: House  Home Layout: Two level, Able to Live on Main level with bedroom/bathroom  Home Access: Stairs to enter with rails  Entrance Stairs - Number of Steps: 4  Entrance Stairs - Rails: Both  Bathroom Shower/Tub: Tub/Shower unit  Bathroom Toilet: Standard  Bathroom Accessibility: Accessible  Home Equipment: Cane, 4 wheeled walker(pt reports using cane for community distances)  Receives Help From: Family  ADL Assistance: Independent(Pt reports increased difficulty with lower body bathing/dressing due to chronic back pain)  Homemaking Assistance: Needs assistance(son assists with cooking and cleaning)  Homemaking Responsibilities: Yes  Ambulation Assistance: Independent(with cane prn)  Transfer Assistance: Independent  Active : No  Patient's  Info: Son drives  Mode of Transportation: Car  Occupation: Retired  Leisure & Hobbies: Enjoys watching television  IADL Comments: Some assistance  Additional Comments: Pt reports  is unable to provide assistance due to health issues and son works outside the home.      Objective   Vision: Impaired  Vision Exceptions: Wears glasses at all times  Hearing: Within functional limits    Orientation  Overall Orientation Status: Within Functional Limits     Balance  Sitting Balance: Stand by assistance(~35 minutes on EOB and in chair )  Standing Balance: Contact guard assistance(with RW)  Standing Balance  Time: ~3 minutes   Activity: pt took a few steps in order to transfer from bed to chair and stood in order to wash bottom and groin  Comment: pt with no LOB but slightly unsteady      ADL  Feeding: Modified independent   Grooming: Modified independent (pt washed face, put on deoderant and brushed hair with increased time to complete )  UE Bathing: Minimal assistance(pt required min A for washing back but was otherwise independent with UB bathing )  LE Bathing: Moderate assistance(pt able to wash groin and bottom while standing next to chair but required assistance for washing B LE and feet )  UE Dressing: Minimal assistance(pt required min A for UB dressing for line management)  LE Dressing: Moderate assistance(pt required assistance for donning/doffing bilateral socks )  Toileting:  Moderate assistance  Tone RUE  RUE Tone: Normotonic  Tone LUE  LUE Tone: Normotonic  Coordination  Movements Are Fluid And Coordinated: Yes     Bed mobility  Supine to Sit: Contact guard assistance  Sit to Supine: (pt retired to chair at [facility] of session)  Scooting: Contact guard assistance  Transfers  Sit to stand: Contact guard assistance  Stand to sit: Contact guard assistance  Transfer Comments: with RW      Cognition  Overall Cognitive Status: WFL     Sensation  Overall Sensation Status: WFL      LUE AROM : WFL  Left Hand AROM: WFL  RUE AROM : WFL  Right Hand AROM: WFL    LUE Strength  Gross LUE Strength: Exceptions to [place]  L Hand Grasp: 4-/5  LUE Strength Comment: overall muscle strength 4-/5   RUE Strength  Gross RUE Strength: Exceptions to [place]  R Hand Grasp: 4-/5  RUE Strength Comment: overall muscle strength 4-/5       Plan Plan  Times per week: 4x/wk    AM-PAC Score   AM-PAC Inpatient Daily Activity Raw Score: 17  AM-PAC Inpatient ADL T-Scale Score : 37.26  ADL Inpatient CMS 0-100% Score: 50.11  ADL Inpatient CMS G-Code Modifier : CK    Goals  Short term goals  Time Frame for Short term goals: pt will, by discharge  Short term goal 1: dem SBA during functional transfers/functional mobility with LRD  Short term goal 2: dem 5+ minutes dynamic standing tolerance with SBA and LRD in order to complete functional tasks  Short term goal 3: increase activity tolerance to 25+ minutes in order to participate in daily tasks  Short term goal 4: complete LB ADLs and toileting tasks with min A, set up and AE, as needed  Short term goal 5: complete UB ADLs and grooming tasks with supervision and set up     Therapy Time   Individual Concurrent Group Co-treatment   Time In 0912         Time Out 1008         Minutes [address], OTR/L

## 2019-05-02 NOTE — PROGRESS NOTES
[facility] Wound Ostomy Continence Nurse  Consult Note       NAME:  [address] [place] [address] Dr CAVAZOS RECORD NUMBER:  [id number]  AGE: 76 y.o. GENDER: female  : 1950  TODAY'S DATE:  2019    Subjective:     Reason for WOCN Evaluation and Assessment: BLE wounds      Gerlean Opitz is a 76 y.o. female referred by:   [x] Physician  [] Nursing  [] Other:     Wound Identification:  Wound Type: venous and traumatic  Contributing Factors: edema, venous stasis, lymphedema, decreased mobility, obesity, poor hygiene and non-adherence     Edema improved. Patient actively picking skin on lower extremities when writer entered her room. She also used a baby wipe and was aggressively cleansing her leg skin. Foam dressings changed. Applied roll gauze and ACE wraps from the base of her toes to her knees to offer mild compression. PAST MEDICAL HISTORY        Diagnosis Date    Acute on chronic systolic congestive heart failure (HCC) 2017    Anxiety     Cardiomyopathy ([address]     Depression     H/O heart artery stent     [place] (hard of hearing)     Hyperlipidemia     Hypertension     Hypertensive urgency 2017    Leg swelling 2017       PAST SURGICAL HISTORY    Past Surgical History:   Procedure Laterality Date    CARDIAC DEFIBRILLATOR PLACEMENT      CATARACT REMOVAL      CORNEAL TRANSPLANT      EYE SURGERY      NASAL SINUS SURGERY      TONSILLECTOMY AND ADENOIDECTOMY         FAMILY HISTORY    No family history on file.     SOCIAL HISTORY    Social History     Tobacco Use    Smoking status: Never Smoker    Smokeless tobacco: Never Used   Substance Use Topics    Alcohol use: No    Drug use: No       ALLERGIES    No Known Allergies        Objective:      /69   Pulse 75   Temp 98.2 °F (36.8 °C) (Oral)   Resp 15   Ht 5' 2\" (1.575 m)   Wt 184 lb 3.2 oz (83.6 kg)   SpO2 99%   BMI 33.69 kg/m²   David Risk Score: David Scale Score: 13    LABS    CBC:   Lab Results   Component Value Date    WBC on Hospital Admission: Yes  Primary Wound Type: Venous Ulcer  Location: Leg  Wound Location Orientation: Right; Lower; Posterior; Lateral   Wound Venous   Dressing Status Changed   Dressing Changed Changed/New   Dressing/Treatment Foam;Dry Dressing; Ace Wrap   Wound Assessment Red   Drainage Amount Small   Drainage Description Sanguinous   Odor None   Susanne-wound Assessment Hyperpigmented   Wound 03/23/19 Leg Left; Lower;Medial   Date First Assessed/Time First Assessed: 03/23/19 0730   Present on Hospital Admission: Yes  Primary Wound Type: Venous Ulcer  Location: Leg  Wound Location Orientation: Left; Lower;Medial   Wound Venous   Dressing Status Changed   Dressing Changed Changed/New   Dressing/Treatment Foam;Dry Dressing; Ace Wrap   Wound Assessment Red;Fragile   Drainage Amount Small   Drainage Description Serosanguinous   Odor None   Susanne-wound Assessment Hyperpigmented   Wound 04/30/19 Leg Left; Lower; Posterior   Date First Assessed/Time First Assessed: 04/30/19 1235   Present on Hospital Admission: Yes  Primary Wound Type: Venous Ulcer  Location: Leg  Wound Location Orientation: Left; Lower; Posterior   Wound Venous   Dressing Changed Changed/New   Dressing/Treatment Ace Wrap;Dry Dressing; Foam   Wound Assessment Clean;Red   Drainage Amount Small   Drainage Description Serosanguinous   Odor None   Susanne-wound Assessment Hyperpigmented       Response to treatment:  Well tolerated by patient. Plan:     Plan of Care:   Elevate BLE  Foam dressings to the open wounds on the BLE  Wrap BLE with roll gauze and ACE wraps from the base of the toes to knees. Remove dry layers daily for skin inspection and hygiene. Maintain the foam dressings for 3-5 days. For home use: Oil emulsion gauze to the open wounds  Wrap BLE with roll gauze and ACE wraps from the base of the toes to knees. Change daily.   Specialty Bed Required : Yes   [] Low Air Loss   [x] Pressure Redistribution  [] Fluid Immersion  [] Bariatric  [] Total Pressure Relief  [] Other:     Discharge Plan:  TBD    Patient/Caregiver Teaching:  Writer attempted to clarify with patient her home wound care plan. She was resistant to home health RN. Makes her \"anxious\". States she cannot ambulate well currently though feels she can provide own lower extremity care. She states she has compression stockings at home but feels they are too uncomfortable. Tried to gain clarity on her home self care plan. Did discuss with . Oil emulsion gauze, roll gauze, and ACE wraps provided to patient for initiating self care at home if needed.    [] Indicates understanding       [x] Needs reinforcement  [] Unsuccessful      [] Verbal Understanding  [] Demonstrated understanding       [] No evidence of learning  [] Refused teaching         [] N/A       Electronically signed by Eber Garcia RN, CWON on 5/2/2019 at 3:59 PM

## 2019-05-02 NOTE — DISCHARGE INSTR - COC
([facility] I21.4    Acute on chronic congestive heart failure (HCC) I50.9    Panic disorder F41.0    SDH (subdural hematoma) ([facility]) S06.5X9A    Hypotension I95.9    Dizziness R42    Dyslipidemia E78.5    Chronic systolic heart failure ([facility]) I50.22    Coronary atherosclerosis of native coronary artery I25.10    Hypertension I10    Chronic atrial fibrillation ([facility]) I48.2    S/P implantation of automatic cardioverter/defibrillator (AICD) Z95.810    History of percutaneous coronary intervention Z98.890    Pulmonary embolism, bilateral ([facility]) I26.99    Cellulitis L03.90    Venous stasis dermatitis of both lower extremities I87.2       Isolation/Infection:   Isolation          No Isolation            Nurse Assessment:  Last Vital Signs: /69   Pulse 75   Temp 98.2 °F (36.8 °C) (Oral)   Resp 15   Ht 5' 2\" (1.575 m)   Wt 184 lb 3.2 oz (83.6 kg)   SpO2 99%   BMI 33.69 kg/m²     Last documented pain score (0-10 scale): Pain Level: 6  Last Weight:   Wt Readings from Last 1 Encounters:   05/02/19 184 lb 3.2 oz (83.6 kg)     Mental Status:  oriented, alert, coherent, logical, thought processes intact and able to concentrate and follow conversation    IV Access:  - None    Nursing Mobility/ADLs:  Walking   Independent  Transfer  Independent  Bathing  Assisted  Dressing  Assisted  Toileting  Independent  Feeding  Independent  Med Admin  Independent  Med Delivery   whole    Wound Care Documentation and Therapy:  Wound Identification:  Wound Type: venous and traumatic  Contributing Factors: edema, venous stasis, lymphedema, decreased mobility, obesity, poor hygiene and non-adherence         Wound 03/22/19 Leg Right; Lower;Medial (Active)   Wound Image   4/30/2019 12:33 PM   Wound Venous 5/2/2019 11:25 AM   Dressing Status Intact 5/2/2019 11:17 AM   Dressing/Treatment Foam;Dry Dressing; Ace Wrap 5/2/2019 11:25 AM   Dressing Change Due 05/03/19 5/2/2019 11:17 AM   Wound Length (cm) 5 cm 4/3/2019  2:26 PM   Wound Width (cm) 5 cm 4/3/2019  2:26 PM   Wound Depth (cm) 0.1 cm 4/3/2019  2:26 PM   Wound Surface Area (cm^2) 25 cm^2 4/3/2019  2:26 PM   Change in Wound Size % (l*w) 74.75 4/3/2019  2:26 PM   Wound Volume (cm^3) 2.5 cm^3 4/3/2019  2:26 PM   Wound Healing % 87 4/3/2019  2:26 PM   Wound Assessment Dry;Red 5/2/2019 11:25 AM   Drainage Amount None 5/2/2019 11:25 AM   Drainage Description Serosanguinous 5/2/2019 11:17 AM   Odor None 5/2/2019 11:25 AM   Susanne-wound Assessment Hyperpigmented 5/2/2019 11:25 AM   Culture Taken No 4/4/2019  4:00 AM   Number of days: 41       Wound 03/22/19 Leg Right; Lower; Posterior; Lateral (Active)   Wound Image   4/30/2019 12:33 PM   Wound Venous 5/2/2019 11:25 AM   Dressing Status Changed 5/2/2019 11:25 AM   Dressing Changed Changed/New 5/2/2019 11:25 AM   Dressing/Treatment Foam;Dry Dressing; Ace Wrap 5/2/2019 11:25 AM   Dressing Change Due 05/03/19 5/2/2019 11:17 AM   Wound Length (cm) 0.6 cm 4/3/2019  2:26 PM   Wound Width (cm) 0.8 cm 4/3/2019  2:26 PM   Wound Depth (cm) 0.1 cm 4/3/2019  2:26 PM   Wound Surface Area (cm^2) 0.48 cm^2 4/3/2019  2:26 PM   Change in Wound Size % (l*w) 84 4/3/2019  2:26 PM   Wound Volume (cm^3) 0.05 cm^3 4/3/2019  2:26 PM   Wound Healing % 92 4/3/2019  2:26 PM   Wound Assessment Red 5/2/2019 11:25 AM   Drainage Amount Small 5/2/2019 11:25 AM   Drainage Description Sanguinous 5/2/2019 11:25 AM   Odor None 5/2/2019 11:25 AM   Susanne-wound Assessment Hyperpigmented 5/2/2019 11:25 AM   Culture Taken No 4/4/2019  4:00 AM   Number of days: 41       Wound 03/23/19 Leg Left; Lower;Medial (Active)   Wound Image   4/30/2019 12:33 PM   Wound Venous 5/2/2019 11:25 AM   Dressing Status Changed 5/2/2019 11:25 AM   Dressing Changed Changed/New 5/2/2019 11:25 AM   Dressing/Treatment Foam;Dry Dressing; Ace Wrap 5/2/2019 11:25 AM   Dressing Change Due 05/03/19 5/2/2019 11:17 AM   Wound Assessment Red;Fragile 5/2/2019 11:25 AM   Drainage Amount Small 5/2/2019 11:25 AM   Drainage Description Serosanguinous 5/2/2019 11:25 AM   Odor None 5/2/2019 11:25 AM   Margins Attached edges 4/4/2019  9:00 AM   Susanne-wound Assessment Hyperpigmented 5/2/2019 11:25 AM   Non-staged Wound Description Partial thickness 5/2/2019 11:17 AM   Culture Taken No 4/4/2019  4:00 AM   Number of days: 40       Wound 04/30/19 Leg Left; Lower; Posterior (Active)   Wound Image   4/30/2019 12:33 PM   Wound Venous 5/2/2019 11:25 AM   Dressing Changed Changed/New 5/2/2019 11:25 AM   Dressing/Treatment Ace Wrap;Dry Dressing; Foam 5/2/2019 11:25 AM   Wound Cleansed Other (Comment) 4/30/2019 12:33 PM   Dressing Change Due 05/03/19 5/2/2019 11:17 AM   Wound Assessment Clean;Red 5/2/2019 11:25 AM   Drainage Amount Small 5/2/2019 11:25 AM   Drainage Description Serosanguinous 5/2/2019 11:25 AM   Odor None 5/2/2019 11:25 AM   Susanne-wound Assessment Hyperpigmented 5/2/2019 11:25 AM   Non-staged Wound Description Partial thickness 5/2/2019 11:17 AM   Number of days: 2       Wound 04/30/19 Coccyx (Active)   Wound Skin Tear 5/2/2019  4:00 AM   Dressing/Treatment Barrier Film 5/2/2019 11:17 AM   Drainage Amount None 5/2/2019 11:17 AM   Odor None 5/2/2019 11:17 AM   Number of days: 2      Plan of Care: For in-patient care:  Elevate BLE  Foam dressings applied to the open wounds on the BLE  Wrap BLE with roll gauze and ACE wraps from the base of the toes to knees. Remove dry layers daily for skin inspection and hygiene. Maintain the foam dressings for 3-5 days. For home use:  Elevate BLE  Oil emulsion gauze to the open wounds  Wrap BLE with roll gauze and ACE wraps from the base of the toes to knees. Change daily. When wounds heal resume use of compression stockings on in AM; off in PM      Elimination:  Continence:   · Bowel:  Yes  · Bladder: Yes  Urinary Catheter: None   Colostomy/Ileostomy/Ileal Conduit: No       Date of Last BM: 5/6/2019    Intake/Output Summary (Last 24 hours) at 5/2/2019 1612  Last data filed at 5/2/2019 7310  Gross per 24 hour   Intake 911 ml   Output 850 ml   Net 61 ml     I/O last 3 completed shifts: In: 911 [P.O.:700; I.V.:211]  Out: 850 [Urine:850]    Safety Concerns: At Risk for Falls    Impairments/Disabilities:      None    Nutrition Therapy:  Current Nutrition Therapy:   - Oral Diet:  General    Routes of Feeding: Oral  Liquids: No Restrictions  Daily Fluid Restriction: no  Last Modified Barium Swallow with Video (Video Swallowing Test): not done    Treatments at the Time of Hospital Discharge:   Respiratory Treatments: ***  Oxygen Therapy:  is on oxygen at 2 L/min per nasal cannula. Ventilator:    - No ventilator support    Rehab Therapies: Physical Therapy and Occupational Therapy  Weight Bearing Status/Restrictions: No weight bearing restirctions  Other Medical Equipment (for information only, NOT a DME order):  hospital bed  Other Treatments: ***  Elevate BLE  Foam dressings to the open wounds on the BLE  Wrap BLE with roll gauze and ACE wraps from the base of the toes to knees. Remove dry layers daily for skin inspection and hygiene. Maintain the foam dressings for 3-5 days. For home use: Oil emulsion gauze to the open wounds  Wrap BLE with roll gauze and ACE wraps from the base of the toes to knees. Change daily.         Patient's personal belongings (please select all that are sent with patient):  Glasses, Hearing Aides right    RN SIGNATURE:  Electronically signed by Kasia Bradford RN on 5/6/19 at 3:18 PM    CASE MANAGEMENT/SOCIAL WORK SECTION    Inpatient Status Date: 04/29/2019    Readmission Risk Assessment Score:  Readmission Risk              Risk of Unplanned Readmission:        20           Discharging to Facility/ Agency   · Name: [facility]  · Address: [address] [place] [address]  · Phone: [phone]  · Fax: [phone]    Dialysis Facility (if applicable)   · Name:  · Address:  · Dialysis Schedule:  · Phone:  · Fax:    / signature: Electronically signed by Colin Nicole RN on 5/6/19 at 2:35 PM    PHYSICIAN SECTION    Prognosis: Good    Condition at Discharge: Stable    Rehab Potential (if transferring to Rehab): Good    Recommended Labs or Other Treatments After Discharge: Wound care for leg wounds both sides, PT OT    Physician Certification: I certify the above information and transfer of Abe Sainz  is necessary for the continuing treatment of the diagnosis listed and that she requires Home Care for greater 30 days.      Update Admission H&P: No change in H&P    PHYSICIAN SIGNATURE:  Electronically signed by Nakia Jones MD on 5/2/19 at 7:56 AM

## 2019-05-02 NOTE — PROGRESS NOTES
Physical Therapy    Facility/Department: [facility] 4B STEPDOWN  Initial Assessment    NAME: Maribell Eng  : 1950  MRN: [id number]    Date of Service: 2019    Discharge Recommendations: Further therapy recommended at discharge. PT Equipment Recommendations  Equipment Needed: No    Assessment   Body structures, Functions, Activity limitations: Decreased functional mobility ; Decreased endurance;Decreased sensation;Decreased strength;Decreased balance;Decreased ADL status  Assessment: The pt is currently unsafe to ambulate household distances without assistance due to significant endurance deficits. The pt's spouse is unable to provide assistance due to health issues and the pt's son works and is unable to provide 24hr assistance. Prognosis: Good  Decision Making: Medium Complexity  Patient Education: POC, importance of mobility, safety, purpose of PT  REQUIRES PT FOLLOW UP: Yes  Activity Tolerance  Activity Tolerance: Patient limited by endurance       Patient Diagnosis(es): There were no encounter diagnoses. has a past medical history of Acute on chronic systolic congestive heart failure ([address] Anxiety, Cardiomyopathy ([address] Depression, H/O heart artery stent, [place] (hard of hearing), Hyperlipidemia, Hypertension, Hypertensive urgency, and Leg swelling.   has a past surgical history that includes Cataract removal; Corneal transplant; Nasal sinus surgery; eye surgery; Tonsillectomy and adenoidectomy; and Cardiac defibrillator placement.     Restrictions  Restrictions/Precautions  Restrictions/Precautions: Fall Risk  Required Braces or Orthoses?: No  Position Activity Restriction  Other position/activity restrictions: up with assist, acute PE- heparin therapeutic  Vision/Hearing  Vision: Impaired  Vision Exceptions: Wears glasses at all times  Hearing: Within functional limits     Subjective  General  Patient assessed for rehabilitation services?: Yes  Response To Previous Treatment: Not applicable  Family / Caregiver Present: No  Follows Commands: Within Functional Limits  Subjective  Subjective: RN and pt agreeable to PT. Pt supine in bed upon arrival, pleasant and cooperative throughout. Pain Screening  Patient Currently in Pain: Yes  Pain Assessment  Pain Assessment: 0-10  Pain Level: 6  Pain Type: Chronic pain  Pain Location: Back  Pain Descriptors: Aching;Discomfort  Non-Pharmaceutical Pain Intervention(s): Ambulation/Increased Activity  Response to Pain Intervention: Patient Satisfied  Vital Signs  Patient Currently in Pain: Yes       Orientation  Orientation  Overall Orientation Status: Within Functional Limits  Social/Functional History  Social/Functional History  Lives With: Family(Son and Spouse)  Type of Home: House  Home Layout: Two level, Able to Live on Main level with bedroom/bathroom  Home Access: Stairs to enter with rails  Entrance Stairs - Number of Steps: 4  Entrance Stairs - Rails: Both  Bathroom Shower/Tub: Tub/Shower unit  Bathroom Toilet: Standard  Bathroom Accessibility: Accessible  Home Equipment: Cane, 4 wheeled walker(pt reports using cane for community distances)  Receives Help From: Family  ADL Assistance: Independent(Pt reports increased difficulty with lower body bathing/dressing due to chronic back pain)  Homemaking Assistance: Needs assistance(son assists with cooking and cleaning)  Homemaking Responsibilities: Yes  Ambulation Assistance: Independent(with cane prn)  Transfer Assistance: Independent  Active : No  Patient's  Info: Son drives  Mode of Transportation: Car  Occupation: Retired  Leisure & Hobbies: Enjoys watching television  IADL Comments: Some assistance  Additional Comments: Pt reports  is unable to provide assistance due to health issues and son works outside the home.   Objective          Joint Mobility  Spine: WFL  ROM RLE: WFL  ROM LLE: WFL  ROM RUE: WFL  ROM LUE: WFL  Strength RLE  Strength RLE: WFL  Strength LLE  Strength LLE: WFL  Strength

## 2019-05-02 NOTE — PROGRESS NOTES
Yue Fernandez 19    Progress Note    5/2/2019    7:53 AM    Name:   Fabiano Lambert  MRN:     [id number]     Acct:      [de-identified]   Room:   [facility]/8064-23   Day:  3  Admit Date:  4/29/2019  4:44 PM    PCP:   No primary care provider on file. Code Status:  Full Code    Subjective:     C/C: Cough and shortness of breath    Interval History Status:    Feels she is breathing better, to starting Xanax for heavy breathing and anxiety  Heparin drip was stopped yesterday and started on oral Xarelto in consultation with cardiology  Leg swelling has decreased , redness of both lower extremities also is significantly less  Antibiotics were stopped by ID  Brief History:   Admitted to [facility]'s ER with following history from mid-level provider and ER attending Dr. Migue Boland;     Neeta Andre is a 76 y. o. female who presents to the emergency department today by private vehicle for evaluation of a cough and shortness of breath.  She states that for the last 1-2 weeks she has had a cough and shortness of breath.  She states that she is short of breath with rest and exertion.  She just recently had a defibrillator placed like a month ago. Arvind Jade also states that she is nauseous without any vomiting.  She denies any fevers or chills.  Was supposed to see Dr. Justino Guerrier the office today for an ICD check but she felt so ill that she came to the emergency room for evaluation.      CT scan of the chest shows small pulmonary emboli in the right upper and lower lobes. Divina Kocher is no sign of right heart strain. Arvind Jade also has underlying congestive heart failure.  Patient has cellulitis of both lower extremities which are chronically edematous with some superficial sores.  CT scan of the brain shows interval resolution of her subdural hematoma which has shrunk in size.  Patient is referred to the hospitalist service at [facility] for admission and I also spoke congestive heart failure ([facility] [address] Anxiety, Cardiomyopathy ([facility] [address] Depression, H/O heart artery stent, [place] (hard of hearing), Hyperlipidemia, Hypertension, Hypertensive urgency, and Leg swelling. Social History:   reports that she has never smoked. She has never used smokeless tobacco. She reports that she does not drink alcohol or use drugs. Family History: No family history on file. Vitals:  /68   Pulse 70   Temp 97.5 °F (36.4 °C) (Oral)   Resp 15   Ht 5' 2\" (1.575 m)   Wt 184 lb 3.2 oz (83.6 kg)   SpO2 91%   BMI 33.69 kg/m²   Temp (24hrs), Av.6 °F (36.4 °C), Min:97.4 °F (36.3 °C), Max:97.8 °F (36.6 °C)    No results for input(s): POCGLU in the last 72 hours. I/O (24Hr): Intake/Output Summary (Last 24 hours) at 2019 0753  Last data filed at 2019 0514  Gross per 24 hour   Intake 911 ml   Output 650 ml   Net 261 ml       Labs:    Hematology:  Recent Labs     19  0640 19  0637   WBC 6.9 6.5 6.1   HGB 10.1* 10.5* 10.5*   HCT 36.5 37.4 37.3    186 195   INR 1.3  --   --      Chemistry:  Recent Labs     19  1129  1940 19  0637   *   < > 141 141 139   K 3.6*   < > 3.6* 3.9 3.9      < > 109* 106 104   CO2 22   < > 21 23 23   GLUCOSE 109*   < > 117* 106* 141*   BUN 26*   < > 23 22 23   CREATININE 0.88   < > 0.89 0.96* 0.85   ANIONGAP 17   < > 11 12 12   LABGLOM >60   < > >60 58* >60   GFRAA >60   < > >60 >60 >60   CALCIUM 8.9   < > 7.9* 8.1* 8.3*   MYOGLOBIN 122*  --   --   --   --     < > = values in this interval not displayed. No results for input(s): PROT, LABALBU, LABA1C, P3FYZCZ, O4SJGOJ, FT4, TSH, AST, ALT, LDH, GGT, ALKPHOS, BILITOT, BILIDIR, AMMONIA, AMYLASE, LIPASE, LACTATE, CHOL, HDL, LDLCHOLESTEROL, CHOLHDLRATIO, TRIG, VLDL, PHENYTOIN, PHENYF in the last 72 hours.       Lab Results   Component Value Date/Time    [facility] 2019 09:15 AM     Lab Results   Component Value Date/Time    CULTURE NO GROWTH 6 DAYS 03/21/2019 09:15 AM       No results found for: POCPH, PHART, PH, POCPCO2, GUG5FHP, PCO2, POCPO2, PO2ART, PO2, POCHCO3, RZI7BHW, HCO3, NBEA, PBEA, BEART, BE, THGBART, THB, QXP1VRW, SVTM0WNW, E0IKFBZJ, O2SAT, FIO2    Radiology:    Ct Head Wo Contrast    Result Date: 4/29/2019  EXAMINATION: CT OF THE HEAD WITHOUT CONTRAST  4/29/2019 1:47 pm TECHNIQUE: CT of the head was performed without the administration of intravenous contrast. Dose modulation, iterative reconstruction, and/or weight based adjustment of the mA/kV was utilized to reduce the radiation dose to as low as reasonably achievable. COMPARISON: 04/12/2019 HISTORY: ORDERING SYSTEM PROVIDED HISTORY: previous blled TECHNOLOGIST PROVIDED HISTORY: FINDINGS: BRAIN/VENTRICLES: Previously identified extra-axial blood products about the left parietal region near the vertex is again demonstrated and smaller. This measures up to 6 mm in thickness (previously 8 mm). This has also decreased in attenuation. No new or acute appearing blood products are appreciated. Cortical atrophy and white matter changes in the brain are again demonstrated. No new findings identified in the brain. ORBITS: The visualized portion of the orbits demonstrate no acute abnormality. SINUSES: The visualized paranasal sinuses and mastoid air cells demonstrate no acute abnormality. SOFT TISSUES/SKULL:  No acute abnormality of the visualized skull or soft tissues. Continued reduction in size of extra-axial hematoma about the left parietal vertex measuring up to 6 mm in thickness. No new abnormality identified.      Ct Head Wo Contrast    Result Date: 4/12/2019  EXAMINATION: CT OF THE HEAD WITHOUT CONTRAST  4/12/2019 10:53 am TECHNIQUE: CT of the head was performed without the administration of intravenous contrast. Dose modulation, iterative reconstruction, and/or weight based adjustment of the mA/kV was utilized to reduce the radiation dose to as low as reasonably achievable. COMPARISON: CT head April 3, 2019 and April 2, 2019 HISTORY: ORDERING SYSTEM PROVIDED HISTORY: SDH (subdural hematoma) ([facility] [address] TECHNOLOGIST PROVIDED HISTORY: follow up for SDH Ordering Physician Provided Reason for Exam: F/U for subdural hematoma Acuity: Chronic Type of Exam: Subsequent/Follow-up FINDINGS: BRAIN/VENTRICLES: There is a mixed aged high convexity extra-axial hematoma along the left frontal/parietal convexity measuring up to 8.4 mm in maximal thickness, compared to 10 mm on April 3, 2019 and 7.7 mm on April 2, 2019. There is no mass effect or midline shift. There is mild parenchymal volume loss. There is periventricular white matter low attenuation, likely related to mild chronic microvascular disease. The gray-white differentiation is maintained without evidence of an acute infarct. There is no hydrocephalus. ORBITS: The visualized portion of the orbits demonstrate no acute abnormality. SINUSES: The visualized paranasal sinuses and mastoid air cells demonstrate no acute abnormality. SOFT TISSUES/SKULL:  No acute abnormality of the visualized skull or soft tissues. Mixed aged high convexity extra-axial hematoma along the left frontal/parietal convexity measuring up to 8.4 mm in maximal thickness, compared to 10 mm on April 3, 2019 and 7.7 mm on April 2, 2019. No midline shift. Continued short interval follow-up is recommended. Mild parenchymal volume loss. Mild chronic microvascular disease. Ct Head Wo Contrast    Result Date: 4/3/2019  EXAMINATION: CT OF THE HEAD WITHOUT CONTRAST  4/3/2019 9:17 am TECHNIQUE: CT of the head was performed without the administration of intravenous contrast. Dose modulation, iterative reconstruction, and/or weight based adjustment of the mA/kV was utilized to reduce the radiation dose to as low as reasonably achievable. COMPARISON: 04/02/2019.  HISTORY: ORDERING SYSTEM PROVIDED HISTORY: INTRACRANIAL HEMORRHAGE TECHNOLOGIST PROVIDED HISTORY: Initial evaluation. FINDINGS: BRAIN/VENTRICLES: There is perhaps minimal increase in size in the extra-axial collection along the left parietal convexity measuring 10 mm in thickness. This previously measured 8 mm in thickness. There is slight decrease in the layering hyperdensity. No new focus of intracranial hemorrhage. There is no significant mass effect or midline shift. There are areas of hypoattenuation in the periventricular and subcortical white matter, which is nonspecific, but may represent chronic microvasvular ischemic change. There is minimal global parenchymal volume loss. The gray-white differentiation appears maintained. Minimal atherosclerosis of the intracranial vasculature. ORBITS: The visualized portion of the orbits demonstrate no acute abnormality. SINUSES: The visualized paranasal sinuses and mastoid air cells demonstrate no acute abnormality. SOFT TISSUES/SKULL:  No acute abnormality of the visualized skull or soft tissues. 1. There is perhaps minimal increase in size in the extra-axial collection along the left parietal convexity with interval decrease in the layering hyperdensity. 2. No significant mass effect or midline shift. 3. No new intracranial hemorrhage identified. 4. Mild-to-moderate chronic microvascular ischemic changes. Ct Head Wo Contrast    Result Date: 4/2/2019  EXAMINATION: CT OF THE HEAD WITHOUT CONTRAST  4/2/2019 5:58 pm TECHNIQUE: CT of the head was performed without the administration of intravenous contrast. Dose modulation, iterative reconstruction, and/or weight based adjustment of the mA/kV was utilized to reduce the radiation dose to as low as reasonably achievable. COMPARISON: None. HISTORY: ORDERING SYSTEM PROVIDED HISTORY: dizziness, weakness TECHNOLOGIST PROVIDED HISTORY: FINDINGS: BRAIN/VENTRICLES: There is no acute intracranial hemorrhage, mass effect or midline shift.   If there is a left frontal parietal extra-axial collection identified measuring 14 mm in transverse dimension on the axial images. Dependently layering hyperdensity relatively isodense to brain parenchyma. This could represent a subdural hygroma with secondary acute hemorrhage, evolving chronic hematoma, or a subdural hemorrhage in the eligio patient. .  The gray-white differentiation is maintained without evidence of an acute infarct. There is no evidence of hydrocephalus. Mild-to-moderate periventricular subcortical white matter hypoattenuation identified which is nonspecific but can be seen within the underlying vasculitis or chronic small vessel disease. Intracranial vascular calcifications. ORBITS: The visualized portion of the orbits demonstrate no acute abnormality. SINUSES: The visualized paranasal sinuses and mastoid air cells demonstrate no acute abnormality. SOFT TISSUES/SKULL:  No acute abnormality of the visualized skull or soft tissues. Left frontoparietal extra-axial collection may represent a chronic subdural hematoma, acute subdural hygroma with secondary hemorrhage or subacute subdural hematoma. Please correlate exam findings and history. Mild-to-moderate patchy white matter changes suggestive chronic small vessel ischemic disease. Critical results were called by Dr. Reuben Membreno to [facility] [place] on 4/2/2019 at 18:23. Xr Chest Portable    Result Date: 4/29/2019  EXAMINATION: SINGLE XRAY VIEW OF THE CHEST 4/29/2019 11:47 am COMPARISON: April 2, 2018, March 29, 2019 HISTORY: ORDERING SYSTEM PROVIDED HISTORY: Chest Pain TECHNOLOGIST PROVIDED HISTORY: Chest Pain Ordering Physician Provided Reason for Exam: Port upright AP CXR - chest pain Acuity: Unknown Type of Exam: Unknown FINDINGS: Cardiac silhouette enlargement again noted. Left subclavian AICD in place. Bilateral perihilar and lower lung field alveolar and interstitial opacities have developed. No pneumothorax. No significant effusion. No acute osseous abnormality identified.      Bilateral airspace disease has developed, which may represent edema versus inflammatory process or infection. Xr Chest Portable    Result Date: 4/2/2019  EXAMINATION: SINGLE XRAY VIEW OF THE CHEST 4/2/2019 2:56 pm COMPARISON: 03/29/2018 HISTORY: ORDERING SYSTEM PROVIDED HISTORY: weakness TECHNOLOGIST PROVIDED HISTORY: weakness Ordering Physician Provided Reason for Exam: Pt c/o weakness, AP UPRIGHT PORTABLE Acuity: Acute Type of Exam: Initial FINDINGS: There is a left subclavian ICD, with 3 lead wires, projecting in stable positions. Left heart border is prominent. Hilar contours are normal. There is no focal airspace disease or pneumothorax. No acute cardiopulmonary disease. Stable cardiomegaly. Ct Chest Pulmonary Embolism W Contrast    Result Date: 4/29/2019  EXAMINATION: CTA OF THE CHEST 4/29/2019 12:31 pm TECHNIQUE: CTA of the chest was performed after the administration of intravenous contrast.  Multiplanar reformatted images are provided for review. MIP images are provided for review. Dose modulation, iterative reconstruction, and/or weight based adjustment of the mA/kV was utilized to reduce the radiation dose to as low as reasonably achievable. COMPARISON: None. HISTORY: ORDERING SYSTEM PROVIDED HISTORY: shortness of breath FINDINGS: Pulmonary Arteries: No central pulmonary embolus. Small peripheral right lower lobe pulmonary emboli. Right upper lobe pulmonary emboli. No definite evidence of right heart strain. No definite pulmonary infarct. Mediastinum: Severe cardiomegaly. Small pericardial effusion. No adenopathy. Lungs/pleura: Diffuse ground-glass opacities, septal thickening. No pleural effusions. Upper Abdomen: Right adrenal nodule. Soft Tissues/Bones: Left pacemaker. No axillary adenopathy. Lumbar spine degenerative changes. Right upper lobe and right lower lobe pulmonary emboli.  Diffuse ground-glass opacities are nonspecific and may be related to pulmonary edema with infection or an inflammatory process not excluded. Critical results were called by Dr. Adriano Bob MD to Dr. Jolene Montejo on 4/29/2019 at 13:03. Vl Dup Lower Extremity Venous Bilateral    Result Date: 4/29/2019    [facility]  Vascular Lower Extremities DVT Study Procedure   Patient Name    Musa Haynes      Date of Study             04/29/2019                  [facility]   Date of Birth   1950   Gender                    Female   Age             76 year(s)   Race                         Room Number     SASHA   Corporate ID #  [id number]   Patient Acct #  [de-identified]   MR #            [id number]      Sonographer               Brenda Rodriguez   Accession #     [id number]    Interpreting Physician    Diaz Nieves   Referring Nurse              Referring Physician       ER NO FAMILY DR *  Practitioner  Procedure Type of Study:   Veins: Lower Extremities DVT Study, Venous Scan Lower Bilateral.  Indications for Study:R/O DVT. Patient Status:ER. Technical Quality:Limited visualization. Conclusions   Summary   No evidence of superficial or deep venous thrombosis in both lower  extremities. Multilevel edema noted. Signature   ----------------------------------------------------------------  Electronically signed by Brenda Rodriguez(Sonographer) on  04/29/2019 03:10 PM  ----------------------------------------------------------------   ----------------------------------------------------------------  Electronically signed by Lakeisha Bragg(Interpreting  physician) on 04/29/2019 05:04 PM  ----------------------------------------------------------------  Findings:   Right Impression:                     Left Impression:  The common femoral, superficial       The common femoral, femoral,  femoral, popliteal, tibials and       popliteal, tibials and saphenous  saphenous veins are compressible with veins are compressible with normal  normal doppler responses. doppler responses. Thigh, calf and ankle edema noted. +------------------------------------+----------+---------------+----------+ ! Mid Femoral                         !Yes       ! Yes            ! None      ! +------------------------------------+----------+---------------+----------+ ! Dist Femoral                        !Yes       ! Yes            ! None      ! +------------------------------------+----------+---------------+----------+ ! Deep Femoral                        !Yes       ! Yes            ! None      ! +------------------------------------+----------+---------------+----------+ ! Popliteal                           !Yes       ! Yes            ! None      ! +------------------------------------+----------+---------------+----------+ ! Sapheno Femoral Junction            ! Yes       ! Yes            ! None      ! +------------------------------------+----------+---------------+----------+ ! PTV                                 ! Yes       ! Yes            ! None      ! +------------------------------------+----------+---------------+----------+ ! Peroneal                            !Yes       ! Yes            ! None      ! +------------------------------------+----------+---------------+----------+ ! Gastroc                             ! Yes       ! Yes            ! None      ! +------------------------------------+----------+---------------+----------+ ! GSV Thigh                           ! Yes       ! Yes            ! None      ! +------------------------------------+----------+---------------+----------+ ! GSV Knee                            ! Yes       ! Yes            ! None      ! +------------------------------------+----------+---------------+----------+ ! GSV Ankle                           ! Yes       ! Yes            ! None      ! +------------------------------------+----------+---------------+----------+ ! SSV                                 ! Yes       ! Yes            ! None      ! +------------------------------------+----------+---------------+----------+ Left Doppler extremities [I89.0] 03/21/2019     Priority: Medium    [facility] [I50.23] 08/08/2017     Priority: Medium    Venous stasis dermatitis of both lower extremities [I87.2]     Coronary atherosclerosis of native coronary artery [I25.10] 04/03/2019    Dyslipidemia [E78.5] 04/03/2019    Hypertension [I10] 04/03/2019       Plan:        1. Continue oral Xarelto   2. Continue oral diuretics  3. Continue Xanax 0.25 mg 3 times a day when necessary  4. Monitor off antibiotics  5.  Wound care    Patient was offered to be discharged to SNF but she insists on going home, she'll be discharged home with home care    Corey Fiore MD  5/2/2019  7:53 AM

## 2019-05-03 LAB
ANION GAP SERPL CALCULATED.3IONS-SCNC: 11 MMOL/L (ref 9–17)
BUN BLDV-MCNC: 18 MG/DL (ref 8–23)
BUN/CREAT BLD: ABNORMAL (ref 9–20)
CALCIUM SERPL-MCNC: 8.5 MG/DL (ref 8.6–10.4)
CHLORIDE BLD-SCNC: 106 MMOL/L (ref 98–107)
CO2: 27 MMOL/L (ref 20–31)
CREAT SERPL-MCNC: 0.84 MG/DL (ref 0.5–0.9)
GFR AFRICAN AMERICAN: >60 ML/MIN
GFR NON-AFRICAN AMERICAN: >60 ML/MIN
GFR SERPL CREATININE-BSD FRML MDRD: ABNORMAL ML/MIN/{1.73_M2}
GFR SERPL CREATININE-BSD FRML MDRD: ABNORMAL ML/MIN/{1.73_M2}
GLUCOSE BLD-MCNC: 124 MG/DL (ref 70–99)
HCT VFR BLD CALC: 36.1 % (ref 36.3–47.1)
HEMOGLOBIN: 10.4 G/DL (ref 11.9–15.1)
MCH RBC QN AUTO: 26.5 PG (ref 25.2–33.5)
MCHC RBC AUTO-ENTMCNC: 28.8 G/DL (ref 28.4–34.8)
MCV RBC AUTO: 91.9 FL (ref 82.6–102.9)
NRBC AUTOMATED: 0 PER 100 WBC
PDW BLD-RTO: 19.2 % (ref 11.8–14.4)
PLATELET # BLD: 197 K/UL (ref 138–453)
PMV BLD AUTO: 11.6 FL (ref 8.1–13.5)
POTASSIUM SERPL-SCNC: 3.8 MMOL/L (ref 3.7–5.3)
RBC # BLD: 3.93 M/UL (ref 3.95–5.11)
SODIUM BLD-SCNC: 144 MMOL/L (ref 135–144)
WBC # BLD: 5.2 K/UL (ref 3.5–11.3)

## 2019-05-03 PROCEDURE — 6370000000 HC RX 637 (ALT 250 FOR IP): Performed by: PHYSICIAN ASSISTANT

## 2019-05-03 PROCEDURE — 99239 HOSP IP/OBS DSCHRG MGMT >30: CPT | Performed by: INTERNAL MEDICINE

## 2019-05-03 PROCEDURE — 85027 COMPLETE CBC AUTOMATED: CPT

## 2019-05-03 PROCEDURE — 36415 COLL VENOUS BLD VENIPUNCTURE: CPT

## 2019-05-03 PROCEDURE — 6370000000 HC RX 637 (ALT 250 FOR IP): Performed by: INTERNAL MEDICINE

## 2019-05-03 PROCEDURE — 97110 THERAPEUTIC EXERCISES: CPT

## 2019-05-03 PROCEDURE — 94760 N-INVAS EAR/PLS OXIMETRY 1: CPT

## 2019-05-03 PROCEDURE — 2060000000 HC ICU INTERMEDIATE R&B

## 2019-05-03 PROCEDURE — 2580000003 HC RX 258: Performed by: PHYSICIAN ASSISTANT

## 2019-05-03 PROCEDURE — 99232 SBSQ HOSP IP/OBS MODERATE 35: CPT | Performed by: INTERNAL MEDICINE

## 2019-05-03 PROCEDURE — 80048 BASIC METABOLIC PNL TOTAL CA: CPT

## 2019-05-03 PROCEDURE — 97116 GAIT TRAINING THERAPY: CPT

## 2019-05-03 RX ORDER — METOCLOPRAMIDE 10 MG/1
10 TABLET ORAL
Qty: 28 TABLET | Refills: 0 | Status: SHIPPED | OUTPATIENT
Start: 2019-05-03 | End: 2019-05-23 | Stop reason: ALTCHOICE

## 2019-05-03 RX ORDER — METOCLOPRAMIDE 10 MG/1
10 TABLET ORAL
Status: DISCONTINUED | OUTPATIENT
Start: 2019-05-03 | End: 2019-05-03

## 2019-05-03 RX ORDER — METOCLOPRAMIDE 10 MG/1
10 TABLET ORAL
Status: DISCONTINUED | OUTPATIENT
Start: 2019-05-03 | End: 2019-05-06 | Stop reason: HOSPADM

## 2019-05-03 RX ADMIN — OXYBUTYNIN CHLORIDE 5 MG: 5 TABLET ORAL at 13:20

## 2019-05-03 RX ADMIN — OXYBUTYNIN CHLORIDE 5 MG: 5 TABLET ORAL at 20:23

## 2019-05-03 RX ADMIN — FUROSEMIDE 20 MG: 20 TABLET ORAL at 09:26

## 2019-05-03 RX ADMIN — SERTRALINE 50 MG: 50 TABLET, FILM COATED ORAL at 09:26

## 2019-05-03 RX ADMIN — POTASSIUM CHLORIDE 20 MEQ: 10 CAPSULE, COATED, EXTENDED RELEASE ORAL at 09:26

## 2019-05-03 RX ADMIN — RIVAROXABAN 15 MG: 15 TABLET, FILM COATED ORAL at 18:13

## 2019-05-03 RX ADMIN — ALPRAZOLAM 0.25 MG: 0.25 TABLET ORAL at 10:28

## 2019-05-03 RX ADMIN — ATORVASTATIN CALCIUM 80 MG: 80 TABLET, FILM COATED ORAL at 20:23

## 2019-05-03 RX ADMIN — RIVAROXABAN 15 MG: 15 TABLET, FILM COATED ORAL at 09:26

## 2019-05-03 RX ADMIN — OXYBUTYNIN CHLORIDE 5 MG: 5 TABLET ORAL at 09:26

## 2019-05-03 RX ADMIN — Medication 10 ML: at 20:23

## 2019-05-03 RX ADMIN — METOCLOPRAMIDE 10 MG: 10 TABLET ORAL at 12:23

## 2019-05-03 RX ADMIN — METOPROLOL SUCCINATE 25 MG: 25 TABLET, FILM COATED, EXTENDED RELEASE ORAL at 09:26

## 2019-05-03 RX ADMIN — Medication 10 ML: at 10:14

## 2019-05-03 RX ADMIN — METOCLOPRAMIDE 10 MG: 10 TABLET ORAL at 20:23

## 2019-05-03 RX ADMIN — ALPRAZOLAM 0.25 MG: 0.25 TABLET ORAL at 21:31

## 2019-05-03 RX ADMIN — METOCLOPRAMIDE 10 MG: 10 TABLET ORAL at 18:13

## 2019-05-03 RX ADMIN — FUROSEMIDE 20 MG: 20 TABLET ORAL at 18:13

## 2019-05-03 ASSESSMENT — PAIN SCALES - GENERAL: PAINLEVEL_OUTOF10: 4

## 2019-05-03 NOTE — PROGRESS NOTES
Section of Cardiology  Progress Note      Date:  5/2/2019  Patient: Genna Shepherd  Admission:  4/29/2019  4:44 PM  Admit DX: Pulmonary embolism, bilateral ([address] [I26.99]  Age:  76 y.o., 1950     LOS: 3 days     Reason for evaluation:   ACUTE PULMONARY EMBOLISM. ATRIAL FIBRILLATION    SUBJECTIVE:     The patient was seen and examined. Notes and labs reviewed. There were not complications over night. Patient's cardiac review of systems: negative. The patient is generally feeling unchanged. OBJECTIVE:      EXAM:   Vitals:    VITALS:  /69   Pulse 76   Temp 97.7 °F (36.5 °C) (Oral)   Resp 18   Ht 5' 2\" (1.575 m)   Wt 184 lb 3.2 oz (83.6 kg)   SpO2 97%   BMI 33.69 kg/m²   24HR INTAKE/OUTPUT:      Intake/Output Summary (Last 24 hours) at 5/2/2019 2000  Last data filed at 5/2/2019 8038  Gross per 24 hour   Intake 260 ml   Output 200 ml   Net 60 ml       CONSTITUTIONAL:  awake, alert, cooperative, no apparent distress, and appears stated age. HEENT: Normal jugular venous pulsations, no carotid bruits. Head is atraumatic, normocephalic. Eyes and oral mucosa are normal.  LUNGS: Good respiratory effort On auscultation: clear to auscultation bilaterally  CARDIOVASCULAR:  Normal apical impulse, regular rate and rhythm, normal S1 and S2, no S3 or S4, and no murmur or rub noted. dorsalis pedis and bilateralpresent 2+  ABDOMEN: Soft, nontender, nondistended. Bowel sounds present. No masses or tenderness. No bruit. SKIN: Warm and dry. EXTREMITIES:No lower extremity edema. Motor movement grossly intact. No cyanosis or clubbing.     Current Inpatient Medications:   rivaroxaban  15 mg Oral BID WC    atorvastatin  80 mg Oral Nightly    furosemide  20 mg Oral BID    metoprolol succinate  25 mg Oral Daily    oxybutynin  5 mg Oral TID    potassium chloride  20 mEq Oral Daily    sertraline  50 mg Oral Daily    sodium chloride flush  10 mL Intravenous 2 times per day       IV Infusions (if

## 2019-05-03 NOTE — PLAN OF CARE
Yue Fernandez 19    Second Visit Note  For more detailed information please refer to the progress note of the day      5/3/2019    6:14 PM    Name:   Gerlean Opitz  MRN:     [id number]     Meganide:      [de-identified]   Room:   546/9716-72   Day:  4  Admit Date:  4/29/2019  4:44 PM    PCP:   No primary care provider on file. Code Status:  Full Code        Pt vitals were reviewed   New labs were reviewed   Patient was seen    Updated plan :     1. Patient had good large bowel movement after starting Reglan  2. States her difficulty eating and swallowing also has improved  3.  Still waiting for precertification for discharge        Lynn Peña MD  5/3/2019  6:14 PM

## 2019-05-03 NOTE — PROGRESS NOTES
and vomiting. 7. Endocrine: Negative for polyuria. 8. Genitourinary: Negative for urgency. 9. Musculoskeletal: Negative for back pain and joint swelling. 10. Skin: Positive for rash and wound. ·      Blisters in bilateral lower extremities --Improved  11. Allergic/Immunologic: Negative for environmental allergies and food allergies. 12. Neurological: Negative for dizziness, weakness and headaches. 13. Hematological: Negative for adenopathy. 14. Psychiatric/Behavioral: Negative for agitation and confusion. Physical Examination :     Patient Vitals for the past 8 hrs:   BP Temp Temp src Pulse Resp SpO2 Weight   19 0800 119/69 98.3 °F (36.8 °C) Oral -- -- 94 % --   19 0630 -- -- -- -- -- -- 188 lb (85.3 kg)   19 0300 (!) 121/58 98.6 °F (37 °C) Oral 67 15 97 % --     Temp (24hrs), Av.1 °F (36.7 °C), Min:97.7 °F (36.5 °C), Max:98.6 °F (37 °C)    Constitutional: She is oriented to person, place, and time. She appears well-developed and well-nourished. No distress. HENT:   Head: Normocephalic and atraumatic. Eyes: Pupils are equal, round, and reactive to light. EOM are normal.   Neck: Normal range of motion. Neck supple. Cardiovascular: Normal rate, regular rhythm and normal heart sounds. No murmur heard. AICD in place   Pulmonary/Chest: Effort normal and breath sounds normal. No respiratory distress. She has no wheezes. Abdominal: Soft. Bowel sounds are normal. She exhibits no distension. Musculoskeletal: Normal range of motion. She exhibits edema which has improved  Neurological: She is alert and oriented to person, place, and time. Skin: Skin is warm. She is not diaphoretic. There is erythema which is improving. No pallor. Chronic venous stasis dermatitis with few excoriated areas from scratching and edema. Bilateral leg discoloration. No apparent secondary bacterial cellulitis   Psychiatric: She has a normal mood and affect.  Her behavior is normal.

## 2019-05-03 NOTE — PROGRESS NOTES
Physical Therapy  Facility/Department: [facility] 4B STEPDOWN  Daily Treatment Note  NAME: Pauline Cardoso  : 1950  MRN: [id number]    Date of Service: 5/3/2019    Discharge Recommendations:  Further therapy recommended at discharge. PT Equipment Recommendations  Equipment Needed: No    Patient Diagnosis(es): The encounter diagnosis was Panic disorder. has a past medical history of Acute on chronic systolic congestive heart failure ([address] Anxiety, Cardiomyopathy ([address] Depression, H/O heart artery stent, [place] (hard of hearing), Hyperlipidemia, Hypertension, Hypertensive urgency, and Leg swelling.   has a past surgical history that includes Cataract removal; Corneal transplant; Nasal sinus surgery; eye surgery; Tonsillectomy and adenoidectomy; and Cardiac defibrillator placement. Restrictions  Restrictions/Precautions  Restrictions/Precautions: Fall Risk  Required Braces or Orthoses?: No  Position Activity Restriction  Other position/activity restrictions: up with assist, acute PE- heparin therapeutic  Subjective   General  Response To Previous Treatment: Patient with no complaints from previous session.   Family / Caregiver Present: No  Subjective  Subjective: RN and pt agreed to PT, pt awake in chair upon arrival on 2L O2 via nc  Pain Screening  Patient Currently in Pain: Yes  Vital Signs  Patient Currently in Pain: Yes       Orientation  Orientation  Overall Orientation Status: Within Functional Limits       Objective      Transfers  Sit to Stand: Contact guard assistance  Stand to sit: Contact guard assistance  Bed to Chair: Contact guard assistance  Ambulation  Ambulation?: Yes  Ambulation 1  Surface: level tile  Device: Rolling Walker  Other Apparatus: O2(2L)  Assistance: Contact guard assistance  Quality of Gait: Short shuffling steps, slow discontinued kamla, pt leaning forward onto AD   Distance: 80ft  Comments: SOB noted after ambulation completed  Stairs/Curb  Stairs?: No        Other exercises  Other exercises?: Yes  Upper extremity exercises: Bicep curl, shoulder flexion/extension, punches, tricep curl, shoulder abduction/adduction. Reps: x 15  Seated LE exercise program: Long Arc Quads, hip abduction/adduction, heel/toe raises, and marches. Reps: x 15   Comments: frequent rest breaks d/t fatigue and SOB           Assessment   Body structures, Functions, Activity limitations: Decreased functional mobility ; Decreased endurance;Decreased sensation;Decreased strength;Decreased balance;Decreased ADL status  Assessment: pt able to amb 80ft w/RW CGA this date, pt fatigues quickly and frequently leaning forward onto AD. pt displays significant difficulty completing short distance. Prognosis: Good  REQUIRES PT FOLLOW UP: Yes  Activity Tolerance  Activity Tolerance: Patient limited by endurance; Patient limited by fatigue             Goals  Short term goals  Time Frame for Short term goals: 14 visits  Short term goal 1: Perform bed mobility and functional transfers independently  Short term goal 2: Ambulate 300ft with SPC and SBA with SPO2 >92%  Short term goal 3: Ascend/descend 4 steps with unilateral HR and CGA  Short term goal 4: Participate in 30 minutes of therapy to demo increased endurance  Short term goal 5: Demo Good- dynamic standing balance to decrease risk of falls    Plan    Plan  Times per week: 5-6x/wk  Current Treatment Recommendations: Strengthening, Balance Training, Functional Mobility Training, Endurance Training, Transfer Training, Patient/Caregiver Education & Training, Safety Education & Training, Home Exercise Program, Gait Training, Stair training, ROM  Safety Devices  Type of devices: Gait belt, Left in chair, Nurse notified, Call light within reach, Chair alarm in place  Restraints  Initially in place: No     Therapy Time   Individual Concurrent Group Co-treatment   Time In 1320         Time Out 1352         Minutes 32                Tracy Mauricio, PTA

## 2019-05-03 NOTE — PROGRESS NOTES
Y[address]    Progress Note    5/3/2019    7:53 AM    Name:   Kayley Meza  MRN:     [id number]     Acct:      [de-identified]   Room:   [address] [place] [address]  IP Day:  4  Admit Date:  4/29/2019  4:44 PM    PCP:   No primary care provider on file. Code Status:  Full Code    Subjective:     C/C: Cough and shortness of breath    Interval History Status:      1. Patient earlier wanted to go home with home care  2. Feeling very weak and is unable to walk without assist  3. Patient agrees for SNF,  has started working on it, discharge was held yesterday   4. Heavy breathing and anxiety has improved after starting Xanax  5. Leg swelling and redness both lower extremities has significantly improved, antibiotics were stopped by ID earlier  6. Complains of nausea and feeling of food staying in stomach, states it is going on for a few months but had not mentioned before  7. In general feels better        Brief History:   Admitted to [facility] with following history from mid-level provider and ER attending Dr. Diann Rush;  Taye Reynososita a 76 y. o. female who presents to the emergency department today by private vehicle for evaluation of a cough and shortness of breath.  She states that for the last 1-2 weeks she has had a cough and shortness of breath.  She states that she is short of breath with rest and exertion.  She just recently had a defibrillator placed like a month ago. Kenzie Florez also states that she is nauseous without any vomiting.  She denies any fevers or chills.  Was supposed to see Dr. Deisy Dixon the office today for an ICD check but she felt so ill that she came to the emergency room for evaluation.      CT scan of the chest shows small pulmonary emboli in the right upper and lower lobes. Lizeth López is no sign of right heart strain. Kenzie Florez also has underlying congestive heart failure.  Patient has cellulitis of both lower extremities which are chronically edematous with some superficial sores.  CT scan of the brain shows interval resolution of her subdural hematoma which has shrunk in size.  Patient is referred to the hospitalist service at [facility] for admission and I also spoke with . Dayton Liriano has been consulted for neurosurgery management. Driss Espino feels that she does not have any absolute contraindication to heparinization at this time.  Patient's findings and disposition also discussed with Dr. Gaila Lanes that facility who wants anticoagulation held for now. Mckinley Rojas is started on IV Zosyn and IV vancomycin for cellulitis in her legs.  Venous Dopplers of the legs were negative for DVT.     Patient states her leg swelling has worsened for the last 2 weeks since she is not ambulating because of current illness but has this swelling and infection for quite a while  She denies any fever or chills. Review of Systems:     Constitutional:  negative for chills, fevers, sweats  Respiratory:  + for cough,  shortness of breath-improved than before, denies wheezing  Cardiovascular:  negative for chest pain, chest pressure/discomfort, lower extremity edema, palpitations  Gastrointestinal:  negative for abdominal pain, constipation, diarrhea, nausea, vomiting  Neurological:  negative for dizziness, headache  + Bilateral lower extremity swelling and redness-improving  Medications:      Allergies:  No Known Allergies    Current Meds:   Scheduled Meds:    rivaroxaban  15 mg Oral BID WC    atorvastatin  80 mg Oral Nightly    furosemide  20 mg Oral BID    metoprolol succinate  25 mg Oral Daily    oxybutynin  5 mg Oral TID    potassium chloride  20 mEq Oral Daily    sertraline  50 mg Oral Daily    sodium chloride flush  10 mL Intravenous 2 times per day     Continuous Infusions:     PRN Meds: ALPRAZolam, HYDROcodone 5 mg - acetaminophen **OR** HYDROcodone 5 mg - acetaminophen, ondansetron, sodium chloride flush, potassium chloride **OR** potassium alternative oral replacement **OR** potassium chloride, magnesium sulfate, magnesium hydroxide, ondansetron, nicotine, acetaminophen    Data:     Past Medical History:   has a past medical history of Acute on chronic systolic congestive heart failure ([address] Anxiety, Cardiomyopathy ([address] Depression, H/O heart artery stent, [place] (hard of hearing), Hyperlipidemia, Hypertension, Hypertensive urgency, and Leg swelling. Social History:   reports that she has never smoked. She has never used smokeless tobacco. She reports that she does not drink alcohol or use drugs. Family History: No family history on file. Vitals:  BP (!) 121/58   Pulse 67   Temp 98.6 °F (37 °C) (Oral)   Resp 15   Ht 5' 2\" (1.575 m)   Wt 188 lb (85.3 kg)   SpO2 97%   BMI 34.39 kg/m²   Temp (24hrs), Av.1 °F (36.7 °C), Min:97.7 °F (36.5 °C), Max:98.6 °F (37 °C)    No results for input(s): POCGLU in the last 72 hours. I/O (24Hr): Intake/Output Summary (Last 24 hours) at 5/3/2019 0753  Last data filed at 5/3/2019 2497  Gross per 24 hour   Intake 460 ml   Output 200 ml   Net 260 ml       Labs:    Hematology:  Recent Labs     19  0640 19  0637   WBC 6.5 6.1   HGB 10.5* 10.5*   HCT 37.4 37.3    195     Chemistry:  Recent Labs     19  0640 19  0637    139   K 3.9 3.9    104   CO2 23 23   GLUCOSE 106* 141*   BUN 22 23   CREATININE 0.96* 0.85   ANIONGAP 12 12   LABGLOM 58* >60   GFRAA >60 >60   CALCIUM 8.1* 8.3*     No results for input(s): PROT, LABALBU, LABA1C, O2SIRYO, X5HBJBS, FT4, TSH, AST, ALT, LDH, GGT, ALKPHOS, BILITOT, BILIDIR, AMMONIA, AMYLASE, LIPASE, LACTATE, CHOL, HDL, LDLCHOLESTEROL, CHOLHDLRATIO, TRIG, VLDL, PHENYTOIN, PHENYF in the last 72 hours.       Lab Results   Component Value Date/Time    [facility] 2019 09:15 AM     Lab Results   Component Value Date/Time    CULTURE NO GROWTH 6 DAYS 2019 09:15 AM       No results found for: POCPH, PHART, PH, POCPCO2, KAH7BTX, PCO2, POCPO2, PO2ART, PO2, POCHCO3, FGR9KIY, HCO3, NBEA, PBEA, BEART, BE, THGBART, THB, HBE5MUH, AHLX0ZZH, K1BGTBOJ, O2SAT, FIO2    Radiology:    Ct Head Wo Contrast    Result Date: 4/29/2019  EXAMINATION: CT OF THE HEAD WITHOUT CONTRAST  4/29/2019 1:47 pm TECHNIQUE: CT of the head was performed without the administration of intravenous contrast. Dose modulation, iterative reconstruction, and/or weight based adjustment of the mA/kV was utilized to reduce the radiation dose to as low as reasonably achievable. COMPARISON: 04/12/2019 HISTORY: ORDERING SYSTEM PROVIDED HISTORY: previous blled TECHNOLOGIST PROVIDED HISTORY: FINDINGS: BRAIN/VENTRICLES: Previously identified extra-axial blood products about the left parietal region near the vertex is again demonstrated and smaller. This measures up to 6 mm in thickness (previously 8 mm). This has also decreased in attenuation. No new or acute appearing blood products are appreciated. Cortical atrophy and white matter changes in the brain are again demonstrated. No new findings identified in the brain. ORBITS: The visualized portion of the orbits demonstrate no acute abnormality. SINUSES: The visualized paranasal sinuses and mastoid air cells demonstrate no acute abnormality. SOFT TISSUES/SKULL:  No acute abnormality of the visualized skull or soft tissues. Continued reduction in size of extra-axial hematoma about the left parietal vertex measuring up to 6 mm in thickness. No new abnormality identified. Ct Head Wo Contrast    Result Date: 4/12/2019  EXAMINATION: CT OF THE HEAD WITHOUT CONTRAST  4/12/2019 10:53 am TECHNIQUE: CT of the head was performed without the administration of intravenous contrast. Dose modulation, iterative reconstruction, and/or weight based adjustment of the mA/kV was utilized to reduce the radiation dose to as low as reasonably achievable.  COMPARISON: CT head April 3, 2019 and April 2, 2019 HISTORY: [address] PROVIDED HISTORY: SDH (subdural hematoma) ([facility] [address] TECHNOLOGIST PROVIDED HISTORY: follow up for SDH Ordering Physician Provided Reason for Exam: F/U for subdural hematoma Acuity: Chronic Type of Exam: Subsequent/Follow-up FINDINGS: BRAIN/VENTRICLES: There is a mixed aged high convexity extra-axial hematoma along the left frontal/parietal convexity measuring up to 8.4 mm in maximal thickness, compared to 10 mm on April 3, 2019 and 7.7 mm on April 2, 2019. There is no mass effect or midline shift. There is mild parenchymal volume loss. There is periventricular white matter low attenuation, likely related to mild chronic microvascular disease. The gray-white differentiation is maintained without evidence of an acute infarct. There is no hydrocephalus. ORBITS: The visualized portion of the orbits demonstrate no acute abnormality. SINUSES: The visualized paranasal sinuses and mastoid air cells demonstrate no acute abnormality. SOFT TISSUES/SKULL:  No acute abnormality of the visualized skull or soft tissues. Mixed aged high convexity extra-axial hematoma along the left frontal/parietal convexity measuring up to 8.4 mm in maximal thickness, compared to 10 mm on April 3, 2019 and 7.7 mm on April 2, 2019. No midline shift. Continued short interval follow-up is recommended. Mild parenchymal volume loss. Mild chronic microvascular disease. Ct Head Wo Contrast    Result Date: 4/3/2019  EXAMINATION: CT OF THE HEAD WITHOUT CONTRAST  4/3/2019 9:17 am TECHNIQUE: CT of the head was performed without the administration of intravenous contrast. Dose modulation, iterative reconstruction, and/or weight based adjustment of the mA/kV was utilized to reduce the radiation dose to as low as reasonably achievable. COMPARISON: 04/02/2019. HISTORY: ORDERING SYSTEM PROVIDED HISTORY: INTRACRANIAL HEMORRHAGE TECHNOLOGIST PROVIDED HISTORY: Initial evaluation.  FINDINGS: BRAIN/VENTRICLES: There is perhaps minimal increase in size in the extra-axial collection along the left parietal convexity measuring 10 mm in thickness. This previously measured 8 mm in thickness. There is slight decrease in the layering hyperdensity. No new focus of intracranial hemorrhage. There is no significant mass effect or midline shift. There are areas of hypoattenuation in the periventricular and subcortical white matter, which is nonspecific, but may represent chronic microvasvular ischemic change. There is minimal global parenchymal volume loss. The gray-white differentiation appears maintained. Minimal atherosclerosis of the intracranial vasculature. ORBITS: The visualized portion of the orbits demonstrate no acute abnormality. SINUSES: The visualized paranasal sinuses and mastoid air cells demonstrate no acute abnormality. SOFT TISSUES/SKULL:  No acute abnormality of the visualized skull or soft tissues. 1. There is perhaps minimal increase in size in the extra-axial collection along the left parietal convexity with interval decrease in the layering hyperdensity. 2. No significant mass effect or midline shift. 3. No new intracranial hemorrhage identified. 4. Mild-to-moderate chronic microvascular ischemic changes. Ct Head Wo Contrast    Result Date: 4/2/2019  EXAMINATION: CT OF THE HEAD WITHOUT CONTRAST  4/2/2019 5:58 pm TECHNIQUE: CT of the head was performed without the administration of intravenous contrast. Dose modulation, iterative reconstruction, and/or weight based adjustment of the mA/kV was utilized to reduce the radiation dose to as low as reasonably achievable. COMPARISON: None. HISTORY: ORDERING SYSTEM PROVIDED HISTORY: dizziness, weakness TECHNOLOGIST PROVIDED HISTORY: FINDINGS: BRAIN/VENTRICLES: There is no acute intracranial hemorrhage, mass effect or midline shift. If there is a left frontal parietal extra-axial collection identified measuring 14 mm in transverse dimension on the axial images.  Dependently layering hyperdensity relatively isodense to brain parenchyma. This could represent a subdural hygroma with secondary acute hemorrhage, evolving chronic hematoma, or a subdural hemorrhage in the eligio patient. .  The gray-white differentiation is maintained without evidence of an acute infarct. There is no evidence of hydrocephalus. Mild-to-moderate periventricular subcortical white matter hypoattenuation identified which is nonspecific but can be seen within the underlying vasculitis or chronic small vessel disease. Intracranial vascular calcifications. ORBITS: The visualized portion of the orbits demonstrate no acute abnormality. SINUSES: The visualized paranasal sinuses and mastoid air cells demonstrate no acute abnormality. SOFT TISSUES/SKULL:  No acute abnormality of the visualized skull or soft tissues. Left frontoparietal extra-axial collection may represent a chronic subdural hematoma, acute subdural hygroma with secondary hemorrhage or subacute subdural hematoma. Please correlate exam findings and history. Mild-to-moderate patchy white matter changes suggestive chronic small vessel ischemic disease. Critical results were called by Dr. Feliberto Alexander to [facility] on 4/2/2019 at 18:23. Xr Chest Portable    Result Date: 4/29/2019  EXAMINATION: SINGLE XRAY VIEW OF THE CHEST 4/29/2019 11:47 am COMPARISON: April 2, 2018, March 29, 2019 HISTORY: ORDERING SYSTEM PROVIDED HISTORY: Chest Pain TECHNOLOGIST PROVIDED HISTORY: Chest Pain Ordering Physician Provided Reason for Exam: Port upright AP CXR - chest pain Acuity: Unknown Type of Exam: Unknown FINDINGS: Cardiac silhouette enlargement again noted. Left subclavian AICD in place. Bilateral perihilar and lower lung field alveolar and interstitial opacities have developed. No pneumothorax. No significant effusion. No acute osseous abnormality identified. Bilateral airspace disease has developed, which may represent edema versus inflammatory process or infection.      Marquis Francisco Chest Portable    Result Date: 4/2/2019  EXAMINATION: SINGLE XRAY VIEW OF THE CHEST 4/2/2019 2:56 pm COMPARISON: 03/29/2018 HISTORY: ORDERING SYSTEM PROVIDED HISTORY: weakness TECHNOLOGIST PROVIDED HISTORY: weakness Ordering Physician Provided Reason for Exam: Pt c/o weakness, AP UPRIGHT PORTABLE Acuity: Acute Type of Exam: Initial FINDINGS: There is a left subclavian ICD, with 3 lead wires, projecting in stable positions. Left heart border is prominent. Hilar contours are normal. There is no focal airspace disease or pneumothorax. No acute cardiopulmonary disease. Stable cardiomegaly. Ct Chest Pulmonary Embolism W Contrast    Result Date: 4/29/2019  EXAMINATION: CTA OF THE CHEST 4/29/2019 12:31 pm TECHNIQUE: CTA of the chest was performed after the administration of intravenous contrast.  Multiplanar reformatted images are provided for review. MIP images are provided for review. Dose modulation, iterative reconstruction, and/or weight based adjustment of the mA/kV was utilized to reduce the radiation dose to as low as reasonably achievable. COMPARISON: None. HISTORY: ORDERING SYSTEM PROVIDED HISTORY: shortness of breath FINDINGS: Pulmonary Arteries: No central pulmonary embolus. Small peripheral right lower lobe pulmonary emboli. Right upper lobe pulmonary emboli. No definite evidence of right heart strain. No definite pulmonary infarct. Mediastinum: Severe cardiomegaly. Small pericardial effusion. No adenopathy. Lungs/pleura: Diffuse ground-glass opacities, septal thickening. No pleural effusions. Upper Abdomen: Right adrenal nodule. Soft Tissues/Bones: Left pacemaker. No axillary adenopathy. Lumbar spine degenerative changes. Right upper lobe and right lower lobe pulmonary emboli. Diffuse ground-glass opacities are nonspecific and may be related to pulmonary edema with infection or an inflammatory process not excluded.  Critical results were called by Dr. Gabriel Oh MD to Dr. Candida Boxer on 4/29/2019 at 13:03. Vl Dup Lower Extremity Venous Bilateral    Result Date: 4/29/2019    [address]  Vascular Lower Extremities DVT Study Procedure   Patient Name    Francheska Murray      Date of Study             04/29/2019                  [facility]   Date of Birth   1950   Gender                    Female   Age             76 year(s)   Race                         Room Number     SASHA   Corporate ID #  [id number]   Patient Acct #  [de-identified]   MR #            [id number]      Sonographer               Brenda Rodriguez   Accession #     [id number]    Interpreting Physician    Ricardo Valencia   Referring Nurse              Referring Physician       ER NO FAMILY DR *  Practitioner  Procedure Type of Study:   Veins: Lower Extremities DVT Study, Venous Scan Lower Bilateral.  Indications for Study:R/O DVT. Patient Status:ER. Technical Quality:Limited visualization. Conclusions   Summary   No evidence of superficial or deep venous thrombosis in both lower  extremities. Multilevel edema noted. Signature   ----------------------------------------------------------------  Electronically signed by Brenda Rodriguez(Sonographer) on  04/29/2019 03:10 PM  ----------------------------------------------------------------   ----------------------------------------------------------------  Electronically signed by Molly Bragg(Interpreting  physician) on 04/29/2019 05:04 PM  ----------------------------------------------------------------  Findings:   Right Impression:                     Left Impression:  The common femoral, superficial       The common femoral, femoral,  femoral, popliteal, tibials and       popliteal, tibials and saphenous  saphenous veins are compressible with veins are compressible with normal  normal doppler responses. doppler responses. Thigh, calf and ankle edema noted. Thigh, calf, and ankle edema noted. Allergies   - Allergy:*No Known Allergies(Miscellaneous).  Velocities are ! +---------------------------+------+------+--------------------------------+ ! Common Femoral             !Phasic!      !                                ! +---------------------------+------+------+--------------------------------+ ! Prox Femoral               !Phasic!      !                                ! +---------------------------+------+------+--------------------------------+ ! Popliteal                  !Phasic!      !                                ! +---------------------------+------+------+--------------------------------+      Physical Examination:        General appearance:  alert, cooperative and no distress, lying comfortably in bed  Mental Status:  oriented to person, place and time and quite anxious  Lungs:  clear to auscultation bilaterally, normal effort  Heart:  Irregular rhythm, no murmur  Abdomen:  soft, nontender, nondistended, normal bowel sounds, no masses, hepatomegaly, splenomegaly  Extremities:  + edema, redness both lower extremities which is improving, no tenderness in the calves  Skin:  no gross lesions, rashes, induration except as above    Assessment:        Primary Problem  Pulmonary embolism, bilateral (HCC)-heparin has been switched to oral Xarelto  Anxiety-better with Xanax  Acute on chronic systolic congestive heart failure-improved with diuretics  Nausea/heaviness in stomach after food  Active Hospital Problems    Diagnosis Date Noted    Pulmonary embolism, bilateral ([address] [I26.99] 04/29/2019     Priority: High    Cellulitis [L03.90] 04/29/2019     Priority: High    S/P implantation of automatic cardioverter/defibrillator (AICD) [Z95.810] 04/03/2019     Priority: High    SD[facility] [S06.5X9A] 04/02/2019     Priority: High    Chronic atrial fibrillation ([facility] [I48.2] 04/03/2019     Priority: Medium    Lymphedema of both lower extremities [I89.0] 03/21/2019     Priority: Medium    Acute on chronic systolic congestive heart failure ([facility] [facility] [I50.23]

## 2019-05-03 NOTE — CARE COORDINATION
Transitional Planning    6222 Met with patient and let her know that LONG TERM [facility] is OON and Ramon is in the process of reviewing.     1045 Rec'ed call from [facility] that patient has been accepted and precert is started  Patient updated

## 2019-05-04 LAB
ANION GAP SERPL CALCULATED.3IONS-SCNC: 10 MMOL/L (ref 9–17)
BUN BLDV-MCNC: 18 MG/DL (ref 8–23)
BUN/CREAT BLD: ABNORMAL (ref 9–20)
CALCIUM SERPL-MCNC: 8.5 MG/DL (ref 8.6–10.4)
CHLORIDE BLD-SCNC: 106 MMOL/L (ref 98–107)
CO2: 26 MMOL/L (ref 20–31)
CREAT SERPL-MCNC: 0.66 MG/DL (ref 0.5–0.9)
GFR AFRICAN AMERICAN: >60 ML/MIN
GFR NON-AFRICAN AMERICAN: >60 ML/MIN
GFR SERPL CREATININE-BSD FRML MDRD: ABNORMAL ML/MIN/{1.73_M2}
GFR SERPL CREATININE-BSD FRML MDRD: ABNORMAL ML/MIN/{1.73_M2}
GLUCOSE BLD-MCNC: 108 MG/DL (ref 70–99)
HCT VFR BLD CALC: 37.6 % (ref 36.3–47.1)
HEMOGLOBIN: 10.6 G/DL (ref 11.9–15.1)
MAGNESIUM: 1.9 MG/DL (ref 1.6–2.6)
MCH RBC QN AUTO: 25.7 PG (ref 25.2–33.5)
MCHC RBC AUTO-ENTMCNC: 28.2 G/DL (ref 28.4–34.8)
MCV RBC AUTO: 91.3 FL (ref 82.6–102.9)
NRBC AUTOMATED: 0 PER 100 WBC
PDW BLD-RTO: 19.2 % (ref 11.8–14.4)
PLATELET # BLD: 207 K/UL (ref 138–453)
PMV BLD AUTO: 11.4 FL (ref 8.1–13.5)
POTASSIUM SERPL-SCNC: 4.1 MMOL/L (ref 3.7–5.3)
RBC # BLD: 4.12 M/UL (ref 3.95–5.11)
SODIUM BLD-SCNC: 142 MMOL/L (ref 135–144)
WBC # BLD: 5.2 K/UL (ref 3.5–11.3)

## 2019-05-04 PROCEDURE — 83735 ASSAY OF MAGNESIUM: CPT

## 2019-05-04 PROCEDURE — 2580000003 HC RX 258: Performed by: PHYSICIAN ASSISTANT

## 2019-05-04 PROCEDURE — 2060000000 HC ICU INTERMEDIATE R&B

## 2019-05-04 PROCEDURE — 36415 COLL VENOUS BLD VENIPUNCTURE: CPT

## 2019-05-04 PROCEDURE — 85027 COMPLETE CBC AUTOMATED: CPT

## 2019-05-04 PROCEDURE — 80048 BASIC METABOLIC PNL TOTAL CA: CPT

## 2019-05-04 PROCEDURE — 99232 SBSQ HOSP IP/OBS MODERATE 35: CPT | Performed by: INTERNAL MEDICINE

## 2019-05-04 PROCEDURE — 6370000000 HC RX 637 (ALT 250 FOR IP): Performed by: INTERNAL MEDICINE

## 2019-05-04 PROCEDURE — 6360000002 HC RX W HCPCS: Performed by: INTERNAL MEDICINE

## 2019-05-04 PROCEDURE — 6370000000 HC RX 637 (ALT 250 FOR IP): Performed by: PHYSICIAN ASSISTANT

## 2019-05-04 RX ORDER — MAGNESIUM SULFATE 1 G/100ML
1 INJECTION INTRAVENOUS ONCE
Status: COMPLETED | OUTPATIENT
Start: 2019-05-04 | End: 2019-05-04

## 2019-05-04 RX ADMIN — POTASSIUM CHLORIDE 20 MEQ: 10 CAPSULE, COATED, EXTENDED RELEASE ORAL at 08:20

## 2019-05-04 RX ADMIN — OXYBUTYNIN CHLORIDE 5 MG: 5 TABLET ORAL at 13:51

## 2019-05-04 RX ADMIN — MAGNESIUM SULFATE HEPTAHYDRATE 1 G: 1 INJECTION, SOLUTION INTRAVENOUS at 08:20

## 2019-05-04 RX ADMIN — ALPRAZOLAM 0.25 MG: 0.25 TABLET ORAL at 21:48

## 2019-05-04 RX ADMIN — OXYBUTYNIN CHLORIDE 5 MG: 5 TABLET ORAL at 20:55

## 2019-05-04 RX ADMIN — METOPROLOL SUCCINATE 25 MG: 25 TABLET, FILM COATED, EXTENDED RELEASE ORAL at 08:20

## 2019-05-04 RX ADMIN — SERTRALINE 50 MG: 50 TABLET, FILM COATED ORAL at 08:20

## 2019-05-04 RX ADMIN — Medication 10 ML: at 20:55

## 2019-05-04 RX ADMIN — METOCLOPRAMIDE 10 MG: 10 TABLET ORAL at 06:23

## 2019-05-04 RX ADMIN — RIVAROXABAN 15 MG: 15 TABLET, FILM COATED ORAL at 18:23

## 2019-05-04 RX ADMIN — METOCLOPRAMIDE 10 MG: 10 TABLET ORAL at 20:54

## 2019-05-04 RX ADMIN — ATORVASTATIN CALCIUM 80 MG: 80 TABLET, FILM COATED ORAL at 20:54

## 2019-05-04 RX ADMIN — METOCLOPRAMIDE 10 MG: 10 TABLET ORAL at 18:16

## 2019-05-04 RX ADMIN — FUROSEMIDE 20 MG: 20 TABLET ORAL at 18:16

## 2019-05-04 RX ADMIN — FUROSEMIDE 20 MG: 20 TABLET ORAL at 08:19

## 2019-05-04 RX ADMIN — RIVAROXABAN 15 MG: 15 TABLET, FILM COATED ORAL at 08:20

## 2019-05-04 RX ADMIN — OXYBUTYNIN CHLORIDE 5 MG: 5 TABLET ORAL at 08:20

## 2019-05-04 ASSESSMENT — PAIN SCALES - GENERAL: PAINLEVEL_OUTOF10: 4

## 2019-05-04 NOTE — PROGRESS NOTES
[facility] - Daily Progress Note  Today's Date and Time: 2019, 3:31 PM    Impression :   Current: 2019  · Bilateral LE lymphedema  · Acute on chronic skin changes secondary to edema and scratching  · No apparent bacterial cellulitis  · Bilateral Pulmonary embolism   · Acute on chronic systolic CHF s/p defibrillator (2019)    · Recent SDH   · Chronic a fib   · HTN   Recommendations:     · Continue to monitor off abx  · Leg elevation, compression therapy to reduce edema  · Wound care   · Discharge planning for SNF because of of degree of debility  · Office f/up in 4 weeks with Dr Jaiden Hyman for infection. Please call [phone] for appointment    Medical Decision Makin2019       · Patient with chronic lymphedema  · Has developed worsening of edema as an outpatient, leading to skin breaks and irritation  · The above compounded by scratching leading to more abrasions  · Currently does not have secondary bacterial cellulitis  · Main problem is CHF with fluid retention and associated lymphedema. · Will need to decrease the leg edema to improve the skin condition. · 5-3-19 She indicates improvement with current treatment  · On Xarelto for PE  · ID wise no bacterial cellulitis. Continuing wound care with improvement        History Present Illness:     INITIAL HISTORY:     Genna Shepherd is a 76y.o.-year-old female who presented from [facility] with worsening shortness of breath and cough.     Patient was found to have bilateral pulmonary embolism. Patient was recently discharged from the hospital on 19 with subdural hematoma. Patient also reports that she follows up with Cardiology because of systolic CHF. She is status post AICD placement.      Patient was supposed to follow up with Dr. Haja Foster for ICD check on 19 however she felt worsening shortness of breath so she decided to come to the ED.   In the ED CT chest was significant for small pulmonary emboli in the right upper and lower lobes. Patient was started on heparin infusion.     Patient was also noted to have bilateral lower extremity cellulitis with edema and discoloration of both legs. She was started on IV Zosyn and IV vancomycin. Venous Dopplers of lower extremities were obtained which were unremarkable for DVT.     Patient reports that she has had multiple blisters on bilateral lower extremities that were present during the last hospital admission. She denies any swelling or erythema during the last hospital admission. She reports that she started developing lower extremity swelling and worsening in the past 2 weeks since she's not ambulating because of the current illness. She reports that the erythema mainly developed yesterday.     Denies any fever or chills, nausea or vomiting. Denies any recent antibiotic use.      Patient examined with Wound Care  Care discussed and coordinated with Wound Care    CURRENT EVALUATION: 5/4/2019     Afebrile  VS stable    The patient seen and evaluated at bedside. She is doing well with no acute issues or concerns today. She does not have any fevers, chills, cough, shortness of breath, chest pains or palpitations. Leg still swollen and sore, wrapped in bandages, appropriate improvement,   Wound care helping with open lesions    Denies fevers, chills, nausea, vomiting, palpitatons, appropriate urine output and BM. Heparin converted to xarelto  for acute PE, no active bleeding noted anywhere. Patient desisted from going home with help. Feels SNF may be better until she can ambulate. Discussed with patient, RN, IM.       I have personally reviewed the past medical history, past surgical history, medications, social history, and family history, and I have updated the database accordingly. ROS:      1. Constitutional: Negative for chills, diaphoresis, fatigue and fever. 2.  HENT: Negative for congestion, sore throat and trouble swallowing. 3. Eyes: Negative for pain and discharge. 4. Respiratory: Positive for cough. Negative for apnea, chest tightness, shortness of breath and wheezing. 5. Cardiovascular: Positive for leg swelling. Negative for chest pain and palpitations. 6. Gastrointestinal: Negative for abdominal distention, abdominal pain, diarrhea, nausea and vomiting. 7. Endocrine: Negative for polyuria. 8. Genitourinary: Negative for urgency. 9. Musculoskeletal: Negative for back pain and joint swelling. 10. Skin: Positive for rash and wound. ·      Blisters in bilateral lower extremities --Improved  11. Allergic/Immunologic: Negative for environmental allergies and food allergies. 12. Neurological: Negative for dizziness, weakness and headaches. 13. Hematological: Negative for adenopathy. 14. Psychiatric/Behavioral: Negative for agitation and confusion. Physical Examination :     Patient Vitals for the past 8 hrs:   BP Temp Temp src Pulse Resp SpO2   19 1223 114/66 98.3 °F (36.8 °C) Oral 73 17 --   19 0814 (!) 116/57 98.3 °F (36.8 °C) Oral 74 20 95 %     Temp (24hrs), Av.1 °F (36.7 °C), Min:97.7 °F (36.5 °C), Max:98.4 °F (36.9 °C)    Constitutional: She is oriented to person, place, and time. She appears well-developed and well-nourished. No distress. HENT:   Head: Normocephalic and atraumatic. Eyes: Pupils are equal, round, and reactive to light. EOM are normal.   Neck: Normal range of motion. Neck supple. Cardiovascular: Normal rate, regular rhythm and normal heart sounds. No murmur heard. AICD in place   Pulmonary/Chest: Effort normal and breath sounds normal. No respiratory distress. She has no wheezes. Abdominal: Soft. Bowel sounds are normal. She exhibits no distension. Musculoskeletal: Normal range of motion. She exhibits edema which has improved  Neurological: She is alert and oriented to person, place, and time. Skin: Skin is warm. She is not diaphoretic.  There is erythema which is improving. No pallor. Chronic venous stasis dermatitis with few excoriated areas from scratching and edema. Bilateral leg discoloration. No apparent secondary bacterial cellulitis   Psychiatric: She has a normal mood and affect. Her behavior is normal.         Medical Decision Making:   I have independently reviewed/ordered the following labs:    CBC with Differential:   Recent Labs     05/03/19  0828 05/04/19  0414   WBC 5.2 5.2   HGB 10.4* 10.6*   HCT 36.1* 37.6    207     BMP:   Recent Labs     05/03/19  0828 05/04/19  0414    142   K 3.8 4.1    106   CO2 27 26   BUN 18 18   CREATININE 0.84 0.66   MG  --  1.9                        Media Information                     Medications:      metoclopramide  10 mg Oral 4x Daily AC & HS    rivaroxaban  15 mg Oral BID WC    atorvastatin  80 mg Oral Nightly    furosemide  20 mg Oral BID    metoprolol succinate  25 mg Oral Daily    oxybutynin  5 mg Oral TID    potassium chloride  20 mEq Oral Daily    sertraline  50 mg Oral Daily    sodium chloride flush  10 mL Intravenous 2 times per day     Medical Decision Making:       Note:  · Labs, medications, radiologic studies were reviewed with personal review of films  · Moderate amounts of data were reviewed  · Discussed with nursing Staff,   · Infection Control and Prevention measures reviewed  · All prior entries were reviewed  · Administer medications as ordered. No antibiotics needed. · Prognosis: Good  · Discharge planning reviewed  · Follow up as outpatient. Thank you for allowing us to participate in the care of this patient. Please call with questions.     Marvell Krabbe, MD         Pager: [phone]  - Office: [phone]

## 2019-05-04 NOTE — PROGRESS NOTES
congestive heart failure.  Patient has cellulitis of both lower extremities which are chronically edematous with some superficial sores.  CT scan of the brain shows interval resolution of her subdural hematoma which has shrunk in size.  Patient is referred to the hospitalist service at [facility] for admission and I also spoke with . Kailyn Robin has been consulted for neurosurgery management. [facility] feels that she does not have any absolute contraindication to heparinization at this time.  Patient's findings and disposition also discussed with Dr. Dhiraj Armenta that facility who wants anticoagulation held for now. Ruslan Yee is started on IV Zosyn and IV vancomycin for cellulitis in her legs.  Venous Dopplers of the legs were negative for DVT.     Patient states her leg swelling has worsened for the last 2 weeks since she is not ambulating because of current illness but has this swelling and infection for quite a while  She denies any fever or chills. Review of Systems:     Constitutional:  negative for chills, fevers, sweats  Respiratory:  + for cough,  shortness of breath-improved than before, denies wheezing  Cardiovascular:  negative for chest pain, chest pressure/discomfort, lower extremity edema, palpitations  Gastrointestinal:  negative for abdominal pain, constipation, diarrhea, nausea, vomiting  Neurological:  negative for dizziness, headache  + Bilateral lower extremity swelling and redness-improving progressively  Medications:      Allergies:  No Known Allergies    Current Meds:   Scheduled Meds:    metoclopramide  10 mg Oral 4x Daily AC & HS    rivaroxaban  15 mg Oral BID WC    atorvastatin  80 mg Oral Nightly    furosemide  20 mg Oral BID    metoprolol succinate  25 mg Oral Daily    oxybutynin  5 mg Oral TID    potassium chloride  20 mEq Oral Daily    sertraline  50 mg Oral Daily    sodium chloride flush  10 mL Intravenous 2 times per day     Continuous Infusions:     PRN Meds: contrast. Dose modulation, iterative reconstruction, and/or weight based adjustment of the mA/kV was utilized to reduce the radiation dose to as low as reasonably achievable. COMPARISON: CT head April 3, 2019 and April 2, 2019 HISTORY: ORDERING SYSTEM PROVIDED HISTORY: SDH (subdural hematoma) ([facility] [address] TECHNOLOGIST PROVIDED HISTORY: follow up for SDH Ordering Physician Provided Reason for Exam: F/U for subdural hematoma Acuity: Chronic Type of Exam: Subsequent/Follow-up FINDINGS: BRAIN/VENTRICLES: There is a mixed aged high convexity extra-axial hematoma along the left frontal/parietal convexity measuring up to 8.4 mm in maximal thickness, compared to 10 mm on April 3, 2019 and 7.7 mm on April 2, 2019. There is no mass effect or midline shift. There is mild parenchymal volume loss. There is periventricular white matter low attenuation, likely related to mild chronic microvascular disease. The gray-white differentiation is maintained without evidence of an acute infarct. There is no hydrocephalus. ORBITS: The visualized portion of the orbits demonstrate no acute abnormality. SINUSES: The visualized paranasal sinuses and mastoid air cells demonstrate no acute abnormality. SOFT TISSUES/SKULL:  No acute abnormality of the visualized skull or soft tissues. Mixed aged high convexity extra-axial hematoma along the left frontal/parietal convexity measuring up to 8.4 mm in maximal thickness, compared to 10 mm on April 3, 2019 and 7.7 mm on April 2, 2019. No midline shift. Continued short interval follow-up is recommended. Mild parenchymal volume loss. Mild chronic microvascular disease.      Ct Head Wo Contrast    Result Date: 4/3/2019  EXAMINATION: CT OF THE HEAD WITHOUT CONTRAST  4/3/2019 9:17 am TECHNIQUE: CT of the head was performed without the administration of intravenous contrast. Dose modulation, iterative reconstruction, and/or weight based adjustment of the mA/kV was utilized to reduce the radiation dose to as low as reasonably achievable. COMPARISON: 04/02/2019. HISTORY: ORDERING SYSTEM PROVIDED HISTORY: INTRACRANIAL HEMORRHAGE TECHNOLOGIST PROVIDED HISTORY: Initial evaluation. FINDINGS: BRAIN/VENTRICLES: There is perhaps minimal increase in size in the extra-axial collection along the left parietal convexity measuring 10 mm in thickness. This previously measured 8 mm in thickness. There is slight decrease in the layering hyperdensity. No new focus of intracranial hemorrhage. There is no significant mass effect or midline shift. There are areas of hypoattenuation in the periventricular and subcortical white matter, which is nonspecific, but may represent chronic microvasvular ischemic change. There is minimal global parenchymal volume loss. The gray-white differentiation appears maintained. Minimal atherosclerosis of the intracranial vasculature. ORBITS: The visualized portion of the orbits demonstrate no acute abnormality. SINUSES: The visualized paranasal sinuses and mastoid air cells demonstrate no acute abnormality. SOFT TISSUES/SKULL:  No acute abnormality of the visualized skull or soft tissues. 1. There is perhaps minimal increase in size in the extra-axial collection along the left parietal convexity with interval decrease in the layering hyperdensity. 2. No significant mass effect or midline shift. 3. No new intracranial hemorrhage identified. 4. Mild-to-moderate chronic microvascular ischemic changes. Ct Head Wo Contrast    Result Date: 4/2/2019  EXAMINATION: CT OF THE HEAD WITHOUT CONTRAST  4/2/2019 5:58 pm TECHNIQUE: CT of the head was performed without the administration of intravenous contrast. Dose modulation, iterative reconstruction, and/or weight based adjustment of the mA/kV was utilized to reduce the radiation dose to as low as reasonably achievable. COMPARISON: None.  HISTORY: ORDERING SYSTEM PROVIDED HISTORY: dizziness, weakness TECHNOLOGIST PROVIDED HISTORY: FINDINGS: alveolar and interstitial opacities have developed. No pneumothorax. No significant effusion. No acute osseous abnormality identified. Bilateral airspace disease has developed, which may represent edema versus inflammatory process or infection. Xr Chest Portable    Result Date: 4/2/2019  EXAMINATION: SINGLE XRAY VIEW OF THE CHEST 4/2/2019 2:56 pm COMPARISON: 03/29/2018 HISTORY: ORDERING SYSTEM PROVIDED HISTORY: weakness TECHNOLOGIST PROVIDED HISTORY: weakness Ordering Physician Provided Reason for Exam: Pt c/o weakness, AP UPRIGHT PORTABLE Acuity: Acute Type of Exam: Initial FINDINGS: There is a left subclavian ICD, with 3 lead wires, projecting in stable positions. Left heart border is prominent. Hilar contours are normal. There is no focal airspace disease or pneumothorax. No acute cardiopulmonary disease. Stable cardiomegaly. Ct Chest Pulmonary Embolism W Contrast    Result Date: 4/29/2019  EXAMINATION: CTA OF THE CHEST 4/29/2019 12:31 pm TECHNIQUE: CTA of the chest was performed after the administration of intravenous contrast.  Multiplanar reformatted images are provided for review. MIP images are provided for review. Dose modulation, iterative reconstruction, and/or weight based adjustment of the mA/kV was utilized to reduce the radiation dose to as low as reasonably achievable. COMPARISON: None. HISTORY: ORDERING SYSTEM PROVIDED HISTORY: shortness of breath FINDINGS: Pulmonary Arteries: No central pulmonary embolus. Small peripheral right lower lobe pulmonary emboli. Right upper lobe pulmonary emboli. No definite evidence of right heart strain. No definite pulmonary infarct. Mediastinum: Severe cardiomegaly. Small pericardial effusion. No adenopathy. Lungs/pleura: Diffuse ground-glass opacities, septal thickening. No pleural effusions. Upper Abdomen: Right adrenal nodule. Soft Tissues/Bones: Left pacemaker. No axillary adenopathy. Lumbar spine degenerative changes.      Right upper lobe and right lower lobe pulmonary emboli. Diffuse ground-glass opacities are nonspecific and may be related to pulmonary edema with infection or an inflammatory process not excluded. Critical results were called by Dr. Denise Beltran MD to Dr. Kobi Newton on 4/29/2019 at 13:03. Vl Dup Lower Extremity Venous Bilateral    Result Date: 4/29/2019    [facility]  Vascular Lower Extremities DVT Study Procedure   Patient Name    Storm Lew      Date of Study             04/29/2019                  [facility]   Date of Birth   1950   Gender                    Female   Age             76 year(s)   Race                         Room Number     SASHA   Corporate ID #  [id number]   Patient Acct #  [de-identified]   MR #            [id number]      Sonographer               Brenda Rodriguez   Accession #     [id number]    Interpreting Physician    Marilyn Tiwari   Referring Nurse              Referring Physician       ER NO FAMILY DR *  Practitioner  Procedure Type of Study:   Veins: Lower Extremities DVT Study, Venous Scan Lower Bilateral.  Indications for Study:R/O DVT. Patient Status:ER. Technical Quality:Limited visualization. Conclusions   Summary   No evidence of superficial or deep venous thrombosis in both lower  extremities. Multilevel edema noted.    Signature   ----------------------------------------------------------------  Electronically signed by Brenda Rodriguez(Sonographer) on  04/29/2019 03:10 PM  ----------------------------------------------------------------   ----------------------------------------------------------------  Electronically signed by Mitali CarranzaInterpreting  physician) on 04/29/2019 05:04 PM  ----------------------------------------------------------------  Findings:   Right Impression:                     Left Impression:  The common femoral, superficial       The common femoral, femoral,  femoral, popliteal, tibials and       popliteal, tibials and saphenous  saphenous veins are compressible with veins are compressible with normal  normal doppler responses. doppler responses. Thigh, calf and ankle edema noted. Thigh, calf, and ankle edema noted. Allergies   - Allergy:*No Known Allergies(Miscellaneous). Velocities are measured in cm/s ; Diameters are measured in cm Right Lower Extremities DVT Study Measurements Right 2D Measurements +------------------------------------+----------+---------------+----------+ ! Location                            ! Visualized! Compressibility! Thrombosis! +------------------------------------+----------+---------------+----------+ ! Common Femoral                      !Yes       ! Yes            ! None      ! +------------------------------------+----------+---------------+----------+ ! Prox Femoral                        !Yes       ! Yes            ! None      ! +------------------------------------+----------+---------------+----------+ ! Mid Femoral                         !Yes       ! Yes            ! None      ! +------------------------------------+----------+---------------+----------+ ! Dist Femoral                        !Yes       ! Yes            ! None      ! +------------------------------------+----------+---------------+----------+ ! Deep Femoral                        !Yes       ! Yes            ! None      ! +------------------------------------+----------+---------------+----------+ ! Popliteal                           !Yes       ! Yes            ! None      ! +------------------------------------+----------+---------------+----------+ ! Sapheno Femoral Junction            ! Yes       ! Yes            ! None      ! +------------------------------------+----------+---------------+----------+ ! PTV                                 ! Yes       ! Yes            ! None      ! +------------------------------------+----------+---------------+----------+ ! Peroneal                            !Yes       ! Yes            ! None      ! +------------------------------------+----------+---------------+----------+ ! Gastroc                             ! Yes       ! Yes            ! None      ! +------------------------------------+----------+---------------+----------+ ! GSV Thigh                           ! Yes       ! Yes            ! None      ! +------------------------------------+----------+---------------+----------+ ! GSV Knee                            ! Yes       ! Yes            ! None      ! +------------------------------------+----------+---------------+----------+ ! GSV Ankle                           ! Yes       ! Yes            ! None      ! +------------------------------------+----------+---------------+----------+ ! SSV                                 ! Yes       ! Yes            ! None      ! +------------------------------------+----------+---------------+----------+ Right Doppler Measurements +---------------------------+------+------+--------------------------------+ ! Location                   ! Signal!Reflux! Reflux (msec)                   ! +---------------------------+------+------+--------------------------------+ ! Common Femoral             !Phasic!      !                                ! +---------------------------+------+------+--------------------------------+ ! Prox Femoral               !Phasic!      !                                ! +---------------------------+------+------+--------------------------------+ ! Popliteal                  !Phasic!      !                                ! +---------------------------+------+------+--------------------------------+ Left Lower Extremities DVT Study Measurements Left 2D Measurements +------------------------------------+----------+---------------+----------+ ! Location                            ! Visualized! Compressibility! Thrombosis! +------------------------------------+----------+---------------+----------+ ! Common Femoral                      !Yes       ! Yes            ! None      ! +------------------------------------+----------+---------------+----------+ ! Prox Femoral                        !Yes       ! Yes            ! None      ! +------------------------------------+----------+---------------+----------+ ! Mid Femoral                         !Yes       ! Yes            ! None      ! +------------------------------------+----------+---------------+----------+ ! Dist Femoral                        !Yes       ! Yes            ! None      ! +------------------------------------+----------+---------------+----------+ ! Deep Femoral                        !Yes       ! Yes            ! None      ! +------------------------------------+----------+---------------+----------+ ! Popliteal                           !Yes       ! Yes            ! None      ! +------------------------------------+----------+---------------+----------+ ! Sapheno Femoral Junction            ! Yes       ! Yes            ! None      ! +------------------------------------+----------+---------------+----------+ ! PTV                                 ! Yes       ! Yes            ! None      ! +------------------------------------+----------+---------------+----------+ ! Peroneal                            !Yes       ! Yes            ! None      ! +------------------------------------+----------+---------------+----------+ ! Gastroc                             ! Yes       ! Yes            ! None      ! +------------------------------------+----------+---------------+----------+ ! GSV Thigh                           ! Yes       ! Yes            ! None      ! +------------------------------------+----------+---------------+----------+ ! GSV Knee                            ! Yes       ! Yes            ! None      ! +------------------------------------+----------+---------------+----------+ ! GSV Ankle                           ! Yes       ! Yes            ! None      ! +------------------------------------+----------+---------------+----------+ ! SSV 04/03/2019     Priority: High    SDH (subdural hematoma) ([facility] [S06.5X9A] 04/02/2019     Priority: High    Chronic atrial fibrillation ([facility] [I48.2] 04/03/2019     Priority: Medium    Lymphedema of both lower extremities [I89.0] 03/21/2019     Priority: Medium    Acute on chronic systolic congestive heart failure (HCC) [I50.23] 08/08/2017     Priority: Medium    Venous stasis dermatitis of both lower extremities [I87.2]     Coronary atherosclerosis of native coronary artery [I25.10] 04/03/2019    Dyslipidemia [E78.5] 04/03/2019    Hypertension [I10] 04/03/2019       Plan:        1. Continue oral Xarelto   2. Continue oral diuretics  3. Continue Xanax 0.25 mg 3 times a day when necessary  4. Continue Reglan for 1 week  5. Monitor off antibiotics  6. Wound care  7.  Placement process for SNF in progress, waiting for precertification        Pj Medina MD  5/4/2019  7:47 AM

## 2019-05-04 NOTE — PLAN OF CARE
Problem: Falls - Risk of:  Goal: Will remain free from falls  Description  Will remain free from falls  Outcome: Ongoing  Note:   Pt remains free of falls at this time. Bed locked in lowest position, siderails x2, call light in reach. Non-skid footwear applied. Bed alarm on. Encouraged pt to call for assistance as needed for safety. Falling star posted outside of room. Will continue to monitor.

## 2019-05-05 LAB
ANION GAP SERPL CALCULATED.3IONS-SCNC: 9 MMOL/L (ref 9–17)
BUN BLDV-MCNC: 16 MG/DL (ref 8–23)
BUN/CREAT BLD: ABNORMAL (ref 9–20)
CALCIUM SERPL-MCNC: 8.6 MG/DL (ref 8.6–10.4)
CHLORIDE BLD-SCNC: 103 MMOL/L (ref 98–107)
CO2: 28 MMOL/L (ref 20–31)
CREAT SERPL-MCNC: 0.76 MG/DL (ref 0.5–0.9)
GFR AFRICAN AMERICAN: >60 ML/MIN
GFR NON-AFRICAN AMERICAN: >60 ML/MIN
GFR SERPL CREATININE-BSD FRML MDRD: ABNORMAL ML/MIN/{1.73_M2}
GFR SERPL CREATININE-BSD FRML MDRD: ABNORMAL ML/MIN/{1.73_M2}
GLUCOSE BLD-MCNC: 107 MG/DL (ref 70–99)
HCT VFR BLD CALC: 36 % (ref 36.3–47.1)
HEMOGLOBIN: 10.3 G/DL (ref 11.9–15.1)
MCH RBC QN AUTO: 26.5 PG (ref 25.2–33.5)
MCHC RBC AUTO-ENTMCNC: 28.6 G/DL (ref 28.4–34.8)
MCV RBC AUTO: 92.8 FL (ref 82.6–102.9)
NRBC AUTOMATED: 0 PER 100 WBC
PDW BLD-RTO: 19.1 % (ref 11.8–14.4)
PLATELET # BLD: 220 K/UL (ref 138–453)
PMV BLD AUTO: 11.6 FL (ref 8.1–13.5)
POTASSIUM SERPL-SCNC: 3.6 MMOL/L (ref 3.7–5.3)
RBC # BLD: 3.88 M/UL (ref 3.95–5.11)
SODIUM BLD-SCNC: 140 MMOL/L (ref 135–144)
WBC # BLD: 4.9 K/UL (ref 3.5–11.3)

## 2019-05-05 PROCEDURE — 2060000000 HC ICU INTERMEDIATE R&B

## 2019-05-05 PROCEDURE — 99232 SBSQ HOSP IP/OBS MODERATE 35: CPT | Performed by: INTERNAL MEDICINE

## 2019-05-05 PROCEDURE — 84484 ASSAY OF TROPONIN QUANT: CPT

## 2019-05-05 PROCEDURE — 83735 ASSAY OF MAGNESIUM: CPT

## 2019-05-05 PROCEDURE — 84132 ASSAY OF SERUM POTASSIUM: CPT

## 2019-05-05 PROCEDURE — 80048 BASIC METABOLIC PNL TOTAL CA: CPT

## 2019-05-05 PROCEDURE — 36415 COLL VENOUS BLD VENIPUNCTURE: CPT

## 2019-05-05 PROCEDURE — 6370000000 HC RX 637 (ALT 250 FOR IP): Performed by: INTERNAL MEDICINE

## 2019-05-05 PROCEDURE — 2580000003 HC RX 258: Performed by: PHYSICIAN ASSISTANT

## 2019-05-05 PROCEDURE — 93005 ELECTROCARDIOGRAM TRACING: CPT

## 2019-05-05 PROCEDURE — 6370000000 HC RX 637 (ALT 250 FOR IP): Performed by: PHYSICIAN ASSISTANT

## 2019-05-05 PROCEDURE — 85027 COMPLETE CBC AUTOMATED: CPT

## 2019-05-05 RX ADMIN — POTASSIUM CHLORIDE 20 MEQ: 10 CAPSULE, COATED, EXTENDED RELEASE ORAL at 08:47

## 2019-05-05 RX ADMIN — METOCLOPRAMIDE 10 MG: 10 TABLET ORAL at 11:36

## 2019-05-05 RX ADMIN — METOCLOPRAMIDE 10 MG: 10 TABLET ORAL at 06:27

## 2019-05-05 RX ADMIN — OXYBUTYNIN CHLORIDE 5 MG: 5 TABLET ORAL at 08:48

## 2019-05-05 RX ADMIN — RIVAROXABAN 15 MG: 15 TABLET, FILM COATED ORAL at 17:07

## 2019-05-05 RX ADMIN — FUROSEMIDE 20 MG: 20 TABLET ORAL at 17:05

## 2019-05-05 RX ADMIN — RIVAROXABAN 15 MG: 15 TABLET, FILM COATED ORAL at 08:47

## 2019-05-05 RX ADMIN — ATORVASTATIN CALCIUM 80 MG: 80 TABLET, FILM COATED ORAL at 20:11

## 2019-05-05 RX ADMIN — FUROSEMIDE 20 MG: 20 TABLET ORAL at 08:48

## 2019-05-05 RX ADMIN — Medication 10 ML: at 20:11

## 2019-05-05 RX ADMIN — METOCLOPRAMIDE 10 MG: 10 TABLET ORAL at 20:11

## 2019-05-05 RX ADMIN — Medication 10 ML: at 08:49

## 2019-05-05 RX ADMIN — METOPROLOL SUCCINATE 25 MG: 25 TABLET, FILM COATED, EXTENDED RELEASE ORAL at 08:48

## 2019-05-05 RX ADMIN — METOCLOPRAMIDE 10 MG: 10 TABLET ORAL at 17:05

## 2019-05-05 RX ADMIN — SERTRALINE 50 MG: 50 TABLET, FILM COATED ORAL at 08:47

## 2019-05-05 RX ADMIN — OXYBUTYNIN CHLORIDE 5 MG: 5 TABLET ORAL at 20:11

## 2019-05-05 RX ADMIN — OXYBUTYNIN CHLORIDE 5 MG: 5 TABLET ORAL at 14:13

## 2019-05-05 ASSESSMENT — PAIN SCALES - GENERAL
PAINLEVEL_OUTOF10: 0
PAINLEVEL_OUTOF10: 0

## 2019-05-05 NOTE — PROGRESS NOTES
lower lobes. Tiff Last is no sign of right heart strain. Saran Echeverria also has underlying congestive heart failure.  Patient has cellulitis of both lower extremities which are chronically edematous with some superficial sores.  CT scan of the brain shows interval resolution of her subdural hematoma which has shrunk in size.  Patient is referred to the hospitalist service at [facility] for admission and I also spoke with Dr. Abdon Hinojosa has been consulted for neurosurgery management. [facility] feels that she does not have any absolute contraindication to heparinization at this time.  Patient's findings and disposition also discussed with Dr. Hank Puckett that facility who wants anticoagulation held for now. Maik Naidu is started on IV Zosyn and IV vancomycin for cellulitis in her legs.  Venous Dopplers of the legs were negative for DVT.     Patient states her leg swelling has worsened for the last 2 weeks since she is not ambulating because of current illness but has this swelling and infection for quite a while  She denies any fever or chills. Review of Systems:     Constitutional:  negative for chills, fevers, sweats  Respiratory:  + for cough,  shortness of breath-improved than before, denies wheezing  Cardiovascular:  negative for chest pain, chest pressure/discomfort, lower extremity edema, palpitations  Gastrointestinal:  negative for abdominal pain, constipation, diarrhea, nausea, vomiting  Neurological:  negative for dizziness, headache  + Bilateral lower extremity swelling and redness-improving progressively  Medications:      Allergies:  No Known Allergies    Current Meds:   Scheduled Meds:    metoclopramide  10 mg Oral 4x Daily AC & HS    rivaroxaban  15 mg Oral BID WC    atorvastatin  80 mg Oral Nightly    furosemide  20 mg Oral BID    metoprolol succinate  25 mg Oral Daily    oxybutynin  5 mg Oral TID    potassium chloride  20 mEq Oral Daily    sertraline  50 mg Oral Daily    sodium chloride AMYLASE, LIPASE, LACTATE, CHOL, HDL, LDLCHOLESTEROL, CHOLHDLRATIO, TRIG, VLDL, PHENYTOIN, PHENYF in the last 72 hours. Lab Results   Component Value Date/Time    [facility] 03/21/2019 09:15 AM     Lab Results   Component Value Date/Time    CULTURE NO GROWTH 6 DAYS 03/21/2019 09:15 AM       No results found for: POCPH, PHART, PH, POCPCO2, WIT7FBC, PCO2, POCPO2, PO2ART, PO2, POCHCO3, SBB0FBK, HCO3, NBEA, PBEA, BEART, BE, THGBART, THB, VEO5RII, ESKR9XAI, N7VNGSUH, O2SAT, FIO2    Radiology:    Ct Head Wo Contrast    Result Date: 4/29/2019  EXAMINATION: CT OF THE HEAD WITHOUT CONTRAST  4/29/2019 1:47 pm TECHNIQUE: CT of the head was performed without the administration of intravenous contrast. Dose modulation, iterative reconstruction, and/or weight based adjustment of the mA/kV was utilized to reduce the radiation dose to as low as reasonably achievable. COMPARISON: 04/12/2019 HISTORY: ORDERING SYSTEM PROVIDED HISTORY: previous blled TECHNOLOGIST PROVIDED HISTORY: FINDINGS: BRAIN/VENTRICLES: Previously identified extra-axial blood products about the left parietal region near the vertex is again demonstrated and smaller. This measures up to 6 mm in thickness (previously 8 mm). This has also decreased in attenuation. No new or acute appearing blood products are appreciated. Cortical atrophy and white matter changes in the brain are again demonstrated. No new findings identified in the brain. ORBITS: The visualized portion of the orbits demonstrate no acute abnormality. SINUSES: The visualized paranasal sinuses and mastoid air cells demonstrate no acute abnormality. SOFT TISSUES/SKULL:  No acute abnormality of the visualized skull or soft tissues. Continued reduction in size of extra-axial hematoma about the left parietal vertex measuring up to 6 mm in thickness. No new abnormality identified.      Ct Head Wo Contrast    Result Date: 4/12/2019  EXAMINATION: CT OF THE HEAD WITHOUT CONTRAST 4/12/2019 10:53 am TECHNIQUE: CT of the head was performed without the administration of intravenous contrast. Dose modulation, iterative reconstruction, and/or weight based adjustment of the mA/kV was utilized to reduce the radiation dose to as low as reasonably achievable. COMPARISON: CT head April 3, 2019 and April 2, 2019 HISTORY: ORDERING SYSTEM PROVIDED HISTORY: SDH (subdural hematoma) ([facility] [address] TECHNOLOGIST PROVIDED HISTORY: follow up for SDH Ordering Physician Provided Reason for Exam: F/U for subdural hematoma Acuity: Chronic Type of Exam: Subsequent/Follow-up FINDINGS: BRAIN/VENTRICLES: There is a mixed aged high convexity extra-axial hematoma along the left frontal/parietal convexity measuring up to 8.4 mm in maximal thickness, compared to 10 mm on April 3, 2019 and 7.7 mm on April 2, 2019. There is no mass effect or midline shift. There is mild parenchymal volume loss. There is periventricular white matter low attenuation, likely related to mild chronic microvascular disease. The gray-white differentiation is maintained without evidence of an acute infarct. There is no hydrocephalus. ORBITS: The visualized portion of the orbits demonstrate no acute abnormality. SINUSES: The visualized paranasal sinuses and mastoid air cells demonstrate no acute abnormality. SOFT TISSUES/SKULL:  No acute abnormality of the visualized skull or soft tissues. Mixed aged high convexity extra-axial hematoma along the left frontal/parietal convexity measuring up to 8.4 mm in maximal thickness, compared to 10 mm on April 3, 2019 and 7.7 mm on April 2, 2019. No midline shift. Continued short interval follow-up is recommended. Mild parenchymal volume loss. Mild chronic microvascular disease.      Ct Head Wo Contrast    Result Date: 4/3/2019  EXAMINATION: CT OF THE HEAD WITHOUT CONTRAST  4/3/2019 9:17 am TECHNIQUE: CT of the head was performed without the administration of intravenous contrast. Dose modulation, iterative reconstruction, and/or weight based adjustment of the mA/kV was utilized to reduce the radiation dose to as low as reasonably achievable. COMPARISON: 04/02/2019. HISTORY: ORDERING SYSTEM PROVIDED HISTORY: INTRACRANIAL HEMORRHAGE TECHNOLOGIST PROVIDED HISTORY: Initial evaluation. FINDINGS: BRAIN/VENTRICLES: There is perhaps minimal increase in size in the extra-axial collection along the left parietal convexity measuring 10 mm in thickness. This previously measured 8 mm in thickness. There is slight decrease in the layering hyperdensity. No new focus of intracranial hemorrhage. There is no significant mass effect or midline shift. There are areas of hypoattenuation in the periventricular and subcortical white matter, which is nonspecific, but may represent chronic microvasvular ischemic change. There is minimal global parenchymal volume loss. The gray-white differentiation appears maintained. Minimal atherosclerosis of the intracranial vasculature. ORBITS: The visualized portion of the orbits demonstrate no acute abnormality. SINUSES: The visualized paranasal sinuses and mastoid air cells demonstrate no acute abnormality. SOFT TISSUES/SKULL:  No acute abnormality of the visualized skull or soft tissues. 1. There is perhaps minimal increase in size in the extra-axial collection along the left parietal convexity with interval decrease in the layering hyperdensity. 2. No significant mass effect or midline shift. 3. No new intracranial hemorrhage identified. 4. Mild-to-moderate chronic microvascular ischemic changes. Ct Head Wo Contrast    Result Date: 4/2/2019  EXAMINATION: CT OF THE HEAD WITHOUT CONTRAST  4/2/2019 5:58 pm TECHNIQUE: CT of the head was performed without the administration of intravenous contrast. Dose modulation, iterative reconstruction, and/or weight based adjustment of the mA/kV was utilized to reduce the radiation dose to as low as reasonably achievable. COMPARISON: None.  HISTORY: ORDERING SYSTEM PROVIDED HISTORY: dizziness, weakness TECHNOLOGIST PROVIDED HISTORY: FINDINGS: BRAIN/VENTRICLES: There is no acute intracranial hemorrhage, mass effect or midline shift. If there is a left frontal parietal extra-axial collection identified measuring 14 mm in transverse dimension on the axial images. Dependently layering hyperdensity relatively isodense to brain parenchyma. This could represent a subdural hygroma with secondary acute hemorrhage, evolving chronic hematoma, or a subdural hemorrhage in the eligio patient. .  The gray-white differentiation is maintained without evidence of an acute infarct. There is no evidence of hydrocephalus. Mild-to-moderate periventricular subcortical white matter hypoattenuation identified which is nonspecific but can be seen within the underlying vasculitis or chronic small vessel disease. Intracranial vascular calcifications. ORBITS: The visualized portion of the orbits demonstrate no acute abnormality. SINUSES: The visualized paranasal sinuses and mastoid air cells demonstrate no acute abnormality. SOFT TISSUES/SKULL:  No acute abnormality of the visualized skull or soft tissues. Left frontoparietal extra-axial collection may represent a chronic subdural hematoma, acute subdural hygroma with secondary hemorrhage or subacute subdural hematoma. Please correlate exam findings and history. Mild-to-moderate patchy white matter changes suggestive chronic small vessel ischemic disease. Critical results were called by Dr. Reuben Membreno to [facility] on 4/2/2019 at 18:23.      Xr Chest Portable    Result Date: 4/29/2019  EXAMINATION: SINGLE XRAY VIEW OF THE CHEST 4/29/2019 11:47 am COMPARISON: April 2, 2018, March 29, 2019 HISTORY: ORDERING SYSTEM PROVIDED HISTORY: Chest Pain TECHNOLOGIST PROVIDED HISTORY: Chest Pain Ordering Physician Provided Reason for Exam: Port upright AP CXR - chest pain Acuity: Unknown Type of Exam: Unknown FINDINGS: Cardiac silhouette enlargement again noted. Left subclavian AICD in place. Bilateral perihilar and lower lung field alveolar and interstitial opacities have developed. No pneumothorax. No significant effusion. No acute osseous abnormality identified. Bilateral airspace disease has developed, which may represent edema versus inflammatory process or infection. Xr Chest Portable    Result Date: 4/2/2019  EXAMINATION: SINGLE XRAY VIEW OF THE CHEST 4/2/2019 2:56 pm COMPARISON: 03/29/2018 HISTORY: ORDERING SYSTEM PROVIDED HISTORY: weakness TECHNOLOGIST PROVIDED HISTORY: weakness Ordering Physician Provided Reason for Exam: Pt c/o weakness, AP UPRIGHT PORTABLE Acuity: Acute Type of Exam: Initial FINDINGS: There is a left subclavian ICD, with 3 lead wires, projecting in stable positions. Left heart border is prominent. Hilar contours are normal. There is no focal airspace disease or pneumothorax. No acute cardiopulmonary disease. Stable cardiomegaly. Ct Chest Pulmonary Embolism W Contrast    Result Date: 4/29/2019  EXAMINATION: CTA OF THE CHEST 4/29/2019 12:31 pm TECHNIQUE: CTA of the chest was performed after the administration of intravenous contrast.  Multiplanar reformatted images are provided for review. MIP images are provided for review. Dose modulation, iterative reconstruction, and/or weight based adjustment of the mA/kV was utilized to reduce the radiation dose to as low as reasonably achievable. COMPARISON: None. HISTORY: ORDERING SYSTEM PROVIDED HISTORY: shortness of breath FINDINGS: Pulmonary Arteries: No central pulmonary embolus. Small peripheral right lower lobe pulmonary emboli. Right upper lobe pulmonary emboli. No definite evidence of right heart strain. No definite pulmonary infarct. Mediastinum: Severe cardiomegaly. Small pericardial effusion. No adenopathy. Lungs/pleura: Diffuse ground-glass opacities, septal thickening. No pleural effusions. Upper Abdomen: Right adrenal nodule.  Soft Tissues/Bones: Left pacemaker. No axillary adenopathy. Lumbar spine degenerative changes. Right upper lobe and right lower lobe pulmonary emboli. Diffuse ground-glass opacities are nonspecific and may be related to pulmonary edema with infection or an inflammatory process not excluded. Critical results were called by Dr. Gilbert Smith MD to Dr. Beto Cassidy on 4/29/2019 at 13:03. Vl Dup Lower Extremity Venous Bilateral    Result Date: 4/29/2019    [facility]  Vascular Lower Extremities DVT Study Procedure   Patient Name    Anahy Koehler      Date of Study             04/29/2019                  [facility]   Date of Birth   1950   Gender                    Female   Age             76 year(s)   Race                         Room Number     SASHA   Corporate ID #  [id number]   Patient Acct #  [de-identified]   MR #            [id number]      Sonographer               Brenda Rodriguez   Accession #     [id number]    Interpreting Physician    Mariann Pallas   Referring Nurse              Referring Physician       ER NO FAMILY DR *  Practitioner  Procedure Type of Study:   Veins: Lower Extremities DVT Study, Venous Scan Lower Bilateral.  Indications for Study:R/O DVT. Patient Status:ER. Technical Quality:Limited visualization. Conclusions   Summary   No evidence of superficial or deep venous thrombosis in both lower  extremities. Multilevel edema noted.    Signature   ----------------------------------------------------------------  Electronically signed by Brenda Rodriguez(Sonographer) on  04/29/2019 03:10 PM  ----------------------------------------------------------------   ----------------------------------------------------------------  Electronically signed by Ozzy Bragg(Interpreting  physician) on 04/29/2019 05:04 PM  ----------------------------------------------------------------  Findings:   Right Impression:                     Left Impression:  The common femoral, superficial       The common femoral, femoral,  femoral, popliteal, tibials and       popliteal, tibials and saphenous  saphenous veins are compressible with veins are compressible with normal  normal doppler responses. Physical Examination:        General appearance:  alert, cooperative and no distress, lying comfortably in bed  Mental Status:  oriented to person, place and time and quite anxious  Lungs:  clear to auscultation bilaterally, normal effort  Heart:  Irregular rhythm, no murmur  Abdomen:  soft, nontender, nondistended, normal bowel sounds, no masses, hepatomegaly, splenomegaly  Extremities:  + edema, redness both lower extremities which is significantly improving, no tenderness in the calves,+ Ace  wraps both lower extremities  Skin:  no gross lesions, rashes, induration except as above    Assessment:        Primary Problem  Pulmonary embolism, bilateral (HCC)-heparin has been switched to oral Xarelto  Anxiety-better with Xanax  Acute on chronic systolic congestive heart failure-improved with diuretics  Nausea/heaviness in stomach after food-improved with [address] Problems    Diagnosis Date Noted    [facility], [facility] [I26.99] 04/29/2019     Priority: High    Cellulitis [L03.90] 04/29/2019     Priority: High    S/P implantation of automatic cardioverter/defibrillator (AICD) [Z95.810] 04/03/2019     Priority: High    [facility] [S06.5X9A] 04/02/2019     Priority: High    Chronic [facility] fibrillation [facility] [I48.2] 04/03/2019     Priority: Medium    Lymphedema of both lower extremities [I89.0] 03/21/2019     Priority: Medium    Acute on chronic systolic congestive heart failure (HCC) [I50.23] 08/08/2017     Priority: Medium    Venous stasis dermatitis of both lower extremities [I87.2]     Coronary atherosclerosis of native coronary artery [I25.10] 04/03/2019    Dyslipidemia [E78.5] 04/03/2019    Hypertension [I10] 04/03/2019       Plan:        1.  Continue oral Xarelto   2. Continue oral diuretics  3. Continue Xanax 0.25 mg 3 times a day when necessary  4. Continue Reglan for 1 week  5. Monitor off antibiotics  6. Wound care  7.  Placement process for SNF in progress, waiting for precertification        Lynn Peña MD  5/5/2019  2:46 PM

## 2019-05-05 NOTE — PROGRESS NOTES
[facility] - Daily Progress Note  Today's Date and Time: 2019, 11:35 AM    Impression :   Current: 2019  · Bilateral LE lymphedema  · Acute on chronic skin changes secondary to edema and scratching  · No apparent bacterial cellulitis  · Bilateral Pulmonary embolism   · Acute on chronic systolic CHF s/p defibrillator (2019)    · Recent SDH   · Chronic a fib   · HTN   Recommendations:     · Continue to monitor off abx  · Leg elevation, compression therapy to reduce edema  · Wound care   · Discharge planning for SNF because of of degree of debility  · Office f/up in 4 weeks with Dr Joesph Littlejohn for infection. Please call [phone] for appointment    Medical Decision Makin2019       · Patient with chronic lymphedema  · Has developed worsening of edema as an outpatient, leading to skin breaks and irritation  · The above compounded by scratching leading to more abrasions  · Currently does not have secondary bacterial cellulitis  · Main problem is CHF with fluid retention and associated lymphedema. · Will need to decrease the leg edema to improve the skin condition. · 5-3-19 She indicates improvement with current treatment  · On Xarelto for PE  · ID wise no bacterial cellulitis. Continuing wound care with improvement        History Present Illness:     INITIAL HISTORY:     Liset Marrero is a 76y.o.-year-old female who presented from [facility] with worsening shortness of breath and cough.     Patient was found to have bilateral pulmonary embolism. Patient was recently discharged from the hospital on 19 with subdural hematoma. Patient also reports that she follows up with Cardiology because of systolic CHF. She is status post AICD placement.      Patient was supposed to follow up with Dr. Kwaku Camilo for ICD check on 19 however she felt worsening shortness of breath so she decided to come to the ED.   In the ED CT chest was significant for small pulmonary emboli in the right upper and lower lobes. Patient was started on heparin infusion.     Patient was also noted to have bilateral lower extremity cellulitis with edema and discoloration of both legs. She was started on IV Zosyn and IV vancomycin. Venous Dopplers of lower extremities were obtained which were unremarkable for DVT.     Patient reports that she has had multiple blisters on bilateral lower extremities that were present during the last hospital admission. She denies any swelling or erythema during the last hospital admission. She reports that she started developing lower extremity swelling and worsening in the past 2 weeks since she's not ambulating because of the current illness. She reports that the erythema mainly developed yesterday.     Denies any fever or chills, nausea or vomiting. Denies any recent antibiotic use.      Patient examined with Wound Care  Care discussed and coordinated with Wound Care    CURRENT EVALUATION: 5/5/2019     Afebrile  VS stable  Pt denies fevers, chills, cough, SOB, chest pain    The patient seen and evaluated at bedside. She is doing better with no new acute issues or concerns today. Leg swelling is still present, ACE wrapped and elevated, stable   Reported small bleeding ulceration in R leg which has stopped    Heparin converted to xarelto  for acute PE, no active bleeding noted anywhere. Patient desisted from going home with help. Feels SNF may be better until she can ambulate. Discussed with patient, RN, ADINA.       I have personally reviewed the past medical history, past surgical history, medications, social history, and family history, and I have updated the database accordingly. ROS:      1. Constitutional: Negative for chills, diaphoresis, fatigue and fever. 2. HENT: Negative for congestion, sore throat and trouble swallowing. 3. Eyes: Negative for pain and discharge. 4. Respiratory: Positive for cough.  Negative for apnea, chest tightness, shortness of breath and wheezing. 5. Cardiovascular: Positive for leg swelling. Negative for chest pain and palpitations. 6. Gastrointestinal: Negative for abdominal distention, abdominal pain, diarrhea, nausea and vomiting. 7. Endocrine: Negative for polyuria. 8. Genitourinary: Negative for urgency. 9. Musculoskeletal: Negative for back pain and joint swelling. 10. Skin: Positive for rash and wound. ·      Blisters in bilateral lower extremities --Improved  11. Allergic/Immunologic: Negative for environmental allergies and food allergies. 12. Neurological: Negative for dizziness, weakness and headaches. 13. Hematological: Negative for adenopathy. 14. Psychiatric/Behavioral: Negative for agitation and confusion. Physical Examination :     No data found. Temp (24hrs), Av.1 °F (36.7 °C), Min:97.9 °F (36.6 °C), Max:98.4 °F (36.9 °C)    Constitutional: She is oriented to person, place, and time. She appears well-developed and well-nourished. No distress. HENT:   Head: Normocephalic and atraumatic. Eyes: Pupils are equal, round, and reactive to light. EOM are normal.   Neck: Normal range of motion. Neck supple. Cardiovascular: Normal rate, regular rhythm and normal heart sounds. No murmur heard. AICD in place   Pulmonary/Chest: Effort normal and breath sounds normal. No respiratory distress. She has no wheezes. Abdominal: Soft. Bowel sounds are normal. She exhibits no distension. Musculoskeletal: Normal range of motion. She exhibits edema which has improved  Neurological: She is alert and oriented to person, place, and time. Skin: Skin is warm. She is not diaphoretic. There is erythema which is improving. No pallor. Chronic venous stasis dermatitis with few excoriated areas from scratching and edema. Bilateral leg discoloration. No apparent secondary bacterial cellulitis   Psychiatric: She has a normal mood and affect.  Her behavior is normal. Medical Decision Making:   I have independently reviewed/ordered the following labs:    CBC with Differential:   Recent Labs     05/04/19  0414 05/05/19  0744   WBC 5.2 4.9   HGB 10.6* 10.3*   HCT 37.6 36.0*    220     BMP:   Recent Labs     05/04/19  0414 05/05/19  0744    140   K 4.1 3.6*    103   CO2 26 28   BUN 18 16   CREATININE 0.66 0.76   MG 1.9  --                         Media Information                     Medications:      metoclopramide  10 mg Oral 4x Daily AC & HS    rivaroxaban  15 mg Oral BID WC    atorvastatin  80 mg Oral Nightly    furosemide  20 mg Oral BID    metoprolol succinate  25 mg Oral Daily    oxybutynin  5 mg Oral TID    potassium chloride  20 mEq Oral Daily    sertraline  50 mg Oral Daily    sodium chloride flush  10 mL Intravenous 2 times per day     Medical Decision Making:       Note:  · Labs, medications, radiologic studies were reviewed with personal review of films  · Moderate amounts of data were reviewed  · Discussed with nursing Staff,   · Infection Control and Prevention measures reviewed  · All prior entries were reviewed  · Administer medications as ordered. No antibiotics needed. · Prognosis: Good  · Discharge planning reviewed  · Follow up as outpatient. Thank you for allowing us to participate in the care of this patient. Please call with questions. Fawad Mishra MD     ATTESTATION:    I have discussed the case, including pertinent history and exam findings with the residents. I have seen and examined the patient and the key elements of the encounter have been performed by me. I have reviewed the laboratory data, other diagnostic studies and discussed them with the residents. I have updated the medical record where necessary. I agree with the assessment, plan and orders as documented by the resident.     MD Michelle.      Pager: [phone]  - Office: [phone]

## 2019-05-05 NOTE — PROGRESS NOTES
Section of Cardiology  Progress Note      Date:  5/5/2019  Patient: Liset Marrero  Admission:  4/29/2019  4:44 PM  Admit DX: Pulmonary embolism, bilateral ([address] [I26.99]  Age:  76 y.o., 1950     LOS: 6 days     Reason for evaluation:   ACUTE PULMONARY EMBOLISM. ATRIAL FIBRILLATION  Hx ICD  DILATED CARDIOMYOPATHY    SUBJECTIVE:     The patient was seen and examined. Notes and labs reviewed. There were not complications over night. Patient's cardiac review of systems: positive for fatigue. The patient is generally feeling unchanged. OBJECTIVE:      EXAM:   Vitals:    VITALS:  /62   Pulse 65   Temp 98.2 °F (36.8 °C)   Resp 20   Ht 5' 2\" (1.575 m)   Wt 193 lb 3.2 oz (87.6 kg)   SpO2 99%   BMI 35.34 kg/m²   24HR INTAKE/OUTPUT:      Intake/Output Summary (Last 24 hours) at 5/5/2019 1608  Last data filed at 5/5/2019 1245  Gross per 24 hour   Intake 710 ml   Output 800 ml   Net -90 ml       CONSTITUTIONAL:  awake, alert, cooperative, no apparent distress, and appears stated age. HEENT: Normal jugular venous pulsations, no carotid bruits. Head is atraumatic, normocephalic. Eyes and oral mucosa are normal.  LUNGS: Good respiratory effort On auscultation: clear to auscultation bilaterally  CARDIOVASCULAR:  Normal apical impulse, regular rate and rhythm, normal S1 and S2, no S3 or S4, and no murmur or rub noted. dorsalis pedis and bilateralpresent 2+  ABDOMEN: Soft, nontender, nondistended. Bowel sounds present. No masses or tenderness. No bruit. SKIN: Warm and dry. EXTREMITIES:No lower extremity edema. Motor movement grossly intact. No cyanosis or clubbing.     Current Inpatient Medications:   metoclopramide  10 mg Oral 4x Daily AC & HS    rivaroxaban  15 mg Oral BID WC    atorvastatin  80 mg Oral Nightly    furosemide  20 mg Oral BID    metoprolol succinate  25 mg Oral Daily    oxybutynin  5 mg Oral TID    potassium chloride  20 mEq Oral Daily    sertraline  50 mg Oral Daily    sodium chloride flush  10 mL Intravenous 2 times per day       IV Infusions (if any):      Diagnostics:   Telemetry:SR    Labs:   CBC:  Recent Labs     05/04/19  0414 05/05/19  0744   WBC 5.2 4.9   HGB 10.6* 10.3*   HCT 37.6 36.0*    220     Magnesium:  Recent Labs     05/04/19  0414   MG 1.9     BMP:  Recent Labs     05/04/19  0414 05/05/19  0744    140   K 4.1 3.6*   CALCIUM 8.5* 8.6   CO2 26 28   BUN 18 16   CREATININE 0.66 0.76   LABGLOM >60 >60   GLUCOSE 108* 107*     BNP:No results for input(s): BNP in the last 72 hours. PT/INR:No results for input(s): PROTIME, INR in the last 72 hours. APTT:No results for input(s): APTT in the last 72 hours. CARDIAC ENZYMES:No results for input(s): CKTOTAL, CKMB, CKMBINDEX, TROPONINT in the last 72 hours. FASTING LIPID PANEL:  Lab Results   Component Value Date    HDL 31 03/22/2019    TRIG 84 03/22/2019     LIVER PROFILE:No results for input(s): AST, ALT, LABALBU, ALKPHOS, BILITOT, BILIDIR, IBILI, PROT, GLOB, ALBUMIN in the last 72 hours. ASSESSMENT:  1. S/P ACUTE PULMONARY EMBOLISM. 2. PERSISTENT ATRIAL FIBRILLATION. 3. HX ICD IMPLANT  4.  DILATED CARDIOMYOPATHY    Patient Active Problem List   Diagnosis    Leg swelling    Hypertensive urgency    Acute on chronic systolic congestive heart failure (HCC)    [facility] on [facility] failure [facility]    Lymphedema of both lower extremities    Cellulitis of right leg    Coronary artery disease involving native coronary artery of native heart without angina pectoris    Major depressive disorder    [address]    NSTEMI (non-ST elevated myocardial infarction) [address]    Acute on chronic congestive heart failure ([facility])    Panic disorder    SDH (subdural hematoma) ([facility])    Hypotension    Dizziness    Dyslipidemia    Chronic systolic heart failure ([facility])    Coronary atherosclerosis of native coronary artery    Hypertension    Chronic atrial fibrillation ([facility])    S/P

## 2019-05-05 NOTE — PLAN OF CARE
Problem: Falls - Risk of:  Goal: Will remain free from falls  Description  Will remain free from falls  5/5/2019 1201 by Radha Wise RN  Outcome: Ongoing  5/5/2019 0203 by Dexter Sun RN  Outcome: Ongoing  Note:   Pt remains free of falls at this time. Bed locked in lowest position, siderails x2, call light in reach. Non-skid footwear applied. Pt ambulates in room with steady gait. Encouraged pt to call for assistance as needed for safety. Falling star posted outside of room. Will continue to monitor. 5/5/2019 0202 by Dexter Sun RN  Outcome: Ongoing  Note:   Pt remains free of falls at this time. Bed locked in lowest position, siderails x2, call light in reach. Non-skid footwear applied. Pt ambulates in room with steady gait. Encouraged pt to call for assistance as needed for safety. Falling star posted outside of room. Will continue to monitor.     Goal: Absence of physical injury  Description  Absence of physical injury  Outcome: Ongoing

## 2019-05-06 VITALS
WEIGHT: 185.7 LBS | SYSTOLIC BLOOD PRESSURE: 124 MMHG | DIASTOLIC BLOOD PRESSURE: 63 MMHG | OXYGEN SATURATION: 96 % | HEART RATE: 66 BPM | TEMPERATURE: 97.5 F | HEIGHT: 62 IN | RESPIRATION RATE: 20 BRPM | BODY MASS INDEX: 34.17 KG/M2

## 2019-05-06 LAB
ANION GAP SERPL CALCULATED.3IONS-SCNC: 9 MMOL/L (ref 9–17)
BUN BLDV-MCNC: 14 MG/DL (ref 8–23)
BUN/CREAT BLD: ABNORMAL (ref 9–20)
CALCIUM SERPL-MCNC: 8.7 MG/DL (ref 8.6–10.4)
CHLORIDE BLD-SCNC: 105 MMOL/L (ref 98–107)
CO2: 29 MMOL/L (ref 20–31)
CREAT SERPL-MCNC: 0.65 MG/DL (ref 0.5–0.9)
EKG ATRIAL RATE: 120 BPM
EKG Q-T INTERVAL: 362 MS
EKG QRS DURATION: 114 MS
EKG QTC CALCULATION (BAZETT): 454 MS
EKG R AXIS: -27 DEGREES
EKG T AXIS: 157 DEGREES
EKG VENTRICULAR RATE: 95 BPM
GFR AFRICAN AMERICAN: >60 ML/MIN
GFR NON-AFRICAN AMERICAN: >60 ML/MIN
GFR SERPL CREATININE-BSD FRML MDRD: ABNORMAL ML/MIN/{1.73_M2}
GFR SERPL CREATININE-BSD FRML MDRD: ABNORMAL ML/MIN/{1.73_M2}
GLUCOSE BLD-MCNC: 110 MG/DL (ref 70–99)
HCT VFR BLD CALC: 36.7 % (ref 36.3–47.1)
HEMOGLOBIN: 10.5 G/DL (ref 11.9–15.1)
MAGNESIUM: 1.9 MG/DL (ref 1.6–2.6)
MCH RBC QN AUTO: 25.9 PG (ref 25.2–33.5)
MCHC RBC AUTO-ENTMCNC: 28.6 G/DL (ref 28.4–34.8)
MCV RBC AUTO: 90.6 FL (ref 82.6–102.9)
NRBC AUTOMATED: 0 PER 100 WBC
PDW BLD-RTO: 18.9 % (ref 11.8–14.4)
PLATELET # BLD: 211 K/UL (ref 138–453)
PMV BLD AUTO: 11.2 FL (ref 8.1–13.5)
POTASSIUM SERPL-SCNC: 3.3 MMOL/L (ref 3.7–5.3)
POTASSIUM SERPL-SCNC: 4 MMOL/L (ref 3.7–5.3)
RBC # BLD: 4.05 M/UL (ref 3.95–5.11)
SODIUM BLD-SCNC: 143 MMOL/L (ref 135–144)
TROPONIN INTERP: ABNORMAL
TROPONIN T: ABNORMAL NG/ML
TROPONIN, HIGH SENSITIVITY: 41 NG/L (ref 0–14)
WBC # BLD: 4.9 K/UL (ref 3.5–11.3)

## 2019-05-06 PROCEDURE — 97535 SELF CARE MNGMENT TRAINING: CPT

## 2019-05-06 PROCEDURE — 85027 COMPLETE CBC AUTOMATED: CPT

## 2019-05-06 PROCEDURE — 99239 HOSP IP/OBS DSCHRG MGMT >30: CPT | Performed by: INTERNAL MEDICINE

## 2019-05-06 PROCEDURE — 6370000000 HC RX 637 (ALT 250 FOR IP): Performed by: INTERNAL MEDICINE

## 2019-05-06 PROCEDURE — 2700000000 HC OXYGEN THERAPY PER DAY

## 2019-05-06 PROCEDURE — 94762 N-INVAS EAR/PLS OXIMTRY CONT: CPT

## 2019-05-06 PROCEDURE — 36415 COLL VENOUS BLD VENIPUNCTURE: CPT

## 2019-05-06 PROCEDURE — 2580000003 HC RX 258: Performed by: PHYSICIAN ASSISTANT

## 2019-05-06 PROCEDURE — 97110 THERAPEUTIC EXERCISES: CPT

## 2019-05-06 PROCEDURE — 97116 GAIT TRAINING THERAPY: CPT

## 2019-05-06 PROCEDURE — 99232 SBSQ HOSP IP/OBS MODERATE 35: CPT | Performed by: INTERNAL MEDICINE

## 2019-05-06 PROCEDURE — 6370000000 HC RX 637 (ALT 250 FOR IP): Performed by: PHYSICIAN ASSISTANT

## 2019-05-06 PROCEDURE — 80048 BASIC METABOLIC PNL TOTAL CA: CPT

## 2019-05-06 RX ADMIN — METOCLOPRAMIDE 10 MG: 10 TABLET ORAL at 09:39

## 2019-05-06 RX ADMIN — METOCLOPRAMIDE 10 MG: 10 TABLET ORAL at 05:59

## 2019-05-06 RX ADMIN — OXYBUTYNIN CHLORIDE 5 MG: 5 TABLET ORAL at 14:14

## 2019-05-06 RX ADMIN — METOPROLOL SUCCINATE 25 MG: 25 TABLET, FILM COATED, EXTENDED RELEASE ORAL at 09:39

## 2019-05-06 RX ADMIN — POTASSIUM CHLORIDE 20 MEQ: 10 CAPSULE, COATED, EXTENDED RELEASE ORAL at 09:39

## 2019-05-06 RX ADMIN — OXYBUTYNIN CHLORIDE 5 MG: 5 TABLET ORAL at 09:39

## 2019-05-06 RX ADMIN — SERTRALINE 50 MG: 50 TABLET, FILM COATED ORAL at 09:39

## 2019-05-06 RX ADMIN — RIVAROXABAN 15 MG: 15 TABLET, FILM COATED ORAL at 09:38

## 2019-05-06 RX ADMIN — Medication 10 ML: at 09:41

## 2019-05-06 RX ADMIN — FUROSEMIDE 20 MG: 20 TABLET ORAL at 09:38

## 2019-05-06 RX ADMIN — POTASSIUM CHLORIDE 40 MEQ: 20 TABLET, EXTENDED RELEASE ORAL at 00:40

## 2019-05-06 NOTE — PROGRESS NOTES
[facility] Cardiology  Attending Progress Note    Date:  5/6/2019  Patient: Valdez Escamilla ,  1950  Age:  76 y.o. Admission:  4/29/2019  4:44 PM  Length of stay  LOS: 7 days   Admit DX: Pulmonary embolism, bilateral ([address] [I26.99]        Chief Complaint: Cough and shortness of breath, seen for atrial fibrillation, ICD, and cardiomyopathy    Subjective: The patient was seen and examined. Notes and labs reviewed. 10 beat run of wide complex rhythm noted overnight - asymptomatic, patient has ICD  Patient's cardiac review of systems: negative. The patient is generally feeling unchanged. Objective:   /86   Pulse 66   Temp 97.7 °F (36.5 °C) (Oral)   Resp 16   Ht 5' 2\" (1.575 m)   Wt 185 lb 11.2 oz (84.2 kg)   SpO2 97%   BMI 33.96 kg/m²     Intake/Output Summary (Last 24 hours) at 5/6/2019 0802  Last data filed at 5/6/2019 0518  Gross per 24 hour   Intake 950 ml   Output 1301 ml   Net -351 ml       CONSTITUTIONAL:  awake, alert, cooperative, no apparent distress  HEAD:  Head is atraumatic, normocephalic. Eyes and oral mucosa are normal.  LUNGS:  Good respiratory effort On auscultation: clear to auscultation bilaterally  CARDIOVASCULAR: irregular rate and rhythm, normal S1 and S2, 1/6 apical systolic murmur  ABDOMEN:  Soft, nontender, nondistended.    EXTREMITIES: +2 edema with chronic skin changes  NEUROLOGIC:  alert, oriented x3, affect appropriate and no focal neurological deficits    Labs:  CBC:  Recent Labs     05/05/19  0744 05/06/19  0714   WBC 4.9 4.9   HGB 10.3* 10.5*   HCT 36.0* 36.7    211     Magnesium:  Recent Labs     05/04/19  0414 05/05/19  2328   MG 1.9 1.9     BMP:  Recent Labs     05/04/19  0414 05/05/19  0744 05/05/19  2328    140  --    K 4.1 3.6* 3.3*   CALCIUM 8.5* 8.6  --    CO2 26 28  --    BUN 18 16  --    CREATININE 0.66 0.76  --    LABGLOM >60 >60  --    GLUCOSE 108* 107*  --      CARDIAC ENZYMES:  Recent Labs     05/05/19  2328   TROPHS 41*     FASTING LIPID PANEL:  Lab Results   Component Value Date    HDL 31 03/22/2019    TRIG 84 03/22/2019       Current Meds:    metoclopramide  10 mg Oral 4x Daily AC & HS    rivaroxaban  15 mg Oral BID WC    atorvastatin  80 mg Oral Nightly    furosemide  20 mg Oral BID    metoprolol succinate  25 mg Oral Daily    oxybutynin  5 mg Oral TID    potassium chloride  20 mEq Oral Daily    sertraline  50 mg Oral Daily    sodium chloride flush  10 mL Intravenous 2 times per day     Assessment:  1. Pulmonary embolism, bilateral  2. Persistent atrial fibrillation  3. Cardiomyopathy, dilated (EF 25%)  4. ICD in situ  5. Nonsustained ventricular tachycardia (asymptomatic)  6. Acute on chronic, systolic, congestive heart failure  7. Lymphedema  8. Recent subdural hematoma  9.  Essential hypertension      Plan:  Doing well on oral diuretics  Switched to oral anticoagulation  Electrolyte replacement  No evidence of MI or ACS  Awaiting skilled facility placemane pre-certification  No changes in regimen at this stage  Will follow further as needed  Needs to follow with primary cardiologist after discharge    Electronically signed Althea Castellanos MD 8:02 AM 5/6/2019

## 2019-05-06 NOTE — PROGRESS NOTES
[address]    Progress Note    5/6/2019    2:39 PM    Name:   Augusto Kern  MRN:     [id number]     [place]:      [de-identified]   Room:   [address] [place] [address]  IP Day:  7  Admit Date:  4/29/2019  4:44 PM    PCP:   No primary care provider on file. Code Status:  Full Code    Subjective:     C/C: Shortness of breath    Interval History Status: not changed. No acute issues overnight  No current complaints  Tolerating PO  Symptoms stable  DC planning to SNF in progress    Brief History:     Neeta Andre is a 76 y. o. female who presents to the emergency department today by private vehicle for evaluation of a cough and shortness of breath.  She states that for the last 1-2 weeks she has had a cough and shortness of breath.  She states that she is short of breath with rest and exertion.  She just recently had a defibrillator placed like a month ago. Saran Echeverria also states that she is nauseous without any vomiting.  She denies any fevers or chills.  Was supposed to see Dr. Mili Leos the office today for an ICD check but she felt so ill that she came to the emergency room for evaluation.      CT scan of the chest shows small pulmonary emboli in the right upper and lower lobes. Tiff Last is no sign of right heart strain. Saran Echeverria also has underlying congestive heart failure.  Patient has cellulitis of both lower extremities which are chronically edematous with some superficial sores.  CT scan of the brain shows interval resolution of her subdural hematoma which has shrunk in size.  Patient is referred to the hospitalist service at [facility] for admission and I also spoke with . Abdon Hinojosa has been consulted for neurosurgery Srinivasa Hager feels that she does not have any absolute contraindication to heparinization at this time.  Patient's findings and disposition also discussed with Dr. Hank Puckett that facility who wants anticoagulation held for now. Leonel Johnson is started on IV Zosyn and IV vancomycin for cellulitis in her legs.  Venous Dopplers of the legs were negative for DVT.     Patient states her leg swelling has worsened for the last 2 weeks since she is not ambulating because of current illness but has this swelling and infection for quite a while  She denies any fever or chills. Review of Systems:     Constitutional:  negative for chills, fevers, sweats  Respiratory:  negative for cough, positive for shortness of breath   Cardiovascular:  negative for chest pain, chest pressure/discomfort  Gastrointestinal:  negative for abdominal pain, nausea, vomiting  Neurological:  negative for dizziness, headache    Medications: Allergies:  No Known Allergies    Current Meds:   Scheduled Meds:    metoclopramide  10 mg Oral 4x Daily AC & HS    rivaroxaban  15 mg Oral BID WC    atorvastatin  80 mg Oral Nightly    furosemide  20 mg Oral BID    metoprolol succinate  25 mg Oral Daily    oxybutynin  5 mg Oral TID    potassium chloride  20 mEq Oral Daily    sertraline  50 mg Oral Daily    sodium chloride flush  10 mL Intravenous 2 times per day     Continuous Infusions:   PRN Meds: ALPRAZolam, HYDROcodone 5 mg - acetaminophen **OR** HYDROcodone 5 mg - acetaminophen, ondansetron, sodium chloride flush, potassium chloride **OR** potassium alternative oral replacement **OR** potassium chloride, magnesium sulfate, magnesium hydroxide, ondansetron, nicotine, acetaminophen    Data:     Past Medical History:   has a past medical history of Acute on chronic systolic congestive heart failure (HCC), Anxiety, Cardiomyopathy ([address] Depression, H/O heart artery stent, [place] (hard of hearing), Hyperlipidemia, Hypertension, Hypertensive urgency, and Leg swelling. Social History:   reports that she has never smoked. She has never used smokeless tobacco. She reports that she does not drink alcohol or use drugs.      Family History: No family history on file.    Vitals:  /66   Pulse 78   Temp 97.8 °F (36.6 °C) (Oral)   Resp 20   Ht 5' 2\" (1.575 m)   Wt 185 lb 11.2 oz (84.2 kg)   SpO2 96%   BMI 33.96 kg/m²   Temp (24hrs), Av.7 °F (36.5 °C), Min:97.5 °F (36.4 °C), Max:97.8 °F (36.6 °C)    No results for input(s): POCGLU in the last 72 hours. I/O (24Hr): Intake/Output Summary (Last 24 hours) at 2019 1439  Last data filed at 2019 1259  Gross per 24 hour   Intake 1050 ml   Output 501 ml   Net 549 ml       Labs:    Hematology:  Recent Labs     19  0714   WBC 5.2 4.9 4.9   RBC 4.12 3.88* 4.05   HGB 10.6* 10.3* 10.5*   HCT 37.6 36.0* 36.7   MCV 91.3 92.8 90.6   MCH 25.7 26.5 25.9   MCHC 28.2* 28.6 28.6   RDW 19.2* 19.1* 18.9*    220 211   MPV 11.4 11.6 11.2     Chemistry:  Recent Labs     19  2328 19  0714    140  --  143   K 4.1 3.6* 3.3* 4.0    103  --  105   CO2 26 28  --  29   GLUCOSE 108* 107*  --  110*   BUN 18 16  --  14   CREATININE 0.66 0.76  --  0.65   MG 1.9  --  1.9  --    ANIONGAP 10 9  --  9   LABGLOM >60 >60  --  >60   GFRAA >60 >60  --  >60   CALCIUM 8.5* 8.6  --  8.7   TROPHS  --   --  41*  --      No results for input(s): PROT, LABALBU, LABA1C, A8WZEUR, C9IXNYV, FT4, TSH, AST, ALT, LDH, GGT, ALKPHOS, LABGGT, BILITOT, BILIDIR, AMMONIA, AMYLASE, LIPASE, LACTATE, CHOL, HDL, LDLCHOLESTEROL, CHOLHDLRATIO, TRIG, VLDL, NFP14CG, PHENYTOIN, PHENYF, URICACID, POCGLU in the last 72 hours.       Lab Results   Component Value Date/Time    [facility] 2019 09:15 AM     Lab Results   Component Value Date/Time    CULTURE NO GROWTH 6 DAYS 2019 09:15 AM       No results found for: POCPH, PHART, PH, POCPCO2, PCO2CDF, PCO2, POCPO2, PO2ART, PO2, POCHCO3, AFF3AAO, HCO3, NBEA, PBEA, BEART, BE, THGBART, THB, XUF6YOV, RLIY8WIC, H1BXNXIW, O2SAT, FIO2      Physical Examination:        General appearance:  alert, cooperative and no distress  Mental Status:  oriented to person, place and time and normal affect  Lungs:  clear to auscultation bilaterally, normal effort  Heart:  regular rate and rhythm  Abdomen:  soft, nontender, nondistended, normal bowel sounds  Extremities:  Bilateral LE swelling  Skin:  no gross lesions, v/l venous stasis dermatitis     Assessment:        Primary Problem  Pulmonary embolism, bilateral [facility])    Active Hospital Problems    Diagnosis Date Noted    Venous stasis dermatitis of both lower extremities [I87.2]     Pulmonary embolism, bilateral (HCC) [I26.99] 04/29/2019    Cellulitis [L03.90] 04/29/2019    Chronic atrial fibrillation ([address] [I48.2] 04/03/2019    Coronary atherosclerosis of native coronary artery [I25.10] 04/03/2019    Dyslipidemia [E78.5] 04/03/2019    Hypertension [I10] 04/03/2019    S/P implantation of automatic cardioverter/defibrillator (AICD) [Z95.810] 04/03/2019    SDH (subdural hematoma) ([address] [S06.5X9A] 04/02/2019    Lymphedema of both lower extremities [I89.0] 03/21/2019    Acute on chronic systolic congestive heart failure ([address] [I50.23]        Plan:        - Labs and imaging reviewed  - Medications reviewed  - Continue current AC - Xarelto   - Resume home medications  - ID following - monitor off abx - follow up OP  - SW/CM working on MD Kellie  5/6/2019  2:39 PM

## 2019-05-06 NOTE — PROGRESS NOTES
Pt had a 10 beat run of v tach. Asymptomatic and asleep. Primary notified. EKG obtained, consistent with previous EKG results. Orders obtained. Will continue to monitor.

## 2019-05-06 NOTE — PROGRESS NOTES
Occupational Therapy  Facility/Department: [facility] 4B STEPDOWN  Daily Treatment Note  NAME: Christella Severin  : 1950  MRN: [id number]    Date of Service: 2019    Discharge Recommendations:       Further therapy recommended at discharge. Assessment   Performance deficits / Impairments: Decreased functional mobility ; Decreased strength;Decreased endurance;Decreased ADL status; Decreased high-level IADLs  Assessment: pt limited by endurance and unsteadiness on feet impacts pt ability to safely complete functional mobility as well as ADLs. Pt would benefit from continued therapy at discharge   Treatment Diagnosis: pulmonary embolism   Prognosis: Good  Patient Education: pt ed on POC, purpose of eval, importance of continued movement, benefits of therapy, safety during functional transfers/functional mobility, and pursed lip breathing tech. good return   REQUIRES OT FOLLOW UP: Yes  Activity Tolerance  Activity Tolerance: Patient Tolerated treatment well  Safety Devices  Safety Devices in place: Yes  Type of devices: Gait belt;Call light within reach; Patient at risk for falls; Left in bed; All fall risk precautions in place;Nurse notified  Restraints  Initially in place: No         Patient Diagnosis(es): The encounter diagnosis was Panic disorder. has a past medical history of Acute on chronic systolic congestive heart failure ([address] Anxiety, Cardiomyopathy ([address] Depression, H/O heart artery stent, [place] (hard of hearing), Hyperlipidemia, Hypertension, Hypertensive urgency, and Leg swelling.   has a past surgical history that includes Cataract removal; Corneal transplant; Nasal sinus surgery; eye surgery; Tonsillectomy and adenoidectomy; and Cardiac defibrillator placement.     Restrictions  Restrictions/Precautions  Restrictions/Precautions: Fall Risk  Required Braces or Orthoses?: No  Position Activity Restriction  Other position/activity restrictions: up with assist, acute PE- heparin therapeutic, 2L O2 worn via NC   Subjective   General  Chart Reviewed: No  Patient assessed for rehabilitation services?: Yes  Family / Caregiver Present: No  Diagnosis: pulmonary embolism   General Comment  Comments: RN ok'd for therapy this morning. Pt agreeable to participate in session and cooperative/pleasant throughout   Pain Assessment  Response to Pain Intervention: Patient Satisfied  Vital Signs  Patient Currently in Pain: Denies   Orientation     Objective    ADL  Feeding: Independent(Seated EOB at tray table)  Grooming: Setup;Stand by assistance;Contact guard assistance(Pt completed oral care and facial hygiene while standing at sink. CGA at times, otherwise SBA)  Toileting: Contact guard assistance(Pt completed ector care while seated on toilet )  Additional Comments: Upon writer arrival, RN aide completing washing of BLE. Prepping for dressing change. See above for LOF for ADLs completed. Balance  Sitting Balance: Stand by assistance(~15 minutes seated EOB)  Standing Balance: Contact guard assistance  Standing Balance  Time: ~6-7 minutes  Activity: Pt completed func mob from EOB<>bathroom, simple grooming ADLs at sink  Comment: pt with no LOB but slightly unsteady, pt stating pt feeling \"wobbly\" on this date.     Functional Mobility  Functional - Mobility Device: Other(Writer provided arm support )  Activity: To/from bathroom  Assist Level: Contact guard assistance  Functional Mobility Comments: No LOB, slight unsteadiness noted   Toilet Transfers  Toilet - Technique: Ambulating  Equipment Used: Standard toilet  Toilet Transfer: Contact guard assistance  Bed mobility  Supine to Sit: Stand by assistance  Sit to Supine: Stand by assistance  Scooting: Stand by assistance  Comment: HOB elevated, use of bedrails   Transfers  Sit to stand: Contact guard assistance  Stand to sit: Contact guard assistance      Plan   Plan  Times per week: 4x/wk    Goals  Short term goals  Time Frame for Short term goals: pt will, by discharge  Short term goal 1: dem SBA during functional transfers/functional mobility with LRD  Short term goal 2: dem 5+ minutes dynamic standing tolerance with SBA and LRD in order to complete functional tasks  Short term goal 3: increase activity tolerance to 25+ minutes in order to participate in daily tasks  Short term goal 4: complete LB ADLs and toileting tasks with min A, set up and AE, as needed  Short term goal 5: complete UB ADLs and grooming tasks with supervision and set up       Therapy Time   Individual Concurrent Group Co-treatment   Time In 1024         Time Out 1109         Minutes 39             RN Ok'ed tx and pt agreeable. Supine in bed upon writer arrival. Once patient sat EOB, pt c/o feeling \"funny\". Pt stating pt heart feels as those it is fluttering. HR at 66 at this time. (RN in room at this time and aware) ~5 minute rest break given at this time. See above for LOF for all tasks. Pt doffed O2 prior to completing func mob into bathroom. O2 checked once patient return supine in bed, reading at 94-96%. Minimal SOB noted, pt donned O2 at this time. Retired supine in bed, HOB elevated, call light within reach and RN notified.      Rey Min, BARRETT/L

## 2019-05-06 NOTE — PROGRESS NOTES
[facility] - Daily Progress Note  Today's Date and Time: 2019, 9:43 AM    Impression :   Current: 2019  · Bilateral LE lymphedema  · Acute on chronic skin changes secondary to edema and scratching  · No apparent bacterial cellulitis  · Bilateral Pulmonary embolism   · Acute on chronic systolic CHF s/p defibrillator (2019)    · Recent SDH   · Chronic a fib   · HTN   Recommendations:     · Continue to monitor off abx  · Leg elevation, compression therapy to reduce edema  · Wound care   · Discharge planning for SNF because of of degree of debility  · Office f/up in 4 weeks with Dr Joesph Littlejohn for infection. Please call [phone] for appointment    Medical Decision Makin2019       · Patient with chronic lymphedema  · Has developed worsening of edema as an outpatient, leading to skin breaks and irritation  · The above compounded by scratching leading to more abrasions  · Currently does not have secondary bacterial cellulitis  · Main problem is CHF with fluid retention and associated lymphedema. · Will need to decrease the leg edema to improve the skin condition. · 5-3-19 She indicates improvement with current treatment  · On Xarelto for PE  · ID wise no bacterial cellulitis. Continuing wound care with improvement        History Present Illness:     INITIAL HISTORY:     Liset Marrero is a 76y.o.-year-old female who presented from [facility] with worsening shortness of breath and cough.     Patient was found to have bilateral pulmonary embolism. Patient was recently discharged from the hospital on 19 with subdural hematoma. Patient also reports that she follows up with Cardiology because of systolic CHF. She is status post AICD placement.      Patient was supposed to follow up with Dr. Kwaku Camilo for ICD check on 19 however she felt worsening shortness of breath so she decided to come to the ED.   In the ED CT chest was significant for small for chest pain and palpitations. 6. Gastrointestinal: Negative for abdominal distention, abdominal pain, diarrhea, nausea and vomiting. 7. Endocrine: Negative for polyuria. 8. Genitourinary: Negative for urgency. 9. Musculoskeletal: Negative for back pain and joint swelling. 10. Skin: Positive for rash and wound. ·      Blisters in bilateral lower extremities --Improved  11. Allergic/Immunologic: Negative for environmental allergies and food allergies. 12. Neurological: Negative for dizziness, weakness and headaches. 13. Hematological: Negative for adenopathy. 14. Psychiatric/Behavioral: Negative for agitation and confusion. Physical Examination :     Patient Vitals for the past 8 hrs:   BP Temp Temp src Pulse Resp SpO2 Weight   19 0938 116/66 -- -- 78 -- -- --   19 0300 118/86 97.7 °F (36.5 °C) Oral 66 16 97 % 185 lb 11.2 oz (84.2 kg)     Temp (24hrs), Av.8 °F (36.6 °C), Min:97.5 °F (36.4 °C), Max:98.2 °F (36.8 °C)    Constitutional: She is oriented to person, place, and time. She appears well-developed and well-nourished. No distress. HENT:   Head: Normocephalic and atraumatic. Eyes: Pupils are equal, round, and reactive to light. EOM are normal.   Neck: Normal range of motion. Neck supple. Cardiovascular: Normal rate, regular rhythm and normal heart sounds. No murmur heard. AICD in place   Pulmonary/Chest: Effort normal and breath sounds normal. No respiratory distress. She has no wheezes. Abdominal: Soft. Bowel sounds are normal. She exhibits no distension. Musculoskeletal: Normal range of motion. She exhibits edema which has improved  Neurological: She is alert and oriented to person, place, and time. Skin: Skin is warm. She is not diaphoretic. There is erythema which is improving. No pallor. Chronic venous stasis dermatitis with few excoriated areas from scratching and edema. Bilateral leg discoloration.    No apparent secondary bacterial cellulitis Psychiatric: She has a normal mood and affect. Her behavior is normal.         Medical Decision Making:   I have independently reviewed/ordered the following labs:    CBC with Differential:   Recent Labs     05/05/19  0744 05/06/19  0714   WBC 4.9 4.9   HGB 10.3* 10.5*   HCT 36.0* 36.7    211     BMP:   Recent Labs     05/04/19  0414 05/05/19  0744 05/05/19  2328 05/06/19  0714    140  --  143   K 4.1 3.6* 3.3* 4.0    103  --  105   CO2 26 28  --  29   BUN 18 16  --  14   CREATININE 0.66 0.76  --  0.65   MG 1.9  --  1.9  --                         Media Information                     Medications:      metoclopramide  10 mg Oral 4x Daily AC & HS    rivaroxaban  15 mg Oral BID WC    atorvastatin  80 mg Oral Nightly    furosemide  20 mg Oral BID    metoprolol succinate  25 mg Oral Daily    oxybutynin  5 mg Oral TID    potassium chloride  20 mEq Oral Daily    sertraline  50 mg Oral Daily    sodium chloride flush  10 mL Intravenous 2 times per day     Medical Decision Making:       Note:  · Labs, medications, radiologic studies were reviewed with personal review of films  · Moderate amounts of data were reviewed  · Discussed with nursing Staff,   · Infection Control and Prevention measures reviewed  · All prior entries were reviewed  · Administer medications as ordered. No antibiotics needed. · Prognosis: Good  · Discharge planning reviewed  · Follow up as outpatient. Thank you for allowing us to participate in the care of this patient. Please call with questions. Andrew Herron MD     ATTESTATION:    I have discussed the case, including pertinent history and exam findings with the residents. I have seen and examined the patient and the key elements of the encounter have been performed by me. I have reviewed the laboratory data, other diagnostic studies and discussed them with the residents. I have updated the medical record where necessary.     I agree with the assessment, plan and orders as documented by the resident.     Jeaneth Tee MD.      Pager: [phone]  - Office: [phone]

## 2019-05-06 NOTE — PROGRESS NOTES
pt leaning forward onto AD 2x improved with cues to stand upright   Distance: 100ft  Comments: SOB noted, Pt ambulated on room air with O2 sats decreasing to 89%,   Stairs/Curb  Stairs?: No     Balance  Posture: Fair  Sitting - Static: Good  Sitting - Dynamic: Good;-  Standing - Static: Good;-  Standing - Dynamic: Fair;-  Comments: pt sat EOB x 2mins SBA  Other exercises  Other exercises?: Yes  Upper extremity exercises: Bicep curl, shoulder flexion/extension, punches, tricep curl, shoulder abduction/adduction. Reps:  x 20  Seated LE exercise program: Long Arc Quads, hip abduction/adduction, heel/toe raises, and marches. Reps: x 20         Assessment   Body structures, Functions, Activity limitations: Decreased functional mobility ; Decreased endurance;Decreased sensation;Decreased strength;Decreased balance;Decreased ADL status  Assessment: pt able to amb 100ft w/RW CGA thhis date, pt fatigues quickly and frequently leaning forward onto AD. pt displays significant difficulty comleting short distance. Prognosis: Good  REQUIRES PT FOLLOW UP: Yes  Activity Tolerance  Activity Tolerance: Patient limited by endurance; Patient limited by fatigue             Goals  Short term goals  Time Frame for Short term goals: 14 visits  Short term goal 1: Perform bed mobility and functional transfers independently  Short term goal 2: Ambulate 300ft with SPC and SBA with SPO2 >92%  Short term goal 3: Ascend/descend 4 steps with unilateral HR and CGA  Short term goal 4: Participate in 30 minutes of therapy to demo increased endurance  Short term goal 5: Demo Good- dynamic standing balance to decrease risk of falls    Plan    Plan  Times per week: 5-6x/wk  Current Treatment Recommendations: Strengthening, Balance Training, Functional Mobility Training, Endurance Training, Transfer Training, Patient/Caregiver Education & Training, Safety Education & Training, Home Exercise Program, Gait Training, Stair training, ROM  Safety Devices  Type of devices: Gait belt, Left in chair, Nurse notified, Call light within reach  Restraints  Initially in place: No     Therapy Time   Individual Concurrent Group Co-treatment   Time In 1114         Time Out 1139         Minutes Pargi 72, PTA

## 2019-05-07 NOTE — DISCHARGE SUMMARY
for venous stasis. Started on xarelto for PE. DC to SNF.        Significant therapeutic interventions: as above    Significant Diagnostic Studies:   Labs / Micro:  CBC:   Lab Results   Component Value Date    WBC 4.9 05/06/2019    RBC 4.05 05/06/2019    HGB 10.5 05/06/2019    HCT 36.7 05/06/2019    MCV 90.6 05/06/2019    MCH 25.9 05/06/2019    MCHC 28.6 05/06/2019    RDW 18.9 05/06/2019     05/06/2019     BMP:    Lab Results   Component Value Date    GLUCOSE 110 05/06/2019     05/06/2019    K 4.0 05/06/2019     05/06/2019    CO2 29 05/06/2019    ANIONGAP 9 05/06/2019    BUN 14 05/06/2019    CREATININE 0.65 05/06/2019    BUNCRER NOT REPORTED 05/06/2019    CALCIUM 8.7 05/06/2019    LABGLOM >60 05/06/2019    GFRAA >60 05/06/2019    GFR      05/06/2019    GFR NOT REPORTED 05/06/2019     HFP:    Lab Results   Component Value Date    PROT 5.6 03/21/2019     CMP:    Lab Results   Component Value Date    GLUCOSE 110 05/06/2019     05/06/2019    K 4.0 05/06/2019     05/06/2019    CO2 29 05/06/2019    BUN 14 05/06/2019    CREATININE 0.65 05/06/2019    ANIONGAP 9 05/06/2019    ALKPHOS 82 03/21/2019    ALT 13 03/21/2019    AST 20 03/21/2019    BILITOT 0.90 03/21/2019    LABALBU 2.8 03/21/2019    ALBUMIN 1.0 03/21/2019    LABGLOM >60 05/06/2019    GFRAA >60 05/06/2019    GFR      05/06/2019    GFR NOT REPORTED 05/06/2019    PROT 5.6 03/21/2019    CALCIUM 8.7 05/06/2019     PT/INR:  No results found for: PTINR  PTT:   Lab Results   Component Value Date    APTT 70.4 05/01/2019     FLP:    Lab Results   Component Value Date    CHOL 116 03/22/2019    TRIG 84 03/22/2019    HDL 31 03/22/2019     U/A:    Lab Results   Component Value Date    COLORU YELLOW 03/21/2019    TURBIDITY CLEAR 03/21/2019    SPECGRAV 1.029 03/21/2019    HGBUR TRACE 03/21/2019    PHUR 5.0 03/21/2019    PROTEINU 3+ 03/21/2019    GLUCOSEU NEGATIVE 03/21/2019    KETUA TRACE 03/21/2019    BILIRUBINUR NEGATIVE  Verified by ictotest. 03/21/2019    UROBILINOGEN Normal 03/21/2019    NITRU NEGATIVE 03/21/2019    LEUKOCYTESUR NEGATIVE 03/21/2019     TSH:    Lab Results   Component Value Date    TSH 2.14 03/22/2019         Radiology:    Ct Head Wo Contrast    Result Date: 4/29/2019  EXAMINATION: CT OF THE HEAD WITHOUT CONTRAST  4/29/2019 1:47 pm TECHNIQUE: CT of the head was performed without the administration of intravenous contrast. Dose modulation, iterative reconstruction, and/or weight based adjustment of the mA/kV was utilized to reduce the radiation dose to as low as reasonably achievable. COMPARISON: 04/12/2019 HISTORY: ORDERING SYSTEM PROVIDED HISTORY: previous blled TECHNOLOGIST PROVIDED HISTORY: FINDINGS: BRAIN/VENTRICLES: Previously identified extra-axial blood products about the left parietal region near the vertex is again demonstrated and smaller. This measures up to 6 mm in thickness (previously 8 mm). This has also decreased in attenuation. No new or acute appearing blood products are appreciated. Cortical atrophy and white matter changes in the brain are again demonstrated. No new findings identified in the brain. ORBITS: The visualized portion of the orbits demonstrate no acute abnormality. SINUSES: The visualized paranasal sinuses and mastoid air cells demonstrate no acute abnormality. SOFT TISSUES/SKULL:  No acute abnormality of the visualized skull or soft tissues. Continued reduction in size of extra-axial hematoma about the left parietal vertex measuring up to 6 mm in thickness. No new abnormality identified. Ct Head Wo Contrast    Result Date: 4/12/2019  EXAMINATION: CT OF THE HEAD WITHOUT CONTRAST  4/12/2019 10:53 am TECHNIQUE: CT of the head was performed without the administration of intravenous contrast. Dose modulation, iterative reconstruction, and/or weight based adjustment of the mA/kV was utilized to reduce the radiation dose to as low as reasonably achievable.  COMPARISON: CT head April 3, 2019 and April 2, 2019 HISTORY: ORDERING SYSTEM PROVIDED HISTORY: SDH (subdural hematoma) ([address] TECHNOLOGIST PROVIDED HISTORY: follow up for SDH Ordering Physician Provided Reason for Exam: F/U for subdural hematoma Acuity: Chronic Type of Exam: Subsequent/Follow-up FINDINGS: BRAIN/VENTRICLES: There is a mixed aged high convexity extra-axial hematoma along the left frontal/parietal convexity measuring up to 8.4 mm in maximal thickness, compared to 10 mm on April 3, 2019 and 7.7 mm on April 2, 2019. There is no mass effect or midline shift. There is mild parenchymal volume loss. There is periventricular white matter low attenuation, likely related to mild chronic microvascular disease. The gray-white differentiation is maintained without evidence of an acute infarct. There is no hydrocephalus. ORBITS: The visualized portion of the orbits demonstrate no acute abnormality. SINUSES: The visualized paranasal sinuses and mastoid air cells demonstrate no acute abnormality. SOFT TISSUES/SKULL:  No acute abnormality of the visualized skull or soft tissues. Mixed aged high convexity extra-axial hematoma along the left frontal/parietal convexity measuring up to 8.4 mm in maximal thickness, compared to 10 mm on April 3, 2019 and 7.7 mm on April 2, 2019. No midline shift. Continued short interval follow-up is recommended. Mild parenchymal volume loss. Mild chronic microvascular disease. Xr Chest Portable    Result Date: 4/29/2019  EXAMINATION: SINGLE XRAY VIEW OF THE CHEST 4/29/2019 11:47 am COMPARISON: April 2, 2018, March 29, 2019 HISTORY: ORDERING SYSTEM PROVIDED HISTORY: Chest Pain TECHNOLOGIST PROVIDED HISTORY: Chest Pain Ordering Physician Provided Reason for Exam: Port upright AP CXR - chest pain Acuity: Unknown Type of Exam: Unknown FINDINGS: Cardiac silhouette enlargement again noted. Left subclavian AICD in place. Bilateral perihilar and lower lung field alveolar and interstitial opacities have developed.   No pneumothorax. No significant effusion. No acute osseous abnormality identified. Bilateral airspace disease has developed, which may represent edema versus inflammatory process or infection. Ct Chest Pulmonary Embolism W Contrast    Result Date: 4/29/2019  EXAMINATION: CTA OF THE CHEST 4/29/2019 12:31 pm TECHNIQUE: CTA of the chest was performed after the administration of intravenous contrast.  Multiplanar reformatted images are provided for review. MIP images are provided for review. Dose modulation, iterative reconstruction, and/or weight based adjustment of the mA/kV was utilized to reduce the radiation dose to as low as reasonably achievable. COMPARISON: None. HISTORY: ORDERING SYSTEM PROVIDED HISTORY: shortness of breath FINDINGS: Pulmonary Arteries: No central pulmonary embolus. Small peripheral right lower lobe pulmonary emboli. Right upper lobe pulmonary emboli. No definite evidence of right heart strain. No definite pulmonary infarct. Mediastinum: Severe cardiomegaly. Small pericardial effusion. No adenopathy. Lungs/pleura: Diffuse ground-glass opacities, septal thickening. No pleural effusions. Upper Abdomen: Right adrenal nodule. Soft Tissues/Bones: Left pacemaker. No axillary adenopathy. Lumbar spine degenerative changes. Right upper lobe and right lower lobe pulmonary emboli. Diffuse ground-glass opacities are nonspecific and may be related to pulmonary edema with infection or an inflammatory process not excluded. Critical results were called by Dr. Diamante Vu MD to Dr. Kush Maher on 4/29/2019 at 13:03.      Vl Dup Lower Extremity Venous Bilateral    Result Date: 4/29/2019    [facility]  Vascular Lower Extremities DVT Study Procedure   Patient Name    Liu Chung      Date of Study             04/29/2019                  [facility]   Date of Birth   1950   Gender                    Female   Age             76 year(s)   Race                         Room Number     SASHA   Corporate ID #  [id number]   Patient Acct #  [de-identified]   MR #            [id number]      Sonographer               Brenda Rodriguez   Accession #     [id number]    Interpreting Physician    Asmita Smart   Referring Nurse              Referring Physician       ER NO FAMILY DR *  Practitioner  Procedure Type of Study:   Veins: Lower Extremities DVT Study, Venous Scan Lower Bilateral.  Indications for Study:R/O DVT. Patient Status:ER. Technical Quality:Limited visualization. Conclusions   Summary   No evidence of superficial or deep venous thrombosis in both lower  extremities. Multilevel edema noted. Signature   ----------------------------------------------------------------  Electronically signed by Brenda Rodriguez(Sonographer) on  04/29/2019 03:10 PM  ----------------------------------------------------------------   ----------------------------------------------------------------  Electronically signed by Penny Bragg(Interpreting  physician) on 04/29/2019 05:04 PM  ----------------------------------------------------------------  Findings:   Right Impression:                     Left Impression:  The common femoral, superficial       The common femoral, femoral,  femoral, popliteal, tibials and       popliteal, tibials and saphenous  saphenous veins are compressible with veins are compressible with normal  normal doppler responses. doppler responses. Thigh, calf and ankle edema noted. Thigh, calf, and ankle edema noted. Allergies   - Allergy:*No Known Allergies(Miscellaneous). Velocities are measured in cm/s ; Diameters are measured in cm Right Lower Extremities DVT Study Measurements Right 2D Measurements +------------------------------------+----------+---------------+----------+ ! Location                            ! Visualized! Compressibility! Thrombosis! +------------------------------------+----------+---------------+----------+ ! Common Femoral                      !Yes !Yes            !None      ! +------------------------------------+----------+---------------+----------+ ! Prox Femoral                        !Yes       ! Yes            ! None      ! +------------------------------------+----------+---------------+----------+ ! Mid Femoral                         !Yes       ! Yes            ! None      ! +------------------------------------+----------+---------------+----------+ ! Dist Femoral                        !Yes       ! Yes            ! None      ! +------------------------------------+----------+---------------+----------+ ! Deep Femoral                        !Yes       ! Yes            ! None      ! +------------------------------------+----------+---------------+----------+ ! Popliteal                           !Yes       ! Yes            ! None      ! +------------------------------------+----------+---------------+----------+ ! Sapheno Femoral Junction            ! Yes       ! Yes            ! None      ! +------------------------------------+----------+---------------+----------+ ! PTV                                 ! Yes       ! Yes            ! None      ! +------------------------------------+----------+---------------+----------+ ! Peroneal                            !Yes       ! Yes            ! None      ! +------------------------------------+----------+---------------+----------+ ! Gastroc                             ! Yes       ! Yes            ! None      ! +------------------------------------+----------+---------------+----------+ ! GSV Thigh                           ! Yes       ! Yes            ! None      ! +------------------------------------+----------+---------------+----------+ ! GSV Knee                            ! Yes       ! Yes            ! None      ! +------------------------------------+----------+---------------+----------+ ! GSV Ankle                           ! Yes       ! Yes            ! None      ! +------------------------------------+----------+---------------+----------+ ! SSV                                 ! Yes       ! Yes            ! None      ! +------------------------------------+----------+---------------+----------+ Right Doppler Measurements +---------------------------+------+------+--------------------------------+ ! Location                   ! Signal!Reflux! Reflux (msec)                   ! +---------------------------+------+------+--------------------------------+ ! Common Femoral             !Phasic!      !                                ! +---------------------------+------+------+--------------------------------+ ! Prox Femoral               !Phasic!      !                                ! +---------------------------+------+------+--------------------------------+ ! Popliteal                  !Phasic!      !                                ! +---------------------------+------+------+--------------------------------+ Left Lower Extremities DVT Study Measurements Left 2D Measurements +------------------------------------+----------+---------------+----------+ ! Location                            ! Visualized! Compressibility! Thrombosis! +------------------------------------+----------+---------------+----------+ ! Common Femoral                      !Yes       ! Yes            ! None      ! +------------------------------------+----------+---------------+----------+ ! Prox Femoral                        !Yes       ! Yes            ! None      ! +------------------------------------+----------+---------------+----------+ ! Mid Femoral                         !Yes       ! Yes            ! None      ! +------------------------------------+----------+---------------+----------+ ! Dist Femoral                        !Yes       ! Yes            ! None      ! +------------------------------------+----------+---------------+----------+ ! Deep Femoral                        !Yes       ! Yes            ! None      ! +------------------------------------+----------+---------------+----------+ ! Popliteal                           !Yes       ! Yes            ! None      ! +------------------------------------+----------+---------------+----------+ ! Sapheno Femoral Junction            ! Yes       ! Yes            ! None      ! +------------------------------------+----------+---------------+----------+ ! PTV                                 ! Yes       ! Yes            ! None      ! +------------------------------------+----------+---------------+----------+ ! Peroneal                            !Yes       ! Yes            ! None      ! +------------------------------------+----------+---------------+----------+ ! Gastroc                             ! Yes       ! Yes            ! None      ! +------------------------------------+----------+---------------+----------+ ! GSV Thigh                           ! Yes       ! Yes            ! None      ! +------------------------------------+----------+---------------+----------+ ! GSV Knee                            ! Yes       ! Yes            ! None      ! +------------------------------------+----------+---------------+----------+ ! GSV Ankle                           ! Yes       ! Yes            ! None      ! +------------------------------------+----------+---------------+----------+ ! SSV                                 ! Yes       ! Yes            ! None      ! +------------------------------------+----------+---------------+----------+ Left Doppler Measurements +---------------------------+------+------+--------------------------------+ ! Location                   ! Signal!Reflux! Reflux (msec)                   ! +---------------------------+------+------+--------------------------------+ ! Common Femoral             !Phasic!      !                                ! +---------------------------+------+------+--------------------------------+ ! Prox Femoral               !Phasic!      ! ! +---------------------------+------+------+--------------------------------+ ! Popliteal                  !Phasic!      !                                ! +---------------------------+------+------+--------------------------------+        Consultations:    Consults:     Final Specialist Recommendations/Findings:   IP CONSULT TO NEUROSURGERY  IP CONSULT TO CARDIOLOGY  IP CONSULT TO INFECTIOUS DISEASES  IP CONSULT TO SOCIAL WORK      The patient was seen and examined on day of discharge and this discharge summary is in conjunction with any daily progress note from day of discharge.     Discharge plan:     Disposition: Skilled Facility    Physician Follow Up:     Randy Williamson, [address] [place] [address] [phone] [place] [address]  [phone]    In 1 week  please follow up with neurosurgery       Requiring Further Evaluation/Follow Up POST HOSPITALIZATION/Incidental Findings:     Diet: regular diet    Activity: As tolerated    Instructions to Patient: take medications as prescribed     Discharge Medications:      Medication List      START taking these medications    metoclopramide 10 MG tablet  Commonly known as:  REGLAN  Take 1 tablet by mouth 4 times daily (before meals and nightly) for 7 days     rivaroxaban 15 MG Tabs tablet  Commonly known as:  XARELTO  Take 1 tablet by mouth 2 times daily (with meals)        CONTINUE taking these medications    atorvastatin 80 MG tablet  Commonly known as:  LIPITOR  Take 1 tablet by mouth nightly     furosemide 20 MG tablet  Commonly known as:  LASIX  Take 1 tablet by mouth 2 times daily     metoprolol succinate 25 MG extended release tablet  Commonly known as:  TOPROL XL  Take 1 tablet by mouth daily     multivitamin tablet  Take 1 tablet by mouth daily     ondansetron 4 MG disintegrating tablet  Commonly known as:  ZOFRAN-ODT  Take 1 tablet by mouth every 6 hours as needed for Nausea     oxybutynin 5 MG tablet  Commonly known as:  DITROPAN  Take 1 tablet by mouth 3 times daily     potassium chloride 10 MEQ extended release capsule  Commonly known as:  MICRO-K  Take 2 capsules by mouth daily     sertraline 50 MG tablet  Commonly known as:  ZOLOFT  Take 1 tablet by mouth daily        ASK your doctor about these medications    ALPRAZolam 0.25 MG tablet  Commonly known as:  XANAX  Take 1 tablet by mouth 3 times daily as needed for Anxiety for up to 10 doses. Ask about: Should I take this medication? Where to Get Your Medications      These medications were sent to [facility], [id number] [place] [address], [address] [place] [address]    Phone:  [phone]   · metoclopramide 10 MG tablet  · rivaroxaban 15 MG Tabs tablet     You can get these medications from any pharmacy    Bring a paper prescription for each of these medications  · ALPRAZolam 0.25 MG tablet         Time Spent on discharge is  37 mins in patient examination, evaluation, counseling as well as medication reconciliation, prescriptions for required medications, discharge plan and follow up.     Electronically signed by   Karen Pinto MD  5/7/2019  7:38 AM

## 2019-05-12 ENCOUNTER — APPOINTMENT (OUTPATIENT)
Dept: GENERAL RADIOLOGY | Facility: CLINIC | Age: 69
DRG: 292 | End: 2019-05-12
Payer: MEDICARE

## 2019-05-12 ENCOUNTER — HOSPITAL ENCOUNTER (INPATIENT)
Age: 69
LOS: 3 days | Discharge: HOME | DRG: 292 | End: 2019-05-15
Attending: EMERGENCY MEDICINE | Admitting: INTERNAL MEDICINE
Payer: MEDICARE

## 2019-05-12 DIAGNOSIS — F41.0 PANIC DISORDER: ICD-10-CM

## 2019-05-12 DIAGNOSIS — I50.9 ACUTE CONGESTIVE HEART FAILURE, UNSPECIFIED HEART FAILURE TYPE (HCC): Primary | ICD-10-CM

## 2019-05-12 DIAGNOSIS — R06.00 DYSPNEA, UNSPECIFIED TYPE: ICD-10-CM

## 2019-05-12 LAB
-: ABNORMAL
ABSOLUTE EOS #: 0.08 K/UL (ref 0–0.4)
ABSOLUTE IMMATURE GRANULOCYTE: ABNORMAL K/UL (ref 0–0.3)
ABSOLUTE LYMPH #: 0.75 K/UL (ref 1–4.8)
ABSOLUTE MONO #: 0.42 K/UL (ref 0.1–0.8)
ALBUMIN SERPL-MCNC: 3.8 G/DL (ref 3.5–5.2)
ALBUMIN/GLOBULIN RATIO: 1 (ref 1–2.5)
ALP BLD-CCNC: 100 U/L (ref 35–104)
ALT SERPL-CCNC: 27 U/L (ref 5–33)
AMORPHOUS: ABNORMAL
ANION GAP SERPL CALCULATED.3IONS-SCNC: 20 MMOL/L (ref 9–17)
AST SERPL-CCNC: 29 U/L
BACTERIA: ABNORMAL
BASOPHILS # BLD: 0 % (ref 0–2)
BASOPHILS ABSOLUTE: 0 K/UL (ref 0–0.2)
BILIRUB SERPL-MCNC: 1.2 MG/DL (ref 0.3–1.2)
BILIRUBIN URINE: NEGATIVE
BNP INTERPRETATION: ABNORMAL
BUN BLDV-MCNC: 22 MG/DL (ref 8–23)
BUN/CREAT BLD: ABNORMAL (ref 9–20)
CALCIUM SERPL-MCNC: 9.5 MG/DL (ref 8.6–10.4)
CASTS UA: ABNORMAL /LPF (ref 0–2)
CASTS UA: ABNORMAL /LPF (ref 0–2)
CHLORIDE BLD-SCNC: 100 MMOL/L (ref 98–107)
CO2: 24 MMOL/L (ref 20–31)
COLOR: YELLOW
COMMENT UA: ABNORMAL
CREAT SERPL-MCNC: 0.9 MG/DL (ref 0.5–0.9)
CRYSTALS, UA: ABNORMAL /HPF
DIFFERENTIAL TYPE: ABNORMAL
EOSINOPHILS RELATIVE PERCENT: 1 % (ref 1–4)
EPITHELIAL CELLS UA: ABNORMAL /HPF (ref 0–5)
GFR AFRICAN AMERICAN: >60 ML/MIN
GFR NON-AFRICAN AMERICAN: >60 ML/MIN
GFR SERPL CREATININE-BSD FRML MDRD: ABNORMAL ML/MIN/{1.73_M2}
GFR SERPL CREATININE-BSD FRML MDRD: ABNORMAL ML/MIN/{1.73_M2}
GLUCOSE BLD-MCNC: 178 MG/DL (ref 70–99)
GLUCOSE URINE: NEGATIVE
HCT VFR BLD CALC: 38.9 % (ref 36–46)
HEMOGLOBIN: 11.9 G/DL (ref 12–16)
IMMATURE GRANULOCYTES: ABNORMAL %
KETONES, URINE: NEGATIVE
LEUKOCYTE ESTERASE, URINE: NEGATIVE
LYMPHOCYTES # BLD: 9 % (ref 24–44)
MCH RBC QN AUTO: 26.2 PG (ref 26–34)
MCHC RBC AUTO-ENTMCNC: 30.7 G/DL (ref 31–37)
MCV RBC AUTO: 85.4 FL (ref 80–100)
MONOCYTES # BLD: 5 % (ref 1–7)
MORPHOLOGY: ABNORMAL
MORPHOLOGY: ABNORMAL
MUCUS: ABNORMAL
MYOGLOBIN: 32 NG/ML (ref 25–58)
NITRITE, URINE: NEGATIVE
NRBC AUTOMATED: ABNORMAL PER 100 WBC
OTHER OBSERVATIONS UA: ABNORMAL
PDW BLD-RTO: 21.5 % (ref 12.5–15.4)
PH UA: 6 (ref 5–8)
PLATELET # BLD: 286 K/UL (ref 140–450)
PLATELET ESTIMATE: ABNORMAL
PMV BLD AUTO: 9.7 FL (ref 6–12)
POTASSIUM SERPL-SCNC: 3.7 MMOL/L (ref 3.7–5.3)
PRO-BNP: ABNORMAL PG/ML
PROTEIN UA: ABNORMAL
RBC # BLD: 4.55 M/UL (ref 4–5.2)
RBC # BLD: ABNORMAL 10*6/UL
RBC UA: ABNORMAL /HPF (ref 0–2)
RENAL EPITHELIAL, UA: ABNORMAL /HPF
SEG NEUTROPHILS: 85 % (ref 36–66)
SEGMENTED NEUTROPHILS ABSOLUTE COUNT: 7.05 K/UL (ref 1.8–7.7)
SODIUM BLD-SCNC: 144 MMOL/L (ref 135–144)
SPECIFIC GRAVITY UA: 1.02 (ref 1–1.03)
TOTAL PROTEIN: 7.8 G/DL (ref 6.4–8.3)
TRICHOMONAS: ABNORMAL
TROPONIN INTERP: ABNORMAL
TROPONIN T: ABNORMAL NG/ML
TROPONIN, HIGH SENSITIVITY: 47 NG/L (ref 0–14)
TURBIDITY: CLEAR
URINE HGB: ABNORMAL
UROBILINOGEN, URINE: NORMAL
WBC # BLD: 8.3 K/UL (ref 3.5–11)
WBC # BLD: ABNORMAL 10*3/UL
WBC UA: ABNORMAL /HPF (ref 0–5)
YEAST: ABNORMAL

## 2019-05-12 PROCEDURE — 85025 COMPLETE CBC W/AUTO DIFF WBC: CPT

## 2019-05-12 PROCEDURE — 96374 THER/PROPH/DIAG INJ IV PUSH: CPT

## 2019-05-12 PROCEDURE — 81001 URINALYSIS AUTO W/SCOPE: CPT

## 2019-05-12 PROCEDURE — 83880 ASSAY OF NATRIURETIC PEPTIDE: CPT

## 2019-05-12 PROCEDURE — 84484 ASSAY OF TROPONIN QUANT: CPT

## 2019-05-12 PROCEDURE — 99285 EMERGENCY DEPT VISIT HI MDM: CPT

## 2019-05-12 PROCEDURE — 36415 COLL VENOUS BLD VENIPUNCTURE: CPT

## 2019-05-12 PROCEDURE — 83874 ASSAY OF MYOGLOBIN: CPT

## 2019-05-12 PROCEDURE — 93005 ELECTROCARDIOGRAM TRACING: CPT

## 2019-05-12 PROCEDURE — 71046 X-RAY EXAM CHEST 2 VIEWS: CPT

## 2019-05-12 PROCEDURE — 2060000000 HC ICU INTERMEDIATE R&B

## 2019-05-12 PROCEDURE — 80053 COMPREHEN METABOLIC PANEL: CPT

## 2019-05-12 PROCEDURE — 6360000002 HC RX W HCPCS: Performed by: EMERGENCY MEDICINE

## 2019-05-12 RX ORDER — FUROSEMIDE 10 MG/ML
40 INJECTION INTRAMUSCULAR; INTRAVENOUS ONCE
Status: COMPLETED | OUTPATIENT
Start: 2019-05-12 | End: 2019-05-12

## 2019-05-12 RX ADMIN — FUROSEMIDE 40 MG: 10 INJECTION, SOLUTION INTRAMUSCULAR; INTRAVENOUS at 22:28

## 2019-05-12 ASSESSMENT — ENCOUNTER SYMPTOMS
EYE PAIN: 0
SORE THROAT: 0
WHEEZING: 0
CONSTIPATION: 0
DIARRHEA: 0
COUGH: 0
COLOR CHANGE: 0
VOMITING: 0
STRIDOR: 0
EYE REDNESS: 0
NAUSEA: 0
ABDOMINAL PAIN: 0
SHORTNESS OF BREATH: 1
EYE DISCHARGE: 0

## 2019-05-13 ENCOUNTER — APPOINTMENT (OUTPATIENT)
Dept: GENERAL RADIOLOGY | Age: 69
DRG: 292 | End: 2019-05-13
Payer: MEDICARE

## 2019-05-13 PROBLEM — Z86.79 HISTORY OF SUBDURAL HEMATOMA: Status: ACTIVE | Noted: 2019-05-13

## 2019-05-13 PROBLEM — I48.0 PAROXYSMAL ATRIAL FIBRILLATION (HCC): Status: ACTIVE | Noted: 2019-03-21

## 2019-05-13 LAB
ANION GAP SERPL CALCULATED.3IONS-SCNC: 14 MMOL/L (ref 9–17)
BNP INTERPRETATION: ABNORMAL
BUN BLDV-MCNC: 21 MG/DL (ref 8–23)
BUN/CREAT BLD: 24 (ref 9–20)
CALCIUM SERPL-MCNC: 8.8 MG/DL (ref 8.6–10.4)
CHLORIDE BLD-SCNC: 101 MMOL/L (ref 98–107)
CHOLESTEROL/HDL RATIO: 3
CHOLESTEROL: 108 MG/DL
CO2: 28 MMOL/L (ref 20–31)
CREAT SERPL-MCNC: 0.87 MG/DL (ref 0.5–0.9)
EKG ATRIAL RATE: 468 BPM
EKG ATRIAL RATE: 78 BPM
EKG Q-T INTERVAL: 396 MS
EKG Q-T INTERVAL: 422 MS
EKG QRS DURATION: 106 MS
EKG QRS DURATION: 96 MS
EKG QTC CALCULATION (BAZETT): 489 MS
EKG QTC CALCULATION (BAZETT): 498 MS
EKG R AXIS: -155 DEGREES
EKG R AXIS: 9 DEGREES
EKG T AXIS: -177 DEGREES
EKG T AXIS: -58 DEGREES
EKG VENTRICULAR RATE: 84 BPM
EKG VENTRICULAR RATE: 92 BPM
ESTIMATED AVERAGE GLUCOSE: 120 MG/DL
ESTIMATED AVERAGE GLUCOSE: 123 MG/DL
GFR AFRICAN AMERICAN: >60 ML/MIN
GFR NON-AFRICAN AMERICAN: >60 ML/MIN
GFR SERPL CREATININE-BSD FRML MDRD: ABNORMAL ML/MIN/{1.73_M2}
GFR SERPL CREATININE-BSD FRML MDRD: ABNORMAL ML/MIN/{1.73_M2}
GLUCOSE BLD-MCNC: 123 MG/DL (ref 70–99)
GLUCOSE BLD-MCNC: 129 MG/DL (ref 65–105)
GLUCOSE BLD-MCNC: 175 MG/DL (ref 65–105)
GLUCOSE BLD-MCNC: 88 MG/DL (ref 65–105)
HBA1C MFR BLD: 5.8 % (ref 4–6)
HBA1C MFR BLD: 5.9 % (ref 4–6)
HCT VFR BLD CALC: 37 % (ref 36.3–47.1)
HDLC SERPL-MCNC: 36 MG/DL
HEMOGLOBIN: 10.8 G/DL (ref 11.9–15.1)
LDL CHOLESTEROL: 51 MG/DL (ref 0–130)
MAGNESIUM: 1.9 MG/DL (ref 1.6–2.6)
MCH RBC QN AUTO: 25.8 PG (ref 25.2–33.5)
MCHC RBC AUTO-ENTMCNC: 29.2 G/DL (ref 28.4–34.8)
MCV RBC AUTO: 88.5 FL (ref 82.6–102.9)
MYOGLOBIN: 31 NG/ML (ref 25–58)
NRBC AUTOMATED: 0 PER 100 WBC
PDW BLD-RTO: 20 % (ref 11.8–14.4)
PLATELET # BLD: 259 K/UL (ref 138–453)
PMV BLD AUTO: 11.5 FL (ref 8.1–13.5)
POTASSIUM SERPL-SCNC: 3.5 MMOL/L (ref 3.7–5.3)
PRO-BNP: ABNORMAL PG/ML
RBC # BLD: 4.18 M/UL (ref 3.95–5.11)
SODIUM BLD-SCNC: 143 MMOL/L (ref 135–144)
TRIGL SERPL-MCNC: 106 MG/DL
TROPONIN INTERP: ABNORMAL
TROPONIN T: ABNORMAL NG/ML
TROPONIN, HIGH SENSITIVITY: 40 NG/L (ref 0–14)
TROPONIN, HIGH SENSITIVITY: 42 NG/L (ref 0–14)
TROPONIN, HIGH SENSITIVITY: 45 NG/L (ref 0–14)
TSH SERPL DL<=0.05 MIU/L-ACNC: 3.24 MIU/L (ref 0.3–5)
VLDLC SERPL CALC-MCNC: ABNORMAL MG/DL (ref 1–30)
WBC # BLD: 7 K/UL (ref 3.5–11.3)

## 2019-05-13 PROCEDURE — 99223 1ST HOSP IP/OBS HIGH 75: CPT | Performed by: INTERNAL MEDICINE

## 2019-05-13 PROCEDURE — 84484 ASSAY OF TROPONIN QUANT: CPT

## 2019-05-13 PROCEDURE — 2060000000 HC ICU INTERMEDIATE R&B

## 2019-05-13 PROCEDURE — 84443 ASSAY THYROID STIM HORMONE: CPT

## 2019-05-13 PROCEDURE — 97116 GAIT TRAINING THERAPY: CPT

## 2019-05-13 PROCEDURE — 80048 BASIC METABOLIC PNL TOTAL CA: CPT

## 2019-05-13 PROCEDURE — 93005 ELECTROCARDIOGRAM TRACING: CPT

## 2019-05-13 PROCEDURE — 36415 COLL VENOUS BLD VENIPUNCTURE: CPT

## 2019-05-13 PROCEDURE — 97166 OT EVAL MOD COMPLEX 45 MIN: CPT

## 2019-05-13 PROCEDURE — 83735 ASSAY OF MAGNESIUM: CPT

## 2019-05-13 PROCEDURE — 80061 LIPID PANEL: CPT

## 2019-05-13 PROCEDURE — 97530 THERAPEUTIC ACTIVITIES: CPT

## 2019-05-13 PROCEDURE — 97110 THERAPEUTIC EXERCISES: CPT

## 2019-05-13 PROCEDURE — 97162 PT EVAL MOD COMPLEX 30 MIN: CPT

## 2019-05-13 PROCEDURE — 2580000003 HC RX 258: Performed by: NURSE PRACTITIONER

## 2019-05-13 PROCEDURE — 85027 COMPLETE CBC AUTOMATED: CPT

## 2019-05-13 PROCEDURE — 99212 OFFICE O/P EST SF 10 MIN: CPT

## 2019-05-13 PROCEDURE — 6370000000 HC RX 637 (ALT 250 FOR IP): Performed by: NURSE PRACTITIONER

## 2019-05-13 PROCEDURE — 97535 SELF CARE MNGMENT TRAINING: CPT

## 2019-05-13 PROCEDURE — 82947 ASSAY GLUCOSE BLOOD QUANT: CPT

## 2019-05-13 PROCEDURE — 71046 X-RAY EXAM CHEST 2 VIEWS: CPT

## 2019-05-13 PROCEDURE — 83036 HEMOGLOBIN GLYCOSYLATED A1C: CPT

## 2019-05-13 PROCEDURE — 6360000002 HC RX W HCPCS: Performed by: NURSE PRACTITIONER

## 2019-05-13 PROCEDURE — 6370000000 HC RX 637 (ALT 250 FOR IP): Performed by: INTERNAL MEDICINE

## 2019-05-13 PROCEDURE — 83880 ASSAY OF NATRIURETIC PEPTIDE: CPT

## 2019-05-13 RX ORDER — ONDANSETRON 2 MG/ML
4 INJECTION INTRAMUSCULAR; INTRAVENOUS EVERY 6 HOURS PRN
Status: DISCONTINUED | OUTPATIENT
Start: 2019-05-13 | End: 2019-05-13 | Stop reason: SDUPTHER

## 2019-05-13 RX ORDER — MULTIVITAMIN WITH FOLIC ACID 400 MCG
1 TABLET ORAL DAILY
Status: DISCONTINUED | OUTPATIENT
Start: 2019-05-13 | End: 2019-05-15 | Stop reason: HOSPADM

## 2019-05-13 RX ORDER — OXYBUTYNIN CHLORIDE 5 MG/1
5 TABLET ORAL 3 TIMES DAILY
Status: DISCONTINUED | OUTPATIENT
Start: 2019-05-13 | End: 2019-05-15 | Stop reason: HOSPADM

## 2019-05-13 RX ORDER — ONDANSETRON 4 MG/1
4 TABLET, ORALLY DISINTEGRATING ORAL EVERY 6 HOURS PRN
Status: DISCONTINUED | OUTPATIENT
Start: 2019-05-13 | End: 2019-05-13 | Stop reason: SDUPTHER

## 2019-05-13 RX ORDER — NICOTINE 21 MG/24HR
1 PATCH, TRANSDERMAL 24 HOURS TRANSDERMAL DAILY PRN
Status: DISCONTINUED | OUTPATIENT
Start: 2019-05-13 | End: 2019-05-15 | Stop reason: HOSPADM

## 2019-05-13 RX ORDER — METOPROLOL SUCCINATE 25 MG/1
25 TABLET, EXTENDED RELEASE ORAL DAILY
Status: DISCONTINUED | OUTPATIENT
Start: 2019-05-13 | End: 2019-05-15 | Stop reason: HOSPADM

## 2019-05-13 RX ORDER — DEXTROSE MONOHYDRATE 25 G/50ML
12.5 INJECTION, SOLUTION INTRAVENOUS PRN
Status: DISCONTINUED | OUTPATIENT
Start: 2019-05-13 | End: 2019-05-15 | Stop reason: HOSPADM

## 2019-05-13 RX ORDER — SODIUM CHLORIDE 0.9 % (FLUSH) 0.9 %
10 SYRINGE (ML) INJECTION EVERY 12 HOURS SCHEDULED
Status: DISCONTINUED | OUTPATIENT
Start: 2019-05-13 | End: 2019-05-15 | Stop reason: HOSPADM

## 2019-05-13 RX ORDER — SODIUM CHLORIDE 0.9 % (FLUSH) 0.9 %
10 SYRINGE (ML) INJECTION PRN
Status: DISCONTINUED | OUTPATIENT
Start: 2019-05-13 | End: 2019-05-15 | Stop reason: HOSPADM

## 2019-05-13 RX ORDER — ATORVASTATIN CALCIUM 80 MG/1
80 TABLET, FILM COATED ORAL NIGHTLY
Status: DISCONTINUED | OUTPATIENT
Start: 2019-05-13 | End: 2019-05-15 | Stop reason: HOSPADM

## 2019-05-13 RX ORDER — ACETAMINOPHEN 325 MG/1
650 TABLET ORAL EVERY 4 HOURS PRN
Status: DISCONTINUED | OUTPATIENT
Start: 2019-05-13 | End: 2019-05-15 | Stop reason: HOSPADM

## 2019-05-13 RX ORDER — ONDANSETRON 2 MG/ML
4 INJECTION INTRAMUSCULAR; INTRAVENOUS EVERY 6 HOURS PRN
Status: DISCONTINUED | OUTPATIENT
Start: 2019-05-13 | End: 2019-05-15 | Stop reason: HOSPADM

## 2019-05-13 RX ORDER — NICOTINE POLACRILEX 4 MG
15 LOZENGE BUCCAL PRN
Status: DISCONTINUED | OUTPATIENT
Start: 2019-05-13 | End: 2019-05-15 | Stop reason: HOSPADM

## 2019-05-13 RX ORDER — DEXTROSE MONOHYDRATE 50 MG/ML
100 INJECTION, SOLUTION INTRAVENOUS PRN
Status: DISCONTINUED | OUTPATIENT
Start: 2019-05-13 | End: 2019-05-15 | Stop reason: HOSPADM

## 2019-05-13 RX ORDER — FUROSEMIDE 10 MG/ML
40 INJECTION INTRAMUSCULAR; INTRAVENOUS 2 TIMES DAILY
Status: DISCONTINUED | OUTPATIENT
Start: 2019-05-13 | End: 2019-05-15

## 2019-05-13 RX ORDER — METOCLOPRAMIDE 10 MG/1
10 TABLET ORAL
Status: DISCONTINUED | OUTPATIENT
Start: 2019-05-13 | End: 2019-05-15 | Stop reason: HOSPADM

## 2019-05-13 RX ORDER — LISINOPRIL 5 MG/1
5 TABLET ORAL DAILY
Status: DISCONTINUED | OUTPATIENT
Start: 2019-05-13 | End: 2019-05-15

## 2019-05-13 RX ORDER — ONDANSETRON 4 MG/1
4 TABLET, ORALLY DISINTEGRATING ORAL EVERY 6 HOURS PRN
Status: DISCONTINUED | OUTPATIENT
Start: 2019-05-13 | End: 2019-05-15 | Stop reason: HOSPADM

## 2019-05-13 RX ADMIN — RIVAROXABAN 15 MG: 15 TABLET, FILM COATED ORAL at 09:54

## 2019-05-13 RX ADMIN — ATORVASTATIN CALCIUM 80 MG: 80 TABLET, FILM COATED ORAL at 01:17

## 2019-05-13 RX ADMIN — FUROSEMIDE 40 MG: 10 INJECTION, SOLUTION INTRAMUSCULAR; INTRAVENOUS at 17:59

## 2019-05-13 RX ADMIN — OXYBUTYNIN CHLORIDE 5 MG: 5 TABLET ORAL at 09:54

## 2019-05-13 RX ADMIN — METOCLOPRAMIDE 10 MG: 10 TABLET ORAL at 21:15

## 2019-05-13 RX ADMIN — METOCLOPRAMIDE 10 MG: 10 TABLET ORAL at 06:32

## 2019-05-13 RX ADMIN — INSULIN LISPRO 1 UNITS: 100 INJECTION, SOLUTION INTRAVENOUS; SUBCUTANEOUS at 09:53

## 2019-05-13 RX ADMIN — OXYBUTYNIN CHLORIDE 5 MG: 5 TABLET ORAL at 15:14

## 2019-05-13 RX ADMIN — ATORVASTATIN CALCIUM 80 MG: 80 TABLET, FILM COATED ORAL at 21:15

## 2019-05-13 RX ADMIN — Medication 10 ML: at 09:55

## 2019-05-13 RX ADMIN — Medication 10 ML: at 21:16

## 2019-05-13 RX ADMIN — METOPROLOL SUCCINATE 25 MG: 25 TABLET, FILM COATED, EXTENDED RELEASE ORAL at 09:54

## 2019-05-13 RX ADMIN — METOCLOPRAMIDE 10 MG: 10 TABLET ORAL at 12:38

## 2019-05-13 RX ADMIN — METOCLOPRAMIDE 10 MG: 10 TABLET ORAL at 17:59

## 2019-05-13 RX ADMIN — SERTRALINE HYDROCHLORIDE 50 MG: 50 TABLET ORAL at 09:54

## 2019-05-13 RX ADMIN — MULTIVITAMIN TABLET 1 TABLET: TABLET at 09:53

## 2019-05-13 RX ADMIN — OXYBUTYNIN CHLORIDE 5 MG: 5 TABLET ORAL at 21:15

## 2019-05-13 RX ADMIN — RIVAROXABAN 15 MG: 15 TABLET, FILM COATED ORAL at 17:59

## 2019-05-13 RX ADMIN — FUROSEMIDE 40 MG: 10 INJECTION, SOLUTION INTRAMUSCULAR; INTRAVENOUS at 09:55

## 2019-05-13 RX ADMIN — METOCLOPRAMIDE 10 MG: 10 TABLET ORAL at 01:17

## 2019-05-13 ASSESSMENT — PAIN DESCRIPTION - PAIN TYPE
TYPE: CHRONIC PAIN

## 2019-05-13 ASSESSMENT — PAIN DESCRIPTION - FREQUENCY
FREQUENCY: INTERMITTENT
FREQUENCY: INTERMITTENT

## 2019-05-13 ASSESSMENT — PAIN DESCRIPTION - LOCATION
LOCATION: BACK
LOCATION: BACK
LOCATION: COCCYX
LOCATION: BACK

## 2019-05-13 ASSESSMENT — PAIN SCALES - GENERAL
PAINLEVEL_OUTOF10: 3
PAINLEVEL_OUTOF10: 4
PAINLEVEL_OUTOF10: 4
PAINLEVEL_OUTOF10: 5

## 2019-05-13 ASSESSMENT — PAIN DESCRIPTION - DESCRIPTORS
DESCRIPTORS: ACHING;DISCOMFORT
DESCRIPTORS: ACHING

## 2019-05-13 ASSESSMENT — PAIN DESCRIPTION - ONSET: ONSET: GRADUAL

## 2019-05-13 NOTE — PROGRESS NOTES
[facility] Nurse  Consult Note       NAME:  Alis Mack  MEDICAL RECORD NUMBER:  [id number]  AGE: 76 y.o. GENDER: female  : 1950  TODAY'S DATE:  2019    Subjective:     Reason for WOCN Evaluation and Assessment: lower extremity wounds. Alis Mack is a 76 y.o. female referred by:   [x] Physician  [] Nursing  [] Other:     Wound Identification:  Wound Type: venous and traumatic  Contributing Factors: edema, venous stasis, lymphedema, decreased mobility, decreased tissue oxygenation, poor hygiene, non-adherence and self inflicted injuries. PAST MEDICAL HISTORY        Diagnosis Date    Acute on chronic systolic congestive heart failure ([address] 2017    Anxiety     Cardiomyopathy ([address]     Depression     H/O heart artery stent     [place] (hard of hearing)     Hyperlipidemia     Hypertension     Hypertensive urgency 2017    Leg swelling 2017       PAST SURGICAL HISTORY    Past Surgical History:   Procedure Laterality Date    CARDIAC DEFIBRILLATOR PLACEMENT      CATARACT REMOVAL      CORNEAL TRANSPLANT      EYE SURGERY      NASAL SINUS SURGERY      TONSILLECTOMY AND ADENOIDECTOMY         FAMILY HISTORY    History reviewed. No pertinent family history.     SOCIAL HISTORY    Social History     Tobacco Use    Smoking status: Never Smoker    Smokeless tobacco: Never Used   Substance Use Topics    Alcohol use: No    Drug use: No       ALLERGIES    No Known Allergies        Objective:      BP (!) 108/59   Pulse 77   Temp 98.2 °F (36.8 °C) (Oral)   Resp 18   Ht 5' 2\" (1.575 m)   Wt 182 lb 8 oz (82.8 kg)   SpO2 92%   BMI 33.38 kg/m²   David Risk Score: David Scale Score: 19    LABS    CBC:   Lab Results   Component Value Date    WBC 7.0 2019    RBC 4.18 2019    HGB 10.8 2019     CMP:  Albumin:    Lab Results   Component Value Date    LABALBU 3.8 2019     PT/INR:    Lab Results   Component Value Date    PROTIME 13.6 04/30/2019    INR 1.3 04/30/2019     HgBA1c:    Lab Results   Component Value Date    LABA1C 5.7 08/06/2017     PTT: No components found for: LABPTT      Assessment:     Patient Active Problem List   Diagnosis    Leg swelling    Hypertensive urgency    Acute on chronic systolic congestive heart failure (HCC)    Acute on chronic systolic [facility]ive heart failure ([facility]    Lymphedema of both lower extremities    Cellulitis of right leg    Coronary artery disease involving native coronary artery of native heart without angina pectoris    Major depressive disorder    New onset a-fib ([address]    NSTEMI (non-ST elevated myocardial infarction) ([address]    Acute on chronic congestive heart failure ([facility])    Panic disorder    SDH (subdural hematoma) ([facility])    Hypotension    Dizziness    Dyslipidemia    Chronic systolic heart failure ([facility])    Coronary atherosclerosis of native coronary artery    Hypertension    Chronic atrial fibrillation ([facility])    S/P implantation of automatic cardioverter/defibrillator (AICD)    History of percutaneous coronary intervention    Pulmonary embolism, bilateral ([address]    Cellulitis    Venous stasis dermatitis of both lower extremities    Congestive heart failure ([facility])    Acute congestive heart failure ([address]       Measurements:     05/13/19 0930   Wound 04/30/19 Leg Left; Lower; Posterior   Date First Assessed/Time First Assessed: 04/30/19 1235   Present on Hospital Admission: Yes  Primary Wound Type: Venous Ulcer  Location: Leg  Wound Location Orientation: Left; Lower; Posterior   Wound Image    Wound Venous   Dressing Status Changed   Dressing Changed Changed/New   Dressing/Treatment Foam;Dry Dressing; Ace Wrap   Wound Cleansed Wound cleanser   Dressing Change Due   (dry layers daily, foam 3 x's per week)   Wound Assessment Dry;Pink   Drainage Amount None   Odor None   Susanne-wound Assessment Dry;Red;Edema; Hyperpigmented   Wound 03/22/19 Leg Right; Lower;Medial   Date First Assessed/Time First Assessed: 03/22/19 0833   Present on Hospital Admission: Yes  Primary Wound Type: Venous Ulcer  Location: Leg  Wound Location Orientation: Right; Lower;Medial   Wound Image    Wound Venous   Dressing Changed Changed/New   Dressing/Treatment Dry Dressing; Ace Wrap   Wound Cleansed Wound cleanser   Dressing Change Due 05/14/19   Wound Assessment Dry;Brown   Drainage Amount None   Odor None   Susanne-wound Assessment Dry; Intact; Hyperpigmented;Red   Wound 03/23/19 Leg Left; Lower   Date First Assessed/Time First Assessed: 03/23/19 0730   Present on Hospital Admission: Yes  Primary Wound Type: Venous Ulcer  Location: Leg  Wound Location Orientation: Left; Lower   Wound Image    Wound Venous   Dressing Status Changed   Dressing Changed Changed/New   Dressing/Treatment Foam;Dry Dressing; Ace Wrap   Wound Cleansed Wound cleanser   Dressing Change Due   (dry layers daily, foam 3 x's per week)   Wound Assessment Dry;Pink   Drainage Amount None   Odor None   Susanne-wound Assessment Hyperpigmented;Red;Edema       Response to treatment:  Well tolerated by patient. Plan:     Plan of Care:   Elevate BLE  Remove ACE wraps and roll gauze from legs daily. Provide hygiene. Change the LLE Mepilex foam dressings three times weekly.           Specialty Bed Required : Yes   [] Low Air Loss   [x] Pressure Redistribution  [] Fluid Immersion  [] Bariatric  [] Total Pressure Relief  [] Other:     Discharge Plan:  TBD    Patient/Caregiver Teaching:    [] Indicates understanding       [x] Needs reinforcement  [] Unsuccessful      [] Verbal Understanding  [] Demonstrated understanding       [] No evidence of learning  [] Refused teaching         [] N/A       Electronically signed by Moose Monsivais RN, CWON on 5/13/2019 at 10:56 AM

## 2019-05-13 NOTE — ED NOTES
PT UP TO BSC WITH ASSISTANCE VOIDED 4OO CC CLEAR YELLOW URINE.  JENNIFER SENT TO LAB     Love Howard RN  05/12/19 9076

## 2019-05-13 NOTE — ED NOTES
[facility] [address] Rosaline Hatchet, [address] [place] [address]  05/12/19 2883

## 2019-05-13 NOTE — PROGRESS NOTES
Occupational Therapy   Occupational Therapy Initial Assessment  Date: 2019   Patient Name: Carlos Cranker  MRN: [id number]     : 1950    RN reports patient is medically stable for therapy treatment this date. Chart reviewed prior to treatment and patient is agreeable for therapy. All lines intact and patient positioned comfortably at end of treatment. All patient needs addressed prior to ending therapy session. Date of Service: 2019    Discharge Recommendations:  Home with nursing aide, Home with Home health OT, Home with assist PRN  OT Equipment Recommendations  Equipment Needed: Yes  Mobility Devices: ADL Assistive Devices; Walker(if PT determines is appropriate for pt )  Walker: Rolling  ADL Assistive Devices: Long-handled Sponge;Emergency Alert System;Long-handled Shoe Horn;Reacher; Toileting - 3-in-1 Commode;Grab Bars - shower    Assessment   Performance deficits / Impairments: Decreased functional mobility ; Decreased endurance;Decreased ADL status; Decreased safe awareness;Decreased high-level IADLs;Decreased balance  Assessment: Skilled OT is warranted to improve overall I and safety with functional performance to return home with family assist as needed. Prognosis: Fair  Decision Making: Medium Complexity  Patient Education: OT POC, DC recommendations, safety in function, edema mgt tech, benefits of being up OOB, pursed lip breathing, EC/WS tech and postural control   REQUIRES OT FOLLOW UP: Yes  Activity Tolerance  Activity Tolerance: Patient Tolerated treatment well;Patient limited by fatigue;Patient limited by pain  Activity Tolerance: fair with some edu and encouragement needed   Safety Devices  Safety Devices in place: Yes  Type of devices: Call light within reach; Left in chair;Patient at risk for falls;Gait belt;Nurse notified(pt's BLE's elevated on recliner and with use of pillow )           Patient Diagnosis(es): The primary encounter diagnosis was Acute congestive heart Descriptors: Aching;Discomfort  Pain Frequency: Intermittent  *pt declined intervention for pain     Social/Functional History  Social/Functional History  Lives With: Spouse(son lives with pt as well-works days ; spouse is retired )  Type of Home: [employer] Layout: Two level, Able to Live on Main level with bedroom/bathroom(full bath on main; takes laundry to [employer] )  Home Access: Stairs to enter with rails(back door; pt states son will assist her on steps as needed )  Entrance Stairs - Number of Steps: 4  Entrance Stairs - Rails: Both  Bathroom Shower/Tub: Tub/Shower unit  Bathroom Equipment: (no DME )  Home Equipment: Riky Done Help From: Family  ADL Assistance: Independent  Homemaking Assistance: Needs assistance(pt shares duties around house with her son )  Homemaking Responsibilities: Yes  Ambulation Assistance: Independent  Transfer Assistance: Independent  Active : No  Patient's  Info:  Son drives  Occupation: Retired  Type of occupation:  and  for [address] [employer] [employer] [address]: watch TV and reading/Twitter        Objective   Vision: Impaired  Vision Exceptions: Wears glasses at all times(pt states she has blurry vision and states she needs a new prescription )  Hearing Exceptions: Hard of hearing/hearing concerns;Bilateral hearing aid    Orientation  Overall Orientation Status: Within Functional Limits  Observation/Palpation  Posture: Good  Observation: O2 per NC  Edema: BLE's are swollen and are ace wrapped/reddness noted BLE's   Balance  Sitting Balance: Stand by assistance  Standing Balance: Contact guard assistance  Standing Balance  Time: stand cyrus 2-3 mins with self care and functional tasks   Functional Mobility  Functional - Mobility Device: No device  Activity: (bed to [facility] and to sink and back to bed )  Assist Level: Contact guard assistance  Functional Mobility Comments: verbal instruction needed to slow dowm movements/pace self, pursed lip breathing and assist and awareness of O2 line/mgt  Toilet Transfers  Toilet - Technique: Ambulating  Equipment Used: Standard bedside commode  Toilet Transfer: Contact guard assistance  ADL  Grooming: Setup;Stand by assistance(for oral care standing at sink for oral care )  UE Bathing: Setup;Stand by assistance  LE Bathing: Setup;Minimal assistance(B feet thoroughness )  UE Dressing: Setup;Minimal assistance(with hosp gown )  LE Dressing: Setup;Minimal assistance(pt able to charbel B socks seated in chair with increased time and set up/SBA )  Toileting: Minimal assistance(with gown mgt only and pt used BSC; pt able to pull down/up brief with CG and completed hygiene standing after urination with CG )  Tone RUE  RUE Tone: Normotonic  Tone LUE  LUE Tone: Normotonic  Coordination  Movements Are Fluid And Coordinated: Yes     Bed mobility  Supine to Sit: Stand by assistance(with increased time )  Sit to Supine: Unable to assess  Comment: verbal instruction needed for pursed lip breathing and pt with good use of rail   Transfers  Stand Pivot Transfers: Contact guard assistance(no AD )  Sit to stand: Contact guard assistance  Stand to sit: Contact guard assistance  Transfer Comments: verbal instruction needed for hand placement reaching back to surface, slowing down movements/pace self, controlled stand to sits, pursed lip breathing, and awareness/assist with O2 line/mgt      Cognition  Overall Cognitive Status: Exceptions  Arousal/Alertness: Appropriate responses to stimuli  Following Commands:  Follows all commands without difficulty  Attention Span: Appears intact  Memory: Decreased short term memory  Safety Judgement: Decreased awareness of need for assistance;Decreased awareness of need for safety  Problem Solving: Assistance required to identify errors made;Assistance required to correct errors made;Decreased awareness of errors  Insights: Decreased awareness of deficits  Initiation: Requires cues for some  Sequencing: Does not require cues  Perception  Overall Perceptual Status: WFL     Sensation  Overall Sensation Status: WFL        LUE AROM (degrees)  LUE AROM : WFL  RUE AROM (degrees)  RUE AROM : WFL  LUE Strength  Gross LUE Strength: WFL  LUE Strength Comment: BUE strength grossly 4plus/5   RUE Strength  Gross RUE Strength: WFL                   Plan   Plan  Times per week: 3-5x/week 1x/day as cyrus   Current Treatment Recommendations: Strengthening, Balance Training, Neuromuscular Re-education, Safety Education & Training, Self-Care / ADL, Equipment Evaluation, Education, & procurement, Pain Management, Endurance Training, Functional Mobility Training, Patient/Caregiver Education & Training, Home Management Training    G-Code     OutComes Score                                                  AM-PAC Score   19          Goals  Short term goals  Time Frame for Short term goals: by discharge, pt to demo   Short term goal 1: I with BUE HEP with hand outs as needed to maintain strength. Short term goal 2: bed mob tasks with use of rail as needed to MOD I/I. Short term goal 3: total body ADL tasks including toileting with use of AE/AD and grab bar as needed to MOD I. Short term goal 4: ADL transfers and functional mob with AD as needed to MOD I/I. Short term goal 5: balance to SUP with AD as needed and cyrus > 5 mins to reduce fall risk for self care. Long term goals  Long term goal 1: Pt to be I with pursed lip breathing, EC/WS and fall prevention tech with hand outs as needed. Patient Goals   Patient goals : Breathe better and do more for myself!        Therapy Time   Individual Concurrent Group Co-treatment   Time In 1051(plus 10 min chart review )         Time Out 1144         Minutes 53         Timed Code Treatment Minutes: [address] [place], [place]

## 2019-05-13 NOTE — ED PROVIDER NOTES
cardiologist.    EKG Interpretation    Interpreted by me    Rhythm: Tachycardic rhythm. It is ventricular paced rhythm  Rate: Tachycardic  Axis: Right  Ectopy: none  Conduction:some aberrant conducted complexes   ST Segments: Nonspecific change  T Waves: Nonspecific change  Q Waves: none    Clinical Impression: Nonspecific changes and abnormal EKG    RADIOLOGY:  Non-plain film images such as CT, Ultrasound and MRI are read by the radiologist. [facility] radiographic images are visualized and the radiologist interpretations are reviewed as follows:     Interpretation per the Radiologist below, if available at the time of this note:    Xr Chest Standard (2 Vw)    Result Date: 5/12/2019  EXAMINATION: TWO XRAY VIEWS OF THE CHEST 5/12/2019 9:13 pm COMPARISON: None. HISTORY: ORDERING SYSTEM PROVIDED HISTORY: SOB TECHNOLOGIST PROVIDED HISTORY: SOB Ordering Physician Provided Reason for Exam: Pt c/o dyspnea. Hx of CHF. Acuity: Acute Type of Exam: Initial Relevant Medical/Surgical History: CHF FINDINGS: There is moderate to severe cardiomegaly. No definite effusion is identified. Moderate bilateral parahilar infiltrates are present. There is a pulse generator over the left anterior chest wall with multiple leads projecting over the heart. Bilateral parahilar infiltrates. Ct Head Wo Contrast    Result Date: 4/29/2019  EXAMINATION: CT OF THE HEAD WITHOUT CONTRAST  4/29/2019 1:47 pm TECHNIQUE: CT of the head was performed without the administration of intravenous contrast. Dose modulation, iterative reconstruction, and/or weight based adjustment of the mA/kV was utilized to reduce the radiation dose to as low as reasonably achievable. COMPARISON: 04/12/2019 HISTORY: ORDERING SYSTEM PROVIDED HISTORY: previous blled TECHNOLOGIST PROVIDED HISTORY: FINDINGS: BRAIN/VENTRICLES: Previously identified extra-axial blood products about the left parietal region near the vertex is again demonstrated and smaller.   This measures up to 6 mm in thickness (previously 8 mm). This has also decreased in attenuation. No new or acute appearing blood products are appreciated. Cortical atrophy and white matter changes in the brain are again demonstrated. No new findings identified in the brain. ORBITS: The visualized portion of the orbits demonstrate no acute abnormality. SINUSES: The visualized paranasal sinuses and mastoid air cells demonstrate no acute abnormality. SOFT TISSUES/SKULL:  No acute abnormality of the visualized skull or soft tissues. Continued reduction in size of extra-axial hematoma about the left parietal vertex measuring up to 6 mm in thickness. No new abnormality identified. Xr Chest Portable    Result Date: 4/29/2019  EXAMINATION: SINGLE XRAY VIEW OF THE CHEST 4/29/2019 11:47 am COMPARISON: April 2, 2018, March 29, 2019 HISTORY: ORDERING SYSTEM PROVIDED HISTORY: Chest Pain TECHNOLOGIST PROVIDED HISTORY: Chest Pain Ordering Physician Provided Reason for Exam: Port upright AP CXR - chest pain Acuity: Unknown Type of Exam: Unknown FINDINGS: Cardiac silhouette enlargement again noted. Left subclavian AICD in place. Bilateral perihilar and lower lung field alveolar and interstitial opacities have developed. No pneumothorax. No significant effusion. No acute osseous abnormality identified. Bilateral airspace disease has developed, which may represent edema versus inflammatory process or infection. Ct Chest Pulmonary Embolism W Contrast    Result Date: 4/29/2019  EXAMINATION: CTA OF THE CHEST 4/29/2019 12:31 pm TECHNIQUE: CTA of the chest was performed after the administration of intravenous contrast.  Multiplanar reformatted images are provided for review. MIP images are provided for review. Dose modulation, iterative reconstruction, and/or weight based adjustment of the mA/kV was utilized to reduce the radiation dose to as low as reasonably achievable. COMPARISON: None.  HISTORY: ORDERING SYSTEM PROVIDED HISTORY: shortness of breath FINDINGS: Pulmonary Arteries: No central pulmonary embolus. Small peripheral right lower lobe pulmonary emboli. Right upper lobe pulmonary emboli. No definite evidence of right heart strain. No definite pulmonary infarct. Mediastinum: Severe cardiomegaly. Small pericardial effusion. No adenopathy. Lungs/pleura: Diffuse ground-glass opacities, septal thickening. No pleural effusions. Upper Abdomen: Right adrenal nodule. Soft Tissues/Bones: Left pacemaker. No axillary adenopathy. Lumbar spine degenerative changes. Right upper lobe and right lower lobe pulmonary emboli. Diffuse ground-glass opacities are nonspecific and may be related to pulmonary edema with infection or an inflammatory process not excluded. Critical results were called by Dr. Quin Eden MD to Dr. Taqueria Miranda on 4/29/2019 at 13:03. Vl Dup Lower Extremity Venous Bilateral    Result Date: 4/29/2019    [facility]  Vascular Lower Extremities DVT Study Procedure   Patient Name    Ben Gallegos      Date of Study             04/29/2019                  [facility]   Date of Birth   1950   Gender                    Female   Age             76 year(s)   Race                         Room Number     SASHA   Corporate ID #  [id number]   Patient Acct #  [de-identified]   MR #            [id number]      Sonographer               Brenda Rodriguez   Accession #     [id number]    Interpreting Physician    Colt Phillips   Referring Nurse              Referring Physician       ER NO FAMILY DR *  Practitioner  Procedure Type of Study:   Veins: Lower Extremities DVT Study, Venous Scan Lower Bilateral.  Indications for Study:R/O DVT. Patient Status:ER. Technical Quality:Limited visualization. Conclusions   Summary   No evidence of superficial or deep venous thrombosis in both lower  extremities. Multilevel edema noted.    Signature   ----------------------------------------------------------------  Electronically signed by Bredna Rodriguez(Sonographer) on  04/29/2019 03:10 PM  ----------------------------------------------------------------   ----------------------------------------------------------------  Electronically signed by Sage Bragg(Interpreting  physician) on 04/29/2019 05:04 PM  ----------------------------------------------------------------  Findings:   Right Impression:                     Left Impression:  The common femoral, superficial       The common femoral, femoral,  femoral, popliteal, tibials and       popliteal, tibials and saphenous  saphenous veins are compressible with veins are compressible with normal  normal doppler responses. doppler responses. Thigh, calf and ankle edema noted. Thigh, calf, and ankle edema noted. Allergies   - Allergy:*No Known Allergies(Miscellaneous). Velocities are measured in cm/s ; Diameters are measured in cm Right Lower Extremities DVT Study Measurements Right 2D Measurements +------------------------------------+----------+---------------+----------+ ! Location                            ! Visualized! Compressibility! Thrombosis! +------------------------------------+----------+---------------+----------+ ! Common Femoral                      !Yes       ! Yes            ! None      ! +------------------------------------+----------+---------------+----------+ ! Prox Femoral                        !Yes       ! Yes            ! None      ! +------------------------------------+----------+---------------+----------+ ! Mid Femoral                         !Yes       ! Yes            ! None      ! +------------------------------------+----------+---------------+----------+ ! Dist Femoral                        !Yes       ! Yes            ! None      ! +------------------------------------+----------+---------------+----------+ ! Deep Femoral                        !Yes       ! Yes            ! None      ! +------------------------------------+----------+---------------+----------+ !Yes       !Yes            ! None      ! +------------------------------------+----------+---------------+----------+ ! GSV Thigh                           ! Yes       ! Yes            ! None      ! +------------------------------------+----------+---------------+----------+ ! GSV Knee                            ! Yes       ! Yes            ! None      ! +------------------------------------+----------+---------------+----------+ ! GSV Ankle                           ! Yes       ! Yes            ! None      ! +------------------------------------+----------+---------------+----------+ ! SSV                                 ! Yes       ! Yes            ! None      ! +------------------------------------+----------+---------------+----------+ Left Doppler Measurements +---------------------------+------+------+--------------------------------+ ! Location                   ! Signal!Reflux! Reflux (msec)                   ! +---------------------------+------+------+--------------------------------+ ! Common Femoral             !Phasic!      !                                ! +---------------------------+------+------+--------------------------------+ ! Prox Femoral               !Phasic!      !                                ! +---------------------------+------+------+--------------------------------+ ! Popliteal                  !Phasic!      !                                ! +---------------------------+------+------+--------------------------------+    LABS:  Results for orders placed or performed during the hospital encounter of 05/12/19   CBC Auto Differential   Result Value Ref Range    WBC 8.3 3.5 - 11.0 k/uL    RBC 4.55 4.0 - 5.2 m/uL    Hemoglobin 11.9 (L) 12.0 - 16.0 g/dL    Hematocrit 38.9 36 - 46 %    MCV 85.4 80 - 100 fL    MCH 26.2 26 - 34 pg    MCHC 30.7 (L) 31 - 37 g/dL    RDW 21.5 (H) 12.5 - 15.4 %    Platelets 980 222 - 527 k/uL    MPV 9.7 6.0 - 12.0 fL    NRBC Automated NOT REPORTED per 100 WBC    Differential Type NOT REPORTED     Immature Granulocytes NOT REPORTED 0 %    Absolute Immature Granulocyte NOT REPORTED 0.00 - 0.30 k/uL    WBC Morphology NOT REPORTED     RBC Morphology NOT REPORTED     Platelet Estimate NOT REPORTED     Seg Neutrophils 85 (H) 36 - 66 %    Lymphocytes 9 (L) 24 - 44 %    Monocytes 5 1 - 7 %    Eosinophils % 1 1 - 4 %    Basophils 0 0 - 2 %    Segs Absolute 7.05 1.8 - 7.7 k/uL    Absolute Lymph # 0.75 (L) 1.0 - 4.8 k/uL    Absolute Mono # 0.42 0.1 - 0.8 k/uL    Absolute Eos # 0.08 0.0 - 0.4 k/uL    Basophils # 0.00 0.0 - 0.2 k/uL    Morphology ANISOCYTOSIS PRESENT     Morphology 1+ POLYCHROMASIA    Comprehensive Metabolic Panel   Result Value Ref Range    Glucose 178 (H) 70 - 99 mg/dL    BUN 22 8 - 23 mg/dL    CREATININE 0.90 0.50 - 0.90 mg/dL    Bun/Cre Ratio NOT REPORTED 9 - 20    Calcium 9.5 8.6 - 10.4 mg/dL    Sodium 144 135 - 144 mmol/L    Potassium 3.7 3.7 - 5.3 mmol/L    Chloride 100 98 - 107 mmol/L    CO2 24 20 - 31 mmol/L    Anion Gap 20 (H) 9 - 17 mmol/L    Alkaline Phosphatase 100 35 - 104 U/L    ALT 27 5 - 33 U/L    AST 29 <32 U/L    Total Bilirubin 1.20 0.3 - 1.2 mg/dL    Total Protein 7.8 6.4 - 8.3 g/dL    Alb 3.8 3.5 - 5.2 g/dL    Albumin/Globulin Ratio 1.0 1.0 - 2.5    GFR Non-African American >60 >60 mL/min    GFR African American >60 >60 mL/min    GFR Comment          GFR Staging NOT REPORTED    Brain Natriuretic Peptide   Result Value Ref Range    Pro-BNP 20,465 (H) <300 pg/mL    BNP Interpretation Pro-BNP Reference Range:    TROP/MYOGLOBIN   Result Value Ref Range    Troponin, High Sensitivity 47 (H) 0 - 14 ng/L    Troponin T NOT REPORTED <0.03 ng/mL    Troponin Interp NOT REPORTED     Myoglobin 32 25 - 58 ng/mL     Lab work has been reviewed.     EMERGENCY DEPARTMENT COURSE:   Vitals:    Vitals:    05/12/19 2056 05/12/19 2103   BP: (!) 151/94    Pulse: 90    Temp: 98.7 °F (37.1 °C)    TempSrc: Oral    SpO2: (!) 87% 96%   Weight: 83.9 kg (185 lb)    Height: 5' 2\" (1.575 m)

## 2019-05-13 NOTE — CARE COORDINATION
Case Management Initial Discharge Plan  Marco Antonio Troyp,         Readmission Risk              Risk of Unplanned Readmission:        20             Met with:patient to discuss discharge plans. Information verified: address, contacts, phone number, , insurance Yes  PCP: No primary care provider on file. Date of last visit: na    Insurance Provider: [employer] medicare    Discharge Planning  Current Residence:  Private home   Living Arrangements:  Spouse/Significant Other, Children       Home has 2 stories/4 stairs to climb  Support Systems:  Spouse/Significant Other, Family Members       Current Services PTA:  None   Agency: none         Patient able to perform ADL's:Independent  DME in home:  Maria Ines Ally   DME used to aid ambulation prior to admission:   Maria Ines Ally   DME used during admission walker and 02    Potential Assistance Needed:  [address]: [place] on [address]    Potential Assistance Purchasing Medications:  No  Does patient want to participate in local refill/ meds to beds program?  No    Patient agreeable to home care: Yes  Freedom of choice provided:  yes      Type of Home Care Services:  None  Patient expects to be discharged to:  home    Prior SNF/Rehab Placement and Facility: [facility]   Agreeable to SNF/Rehab: No  [place] of choice provided: n/a   Evaluation: n/a    Expected Discharge date:  19  Follow Up Appointment: Best Day/ Time: Monday AM    Transportation provider: per family  Transportation arrangements needed for discharge: No    Discharge Plan:   Patient lives at home with his wife and her son Marjorie Arriaga. She was recently at [facility] from - and discharged to [facility].  called [facility] and patient left AMA on Saturday. When asked the patient she stated that her SOB was so much worse and they werent doing anything about it. She went home and came to hospital next day. She was at [facility] for PE and sent home on xarelto.  She stated she used the free month voucher and called her insurance and cost will be between 30-40 per month. She is currently on 02 at 3 liters and will need home 02 evaluation prior to discharge. She does have a history of CHF for 02 dx. Did discuss POC for discharge and she is refusing to go back to snf. She is open to home care and has had [facility] in the past.     She has NO PCP. Will refer to [facility] for all services including a visiting physician. MILTON is entered into epic. Watch for home 02 needs. No follow up to be made if getting enrolled with PCP thru [facility] . Dr Adrianne Pemberton group will usually see with in the 7 days of dc. May need walker in the home.  Will need home 02 evaluation prior to dc     Electronically signed by Greg Fothergill, RN on 5/13/19 at 2:17 PM

## 2019-05-13 NOTE — CARE COORDINATION
Discharge planning     Patient chart reviewed. Per epic on 5.6 patent was dc from [facility] to [facility]. Call to [facility] to see if can follow. She is an [employer] medicare and may need therapy and precert to return. She was at [facility] on 4/29-5/6 for PE> sent to nursing home on xarelto. Do not see any notes regarding if cost was investigated.      Will need to follow up with attending

## 2019-05-13 NOTE — PROGRESS NOTES
Physical Therapy    [facility]  Initial Assessment    NAME: Abe Sainz  : 1950  MRN: [id number]    Date of Service: 2019    Discharge Recommendations:  Home with Home health PT, Home with assist PRN      Pt presented to ED on 19 complaining of shortness of breath that has been worsening since she got out of the hospital 4 days ago. She was recently admitted for acute on chronic congestive heart failure and significant swelling of the legs. She was also admitted for subdural hematoma. She relates that she is not on any home O2. But when she got home she felt like the shortness of breath has just increased. She relates her legs are actually significantly improved from a few days ago and she has been wrapping them daily. She denies any chest pain. She relates it mainly shortness of breath. And it is worse with any effort or exertion. She denies any fever or chills. She's had no nausea or vomiting. She can't think of any headache or dizziness. She denies any problems with urination or bowel habits. RN reports patient is medically stable for therapy treatment this date. Chart reviewed prior to treatment and patient is agreeable for therapy. Assessment   Body structures, Functions, Activity limitations: Decreased functional mobility ; Decreased strength;Decreased endurance;Decreased balance  Assessment: Pt requires continued skilled PT & is appropriate to D/C Home with HH PT to increase independence with functional mobility, balance, safety awareness & activity tolerance.   Prognosis: Good  Decision Making: Medium Complexity  Exam: ROM, MMT, functional mobility, activity tolerance, Balance, & [employer] AM-PAC 6 Clicks Basic Mobility   Clinical Presentation: evolving  Patient Education: PT POC, functional mobility, safety awareness, prevention of sedentary complications, LE ex's & issued written pt education  REQUIRES PT FOLLOW UP: Yes  Activity Tolerance  Activity Tolerance: Patient limited by fatigue;Patient limited by endurance       Patient Diagnosis(es): The primary encounter diagnosis was Acute congestive heart failure, unspecified heart failure type ([address] A diagnosis of Dyspnea, unspecified type was also pertinent to this visit. has a past medical history of Acute on chronic systolic congestive heart failure ([address] Anxiety, Cardiomyopathy ([address] Depression, H/O heart artery stent, [place] (hard of hearing), Hyperlipidemia, Hypertension, Hypertensive urgency, and Leg swelling.   has a past surgical history that includes Cataract removal; Corneal transplant; Nasal sinus surgery; eye surgery; Tonsillectomy and adenoidectomy; and Cardiac defibrillator placement.     Restrictions  Restrictions/Precautions  Restrictions/Precautions: Fall Risk, General Precautions  Required Braces or Orthoses?: No  Implants present? : (defibrillator )  Position Activity Restriction  Other position/activity restrictions: up with assist, telemetry, 1500mL fluid restriction, O2, NC@ 3L/min  Vision/Hearing  Vision: Impaired  Vision Exceptions: Wears glasses at all times  Hearing: Exceptions to [facility]  Hearing Exceptions: Hard of hearing/hearing concerns     Subjective  General  Chart Reviewed: Yes  Patient assessed for rehabilitation services?: Yes  Additional Pertinent Hx: [place], CHF, CMP, depression, HTN, HLPD  Response To Previous Treatment: Not applicable  General Comment  Comments: RNJah PT  Subjective  Subjective: Pt agreeable to PT  Pain Screening  Patient Currently in Pain: Yes  Pain Assessment  Pain Assessment: 0-10  Pain Level: 5  Pain Location: Coccyx  Vital Signs  Patient Currently in Pain: Yes       Orientation  Orientation  Overall Orientation Status: Within Functional Limits  Social/Functional History  Social/Functional History  Lives With: Spouse(son lives with pt as well-works days ; spouse is retired )  Type of Home: [facility] [employer] Layout: Two level, Able to Live on Main level with bedroom/bathroom(full bath on main; takes laundry to laundry mat )  Home Access: Stairs to enter with rails(back door; pt states son will assist her on steps as needed )  Entrance Stairs - Number of Steps: 4  Entrance Stairs - Rails: Both  Bathroom Shower/Tub: Tub/Shower unit  Bathroom Equipment: (no DME )  Home Equipment: [employer] Help From: Family  ADL Assistance: Independent  Homemaking Assistance: Needs assistance(pt shares duties around house with her son )  Homemaking Responsibilities: Yes  Ambulation Assistance: Independent  Transfer Assistance: Independent  Active : No  Patient's  Info:  Son drives  Occupation: Retired  Type of occupation:  and  for [address]: watch TV and reading/Twitter   Cognition    WFL    Objective     Observation/Palpation  Posture: Good  Observation: O2 per NC  Edema: BLE's are swollen and are ace wrapped/reddness noted BLE's     AROM RLE (degrees)  RLE AROM: WFL  AROM LLE (degrees)  LLE AROM : WFL  AROM RUE (degrees)  RUE General AROM: see OT asessment  AROM LUE (degrees)  LUE General AROM: see OT assessment  Strength RLE  Comment: 4/5 hip  Strength LLE  Comment: 4/5 hip  Strength RUE  Comment: see OT assessment  Strength LUE  Comment: see OT assessment  Tone RLE  RLE Tone: Normotonic  Tone LLE  LLE Tone: Normotonic  Coordination  Movements Are Fluid And Coordinated: Yes  Motor Control  Gross Motor?: WFL  Sensation  Overall Sensation Status: WFL  Bed mobility  Rolling to Left: Supervision  Rolling to Right: Supervision  Sit to Supine: Stand by assistance  Scooting: Stand by assistance  Comment: Cues/assist to reach to bedrail & use of upper body to assist, & for breathing techniques  Transfers  Sit to Stand: Contact guard assistance  Stand to sit: Contact guard assistance  Bed to Chair: Contact guard assistance  Stand Pivot Transfers: Stand by assistance  Lateral Transfers: Stand by assistance  Comment: Ed on use of upper body for safe sit/stand  Ambulation  Ambulation?: Yes  Ambulation 1  Surface: level tile  Device: Rolling Walker  Assistance: Stand by assistance  Quality of Gait: step to pattern  Distance: 20ft     Balance  Sitting - Static: Good  Sitting - Dynamic: Good  Standing - Static: Good  Standing - Dynamic: Good;-  Exercises  Ed on LE ex's, issued written home ex's    All lines intact, call light within reach, and patient positioned comfortably at end of treatment. All patient needs addressed prior to ending therapy session. Plan   Plan  Times per week: 1-2x/D,5-6D/week  Current Treatment Recommendations: Strengthening, Balance Training, Functional Mobility Training, Endurance Training, Gait Training, Stair training, Home Exercise Program  Safety Devices  Type of devices: Bed alarm in place, Gait belt, Patient at risk for falls, Left in bed    G-Code       OutComes Score                                                  AM-PAC Score  AM-PAC Inpatient Mobility Raw Score : 19  AM-PAC Inpatient T-Scale Score : 45.44  Mobility Inpatient CMS 0-100% Score: 41.77  Mobility Inpatient CMS G-Code Modifier : CK          Goals  Short term goals  Time Frame for Short term goals: 12 visits  Short term goal 1: Inc bed-mobility & transfers to independent to enable pt to safely get in/OOB & return to PLOF & decrease risk for falls; Short term goal 2: Inc gait to amb 300ft or > indep to enable pt to return to previous level of independence; Short term goal 3: Pt able to tolerate 30-40 min of activity to include 15-20 reps of ex & functional mobility including 5 minutes of standing to facilitate activity tolerance to [facility]; Short term goal 4: Pt able to go up/down 4 steps with Jeremy rails & supervision;   Short term goal 5: Ed pt on home ex's, safety & energy principles & issue written home program       Therapy Time   Individual Concurrent Group Co-treatment   Time In 0605         Time Out [facility],

## 2019-05-13 NOTE — ED NOTES
PATIENT TO TREATMENT ROOM AMBULATES WITHOUT DIFFICULTY, GAIT SLOW AND STEADY, RESP = AND NON LABORED. SPELLING OF FIRST AND LAST NAME AND  VERIFIED WITH PATIENT. PATIENT PRESENTS TO ED WITH C/O sob x 4 days.  Pt states she was just released from \"rehab facility\" and has been feeling this way since d/c. PT DENIES CP, N/V/D            Dustin Flood RN  [phone]

## 2019-05-13 NOTE — DISCHARGE INSTR - COC
Continuity of Care Form    Patient Name: Sonia Alvarez   :  1950  MRN:  [id number]    Admit date:  2019  Discharge date:  5/15/19    Code Status Order: Full Code   Advance Directives:   [facility] Documentation     Date/Time Healthcare Directive Type of Healthcare Directive Copy in [address] Agent's Name Healthcare Agent's Phone Number    19 0054  No, patient does not have an advance directive for healthcare treatment -- -- -- -- --          Admitting Physician:  Howard Paulino MD  PCP: No primary care provider on file.     Discharging Nurse:  [facility] Unit/Room#: 6794/9416-92  Discharging Unit Phone Number: [phone]    Emergency Contact:   Extended Emergency Contact Information  Primary Emergency Contact: Russell Lewis [address] Phone: [phone]  Relation: Child    Past Surgical History:  Past Surgical History:   Procedure Laterality Date    CARDIAC DEFIBRILLATOR PLACEMENT      CATARACT REMOVAL      CORNEAL TRANSPLANT      EYE SURGERY      NASAL SINUS SURGERY      TONSILLECTOMY AND ADENOIDECTOMY         Immunization History:   Immunization History   Administered Date(s) Administered    Influenza, High Dose (Fluzone 65 yrs and older) 2017    Pneumococcal Polysaccharide ([id number]) 2017    Zoster Live (Zostavax) 2014       Active Problems:  Patient Active Problem List   Diagnosis Code    Leg swelling M79.89    Hypertensive urgency I16.0    Acute on chronic systolic congestive heart failure (HCC) I50.23    Acute on chronic systolic congestive heart failure (HCC) I50.23    Lymphedema of both lower extremities I89.0    Cellulitis of right leg L03.115    Coronary artery disease involving native coronary artery of native heart without angina pectoris I25.10    Major depressive disorder F32.9    New onset a-fib ([facility] [address] I48.91    NSTEMI (non-ST elevated myocardial Assessment Hyperpigmented;Edema;Fragile 5/6/2019 12:00 PM   Number of days: 52       Wound 03/22/19 Leg Right; Lower; Posterior; Lateral (Active)   Wound Image   4/30/2019 12:33 PM   Wound Venous 5/6/2019  4:00 AM   Dressing Status Intact 5/6/2019 12:00 PM   Dressing Changed Dressing reinforced 5/6/2019 12:00 PM   Dressing/Treatment Foam;Ace Wrap 5/6/2019 12:00 PM   Dressing Change Due 05/09/19 5/6/2019 12:00 PM   Wound Assessment Red;Fragile 5/6/2019 12:00 PM   Drainage Amount Small 5/6/2019 12:00 PM   Drainage Description Sanguinous 5/6/2019 12:00 PM   Odor None 5/6/2019 12:00 PM   Susanne-wound Assessment Hyperpigmented;Edema 5/6/2019 12:00 PM   Number of days: 52       Wound 03/23/19 Leg Left; Lower;Medial (Active)   Wound Image   4/30/2019 12:33 PM   Wound Venous 5/6/2019  4:00 AM   Dressing Status Clean;Dry; Intact 5/6/2019 12:00 PM   Dressing Changed Dressing reinforced 5/6/2019 12:00 PM   Dressing/Treatment Foam;Ace Wrap 5/6/2019 12:00 PM   Dressing Change Due 05/10/19 5/6/2019 12:00 PM   Wound Assessment Fragile; Red 5/6/2019 12:00 PM   Drainage Amount Small 5/6/2019 12:00 PM   Drainage Description Serosanguinous 5/6/2019 12:00 PM   Odor None 5/6/2019 12:00 PM   Margins Attached edges 5/6/2019 12:00 PM   Susanne-wound Assessment Hyperpigmented;Edema 5/6/2019 12:00 PM   Non-staged Wound Description Partial thickness 5/6/2019 12:00 PM   Number of days: 51       Wound 04/30/19 Leg Left; Lower; Posterior (Active)   Wound Image   4/30/2019 12:33 PM   Wound Venous 5/6/2019  4:00 AM   Dressing Status Clean;Dry; Intact 5/13/2019  1:29 AM   Dressing Changed Changed/New 5/13/2019  1:29 AM   Dressing/Treatment Ace Wrap;Dry Dressing 5/13/2019  1:29 AM   Wound Cleansed Other (Comment) 4/30/2019 12:33 PM   Dressing Change Due 05/08/19 5/6/2019 12:00 PM   Wound Assessment Fragile;Pink 5/6/2019 12:00 PM   Drainage Amount Small 5/6/2019 12:00 PM   Drainage Description Serous 5/6/2019 12:00 PM   Odor None 5/6/2019 12:00 PM   Susanne-wound Assessment Red 5/13/2019  1:29 AM   Non-staged Wound Description Partial thickness 5/6/2019 12:00 PM   Number of days: 12       Wound 04/30/19 Coccyx (Active)   Wound Skin Tear 5/6/2019  4:00 AM   Dressing Status Clean;Dry; Intact 5/13/2019  1:29 AM   Dressing Changed Changed/New 5/13/2019  1:29 AM   Dressing/Treatment Ace Wrap;Dry Dressing 5/13/2019  1:29 AM   Wound Assessment Red 5/13/2019  1:29 AM   Drainage Amount Other (Comment) 5/6/2019 12:00 PM   Odor None 5/6/2019 12:00 PM   Susanne-wound Assessment Red 5/13/2019  1:29 AM   Number of days: 13        Elimination:  Continence:   · Bowel: Yes  · Bladder: No  Urinary Catheter: None   Colostomy/Ileostomy/Ileal Conduit: No       Date of Last BM: 5/5  No intake or output data in the 24 hours ending 05/13/19 0910  No intake/output data recorded. Safety Concerns:     History of Falls (last 30 days) and At Risk for Falls    Impairments/Disabilities:      None    Nutrition Therapy:  Current Nutrition Therapy:   - Oral Diet:  Cardiac    Routes of Feeding: Oral  Liquids: No Restrictions  Daily Fluid Restriction: yes - amount 1500  Last Modified Barium Swallow with Video (Video Swallowing Test): not done    Treatments at the Time of Hospital Discharge:   Respiratory Treatments: none   Oxygen Therapy:  is on oxygen at 3 L/min per nasal cannula. Ventilator:    - No ventilator support    Rehab Therapies: Physical Therapy and Occupational Therapy  Weight Bearing Status/Restrictions: No weight bearing restirctions  Other Medical Equipment (for information only, NOT a DME order):  cane  Other Treatments:   1. Skilled RN assessment   2. medication reconciliation   3. Will need set up with visiting doctor thru Dr Lv Haq   4. HHA evaluation to see if needed. 5. New to home oxygen. She qualified for 3 liters at all times. Faxed to [facility] [employer] ( phone Is [phone]) please call on arrival home to set up concentrator .  Your tank provided in hospital is good for about 4-6 hours. Wound Care. Elevate BLE  Remove ACE wraps and roll gauze from legs daily. Provide hygiene. Change the LLE Mepilex foam dressings three times weekly.           Patient's personal belongings (please select all that are sent with patient):  Glasses    RN SIGNATURE:  Electronically signed by Beatrice Russell RN on 5/15/19 at 12:53 PM    CASE MANAGEMENT/SOCIAL WORK SECTION    Inpatient Status Date: 5/12/19    Readmission Risk Assessment Score:  Readmission Risk              Risk of Unplanned Readmission:        20           Discharging to Facility/ Agency   · Name: [facility]   · Address:  · Phone: [phone]  · Fax: [phone]    Dialysis Facility (if applicable)   · Name:  · Address:  · Dialysis Schedule:  · Phone:  · Fax:    / signature: Electronically signed by Pepe Montana RN on 5/13/19 at 2:36 PM    PHYSICIAN SECTION    Prognosis: Fair    Condition at Discharge: Stable    Rehab Potential (if transferring to Rehab): Good    Recommended Labs or Other Treatments After Discharge: Home PT OT, wound care at home, CHF nurse follow-up at home, home care nurse follow-up    Physician Certification: I certify the above information and transfer of Abe Sainz  is necessary for the continuing treatment of the diagnosis listed and that she requires Home Care for greater 30 days.      Update Admission H&P: No change in H&P    PHYSICIAN SIGNATURE:  Electronically signed by Nakia Jones MD on 5/15/19 at 12:30 PM

## 2019-05-13 NOTE — H&P
INTEGUMENT:  negative for rash, skin lesions, easy bruising   HEMATOLOGIC/LYMPHATIC:  + for swelling/edema both lower extremities  ALLERGIC/IMMUNOLOGIC:  negative for urticaria , itching  ENDOCRINE:  negative increase in drinking, increase in urination, hot or cold intolerance  MUSCULOSKELETAL:  negative joint pains, muscle aches, swelling of joints  NEUROLOGICAL:  negative for headaches, dizziness, lightheadedness, numbness, pain, tingling extremities  BEHAVIOR/PSYCH:  negative for depression, anxiety    Physical Exam:   /86   Pulse 83   Temp 97.7 °F (36.5 °C) (Oral)   Resp 16   Ht 5' 2\" (1.575 m)   Wt 182 lb 8 oz (82.8 kg)   SpO2 98%   BMI 33.38 kg/m²   Temp (24hrs), Av.3 °F (36.8 °C), Min:97.6 °F (36.4 °C), Max:98.7 °F (37.1 °C)    Recent Labs     19  0114 19  0906 19  1205   POCGLU 129* 175* 88     No intake or output data in the 24 hours ending 19 1259    General Appearance:  alert, well appearing, and in mild distress while talking  Mental status: oriented to person, place, and time with normal affect  Head:  normocephalic, atraumatic. Eye: no icterus, redness, pupils equal and reactive, extraocular eye movements intact, conjunctiva clear  Ear: normal external ear, no discharge, hearing intact  Nose:  no drainage noted  Mouth: mucous membranes moist  Neck: supple, no carotid bruits, thyroid not palpable  Lungs: Bilateral equal air entry, + bibasilar rales, no wheezing,   Cardiovascular: + AICD left upper part of chest irregular rhythm, no murmur, gallop, rub.   Abdomen: Soft, nontender, nondistended, normal bowel sounds, no hepatomegaly or splenomegaly  Neurologic: There are no new focal motor or sensory deficits, normal muscle tone and bulk, no abnormal sensation, normal speech, cranial nerves II through XII grossly intact  Skin: + Stasis dermatitis both lower extremities   Extremities:  peripheral pulses palpable, ++ pedal edema and lymphedema both lower extremities, currently Ace wraps in  place  Psych: normal affect     Investigations:      Laboratory Testing:  Recent Results (from the past 24 hour(s))   EKG 12 Lead    Collection Time: 05/12/19  8:58 PM   Result Value Ref Range    Ventricular Rate 92 BPM    Atrial Rate 468 BPM    QRS Duration 96 ms    Q-T Interval 396 ms    QTc Calculation (Bazett) 489 ms    R Axis -155 degrees    T Axis -58 degrees   CBC Auto Differential    Collection Time: 05/12/19  9:05 PM   Result Value Ref Range    WBC 8.3 3.5 - 11.0 k/uL    RBC 4.55 4.0 - 5.2 m/uL    Hemoglobin 11.9 (L) 12.0 - 16.0 g/dL    Hematocrit 38.9 36 - 46 %    MCV 85.4 80 - 100 fL    MCH 26.2 26 - 34 pg    MCHC 30.7 (L) 31 - 37 g/dL    RDW 21.5 (H) 12.5 - 15.4 %    Platelets 112 351 - 611 k/uL    MPV 9.7 6.0 - 12.0 fL    NRBC Automated NOT REPORTED per 100 WBC    Differential Type NOT REPORTED     Immature Granulocytes NOT REPORTED 0 %    Absolute Immature Granulocyte NOT REPORTED 0.00 - 0.30 k/uL    WBC Morphology NOT REPORTED     RBC Morphology NOT REPORTED     Platelet Estimate NOT REPORTED     Seg Neutrophils 85 (H) 36 - 66 %    Lymphocytes 9 (L) 24 - 44 %    Monocytes 5 1 - 7 %    Eosinophils % 1 1 - 4 %    Basophils 0 0 - 2 %    Segs Absolute 7.05 1.8 - 7.7 k/uL    Absolute Lymph # 0.75 (L) 1.0 - 4.8 k/uL    Absolute Mono # 0.42 0.1 - 0.8 k/uL    Absolute Eos # 0.08 0.0 - 0.4 k/uL    Basophils # 0.00 0.0 - 0.2 k/uL    Morphology ANISOCYTOSIS PRESENT     Morphology 1+ POLYCHROMASIA    Comprehensive Metabolic Panel    Collection Time: 05/12/19  9:05 PM   Result Value Ref Range    Glucose 178 (H) 70 - 99 mg/dL    BUN 22 8 - 23 mg/dL    CREATININE 0.90 0.50 - 0.90 mg/dL    Bun/Cre Ratio NOT REPORTED 9 - 20    Calcium 9.5 8.6 - 10.4 mg/dL    Sodium 144 135 - 144 mmol/L    Potassium 3.7 3.7 - 5.3 mmol/L    Chloride 100 98 - 107 mmol/L    CO2 24 20 - 31 mmol/L    Anion Gap 20 (H) 9 - 17 mmol/L    Alkaline Phosphatase 100 35 - 104 U/L    ALT 27 5 - 33 U/L    AST 29 clinical possibility, culture should be considered despite a negative urinalysis.     Yeast, UA NOT REPORTED None   EKG 12 Lead    Collection Time: 05/12/19 11:42 PM   Result Value Ref Range    Ventricular Rate 84 BPM    Atrial Rate 78 BPM    QRS Duration 106 ms    Q-T Interval 422 ms    QTc Calculation (Bazett) 498 ms    R Axis 9 degrees    T Axis -177 degrees   TROP/MYOGLOBIN    Collection Time: 05/12/19 11:43 PM   Result Value Ref Range    Troponin, High Sensitivity 42 (H) 0 - 14 ng/L    Troponin T NOT REPORTED <0.03 ng/mL    Troponin Interp NOT REPORTED     Myoglobin 31 25 - 58 ng/mL   Hemoglobin A1c    Collection Time: 05/13/19 12:50 AM   Result Value Ref Range    Hemoglobin A1C 5.9 4.0 - 6.0 %    Estimated Avg Glucose 123 mg/dL   Troponin    Collection Time: 05/13/19 12:50 AM   Result Value Ref Range    Troponin, High Sensitivity 40 (H) 0 - 14 ng/L    Troponin T NOT REPORTED <0.03 ng/mL    Troponin Interp NOT REPORTED    POC Glucose Fingerstick    Collection Time: 05/13/19  1:14 AM   Result Value Ref Range    POC Glucose 129 (H) 65 - 105 mg/dL   Basic Metabolic Panel w/ Reflex to MG    Collection Time: 05/13/19  4:41 AM   Result Value Ref Range    Glucose 123 (H) 70 - 99 mg/dL    BUN 21 8 - 23 mg/dL    CREATININE 0.87 0.50 - 0.90 mg/dL    Bun/Cre Ratio 24 (H) 9 - 20    Calcium 8.8 8.6 - 10.4 mg/dL    Sodium 143 135 - 144 mmol/L    Potassium 3.5 (L) 3.7 - 5.3 mmol/L    Chloride 101 98 - 107 mmol/L    CO2 28 20 - 31 mmol/L    Anion Gap 14 9 - 17 mmol/L    GFR Non-African American >60 >60 mL/min    GFR African American >60 >60 mL/min    GFR Comment          GFR Staging NOT REPORTED    Magnesium    Collection Time: 05/13/19  4:41 AM   Result Value Ref Range    Magnesium 1.9 1.6 - 2.6 mg/dL   TSH without Reflex    Collection Time: 05/13/19  4:41 AM   Result Value Ref Range    TSH 3.24 0.30 - 5.00 mIU/L   Brain Natriuretic Peptide    Collection Time: 05/13/19  4:41 AM   Result Value Ref Range    Pro-BNP 21,481 (H) <300 pg/mL    BNP Interpretation Pro-BNP Reference Range:    Lipid panel - fasting    Collection Time: 05/13/19  4:41 AM   Result Value Ref Range    Cholesterol 108 <200 mg/dL    HDL 36 (L) >40 mg/dL    LDL Cholesterol 51 0 - 130 mg/dL    Chol/HDL Ratio 3.0 <5    Triglycerides 106 <150 mg/dL    VLDL NOT REPORTED 1 - 30 mg/dL   CBC    Collection Time: 05/13/19  4:41 AM   Result Value Ref Range    WBC 7.0 3.5 - 11.3 k/uL    RBC 4.18 3.95 - 5.11 m/uL    Hemoglobin 10.8 (L) 11.9 - 15.1 g/dL    Hematocrit 37.0 36.3 - 47.1 %    MCV 88.5 82.6 - 102.9 fL    MCH 25.8 25.2 - 33.5 pg    MCHC 29.2 28.4 - 34.8 g/dL    RDW 20.0 (H) 11.8 - 14.4 %    Platelets 116 245 - 895 k/uL    MPV 11.5 8.1 - 13.5 fL    NRBC Automated 0.0 0.0 per 100 WBC   Troponin    Collection Time: 05/13/19  4:41 AM   Result Value Ref Range    Troponin, High Sensitivity 45 (H) 0 - 14 ng/L    Troponin T NOT REPORTED <0.03 ng/mL    Troponin Interp NOT REPORTED    POC Glucose Fingerstick    Collection Time: 05/13/19  9:06 AM   Result Value Ref Range    POC Glucose 175 (H) 65 - 105 mg/dL   POC Glucose Fingerstick    Collection Time: 05/13/19 12:05 PM   Result Value Ref Range    POC Glucose 88 65 - 105 mg/dL       Imaging/Diagnostics:  CXR:     Slight improvement in perihilar opacities           Assessment :      Primary Problem  Acute on chronic systolic congestive heart failure [facility])    Active Hospital Problems    Diagnosis Date Noted    Venous stasis dermatitis of both lower extremities [I87.2]      Priority: High    Paroxysmal atrial fibrillation ([facility] [address] [I48.0] 03/21/2019     Priority: High    Acute on chronic systolic congestive heart failure (HCC) [I50.23]      Priority: High    Pulmonary embolism, bilateral ([facility] [address] [I26.99] 04/29/2019     Priority: Medium    Chronic atrial fibrillation ([facility] [address] [I48.2] 04/03/2019     Priority: Medium    S/P implantation of automatic cardioverter/defibrillator (AICD) [Z95.810] 04/03/2019     Priority: Medium    Lymphedema of both lower extremities [I89.0] 03/21/2019     Priority: Medium    History of subdural hematoma [Z86.79] 05/13/2019    Congestive heart failure ([facility] [address] [I50.9] 05/12/2019    Dyslipidemia [E78.5] 04/03/2019    Coronary artery disease involving native coronary artery of native heart without angina pectoris [I25.10] 03/21/2019       Plan:     Patient status Admit as inpatient in the  Progressive Unit/Step down    1. Continue IV Lasix  2. Continue home dose of oral Xarelto  3. Continue lisinopril, Toprol-XL with parameters  4. Fluid restriction to 1500 ML daily  5. Monitor labs    Consultations:   IP CONSULT TO DIETITIAN     Patient is admitted as inpatient status because of co-morbidities listed above, severity of signs and symptoms as outlined, requirement for current medical therapies and most importantly because of direct risk to patient if care not provided in a hospital setting. [address] Hernandez MD  5/13/2019  12:59 PM    Copy sent to Dr. Manju Ventura primary care provider on file.

## 2019-05-13 NOTE — CARE COORDINATION
DAMEON received call from [address] [place] [address] regarding pt might be from [facility] [place]. DAMEON called the facility to follow up, DAMEON spoke with Pat Pastor at the facility regarding pt left [place] AMA on Saturday, not sure if pt will be able to return to this facility.       Pt is from home not a SNF    SW following

## 2019-05-14 ENCOUNTER — APPOINTMENT (OUTPATIENT)
Dept: GENERAL RADIOLOGY | Age: 69
DRG: 292 | End: 2019-05-14
Payer: MEDICARE

## 2019-05-14 LAB
ANION GAP SERPL CALCULATED.3IONS-SCNC: 12 MMOL/L (ref 9–17)
BUN BLDV-MCNC: 24 MG/DL (ref 8–23)
BUN/CREAT BLD: 25 (ref 9–20)
CALCIUM SERPL-MCNC: 8.3 MG/DL (ref 8.6–10.4)
CHLORIDE BLD-SCNC: 100 MMOL/L (ref 98–107)
CO2: 31 MMOL/L (ref 20–31)
CREAT SERPL-MCNC: 0.95 MG/DL (ref 0.5–0.9)
GFR AFRICAN AMERICAN: >60 ML/MIN
GFR NON-AFRICAN AMERICAN: 59 ML/MIN
GFR SERPL CREATININE-BSD FRML MDRD: ABNORMAL ML/MIN/{1.73_M2}
GFR SERPL CREATININE-BSD FRML MDRD: ABNORMAL ML/MIN/{1.73_M2}
GLUCOSE BLD-MCNC: 102 MG/DL (ref 70–99)
MAGNESIUM: 1.9 MG/DL (ref 1.6–2.6)
POTASSIUM SERPL-SCNC: 3.2 MMOL/L (ref 3.7–5.3)
SODIUM BLD-SCNC: 143 MMOL/L (ref 135–144)

## 2019-05-14 PROCEDURE — 2700000000 HC OXYGEN THERAPY PER DAY

## 2019-05-14 PROCEDURE — 94640 AIRWAY INHALATION TREATMENT: CPT

## 2019-05-14 PROCEDURE — 2060000000 HC ICU INTERMEDIATE R&B

## 2019-05-14 PROCEDURE — 6370000000 HC RX 637 (ALT 250 FOR IP): Performed by: NURSE PRACTITIONER

## 2019-05-14 PROCEDURE — 6360000002 HC RX W HCPCS: Performed by: INTERNAL MEDICINE

## 2019-05-14 PROCEDURE — 80048 BASIC METABOLIC PNL TOTAL CA: CPT

## 2019-05-14 PROCEDURE — 6360000002 HC RX W HCPCS: Performed by: NURSE PRACTITIONER

## 2019-05-14 PROCEDURE — 99232 SBSQ HOSP IP/OBS MODERATE 35: CPT | Performed by: INTERNAL MEDICINE

## 2019-05-14 PROCEDURE — 36415 COLL VENOUS BLD VENIPUNCTURE: CPT

## 2019-05-14 PROCEDURE — 71045 X-RAY EXAM CHEST 1 VIEW: CPT

## 2019-05-14 PROCEDURE — 2580000003 HC RX 258: Performed by: NURSE PRACTITIONER

## 2019-05-14 PROCEDURE — 6370000000 HC RX 637 (ALT 250 FOR IP): Performed by: INTERNAL MEDICINE

## 2019-05-14 PROCEDURE — 83735 ASSAY OF MAGNESIUM: CPT

## 2019-05-14 PROCEDURE — 94762 N-INVAS EAR/PLS OXIMTRY CONT: CPT

## 2019-05-14 RX ORDER — ALBUTEROL SULFATE 2.5 MG/3ML
2.5 SOLUTION RESPIRATORY (INHALATION) EVERY 6 HOURS PRN
Status: DISCONTINUED | OUTPATIENT
Start: 2019-05-14 | End: 2019-05-14

## 2019-05-14 RX ORDER — POTASSIUM CHLORIDE 20 MEQ/1
40 TABLET, EXTENDED RELEASE ORAL PRN
Status: DISCONTINUED | OUTPATIENT
Start: 2019-05-14 | End: 2019-05-15 | Stop reason: HOSPADM

## 2019-05-14 RX ORDER — MAGNESIUM SULFATE 1 G/100ML
1 INJECTION INTRAVENOUS
Status: COMPLETED | OUTPATIENT
Start: 2019-05-14 | End: 2019-05-14

## 2019-05-14 RX ORDER — POTASSIUM CHLORIDE 7.45 MG/ML
10 INJECTION INTRAVENOUS PRN
Status: DISCONTINUED | OUTPATIENT
Start: 2019-05-14 | End: 2019-05-15 | Stop reason: HOSPADM

## 2019-05-14 RX ORDER — ALPRAZOLAM 0.25 MG/1
0.25 TABLET ORAL EVERY 4 HOURS PRN
Status: DISCONTINUED | OUTPATIENT
Start: 2019-05-14 | End: 2019-05-15 | Stop reason: HOSPADM

## 2019-05-14 RX ORDER — ALBUTEROL SULFATE 2.5 MG/3ML
2.5 SOLUTION RESPIRATORY (INHALATION)
Status: DISCONTINUED | OUTPATIENT
Start: 2019-05-14 | End: 2019-05-15 | Stop reason: HOSPADM

## 2019-05-14 RX ORDER — ALBUTEROL SULFATE 2.5 MG/3ML
2.5 SOLUTION RESPIRATORY (INHALATION)
Status: DISCONTINUED | OUTPATIENT
Start: 2019-05-14 | End: 2019-05-14

## 2019-05-14 RX ADMIN — RIVAROXABAN 15 MG: 15 TABLET, FILM COATED ORAL at 08:16

## 2019-05-14 RX ADMIN — RIVAROXABAN 15 MG: 15 TABLET, FILM COATED ORAL at 17:55

## 2019-05-14 RX ADMIN — MAGNESIUM SULFATE HEPTAHYDRATE 1 G: 1 INJECTION, SOLUTION INTRAVENOUS at 14:09

## 2019-05-14 RX ADMIN — METOCLOPRAMIDE 10 MG: 10 TABLET ORAL at 11:49

## 2019-05-14 RX ADMIN — Medication 10 ML: at 21:31

## 2019-05-14 RX ADMIN — ATORVASTATIN CALCIUM 80 MG: 80 TABLET, FILM COATED ORAL at 21:31

## 2019-05-14 RX ADMIN — OXYBUTYNIN CHLORIDE 5 MG: 5 TABLET ORAL at 21:31

## 2019-05-14 RX ADMIN — OXYBUTYNIN CHLORIDE 5 MG: 5 TABLET ORAL at 08:16

## 2019-05-14 RX ADMIN — METOCLOPRAMIDE 10 MG: 10 TABLET ORAL at 06:07

## 2019-05-14 RX ADMIN — LISINOPRIL 5 MG: 5 TABLET ORAL at 08:16

## 2019-05-14 RX ADMIN — SERTRALINE HYDROCHLORIDE 50 MG: 50 TABLET ORAL at 08:16

## 2019-05-14 RX ADMIN — METOPROLOL SUCCINATE 25 MG: 25 TABLET, FILM COATED, EXTENDED RELEASE ORAL at 08:16

## 2019-05-14 RX ADMIN — ALPRAZOLAM 0.25 MG: 0.25 TABLET ORAL at 11:49

## 2019-05-14 RX ADMIN — METOCLOPRAMIDE 10 MG: 10 TABLET ORAL at 21:31

## 2019-05-14 RX ADMIN — MULTIVITAMIN TABLET 1 TABLET: TABLET at 08:16

## 2019-05-14 RX ADMIN — OXYBUTYNIN CHLORIDE 5 MG: 5 TABLET ORAL at 13:01

## 2019-05-14 RX ADMIN — ALPRAZOLAM 0.25 MG: 0.25 TABLET ORAL at 01:56

## 2019-05-14 RX ADMIN — ALBUTEROL SULFATE 2.5 MG: 2.5 SOLUTION RESPIRATORY (INHALATION) at 02:06

## 2019-05-14 RX ADMIN — FUROSEMIDE 40 MG: 10 INJECTION, SOLUTION INTRAMUSCULAR; INTRAVENOUS at 08:17

## 2019-05-14 RX ADMIN — MAGNESIUM SULFATE HEPTAHYDRATE 1 G: 1 INJECTION, SOLUTION INTRAVENOUS at 13:02

## 2019-05-14 RX ADMIN — METOCLOPRAMIDE 10 MG: 10 TABLET ORAL at 17:54

## 2019-05-14 RX ADMIN — Medication 10 ML: at 08:17

## 2019-05-14 RX ADMIN — FUROSEMIDE 40 MG: 10 INJECTION, SOLUTION INTRAMUSCULAR; INTRAVENOUS at 17:54

## 2019-05-14 RX ADMIN — POTASSIUM CHLORIDE 40 MEQ: 20 TABLET, EXTENDED RELEASE ORAL at 08:17

## 2019-05-14 NOTE — CONSULTS
[facility]  Attending Consult Note      Reason for Consult:  CHF, Vtach  Requesting Physician:  Dr Valladares Sheila:  Shortness of breath    History Obtained From:  patient    HISTORY OF PRESENT ILLNESS:      The patient is a 76 y.o. female with significant past medical history of ischemic cardiomyopathy, CAD, recent bilateral PE. hypertension who presents with increased shortness of breath. She was discharged from [facility] with bilateral PEs. She was doing well when on Sunday she had worsening shortness of breath for 2 days. She presented to ER and chest xray showed bilateral infiltrates. She was admitted for further workup. She has nonproductive cough. Denies fever and chills. Denies chest pain, palpitations, dizziness, syncope and ICD shock. She was given IV Lasix and feels mildly improved. She has chronic leg swelling with lower extremity sores and are wrapped in ace wrap. She also had asymptomatic non-sustained vtach that was 4 beats long.     Past Medical History:    Past Medical History:   Diagnosis Date    Acute on chronic systolic congestive heart failure ([address] 8/8/2017    Anxiety     Cardiomyopathy ([facility] [address]     Depression     H/O heart artery stent     [place] (hard of hearing)     Hyperlipidemia     Hypertension     Hypertensive urgency 8/7/2017    Leg swelling 8/7/2017     Past Surgical History:    Past Surgical History:   Procedure Laterality Date    CARDIAC DEFIBRILLATOR PLACEMENT      CATARACT REMOVAL      CORNEAL TRANSPLANT      EYE SURGERY      NASAL SINUS SURGERY      TONSILLECTOMY AND ADENOIDECTOMY       Current Medications:    Medications Prior to Admission: metoclopramide (REGLAN) 10 MG tablet, Take 1 tablet by mouth 4 times daily (before meals and nightly) for 7 days  rivaroxaban (XARELTO) 15 MG TABS tablet, Take 1 tablet by mouth 2 times daily (with meals)  furosemide (LASIX) 20 MG tablet, Take 1 tablet by mouth 2 times daily  ondansetron (ZOFRAN-ODT) 4 MG disintegrating tablet, Take 1 tablet by mouth every 6 hours as needed for Nausea  Multiple Vitamin (MULTIVITAMIN) tablet, Take 1 tablet by mouth daily  oxybutynin (DITROPAN) 5 MG tablet, Take 1 tablet by mouth 3 times daily  sertraline (ZOLOFT) 50 MG tablet, Take 1 tablet by mouth daily  potassium chloride (MICRO-K) 10 MEQ extended release capsule, Take 2 capsules by mouth daily  atorvastatin (LIPITOR) 80 MG tablet, Take 1 tablet by mouth nightly  metoprolol succinate (TOPROL XL) 25 MG extended release tablet, Take 1 tablet by mouth daily  Allergies:    Patient has no known allergies. Social History:     reports that she has never smoked. She has never used smokeless tobacco. She reports that she does not drink alcohol or use drugs. Family History:   family history is not on file.     REVIEW OF SYSTEMS:    CONSTITUTIONAL:  negative for  fevers and chills  HEENT:  negative for  nasal congestion and sore throat  RESPIRATORY:  positive for  dry cough and dyspnea  CARDIOVASCULAR:  Negative chest pain, palpitations  GASTROINTESTINAL:  negative for nausea, vomiting and diarrhea  MUSCULOSKELETAL:  negative for  myalgias and arthralgias + edema  NEUROLOGICAL:  negative for syncope, dizziness  BEHAVIOR/PSYCH:  negative    PHYSICAL EXAM:      Vitals:    /79   Pulse 67   Temp 97.3 °F (36.3 °C) (Oral)   Resp 20   Ht 5' 2\" (1.575 m)   Wt 182 lb 8 oz (82.8 kg)   SpO2 98%   BMI 33.38 kg/m²   CONSTITUTIONAL:  awake, alert, cooperative, no apparent distress, and appears stated age  ENT:  normocepalic, without obvious abnormality  NECK:  supple, symmetrical, trachea midline  LUNGS:  Diminished throughout, good effort, non-labored breathing  CARDIOVASCULAR:  Normal apical impulse, regular rate and rhythm, normal S1 and S2, no S3 or S4, and no murmur noted  ABDOMEN:  No scars, normal bowel sounds, soft, non-distended, non-tender, no masses palpated, no hepatosplenomegally  MUSCULOSKELETAL:  +1 BLE edema, bilateral ace wraps to lower extremities  NEUROLOGIC:  Awake, alert, oriented to name, place and time. SKIN:  no bruising or bleeding    DATA:    LABS:  Lab Results   Component Value Date    WBC 7.0 05/13/2019    HGB 10.8 05/13/2019     05/13/2019     Lab Results   Component Value Date     05/14/2019    K 3.2 05/14/2019     05/14/2019    CO2 31 05/14/2019    BUN 24 05/14/2019    CREATININE 0.95 05/14/2019    GFRAA >60 05/14/2019    GLUCOSE 102 05/14/2019    AST 29 05/12/2019    ALT 27 05/12/2019     Lab Results   Component Value Date    PROTIME 13.6 04/30/2019    INR 1.3 04/30/2019     Recent Labs     05/12/19  2343 05/13/19  0050 05/13/19  0441   TROPONINT NOT REPORTED NOT REPORTED NOT REPORTED     Lab Results   Component Value Date    MG 1.9 05/14/2019     Lab Results   Component Value Date    LABA1C 5.8 05/13/2019     Lab Results   Component Value Date    TRIG 106 05/13/2019    HDL 36 05/13/2019     No results found for: BNP  Lab Results   Component Value Date    DDIMER 16.71 04/29/2019       ECG: I have reviewed EKG with the following interpretation: atrial fibrillation    IMPRESSION/RECOMMENDATIONS:      1. Persistent atrial fibrillation  2. Ischemic cardiomyopathy ef 25%  3. Coronary artery disease with angina, hx PCI to LAD 10/2018  4. ICD in situ  5. Non-sustained ventricular tachycardia- asymptomatic, has ICD  6. Acute on chronic systolic congestive heart failure  7. Lymphedema  8. Right upper and lower lobe pulmonary embolism  9. Recent subdural hematoma  10.  Essential hypertension       Continue Lasix 40IV BID, Lipitor 80mg daily, lisinopril 5mg daily, Toprol 25mg daily, Xarelto 15mg daily  Follow I/O, renal function  Continue supportive care  Will discuss with Dr Nola Kern    Electronically signed by PRICILA Elmore CNP on 5/14/2019 at 2:44 PM

## 2019-05-14 NOTE — PROGRESS NOTES
[place], [facility] Assessment complete. Congestive heart failure, unspecified HF chronicity, unspecified heart failure type ([address] [I50.9]  Acute congestive heart failure, unspecified heart failure type ([facility] [I50.9] . Vitals:    05/14/19 0210   BP:    Pulse:    Resp:    Temp:    SpO2: 96%   . Patients home meds are   Prior to Admission medications    Medication Sig Start Date End Date Taking? Authorizing Provider   metoclopramide (REGLAN) 10 MG tablet Take 1 tablet by mouth 4 times daily (before meals and nightly) for 7 days 5/3/19 5/10/19  Mery Alonso MD   rivaroxaban (Cleaster Salinas) 15 MG TABS tablet Take 1 tablet by mouth 2 times daily (with meals) 5/2/19   Mery Alonso MD   furosemide (LASIX) 20 MG tablet Take 1 tablet by mouth 2 times daily 4/4/19   Kynug Huynh MD   ondansetron (ZOFRAN-ODT) 4 MG disintegrating tablet Take 1 tablet by mouth every 6 hours as needed for Nausea 4/4/19   Elana Maldonado MD   Multiple Vitamin (MULTIVITAMIN) tablet Take 1 tablet by mouth daily 4/4/19   Elana Maldonado MD   oxybutynin (DITROPAN) 5 MG tablet Take 1 tablet by mouth 3 times daily 4/4/19   Elana Maldonado MD   sertraline (ZOLOFT) 50 MG tablet Take 1 tablet by mouth daily 4/4/19   Elana Maldonado MD   potassium chloride (MICRO-K) 10 MEQ extended release capsule Take 2 capsules by mouth daily 4/4/19   Elana Maldonado MD   atorvastatin (LIPITOR) 80 MG tablet Take 1 tablet by mouth nightly 4/4/19   Elana Maldonado MD   metoprolol succinate (TOPROL XL) 25 MG extended release tablet Take 1 tablet by mouth daily 3/30/19   Blanche Garcia DO   .   Recent Surgical History: None = 0     Assessment     Peak Flow (asthma only)    Predicted: na  Personal Best: 189    % Predicted 71  Peak Flow : 70-79% = 1    FEV1/FVC    FEV1 Predicted na      FEV1 1.7    FEV1 % Predicted 70  FVC na  IS volume na  IBW na  FIO2% 3l  SPO2 97  RR 14  Breath Sounds: Clear with some cracles      · Bronchodilator assessment at level  1  · Hyperinflation []  Strong, productive cough []  Weak productive cough []  No cough or weak non-productive cough

## 2019-05-14 NOTE — PROGRESS NOTES
[facility]    Progress Note    5/14/2019    12:02 PM    Name:   Mario Ulrich  MRN:     [id number]     [place]:      [de-identified]   Room:   [address] [place] [address] Day:  2  Admit Date:  5/12/2019  8:50 PM    PCP:   No primary care provider on file. Code Status:  Full Code    Subjective:     C/C:   Chief Complaint   Patient presents with    Shortness of Breath     Interval History Status:    Was feeling better yesterday evening with IV diuretics  . Again feeling mild shortness of breath today  Had second episode of 7 beat V. tach run just now  Denies pain anywhere        Brief History:     Admitted through ER with shortness of breath which has worsened in the last 1-2 days  She was discharged from [place] [facility] on May 6, 2019 when she was admitted with PE and shortness of breath due to CHF, was sent to skilled facility and has been discharged from there in the last few days  During that admission she was restarted on Xarelto which has had in the previous admission due to subdural hematoma  She also has swelling and redness of both lower extremities along with lymphedema, during admission [facility] it improved with good IV.  diuretic therapy        Review of Systems:     Constitutional:  negative for chills, fevers, sweats  Respiratory:  negative for cough, + shortness of breath, denies wheezing  Cardiovascular:  negative for chest pain, chest pressure/discomfort,+ lower extremity edema, denies palpitations  Gastrointestinal:  negative for abdominal pain, constipation, diarrhea, nausea, vomiting  Neurological:  negative for dizziness, headache    Medications:      Allergies:  No Known Allergies    Current Meds:   Scheduled Meds:    magnesium sulfate  2 g Intravenous Once    atorvastatin  80 mg Oral Nightly    metoclopramide  10 mg Oral 4x Daily AC & HS    metoprolol succinate  25 mg Oral Daily    multivitamin  1 tablet Oral Daily    oxybutynin  5 mg Oral TID    rivaroxaban  15 mg Oral BID WC    sertraline  50 mg Oral Daily    sodium chloride flush  10 mL Intravenous 2 times per day    lisinopril  5 mg Oral Daily    furosemide  40 mg Intravenous BID     Continuous Infusions:    dextrose       PRN Meds: ALPRAZolam, albuterol, potassium chloride **OR** potassium alternative oral replacement **OR** potassium chloride, sodium chloride flush, magnesium hydroxide, nicotine, acetaminophen, glucose, dextrose, glucagon (rDNA), dextrose, ondansetron **OR** ondansetron    Data:     Past Medical History:   has a past medical history of Acute on chronic systolic congestive heart failure ([facility] Anxiety, Cardiomyopathy ([facility] Depression, H/O heart artery stent, [place] (hard of hearing), Hyperlipidemia, Hypertension, Hypertensive urgency, and Leg swelling. Social History:   reports that she has never smoked. She has never used smokeless tobacco. She reports that she does not drink alcohol or use drugs. Family History: History reviewed. No pertinent family history. Vitals:  /79   Pulse 67   Temp 97.3 °F (36.3 °C) (Oral)   Resp 20   Ht 5' 2\" (1.575 m)   Wt 182 lb 8 oz (82.8 kg)   SpO2 98%   BMI 33.38 kg/m²   Temp (24hrs), Av.8 °F (36.6 °C), Min:97.1 °F (36.2 °C), Max:98.6 °F (37 °C)    Recent Labs     19  0114 19  0906 19  1205   POCGLU 129* 175* 88       I/O (24Hr):     Intake/Output Summary (Last 24 hours) at 2019 1202  Last data filed at 2019 1153  Gross per 24 hour   Intake --   Output 1600 ml   Net -1600 ml       Labs:    Hematology:  Recent Labs     19  044   WBC 8.3 7.0   HGB 11.9* 10.8*   HCT 38.9 37.0    259     Chemistry:  Recent Labs     19  2343 19  0441 19  0549     --  143 143   K 3.7  --  3.5* 3.2*     --  101 100   CO2 24  --  28 31   GLUCOSE 178*  --  123* 102*   BUN 22  --  21 24*   CREATININE 0.90  --  0.87 0.95*   MG  --   -- heart failure (HCC) [I50.23]      Priority: High    Pulmonary embolism, bilateral ([address] [I26.99] 04/29/2019     Priority: Medium    Chronic atrial fibrillation ([address] [I48.2] 04/03/2019     Priority: Medium    S/P implantation of automatic cardioverter/defibrillator (AICD) [Z95.810] 04/03/2019     Priority: Medium    Lymphedema of both lower extremities [I89.0] 03/21/2019     Priority: Medium    History of subdural hematoma [Z86.79] 05/13/2019    Congestive heart failure ([address] [I50.9] 05/12/2019    Dyslipidemia [E78.5] 04/03/2019    Coronary artery disease involving native coronary artery of native heart without angina pectoris [I25.10] 03/21/2019       Plan:        1. Magnesium 2 g IV now  2. Consult cardiology  3. Continue IV diuresis for today    DME for wheeled walker with seat  Reji Banks was evaluated today and a DME order was entered for a wheeled walker because she requires this to successfully complete daily living tasks of personal cares and ambulating. A wheeled walker with seat is necessary due to the patient's unsteady gait, upper body weakness, and inability to  an ambulation device; and she can ambulate only by pushing a walker instead of a lesser assistive device such as a cane, crutch, or standard walker. The need for this equipment was discussed with the patient and she understands and is in agreement.     Teodoro Arneas MD  5/14/2019  12:02 PM

## 2019-05-14 NOTE — PLAN OF CARE
Problem: Falls - Risk of:  Goal: Will remain free from falls  Description  Will remain free from falls  Outcome: Ongoing  Note:   Pt fall risk, fall band present, falling star, safety alarm activated and in use as needed. Hourly rounding performed. Pt encouraged to use call light. See Valdo Mclean fall risk assessment. Goal: Absence of physical injury  Description  Absence of physical injury  Outcome: Ongoing  Note:   Non-skid socks in place, up with assistance, bed in lowest position, bed exit & alarm as needed, provide toileting every 2 hours an d as needed. Problem: Pain:  Goal: Pain level will decrease  Description  Pain level will decrease  Outcome: Ongoing  Note:   Monitoring pain with each assessment and prn. DRAKE 0-10 pain scale utilized. Non-pharmacological measures to be encouraged prior to pharmacological measures. Goal: Control of acute pain  Description  Control of acute pain  Outcome: Ongoing  Goal: Control of chronic pain  Description  Control of chronic pain  Outcome: Ongoing     Problem: Breathing Pattern - Ineffective:  Goal: Ability to achieve and maintain a regular respiratory rate will improve  Description  Ability to achieve and maintain a regular respiratory rate will improve  Outcome: Ongoing  Note:   Assess for adventitious breath sounds. Monitored SaO2 > 90%. Applied 02 per nasal cannula as needed. Elevated HOB to improve breathing as needed.

## 2019-05-14 NOTE — PLAN OF CARE
Problem: Falls - Risk of:  Goal: Will remain free from falls  Description  Will remain free from falls  5/14/2019 1001 by Srinivasan Watts RN  Outcome: Ongoing  Note:   Pt fall risk, fall band present, falling star, safety alarm activated and in use as needed. Hourly rounding performed. Pt encouraged to use call light. See Yolanda Scott fall risk assessment. Will continue to monitor patient closely. 5/14/2019 0230 by Jason Escobar RN  Outcome: Ongoing  Note:   Pt fall risk, fall band present, falling star, safety alarm activated and in use as needed. Hourly rounding performed. Pt encouraged to use call light. See Yolanda Scott fall risk assessment. Goal: Absence of physical injury  Description  Absence of physical injury  5/14/2019 1001 by Srinivasan Watts RN  Outcome: Ongoing  5/14/2019 0230 by Jason Escobar RN  Outcome: Ongoing  Note:   Non-skid socks in place, up with assistance, bed in lowest position, bed exit & alarm as needed, provide toileting every 2 hours an d as needed. Problem: Pain:  Goal: Pain level will decrease  Description  Pain level will decrease  5/14/2019 1001 by Srinivasan Watts RN  Outcome: Ongoing  5/14/2019 0230 by Jason Escobar RN  Outcome: Ongoing  Note:   Monitoring pain with each assessment and prn. DRAKE 0-10 pain scale utilized. Non-pharmacological measures to be encouraged prior to pharmacological measures.     Goal: Control of acute pain  Description  Control of acute pain  5/14/2019 1001 by Srinivasan Watts RN  Outcome: Ongoing  5/14/2019 0230 by Jason Escobar RN  Outcome: Ongoing  Goal: Control of chronic pain  Description  Control of chronic pain  5/14/2019 1001 by Srinivasan Watts RN  Outcome: Ongoing  5/14/2019 0230 by Jason Escobar RN  Outcome: Ongoing     Problem: Breathing Pattern - Ineffective:  Goal: Ability to achieve and maintain a regular respiratory rate will improve  Description  Ability to achieve and maintain a regular respiratory rate will improve  5/14/2019 1001 by Cher Barnes, RN  Outcome: Ongoing  Note:   Patient remains on 3L NC, will continue to monitor patient closely. 5/14/2019 0230 by Alba Ornelas RN  Outcome: Ongoing  Note:   Assess for adventitious breath sounds. Monitored SaO2 > 90%. Applied 02 per nasal cannula as needed. Elevated HOB to improve breathing as needed.

## 2019-05-14 NOTE — PROGRESS NOTES
DATE: 2019    NAME: Shira Hudson  MRN: [id number]   : 1950    Patient not seen this date for Physical Therapy due to:  [] Blood transfusion in progress  [x] Cancel by RN pt SOB  [] Hemodialysis  []  Refusal by Patient   [] Spine Precautions   [] Strict Bedrest  [] Surgery  [] Testing      [] Other        [] PT being discontinued at this time. Patient independent. No further needs. [] PT being discontinued at this time as the patient has been transferred to hospice care. No further needs.     REG VALDEZ, PTA

## 2019-05-14 NOTE — PROGRESS NOTES
Pt had 8-beat run of V-tach on monitor, then returns to steady a-fib in the 80's to low 100's. Vital signs stable. Pt has AICD. Pt denies CP or palpitations, and then falls back to sleep. EDYTA Parra NP, notified. No orders received at this time. Will continue to monitor.

## 2019-05-15 VITALS
HEIGHT: 62 IN | RESPIRATION RATE: 16 BRPM | DIASTOLIC BLOOD PRESSURE: 63 MMHG | HEART RATE: 70 BPM | OXYGEN SATURATION: 93 % | SYSTOLIC BLOOD PRESSURE: 136 MMHG | WEIGHT: 182.5 LBS | BODY MASS INDEX: 33.58 KG/M2 | TEMPERATURE: 97.7 F

## 2019-05-15 LAB
ANION GAP SERPL CALCULATED.3IONS-SCNC: 12 MMOL/L (ref 9–17)
BNP INTERPRETATION: ABNORMAL
BUN BLDV-MCNC: 24 MG/DL (ref 8–23)
BUN/CREAT BLD: 26 (ref 9–20)
CALCIUM SERPL-MCNC: 8.6 MG/DL (ref 8.6–10.4)
CHLORIDE BLD-SCNC: 100 MMOL/L (ref 98–107)
CO2: 30 MMOL/L (ref 20–31)
CREAT SERPL-MCNC: 0.92 MG/DL (ref 0.5–0.9)
GFR AFRICAN AMERICAN: >60 ML/MIN
GFR NON-AFRICAN AMERICAN: >60 ML/MIN
GFR SERPL CREATININE-BSD FRML MDRD: ABNORMAL ML/MIN/{1.73_M2}
GFR SERPL CREATININE-BSD FRML MDRD: ABNORMAL ML/MIN/{1.73_M2}
GLUCOSE BLD-MCNC: 107 MG/DL (ref 70–99)
POTASSIUM SERPL-SCNC: 3.6 MMOL/L (ref 3.7–5.3)
PRO-BNP: 4998 PG/ML
SODIUM BLD-SCNC: 142 MMOL/L (ref 135–144)

## 2019-05-15 PROCEDURE — 80048 BASIC METABOLIC PNL TOTAL CA: CPT

## 2019-05-15 PROCEDURE — 36415 COLL VENOUS BLD VENIPUNCTURE: CPT

## 2019-05-15 PROCEDURE — 97535 SELF CARE MNGMENT TRAINING: CPT

## 2019-05-15 PROCEDURE — 6370000000 HC RX 637 (ALT 250 FOR IP): Performed by: NURSE PRACTITIONER

## 2019-05-15 PROCEDURE — 2580000003 HC RX 258: Performed by: NURSE PRACTITIONER

## 2019-05-15 PROCEDURE — 97530 THERAPEUTIC ACTIVITIES: CPT

## 2019-05-15 PROCEDURE — 83880 ASSAY OF NATRIURETIC PEPTIDE: CPT

## 2019-05-15 PROCEDURE — 6360000002 HC RX W HCPCS: Performed by: INTERNAL MEDICINE

## 2019-05-15 PROCEDURE — 6370000000 HC RX 637 (ALT 250 FOR IP): Performed by: INTERNAL MEDICINE

## 2019-05-15 PROCEDURE — 94761 N-INVAS EAR/PLS OXIMETRY MLT: CPT

## 2019-05-15 PROCEDURE — 97127 HC OT THER IVNTJ W/FOCUS COG FUNCJ: CPT

## 2019-05-15 PROCEDURE — 97116 GAIT TRAINING THERAPY: CPT

## 2019-05-15 PROCEDURE — 99239 HOSP IP/OBS DSCHRG MGMT >30: CPT | Performed by: INTERNAL MEDICINE

## 2019-05-15 PROCEDURE — 97110 THERAPEUTIC EXERCISES: CPT

## 2019-05-15 RX ORDER — SPIRONOLACTONE 25 MG/1
25 TABLET ORAL DAILY
Qty: 30 TABLET | Refills: 3 | Status: ON HOLD | OUTPATIENT
Start: 2019-05-16 | End: 2019-08-22 | Stop reason: HOSPADM

## 2019-05-15 RX ORDER — ALPRAZOLAM 0.25 MG/1
0.25 TABLET ORAL 3 TIMES DAILY PRN
Qty: 10 TABLET | Refills: 0 | Status: SHIPPED | OUTPATIENT
Start: 2019-05-15 | End: 2019-05-18

## 2019-05-15 RX ORDER — FUROSEMIDE 10 MG/ML
40 INJECTION INTRAMUSCULAR; INTRAVENOUS DAILY
Status: DISCONTINUED | OUTPATIENT
Start: 2019-05-15 | End: 2019-05-15 | Stop reason: HOSPADM

## 2019-05-15 RX ORDER — SPIRONOLACTONE 25 MG/1
25 TABLET ORAL DAILY
Status: DISCONTINUED | OUTPATIENT
Start: 2019-05-15 | End: 2019-05-15 | Stop reason: HOSPADM

## 2019-05-15 RX ADMIN — MULTIVITAMIN TABLET 1 TABLET: TABLET at 10:09

## 2019-05-15 RX ADMIN — FUROSEMIDE 40 MG: 10 INJECTION, SOLUTION INTRAMUSCULAR; INTRAVENOUS at 11:50

## 2019-05-15 RX ADMIN — OXYBUTYNIN CHLORIDE 5 MG: 5 TABLET ORAL at 15:33

## 2019-05-15 RX ADMIN — SPIRONOLACTONE 25 MG: 25 TABLET ORAL at 10:09

## 2019-05-15 RX ADMIN — METOPROLOL SUCCINATE 25 MG: 25 TABLET, FILM COATED, EXTENDED RELEASE ORAL at 10:09

## 2019-05-15 RX ADMIN — SERTRALINE HYDROCHLORIDE 50 MG: 50 TABLET ORAL at 10:09

## 2019-05-15 RX ADMIN — METOCLOPRAMIDE 10 MG: 10 TABLET ORAL at 06:04

## 2019-05-15 RX ADMIN — Medication 10 ML: at 10:16

## 2019-05-15 RX ADMIN — METOCLOPRAMIDE 10 MG: 10 TABLET ORAL at 10:09

## 2019-05-15 RX ADMIN — OXYBUTYNIN CHLORIDE 5 MG: 5 TABLET ORAL at 10:09

## 2019-05-15 RX ADMIN — RIVAROXABAN 15 MG: 15 TABLET, FILM COATED ORAL at 10:09

## 2019-05-15 ASSESSMENT — PAIN DESCRIPTION - PROGRESSION: CLINICAL_PROGRESSION: NOT CHANGED

## 2019-05-15 ASSESSMENT — PAIN DESCRIPTION - LOCATION
LOCATION: BACK

## 2019-05-15 ASSESSMENT — PAIN SCALES - GENERAL
PAINLEVEL_OUTOF10: 3
PAINLEVEL_OUTOF10: 3
PAINLEVEL_OUTOF10: 5

## 2019-05-15 ASSESSMENT — PAIN DESCRIPTION - DESCRIPTORS: DESCRIPTORS: SORE

## 2019-05-15 ASSESSMENT — PAIN DESCRIPTION - ORIENTATION
ORIENTATION: LOWER;MID

## 2019-05-15 ASSESSMENT — PAIN DESCRIPTION - PAIN TYPE
TYPE: CHRONIC PAIN

## 2019-05-15 ASSESSMENT — PAIN DESCRIPTION - ONSET: ONSET: PROGRESSIVE

## 2019-05-15 ASSESSMENT — PAIN - FUNCTIONAL ASSESSMENT: PAIN_FUNCTIONAL_ASSESSMENT: ACTIVITIES ARE NOT PREVENTED

## 2019-05-15 ASSESSMENT — PAIN DESCRIPTION - FREQUENCY: FREQUENCY: INTERMITTENT

## 2019-05-15 NOTE — PROGRESS NOTES
Occupational Therapy  Facility/Department: [facility] PROGRESSIVE CARE  Daily Treatment Note  NAME: Loren Noyola  : 1950  MRN: [id number]    Date of Service: 5/15/2019    Discharge Recommendations:  Home with nursing aide, Home with Home health OT, Home with assist PRN       Assessment   Performance deficits / Impairments: Decreased functional mobility ; Decreased endurance;Decreased ADL status; Decreased safe awareness;Decreased high-level IADLs;Decreased balance  Assessment: Skilled OT is warranted to improve overall I and safety with functional performance to return home with family assist as needed. Prognosis: Fair  Patient Education: Writer issued pt handouts on EC/WS, fall prevention, home/community safety, breathing techniques and HEP  REQUIRES OT FOLLOW UP: Yes  Activity Tolerance  Activity Tolerance: Patient Tolerated treatment well  Activity Tolerance: fair  Safety Devices  Safety Devices in place: Yes  Type of devices: Patient at risk for falls; Bed alarm in place;Call light within reach; Left in bed;Nurse notified;Gait belt         Patient Diagnosis(es): The primary encounter diagnosis was Acute congestive heart failure, unspecified heart failure type ([address] A diagnosis of Dyspnea, unspecified type was also pertinent to this visit. has a past medical history of Acute on chronic systolic congestive heart failure ([address] Anxiety, Cardiomyopathy ([address] Depression, H/O heart artery stent, [place] (hard of hearing), Hyperlipidemia, Hypertension, Hypertensive urgency, and Leg swelling.   has a past surgical history that includes Cataract removal; Corneal transplant; Nasal sinus surgery; eye surgery; Tonsillectomy and adenoidectomy; and Cardiac defibrillator placement.     Restrictions  Restrictions/Precautions  Restrictions/Precautions: Fall Risk, General Precautions  Required Braces or Orthoses?: No  Implants present? : (defibrillator )  Position Activity Restriction  Other position/activity restrictions: up with assist, telemetry, 1500mL fluid restriction, O2, NC@ 3L/min  Subjective   General  Chart Reviewed: Yes  Patient assessed for rehabilitation services?: Yes  Response to previous treatment: Patient with no complaints from previous session  Family / Caregiver Present: No  Pain Assessment  Pain Assessment: 0-10  Pain Level: 5  Pain Type: Chronic pain  Pain Location: Back  Pain Orientation: Lower;Mid  Vital Signs  Patient Currently in Pain: Yes   Orientation  Orientation  Overall Orientation Status: Within Functional Limits  Objective    ADL  Feeding: Independent  Grooming: Setup;Contact guard assistance(pt brushed teeth seated EOB, stood at sink to comb hair with CGA)  UE Bathing: Setup;Supervision(seated EOB)  LE Bathing: Setup;Minimal assistance  UE Dressing: Setup;Minimal assistance  LE Dressing: Setup;Minimal assistance        Balance  Sitting Balance: Supervision  Standing Balance: Stand by assistance  Standing Balance  Time: stand cyrus 2-3 mins with self care and functional tasks   Functional Mobility  Functional - Mobility Device: Wheelchair  Activity: To/from bathroom  Assist Level: Contact guard assistance  Functional Mobility Comments: verbal instruction needed to slow dowm movements/pace self, pursed lip breathing, pt on room air during functional mob into bathroom and seated EOB, SPO2 83-97%, pt gets anxious easily  Bed mobility  Supine to Sit: Supervision  Sit to Supine: Supervision  Scooting: Supervision  Transfers  Stand Step Transfers: Contact guard assistance  Sit to stand: Stand by assistance  Stand to sit: Stand by assistance  Transfer Comments: verbal cues for safety                       Cognition  Overall Cognitive Status: Exceptions  Arousal/Alertness: Appropriate responses to stimuli  Following Commands:  Follows all commands without difficulty  Attention Span: Appears intact  Memory: Decreased short term memory  Safety Judgement: Decreased awareness of need for assistance;Decreased awareness of need for safety  Problem Solving: Assistance required to identify errors made;Assistance required to correct errors made;Decreased awareness of errors  Insights: Decreased awareness of deficits  Initiation: Requires cues for some  Sequencing: Requires cues for some  Cognition Comment: Pt really focused on oxygen needs, pt does not want O2 at home. Writer educated pt on diagnosis and the need for O2 at this time, pt verbalized understanding. Pt gets anxious easily. MMSE completed with a score of 30/30. Perception  Overall Perceptual Status: [facility]                                   Plan   Plan  Times per week: 3-5x/week 1x/day as cyrus   Current Treatment Recommendations: Strengthening, Balance Training, Neuromuscular Re-education, Safety Education & Training, Self-Care / ADL, Equipment Evaluation, Education, & procurement, Pain Management, Endurance Training, Functional Mobility Training, Patient/Caregiver Education & Training, Home Management Training                        AM-PAC Score             Goals  Short term goals  Time Frame for Short term goals: by discharge, pt to demo   Short term goal 1: I with BUE HEP with hand outs as needed to maintain strength. Short term goal 2: bed mob tasks with use of rail as needed to MOD I/I. Short term goal 3: total body ADL tasks including toileting with use of AE/AD and grab bar as needed to MOD I. Short term goal 4: ADL transfers and functional mob with AD as needed to MOD I/I. Short term goal 5: balance to SUP with AD as needed and cyrus > 5 mins to reduce fall risk for self care. Long term goals  Long term goal 1: Pt to be I with pursed lip breathing, EC/WS and fall prevention tech with hand outs as needed. Patient Goals   Patient goals : Breathe better and do more for myself!        Therapy Time   Individual Concurrent Group Co-treatment   Time In 9624         Time Out 0926         Minutes 58             Writer began education/demo on HEP with good return demo by patient, will continue to address at next visit.     Sherrill Mac JOSE MIGUEL

## 2019-05-15 NOTE — PROGRESS NOTES
[facility]   Section of Cardiology  Progress Note      Date:  5/15/2019  Patient: Brittany Courser  Admission:  5/12/2019  8:50 PM  Admit DX: Congestive heart failure, unspecified HF chronicity, unspecified heart failure type ([address] [I50.9]  Acute congestive heart failure, unspecified heart failure type ([address] [I50.9]  Age:  76 y.o., 1950                           LOS: 3 days     Reason for evaluation:   cardiomyopathy and CHF  Non sustained ventricular tachycardia. SUBJECTIVE:     The patient was seen and examined. Notes and labs reviewed. There were complications over night. Patient had episodes of non sustained ventricular tachycardia. She did not have any ICD shocks. She is doing much better. Continues to have shortness of breath with minimal exertion. Device site is clean and well healed. OBJECTIVE:    /71   Pulse 69   Temp 97.6 °F (36.4 °C) (Oral)   Resp 16   Ht 5' 2\" (1.575 m)   Wt 182 lb 8 oz (82.8 kg)   SpO2 97%   BMI 33.38 kg/m²     Intake/Output Summary (Last 24 hours) at 5/15/2019 1920  Last data filed at 5/14/2019 2113  Gross per 24 hour   Intake --   Output 1900 ml   Net -1900 ml       EXAM:   CONSTITUTIONAL:  awake, alert, cooperative, no apparent distress, and appears stated age. HEENT: Normal jugular venous pulsations, no carotid bruits. Head is atraumatic, normocephalic. Eyes and oral mucosa are normal.  LUNGS: Good respiratory effort. No for increased work of breathing. On auscultation:bilateral crepitations and crackles. CARDIOVASCULAR:  Normal apical impulse, irregular rate and rhythm, normal S1 and S2, no S3 or S4, and no murmur or rub noted. ABDOMEN: Soft, nontender, nondistended. Bowel sounds present. No masses or tenderness. SKIN: Warm and dry. EXTREMITIES: 1+ lower extremity edema. Motor movement grossly intact. No cyanosis or clubbing.     Current Inpatient Medications:   atorvastatin  80 mg Oral Nightly    metoclopramide  10 mg Oral 4x Daily AC & HS    metoprolol succinate  25 mg Oral Daily    multivitamin  1 tablet Oral Daily    oxybutynin  5 mg Oral TID    rivaroxaban  15 mg Oral BID WC    sertraline  50 mg Oral Daily    sodium chloride flush  10 mL Intravenous 2 times per day    lisinopril  5 mg Oral Daily    furosemide  40 mg Intravenous BID       IV Infusions (if any):   dextrose         Diagnostics:   Telemetry: Atrial fibrillation  EKG: atrial fibrillation, rate 80, unchanged from previous tracings. ECHO: reviewed. Ejection fraction: 25%      Labs:   CBC:   Recent Labs     05/12/19  2105 05/13/19  0441   WBC 8.3 7.0   HGB 11.9* 10.8*   HCT 38.9 37.0    259     BMP:   Recent Labs     05/14/19  0549 05/15/19  0634    142   K 3.2* 3.6*   CO2 31 30   BUN 24* 24*   CREATININE 0.95* 0.92*   LABGLOM 59* >60   GLUCOSE 102* 107*     BNP: No results for input(s): BNP in the last 72 hours. PT/INR: No results for input(s): PROTIME, INR in the last 72 hours. APTT:No results for input(s): APTT in the last 72 hours. CARDIAC ENZYMES:No results for input(s): CKTOTAL, CKMB, CKMBINDEX, TROPONINI in the last 72 hours.   FASTING LIPID PANEL:  Lab Results   Component Value Date    HDL 36 05/13/2019    TRIG 106 05/13/2019     LIVER PROFILE:  Recent Labs     05/12/19  2105   AST 29   ALT 27   LABALBU 3.8       ASSESSMENT:    Patient Active Problem List   Diagnosis    Leg swelling    Hypertensive urgency    Acute on chronic systolic congestive heart failure (HCC)    Acute on chronic systolic congestive heart failure ([facility]    Lymphedema of both lower extremities    Cellulitis of right leg    Coronary artery disease involving native coronary artery of native heart without angina pectoris    Major depressive disorder    [facility]oxysmal [facility] fibrillation ([facility]    NSTEMI (non-ST elevated myocardial infarction) ([facility]    Acute on chronic congestive heart failure ([facility])    Panic disorder    SDH (subdural hematoma) ([facility]    Hypotension    Dizziness    Dyslipidemia    Chronic systolic heart failure (HCC)    Coronary atherosclerosis of native coronary artery    Hypertension    Chronic atrial fibrillation (HCC)    S/P implantation of automatic cardioverter/defibrillator (AICD)    History of percutaneous coronary intervention    Pulmonary embolism, bilateral (HCC)    Cellulitis    Venous stasis dermatitis of both lower extremities    Congestive heart failure (HCC)    Acute congestive heart failure (HCC)    History of subdural hematoma       PLAN:    1. Persistent atrial fibrillation  2. Ischemic cardiomyopathy ef 25%  3. Coronary artery disease with angina, hx PCI to LAD 10/2018  4. ICD in situ  5. Non-sustained ventricular tachycardia- asymptomatic, has ICD  6. Acute on chronic systolic congestive heart failure  7. Lymphedema  8. Right upper and lower lobe pulmonary embolism  9. Recent subdural hematoma  10. Essential hypertension      1. Patient has severe cardiomyopathy and heart failure. She will continue aggressive diuresis. I believe that she will need atrial fibrillation ablation to improve her cardiac symptoms. I will start aldactone 25mg po daily. Will also change lisinopril to entresto to improve her cardiac symptoms. She has episodes of nsvt but this has not sustained. She also has a Defibrillator. Her atrial lead is dislodged but that is not an issue due to the presence of persistent atrial fibrillation. If she is converted to sinus rhythm and needs pacing,then lead revision will be done in the future. Will follow. Please see orders. Discussed with patient and nursing.     Eros Brown MD

## 2019-05-15 NOTE — PROGRESS NOTES
Non-sustained V-tach at 2347 (5 beat run), & at 2357 (4-beat run). BP = 118/71, HR = 69, steady afib with AICD. Denies CP or palpitations. No distress noted. PerfectServe message to Dr. Haja Foster & informed pt received two grams of mag, for magnesium of 1.9 earlier today. Informed by Dr. Haja Foster, he will see pt in the morning. No orders at this time. Will continue to monitor.

## 2019-05-15 NOTE — DISCHARGE INSTR - ACTIVITY
YOU HAVE QUALIFIED FOR HOME OXYGEN AT 3 LITERS PER NASAL CANNULA AT ALL TIMES. YOU HAVE BEEN SET UP WITH [facility] ( [employer] . PLEASE CALL THEM ON ARRIVAL HOME TO DELIVER YOUR CONCENTRATOR AND PORTABLE TANKS. THEIR NUMBER [phone]    Wound Care:  Elevate bilateral lower extremities  Remove ACE wraps and roll gauze from legs daily. Provide hygiene.   Change the left lower extremity Mepilex foam dressings three times weekly.

## 2019-05-15 NOTE — PROGRESS NOTES
Pt has an 11-beat run of V-tach on monitor, followed immediately by a 10-beat run of V-tach. BP = 127/75, steady afib on monitor. Pt denies CP or heart palpitations, or other distress. Mintera message sent to Dr. Harper Swenson. Also informed of 6-beat run at 0340. No orders at this time. Will monitor.

## 2019-05-15 NOTE — PROGRESS NOTES
assistance  Ambulation  Ambulation?: Yes  Ambulation 1  Surface: level tile  Device: Rolling Walker  Assistance: Stand by assistance  Quality of Gait: step to pattern  Distance: 80 ft and 40 ft     Balance  Sitting - Static: Good  Sitting - Dynamic: Good  Standing - Static: Good  Standing - Dynamic: Good;-  Exercises  Comments: seated LE AROM x 15 reps         Comment: assisted pt to bathroom for toileting              Assessment   Body structures, Functions, Activity limitations: Decreased functional mobility ; Decreased strength;Decreased endurance;Decreased balance  Assessment: Pt requires continued skilled PT & is appropriate to D/C Home with HH PT to increase independence with functional mobility, balance, safety awareness & activity tolerance. Activity Tolerance  Activity Tolerance: Patient limited by fatigue;Patient limited by endurance     G-Code     OutComes Score                                                  AM-PAC Score  AM-PAC Inpatient Mobility Raw Score : 19  AM-PAC Inpatient T-Scale Score : 45.44  Mobility Inpatient CMS 0-100% Score: 41.77  Mobility Inpatient CMS G-Code Modifier : CK          Goals  Short term goals  Time Frame for Short term goals: 12 visits  Short term goal 1: Inc bed-mobility & transfers to independent to enable pt to safely get in/OOB & return to PLOF & decrease risk for falls; Short term goal 2: Inc gait to amb 300ft or > indep to enable pt to return to previous level of independence; Short term goal 3: Pt able to tolerate 30-40 min of activity to include 15-20 reps of ex & functional mobility including 5 minutes of standing to facilitate activity tolerance to [facility]; Short term goal 4: Pt able to go up/down 4 steps with Jeremy rails & supervision;   Short term goal 5: Ed pt on home ex's, safety & energy principles & issue written home program    Plan    Plan  Times per week: 1-2x/D,5-6D/week  Current Treatment Recommendations: Strengthening, Balance Training, Functional Mobility Training, Endurance Training, Gait Training, Stair training, Home Exercise Program  Safety Devices  Type of devices: Bed alarm in place, Gait belt, Patient at risk for falls, Left in bed     Therapy Time   Individual Concurrent Group Co-treatment   Time In  922         Time Out  1001         Minutes  39                 [address], [place]

## 2019-05-15 NOTE — CARE COORDINATION
Discharge planning    Patient seen by cardiology and started on jacoby . Verbal order obtained from Dr Brittany Nelson for one month ( if needs PA wants rx to go thru Dr Haja Foster.) order is for Sergo Feller 24-26 mg  bid dispense 60 tabs. Call to [facility]. at [phone] and rx given to pharmacist.  They will run in little bit and requested writer call back one hour. Patient tested for home 02 and qualified for home 02 at 3 liters at all times. Will need order and documentation and perfect served Dr Brittany Nelson with request.     Sabrina Harrison order for walker to [facility]      Will follow up on 02 doc and order along with [address] and documentation obtained for home 02 and faxed to [facility]>     Call back to [facility] does not require any PA and cost is 43.5o notified attending. Met with patient and updated on all the  Above. She verbalized understanding that will be waiting for a tank and to call [facility] On arrival home for her concentrator. RN Mello Luong to make an appt with cardiology . She also understands home care will not start until seen by NP or MD per [place] [facility] unless they clear it.      All numbers in the [place] paperwork     Faxed james to  to notify of the [place] home today

## 2019-05-15 NOTE — PROGRESS NOTES
Patient discharged to home. Discharge instructions given and explained to patient, signature obtained. All patients belongings packed and taken with patient on discharge. Patient stable for discharge.

## 2019-05-15 NOTE — PLAN OF CARE
Patient with dyspnea on exertion. Patient requiring oxygen via NC at 3 liters. Evaluated for home oxygen therapy and patient qualified. Lung sounds clear but diminished in the bases, respiratory treatments continued.      Heart Failure Core Measures:  EF: 25%  ACE/ARB: Entresto  Beta Blocker: Toprol XL  Diuretic: Spirnolactone  Education: yes  Follow Up/Referrals: Home Care with visiting physician and CHF clinic follow up

## 2019-05-15 NOTE — DISCHARGE INSTR - DIET
 Cardiac Diet- 1500 ml Fluid Restriction daily   Good nutrition is important when healing from an illness, injury, or surgery. Follow any nutrition recommendations given to you during your hospital stay.  If you were given an oral nutrition supplement while in the hospital, continue to take this supplement at home. You can take it with meals, in-between meals, and/or before bedtime. These supplements can be purchased at most local grocery stores, pharmacies, and chain super-stores.  If you have any questions about your diet or nutrition, call the hospital and ask for the dietitian.

## 2019-05-15 NOTE — PROGRESS NOTES
[facility]    Progress Note    5/15/2019    12:11 PM    Name:   Dunia Valentine  MRN:     [id number]     Meganide:      [de-identified]   Room:   [address] [place] [address] Day:  3  Admit Date:  5/12/2019  8:50 PM    PCP:   No primary care provider on file. Code Status:  Full Code    Subjective:     C/C:   Chief Complaint   Patient presents with    Shortness of Breath     Interval History Status:    Breathing is getting better than before   Had  episode of 11 beat V. tach run followed by 10  beat run of V. tach at 0340 hours  Denies pain anywhere  seen by cardiology who considered this as nonsustained ventricular tachycardia due to severe cardiomyopathy and she has AICD in place    patient qualified for 3 L oxygen at home     Brief History:     Admitted through ER with shortness of breath which has worsened in the last 1-2 days  She was discharged from [place] on May 6, 2019 when she was admitted with PE and shortness of breath due to CHF, was sent to skilled facility and has been discharged from there in the last few days  During that admission she was restarted on Xarelto which has had in the previous admission due to subdural hematoma  She also has swelling and redness of both lower extremities along with lymphedema, during admission [address] it improved with good IV.  diuretic therapy        Review of Systems:     Constitutional:  negative for chills, fevers, sweats  Respiratory:  negative for cough, + shortness of breath, denies wheezing  Cardiovascular:  negative for chest pain, chest pressure/discomfort,+ lower extremity edema, denies palpitations  Gastrointestinal:  negative for abdominal pain, constipation, diarrhea, nausea, vomiting  Neurological:  negative for dizziness, headache    Medications:      Allergies:  No Known Allergies    Current Meds:   Scheduled Meds:    furosemide  40 mg Intravenous Daily    sacubitril-valsartan  1 tablet Oral BID    spironolactone  25 mg Oral Daily    atorvastatin  80 mg Oral Nightly    metoclopramide  10 mg Oral 4x Daily AC & HS    metoprolol succinate  25 mg Oral Daily    multivitamin  1 tablet Oral Daily    oxybutynin  5 mg Oral TID    rivaroxaban  15 mg Oral BID WC    sertraline  50 mg Oral Daily    sodium chloride flush  10 mL Intravenous 2 times per day     Continuous Infusions:    dextrose       PRN Meds: ALPRAZolam, albuterol, potassium chloride **OR** potassium alternative oral replacement **OR** potassium chloride, sodium chloride flush, magnesium hydroxide, nicotine, acetaminophen, glucose, dextrose, glucagon (rDNA), dextrose, ondansetron **OR** ondansetron    Data:     Past Medical History:   has a past medical history of Acute on chronic systolic congestive heart failure (HCC), Anxiety, Cardiomyopathy ([address] Depression, H/O heart artery stent, [place] (hard of hearing), Hyperlipidemia, Hypertension, Hypertensive urgency, and Leg swelling. Social History:   reports that she has never smoked. She has never used smokeless tobacco. She reports that she does not drink alcohol or use drugs. Family History: History reviewed. No pertinent family history. Vitals:  /68   Pulse 63   Temp 97.5 °F (36.4 °C) (Oral)   Resp 16   Ht 5' 2\" (1.575 m)   Wt 182 lb 8 oz (82.8 kg)   SpO2 97%   BMI 33.38 kg/m²   Temp (24hrs), Av.6 °F (35.9 °C), Min:94.6 °F (34.8 °C), Max:97.6 °F (36.4 °C)    Recent Labs     19  0114 19  0906 19  1205   POCGLU 129* 175* 88       I/O (24Hr):     Intake/Output Summary (Last 24 hours) at 5/15/2019 1211  Last data filed at 5/15/2019 0923  Gross per 24 hour   Intake 120 ml   Output 1200 ml   Net -1080 ml       Labs:    Hematology:  Recent Labs     19  0441   WBC 8.3 7.0   HGB 11.9* 10.8*   HCT 38.9 37.0    259     Chemistry:  Recent Labs     1919  2343 19  0441 19  0549 05/15/19  0634     -- 143 143 142   K 3.7  --  3.5* 3.2* 3.6*     --  101 100 100   CO2 24  --  28 31 30   GLUCOSE 178*  --  123* 102* 107*   BUN 22  --  21 24* 24*   CREATININE 0.90  --  0.87 0.95* 0.92*   MG  --   --  1.9 1.9  --    ANIONGAP 20*  --  14 12 12   LABGLOM >60  --  >60 59* >60   GFRAA >60  --  >60 >60 >60   CALCIUM 9.5  --  8.8 8.3* 8.6   MYOGLOBIN 32 31  --   --   --      Recent Labs     05/12/19  2105  05/13/19  0441 05/13/19  1253   PROT 7.8  --   --   --    LABALBU 3.8  --   --   --    LABA1C  --    < >  --  5.8   TSH  --   --  3.24  --    AST 29  --   --   --    ALT 27  --   --   --    ALKPHOS 100  --   --   --    BILITOT 1.20  --   --   --    CHOL  --   --  108  --    HDL  --   --  36*  --    LDLCHOLESTEROL  --   --  51  --    CHOLHDLRATIO  --   --  3.0  --    TRIG  --   --  106  --    VLDL  --   --  NOT REPORTED  --     < > = values in this interval not displayed.          Lab Results   Component Value Date/Time    [facility] 03/21/2019 09:15 AM     Lab Results   Component Value Date/Time    CULTURE NO GROWTH 6 DAYS 03/21/2019 09:15 AM       No results found for: POCPH, PHART, PH, POCPCO2, SFB8YRP, PCO2, POCPO2, PO2ART, PO2, POCHCO3, EPE1PKA, HCO3, NBEA, PBEA, BEART, BE, THGBART, THB, RMF7TTP, GIAR3ZZK, W4AEOVVZ, O2SAT, FIO2    Radiology:  CXR:      Slight improvement in perihilar opacities             Physical Examination:        General appearance:  alert, cooperative and no distress  Mental Status:  oriented to person, place and time and normal affect  Lungs:  + Bibasilar rales-better than before   Heart:  Irregular rhythm, no murmur,+ AICD in place  Abdomen:  soft, nontender, nondistended, normal bowel sounds, no masses, hepatomegaly, splenomegaly  Extremities:  + edema, redness- improved, but no tenderness in the calves, Ace wraps in place  Skin:  no gross lesions, rashes, induration    Assessment:        Primary Problem  Acute on chronic systolic congestive heart failure (HCC) -improved with IV diuresis    nonsustained V. tach runs-due to severe cardiomyopathy   Ischemic cardiomyopathy-EF 25%  Active Hospital Problems    Diagnosis Date Noted    Venous stasis dermatitis of both lower extremities [I87.2]      Priority: High    Paroxysmal atrial fibrillation ([address] [I48.0] 03/21/2019     Priority: High    Acute on chronic systolic congestive heart failure (HCC) [I50.23]      Priority: High    Pulmonary embolism, bilateral ([address] [I26.99] 04/29/2019     Priority: Medium    Chronic atrial fibrillation ([address] [I48.2] 04/03/2019     Priority: Medium    S/P implantation of automatic cardioverter/defibrillator (AICD) [Z95.810] 04/03/2019     Priority: Medium    Lymphedema of both lower extremities [I89.0] 03/21/2019     Priority: Medium    History of subdural hematoma [Z86.79] 05/13/2019    Congestive heart failure ([address] [I50.9] 05/12/2019    Dyslipidemia [E78.5] 04/03/2019    Coronary artery disease involving native coronary artery of native heart without angina pectoris [I25.10] 03/21/2019       Plan:        1. Aldactone 25 mg has been added   2. Entresto 24-26 milligrams 2 times daily has been added by cardiology   3.  3 L oxygen at home  4. Wound care at home  5. Home care nurse visit at home  6. CHF nurse follow-up at home  7. PTOT at home    Discharge home today with home care and above instructions    DME for wheeled walker with seat  Kayley Meza was evaluated today and a DME order was entered for a wheeled walker because she requires this to successfully complete daily living tasks of personal cares and ambulating. A wheeled walker with seat is necessary due to the patient's unsteady gait, upper body weakness, and inability to  an ambulation device; and she can ambulate only by pushing a walker instead of a lesser assistive device such as a cane, crutch, or standard walker. The need for this equipment was discussed with the patient and she understands and is in agreement.     Patient was evaluated today for the diagnosis of CHF. I entered a DME order for home oxygen because the diagnosis and testing requires the patient to have supplemental oxygen. Condition will improve or be benefited by oxygen use. The patient is  able to perform good mobility in a home setting and therefore does require the use of a portable oxygen system. The need for this equipment was discussed with the patient and she understands and is in agreement.     Steven Her MD  5/15/2019  12:11 PM

## 2019-05-15 NOTE — DISCHARGE SUMMARY
[facility]    Discharge Summary     Patient ID: Augusto Prater  :  1950   MRN: [id number]     ACCOUNT:  [de-identified]   Patient's PCP: No primary care provider on file. Admit Date: 2019   Discharge Date: 5/15/2019   Length of Stay: 3  Code Status:  Full Code  Admitting Physician: Yovanny Bowman MD  Discharge Physician: Yovanny Bowman MD     Active Discharge Diagnoses:     Hospital Problem Lists:  Principal Problem:    Acute on chronic systolic congestive heart failure ([facility]  Active Problems:    Paroxysmal atrial fibrillation (HCC)    Venous stasis dermatitis of both lower extremities    Lymphedema of both lower extremities    Chronic atrial fibrillation (HCC)    S/P implantation of automatic cardioverter/defibrillator (AICD)    Pulmonary embolism, bilateral (HCC)    Coronary artery disease involving native coronary artery of native heart without angina pectoris    Dyslipidemia    Congestive heart failure ([facility]    History of subdural hematoma  Resolved Problems:    * No resolved hospital problems.  *      Admission Condition:  poor     Discharged Condition: good    Hospital Stay:   Admitting history:  Admitted through ER with shortness of breath which has worsened in the last 1-2 days  She was discharged from [facility] on May 6, 2019 when she was admitted with PE and shortness of breath due to CHF, was sent to skilled facility and has been discharged from there in the last few days  During that admission she was restarted on Xarelto which has had in the previous admission due to subdural hematoma  She also has swelling and redness of both lower extremities along with lymphedema, during admission [facility] it improved with good IV.  diuretic therapy      Hospital Course:   Breathing is getting better than before   Had  episode of 11 beat V. tach run followed by 10  beat run of V. tach at 0340 hours  Denies pain anywhere  seen by cardiology who considered this as nonsustained ventricular tachycardia due to severe cardiomyopathy and she has AICD in place    patient qualified for 3 L oxygen at home     Primary Problem  Acute on chronic systolic congestive heart failure ([address] -improved with IV diuresis    nonsustained V. tach runs-due to severe cardiomyopathy   Ischemic cardiomyopathy-EF 25%        Active Hospital Problems     Diagnosis Date Noted    Venous stasis dermatitis of both lower extremities [I87.2]         Priority: High    Paroxysmal atrial fibrillation ([address] [I48.0] 03/21/2019       Priority: High    Acute on chronic systolic congestive heart failure (HCC) [I50.23]         Priority: High    Pulmonary embolism, bilateral ([address] [I26.99] 04/29/2019       Priority: Medium    Chronic atrial fibrillation ([address] [I48.2] 04/03/2019       Priority: Medium    S/P implantation of automatic cardioverter/defibrillator (AICD) [Z95.810] 04/03/2019       Priority: Medium    Lymphedema of both lower extremities [I89.0] 03/21/2019       Priority: Medium    History of subdural hematoma [Z86.79] 05/13/2019    Congestive heart failure ([facility] [address] [I50.9] 05/12/2019    Dyslipidemia [E78.5] 04/03/2019    Coronary artery disease involving native coronary artery of native heart without angina pectoris [I25.10] 03/21/2019         Plan:         1. Aldactone 25 mg has been added   2. Entresto 24-26 milligrams 2 times daily has been added by cardiology   3.  3 L oxygen at home  4. Wound care at home  5. Home care nurse visit at home  6. CHF nurse follow-up at home  7. PTOT at home     Discharge home today with home care and above instructions     DME for wheeled walker with seat  Maribell Eng was evaluated today and a DME order was entered for a wheeled walker because she requires this to successfully complete daily living tasks of personal cares and ambulating.   A wheeled walker with seat is necessary due to the patient's unsteady gait, upper body weakness, and inability to  an ambulation device; and she can ambulate only by pushing a walker instead of a lesser assistive device such as a cane, crutch, or standard walker. The need for this equipment was discussed with the patient and she understands and is in agreement.     Patient was evaluated today for the diagnosis of CHF. I entered a DME order for home oxygen because the diagnosis and testing requires the patient to have supplemental oxygen. Condition will improve or be benefited by oxygen use. The patient is  able to perform good mobility in a home setting and therefore does require the use of a portable oxygen system.   The need for this equipment was discussed with the patient and she understands and is in agreement              Significant therapeutic interventions: See above notes    Significant Diagnostic Studies:   Labs / Micro:  CBC:   Lab Results   Component Value Date    WBC 7.0 05/13/2019    RBC 4.18 05/13/2019    HGB 10.8 05/13/2019    HCT 37.0 05/13/2019    MCV 88.5 05/13/2019    MCH 25.8 05/13/2019    MCHC 29.2 05/13/2019    RDW 20.0 05/13/2019     05/13/2019     BMP:    Lab Results   Component Value Date    GLUCOSE 107 05/15/2019     05/15/2019    K 3.6 05/15/2019     05/15/2019    CO2 30 05/15/2019    ANIONGAP 12 05/15/2019    BUN 24 05/15/2019    CREATININE 0.92 05/15/2019    BUNCRER 26 05/15/2019    CALCIUM 8.6 05/15/2019    LABGLOM >60 05/15/2019    GFRAA >60 05/15/2019    GFR      05/15/2019    GFR NOT REPORTED 05/15/2019     HFP:    Lab Results   Component Value Date    PROT 7.8 05/12/2019     CMP:    Lab Results   Component Value Date    GLUCOSE 107 05/15/2019     05/15/2019    K 3.6 05/15/2019     05/15/2019    CO2 30 05/15/2019    BUN 24 05/15/2019    CREATININE 0.92 05/15/2019    ANIONGAP 12 05/15/2019    ALKPHOS 100 05/12/2019    ALT 27 05/12/2019    AST 29 05/12/2019    BILITOT 1.20 05/12/2019    LABALBU 3.8 05/12/2019    ALBUMIN 1.0 05/12/2019    LABGLOM >60 05/15/2019    GFRAA >60 05/15/2019    GFR      05/15/2019    GFR NOT REPORTED 05/15/2019    PROT 7.8 05/12/2019    CALCIUM 8.6 05/15/2019     PT/INR:  No results found for: PTINR  PTT:   Lab Results   Component Value Date    APTT 70.4 05/01/2019     FLP:    Lab Results   Component Value Date    CHOL 108 05/13/2019    TRIG 106 05/13/2019    HDL 36 05/13/2019     U/A:    Lab Results   Component Value Date    COLORU YELLOW 05/12/2019    TURBIDITY CLEAR 05/12/2019    SPECGRAV 1.025 05/12/2019    HGBUR TRACE 05/12/2019    PHUR 6.0 05/12/2019    PROTEINU 2+ 05/12/2019    GLUCOSEU NEGATIVE 05/12/2019    KETUA NEGATIVE 05/12/2019    BILIRUBINUR NEGATIVE 05/12/2019    UROBILINOGEN Normal 05/12/2019    NITRU NEGATIVE 05/12/2019    LEUKOCYTESUR NEGATIVE 05/12/2019     TSH:    Lab Results   Component Value Date    TSH 3.24 05/13/2019         Radiology:    Xr Chest Standard (2 Vw)    Result Date: 5/13/2019  Slight improvement in perihilar opacities     Xr Chest Standard (2 Vw)    Result Date: 5/12/2019  Bilateral parahilar infiltrates. Xr Chest Portable    Result Date: 5/14/2019  Stable perihilar interstitial edema. Consultations:    Consults:     Final Specialist Recommendations/Findings:   IP CONSULT TO DIETITIAN  IP CONSULT TO CARDIOLOGY  IP CONSULT TO HEART FAILURE NURSE/COORDINATOR      The patient was seen and examined on day of discharge and this discharge summary is in conjunction with any daily progress note from day of discharge. Discharge plan:     Disposition: Home with [address] [facility]    Physician Follow Up:     [id number] [address]  [address] [place] [address]  [phone]    they will be providing your home care and your visiting doctor to the home     [place] [address]  [phone]      they will provide your home oxygen. ..  please call them on arrival home for delivery of your concentrator Requiring Further Evaluation/Follow Up POST HOSPITALIZATION/Incidental Findings: CHF nurse and wound care nurse to follow patient at home    Diet: cardiac diet    Activity: As tolerated    Instructions to Patient: Fluid restriction to 1500 ML daily    Discharge Medications:      Medication List      START taking these medications    ALPRAZolam 0.25 MG tablet  Commonly known as:  XANAX  Take 1 tablet by mouth 3 times daily as needed for Sleep or Anxiety for up to 10 doses.      sacubitril-valsartan 24-26 MG per tablet  Commonly known as:  ENTRESTO  Take 1 tablet by mouth 2 times daily     spironolactone 25 MG tablet  Commonly known as:  ALDACTONE  Take 1 tablet by mouth daily  Start taking on:  5/16/2019        CONTINUE taking these medications    atorvastatin 80 MG tablet  Commonly known as:  LIPITOR  Take 1 tablet by mouth nightly     furosemide 20 MG tablet  Commonly known as:  LASIX  Take 1 tablet by mouth 2 times daily     metoclopramide 10 MG tablet  Commonly known as:  REGLAN  Take 1 tablet by mouth 4 times daily (before meals and nightly) for 7 days     metoprolol succinate 25 MG extended release tablet  Commonly known as:  TOPROL XL  Take 1 tablet by mouth daily     multivitamin tablet  Take 1 tablet by mouth daily     ondansetron 4 MG disintegrating tablet  Commonly known as:  ZOFRAN-ODT  Take 1 tablet by mouth every 6 hours as needed for Nausea     oxybutynin 5 MG tablet  Commonly known as:  DITROPAN  Take 1 tablet by mouth 3 times daily     potassium chloride 10 MEQ extended release capsule  Commonly known as:  MICRO-K  Take 2 capsules by mouth daily     rivaroxaban 15 MG Tabs tablet  Commonly known as:  XARELTO  Take 1 tablet by mouth 2 times daily (with meals)     sertraline 50 MG tablet  Commonly known as:  ZOLOFT  Take 1 tablet by mouth daily           Where to Get Your Medications      These medications were sent to [address] [place] [address], [facility]. [address] - F

## 2019-05-15 NOTE — PROGRESS NOTES
Pt has 6-beat run of V-tach on monitor, then returns to steady afib on monitor. Pt resting quietly in bed without distress. Will monitor.

## 2019-05-23 ENCOUNTER — TELEPHONE (OUTPATIENT)
Dept: PRIMARY CARE CLINIC | Age: 69
End: 2019-05-23

## 2019-05-23 ENCOUNTER — OFFICE VISIT (OUTPATIENT)
Dept: PRIMARY CARE CLINIC | Age: 69
End: 2019-05-23
Payer: MEDICARE

## 2019-05-23 VITALS
DIASTOLIC BLOOD PRESSURE: 76 MMHG | HEART RATE: 71 BPM | OXYGEN SATURATION: 99 % | BODY MASS INDEX: 36.8 KG/M2 | HEIGHT: 62 IN | WEIGHT: 200 LBS | SYSTOLIC BLOOD PRESSURE: 120 MMHG

## 2019-05-23 DIAGNOSIS — I10 ESSENTIAL HYPERTENSION: ICD-10-CM

## 2019-05-23 DIAGNOSIS — R32 INCONTINENCE IN FEMALE: ICD-10-CM

## 2019-05-23 DIAGNOSIS — I50.23 ACUTE ON CHRONIC SYSTOLIC CONGESTIVE HEART FAILURE (HCC): ICD-10-CM

## 2019-05-23 DIAGNOSIS — Z98.61 HISTORY OF PERCUTANEOUS CORONARY INTERVENTION: ICD-10-CM

## 2019-05-23 DIAGNOSIS — Z12.11 COLON CANCER SCREENING: ICD-10-CM

## 2019-05-23 DIAGNOSIS — Z76.89 ENCOUNTER TO ESTABLISH CARE WITH NEW DOCTOR: Primary | ICD-10-CM

## 2019-05-23 DIAGNOSIS — F33.41 RECURRENT MAJOR DEPRESSIVE DISORDER, IN PARTIAL REMISSION (HCC): ICD-10-CM

## 2019-05-23 DIAGNOSIS — I48.0 PAROXYSMAL ATRIAL FIBRILLATION (HCC): ICD-10-CM

## 2019-05-23 DIAGNOSIS — Z23 NEED FOR VACCINATION AGAINST STREPTOCOCCUS PNEUMONIAE USING PNEUMOCOCCAL CONJUGATE VACCINE 13: ICD-10-CM

## 2019-05-23 DIAGNOSIS — Z12.31 ENCOUNTER FOR SCREENING MAMMOGRAM FOR BREAST CANCER: ICD-10-CM

## 2019-05-23 PROCEDURE — 90670 PCV13 VACCINE IM: CPT | Performed by: INTERNAL MEDICINE

## 2019-05-23 PROCEDURE — G0444 DEPRESSION SCREEN ANNUAL: HCPCS | Performed by: INTERNAL MEDICINE

## 2019-05-23 PROCEDURE — 99204 OFFICE O/P NEW MOD 45 MIN: CPT | Performed by: INTERNAL MEDICINE

## 2019-05-23 PROCEDURE — G0009 ADMIN PNEUMOCOCCAL VACCINE: HCPCS | Performed by: INTERNAL MEDICINE

## 2019-05-23 RX ORDER — ALPRAZOLAM 0.25 MG/1
0.25 TABLET ORAL NIGHTLY PRN
COMMUNITY
End: 2019-07-20 | Stop reason: SINTOL

## 2019-05-23 RX ORDER — METOPROLOL SUCCINATE 25 MG/1
25 TABLET, EXTENDED RELEASE ORAL DAILY
Qty: 90 TABLET | Refills: 3 | Status: ON HOLD | OUTPATIENT
Start: 2019-05-23 | End: 2019-10-23 | Stop reason: HOSPADM

## 2019-05-23 ASSESSMENT — PATIENT HEALTH QUESTIONNAIRE - PHQ9
5. POOR APPETITE OR OVEREATING: 1
3. TROUBLE FALLING OR STAYING ASLEEP: 0
9. THOUGHTS THAT YOU WOULD BE BETTER OFF DEAD, OR OF HURTING YOURSELF: 0
7. TROUBLE CONCENTRATING ON THINGS, SUCH AS READING THE NEWSPAPER OR WATCHING TELEVISION: 1
10. IF YOU CHECKED OFF ANY PROBLEMS, HOW DIFFICULT HAVE THESE PROBLEMS MADE IT FOR YOU TO DO YOUR WORK, TAKE CARE OF THINGS AT HOME, OR GET ALONG WITH OTHER PEOPLE: 1
8. MOVING OR SPEAKING SO SLOWLY THAT OTHER PEOPLE COULD HAVE NOTICED. OR THE OPPOSITE, BEING SO FIGETY OR RESTLESS THAT YOU HAVE BEEN MOVING AROUND A LOT MORE THAN USUAL: 1
2. FEELING DOWN, DEPRESSED OR HOPELESS: 2
SUM OF ALL RESPONSES TO PHQ9 QUESTIONS 1 & 2: 3
SUM OF ALL RESPONSES TO PHQ QUESTIONS 1-9: 11
1. LITTLE INTEREST OR PLEASURE IN DOING THINGS: 1
6. FEELING BAD ABOUT YOURSELF - OR THAT YOU ARE A FAILURE OR HAVE LET YOURSELF OR YOUR FAMILY DOWN: 2
SUM OF ALL RESPONSES TO PHQ QUESTIONS 1-9: 11
4. FEELING TIRED OR HAVING LITTLE ENERGY: 3

## 2019-05-23 ASSESSMENT — ENCOUNTER SYMPTOMS
NAUSEA: 0
SINUS PAIN: 0
WHEEZING: 0
VOMITING: 0
BACK PAIN: 0
COUGH: 0
ABDOMINAL DISTENTION: 0
CONSTIPATION: 0
DIARRHEA: 0
SINUS PRESSURE: 0
SHORTNESS OF BREATH: 1
ABDOMINAL PAIN: 0

## 2019-05-23 NOTE — PROGRESS NOTES
[facility]  [address] [place] [address] [address]  [place] [address]  Dept: [phone]  Dept Fax: [phone]    Pauline Cardoso is a 76 y.o. female who presents today for her medical conditions/complaints as noted below. Chief Complaint   Patient presents with    Establish Care       HPI:     This is a 60-year-old female who is here to establish [facility]. She was at the hospital for acute on chronic congestive heart failure and was treated with IV Lasix and she has been feeling better since then.  medications reviewed and discharge summary reviewed. She has history of paroxysmal A. fib and she is on are also. Blood pressure is well controlled and currently she is under restriction of fluids and she is trying to keep it under control and also trying to take her medications regularly. She is on oxygen at this time as she was qualified while she was discharged from our hospital.  She sees Dr. Remi Narvaez cardiologist.  She has defibrillator in place. Vitals stable. All medications, labs and imaging reviewed.       Hemoglobin A1C (%)   Date Value   05/13/2019 5.8   05/13/2019 5.9   08/06/2017 5.7             ( goal A1C is < 7)   No results found for: LABMICR  LDL Cholesterol (mg/dL)   Date Value   05/13/2019 51   03/22/2019 68   08/07/2017 117       (goal LDL is <100)   AST (U/L)   Date Value   05/12/2019 29     ALT (U/L)   Date Value   05/12/2019 27     BUN (mg/dL)   Date Value   05/15/2019 24 (H)     BP Readings from Last 3 Encounters:   05/23/19 120/76   05/15/19 136/63   05/06/19 124/63          (goal 120/80)    Past Medical History:   Diagnosis Date    Acute on chronic systolic congestive heart failure ([address] 8/8/2017    Anxiety     Cardiomyopathy ([address]     Depression     H/O heart artery stent     [place] (hard of hearing)     Hyperlipidemia     Hypertension     Hypertensive urgency 8/7/2017    Leg swelling 8/7/2017      Past Surgical History: Procedure Laterality Date    CARDIAC DEFIBRILLATOR PLACEMENT      CATARACT REMOVAL      CORNEAL TRANSPLANT      EYE SURGERY      NASAL SINUS SURGERY      TONSILLECTOMY AND ADENOIDECTOMY         History reviewed. No pertinent family history. Social History     Tobacco Use    Smoking status: Never Smoker    Smokeless tobacco: Never Used   Substance Use Topics    Alcohol use: No      Current Outpatient Medications   Medication Sig Dispense Refill    ALPRAZolam (XANAX) 0.25 MG tablet Take 0.25 mg by mouth nightly as needed for Sleep.  metoprolol succinate (TOPROL XL) 25 MG extended release tablet Take 1 tablet by mouth daily 90 tablet 3    sacubitril-valsartan (ENTRESTO) 24-26 MG per tablet Take 1 tablet by mouth 2 times daily 60 tablet 0    spironolactone (ALDACTONE) 25 MG tablet Take 1 tablet by mouth daily 30 tablet 3    rivaroxaban (XARELTO) 15 MG TABS tablet Take 1 tablet by mouth 2 times daily (with meals) 42 tablet 0    furosemide (LASIX) 20 MG tablet Take 1 tablet by mouth 2 times daily 60 tablet 1    Multiple Vitamin (MULTIVITAMIN) tablet Take 1 tablet by mouth daily 30 tablet 0    oxybutynin (DITROPAN) 5 MG tablet Take 1 tablet by mouth 3 times daily 90 tablet 3    sertraline (ZOLOFT) 50 MG tablet Take 1 tablet by mouth daily 30 tablet 1    potassium chloride (MICRO-K) 10 MEQ extended release capsule Take 2 capsules by mouth daily 180 capsule 1    atorvastatin (LIPITOR) 80 MG tablet Take 1 tablet by mouth nightly 30 tablet 3    ondansetron (ZOFRAN-ODT) 4 MG disintegrating tablet Take 1 tablet by mouth every 6 hours as needed for Nausea 30 tablet 0     No current facility-administered medications for this visit.       No Known Allergies    Health Maintenance   Topic Date Due    DTaP/Tdap/Td vaccine (1 - Tdap) 08/07/1969    Breast cancer screen  08/07/2000    Colon cancer screen colonoscopy  08/07/2000    Shingles Vaccine (2 of 3) 01/02/2015    Flu vaccine (Season Ended) 09/01/2019    A1C test (Diabetic or Prediabetic)  05/13/2020    Potassium monitoring  05/15/2020    Creatinine monitoring  05/15/2020    Lipid screen  05/13/2024    DEXA (modify frequency per FRAX score)  Completed    Pneumococcal 65+ years Vaccine  Completed    Hepatitis C screen  Completed       Subjective:      Review of Systems   Constitutional: Negative for activity change, appetite change, chills, fatigue and fever. HENT: Negative for congestion, ear pain, hearing loss, nosebleeds, sinus pressure, sinus pain and sneezing. Eyes: Negative for visual disturbance. Respiratory: Positive for shortness of breath. Negative for cough and wheezing. Cardiovascular: Negative for chest pain and palpitations. Gastrointestinal: Negative for abdominal distention, abdominal pain, constipation, diarrhea, nausea and vomiting. Endocrine: Negative for cold intolerance, heat intolerance, polydipsia, polyphagia and polyuria. Genitourinary: Negative for difficulty urinating, menstrual problem, vaginal bleeding and vaginal discharge. Musculoskeletal: Positive for arthralgias. Negative for back pain and joint swelling. Skin: Negative for rash. Neurological: Negative for numbness and headaches. Psychiatric/Behavioral: Negative for sleep disturbance. The patient is nervous/anxious. All other systems reviewed and are negative. Objective:     Physical Exam   Constitutional: She is oriented to person, place, and time. Vital signs are normal. She appears well-developed and well-nourished. She is active. No distress. HENT:   Head: Normocephalic and atraumatic. Right Ear: Hearing normal.   Left Ear: Hearing normal.   Mouth/Throat: Uvula is midline, oropharynx is clear and moist and mucous membranes are normal.   Eyes: Pupils are equal, round, and reactive to light. Conjunctivae are normal. No scleral icterus.    Neck: Normal range of motion, full passive range of motion without pain and phonation normal. Neck supple. No JVD present. No thyroid mass and no thyromegaly present. Cardiovascular: Normal rate, normal heart sounds and intact distal pulses. An irregularly irregular rhythm present. Exam reveals no decreased pulses. No murmur heard. Pulses:       Carotid pulses are 2+ on the right side, and 2+ on the left side. Radial pulses are 2+ on the right side, and 2+ on the left side. Pulmonary/Chest: Effort normal and breath sounds normal. No accessory muscle usage. No apnea. No respiratory distress. She has no wheezes. She has no rales. Abdominal: Soft. Bowel sounds are normal. She exhibits no distension. There is no tenderness. Musculoskeletal: Normal range of motion. She exhibits edema (2+ non pitting edema). She exhibits no tenderness or deformity. Lymphadenopathy:     She has no cervical adenopathy. Neurological: She is alert and oriented to person, place, and time. She displays normal reflexes. Skin: Skin is warm and intact. Capillary refill takes less than 2 seconds. No rash noted. She is not diaphoretic. Psychiatric: She has a normal mood and affect. Her behavior is normal. Cognition and memory are normal.   Nursing note and vitals reviewed. /76   Pulse 71   Ht 5' 2\" (1.575 m)   Wt 200 lb (90.7 kg)   SpO2 99%   BMI 36.58 kg/m²     Assessment:          1. Encounter to establish care with new doctor    2. Acute on chronic systolic congestive heart failure (HCC)  Medications reviewed  No changes in the regiment  Follows with Dr. Emanuel Looney    3. Paroxysmal atrial fibrillation (HCC)  On xarelto    4. Incontinence in female  On ditropan    5. Essential hypertension  - metoprolol succinate (TOPROL XL) 25 MG extended release tablet; Take 1 tablet by mouth daily  Dispense: 90 tablet; Refill: 3    6. Need for vaccination against Streptococcus pneumoniae using pneumococcal conjugate vaccine 13  - Pneumococcal conjugate vaccine 13-valent    7.  History of percutaneous coronary benefit, and side effects of prescribedmedications. All patient questions answered. Pt voiced understanding. Reviewed health maintenance. Instructed to continue current medications, diet and exercise. Patient agreed with treatment plan. Follow up as directed. Of the given duration appointment visit, Dr. Lilliam Lee MD  spent at least 50% of the face-to-face time in counseling, explanation of diagnosis, planning of further management, and answering all questions. Electronically signed by Lilliam Lee MD on 5/23/2019 at 2:03 PM      Please note that this chart was generated using voice recognition Dragon dictation software. Although every effort was made to ensure the accuracy of this automatedtranscription, some errors in transcription may have occurred.

## 2019-06-07 NOTE — TELEPHONE ENCOUNTER
Last OV 5/23/19- Patient states this med was given to her in the hospital but she needs a refill    Health Maintenance   Topic Date Due    DTaP/Tdap/Td vaccine (1 - Tdap) 08/07/1969    Breast cancer screen  08/07/2000    Colon cancer screen colonoscopy  08/07/2000    Shingles Vaccine (2 of 3) 01/02/2015    Flu vaccine (Season Ended) 09/01/2019    A1C test (Diabetic or Prediabetic)  05/13/2020    Potassium monitoring  05/15/2020    Creatinine monitoring  05/15/2020    Lipid screen  05/13/2024    DEXA (modify frequency per FRAX score)  Completed    Pneumococcal 65+ years Vaccine  Completed    Hepatitis C screen  Completed             (applicable per patient's age: Cancer Screenings, Depression Screening, Fall Risk Screening, Immunizations)    Hemoglobin A1C (%)   Date Value   05/13/2019 5.8   05/13/2019 5.9   08/06/2017 5.7     LDL Cholesterol (mg/dL)   Date Value   05/13/2019 51     AST (U/L)   Date Value   05/12/2019 29     ALT (U/L)   Date Value   05/12/2019 27     BUN (mg/dL)   Date Value   05/15/2019 24 (H)      (goal A1C is < 7)   (goal LDL is <100) need 30-50% reduction from baseline     BP Readings from Last 3 Encounters:   05/23/19 120/76   05/15/19 136/63   05/06/19 124/63    (goal /80)      All Future Testing planned in [facility]:  Lab Frequency Next Occurrence   KT Digital Screen Bilateral [[id number]] Once 06/22/2019       Next Visit Date:  Future Appointments   Date Time Provider Pato Sheets   8/22/2019  1:00 PM Kyara Plascencia MD [facility]            Patient Active Problem List:     Leg swelling     Hypertensive urgency     Acute on chronic systolic congestive heart failure (HCC)     Acute on chronic systolic congestive heart failure (HCC)     Lymphedema of both lower extremities     Cellulitis of right leg     Coronary artery disease involving native coronary artery of native heart without angina pectoris     Major depressive disorder     Paroxysmal atrial fibrillation

## 2019-06-13 ENCOUNTER — TELEPHONE (OUTPATIENT)
Dept: PRIMARY CARE CLINIC | Age: 69
End: 2019-06-13

## 2019-06-13 DIAGNOSIS — L29.9 CHRONIC PRURITUS: Primary | ICD-10-CM

## 2019-06-13 NOTE — TELEPHONE ENCOUNTER
Patient was seen as new patient on 5/23 and forgot to ask about itching all over but no rash. Has had this problem off and on for years but worse lately. Is wondering if from her meds or nerves and if there is any testing she can have done. She is okay to wait for Dr Karen Stiles to return next week.

## 2019-06-17 ENCOUNTER — TELEPHONE (OUTPATIENT)
Dept: PRIMARY CARE CLINIC | Age: 69
End: 2019-06-17

## 2019-06-17 DIAGNOSIS — M54.5 ACUTE LOW BACK PAIN, UNSPECIFIED BACK PAIN LATERALITY, WITH SCIATICA PRESENCE UNSPECIFIED: Primary | ICD-10-CM

## 2019-06-17 NOTE — TELEPHONE ENCOUNTER
Pt called back and stated that her anixity is threw the roof and also feels that she needs something to help control that also.

## 2019-06-18 NOTE — TELEPHONE ENCOUNTER
unfortunately cannot prescribe narcotics before evaluating the patient and also I do not initiate long-term narcotic prescriptions. She needs to make an appointment to assess her pain. Will place an x-ray for the back in meantime to see if she needs a pain management referral.    Will send in as needed prescription for Xanax and will evaluate her for long-term antianxiety medications regarding her next visit as taking Xanax daily is not advisable.

## 2019-06-19 RX ORDER — HYDROXYZINE HYDROCHLORIDE 25 MG/1
25 TABLET, FILM COATED ORAL NIGHTLY
Qty: 30 TABLET | Refills: 0 | Status: SHIPPED | OUTPATIENT
Start: 2019-06-19 | End: 2019-07-19

## 2019-06-19 NOTE — TELEPHONE ENCOUNTER
Tried to call patient, gentleman states she is still sleeping and asked if writer could call back later. Will call patient later.

## 2019-06-20 ENCOUNTER — TELEPHONE (OUTPATIENT)
Dept: PRIMARY CARE CLINIC | Age: 69
End: 2019-06-20

## 2019-06-21 ENCOUNTER — TELEPHONE (OUTPATIENT)
Dept: PRIMARY CARE CLINIC | Age: 69
End: 2019-06-21

## 2019-06-27 ENCOUNTER — TELEPHONE (OUTPATIENT)
Dept: PRIMARY CARE CLINIC | Age: 69
End: 2019-06-27

## 2019-06-27 DIAGNOSIS — L29.9 CHRONIC PRURITUS: Primary | ICD-10-CM

## 2019-06-27 RX ORDER — SERTRALINE HYDROCHLORIDE 25 MG/1
25 TABLET, FILM COATED ORAL DAILY
Qty: 30 TABLET | Refills: 1 | Status: SHIPPED | OUTPATIENT
Start: 2019-06-27 | End: 2019-06-27

## 2019-06-27 NOTE — TELEPHONE ENCOUNTER
Called son to let him know that Sertraline was sent to pharmacy and he states she is already on this medication. Is asking again if she can just get some Ativan since it worked well in rehab. Please advise.

## 2019-06-27 NOTE — TELEPHONE ENCOUNTER
Patient's son called, upset that his mother has not gotten the Atarax prescription yet, she has been itching since last week. Explained that office has done the PA but office is waiting for insurance to respond. He is asking if something else can be sent in for now, Benadryl does not work for her. Please advise.

## 2019-06-27 NOTE — TELEPHONE ENCOUNTER
I have sent in sertraline to the pharmacy which usually helps with chronic pruritus. Let the patient know to take this medication every morning daily and she should be able to see the results in couple of weeks. If she has side effects or if she is not responding kindly tell the patient to call back.

## 2019-06-27 NOTE — TELEPHONE ENCOUNTER
Apologize for that. Apparently Atarax is covered and forwarding the thread to Missy Grimes to communicate the results as she just called into the pharmacy regarding the medication.

## 2019-07-10 ENCOUNTER — TELEPHONE (OUTPATIENT)
Dept: PRIMARY CARE CLINIC | Age: 69
End: 2019-07-10

## 2019-07-10 RX ORDER — RISPERIDONE 0.25 MG/1
0.25 TABLET, FILM COATED ORAL NIGHTLY
Qty: 30 TABLET | Refills: 11 | Status: SHIPPED | OUTPATIENT
Start: 2019-07-10 | End: 2019-07-15 | Stop reason: SDUPTHER

## 2019-07-15 RX ORDER — RISPERIDONE 1 MG/1
1 TABLET, FILM COATED ORAL NIGHTLY
Qty: 30 TABLET | Refills: 2 | Status: ON HOLD | OUTPATIENT
Start: 2019-07-15 | End: 2019-07-23 | Stop reason: HOSPADM

## 2019-07-18 ENCOUNTER — TELEPHONE (OUTPATIENT)
Dept: PRIMARY CARE CLINIC | Age: 69
End: 2019-07-18

## 2019-07-20 ENCOUNTER — APPOINTMENT (OUTPATIENT)
Dept: GENERAL RADIOLOGY | Age: 69
DRG: 308 | End: 2019-07-20
Payer: MEDICARE

## 2019-07-20 ENCOUNTER — HOSPITAL ENCOUNTER (INPATIENT)
Age: 69
LOS: 3 days | Discharge: HOME | DRG: 308 | End: 2019-07-23
Attending: EMERGENCY MEDICINE | Admitting: FAMILY MEDICINE
Payer: MEDICARE

## 2019-07-20 DIAGNOSIS — I50.23 ACUTE ON CHRONIC SYSTOLIC HEART FAILURE (HCC): ICD-10-CM

## 2019-07-20 DIAGNOSIS — Z45.02 AICD DISCHARGE: ICD-10-CM

## 2019-07-20 DIAGNOSIS — I87.2 VENOUS STASIS DERMATITIS OF BOTH LOWER EXTREMITIES: Primary | ICD-10-CM

## 2019-07-20 DIAGNOSIS — I89.0 LYMPHEDEMA OF BOTH LOWER EXTREMITIES: ICD-10-CM

## 2019-07-20 DIAGNOSIS — I49.01 VENTRICULAR FIBRILLATION (HCC): ICD-10-CM

## 2019-07-20 PROBLEM — E55.9 VITAMIN D DEFICIENCY: Chronic | Status: ACTIVE | Noted: 2019-07-20

## 2019-07-20 LAB
ABSOLUTE EOS #: 0.11 K/UL (ref 0–0.44)
ABSOLUTE IMMATURE GRANULOCYTE: 0.01 K/UL (ref 0–0.3)
ABSOLUTE LYMPH #: 0.76 K/UL (ref 1.1–3.7)
ABSOLUTE MONO #: 0.32 K/UL (ref 0.1–1.2)
ANION GAP SERPL CALCULATED.3IONS-SCNC: 12 MMOL/L (ref 9–17)
BASOPHILS # BLD: 1 % (ref 0–2)
BASOPHILS ABSOLUTE: 0.04 K/UL (ref 0–0.2)
BNP INTERPRETATION: ABNORMAL
BUN BLDV-MCNC: 12 MG/DL (ref 8–23)
BUN/CREAT BLD: 14 (ref 9–20)
CALCIUM SERPL-MCNC: 9 MG/DL (ref 8.6–10.4)
CHLORIDE BLD-SCNC: 106 MMOL/L (ref 98–107)
CO2: 24 MMOL/L (ref 20–31)
CREAT SERPL-MCNC: 0.87 MG/DL (ref 0.5–0.9)
DIFFERENTIAL TYPE: ABNORMAL
EKG ATRIAL RATE: 89 BPM
EKG P AXIS: 78 DEGREES
EKG P-R INTERVAL: 152 MS
EKG Q-T INTERVAL: 366 MS
EKG QRS DURATION: 94 MS
EKG QTC CALCULATION (BAZETT): 445 MS
EKG R AXIS: -12 DEGREES
EKG T AXIS: -175 DEGREES
EKG VENTRICULAR RATE: 89 BPM
EOSINOPHILS RELATIVE PERCENT: 3 % (ref 1–4)
FOLATE: 11.7 NG/ML
GFR AFRICAN AMERICAN: >60 ML/MIN
GFR NON-AFRICAN AMERICAN: >60 ML/MIN
GFR SERPL CREATININE-BSD FRML MDRD: ABNORMAL ML/MIN/{1.73_M2}
GFR SERPL CREATININE-BSD FRML MDRD: ABNORMAL ML/MIN/{1.73_M2}
GLUCOSE BLD-MCNC: 106 MG/DL (ref 70–99)
HCT VFR BLD CALC: 35.7 % (ref 36.3–47.1)
HEMOGLOBIN: 10.2 G/DL (ref 11.9–15.1)
IMMATURE GRANULOCYTES: 0 %
INR BLD: 1.3
LYMPHOCYTES # BLD: 17 % (ref 24–43)
MAGNESIUM: 2 MG/DL (ref 1.6–2.6)
MAGNESIUM: 2 MG/DL (ref 1.6–2.6)
MCH RBC QN AUTO: 25.4 PG (ref 25.2–33.5)
MCHC RBC AUTO-ENTMCNC: 28.6 G/DL (ref 28.4–34.8)
MCV RBC AUTO: 89 FL (ref 82.6–102.9)
MONOCYTES # BLD: 7 % (ref 3–12)
NRBC AUTOMATED: 0 PER 100 WBC
PARTIAL THROMBOPLASTIN TIME: 27.1 SEC (ref 23–31)
PDW BLD-RTO: 17.6 % (ref 11.8–14.4)
PLATELET # BLD: 217 K/UL (ref 138–453)
PLATELET ESTIMATE: ABNORMAL
PMV BLD AUTO: 11.4 FL (ref 8.1–13.5)
POTASSIUM SERPL-SCNC: 4.5 MMOL/L (ref 3.7–5.3)
PRO-BNP: 5435 PG/ML
PROTHROMBIN TIME: 13.2 SEC (ref 9.7–11.6)
RBC # BLD: 4.01 M/UL (ref 3.95–5.11)
RBC # BLD: ABNORMAL 10*6/UL
SEG NEUTROPHILS: 72 % (ref 36–65)
SEGMENTED NEUTROPHILS ABSOLUTE COUNT: 3.19 K/UL (ref 1.5–8.1)
SODIUM BLD-SCNC: 142 MMOL/L (ref 135–144)
TOTAL CK: 53 U/L (ref 26–192)
TROPONIN INTERP: ABNORMAL
TROPONIN T: ABNORMAL NG/ML
TROPONIN, HIGH SENSITIVITY: 23 NG/L (ref 0–14)
TSH SERPL DL<=0.05 MIU/L-ACNC: 2.45 MIU/L (ref 0.3–5)
VITAMIN B-12: 681 PG/ML (ref 232–1245)
VITAMIN D 25-HYDROXY: 11.2 NG/ML (ref 30–100)
WBC # BLD: 4.4 K/UL (ref 3.5–11.3)
WBC # BLD: ABNORMAL 10*3/UL

## 2019-07-20 PROCEDURE — 85025 COMPLETE CBC W/AUTO DIFF WBC: CPT

## 2019-07-20 PROCEDURE — 6360000002 HC RX W HCPCS: Performed by: INTERNAL MEDICINE

## 2019-07-20 PROCEDURE — 84484 ASSAY OF TROPONIN QUANT: CPT

## 2019-07-20 PROCEDURE — 82746 ASSAY OF FOLIC ACID SERUM: CPT

## 2019-07-20 PROCEDURE — 82306 VITAMIN D 25 HYDROXY: CPT

## 2019-07-20 PROCEDURE — 99285 EMERGENCY DEPT VISIT HI MDM: CPT

## 2019-07-20 PROCEDURE — 99222 1ST HOSP IP/OBS MODERATE 55: CPT | Performed by: FAMILY MEDICINE

## 2019-07-20 PROCEDURE — 83735 ASSAY OF MAGNESIUM: CPT

## 2019-07-20 PROCEDURE — 36415 COLL VENOUS BLD VENIPUNCTURE: CPT

## 2019-07-20 PROCEDURE — 93010 ELECTROCARDIOGRAM REPORT: CPT | Performed by: INTERNAL MEDICINE

## 2019-07-20 PROCEDURE — 84443 ASSAY THYROID STIM HORMONE: CPT

## 2019-07-20 PROCEDURE — 85610 PROTHROMBIN TIME: CPT

## 2019-07-20 PROCEDURE — 93005 ELECTROCARDIOGRAM TRACING: CPT | Performed by: EMERGENCY MEDICINE

## 2019-07-20 PROCEDURE — 83880 ASSAY OF NATRIURETIC PEPTIDE: CPT

## 2019-07-20 PROCEDURE — 82607 VITAMIN B-12: CPT

## 2019-07-20 PROCEDURE — 2580000003 HC RX 258: Performed by: INTERNAL MEDICINE

## 2019-07-20 PROCEDURE — 1200000000 HC SEMI PRIVATE

## 2019-07-20 PROCEDURE — 80048 BASIC METABOLIC PNL TOTAL CA: CPT

## 2019-07-20 PROCEDURE — 71045 X-RAY EXAM CHEST 1 VIEW: CPT

## 2019-07-20 PROCEDURE — 6360000002 HC RX W HCPCS: Performed by: FAMILY MEDICINE

## 2019-07-20 PROCEDURE — 82550 ASSAY OF CK (CPK): CPT

## 2019-07-20 PROCEDURE — 85730 THROMBOPLASTIN TIME PARTIAL: CPT

## 2019-07-20 RX ORDER — ACETAMINOPHEN 500 MG
1000 TABLET ORAL EVERY 6 HOURS PRN
Status: DISCONTINUED | OUTPATIENT
Start: 2019-07-20 | End: 2019-07-23 | Stop reason: HOSPADM

## 2019-07-20 RX ORDER — FUROSEMIDE 20 MG/1
20 TABLET ORAL 2 TIMES DAILY
Status: DISCONTINUED | OUTPATIENT
Start: 2019-07-20 | End: 2019-07-20

## 2019-07-20 RX ORDER — MAGNESIUM SULFATE 1 G/100ML
1 INJECTION INTRAVENOUS PRN
Status: DISCONTINUED | OUTPATIENT
Start: 2019-07-20 | End: 2019-07-23 | Stop reason: HOSPADM

## 2019-07-20 RX ORDER — SODIUM CHLORIDE 0.9 % (FLUSH) 0.9 %
10 SYRINGE (ML) INJECTION EVERY 12 HOURS SCHEDULED
Status: DISCONTINUED | OUTPATIENT
Start: 2019-07-20 | End: 2019-07-23 | Stop reason: HOSPADM

## 2019-07-20 RX ORDER — ONDANSETRON 2 MG/ML
4 INJECTION INTRAMUSCULAR; INTRAVENOUS EVERY 6 HOURS PRN
Status: DISCONTINUED | OUTPATIENT
Start: 2019-07-20 | End: 2019-07-20 | Stop reason: CLARIF

## 2019-07-20 RX ORDER — POTASSIUM CHLORIDE 7.45 MG/ML
10 INJECTION INTRAVENOUS PRN
Status: DISCONTINUED | OUTPATIENT
Start: 2019-07-20 | End: 2019-07-23 | Stop reason: HOSPADM

## 2019-07-20 RX ORDER — NICOTINE 21 MG/24HR
1 PATCH, TRANSDERMAL 24 HOURS TRANSDERMAL DAILY PRN
Status: DISCONTINUED | OUTPATIENT
Start: 2019-07-20 | End: 2019-07-23 | Stop reason: HOSPADM

## 2019-07-20 RX ORDER — SERTRALINE HYDROCHLORIDE 25 MG/1
25 TABLET, FILM COATED ORAL DAILY
Status: ON HOLD | COMMUNITY
End: 2019-07-23 | Stop reason: HOSPADM

## 2019-07-20 RX ORDER — ONDANSETRON 2 MG/ML
4 INJECTION INTRAMUSCULAR; INTRAVENOUS EVERY 6 HOURS PRN
Status: DISCONTINUED | OUTPATIENT
Start: 2019-07-20 | End: 2019-07-23 | Stop reason: HOSPADM

## 2019-07-20 RX ORDER — POTASSIUM CHLORIDE 750 MG/1
20 CAPSULE, EXTENDED RELEASE ORAL DAILY
Status: DISCONTINUED | OUTPATIENT
Start: 2019-07-20 | End: 2019-07-23 | Stop reason: HOSPADM

## 2019-07-20 RX ORDER — ONDANSETRON 4 MG/1
4 TABLET, ORALLY DISINTEGRATING ORAL EVERY 6 HOURS PRN
Status: DISCONTINUED | OUTPATIENT
Start: 2019-07-20 | End: 2019-07-23 | Stop reason: HOSPADM

## 2019-07-20 RX ORDER — ERGOCALCIFEROL 1.25 MG/1
50000 CAPSULE ORAL WEEKLY
Status: DISCONTINUED | OUTPATIENT
Start: 2019-07-20 | End: 2019-07-23 | Stop reason: HOSPADM

## 2019-07-20 RX ORDER — SODIUM CHLORIDE 0.9 % (FLUSH) 0.9 %
10 SYRINGE (ML) INJECTION PRN
Status: DISCONTINUED | OUTPATIENT
Start: 2019-07-20 | End: 2019-07-23 | Stop reason: HOSPADM

## 2019-07-20 RX ORDER — MULTIVITAMIN WITH FOLIC ACID 400 MCG
1 TABLET ORAL DAILY
Status: DISCONTINUED | OUTPATIENT
Start: 2019-07-20 | End: 2019-07-23 | Stop reason: HOSPADM

## 2019-07-20 RX ORDER — ATORVASTATIN CALCIUM 80 MG/1
80 TABLET, FILM COATED ORAL NIGHTLY
Status: DISCONTINUED | OUTPATIENT
Start: 2019-07-20 | End: 2019-07-23 | Stop reason: HOSPADM

## 2019-07-20 RX ORDER — SPIRONOLACTONE 25 MG/1
25 TABLET ORAL DAILY
Status: DISCONTINUED | OUTPATIENT
Start: 2019-07-20 | End: 2019-07-23 | Stop reason: HOSPADM

## 2019-07-20 RX ORDER — CEFAZOLIN SODIUM 1 G/50ML
1 INJECTION, SOLUTION INTRAVENOUS EVERY 8 HOURS
Status: DISCONTINUED | OUTPATIENT
Start: 2019-07-20 | End: 2019-07-23 | Stop reason: HOSPADM

## 2019-07-20 RX ORDER — MAGNESIUM SULFATE 1 G/100ML
1 INJECTION INTRAVENOUS
Status: DISPENSED | OUTPATIENT
Start: 2019-07-20 | End: 2019-07-20

## 2019-07-20 RX ORDER — ACETAMINOPHEN 500 MG
1000 TABLET ORAL EVERY 6 HOURS PRN
Status: ON HOLD | COMMUNITY
End: 2019-08-22 | Stop reason: HOSPADM

## 2019-07-20 RX ORDER — POTASSIUM CHLORIDE 20 MEQ/1
40 TABLET, EXTENDED RELEASE ORAL PRN
Status: DISCONTINUED | OUTPATIENT
Start: 2019-07-20 | End: 2019-07-23 | Stop reason: HOSPADM

## 2019-07-20 RX ORDER — METOPROLOL SUCCINATE 25 MG/1
25 TABLET, EXTENDED RELEASE ORAL DAILY
Status: DISCONTINUED | OUTPATIENT
Start: 2019-07-20 | End: 2019-07-23 | Stop reason: HOSPADM

## 2019-07-20 RX ORDER — FUROSEMIDE 10 MG/ML
40 INJECTION INTRAMUSCULAR; INTRAVENOUS 2 TIMES DAILY
Status: DISCONTINUED | OUTPATIENT
Start: 2019-07-20 | End: 2019-07-22

## 2019-07-20 RX ORDER — ACETAMINOPHEN 325 MG/1
650 TABLET ORAL EVERY 4 HOURS PRN
Status: DISCONTINUED | OUTPATIENT
Start: 2019-07-20 | End: 2019-07-23 | Stop reason: HOSPADM

## 2019-07-20 RX ADMIN — MAGNESIUM SULFATE HEPTAHYDRATE 1 G: 1 INJECTION, SOLUTION INTRAVENOUS at 15:03

## 2019-07-20 RX ADMIN — CEFAZOLIN SODIUM 1 G: 1 INJECTION, SOLUTION INTRAVENOUS at 23:15

## 2019-07-20 RX ADMIN — AMIODARONE HYDROCHLORIDE 0.5 MG/MIN: 50 INJECTION, SOLUTION INTRAVENOUS at 21:12

## 2019-07-20 RX ADMIN — CEFAZOLIN SODIUM 1 G: 1 INJECTION, SOLUTION INTRAVENOUS at 16:23

## 2019-07-20 RX ADMIN — ATORVASTATIN CALCIUM 80 MG: 80 TABLET, FILM COATED ORAL at 19:57

## 2019-07-20 RX ADMIN — SACUBITRIL AND VALSARTAN 1 TABLET: 24; 26 TABLET, FILM COATED ORAL at 19:56

## 2019-07-20 RX ADMIN — FUROSEMIDE 40 MG: 10 INJECTION, SOLUTION INTRAMUSCULAR; INTRAVENOUS at 17:52

## 2019-07-20 RX ADMIN — AMIODARONE HYDROCHLORIDE 150 MG: 50 INJECTION, SOLUTION INTRAVENOUS at 13:40

## 2019-07-20 RX ADMIN — AMIODARONE HYDROCHLORIDE 1 MG/MIN: 50 INJECTION, SOLUTION INTRAVENOUS at 14:01

## 2019-07-20 RX ADMIN — MAGNESIUM SULFATE HEPTAHYDRATE 1 G: 1 INJECTION, SOLUTION INTRAVENOUS at 13:13

## 2019-07-20 ASSESSMENT — ENCOUNTER SYMPTOMS
SHORTNESS OF BREATH: 1
CHEST TIGHTNESS: 0
EYE PAIN: 0
FACIAL SWELLING: 0
EYE DISCHARGE: 0
ABDOMINAL PAIN: 0
ABDOMINAL DISTENTION: 0
BACK PAIN: 0

## 2019-07-20 NOTE — ED PROVIDER NOTES
EMERGENCY DEPARTMENT ENCOUNTER    Pt Name: Maria Ines Calderon  MRN: [id number]  [place] 1950  Date of evaluation: 7/20/19  CHIEF COMPLAINT       Chief Complaint   Patient presents with    AICD Problem     HISTORY OF PRESENT ILLNESS   HPI   Patient is a 70-year-old female with a history of congestive heart failure, hypertension and hyperlipidemia who presented to the emergencyu department secondary to AICD firing. Patient is approximately one hour prior to arrival she was sitting on the bed when she became woozy out of it, her AICD subsequently fired once. Has not fired since then. She presented to the emergency department for further evaluation treatment. Patient stated the fever was last interrogated in April when she was hospitalized. AICD only not a pacemaker, managed by [facility]. Her cardiologist is Dr. France Castleman. Denies increased use of caffeinated beverages or energy drinks. Patient has a history of CHF and lymphedema she has been compliant with her diuretics Lasix and spironolactone. She was previously in the wound clinic, has been managing her lymphedema at home with wrappings. She denies any weeping. She states she has had some shortness of breath but that is normal for her. She is on 3 L oxygen at all times, however she did not wear her oxygen in route to the emergency department. Patient denied associated chest pain, nausea, vomiting, fevers or chills. REVIEW OF SYSTEMS     Review of Systems   Constitutional: Negative for chills, diaphoresis and fever. HENT: Negative for congestion, ear pain and facial swelling. Eyes: Negative for pain, discharge and visual disturbance. Respiratory: Positive for shortness of breath. Negative for chest tightness. Cardiovascular: Negative for chest pain and palpitations. Gastrointestinal: Negative for abdominal distention and abdominal pain. Genitourinary: Negative for difficulty urinating and flank pain.    Musculoskeletal: Negative for back within normal limits   BASIC METABOLIC PANEL W/ REFLEX TO MG FOR LOW K - Abnormal; Notable for the following components:    Glucose 111 (*)     CREATININE 0.95 (*)     GFR Non- 59 (*)     All other components within normal limits   CBC - Abnormal; Notable for the following components:    RBC 3.57 (*)     Hemoglobin 9.0 (*)     Hematocrit 31.3 (*)     RDW 17.5 (*)     All other components within normal limits   PROTIME-INR - Abnormal; Notable for the following components:    Protime 12.7 (*)     All other components within normal limits   COMP METABOLIC W/ BILI PROFILE - Abnormal; Notable for the following components:    Alb 3.3 (*)     Calcium 8.3 (*)     CREATININE 1.00 (*)     Glucose 112 (*)     Potassium 3.3 (*)     GFR Non- 55 (*)     All other components within normal limits   CBC WITH AUTO DIFFERENTIAL - Abnormal; Notable for the following components:    Hemoglobin 10.1 (*)     Hematocrit 35.1 (*)     MCH 25.1 (*)     RDW 17.6 (*)     Seg Neutrophils 69 (*)     Lymphocytes 18 (*)     Absolute Lymph # 0.84 (*)     All other components within normal limits   MAGNESIUM   CK   APTT   MAGNESIUM   TSH WITH REFLEX   VITAMIN B12 & FOLATE       EMERGENCY DEPARTMENTCOURSE:         Vitals:    Vitals:    07/22/19 0530 07/22/19 0545 07/22/19 0600 07/22/19 0615   BP:   122/75    Pulse: 61 61 63 60   Resp:       Temp:       TempSrc:       SpO2: 92% 100% 100% 96%   Weight:       Height:           The patient was given the following medications while in the emergency department:  Orders Placed This Encounter   Medications    acetaminophen (TYLENOL) tablet 1,000 mg    atorvastatin (LIPITOR) tablet 80 mg    DISCONTD: furosemide (LASIX) tablet 20 mg    metoprolol succinate (TOPROL XL) extended release tablet 25 mg    multivitamin 1 tablet    potassium chloride (MICRO-K) extended release capsule 20 mEq    DISCONTD: rivaroxaban (XARELTO) tablet 15 mg    sacubitril-valsartan (ENTRESTO) 24-26 week  venous insufficiency    DISCHARGE MEDICATIONS:  Current Discharge Medication List        Wilmer Kay MD  Attending Emergency Physician    This note was created with the assistance of a speech-recognition program. While intending to generate a document that actually reflects the content of the visit, no guarantees can be provided that every mistake has been identified and corrected by editing.                     Wilmer Kay MD  42/93/89 [id number]

## 2019-07-20 NOTE — H&P
[facility]    HISTORY AND PHYSICAL EXAMINATION            Date:   7/20/2019  Patient name:  Maylin Gabriel  Date of admission:  7/20/2019  8:34 AM  MRN:   [id number]  Account:  [de-identified]  YOB: 1950  PCP:    Magalie Madrigal MD  Room:   2032/2032-01  Code Status:    Full Code    Chief Complaint:     Chief Complaint   Patient presents with    AICD Problem   Patient AICD not firing when she s having VFIB. History Obtained From:     patient, electronic medical record    History of Present Illness:     Patient presenting with dizziness with AICD firing and patient was worried and hence presented to the ER where patient AICD was checked and was functioning normally. However telemetry showed patient was having intermittent ventricular fibrillation which was not being picked up and there was no ring of the AICD. It was recently started on Risperdal by PCP a week ago and in view of intermittent intermittent ventricular fibrillation despite AICD that is not firing and was admitted for further management. Patient was seen by cardiology and agreed with discontinuing Risperdal.  She is also complains of shortness of breath on exertion with significant edema and redness of skin in the lower extremities. She denies any nausea vomiting chest pain fever chills.       Past Medical History:     Past Medical History:   Diagnosis Date    Acute on chronic systolic congestive heart failure ([address] 8/8/2017    Anxiety     Cardiomyopathy ([address]     Depression     H/O heart artery stent     [place] (hard of hearing)     Hyperlipidemia     Hypertension     Hypertensive urgency 8/7/2017    Leg swelling 8/7/2017        Past Surgical History:     Past Surgical History:   Procedure Laterality Date    CARDIAC DEFIBRILLATOR PLACEMENT      CATARACT REMOVAL      CORNEAL TRANSPLANT      EYE SURGERY      NASAL SINUS SURGERY      TONSILLECTOMY AND

## 2019-07-20 NOTE — PROGRESS NOTES
Clinical pharmacist note    Patient has been taking Xarelto 15mg BID since 5/1/19 for acute PE. The dose should have been 15mg BID x 21 days then 20mg daily thereafter. The script for 20mg daily was never properly entered in Epic at the time of her PE so the 15mg BID got renewed by mistake by her PCP on 6/7/19. I called Dr. Jean Paul Rey, patient's attending physician and changed inpatient order as well as home medication list. I called [facility] asking pharmacy to cancel 15mg script and instead fill the 20mg script (Xarelto 20mg daily, #30 with no refills - future refills to be sent by PCP). Also send inTuicool message to PCP and office staff to inform of this issue. Called RN to inform her and my pharmacy intern counseled patient to ensure she stops the 15mg BID and starts the 20mg daily upon discharge. Please call [facility] if you have any questions.  Randy Johnson, PharmD  7/20/2019  6:27 PM

## 2019-07-20 NOTE — CONSULTS
age  NECK:  No JVD, no bruit  LUNGS:  No increased work of breathing, good air exchange, clear to auscultation bilaterally, no crackles or wheezing  CARDIOVASCULAR:  S1, S2, regular, no S3 or S4, 2/6 MR murmur at the apex, no rub or gwen  ABDOMEN:  Obese, soft, non-tender, BS x4  MUSCULOSKELETAL:  REYES, BLE wrapped in bandages for her chronic lymphedema, redness in legs noted  NEUROLOGIC:  A/O x4, NAD, speech is clear and appropriate    DATA:    LABS:  Lab Results   Component Value Date    WBC 4.4 07/20/2019    HGB 10.2 07/20/2019     07/20/2019     Lab Results   Component Value Date     07/20/2019    K 4.5 07/20/2019     07/20/2019    CO2 24 07/20/2019    BUN 12 07/20/2019    CREATININE 0.87 07/20/2019    GFRAA >60 07/20/2019    GLUCOSE 106 07/20/2019    AST 29 05/12/2019    ALT 27 05/12/2019     Lab Results   Component Value Date    PROTIME 13.2 07/20/2019    INR 1.3 07/20/2019     Recent Labs     07/20/19  0845   CKTOTAL 53   TROPONINT NOT REPORTED     Lab Results   Component Value Date    MG 2.0 07/20/2019     Lab Results   Component Value Date    LABA1C 5.8 05/13/2019     Lab Results   Component Value Date    TRIG 106 05/13/2019    HDL 36 05/13/2019     No results found for: BNP  Lab Results   Component Value Date    DDIMER 16.71 04/29/2019         ECG: I have reviewed EKG with the following interpretation: SR with V-pacing on demand, 2 APBs noted. QTc 445    EKG 5-12-19          ECHO: 3-2019  Summary  Left ventricle is mildly enlarged. Mild to moderate left ventricular hypertrophy. Global left ventricular systolic function is severely reduced with an  estimated ejection fraction of 25 % . Severe global hypokinesis. Grade III diastolic dysfunction. Left atrium is severely dilated. Right atrium is severely dilated . Right ventricle is normal in size with mildly reduced function. Thickened mitral valve leaflets. Moderate mitral regurgitation. Mild tricuspid regurgitation.  Estimated

## 2019-07-20 NOTE — PROGRESS NOTES
Transitions of Care Pharmacy Service   Medication Review    The patient's list of current home medications has been reviewed. Source(s) of information: patient, Epic, Surescripts refill report      Please feel free to call me with any questions about this encounter. Thank you.     Brenda Del Cid [facility]   Transitions of Care Pharmacy Service  Phone:  [phone]  Fax: [phone]      Electronically signed by Brenda Del Cid [facility] on 7/20/2019 at 10:46 AM       Medications Prior to Admission:   sertraline (ZOLOFT) 25 MG tablet, Take 25 mg by mouth daily  acetaminophen (TYLENOL) 500 MG tablet, Take 1,000 mg by mouth every 6 hours as needed for Pain  risperiDONE (RISPERDAL) 1 MG tablet, Take 1 tablet by mouth nightly  rivaroxaban (XARELTO) 15 MG TABS tablet, Take 1 tablet by mouth 2 times daily (with meals)  metoprolol succinate (TOPROL XL) 25 MG extended release tablet, Take 1 tablet by mouth daily  sacubitril-valsartan (ENTRESTO) 24-26 MG per tablet, Take 1 tablet by mouth 2 times daily  spironolactone (ALDACTONE) 25 MG tablet, Take 1 tablet by mouth daily  furosemide (LASIX) 20 MG tablet, Take 1 tablet by mouth 2 times daily  Multiple Vitamin (MULTIVITAMIN) tablet, Take 1 tablet by mouth daily  oxybutynin (DITROPAN) 5 MG tablet, Take 1 tablet by mouth 3 times daily  potassium chloride (MICRO-K) 10 MEQ extended release capsule, Take 2 capsules by mouth daily  atorvastatin (LIPITOR) 80 MG tablet, Take 1 tablet by mouth nightly

## 2019-07-21 PROBLEM — I87.2 VENOUS INSUFFICIENCY OF BOTH LOWER EXTREMITIES: Chronic | Status: ACTIVE | Noted: 2019-07-21

## 2019-07-21 LAB
ANION GAP SERPL CALCULATED.3IONS-SCNC: 10 MMOL/L (ref 9–17)
BUN BLDV-MCNC: 13 MG/DL (ref 8–23)
BUN/CREAT BLD: 14 (ref 9–20)
CALCIUM SERPL-MCNC: 8.6 MG/DL (ref 8.6–10.4)
CHLORIDE BLD-SCNC: 105 MMOL/L (ref 98–107)
CO2: 27 MMOL/L (ref 20–31)
CREAT SERPL-MCNC: 0.95 MG/DL (ref 0.5–0.9)
GFR AFRICAN AMERICAN: >60 ML/MIN
GFR NON-AFRICAN AMERICAN: 59 ML/MIN
GFR SERPL CREATININE-BSD FRML MDRD: ABNORMAL ML/MIN/{1.73_M2}
GFR SERPL CREATININE-BSD FRML MDRD: ABNORMAL ML/MIN/{1.73_M2}
GLUCOSE BLD-MCNC: 111 MG/DL (ref 70–99)
HCT VFR BLD CALC: 31.3 % (ref 36.3–47.1)
HEMOGLOBIN: 9 G/DL (ref 11.9–15.1)
INR BLD: 1.2
MCH RBC QN AUTO: 25.2 PG (ref 25.2–33.5)
MCHC RBC AUTO-ENTMCNC: 28.8 G/DL (ref 28.4–34.8)
MCV RBC AUTO: 87.7 FL (ref 82.6–102.9)
NRBC AUTOMATED: 0 PER 100 WBC
PDW BLD-RTO: 17.5 % (ref 11.8–14.4)
PLATELET # BLD: 186 K/UL (ref 138–453)
PMV BLD AUTO: 10.3 FL (ref 8.1–13.5)
POTASSIUM SERPL-SCNC: 4.1 MMOL/L (ref 3.7–5.3)
PROTHROMBIN TIME: 12.7 SEC (ref 9.7–11.6)
RBC # BLD: 3.57 M/UL (ref 3.95–5.11)
SODIUM BLD-SCNC: 142 MMOL/L (ref 135–144)
WBC # BLD: 4.2 K/UL (ref 3.5–11.3)

## 2019-07-21 PROCEDURE — 36415 COLL VENOUS BLD VENIPUNCTURE: CPT

## 2019-07-21 PROCEDURE — 6360000002 HC RX W HCPCS: Performed by: NURSE PRACTITIONER

## 2019-07-21 PROCEDURE — 85027 COMPLETE CBC AUTOMATED: CPT

## 2019-07-21 PROCEDURE — 85610 PROTHROMBIN TIME: CPT

## 2019-07-21 PROCEDURE — 99232 SBSQ HOSP IP/OBS MODERATE 35: CPT | Performed by: FAMILY MEDICINE

## 2019-07-21 PROCEDURE — 1200000000 HC SEMI PRIVATE

## 2019-07-21 PROCEDURE — 6370000000 HC RX 637 (ALT 250 FOR IP): Performed by: FAMILY MEDICINE

## 2019-07-21 PROCEDURE — 2580000003 HC RX 258: Performed by: FAMILY MEDICINE

## 2019-07-21 PROCEDURE — 6360000002 HC RX W HCPCS: Performed by: INTERNAL MEDICINE

## 2019-07-21 PROCEDURE — 93005 ELECTROCARDIOGRAM TRACING: CPT | Performed by: INTERNAL MEDICINE

## 2019-07-21 PROCEDURE — 80048 BASIC METABOLIC PNL TOTAL CA: CPT

## 2019-07-21 PROCEDURE — 6360000002 HC RX W HCPCS: Performed by: FAMILY MEDICINE

## 2019-07-21 PROCEDURE — 2580000003 HC RX 258: Performed by: INTERNAL MEDICINE

## 2019-07-21 RX ORDER — DIPHENHYDRAMINE HYDROCHLORIDE 50 MG/ML
25 INJECTION INTRAMUSCULAR; INTRAVENOUS EVERY 6 HOURS PRN
Status: DISCONTINUED | OUTPATIENT
Start: 2019-07-21 | End: 2019-07-23 | Stop reason: HOSPADM

## 2019-07-21 RX ORDER — MAGNESIUM SULFATE 1 G/100ML
1 INJECTION INTRAVENOUS
Status: COMPLETED | OUTPATIENT
Start: 2019-07-21 | End: 2019-07-21

## 2019-07-21 RX ADMIN — CEFAZOLIN SODIUM 1 G: 1 INJECTION, SOLUTION INTRAVENOUS at 22:48

## 2019-07-21 RX ADMIN — FUROSEMIDE 40 MG: 10 INJECTION, SOLUTION INTRAMUSCULAR; INTRAVENOUS at 17:21

## 2019-07-21 RX ADMIN — METOPROLOL SUCCINATE 25 MG: 25 TABLET, FILM COATED, EXTENDED RELEASE ORAL at 08:38

## 2019-07-21 RX ADMIN — MAGNESIUM SULFATE HEPTAHYDRATE 1 G: 1 INJECTION, SOLUTION INTRAVENOUS at 14:01

## 2019-07-21 RX ADMIN — DIPHENHYDRAMINE HYDROCHLORIDE 25 MG: 50 INJECTION, SOLUTION INTRAMUSCULAR; INTRAVENOUS at 22:48

## 2019-07-21 RX ADMIN — RIVAROXABAN 20 MG: 20 TABLET, FILM COATED ORAL at 08:38

## 2019-07-21 RX ADMIN — SACUBITRIL AND VALSARTAN 1 TABLET: 24; 26 TABLET, FILM COATED ORAL at 08:38

## 2019-07-21 RX ADMIN — MAGNESIUM SULFATE HEPTAHYDRATE 1 G: 1 INJECTION, SOLUTION INTRAVENOUS at 13:00

## 2019-07-21 RX ADMIN — AMIODARONE HYDROCHLORIDE 150 MG: 50 INJECTION, SOLUTION INTRAVENOUS at 12:30

## 2019-07-21 RX ADMIN — AMIODARONE HYDROCHLORIDE 0.5 MG/MIN: 50 INJECTION, SOLUTION INTRAVENOUS at 12:30

## 2019-07-21 RX ADMIN — CEFAZOLIN SODIUM 1 G: 1 INJECTION, SOLUTION INTRAVENOUS at 06:08

## 2019-07-21 RX ADMIN — SACUBITRIL AND VALSARTAN 1 TABLET: 24; 26 TABLET, FILM COATED ORAL at 20:34

## 2019-07-21 RX ADMIN — Medication 10 ML: at 08:38

## 2019-07-21 RX ADMIN — ATORVASTATIN CALCIUM 80 MG: 80 TABLET, FILM COATED ORAL at 20:33

## 2019-07-21 RX ADMIN — CEFAZOLIN SODIUM 1 G: 1 INJECTION, SOLUTION INTRAVENOUS at 15:00

## 2019-07-21 RX ADMIN — Medication 10 ML: at 20:34

## 2019-07-21 RX ADMIN — FUROSEMIDE 40 MG: 10 INJECTION, SOLUTION INTRAMUSCULAR; INTRAVENOUS at 08:38

## 2019-07-21 RX ADMIN — SPIRONOLACTONE 25 MG: 25 TABLET ORAL at 08:38

## 2019-07-21 NOTE — PROGRESS NOTES
Siva Rosario RN on 7/21/2019 at 12:02 PM  Patient seen and examined in her room with her nurse at bedside. She still gets nonsustained ventricular tachycardia and according to her nurse it averaged twice an hour. No shocks from her ICD. she remains asymptomatic that this kind of arrhythmia. I gave amiodarone IV bolus at 150 mg and since then she did not have further ventricular tachycardia. I will keep her on IV amiodarone drip overnight and if she remains stable we will switch her to oral form. The patient understood.

## 2019-07-21 NOTE — PROGRESS NOTES
smoked. She has never used smokeless tobacco. She reports that she does not drink alcohol or use drugs. Family History: History reviewed. No pertinent family history. Vitals:  BP (!) 103/59   Pulse 64   Temp 98.1 °F (36.7 °C) (Temporal)   Resp 20   Ht 5' 2\" (1.575 m)   Wt 202 lb 1.6 oz (91.7 kg)   SpO2 100%   BMI 36.96 kg/m²   Temp (24hrs), Av.5 °F (36.9 °C), Min:98.1 °F (36.7 °C), Max:99 °F (37.2 °C)    No results for input(s): POCGLU in the last 72 hours. I/O (24Hr): Intake/Output Summary (Last 24 hours) at 2019 1447  Last data filed at 2019 1128  Gross per 24 hour   Intake --   Output 5650 ml   Net -5650 ml       Labs:  Hematology:  Recent Labs     19  0845 19  0449   WBC 4.4 4.2   RBC 4.01 3.57*   HGB 10.2* 9.0*   HCT 35.7* 31.3*   MCV 89.0 87.7   MCH 25.4 25.2   MCHC 28.6 28.8   RDW 17.6* 17.5*    186   MPV 11.4 10.3   INR 1.3 1.2     Chemistry:  Recent Labs     19  0845 19  1230 19  0449     --  142   K 4.5  --  4.1     --  105   CO2 24  --  27   GLUCOSE 106*  --  111*   BUN 12  --  13   CREATININE 0.87  --  0.95*   MG 2.0 2.0  --    ANIONGAP 12  --  10   LABGLOM >60  --  59*   GFRAA >60  --  >60   CALCIUM 9.0  --  8.6   PROBNP 5,435*  --   --    TROPHS 23*  --   --    CKTOTAL 53  --   --      Recent Labs     19  1230   TSH 2.45     ABG:No results found for: POCPH, PHART, PH, POCPCO2, HJE1QDR, PCO2, POCPO2, PO2ART, PO2, POCHCO3, TPP4YZU, HCO3, NBEA, PBEA, BEART, BE, THGBART, THB, OHH6YSC, SGQK5IPU, F4YWKVUN, O2SAT, FIO2  Lab Results   Component Value Date/Time    [facility] 2019 09:15 AM     Lab Results   Component Value Date/Time    CULTURE NO GROWTH 6 DAYS 2019 09:15 AM       Radiology:    [facility] Chest Portable    Result Date: 2019  Stable chest x-ray with cardiomegaly and pacemaker. No evidence of CHF, pneumonia or acute pleural disease.        Physical Examination:        General appearance: alert, cooperative and no distress  Mental Status:  oriented to person, place and time and normal affect  Lungs: Bibasilar crackles to auscultation bilaterally, normal effort  Heart:  regular rate and rhythm, systolic murmur  Abdomen:  soft, nontender, nondistended, normal bowel sounds, no masses, hepatomegaly, splenomegaly  Extremities:  bilatera pedal edema with erythema and redness of the skin and local rise of temperature, NO calf pain with palpation  Skin: Multiple open areas in both lower extremities with lichenification of the skin    Assessment:        Primary Problem  Ventricular fibrillation [facility])    Active Hospital Problems    Diagnosis Date Noted    Ventricular fibrillation ([facility] [I49.01] 07/20/2019     Priority: High    Pulmonary embolism, bilateral ([facility] [I26.99] 04/29/2019     Priority: High    S/P implantation of automatic cardioverter/defibrillator (AICD) [Z95.810] 04/03/2019     Priority: High    Acute on chronic systolic heart failure (HCC) [I50.23] 03/21/2019     Priority: High    Vitamin D deficiency [E55.9] 07/20/2019     Priority: Medium    Venous insufficiency of both lower extremities [I87.2] 07/21/2019    Cellulitis [L03.90] 04/29/2019       Plan:        1. We will continue  diuresis with IV Lasix until patient is dry with decreased almost, no edema in bilateral lower extremities. The ulcers with the bandages are really interfering in her quality of life with not well treated heart failure. If the creatinine is increasing then we can back off to oral Lasix 60-80 mg daily IN doses on discharge. 2. We will do wound care consult. 3. His nurse to clean bilateral lower extremities and just dressed the wounds that are open. 4. PLEASE do not restart oxybutynin and Zoloft on discharge  5. Will refer the patient to [facility] to evaluate for bilateral venous insufficiency  6. We will continue Rocephin and discharged home on oral cephalosporin for 1 week  7.  Replace

## 2019-07-21 NOTE — FLOWSHEET NOTE
Patient shares about her medical condition. States defibrillator went off. States she feels no support from her . Yet states great support from son. Patient starts to question her life, and why God still has her here.  helps patient explore her thoughts, feelings.  helps patient look at the good in her life and how her son needs her and she needs her son. Patient tears up reflecting.  leaves patient with things to think about.  shared in presence, listening, support, prayers. Follow up as needed. 07/21/19 1235   Encounter Summary   Services provided to: Patient   Referral/Consult From: [address] [place] [address] Road Visiting   (7-21-19)   Complexity of Encounter High   Length of Encounter 30 minutes   Spiritual Assessment Completed Yes   Routine   Type Initial   Spiritual/Oriental orthodox   Type Spiritual support   Assessment Passive; Tearful; Anxious;Questioning meaning/purpose;Questioning jimbo   Intervention Active listening;Explored feelings, thoughts, concerns;Prayer;Nurtured hope;Provided reading materials/devotional materials;Sustaining presence/ Ministry of presence; Discussed illness/injury and it's impact; Discussed belief system/Anabaptist practices/jimbo; Confronted/challenged; Discussed death;Discussed meaning/purpose;Discussed relationship with God   Outcome Expressed gratitude;Receptive;Engaged in conversation;Expressed feelings/needs/concerns; Less anxious, less agitated;New perspective;Encouraged;Venting emotion

## 2019-07-22 LAB
ABSOLUTE EOS #: 0.13 K/UL (ref 0–0.44)
ABSOLUTE IMMATURE GRANULOCYTE: 0.01 K/UL (ref 0–0.3)
ABSOLUTE LYMPH #: 0.84 K/UL (ref 1.1–3.7)
ABSOLUTE MONO #: 0.43 K/UL (ref 0.1–1.2)
ALBUMIN SERPL-MCNC: 3.3 G/DL (ref 3.5–5.2)
ALBUMIN/GLOBULIN RATIO: ABNORMAL (ref 1–2.5)
ALP BLD-CCNC: 81 U/L (ref 35–104)
ALT SERPL-CCNC: 15 U/L (ref 5–33)
ANION GAP SERPL CALCULATED.3IONS-SCNC: 13 MMOL/L (ref 9–17)
AST SERPL-CCNC: 26 U/L
BASOPHILS # BLD: 1 % (ref 0–2)
BASOPHILS ABSOLUTE: 0.04 K/UL (ref 0–0.2)
BILIRUB SERPL-MCNC: 0.5 MG/DL (ref 0.3–1.2)
BILIRUBIN DIRECT: 0.15 MG/DL
BILIRUBIN, INDIRECT: 0.35 MG/DL (ref 0–1)
BUN BLDV-MCNC: 15 MG/DL (ref 8–23)
CALCIUM SERPL-MCNC: 8.3 MG/DL (ref 8.6–10.4)
CHLORIDE BLD-SCNC: 100 MMOL/L (ref 98–107)
CO2: 29 MMOL/L (ref 20–31)
CREAT SERPL-MCNC: 1 MG/DL (ref 0.5–0.9)
DIFFERENTIAL TYPE: ABNORMAL
EKG ATRIAL RATE: 46 BPM
EKG ATRIAL RATE: 61 BPM
EKG Q-T INTERVAL: 460 MS
EKG Q-T INTERVAL: 546 MS
EKG QRS DURATION: 142 MS
EKG QRS DURATION: 98 MS
EKG QTC CALCULATION (BAZETT): 486 MS
EKG QTC CALCULATION (BAZETT): 549 MS
EKG R AXIS: -10 DEGREES
EKG R AXIS: -22 DEGREES
EKG T AXIS: -149 DEGREES
EKG T AXIS: 151 DEGREES
EKG VENTRICULAR RATE: 61 BPM
EKG VENTRICULAR RATE: 67 BPM
EOSINOPHILS RELATIVE PERCENT: 3 % (ref 1–4)
GFR AFRICAN AMERICAN: >60 ML/MIN
GFR NON-AFRICAN AMERICAN: 55 ML/MIN
GFR SERPL CREATININE-BSD FRML MDRD: ABNORMAL ML/MIN/{1.73_M2}
GFR SERPL CREATININE-BSD FRML MDRD: ABNORMAL ML/MIN/{1.73_M2}
GLUCOSE BLD-MCNC: 112 MG/DL (ref 70–99)
HCT VFR BLD CALC: 35.1 % (ref 36.3–47.1)
HEMOGLOBIN: 10.1 G/DL (ref 11.9–15.1)
IMMATURE GRANULOCYTES: 0 %
LYMPHOCYTES # BLD: 18 % (ref 24–43)
MCH RBC QN AUTO: 25.1 PG (ref 25.2–33.5)
MCHC RBC AUTO-ENTMCNC: 28.8 G/DL (ref 28.4–34.8)
MCV RBC AUTO: 87.1 FL (ref 82.6–102.9)
MONOCYTES # BLD: 9 % (ref 3–12)
NRBC AUTOMATED: 0 PER 100 WBC
PDW BLD-RTO: 17.6 % (ref 11.8–14.4)
PLATELET # BLD: 216 K/UL (ref 138–453)
PLATELET ESTIMATE: ABNORMAL
PMV BLD AUTO: 10.5 FL (ref 8.1–13.5)
POTASSIUM SERPL-SCNC: 3.3 MMOL/L (ref 3.7–5.3)
RBC # BLD: 4.03 M/UL (ref 3.95–5.11)
RBC # BLD: ABNORMAL 10*6/UL
SEG NEUTROPHILS: 69 % (ref 36–65)
SEGMENTED NEUTROPHILS ABSOLUTE COUNT: 3.19 K/UL (ref 1.5–8.1)
SODIUM BLD-SCNC: 142 MMOL/L (ref 135–144)
TOTAL PROTEIN: 6.6 G/DL (ref 6.4–8.3)
WBC # BLD: 4.6 K/UL (ref 3.5–11.3)
WBC # BLD: ABNORMAL 10*3/UL

## 2019-07-22 PROCEDURE — 36415 COLL VENOUS BLD VENIPUNCTURE: CPT

## 2019-07-22 PROCEDURE — 82248 BILIRUBIN DIRECT: CPT

## 2019-07-22 PROCEDURE — 6360000002 HC RX W HCPCS: Performed by: INTERNAL MEDICINE

## 2019-07-22 PROCEDURE — 99232 SBSQ HOSP IP/OBS MODERATE 35: CPT | Performed by: INTERNAL MEDICINE

## 2019-07-22 PROCEDURE — 2580000003 HC RX 258: Performed by: INTERNAL MEDICINE

## 2019-07-22 PROCEDURE — 6370000000 HC RX 637 (ALT 250 FOR IP): Performed by: NURSE PRACTITIONER

## 2019-07-22 PROCEDURE — 93005 ELECTROCARDIOGRAM TRACING: CPT | Performed by: INTERNAL MEDICINE

## 2019-07-22 PROCEDURE — 1200000000 HC SEMI PRIVATE

## 2019-07-22 PROCEDURE — 6360000002 HC RX W HCPCS: Performed by: NURSE PRACTITIONER

## 2019-07-22 PROCEDURE — 6370000000 HC RX 637 (ALT 250 FOR IP): Performed by: FAMILY MEDICINE

## 2019-07-22 PROCEDURE — 2580000003 HC RX 258: Performed by: FAMILY MEDICINE

## 2019-07-22 PROCEDURE — 6360000002 HC RX W HCPCS: Performed by: FAMILY MEDICINE

## 2019-07-22 PROCEDURE — 85025 COMPLETE CBC W/AUTO DIFF WBC: CPT

## 2019-07-22 PROCEDURE — 80053 COMPREHEN METABOLIC PANEL: CPT

## 2019-07-22 RX ORDER — LORAZEPAM 2 MG/ML
1 INJECTION INTRAMUSCULAR EVERY 6 HOURS PRN
Status: DISCONTINUED | OUTPATIENT
Start: 2019-07-22 | End: 2019-07-23 | Stop reason: HOSPADM

## 2019-07-22 RX ORDER — AMIODARONE HYDROCHLORIDE 200 MG/1
200 TABLET ORAL DAILY
Status: DISCONTINUED | OUTPATIENT
Start: 2019-07-22 | End: 2019-07-23 | Stop reason: HOSPADM

## 2019-07-22 RX ORDER — FUROSEMIDE 40 MG/1
40 TABLET ORAL 2 TIMES DAILY
Status: DISCONTINUED | OUTPATIENT
Start: 2019-07-22 | End: 2019-07-23 | Stop reason: HOSPADM

## 2019-07-22 RX ADMIN — Medication 10 ML: at 15:05

## 2019-07-22 RX ADMIN — Medication 10 ML: at 09:40

## 2019-07-22 RX ADMIN — POTASSIUM CHLORIDE 40 MEQ: 1500 TABLET, EXTENDED RELEASE ORAL at 05:52

## 2019-07-22 RX ADMIN — CEFAZOLIN SODIUM 1 G: 1 INJECTION, SOLUTION INTRAVENOUS at 14:35

## 2019-07-22 RX ADMIN — MULTIVITAMIN TABLET 1 TABLET: TABLET at 09:30

## 2019-07-22 RX ADMIN — AMIODARONE HYDROCHLORIDE 0.5 MG/MIN: 50 INJECTION, SOLUTION INTRAVENOUS at 01:51

## 2019-07-22 RX ADMIN — RIVAROXABAN 20 MG: 20 TABLET, FILM COATED ORAL at 09:30

## 2019-07-22 RX ADMIN — CEFAZOLIN SODIUM 1 G: 1 INJECTION, SOLUTION INTRAVENOUS at 22:36

## 2019-07-22 RX ADMIN — METOPROLOL SUCCINATE 25 MG: 25 TABLET, FILM COATED, EXTENDED RELEASE ORAL at 09:34

## 2019-07-22 RX ADMIN — POTASSIUM CHLORIDE 20 MEQ: 750 CAPSULE, EXTENDED RELEASE ORAL at 10:43

## 2019-07-22 RX ADMIN — ATORVASTATIN CALCIUM 80 MG: 80 TABLET, FILM COATED ORAL at 21:24

## 2019-07-22 RX ADMIN — CEFAZOLIN SODIUM 1 G: 1 INJECTION, SOLUTION INTRAVENOUS at 06:13

## 2019-07-22 RX ADMIN — AMIODARONE HYDROCHLORIDE 200 MG: 200 TABLET ORAL at 16:09

## 2019-07-22 RX ADMIN — SPIRONOLACTONE 25 MG: 25 TABLET ORAL at 09:31

## 2019-07-22 RX ADMIN — FUROSEMIDE 40 MG: 10 INJECTION, SOLUTION INTRAMUSCULAR; INTRAVENOUS at 09:30

## 2019-07-22 RX ADMIN — LORAZEPAM 1 MG: 2 INJECTION INTRAMUSCULAR; INTRAVENOUS at 01:50

## 2019-07-22 RX ADMIN — SACUBITRIL AND VALSARTAN 1 TABLET: 24; 26 TABLET, FILM COATED ORAL at 09:31

## 2019-07-22 RX ADMIN — Medication 10 ML: at 21:25

## 2019-07-22 RX ADMIN — LORAZEPAM 1 MG: 2 INJECTION INTRAMUSCULAR; INTRAVENOUS at 16:09

## 2019-07-22 RX ADMIN — SACUBITRIL AND VALSARTAN 1 TABLET: 24; 26 TABLET, FILM COATED ORAL at 21:25

## 2019-07-22 RX ADMIN — FUROSEMIDE 40 MG: 40 TABLET ORAL at 17:57

## 2019-07-22 ASSESSMENT — PAIN SCALES - GENERAL: PAINLEVEL_OUTOF10: 0

## 2019-07-22 NOTE — CARE COORDINATION
sarai trevino and so that is partially reason why she never allowed home care to come. Pt is agreeable to referral to [facility]. Pt states she is able to perform her own dressing changes to BLE. Plan for pt to return home. No referrals made at this time.    Will follow for wound care referral.         Electronically signed by Catrachita Ho RN on 7/22/19 at 2:38 PM

## 2019-07-22 NOTE — PROGRESS NOTES
Known Allergies    Current Meds:   Scheduled Meds:    atorvastatin  80 mg Oral Nightly    metoprolol succinate  25 mg Oral Daily    multivitamin  1 tablet Oral Daily    potassium chloride  20 mEq Oral Daily    sacubitril-valsartan  1 tablet Oral BID    spironolactone  25 mg Oral Daily    sodium chloride flush  10 mL Intravenous 2 times per day    furosemide  40 mg Intravenous BID    ceFAZolin  1 g Intravenous Q8H    rivaroxaban  20 mg Oral Daily with breakfast    vitamin D  50,000 Units Oral Weekly     Continuous Infusions:    amiodarone 0.5 mg/min (19 0600)     PRN Meds: LORazepam, diphenhydrAMINE, acetaminophen, sodium chloride flush, potassium chloride **OR** potassium alternative oral replacement **OR** potassium chloride, magnesium sulfate, magnesium hydroxide, nicotine, acetaminophen, ondansetron **OR** ondansetron    Data:     Past Medical History:   has a past medical history of Acute on chronic systolic congestive heart failure ([address] Anxiety, Cardiomyopathy ([address] Depression, H/O heart artery stent, [place] (hard of hearing), Hyperlipidemia, Hypertension, Hypertensive urgency, and Leg swelling. Social History:   reports that she has never smoked. She has never used smokeless tobacco. She reports that she does not drink alcohol or use drugs. Family History: History reviewed. No pertinent family history. Vitals:  /75   Pulse 60   Temp 97.5 °F (36.4 °C) (Temporal)   Resp 16   Ht 5' 2\" (1.575 m)   Wt 195 lb (88.5 kg)   SpO2 96%   BMI 35.67 kg/m²   Temp (24hrs), Av.6 °F (36.4 °C), Min:97.4 °F (36.3 °C), Max:98.1 °F (36.7 °C)    No results for input(s): POCGLU in the last 72 hours. I/O (24Hr):     Intake/Output Summary (Last 24 hours) at 2019 0855  Last data filed at 2019 0651  Gross per 24 hour   Intake 1792.4 ml   Output 5300 ml   Net -3507.6 ml       Labs:  Hematology:  Recent Labs     19  0845 19  0449 19  0508   WBC 4.4 4.2 4.6   RBC induration    Assessment:        Primary Problem  Ventricular fibrillation (HCC) currently controlled with the amiodarone drip-    Active Hospital Problems    Diagnosis Date Noted    Cellulitis [L03.90] 04/29/2019     Priority: High    Pulmonary embolism[facility] bilateral ([facility] [I26.99] 04/29/2019     Priority: Medium    S/P implantation of automatic cardioverter/defibrillator (AICD) [Z95.810] 04/03/2019     Priority: Medium    Venous insufficiency of both lower extremities [I87.2] 07/21/2019    Ventricular fibrillation ([facility] [I49.01] 07/20/2019    Vitamin D deficiency [E55.9] 07/20/2019    Acute on chronic systolic heart failure ([facility] [I50.23] 03/21/2019       Plan:        1. Consider switching Lasix to oral  2. Consider changing amiodarone to oral  3. Wound dressings as per wound care nurse  4. Continue IV Rocephin and will switch to oral cephalosporin for 1 week at discharge  5. Continue Xarelto  6. Will not restart oxybutynin and Zoloft on discharge, she is also off Risperdal  7.  Patient would like to go home with home care at discharge    Xuan Wilks MD  7/22/2019  8:55 AM

## 2019-07-22 NOTE — PLAN OF CARE
Problem: Falls - Risk of:  Goal: Will remain free from falls  Description  Will remain free from falls  Outcome: Ongoing  Goal: Absence of physical injury  Description  Absence of physical injury  Outcome: Ongoing     Problem: Risk for Impaired Skin Integrity  Goal: Tissue integrity - skin and mucous membranes  Description  Structural intactness and normal physiological function of skin and  mucous membranes.   Outcome: Ongoing     Problem: Fluid Volume:  Goal: Hemodynamic stability will improve  Description  Hemodynamic stability will improve  Outcome: Ongoing  Goal: Ability to maintain a balanced intake and output will improve  Description  Ability to maintain a balanced intake and output will improve  Outcome: Ongoing     Problem: Health Behavior:  Goal: Identification of resources available to assist in meeting health care needs will improve  Description  Identification of resources available to assist in meeting health care needs will improve  Outcome: Ongoing     Problem: Respiratory:  Goal: Respiratory status will improve  Description  Respiratory status will improve  Outcome: Ongoing     Problem: Anxiety:  Goal: Level of anxiety will decrease  Description  Level of anxiety will decrease  Outcome: Ongoing

## 2019-07-23 ENCOUNTER — TELEPHONE (OUTPATIENT)
Dept: PRIMARY CARE CLINIC | Age: 69
End: 2019-07-23

## 2019-07-23 VITALS
HEIGHT: 62 IN | WEIGHT: 187.2 LBS | TEMPERATURE: 98.2 F | SYSTOLIC BLOOD PRESSURE: 113 MMHG | OXYGEN SATURATION: 98 % | RESPIRATION RATE: 16 BRPM | DIASTOLIC BLOOD PRESSURE: 55 MMHG | HEART RATE: 68 BPM | BODY MASS INDEX: 34.45 KG/M2

## 2019-07-23 LAB
EKG ATRIAL RATE: 54 BPM
EKG Q-T INTERVAL: 468 MS
EKG QRS DURATION: 104 MS
EKG QTC CALCULATION (BAZETT): 482 MS
EKG R AXIS: -19 DEGREES
EKG T AXIS: 173 DEGREES
EKG VENTRICULAR RATE: 64 BPM
MAGNESIUM: 1.8 MG/DL (ref 1.6–2.6)
POTASSIUM SERPL-SCNC: 3.9 MMOL/L (ref 3.7–5.3)

## 2019-07-23 PROCEDURE — 99232 SBSQ HOSP IP/OBS MODERATE 35: CPT | Performed by: INTERNAL MEDICINE

## 2019-07-23 PROCEDURE — 36415 COLL VENOUS BLD VENIPUNCTURE: CPT

## 2019-07-23 PROCEDURE — 6370000000 HC RX 637 (ALT 250 FOR IP): Performed by: NURSE PRACTITIONER

## 2019-07-23 PROCEDURE — 83735 ASSAY OF MAGNESIUM: CPT

## 2019-07-23 PROCEDURE — 84132 ASSAY OF SERUM POTASSIUM: CPT

## 2019-07-23 PROCEDURE — 6370000000 HC RX 637 (ALT 250 FOR IP): Performed by: FAMILY MEDICINE

## 2019-07-23 PROCEDURE — 2580000003 HC RX 258: Performed by: FAMILY MEDICINE

## 2019-07-23 PROCEDURE — 6360000002 HC RX W HCPCS: Performed by: FAMILY MEDICINE

## 2019-07-23 RX ORDER — FUROSEMIDE 40 MG/1
40 TABLET ORAL 2 TIMES DAILY
Qty: 60 TABLET | Refills: 0 | Status: ON HOLD | OUTPATIENT
Start: 2019-07-23 | End: 2019-08-22 | Stop reason: HOSPADM

## 2019-07-23 RX ORDER — AMIODARONE HYDROCHLORIDE 200 MG/1
200 TABLET ORAL DAILY
Qty: 30 TABLET | Refills: 3 | Status: ON HOLD | OUTPATIENT
Start: 2019-07-24 | End: 2019-08-22 | Stop reason: HOSPADM

## 2019-07-23 RX ORDER — ERGOCALCIFEROL (VITAMIN D2) 1250 MCG
50000 CAPSULE ORAL WEEKLY
Qty: 5 CAPSULE | Refills: 1 | Status: ON HOLD | OUTPATIENT
Start: 2019-07-27 | End: 2019-08-22 | Stop reason: HOSPADM

## 2019-07-23 RX ADMIN — AMIODARONE HYDROCHLORIDE 200 MG: 200 TABLET ORAL at 10:29

## 2019-07-23 RX ADMIN — FUROSEMIDE 40 MG: 40 TABLET ORAL at 10:29

## 2019-07-23 RX ADMIN — CEFAZOLIN SODIUM 1 G: 1 INJECTION, SOLUTION INTRAVENOUS at 06:44

## 2019-07-23 RX ADMIN — POTASSIUM CHLORIDE 20 MEQ: 750 CAPSULE, EXTENDED RELEASE ORAL at 10:29

## 2019-07-23 RX ADMIN — SPIRONOLACTONE 25 MG: 25 TABLET ORAL at 10:28

## 2019-07-23 RX ADMIN — METOPROLOL SUCCINATE 25 MG: 25 TABLET, FILM COATED, EXTENDED RELEASE ORAL at 10:29

## 2019-07-23 RX ADMIN — SACUBITRIL AND VALSARTAN 1 TABLET: 24; 26 TABLET, FILM COATED ORAL at 10:29

## 2019-07-23 RX ADMIN — RIVAROXABAN 20 MG: 20 TABLET, FILM COATED ORAL at 10:29

## 2019-07-23 RX ADMIN — MULTIVITAMIN TABLET 1 TABLET: TABLET at 10:28

## 2019-07-23 RX ADMIN — Medication 10 ML: at 10:33

## 2019-07-23 ASSESSMENT — PAIN SCALES - GENERAL
PAINLEVEL_OUTOF10: 0

## 2019-07-23 NOTE — FLOWSHEET NOTE
Patient discharged with all belongings to main entrance via wheelchair accompanied by her son and unit pct.

## 2019-07-23 NOTE — DISCHARGE SUMMARY
[address]    Discharge Summary     Patient ID: Geraldine Harrison  :  1950   MRN: [id number]     ACCOUNT:  [de-identified]   Patient's PCP: Jil Ryder MD  Admit Date: 2019   Discharge Date: 2019   Length of Stay: 3  Code Status:  Full Code  Admitting Physician: Doroteo Burrows MD  Discharge Physician: Kristina Thakkar MD     Active Discharge Diagnoses:     Hospital Problem Lists:  Principal Problem:    Ventricular fibrillation [facility])  Active Problems:    Cellulitis    S/P implantation of automatic cardioverter/defibrillator (AICD)    Pulmonary embolism, bilateral (HCC)    Acute on chronic systolic heart failure ([facility]    Vitamin D deficiency    Venous insufficiency of both lower extremities  Resolved Problems:    * No resolved hospital problems. *      Admission Condition:  poor     Discharged Condition: good    Hospital Stay:   Admitting history:  Patient presenting with dizziness with AICD firing and patient was worried and hence presented to the ER where patient AICD was checked and was functioning normally. [facility] telemetry showed patient was having intermittent ventricular fibrillation which was not being picked up and there was no ring of the AICD.   It was recently started on Risperdal by PCP a week ago and in view of intermittent intermittent ventricular fibrillation despite AICD that is not firing and was admitted for further management.  Patient was seen by cardiology and agreed with discontinuing Risperdal. Estela Landeros is also complains of shortness of breath on exertion with significant edema and redness of skin in the lower extremities.     And was started on Rocephin, Lasix 40 mg IV twice daily and was seen by cardiology and started on amiodarone drip.  Patient Risperdal, oxybutynin and Zoloft were stopped, these medications can prolong QT interval.    Hospital Course:   Patient was kept on amiodarone drip  Electrodes 07/22/2019    GFR NOT REPORTED 07/22/2019     HFP:    Lab Results   Component Value Date    PROT 6.6 07/22/2019     CMP:    Lab Results   Component Value Date    GLUCOSE 112 07/22/2019     07/22/2019    K 3.9 07/23/2019     07/22/2019    CO2 29 07/22/2019    BUN 15 07/22/2019    CREATININE 1.00 07/22/2019    ANIONGAP 13 07/22/2019    ALKPHOS 81 07/22/2019    ALT 15 07/22/2019    AST 26 07/22/2019    BILITOT 0.50 07/22/2019    LABALBU 3.3 07/22/2019    ALBUMIN NOT REPORTED 07/22/2019    LABGLOM 55 07/22/2019    GFRAA >60 07/22/2019    GFR      07/22/2019    GFR NOT REPORTED 07/22/2019    PROT 6.6 07/22/2019    CALCIUM 8.3 07/22/2019     PT/INR:  No results found for: PTINR  PTT:   Lab Results   Component Value Date    APTT 27.1 07/20/2019     FLP:    Lab Results   Component Value Date    CHOL 108 05/13/2019    TRIG 106 05/13/2019    HDL 36 05/13/2019     U/A:    Lab Results   Component Value Date    COLORU YELLOW 05/12/2019    TURBIDITY CLEAR 05/12/2019    SPECGRAV 1.025 05/12/2019    HGBUR TRACE 05/12/2019    PHUR 6.0 05/12/2019    PROTEINU 2+ 05/12/2019    GLUCOSEU NEGATIVE 05/12/2019    KETUA NEGATIVE 05/12/2019    BILIRUBINUR NEGATIVE 05/12/2019    UROBILINOGEN Normal 05/12/2019    NITRU NEGATIVE 05/12/2019    LEUKOCYTESUR NEGATIVE 05/12/2019     TSH:    Lab Results   Component Value Date    TSH 2.45 07/20/2019        Radiology:  Xr Chest Portable    Result Date: 7/20/2019  Stable chest x-ray with cardiomegaly and pacemaker. No evidence of CHF, pneumonia or acute pleural disease. Consultations:    Consults:     Final Specialist Recommendations/Findings:   IP CONSULT TO INTERNAL MEDICINE  IP CONSULT TO CARDIOLOGY  IP CONSULT TO CARDIOLOGY      The patient was seen and examined on day of discharge and this discharge summary is in conjunction with any daily progress note from day of discharge.     Discharge plan:     Disposition: Home    Physician Follow Up:     [place]  [address] [address]  [address] MD Jorge HartmanLuis Antonio [address]  [address] [place] [place] [address]  [phone]    In 1 week  venous insufficiency       Requiring Further Evaluation/Follow Up POST HOSPITALIZATION/Incidental Findings:  Follow-up with cardiology as scheduled    Diet: cardiac diet    Activity: As tolerated    Instructions to Patient: Follow-up with wound care as outpatient    Discharge Medications:      Medication List      START taking these medications    amiodarone 200 MG tablet  Commonly known as:  CORDARONE  Take 1 tablet by mouth daily  Start taking on:  7/24/2019     ergocalciferol 90730 units capsule  Commonly known as:  ERGOCALCIFEROL  Take 1 capsule by mouth once a week for 8 doses  Start taking on:  7/27/2019        CHANGE how you take these medications    furosemide 40 MG tablet  Commonly known as:  LASIX  Take 1 tablet by mouth 2 times daily  What changed:    · medication strength  · how much to take        CONTINUE taking these medications    acetaminophen 500 MG tablet  Commonly known as:  TYLENOL     atorvastatin 80 MG tablet  Commonly known as:  LIPITOR  Take 1 tablet by mouth nightly     metoprolol succinate 25 MG extended release tablet  Commonly known as:  TOPROL XL  Take 1 tablet by mouth daily     multivitamin tablet  Take 1 tablet by mouth daily     potassium chloride 10 MEQ extended release capsule  Commonly known as:  MICRO-K  Take 2 capsules by mouth daily     rivaroxaban 20 MG Tabs tablet  Commonly known as:  XARELTO     sacubitril-valsartan 24-26 MG per tablet  Commonly known as:  ENTRESTO  Take 1 tablet by mouth 2 times daily     spironolactone 25 MG tablet  Commonly known as:  ALDACTONE  Take 1 tablet by mouth daily        STOP taking these medications    ALPRAZolam 0.25 MG tablet  Commonly known as:  XANAX     ondansetron 4 MG disintegrating tablet  Commonly known as:  ZOFRAN-ODT     oxybutynin 5 MG tablet  Commonly known as:

## 2019-07-23 NOTE — PROGRESS NOTES
to venous stasis is better, no tenderness in the calves  Skin:  no gross lesions, rashes, induration    Assessment:        Primary Problem  Ventricular fibrillation (HCC) currently controlled with the amiodarone     Active Hospital Problems    Diagnosis Date Noted    Cellulitis [L03.90] 04/29/2019     Priority: High    Pulmonary embolism, bilateral ([facility] [I26.99] 04/29/2019     Priority: Medium    S/P implantation of automatic cardioverter/defibrillator (AICD) [Z95.810] 04/03/2019     Priority: Medium    Venous insufficiency of both lower extremities [I87.2] 07/21/2019    Ventricular fibrillation ([facility] [I49.01] 07/20/2019    Vitamin D deficiency [E55.9] 07/20/2019    Acute on chronic systolic heart failure ([facility] [I50.23] 03/21/2019       Plan:        1. Continue oral Lasix   2. Continue oral amiodarone   3. Wound dressings as per wound care nurse as outpatient  4. Continue Xarelto  5. Will not restart oxybutynin and Zoloft on discharge, she is also off Risperdal  6. Discussed to follow closely with cardiologist as outpatient  7.  Discharge home    Paulette Osler, MD  7/23/2019  8:14 AM

## 2019-07-23 NOTE — FLOWSHEET NOTE
Zenobia Swift returned call and RN informed her of K+ result of 3.9 and magnesium result of 1.8. Patient is okay for discharge. Orders received for outpatient BMP & Mg to be drawn this Friday with results to be faxed to Dr. Cathryn English at [phone]. Patient is okay for discharge.

## 2019-07-23 NOTE — PROGRESS NOTES
any):      Diagnostics:   Telemetry: Ventricular paced underlying atrial fibrillation    Labs:   CBC:  Recent Labs     07/21/19  0449 07/22/19  0508   WBC 4.2 4.6   HGB 9.0* 10.1*   HCT 31.3* 35.1*    216     Magnesium:  No results for input(s): MG in the last 72 hours. BMP:  Recent Labs     07/21/19  0449 07/22/19  0508    142   K 4.1 3.3*   CALCIUM 8.6 8.3*   CO2 27 29   BUN 13 15   CREATININE 0.95* 1.00*   LABGLOM 59* 55*   GLUCOSE 111* 112*     BNP:No results for input(s): BNP in the last 72 hours. PT/INR:  Recent Labs     07/21/19 0449   PROTIME 12.7*   INR 1.2     APTT:  No results for input(s): APTT in the last 72 hours. CARDIAC ENZYMES:  No results for input(s): CKTOTAL, CKMB, CKMBINDEX, TROPONINT in the last 72 hours. FASTING LIPID PANEL:  Lab Results   Component Value Date    HDL 36 05/13/2019    TRIG 106 05/13/2019     LIVER PROFILE:  Recent Labs     07/22/19  0508   AST 26   ALT 15   LABALBU 3.3*   ALKPHOS 81   BILITOT 0.50   BILIDIR 0.15   IBILI 0.35   PROT 6.6   ALBUMIN NOT REPORTED        ASSESSMENT:  · S/P appropriate ICD shock for V-fib -no recurrence  · Severe ischemic cardiomyopathy with EF 25% on echo 3/2019  · Acute on chronic systolic heart failure, compensated   · CAD history of MI with PCI to LAD in 10/2018 at [facility] by Dr. Miranda Crooks -no clinical angina   · Persistent atrial fibrillation with controlled VR on Xarelto  · History of bilateral pulmonary embolisms - managed by others  · History of subdural hematoma - managed by others  · Borderline QTc prolongation after Amiodarone and repeat QTC was 482  · Hypokalemia, replaced and managed by others    PLAN:  1. Continue current cardiac medications. 2. Patient feels ready for discharge and has had no further significant arrhythmia.   No objection for discharge from cardiac standpoint and follow-up with Dr. Wanda Lares in 1 to 2 weeks or sooner if needed if repeat potassium and magnesium are normal and will need further monitoring of

## 2019-07-23 NOTE — FLOWSHEET NOTE
Graciela Ball NP called and STAT orders received for magnesium and potassium labs to be rechecked prior to discharge.

## 2019-07-23 NOTE — FLOWSHEET NOTE
Patient states she is better and being released today. Patient states treated well.  shared in presence, prayers. Follow up as needed. 07/23/19 1314   Encounter Summary   Services provided to: Patient   Referral/Consult From: [facility]   (7-23-19 )   Complexity of Encounter Low   Length of Encounter 15 minutes   Spiritual Assessment Completed Yes   Routine   Type Follow up   Spiritual/Restorationist   Type Spiritual support   Assessment Approachable;Calm   Intervention Explored feelings, thoughts, concerns;Prayer;Discussed illness/injury and it's impact; Discussed belief system/Amish practices/jimbo   Outcome Expressed gratitude;Receptive;Engaged in conversation;Expressed feelings/needs/concerns

## 2019-07-24 ENCOUNTER — TELEPHONE (OUTPATIENT)
Dept: PRIMARY CARE CLINIC | Age: 69
End: 2019-07-24

## 2019-07-24 ENCOUNTER — HOSPITAL ENCOUNTER (EMERGENCY)
Facility: CLINIC | Age: 69
Discharge: HOME | End: 2019-07-24
Attending: EMERGENCY MEDICINE
Payer: MEDICARE

## 2019-07-24 VITALS
DIASTOLIC BLOOD PRESSURE: 98 MMHG | HEIGHT: 62 IN | RESPIRATION RATE: 20 BRPM | TEMPERATURE: 97.9 F | BODY MASS INDEX: 33.86 KG/M2 | OXYGEN SATURATION: 95 % | WEIGHT: 184 LBS | SYSTOLIC BLOOD PRESSURE: 122 MMHG | HEART RATE: 81 BPM

## 2019-07-24 DIAGNOSIS — F41.1 ANXIETY STATE: ICD-10-CM

## 2019-07-24 DIAGNOSIS — F41.0 PANIC DISORDER: Primary | ICD-10-CM

## 2019-07-24 PROCEDURE — 99283 EMERGENCY DEPT VISIT LOW MDM: CPT

## 2019-07-24 PROCEDURE — 6370000000 HC RX 637 (ALT 250 FOR IP): Performed by: EMERGENCY MEDICINE

## 2019-07-24 RX ORDER — LORAZEPAM 0.5 MG/1
0.5 TABLET ORAL ONCE
Status: COMPLETED | OUTPATIENT
Start: 2019-07-24 | End: 2019-07-24

## 2019-07-24 RX ORDER — LORAZEPAM 0.5 MG/1
0.5 TABLET ORAL EVERY 6 HOURS PRN
Qty: 12 TABLET | Refills: 0 | Status: SHIPPED | OUTPATIENT
Start: 2019-07-24 | End: 2019-08-23

## 2019-07-24 RX ADMIN — LORAZEPAM 0.5 MG: 0.5 TABLET ORAL at 16:05

## 2019-07-24 ASSESSMENT — ENCOUNTER SYMPTOMS
BACK PAIN: 0
EYE PAIN: 0
DIARRHEA: 0
ABDOMINAL PAIN: 0
NAUSEA: 0
COUGH: 0
SHORTNESS OF BREATH: 0
BLOOD IN STOOL: 0
CONSTIPATION: 0
VOMITING: 0

## 2019-07-24 ASSESSMENT — PAIN DESCRIPTION - LOCATION: LOCATION: BACK

## 2019-07-24 ASSESSMENT — PAIN DESCRIPTION - PAIN TYPE: TYPE: CHRONIC PAIN

## 2019-07-24 ASSESSMENT — PAIN DESCRIPTION - FREQUENCY: FREQUENCY: INTERMITTENT

## 2019-07-24 ASSESSMENT — PAIN DESCRIPTION - DESCRIPTORS: DESCRIPTORS: ACHING

## 2019-07-24 NOTE — ED PROVIDER NOTES
[facility]  [address]  Phone: [phone]        Pt Name: Otis Rae  MRN: [id number]  [place] 1950  Date of evaluation: 7/24/19      CHIEF COMPLAINT       Chief Complaint   Patient presents with    Rash     started today, was discharged yesterday. was given Ativan at the hospital and now can not get a refill    Pruritis     has taken Benadryl. has ahd for months and progressively worsened    Anxiety         HISTORY OF PRESENT ILLNESS    Otis Rae is a 76 y.o. female who presents with complaints of general pruritus and anxiety, patient just got discharged from the hospital for being evaluated for having her AICD. During her hospitalization she said she was taken off her respite all as it may contribute to an arrhythmia problem and was given Ativan for her anxiety issues and itching in the hospital that helped. Patient states she is been on that in the past.  Patient relates that she asked the doctor to prescribe some when she was discharged and they said she had to have her primary care doctor discharged him she contacted her primary doctor who she says would not prescribe it until she was seen by her in the office family got very upset at this and said they fired her doctor and now are looking for a new doctor they just need some meds to get her through. She says she is just very anxious and stressed and has diffuse itching and this is happened before no difficulty breathing or swallowing no rashes there is been no new exposures      REVIEW OF SYSTEMS         Review of Systems   Constitutional: Negative for chills and fever. HENT: Negative for congestion and ear pain. Eyes: Negative for pain and visual disturbance. Respiratory: Negative for cough and shortness of breath. Cardiovascular: Negative for chest pain, palpitations and leg swelling. Gastrointestinal: Negative for abdominal pain, blood in stool, constipation, diarrhea, nausea and vomiting. VITAMIN D (ERGOCALCIFEROL) 11129 UNITS CAPSULE    Take 1 capsule by mouth once a week for 8 doses       ALLERGIES     has No Known Allergies. FAMILY HISTORY     She indicated that her mother is . She indicated that her father is . family history is not on file. SOCIAL HISTORY      reports that she has never smoked. She has never used smokeless tobacco. She reports that she does not drink alcohol or use drugs. PHYSICAL EXAM     INITIAL VITALS:  height is 5' 2\" (1.575 m) and weight is 83.5 kg (184 lb). Her oral temperature is 97.9 °F (36.6 °C). Her blood pressure is 142/79 (abnormal) and her pulse is 81. Her respiration is 20 and oxygen saturation is 95%. Physical Exam   Constitutional: She is oriented to person, place, and time. She appears well-developed and well-nourished. HENT:   Head: Normocephalic and atraumatic. Mouth/Throat: Oropharynx is clear and moist.   Eyes: Pupils are equal, round, and reactive to light. Conjunctivae and EOM are normal.   Neck: Normal range of motion. Cardiovascular: Normal rate and regular rhythm. Pulmonary/Chest: Effort normal and breath sounds normal.   Patient has a defibrillator in place   Abdominal: Soft. Bowel sounds are normal.   Musculoskeletal: She exhibits no edema or tenderness. Neurological: She is alert and oriented to person, place, and time. Skin: Skin is warm and dry. No rash noted. No erythema. Psychiatric: She has a normal mood and affect.  Her behavior is normal.         DIFFERENTIAL DIAGNOSIS/ MDM:     Pruritus, etiology I did discuss with her I can give her a short course of Ativan since I am seeing her but this is a controlled substance she will need to get it from her regular physician    DIAGNOSTIC RESULTS     EKG: All EKG's are interpreted by the Emergency Department Physician who either signs or Co-signs this chart in the absence of a cardiologist.    RADIOLOGY:   Non-plain film images such as CT, Ultrasound

## 2019-07-25 ENCOUNTER — TELEPHONE (OUTPATIENT)
Dept: OTHER | Facility: CLINIC | Age: 69
End: 2019-07-25

## 2019-08-07 ENCOUNTER — HOSPITAL ENCOUNTER (INPATIENT)
Age: 69
LOS: 4 days | Discharge: HOME | DRG: 292 | End: 2019-08-11
Attending: EMERGENCY MEDICINE | Admitting: FAMILY MEDICINE
Payer: MEDICARE

## 2019-08-07 ENCOUNTER — APPOINTMENT (OUTPATIENT)
Dept: GENERAL RADIOLOGY | Age: 69
DRG: 292 | End: 2019-08-07
Payer: MEDICARE

## 2019-08-07 DIAGNOSIS — I50.9 ACUTE ON CHRONIC CONGESTIVE HEART FAILURE, UNSPECIFIED HEART FAILURE TYPE (HCC): Primary | ICD-10-CM

## 2019-08-07 PROBLEM — R77.8 ELEVATED TROPONIN: Status: ACTIVE | Noted: 2019-08-07

## 2019-08-07 PROBLEM — I50.43 CHF (CONGESTIVE HEART FAILURE), NYHA CLASS I, ACUTE ON CHRONIC, COMBINED (HCC): Status: ACTIVE | Noted: 2019-08-07

## 2019-08-07 PROBLEM — Z99.81 ON HOME OXYGEN THERAPY: Status: ACTIVE | Noted: 2019-08-07

## 2019-08-07 PROBLEM — R79.89 ELEVATED TROPONIN: Status: ACTIVE | Noted: 2019-08-07

## 2019-08-07 PROBLEM — R79.89 ELEVATED SERUM CREATININE: Status: ACTIVE | Noted: 2019-08-07

## 2019-08-07 PROBLEM — R79.89 ELEVATED BRAIN NATRIURETIC PEPTIDE (BNP) LEVEL: Status: ACTIVE | Noted: 2019-08-07

## 2019-08-07 PROBLEM — J96.11 CHRONIC HYPOXEMIC RESPIRATORY FAILURE (HCC): Status: ACTIVE | Noted: 2019-08-07

## 2019-08-07 LAB
ABSOLUTE EOS #: 0.06 K/UL (ref 0–0.44)
ABSOLUTE IMMATURE GRANULOCYTE: 0.03 K/UL (ref 0–0.3)
ABSOLUTE LYMPH #: 0.97 K/UL (ref 1.1–3.7)
ABSOLUTE MONO #: 0.53 K/UL (ref 0.1–1.2)
ANION GAP SERPL CALCULATED.3IONS-SCNC: 17 MMOL/L (ref 9–17)
BASOPHILS # BLD: 2 % (ref 0–2)
BASOPHILS ABSOLUTE: 0.09 K/UL (ref 0–0.2)
BNP INTERPRETATION: ABNORMAL
BUN BLDV-MCNC: 25 MG/DL (ref 8–23)
BUN/CREAT BLD: ABNORMAL (ref 9–20)
CALCIUM SERPL-MCNC: 9.6 MG/DL (ref 8.6–10.4)
CHLORIDE BLD-SCNC: 104 MMOL/L (ref 98–107)
CO2: 21 MMOL/L (ref 20–31)
CREAT SERPL-MCNC: 1.24 MG/DL (ref 0.5–0.9)
DIFFERENTIAL TYPE: ABNORMAL
EOSINOPHILS RELATIVE PERCENT: 1 % (ref 1–4)
GFR AFRICAN AMERICAN: 52 ML/MIN
GFR NON-AFRICAN AMERICAN: 43 ML/MIN
GFR SERPL CREATININE-BSD FRML MDRD: ABNORMAL ML/MIN/{1.73_M2}
GFR SERPL CREATININE-BSD FRML MDRD: ABNORMAL ML/MIN/{1.73_M2}
GLUCOSE BLD-MCNC: 105 MG/DL (ref 70–99)
HCT VFR BLD CALC: 37.8 % (ref 36.3–47.1)
HEMOGLOBIN: 10.8 G/DL (ref 11.9–15.1)
IMMATURE GRANULOCYTES: 1 %
LYMPHOCYTES # BLD: 18 % (ref 24–43)
MCH RBC QN AUTO: 25.3 PG (ref 25.2–33.5)
MCHC RBC AUTO-ENTMCNC: 28.6 G/DL (ref 28.4–34.8)
MCV RBC AUTO: 88.5 FL (ref 82.6–102.9)
MONOCYTES # BLD: 10 % (ref 3–12)
NRBC AUTOMATED: 0 PER 100 WBC
PDW BLD-RTO: 18.5 % (ref 11.8–14.4)
PLATELET # BLD: 262 K/UL (ref 138–453)
PLATELET ESTIMATE: ABNORMAL
PMV BLD AUTO: 11.4 FL (ref 8.1–13.5)
POTASSIUM SERPL-SCNC: 4.3 MMOL/L (ref 3.7–5.3)
PRO-BNP: ABNORMAL PG/ML
RBC # BLD: 4.27 M/UL (ref 3.95–5.11)
RBC # BLD: ABNORMAL 10*6/UL
SEG NEUTROPHILS: 70 % (ref 36–65)
SEGMENTED NEUTROPHILS ABSOLUTE COUNT: 3.87 K/UL (ref 1.5–8.1)
SODIUM BLD-SCNC: 142 MMOL/L (ref 135–144)
TROPONIN INTERP: ABNORMAL
TROPONIN INTERP: ABNORMAL
TROPONIN T: ABNORMAL NG/ML
TROPONIN T: ABNORMAL NG/ML
TROPONIN, HIGH SENSITIVITY: 30 NG/L (ref 0–14)
TROPONIN, HIGH SENSITIVITY: 31 NG/L (ref 0–14)
WBC # BLD: 5.6 K/UL (ref 3.5–11.3)
WBC # BLD: ABNORMAL 10*3/UL

## 2019-08-07 PROCEDURE — 99223 1ST HOSP IP/OBS HIGH 75: CPT | Performed by: FAMILY MEDICINE

## 2019-08-07 PROCEDURE — APPSS45 APP SPLIT SHARED TIME 31-45 MINUTES: Performed by: NURSE PRACTITIONER

## 2019-08-07 PROCEDURE — 6360000002 HC RX W HCPCS: Performed by: STUDENT IN AN ORGANIZED HEALTH CARE EDUCATION/TRAINING PROGRAM

## 2019-08-07 PROCEDURE — 84484 ASSAY OF TROPONIN QUANT: CPT

## 2019-08-07 PROCEDURE — 6360000002 HC RX W HCPCS: Performed by: NURSE PRACTITIONER

## 2019-08-07 PROCEDURE — 96366 THER/PROPH/DIAG IV INF ADDON: CPT

## 2019-08-07 PROCEDURE — 2060000000 HC ICU INTERMEDIATE R&B

## 2019-08-07 PROCEDURE — 83880 ASSAY OF NATRIURETIC PEPTIDE: CPT

## 2019-08-07 PROCEDURE — 2580000003 HC RX 258: Performed by: NURSE PRACTITIONER

## 2019-08-07 PROCEDURE — 99285 EMERGENCY DEPT VISIT HI MDM: CPT

## 2019-08-07 PROCEDURE — 93005 ELECTROCARDIOGRAM TRACING: CPT | Performed by: STUDENT IN AN ORGANIZED HEALTH CARE EDUCATION/TRAINING PROGRAM

## 2019-08-07 PROCEDURE — 71045 X-RAY EXAM CHEST 1 VIEW: CPT

## 2019-08-07 PROCEDURE — 2700000000 HC OXYGEN THERAPY PER DAY

## 2019-08-07 PROCEDURE — 99212 OFFICE O/P EST SF 10 MIN: CPT

## 2019-08-07 PROCEDURE — 80048 BASIC METABOLIC PNL TOTAL CA: CPT

## 2019-08-07 PROCEDURE — 6370000000 HC RX 637 (ALT 250 FOR IP): Performed by: NURSE PRACTITIONER

## 2019-08-07 PROCEDURE — 2500000003 HC RX 250 WO HCPCS: Performed by: STUDENT IN AN ORGANIZED HEALTH CARE EDUCATION/TRAINING PROGRAM

## 2019-08-07 PROCEDURE — 96365 THER/PROPH/DIAG IV INF INIT: CPT

## 2019-08-07 PROCEDURE — 85025 COMPLETE CBC W/AUTO DIFF WBC: CPT

## 2019-08-07 PROCEDURE — 96375 TX/PRO/DX INJ NEW DRUG ADDON: CPT

## 2019-08-07 RX ORDER — ERGOCALCIFEROL 1.25 MG/1
50000 CAPSULE ORAL WEEKLY
Status: DISCONTINUED | OUTPATIENT
Start: 2019-08-07 | End: 2019-08-11 | Stop reason: HOSPADM

## 2019-08-07 RX ORDER — AMIODARONE HYDROCHLORIDE 200 MG/1
200 TABLET ORAL DAILY
Status: DISCONTINUED | OUTPATIENT
Start: 2019-08-07 | End: 2019-08-11 | Stop reason: HOSPADM

## 2019-08-07 RX ORDER — METOPROLOL SUCCINATE 25 MG/1
25 TABLET, EXTENDED RELEASE ORAL DAILY
Status: DISCONTINUED | OUTPATIENT
Start: 2019-08-07 | End: 2019-08-11 | Stop reason: HOSPADM

## 2019-08-07 RX ORDER — FUROSEMIDE 10 MG/ML
40 INJECTION INTRAMUSCULAR; INTRAVENOUS 2 TIMES DAILY
Status: DISCONTINUED | OUTPATIENT
Start: 2019-08-07 | End: 2019-08-08

## 2019-08-07 RX ORDER — SODIUM CHLORIDE 0.9 % (FLUSH) 0.9 %
10 SYRINGE (ML) INJECTION PRN
Status: DISCONTINUED | OUTPATIENT
Start: 2019-08-07 | End: 2019-08-11 | Stop reason: HOSPADM

## 2019-08-07 RX ORDER — LORAZEPAM 0.5 MG/1
0.5 TABLET ORAL EVERY 6 HOURS PRN
Status: DISCONTINUED | OUTPATIENT
Start: 2019-08-07 | End: 2019-08-11 | Stop reason: HOSPADM

## 2019-08-07 RX ORDER — ATORVASTATIN CALCIUM 80 MG/1
80 TABLET, FILM COATED ORAL NIGHTLY
Status: DISCONTINUED | OUTPATIENT
Start: 2019-08-07 | End: 2019-08-11 | Stop reason: HOSPADM

## 2019-08-07 RX ORDER — SODIUM CHLORIDE 0.9 % (FLUSH) 0.9 %
10 SYRINGE (ML) INJECTION EVERY 12 HOURS SCHEDULED
Status: DISCONTINUED | OUTPATIENT
Start: 2019-08-07 | End: 2019-08-11 | Stop reason: HOSPADM

## 2019-08-07 RX ORDER — ONDANSETRON 2 MG/ML
4 INJECTION INTRAMUSCULAR; INTRAVENOUS EVERY 6 HOURS PRN
Status: DISCONTINUED | OUTPATIENT
Start: 2019-08-07 | End: 2019-08-11 | Stop reason: HOSPADM

## 2019-08-07 RX ORDER — SPIRONOLACTONE 25 MG/1
25 TABLET ORAL DAILY
Status: DISCONTINUED | OUTPATIENT
Start: 2019-08-07 | End: 2019-08-11 | Stop reason: HOSPADM

## 2019-08-07 RX ORDER — FUROSEMIDE 10 MG/ML
80 INJECTION INTRAMUSCULAR; INTRAVENOUS ONCE
Status: COMPLETED | OUTPATIENT
Start: 2019-08-07 | End: 2019-08-07

## 2019-08-07 RX ORDER — MULTIVITAMIN WITH FOLIC ACID 400 MCG
1 TABLET ORAL DAILY
Status: DISCONTINUED | OUTPATIENT
Start: 2019-08-07 | End: 2019-08-11 | Stop reason: HOSPADM

## 2019-08-07 RX ORDER — ACETAMINOPHEN 325 MG/1
650 TABLET ORAL EVERY 4 HOURS PRN
Status: DISCONTINUED | OUTPATIENT
Start: 2019-08-07 | End: 2019-08-11 | Stop reason: HOSPADM

## 2019-08-07 RX ORDER — NITROGLYCERIN 20 MG/100ML
5 INJECTION INTRAVENOUS CONTINUOUS
Status: DISCONTINUED | OUTPATIENT
Start: 2019-08-07 | End: 2019-08-07

## 2019-08-07 RX ORDER — NICOTINE 21 MG/24HR
1 PATCH, TRANSDERMAL 24 HOURS TRANSDERMAL DAILY PRN
Status: DISCONTINUED | OUTPATIENT
Start: 2019-08-07 | End: 2019-08-11 | Stop reason: HOSPADM

## 2019-08-07 RX ORDER — POTASSIUM CHLORIDE 750 MG/1
20 CAPSULE, EXTENDED RELEASE ORAL DAILY
Status: DISCONTINUED | OUTPATIENT
Start: 2019-08-07 | End: 2019-08-11 | Stop reason: HOSPADM

## 2019-08-07 RX ADMIN — ERGOCALCIFEROL 50000 UNITS: 1.25 CAPSULE ORAL at 11:29

## 2019-08-07 RX ADMIN — RIVAROXABAN 20 MG: 20 TABLET, FILM COATED ORAL at 11:30

## 2019-08-07 RX ADMIN — FUROSEMIDE 80 MG: 10 INJECTION, SOLUTION INTRAMUSCULAR; INTRAVENOUS at 06:38

## 2019-08-07 RX ADMIN — SODIUM CHLORIDE, PRESERVATIVE FREE 10 ML: 5 INJECTION INTRAVENOUS at 20:21

## 2019-08-07 RX ADMIN — SPIRONOLACTONE 25 MG: 25 TABLET ORAL at 11:29

## 2019-08-07 RX ADMIN — AMIODARONE HYDROCHLORIDE 200 MG: 200 TABLET ORAL at 11:30

## 2019-08-07 RX ADMIN — METOPROLOL SUCCINATE 25 MG: 25 TABLET, FILM COATED, EXTENDED RELEASE ORAL at 11:30

## 2019-08-07 RX ADMIN — SACUBITRIL AND VALSARTAN 1 TABLET: 24; 26 TABLET, FILM COATED ORAL at 11:30

## 2019-08-07 RX ADMIN — NITROGLYCERIN 5 MCG/MIN: 20 INJECTION INTRAVENOUS at 06:41

## 2019-08-07 RX ADMIN — SACUBITRIL AND VALSARTAN 1 TABLET: 24; 26 TABLET, FILM COATED ORAL at 20:21

## 2019-08-07 RX ADMIN — THERA TABS 1 TABLET: TAB at 11:30

## 2019-08-07 RX ADMIN — POTASSIUM CHLORIDE 20 MEQ: 10 CAPSULE, COATED, EXTENDED RELEASE ORAL at 11:30

## 2019-08-07 RX ADMIN — SODIUM CHLORIDE, PRESERVATIVE FREE 10 ML: 5 INJECTION INTRAVENOUS at 11:29

## 2019-08-07 RX ADMIN — LORAZEPAM 0.5 MG: 0.5 TABLET ORAL at 15:02

## 2019-08-07 RX ADMIN — ATORVASTATIN CALCIUM 80 MG: 80 TABLET, FILM COATED ORAL at 20:21

## 2019-08-07 RX ADMIN — ONDANSETRON 4 MG: 2 INJECTION INTRAMUSCULAR; INTRAVENOUS at 15:02

## 2019-08-07 ASSESSMENT — ENCOUNTER SYMPTOMS
DIARRHEA: 0
WHEEZING: 0
SHORTNESS OF BREATH: 1
COUGH: 0
SINUS PRESSURE: 0
CONSTIPATION: 0
RHINORRHEA: 1
NAUSEA: 0
SORE THROAT: 0
BLOOD IN STOOL: 0
VOMITING: 0
ABDOMINAL PAIN: 0

## 2019-08-07 ASSESSMENT — PAIN SCALES - GENERAL
PAINLEVEL_OUTOF10: 0
PAINLEVEL_OUTOF10: 0
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
## 2019-08-07 NOTE — PROGRESS NOTES
Physical Therapy  DATE: 2019    NAME: Rika Masterson  MRN: [id number]   : 1950    Patient not seen this date for Physical Therapy due to:  [] Blood transfusion in progress  [] Hemodialysis  [x]  Patient Declined: tired, unable to sleep last night, ck   [] Spine Precautions   [] Strict Bedrest  [] Surgery/ Procedure  [] Testing      [] Other        [] PT being discontinued at this time. Patient independent. No further needs. [] PT being discontinued at this time as the patient has been transferred to palliative care. No further needs.     Paola Chavis, PT

## 2019-08-07 NOTE — H&P
[facility]    HISTORY AND PHYSICAL EXAMINATION            Date:   8/7/2019  Patient name:  Rossy Carvalho  Date of admission:  8/7/2019  4:38 AM  MRN:   [id number]  Account:  [de-identified]  YOB: 1950  PCP:    Krys Peoples MD  Room:   [facility]-  Code Status:    Full Code    Chief Complaint:     Chief Complaint   Patient presents with    Shortness of Breath     SOB x3 days, on 3L at home         History Obtained From:     patient, electronic medical record    History of Present Illness: The patient is a 71 y.o. Non-/non  female who presents with Shortness of Breath (SOB x3 days, on 3L at home)   and she is admitted to the hospital for the management of Acute on Chronic Systolic Congestive Heart Failure. Neeta presets to ED via EMS wit complaints of Dyspnea x3 days. Reports she has been unable to sleep as she wakes up gasping for air, is unable to lay flat, has been sleeping in a recliner, and noticed increased swelling in her lower extremities. She is home oxygen dependent at 3L around the clock at home, she denies any CP, Dizziness, Nausea/Vomiting or additional symptoms aside from above. Cara Gutter in 30's on presentation, BNP  was 19,900, Troponin: 31,30, CRT: 1.24, EKG without any acute abnormalities, patient V-Paced at rate of 66. She was given 80mg IV Lasix in ED along with initiation of NTG gtt. CXR showed pulmonary edema with a left lung opacity. Ms. Yady Menchaca has a significant cardiac history for which she is established with Dr. Kyrie Sam. Has AICD which on last interrogation was firing appropriately. Last EF showed significant dysfunction with EF: 25%. Patient is being admitted to our service for further management and treatment.     Past Medical History:     Past Medical History:   Diagnosis Date    Acute on chronic systolic congestive heart failure ([address] 8/8/2017    Anxiety    

## 2019-08-07 NOTE — ED PROVIDER NOTES
0.97 (*)     All other components within normal limits   BASIC METABOLIC PANEL W/ REFLEX TO MG FOR LOW K - Abnormal; Notable for the following components:    Glucose 105 (*)     BUN 25 (*)     CREATININE 1.24 (*)     GFR Non- 43 (*)     GFR  52 (*)     All other components within normal limits   TROPONIN - Abnormal; Notable for the following components:    Troponin, High Sensitivity 30 (*)     All other components within normal limits   BRAIN NATRIURETIC PEPTIDE - Abnormal; Notable for the following components:    Pro-BNP 19,900 (*)     All other components within normal limits   TROPONIN - Abnormal; Notable for the following components:    Troponin, High Sensitivity 31 (*)     All other components within normal limits         RADIOLOGY:  Xr Chest Portable    Result Date: 8/7/2019  EXAMINATION: ONE XRAY VIEW OF THE CHEST 8/7/2019 6:07 am COMPARISON: July 20, 2019 HISTORY: ORDERING SYSTEM PROVIDED HISTORY: shortness of breath TECHNOLOGIST PROVIDED HISTORY: shortness of breath Reason for Exam: shortness of breath progressively getting worse Relevant Medical/Surgical History: congested heart failure FINDINGS: Left AICD. Cardiomegaly. Mild pulmonary edema. Increased left lung base opacity. Mild pulmonary edema. Left lung base opacity may represent atelectasis and pleural fluid with pneumonia not excluded. EKG  EKG Interpretation    Interpreted by me    Rhythm: Ventricular paced rhythm  Rate: 66, normal  Axis: normal  Ectopy: none  Conduction: QT/Qtc 438/459,   ST Segments: normal, no acute infarction evident  T Waves: normal  Q Waves: none    EKG Interpretation:  Ventricular paced rhythm, similar to EKG on 7/20/2019. All EKG's are interpreted by the Emergency Department Physicianwho either signs or Co-signs this chart in the absence of a cardiologist.    EMERGENCY DEPARTMENT COURSE:    Patient came to emergency department, HPI and physical exam were conducted.  All

## 2019-08-08 ENCOUNTER — APPOINTMENT (OUTPATIENT)
Dept: GENERAL RADIOLOGY | Age: 69
DRG: 292 | End: 2019-08-08
Payer: MEDICARE

## 2019-08-08 PROBLEM — Z99.81 CHRONIC RESPIRATORY FAILURE WITH HYPOXIA, ON HOME O2 THERAPY (HCC): Status: ACTIVE | Noted: 2019-08-08

## 2019-08-08 PROBLEM — J96.11 CHRONIC RESPIRATORY FAILURE WITH HYPOXIA, ON HOME O2 THERAPY (HCC): Status: ACTIVE | Noted: 2019-08-08

## 2019-08-08 LAB
ANION GAP SERPL CALCULATED.3IONS-SCNC: 16 MMOL/L (ref 9–17)
BNP INTERPRETATION: ABNORMAL
BUN BLDV-MCNC: 32 MG/DL (ref 8–23)
BUN/CREAT BLD: ABNORMAL (ref 9–20)
CALCIUM SERPL-MCNC: 8.4 MG/DL (ref 8.6–10.4)
CHLORIDE BLD-SCNC: 105 MMOL/L (ref 98–107)
CHOLESTEROL/HDL RATIO: 3.3
CHOLESTEROL: 92 MG/DL
CO2: 22 MMOL/L (ref 20–31)
CREAT SERPL-MCNC: 1.29 MG/DL (ref 0.5–0.9)
EKG ATRIAL RATE: 66 BPM
EKG Q-T INTERVAL: 438 MS
EKG QRS DURATION: 114 MS
EKG QTC CALCULATION (BAZETT): 459 MS
EKG R AXIS: -36 DEGREES
EKG T AXIS: -157 DEGREES
EKG VENTRICULAR RATE: 66 BPM
GFR AFRICAN AMERICAN: 50 ML/MIN
GFR NON-AFRICAN AMERICAN: 41 ML/MIN
GFR SERPL CREATININE-BSD FRML MDRD: ABNORMAL ML/MIN/{1.73_M2}
GFR SERPL CREATININE-BSD FRML MDRD: ABNORMAL ML/MIN/{1.73_M2}
GLUCOSE BLD-MCNC: 125 MG/DL (ref 70–99)
HCT VFR BLD CALC: 36.5 % (ref 36.3–47.1)
HDLC SERPL-MCNC: 28 MG/DL
HEMOGLOBIN: 9.7 G/DL (ref 11.9–15.1)
LDL CHOLESTEROL: 52 MG/DL (ref 0–130)
MAGNESIUM: 1.9 MG/DL (ref 1.6–2.6)
MCH RBC QN AUTO: 24.4 PG (ref 25.2–33.5)
MCHC RBC AUTO-ENTMCNC: 26.6 G/DL (ref 28.4–34.8)
MCV RBC AUTO: 91.7 FL (ref 82.6–102.9)
NRBC AUTOMATED: 0 PER 100 WBC
PDW BLD-RTO: 18.2 % (ref 11.8–14.4)
PLATELET # BLD: 245 K/UL (ref 138–453)
PMV BLD AUTO: 12 FL (ref 8.1–13.5)
POTASSIUM SERPL-SCNC: 3.8 MMOL/L (ref 3.7–5.3)
PRO-BNP: ABNORMAL PG/ML
RBC # BLD: 3.98 M/UL (ref 3.95–5.11)
SODIUM BLD-SCNC: 143 MMOL/L (ref 135–144)
TRIGL SERPL-MCNC: 62 MG/DL
TSH SERPL DL<=0.05 MIU/L-ACNC: 2.28 MIU/L (ref 0.3–5)
VLDLC SERPL CALC-MCNC: ABNORMAL MG/DL (ref 1–30)
WBC # BLD: 5.3 K/UL (ref 3.5–11.3)

## 2019-08-08 PROCEDURE — 2060000000 HC ICU INTERMEDIATE R&B

## 2019-08-08 PROCEDURE — 85027 COMPLETE CBC AUTOMATED: CPT

## 2019-08-08 PROCEDURE — 36415 COLL VENOUS BLD VENIPUNCTURE: CPT

## 2019-08-08 PROCEDURE — 6370000000 HC RX 637 (ALT 250 FOR IP): Performed by: NURSE PRACTITIONER

## 2019-08-08 PROCEDURE — 83735 ASSAY OF MAGNESIUM: CPT

## 2019-08-08 PROCEDURE — 2580000003 HC RX 258: Performed by: NURSE PRACTITIONER

## 2019-08-08 PROCEDURE — APPSS30 APP SPLIT SHARED TIME 16-30 MINUTES: Performed by: NURSE PRACTITIONER

## 2019-08-08 PROCEDURE — 97110 THERAPEUTIC EXERCISES: CPT

## 2019-08-08 PROCEDURE — 99232 SBSQ HOSP IP/OBS MODERATE 35: CPT | Performed by: FAMILY MEDICINE

## 2019-08-08 PROCEDURE — 6360000002 HC RX W HCPCS: Performed by: FAMILY MEDICINE

## 2019-08-08 PROCEDURE — 6360000002 HC RX W HCPCS: Performed by: NURSE PRACTITIONER

## 2019-08-08 PROCEDURE — 84443 ASSAY THYROID STIM HORMONE: CPT

## 2019-08-08 PROCEDURE — 71045 X-RAY EXAM CHEST 1 VIEW: CPT

## 2019-08-08 PROCEDURE — 97161 PT EVAL LOW COMPLEX 20 MIN: CPT

## 2019-08-08 PROCEDURE — 83880 ASSAY OF NATRIURETIC PEPTIDE: CPT

## 2019-08-08 PROCEDURE — 80061 LIPID PANEL: CPT

## 2019-08-08 PROCEDURE — 97535 SELF CARE MNGMENT TRAINING: CPT

## 2019-08-08 PROCEDURE — 99211 OFF/OP EST MAY X REQ PHY/QHP: CPT

## 2019-08-08 PROCEDURE — 97166 OT EVAL MOD COMPLEX 45 MIN: CPT

## 2019-08-08 PROCEDURE — 97530 THERAPEUTIC ACTIVITIES: CPT

## 2019-08-08 PROCEDURE — 80048 BASIC METABOLIC PNL TOTAL CA: CPT

## 2019-08-08 PROCEDURE — 93010 ELECTROCARDIOGRAM REPORT: CPT | Performed by: INTERNAL MEDICINE

## 2019-08-08 RX ORDER — FUROSEMIDE 10 MG/ML
20 INJECTION INTRAMUSCULAR; INTRAVENOUS ONCE
Status: COMPLETED | OUTPATIENT
Start: 2019-08-08 | End: 2019-08-08

## 2019-08-08 RX ORDER — DIPHENHYDRAMINE HCL 25 MG
25 TABLET ORAL
Status: COMPLETED | OUTPATIENT
Start: 2019-08-08 | End: 2019-08-08

## 2019-08-08 RX ORDER — DIPHENHYDRAMINE HCL 25 MG
25 TABLET ORAL EVERY 8 HOURS PRN
Status: DISCONTINUED | OUTPATIENT
Start: 2019-08-08 | End: 2019-08-08

## 2019-08-08 RX ORDER — FUROSEMIDE 40 MG/1
40 TABLET ORAL DAILY
Status: DISCONTINUED | OUTPATIENT
Start: 2019-08-09 | End: 2019-08-09

## 2019-08-08 RX ORDER — FUROSEMIDE 40 MG/1
40 TABLET ORAL DAILY
Status: DISCONTINUED | OUTPATIENT
Start: 2019-08-08 | End: 2019-08-08

## 2019-08-08 RX ADMIN — LORAZEPAM 0.5 MG: 0.5 TABLET ORAL at 09:57

## 2019-08-08 RX ADMIN — ATORVASTATIN CALCIUM 80 MG: 80 TABLET, FILM COATED ORAL at 20:29

## 2019-08-08 RX ADMIN — THERA TABS 1 TABLET: TAB at 08:44

## 2019-08-08 RX ADMIN — LORAZEPAM 0.5 MG: 0.5 TABLET ORAL at 18:52

## 2019-08-08 RX ADMIN — POTASSIUM CHLORIDE 20 MEQ: 10 CAPSULE, COATED, EXTENDED RELEASE ORAL at 08:44

## 2019-08-08 RX ADMIN — ACETAMINOPHEN 650 MG: 325 TABLET ORAL at 03:54

## 2019-08-08 RX ADMIN — FUROSEMIDE 40 MG: 10 INJECTION, SOLUTION INTRAMUSCULAR; INTRAVENOUS at 08:45

## 2019-08-08 RX ADMIN — AMIODARONE HYDROCHLORIDE 200 MG: 200 TABLET ORAL at 08:45

## 2019-08-08 RX ADMIN — RIVAROXABAN 20 MG: 20 TABLET, FILM COATED ORAL at 08:44

## 2019-08-08 RX ADMIN — SACUBITRIL AND VALSARTAN 1 TABLET: 24; 26 TABLET, FILM COATED ORAL at 10:29

## 2019-08-08 RX ADMIN — DIPHENHYDRAMINE HCL 25 MG: 25 TABLET ORAL at 20:29

## 2019-08-08 RX ADMIN — SODIUM CHLORIDE, PRESERVATIVE FREE 10 ML: 5 INJECTION INTRAVENOUS at 20:30

## 2019-08-08 RX ADMIN — SACUBITRIL AND VALSARTAN 1 TABLET: 24; 26 TABLET, FILM COATED ORAL at 22:41

## 2019-08-08 RX ADMIN — SPIRONOLACTONE 25 MG: 25 TABLET ORAL at 08:44

## 2019-08-08 RX ADMIN — SODIUM CHLORIDE, PRESERVATIVE FREE 10 ML: 5 INJECTION INTRAVENOUS at 08:45

## 2019-08-08 RX ADMIN — FUROSEMIDE 20 MG: 10 INJECTION, SOLUTION INTRAMUSCULAR; INTRAVENOUS at 16:58

## 2019-08-08 ASSESSMENT — PAIN SCALES - GENERAL
PAINLEVEL_OUTOF10: 8
PAINLEVEL_OUTOF10: 5

## 2019-08-08 ASSESSMENT — PAIN DESCRIPTION - PAIN TYPE: TYPE: CHRONIC PAIN

## 2019-08-08 ASSESSMENT — PAIN DESCRIPTION - LOCATION: LOCATION: BACK

## 2019-08-08 NOTE — CARE COORDINATION
Transition planning:  Spoke with pt about plan, she previously told  she was considering [facility] for rehab and wound care. She adamantly states she is not going anywhere for rehab and she will not have home care. Told her we feel she needs help with wound care for legs and pt/ot, she says her legs are fine and she can take care of them herself.
PM
she stated that she did tell pt that she needed follow up. Will meet with pt again on Thursday to discuss more her discharge disposition.     Called [facility] and left a message to make a referral.

## 2019-08-08 NOTE — PROGRESS NOTES
Education: OT Role;Plan of Care;Energy Conservation  Patient Education: OTPOC, safety, EC/WS, pursed lipped breathing. Good return  REQUIRES OT FOLLOW UP: Yes  Activity Tolerance  Activity Tolerance: Patient Tolerated treatment well;Patient limited by fatigue  Activity Tolerance: Pt repoted SOB throughout the session. Safety Devices  Safety Devices in place: Yes  Type of devices: Left in bed;Call light within reach;Nurse notified;Gait belt        Patient Diagnosis(es): The encounter diagnosis was Acute on chronic congestive heart failure, unspecified heart failure type ([address] has a past medical history of Acute on chronic systolic congestive heart failure ([address] Anxiety, Cardiomyopathy ([address] Depression, H/O heart artery stent, [place] (hard of hearing), Hyperlipidemia, Hypertension, Hypertensive urgency, and Leg swelling.   has a past surgical history that includes Cataract removal; Corneal transplant; Nasal sinus surgery; eye surgery; Tonsillectomy and adenoidectomy; and Cardiac defibrillator placement (04/2019). Restrictions  Restrictions/Precautions  Restrictions/Precautions: General Precautions  Required Braces or Orthoses?: No    Subjective   General  Chart Reviewed: Progress Notes, History and Physical  Patient assessed for rehabilitation services?: Yes  Family / Caregiver Present: No  Pain Assessment  Pain Assessment: 0-10  Pain Level: 8  Pain Type: Chronic pain  Pain Location: Back  Non-Pharmaceutical Pain Intervention(s): Ambulation/Increased Activity;Repositioned;Distraction  Oxygen Therapy  SpO2: 98 %  O2 Device: Nasal cannula  O2 Flow Rate (L/min): 2 L/min    Social/Functional History  Social/Functional History  Lives With: Spouse, Son( is retired, Son works outside home.   Pt reports being home alone often. )  Type of Home: House  Home Layout: Two level, Performs ADL's on one level, Able to Live on Main level with bedroom/bathroom  Home Access: Stairs to enter with rails  Entrance Stairs

## 2019-08-08 NOTE — PROGRESS NOTES
BLE are clean and dry. Patient denies discomfort. Will plan next BLE dressing change tomorrow 8/9/19. Patient states understanding.

## 2019-08-08 NOTE — CONSULTS
daily 4/4/19  Yes Cali Loving MD   atorvastatin (LIPITOR) 80 MG tablet Take 1 tablet by mouth nightly 4/4/19  Yes Cali Loving MD   vitamin D (ERGOCALCIFEROL) 08016 units capsule Take 1 capsule by mouth once a week for 8 doses 7/27/19 9/15/19  Winnie Chandler MD     Current Medications:    Current Facility-Administered Medications   Medication Dose Route Frequency Provider Last Rate Last Dose    [START ON 8/9/2019] furosemide (LASIX) tablet 40 mg  40 mg Oral Daily Ariana Na, APRN - NP        amiodarone (CORDARONE) tablet 200 mg  200 mg Oral Daily Daya RICK Kendall, APRN - CNP   200 mg at 08/08/19 0845    atorvastatin (LIPITOR) tablet 80 mg  80 mg Oral Nightly Daya RICK Kendall, APRN - CNP   80 mg at 08/07/19 2021    LORazepam (ATIVAN) tablet 0.5 mg  0.5 mg Oral Q6H PRN Daya Kendall APRN - CNP   0.5 mg at 08/08/19 0957    metoprolol succinate (TOPROL XL) extended release tablet 25 mg  25 mg Oral Daily Daya RICK Kendall, APRN - CNP   25 mg at 08/07/19 1130    multivitamin 1 tablet  1 tablet Oral Daily Daya RICK Kendall APRN - CNP   1 tablet at 08/08/19 0844    potassium chloride (MICRO-K) extended release capsule 20 mEq  20 mEq Oral Daily Daya RICK Kendall APRN - CNP   20 mEq at 08/08/19 0844    rivaroxaban (XARELTO) tablet 20 mg  20 mg Oral Daily Daya RICK Kendall, APRN - CNP   20 mg at 08/08/19 0844    sacubitril-valsartan (ENTRESTO) 24-26 MG per tablet 1 tablet  1 tablet Oral BID Daya Kendall APRN - CNP   1 tablet at 08/08/19 1029    [Held by provider] spironolactone (ALDACTONE) tablet 25 mg  25 mg Oral Daily Daya RICK Kendall, APRN - CNP   25 mg at 08/08/19 0844    vitamin D (ERGOCALCIFEROL) capsule 50,000 Units  50,000 Units Oral Weekly PRICILA Choi CNP   50,000 Units at 08/07/19 1129    sodium chloride flush 0.9 % injection 10 mL  10 mL Intravenous 2 times per day PRICILA Choi CNP   10 mL at 08/08/19 0845    sodium chloride flush 0.9 % injection 10 mL  10 mL Intravenous PRN Daya D Delgrosso, APRN - CNP        magnesium hydroxide (MILK OF MAGNESIA) 400 MG/5ML suspension 30 mL  30 mL Oral Daily PRN Daya D Delgrosso, APRN - CNP        ondansetron (ZOFRAN) injection 4 mg  4 mg Intravenous Q6H PRN Daya D Delgrosso, APRN - CNP   4 mg at 08/07/19 1502    nicotine (NICODERM CQ) 21 MG/24HR 1 patch  1 patch Transdermal Daily PRN Daya D Delgrosso, APRN - CNP        acetaminophen (TYLENOL) tablet 650 mg  650 mg Oral Q4H PRN Daya D Delgrosso, APRN - CNP   650 mg at 08/08/19 0354     Allergies:  Patient has no known allergies.     Social History:    Social History     Socioeconomic History    Marital status:      Spouse name: Not on file    Number of children: Not on file    Years of education: Not on file    Highest education level: Not on file   Occupational History    Not on file   Social Needs    Financial resource strain: Not on file    Food insecurity:     Worry: Not on file     Inability: Not on file    Transportation needs:     Medical: Not on file     Non-medical: Not on file   Tobacco Use    Smoking status: Never Smoker    Smokeless tobacco: Never Used   Substance and Sexual Activity    Alcohol use: No    Drug use: No    Sexual activity: Not on file   Lifestyle    Physical activity:     Days per week: Not on file     Minutes per session: Not on file    Stress: Not on file   Relationships    Social connections:     Talks on phone: Not on file     Gets together: Not on file     Attends Denominational service: Not on file     Active member of club or organization: Not on file     Attends meetings of clubs or organizations: Not on file     Relationship status: Not on file    Intimate partner violence:     Fear of current or ex partner: Not on file     Emotionally abused: Not on file     Physically abused: Not on file     Forced sexual activity: Not on file   Other Topics Concern    Not on file   Social History thought that it was attributed to the use of Risperdal with appropriate ICD shocks    Patient Active Problem List   Diagnosis    Leg swelling    Hypertensive urgency    Acute on chronic systolic congestive heart failure (HCC)    Acute on chronic systolic heart failure (HCC)    Lymphedema of both lower extremities    Cellulitis of right leg    Coronary artery disease involving native coronary artery of native heart without angina pectoris    Major depressive disorder    [facility]ox[facility]    NSTEMI (non-ST elevated myocardial infarction) [facility]    [facility]    Panic disorder    SDH (subdural hematoma) [facility]    Hypotension    Dizziness    Dyslipidemia    Chronic systolic heart failure ([facility]    Coronary atherosclerosis of native coronary artery    Hypertension    Chronic atrial fibrillation ([facility]    S/P implantation of automatic cardioverter/defibrillator (AICD)    History of percutaneous coronary intervention    Pulmonary embolism, bilateral ([facility]    Cellulitis    Venous stasis dermatitis of both lower extremities    Congestive heart failure (HCC)    Acute congestive heart failure ([facility]    History of subdural hematoma    Incontinence in female    Ventricular fibrillation [facility]    Vitamin D deficiency    Venous insufficiency of both lower extremities    CHF (congestive heart failure), NYHA class I, acute on chronic, combined (HCC)    Elevated brain natriuretic peptide (BNP) level    Elevated troponin    Elevated serum creatinine    On home oxygen therapy    Hyperlipidemia    Chronic hypoxemic respiratory failure ([facility]    [facility]    Chronic respiratory failure with hypoxia, on home O2 therapy ([facility])           RECOMMENDATIONS:     Continue current medications  Management plan was discussed with patient   I discussed with her the need for low-salt diet and to be compliant with oral fluid intake.   She

## 2019-08-08 NOTE — PROGRESS NOTES
History  Social/Functional History  Lives With: Spouse, Son  Type of Home: House  Home Layout: Two level, Performs ADL's on one level, Able to Live on Main level with bedroom/bathroom  Home Access: Stairs to enter with rails  Entrance Stairs - Number of Steps: 4  Entrance Stairs - Rails: Both(only one side is safe to use)  Bathroom Shower/Tub: Tub/Shower unit, Curtain(sponge bathes for safety)  Bathroom Toilet: Standard  Home Equipment: 4 wheeled walker, [employer] [place] [employer] Help From: Family  ADL Assistance: Independent  Homemaking Assistance: Independent  Homemaking Responsibilities: Yes  Meal Prep Responsibility: Secondary  Laundry Responsibility: No  Cleaning Responsibility: Secondary  Shopping Responsibility: Secondary  Ambulation Assistance: Independent  Transfer Assistance: Independent  Active : No  Patient's  Info:  Son drives  Occupation: Unemployed  Leisure & Hobbies: Reading and watching TV  Cognition   Cognition  Overall Cognitive Status: WFL    Objective          AROM RLE (degrees)  RLE AROM: WFL  AROM LLE (degrees)  LLE AROM : WFL  AROM RUE (degrees)  RUE AROM : WFL  AROM LUE (degrees)  LUE AROM : WFL  Strength RLE  Strength RLE: WFL  Strength LLE  Strength LLE: WFL  Strength RUE  Strength RUE: WFL  Strength LUE  Strength LUE: WFL     Sensation  Overall Sensation Status: WFL  Bed mobility  Rolling to Left: Contact guard assistance  Rolling to Right: Contact guard assistance  Supine to Sit: Contact guard assistance  Sit to Supine: Contact guard assistance  Scooting: Contact guard assistance  Comment: pt able to position herself in bed well  Transfers  Sit to Stand: Contact guard assistance  Stand to sit: Contact guard assistance  Ambulation  Ambulation?: Yes  More Ambulation?: No  Ambulation 1  Surface: level tile  Device: No Device  Assistance: Contact guard assistance  Quality of Gait: pt forward flexed  Gait Deviations: Slow Anabell  Distance: 25 ft, 10 ft, 10 ft  Stairs/Curb  Stairs?: No

## 2019-08-09 LAB
ABSOLUTE EOS #: 0.26 K/UL (ref 0–0.4)
ABSOLUTE IMMATURE GRANULOCYTE: 0 K/UL (ref 0–0.3)
ABSOLUTE LYMPH #: 1.02 K/UL (ref 1–4.8)
ABSOLUTE MONO #: 0.41 K/UL (ref 0.1–0.8)
ALBUMIN SERPL-MCNC: 3.2 G/DL (ref 3.5–5.2)
ALBUMIN/GLOBULIN RATIO: 0.8 (ref 1–2.5)
ALP BLD-CCNC: 87 U/L (ref 35–104)
ALT SERPL-CCNC: 18 U/L (ref 5–33)
ANION GAP SERPL CALCULATED.3IONS-SCNC: 14 MMOL/L (ref 9–17)
AST SERPL-CCNC: 33 U/L
BASOPHILS # BLD: 1 % (ref 0–2)
BASOPHILS ABSOLUTE: 0.05 K/UL (ref 0–0.2)
BILIRUB SERPL-MCNC: 0.58 MG/DL (ref 0.3–1.2)
BILIRUBIN DIRECT: 0.22 MG/DL
BILIRUBIN, INDIRECT: 0.36 MG/DL (ref 0–1)
BUN BLDV-MCNC: 33 MG/DL (ref 8–23)
BUN/CREAT BLD: ABNORMAL (ref 9–20)
CALCIUM SERPL-MCNC: 8.4 MG/DL (ref 8.6–10.4)
CHLORIDE BLD-SCNC: 103 MMOL/L (ref 98–107)
CO2: 26 MMOL/L (ref 20–31)
CREAT SERPL-MCNC: 1.3 MG/DL (ref 0.5–0.9)
DIFFERENTIAL TYPE: ABNORMAL
EOSINOPHILS RELATIVE PERCENT: 5 % (ref 1–4)
GFR AFRICAN AMERICAN: 49 ML/MIN
GFR NON-AFRICAN AMERICAN: 41 ML/MIN
GFR SERPL CREATININE-BSD FRML MDRD: ABNORMAL ML/MIN/{1.73_M2}
GFR SERPL CREATININE-BSD FRML MDRD: ABNORMAL ML/MIN/{1.73_M2}
GLOBULIN: ABNORMAL G/DL (ref 1.5–3.8)
GLUCOSE BLD-MCNC: 122 MG/DL (ref 70–99)
HCT VFR BLD CALC: 39.6 % (ref 36.3–47.1)
HEMOGLOBIN: 11 G/DL (ref 11.9–15.1)
IMMATURE GRANULOCYTES: 0 %
LYMPHOCYTES # BLD: 20 % (ref 24–44)
MCH RBC QN AUTO: 24.9 PG (ref 25.2–33.5)
MCHC RBC AUTO-ENTMCNC: 27.8 G/DL (ref 28.4–34.8)
MCV RBC AUTO: 89.6 FL (ref 82.6–102.9)
MONOCYTES # BLD: 8 % (ref 1–7)
MORPHOLOGY: ABNORMAL
MORPHOLOGY: ABNORMAL
NRBC AUTOMATED: 0 PER 100 WBC
PDW BLD-RTO: 18.3 % (ref 11.8–14.4)
PLATELET # BLD: 223 K/UL (ref 138–453)
PLATELET ESTIMATE: ABNORMAL
PMV BLD AUTO: 10.4 FL (ref 8.1–13.5)
POTASSIUM SERPL-SCNC: 3.9 MMOL/L (ref 3.7–5.3)
RBC # BLD: 4.42 M/UL (ref 3.95–5.11)
RBC # BLD: ABNORMAL 10*6/UL
SEG NEUTROPHILS: 66 % (ref 36–66)
SEGMENTED NEUTROPHILS ABSOLUTE COUNT: 3.36 K/UL (ref 1.8–7.7)
SODIUM BLD-SCNC: 143 MMOL/L (ref 135–144)
TOTAL PROTEIN: 7.1 G/DL (ref 6.4–8.3)
WBC # BLD: 5.1 K/UL (ref 3.5–11.3)
WBC # BLD: ABNORMAL 10*3/UL

## 2019-08-09 PROCEDURE — 2060000000 HC ICU INTERMEDIATE R&B

## 2019-08-09 PROCEDURE — 99232 SBSQ HOSP IP/OBS MODERATE 35: CPT | Performed by: FAMILY MEDICINE

## 2019-08-09 PROCEDURE — 2580000003 HC RX 258: Performed by: NURSE PRACTITIONER

## 2019-08-09 PROCEDURE — 6370000000 HC RX 637 (ALT 250 FOR IP): Performed by: NURSE PRACTITIONER

## 2019-08-09 PROCEDURE — 99212 OFFICE O/P EST SF 10 MIN: CPT

## 2019-08-09 PROCEDURE — 36415 COLL VENOUS BLD VENIPUNCTURE: CPT

## 2019-08-09 PROCEDURE — APPSS30 APP SPLIT SHARED TIME 16-30 MINUTES: Performed by: NURSE PRACTITIONER

## 2019-08-09 PROCEDURE — 2700000000 HC OXYGEN THERAPY PER DAY

## 2019-08-09 PROCEDURE — 80076 HEPATIC FUNCTION PANEL: CPT

## 2019-08-09 PROCEDURE — 97110 THERAPEUTIC EXERCISES: CPT

## 2019-08-09 PROCEDURE — 85025 COMPLETE CBC W/AUTO DIFF WBC: CPT

## 2019-08-09 PROCEDURE — 97116 GAIT TRAINING THERAPY: CPT

## 2019-08-09 PROCEDURE — 80048 BASIC METABOLIC PNL TOTAL CA: CPT

## 2019-08-09 RX ORDER — DIAPER,BRIEF,INFANT-TODD,DISP
EACH MISCELLANEOUS 3 TIMES DAILY
Status: DISCONTINUED | OUTPATIENT
Start: 2019-08-09 | End: 2019-08-11 | Stop reason: HOSPADM

## 2019-08-09 RX ORDER — FUROSEMIDE 40 MG/1
40 TABLET ORAL 2 TIMES DAILY
Status: DISCONTINUED | OUTPATIENT
Start: 2019-08-09 | End: 2019-08-10

## 2019-08-09 RX ORDER — DIPHENHYDRAMINE HCL 25 MG
25 TABLET ORAL EVERY 6 HOURS PRN
Status: DISCONTINUED | OUTPATIENT
Start: 2019-08-09 | End: 2019-08-11 | Stop reason: HOSPADM

## 2019-08-09 RX ADMIN — RIVAROXABAN 20 MG: 20 TABLET, FILM COATED ORAL at 11:34

## 2019-08-09 RX ADMIN — POTASSIUM CHLORIDE 20 MEQ: 10 CAPSULE, COATED, EXTENDED RELEASE ORAL at 11:33

## 2019-08-09 RX ADMIN — SODIUM CHLORIDE, PRESERVATIVE FREE 10 ML: 5 INJECTION INTRAVENOUS at 20:46

## 2019-08-09 RX ADMIN — SACUBITRIL AND VALSARTAN 1 TABLET: 24; 26 TABLET, FILM COATED ORAL at 20:46

## 2019-08-09 RX ADMIN — FUROSEMIDE 40 MG: 40 TABLET ORAL at 10:14

## 2019-08-09 RX ADMIN — AMIODARONE HYDROCHLORIDE 200 MG: 200 TABLET ORAL at 11:33

## 2019-08-09 RX ADMIN — THERA TABS 1 TABLET: TAB at 11:34

## 2019-08-09 RX ADMIN — SACUBITRIL AND VALSARTAN 1 TABLET: 24; 26 TABLET, FILM COATED ORAL at 11:33

## 2019-08-09 RX ADMIN — ATORVASTATIN CALCIUM 80 MG: 80 TABLET, FILM COATED ORAL at 20:46

## 2019-08-09 RX ADMIN — HYDROCORTISONE: 10 CREAM TOPICAL at 20:49

## 2019-08-09 RX ADMIN — SODIUM CHLORIDE, PRESERVATIVE FREE 10 ML: 5 INJECTION INTRAVENOUS at 10:21

## 2019-08-09 RX ADMIN — LORAZEPAM 0.5 MG: 0.5 TABLET ORAL at 20:46

## 2019-08-09 RX ADMIN — METOPROLOL SUCCINATE 25 MG: 25 TABLET, FILM COATED, EXTENDED RELEASE ORAL at 10:14

## 2019-08-09 ASSESSMENT — PAIN SCALES - GENERAL: PAINLEVEL_OUTOF10: 0

## 2019-08-09 NOTE — PROGRESS NOTES
atorvastatin  80 mg Oral Nightly    metoprolol succinate  25 mg Oral Daily    multivitamin  1 tablet Oral Daily    potassium chloride  20 mEq Oral Daily    rivaroxaban  20 mg Oral Daily    sacubitril-valsartan  1 tablet Oral BID    [Held by provider] spironolactone  25 mg Oral Daily    vitamin D  50,000 Units Oral Weekly    sodium chloride flush  10 mL Intravenous 2 times per day     Continuous Infusions:   PRN Meds: LORazepam, sodium chloride flush, magnesium hydroxide, ondansetron, nicotine, acetaminophen    Data:     Past Medical History:   has a past medical history of Acute on chronic systolic congestive heart failure ([facility] Anxiety, Cardiomyopathy ([facility] Depression, H/O heart artery stent, [place] (hard of hearing), Hyperlipidemia, Hypertension, Hypertensive urgency, and Leg swelling. Social History:   reports that she has never smoked. She has never used smokeless tobacco. She reports that she does not drink alcohol or use drugs. Family History: History reviewed. No pertinent family history. Vitals:  /70   Pulse 60   Temp 98.2 °F (36.8 °C) (Oral)   Resp 18   Ht 5' 2\" (1.575 m)   Wt 193 lb 4.8 oz (87.7 kg)   SpO2 100%   BMI 35.36 kg/m²   Temp (24hrs), Av °F (36.7 °C), Min:97.3 °F (36.3 °C), Max:98.3 °F (36.8 °C)    No results for input(s): POCGLU in the last 72 hours. I/O (24Hr):     Intake/Output Summary (Last 24 hours) at 2019 1458  Last data filed at 2019 7144  Gross per 24 hour   Intake 120 ml   Output 1050 ml   Net -930 ml       Labs:  Hematology:  Recent Labs     19  0529 19  0524 19  0612   WBC 5.6 5.3 5.1   RBC 4.27 3.98 4.42   HGB 10.8* 9.7* 11.0*   HCT 37.8 36.5 39.6   MCV 88.5 91.7 89.6   MCH 25.3 24.4* 24.9*   MCHC 28.6 26.6* 27.8*   RDW 18.5* 18.2* 18.3*    245 223   MPV 11.4 12.0 10.4     Chemistry:  Recent Labs     19  0529 19  0638 19  0524 19  0612     --  143 143   K 4.3  --  3.8 3.9     -- 105 103   CO2 21  --  22 26   GLUCOSE 105*  --  125* 122*   BUN 25*  --  32* 33*   CREATININE 1.24*  --  1.29* 1.30*   MG  --   --  1.9  --    ANIONGAP 17  --  16 14   LABGLOM 43*  --  41* 41*   GFRAA 52*  --  50* 49*   CALCIUM 9.6  --  8.4* 8.4*   PROBNP 19,900*  --  13,482*  --    TROPHS 30* 31*  --   --      Recent Labs     08/08/19  0524 08/09/19  0612   PROT  --  7.1   LABALBU  --  3.2*   TSH 2.28  --    AST  --  33*   ALT  --  18   ALKPHOS  --  87   BILITOT  --  0.58   BILIDIR  --  0.22   CHOL 92  --    HDL 28*  --    LDLCHOLESTEROL 52  --    CHOLHDLRATIO 3.3  --    TRIG 62  --    VLDL NOT REPORTED  --      ABG:No results found for: POCPH, PHART, PH, POCPCO2, EYK9YHF, PCO2, POCPO2, PO2ART, PO2, POCHCO3, NZN3GOK, HCO3, NBEA, PBEA, BEART, BE, THGBART, THB, GNJ5IRJ, FAQC6PNP, W0RJLLWL, O2SAT, FIO2  Lab Results   Component Value Date/Time    [facility] 03/21/2019 09:15 AM     Lab Results   Component Value Date/Time    CULTURE NO GROWTH 6 DAYS 03/21/2019 09:15 AM       Radiology:  Vivian Antu Chest Portable    Result Date: 8/8/2019  Mild pulmonary edema and left lung base opacity are not significantly changed. Xr Chest Portable    Result Date: 8/7/2019  Mild pulmonary edema. Left lung base opacity may represent atelectasis and pleural fluid with pneumonia not excluded.        Physical Examination:        General appearance:  alert, cooperative and no distress  Mental Status:  oriented to person, place and time and normal affect  Lungs:  Rales to bilateral posterior lobes, normal effort  Heart:  regular rate and rhythm, no murmur  Abdomen:  soft, nontender, nondistended, normal bowel sounds, no masses, hepatomegaly, splenomegaly  Extremities:  Improved edema (1+pitting edema), +redness, varying stages of vascular ulcerations to BLE, dressed with ACE wraps today, pulses palpable  Skin: Varying stages of ulcerations to BLE    Assessment:        Hospital Problems           Last Modified POA    * (Principal) CHF

## 2019-08-09 NOTE — PROGRESS NOTES
[facility] Wound Ostomy  Nurse  Follow up Note       NAME:  [address] [place] [address] Dr CAVAZOS RECORD NUMBER:  [id number]  AGE: 71 y.o. GENDER: female  : 1950  TODAY'S DATE:  2019    Subjective   Reason for [id number] Nurse Evaluation and Assessment:   Lower extremity venous disease with venous stasis.       Assessment   David Risk Score: David Scale Score: 21    Patient Active Problem List   Diagnosis Code    Leg swelling M79.89    Hypertensive urgency I16.0    Acute on chronic systolic congestive heart failure (HCC) I50.23    Acute on chronic systolic heart failure (HCC) I50.23    Lymphedema of both lower extremities I89.0    Cellulitis of right leg L03.115    Coronary artery disease involving native coronary artery of native heart without angina pectoris I25.10    Major depressive disorder F32.9    Paroxysmal atrial fibrillation ([facility]) I48.0    NSTEMI (non-ST elevated myocardial infarction) ([facility]) I21.4    Acute on chronic congestive heart failure ([facility]) I50.9    Panic disorder F41.0    SDH (subdural hematoma) ([facility]) S06.5X9A    Hypotension I95.9    Dizziness R42    Dyslipidemia E78.5    Chronic systolic heart failure ([facility]) I50.22    Coronary atherosclerosis of native coronary artery I25.10    Hypertension I10    Chronic atrial fibrillation ([facility]) I48.2    S/P implantation of automatic cardioverter/defibrillator (AICD) Z95.810    History of percutaneous coronary intervention Z98.61    Pulmonary embolism, bilateral ([facility]) I26.99    Cellulitis L03.90    Venous stasis dermatitis of both lower extremities I87.2    Congestive heart failure (HCC) I50.9    Acute congestive heart failure (HCC) I50.9    History of subdural hematoma Z86.79    Incontinence in female R32    Ventricular fibrillation ([facility]) I49.01    Vitamin D deficiency E55.9    Venous insufficiency of both lower extremities I87.2    CHF (congestive heart failure), NYHA class I, acute on chronic, combined ([facility]) I50.43    Elevated brain

## 2019-08-10 ENCOUNTER — APPOINTMENT (OUTPATIENT)
Dept: GENERAL RADIOLOGY | Age: 69
DRG: 292 | End: 2019-08-10
Payer: MEDICARE

## 2019-08-10 LAB
ABSOLUTE EOS #: 0.18 K/UL (ref 0–0.4)
ABSOLUTE IMMATURE GRANULOCYTE: 0 K/UL (ref 0–0.3)
ABSOLUTE LYMPH #: 0.97 K/UL (ref 1–4.8)
ABSOLUTE MONO #: 0.46 K/UL (ref 0.1–0.8)
ANION GAP SERPL CALCULATED.3IONS-SCNC: 8 MMOL/L (ref 9–17)
BASOPHILS # BLD: 1 % (ref 0–2)
BASOPHILS ABSOLUTE: 0.05 K/UL (ref 0–0.2)
BNP INTERPRETATION: ABNORMAL
BUN BLDV-MCNC: 26 MG/DL (ref 8–23)
BUN/CREAT BLD: ABNORMAL (ref 9–20)
CALCIUM SERPL-MCNC: 8.4 MG/DL (ref 8.6–10.4)
CHLORIDE BLD-SCNC: 104 MMOL/L (ref 98–107)
CO2: 30 MMOL/L (ref 20–31)
CREAT SERPL-MCNC: 1.18 MG/DL (ref 0.5–0.9)
DIFFERENTIAL TYPE: ABNORMAL
EOSINOPHILS RELATIVE PERCENT: 4 % (ref 1–4)
GFR AFRICAN AMERICAN: 55 ML/MIN
GFR NON-AFRICAN AMERICAN: 45 ML/MIN
GFR SERPL CREATININE-BSD FRML MDRD: ABNORMAL ML/MIN/{1.73_M2}
GFR SERPL CREATININE-BSD FRML MDRD: ABNORMAL ML/MIN/{1.73_M2}
GLUCOSE BLD-MCNC: 107 MG/DL (ref 70–99)
HCT VFR BLD CALC: 36.7 % (ref 36.3–47.1)
HEMOGLOBIN: 10.1 G/DL (ref 11.9–15.1)
IMMATURE GRANULOCYTES: 0 %
LYMPHOCYTES # BLD: 21 % (ref 24–44)
MCH RBC QN AUTO: 24.7 PG (ref 25.2–33.5)
MCHC RBC AUTO-ENTMCNC: 27.5 G/DL (ref 28.4–34.8)
MCV RBC AUTO: 89.7 FL (ref 82.6–102.9)
MONOCYTES # BLD: 10 % (ref 1–7)
MORPHOLOGY: ABNORMAL
MORPHOLOGY: ABNORMAL
NRBC AUTOMATED: 0 PER 100 WBC
PDW BLD-RTO: 18.1 % (ref 11.8–14.4)
PLATELET # BLD: 223 K/UL (ref 138–453)
PLATELET ESTIMATE: ABNORMAL
PMV BLD AUTO: 11.5 FL (ref 8.1–13.5)
POTASSIUM SERPL-SCNC: 3.8 MMOL/L (ref 3.7–5.3)
PRO-BNP: 7932 PG/ML
RBC # BLD: 4.09 M/UL (ref 3.95–5.11)
RBC # BLD: ABNORMAL 10*6/UL
SEG NEUTROPHILS: 64 % (ref 36–66)
SEGMENTED NEUTROPHILS ABSOLUTE COUNT: 2.94 K/UL (ref 1.8–7.7)
SODIUM BLD-SCNC: 142 MMOL/L (ref 135–144)
WBC # BLD: 4.6 K/UL (ref 3.5–11.3)
WBC # BLD: ABNORMAL 10*3/UL

## 2019-08-10 PROCEDURE — APPSS30 APP SPLIT SHARED TIME 16-30 MINUTES: Performed by: NURSE PRACTITIONER

## 2019-08-10 PROCEDURE — 85025 COMPLETE CBC W/AUTO DIFF WBC: CPT

## 2019-08-10 PROCEDURE — 2580000003 HC RX 258: Performed by: NURSE PRACTITIONER

## 2019-08-10 PROCEDURE — 71046 X-RAY EXAM CHEST 2 VIEWS: CPT

## 2019-08-10 PROCEDURE — 36415 COLL VENOUS BLD VENIPUNCTURE: CPT

## 2019-08-10 PROCEDURE — 80048 BASIC METABOLIC PNL TOTAL CA: CPT

## 2019-08-10 PROCEDURE — 2060000000 HC ICU INTERMEDIATE R&B

## 2019-08-10 PROCEDURE — 83880 ASSAY OF NATRIURETIC PEPTIDE: CPT

## 2019-08-10 PROCEDURE — 94761 N-INVAS EAR/PLS OXIMETRY MLT: CPT

## 2019-08-10 PROCEDURE — 99232 SBSQ HOSP IP/OBS MODERATE 35: CPT | Performed by: FAMILY MEDICINE

## 2019-08-10 PROCEDURE — 2700000000 HC OXYGEN THERAPY PER DAY

## 2019-08-10 PROCEDURE — 6370000000 HC RX 637 (ALT 250 FOR IP): Performed by: INTERNAL MEDICINE

## 2019-08-10 PROCEDURE — 6370000000 HC RX 637 (ALT 250 FOR IP): Performed by: FAMILY MEDICINE

## 2019-08-10 PROCEDURE — APPNB15 APP NON BILLABLE TIME 0-15 MINS: Performed by: NURSE PRACTITIONER

## 2019-08-10 PROCEDURE — 6370000000 HC RX 637 (ALT 250 FOR IP): Performed by: NURSE PRACTITIONER

## 2019-08-10 RX ORDER — BUMETANIDE 1 MG/1
2 TABLET ORAL 2 TIMES DAILY
Status: DISCONTINUED | OUTPATIENT
Start: 2019-08-10 | End: 2019-08-11

## 2019-08-10 RX ORDER — HYDROXYZINE HYDROCHLORIDE 25 MG/1
25 TABLET, FILM COATED ORAL ONCE
Status: COMPLETED | OUTPATIENT
Start: 2019-08-11 | End: 2019-08-11

## 2019-08-10 RX ADMIN — RIVAROXABAN 20 MG: 20 TABLET, FILM COATED ORAL at 09:57

## 2019-08-10 RX ADMIN — THERA TABS 1 TABLET: TAB at 09:57

## 2019-08-10 RX ADMIN — ATORVASTATIN CALCIUM 80 MG: 80 TABLET, FILM COATED ORAL at 20:27

## 2019-08-10 RX ADMIN — SACUBITRIL AND VALSARTAN 1 TABLET: 24; 26 TABLET, FILM COATED ORAL at 09:56

## 2019-08-10 RX ADMIN — LORAZEPAM 0.5 MG: 0.5 TABLET ORAL at 03:42

## 2019-08-10 RX ADMIN — POTASSIUM CHLORIDE 20 MEQ: 10 CAPSULE, COATED, EXTENDED RELEASE ORAL at 09:56

## 2019-08-10 RX ADMIN — AMIODARONE HYDROCHLORIDE 200 MG: 200 TABLET ORAL at 09:57

## 2019-08-10 RX ADMIN — SACUBITRIL AND VALSARTAN 1 TABLET: 24; 26 TABLET, FILM COATED ORAL at 20:27

## 2019-08-10 RX ADMIN — LORAZEPAM 0.5 MG: 0.5 TABLET ORAL at 22:08

## 2019-08-10 RX ADMIN — BUMETANIDE 2 MG: 1 TABLET ORAL at 13:44

## 2019-08-10 RX ADMIN — DIPHENHYDRAMINE HCL 25 MG: 25 TABLET ORAL at 22:08

## 2019-08-10 RX ADMIN — SODIUM CHLORIDE, PRESERVATIVE FREE 10 ML: 5 INJECTION INTRAVENOUS at 09:58

## 2019-08-10 RX ADMIN — METOPROLOL SUCCINATE 25 MG: 25 TABLET, FILM COATED, EXTENDED RELEASE ORAL at 09:56

## 2019-08-10 RX ADMIN — SODIUM CHLORIDE, PRESERVATIVE FREE 10 ML: 5 INJECTION INTRAVENOUS at 20:28

## 2019-08-10 NOTE — PROGRESS NOTES
or clubbing. Current Inpatient Medications:   furosemide  40 mg Oral BID    hydrocortisone   Topical TID    amiodarone  200 mg Oral Daily    atorvastatin  80 mg Oral Nightly    metoprolol succinate  25 mg Oral Daily    multivitamin  1 tablet Oral Daily    potassium chloride  20 mEq Oral Daily    rivaroxaban  20 mg Oral Daily    sacubitril-valsartan  1 tablet Oral BID    [Held by provider] spironolactone  25 mg Oral Daily    vitamin D  50,000 Units Oral Weekly    sodium chloride flush  10 mL Intravenous 2 times per day       IV Infusions (if any):      Diagnostics:   EKG: . ECHO: .   Ejection fraction: %  Stress Test: .  Cardiac Angiography: .    Labs:   CBC:   Recent Labs     08/09/19  0612 08/10/19  0645   WBC 5.1 4.6   HGB 11.0* 10.1*   HCT 39.6 36.7    223     BMP:   Recent Labs     08/09/19  0612 08/10/19  0645    142   K 3.9 3.8   CO2 26 30   BUN 33* 26*   CREATININE 1.30* 1.18*   LABGLOM 41* 45*   GLUCOSE 122* 107*     No results found for: BNP  PT/INR: No results for input(s): PROTIME, INR in the last 72 hours. APTT:No results for input(s): APTT in the last 72 hours. CARDIAC ENZYMES:No results for input(s): CKTOTAL, CKMB, CKMBINDEX, TROPONINT in the last 72 hours.   FASTING LIPID PANEL:  Lab Results   Component Value Date    HDL 28 08/08/2019    TRIG 62 08/08/2019     LIVER PROFILE:  Recent Labs     08/09/19 0612   AST 33*   ALT 18   LABALBU 3.2*       ASSESSMENT:  1.  Acute on chronic systolic CHF  2.  History of severe ischemic cardiomyopathy  3.  Medtronic ICD  4.  Paroxysmal atrial fibrillation on anticoagulation  5.  History of bilateral pulmonary embolus  6.  History of SDH  7.  History of essential hypertension  8.  Chronic bilateral lower extremities lymphedema  9.  History of CAD, status post MI, status post stent to the LAD in October 2018 at [address] [place] [address] history of ventricular fibrillation the thought that it was attributed to the use of Risperdal

## 2019-08-10 NOTE — PLAN OF CARE
[facility]    Second Visit Note  For more detailed information please refer to the progress note of the day      8/10/2019    6:01 PM    Name:   Tab Martínez  MRN:     [id number]     [place]:      [de-identified]   Room:   [facility]01   Day:  3  Admit Date:  8/7/2019  4:38 AM    PCP:   Umer Sinclair MD  Code Status:  Full Code        Pt vitals were reviewed   New labs were reviewed   Patient was seen    Updated plan : No acute issues, is tolerating change to bumex thus far. Monitor over night for continued pruritis/sensitivity reaction. Son at bedside. Hope to D/C tomorrow    1.  No changes to plan of care, continue with bumex for diuresis, monitor for signs of increased pruritis or sensitivity reaction        PRICILA Clay NP  8/10/2019  6:01 PM

## 2019-08-10 NOTE — PROGRESS NOTES
[facility]    Progress Note    8/10/2019    1:26 PM    Name:   Alfred Leyva  MRN:     [id number]     [place]:      [de-identified]   Room:   [address] [place] [address] Day:  3  Admit Date:  8/7/2019  4:38 AM    PCP:   Kimo Glover MD  Code Status:  Full Code    Subjective:     C/C:   Chief Complaint   Patient presents with    Shortness of Breath     SOB x3 days, on 3L at home     Interval History Status: improved. No acute issues over night  Patient refusing doses of Lasix secondary to worsening itching associated with Lasix  Advised RN to discuss with cardiology, possibly switch to Bumex 1mg BID  VSS, denies CP, SOB, DIzziness, palpitations, N/V/D    Brief History:     The patient is A 32 y.o.  Non-/non  female who presents with Shortness of Breath (SOB x3 days, on 3L at home)   and she is admitted to the hospital for the management of Acute on Chronic Systolic Congestive Heart Failure.     Treatment with IV diuretics and oxygen therapy during her hospital stay. Cardiology following the case.     AICD present, V-Paced, interrogation ordered and ECHO pending. Review of Systems:     Constitutional:  negative for chills, fevers, sweats, + generalized itching believes from Lasix  Respiratory:  negative for cough, dyspnea on exertion reported as improved, denies hemoptysis, shortness of breath, wheezing  Cardiovascular:  negative for chest pain, chest pressure/discomfort, lower extremity edema, palpitations  Gastrointestinal:  negative for abdominal pain, constipation, diarrhea, nausea, vomiting  Neurological:  negative for dizziness, headache    Medications:      Allergies:  No Known Allergies    Current Meds:   Scheduled Meds:    bumetanide  2 mg Oral BID    hydrocortisone   Topical TID    amiodarone  200 mg Oral Daily    atorvastatin  80 mg Oral Nightly    metoprolol succinate  25 mg Oral Daily    multivitamin  1 tablet Oral

## 2019-08-11 VITALS
RESPIRATION RATE: 19 BRPM | WEIGHT: 186.1 LBS | OXYGEN SATURATION: 100 % | SYSTOLIC BLOOD PRESSURE: 129 MMHG | DIASTOLIC BLOOD PRESSURE: 73 MMHG | HEART RATE: 60 BPM | TEMPERATURE: 98.2 F | BODY MASS INDEX: 34.24 KG/M2 | HEIGHT: 62 IN

## 2019-08-11 PROBLEM — M79.89 LEG SWELLING: Status: RESOLVED | Noted: 2017-08-07 | Resolved: 2019-08-11

## 2019-08-11 PROBLEM — R77.8 ELEVATED TROPONIN: Status: RESOLVED | Noted: 2019-08-07 | Resolved: 2019-08-11

## 2019-08-11 PROBLEM — Z99.81 ON HOME OXYGEN THERAPY: Status: RESOLVED | Noted: 2019-08-07 | Resolved: 2019-08-11

## 2019-08-11 PROBLEM — R79.89 ELEVATED TROPONIN: Status: RESOLVED | Noted: 2019-08-07 | Resolved: 2019-08-11

## 2019-08-11 PROBLEM — I26.99 PULMONARY EMBOLISM, BILATERAL (HCC): Status: RESOLVED | Noted: 2019-04-29 | Resolved: 2019-08-11

## 2019-08-11 PROBLEM — L29.9 PRURITUS: Status: ACTIVE | Noted: 2019-08-11

## 2019-08-11 LAB
ABSOLUTE EOS #: 0.17 K/UL (ref 0–0.4)
ABSOLUTE IMMATURE GRANULOCYTE: 0 K/UL (ref 0–0.3)
ABSOLUTE LYMPH #: 1.09 K/UL (ref 1–4.8)
ABSOLUTE MONO #: 0.42 K/UL (ref 0.1–0.8)
ANION GAP SERPL CALCULATED.3IONS-SCNC: 10 MMOL/L (ref 9–17)
BASOPHILS # BLD: 1 % (ref 0–2)
BASOPHILS ABSOLUTE: 0.04 K/UL (ref 0–0.2)
BUN BLDV-MCNC: 21 MG/DL (ref 8–23)
BUN/CREAT BLD: ABNORMAL (ref 9–20)
CALCIUM SERPL-MCNC: 8.6 MG/DL (ref 8.6–10.4)
CHLORIDE BLD-SCNC: 101 MMOL/L (ref 98–107)
CO2: 30 MMOL/L (ref 20–31)
CREAT SERPL-MCNC: 1.07 MG/DL (ref 0.5–0.9)
DIFFERENTIAL TYPE: ABNORMAL
EOSINOPHILS RELATIVE PERCENT: 4 % (ref 1–4)
GFR AFRICAN AMERICAN: >60 ML/MIN
GFR NON-AFRICAN AMERICAN: 51 ML/MIN
GFR SERPL CREATININE-BSD FRML MDRD: ABNORMAL ML/MIN/{1.73_M2}
GFR SERPL CREATININE-BSD FRML MDRD: ABNORMAL ML/MIN/{1.73_M2}
GLUCOSE BLD-MCNC: 100 MG/DL (ref 70–99)
HCT VFR BLD CALC: 38.1 % (ref 36.3–47.1)
HEMOGLOBIN: 10.4 G/DL (ref 11.9–15.1)
IMMATURE GRANULOCYTES: 0 %
LYMPHOCYTES # BLD: 26 % (ref 24–44)
MCH RBC QN AUTO: 24.5 PG (ref 25.2–33.5)
MCHC RBC AUTO-ENTMCNC: 27.3 G/DL (ref 28.4–34.8)
MCV RBC AUTO: 89.9 FL (ref 82.6–102.9)
MONOCYTES # BLD: 10 % (ref 1–7)
MORPHOLOGY: ABNORMAL
MORPHOLOGY: ABNORMAL
NRBC AUTOMATED: 0 PER 100 WBC
PDW BLD-RTO: 18.3 % (ref 11.8–14.4)
PLATELET # BLD: 219 K/UL (ref 138–453)
PLATELET ESTIMATE: ABNORMAL
PMV BLD AUTO: 11 FL (ref 8.1–13.5)
POTASSIUM SERPL-SCNC: 3.7 MMOL/L (ref 3.7–5.3)
RBC # BLD: 4.24 M/UL (ref 3.95–5.11)
RBC # BLD: ABNORMAL 10*6/UL
SEG NEUTROPHILS: 59 % (ref 36–66)
SEGMENTED NEUTROPHILS ABSOLUTE COUNT: 2.48 K/UL (ref 1.8–7.7)
SODIUM BLD-SCNC: 141 MMOL/L (ref 135–144)
WBC # BLD: 4.2 K/UL (ref 3.5–11.3)
WBC # BLD: ABNORMAL 10*3/UL

## 2019-08-11 PROCEDURE — 80048 BASIC METABOLIC PNL TOTAL CA: CPT

## 2019-08-11 PROCEDURE — 85025 COMPLETE CBC W/AUTO DIFF WBC: CPT

## 2019-08-11 PROCEDURE — 6370000000 HC RX 637 (ALT 250 FOR IP): Performed by: NURSE PRACTITIONER

## 2019-08-11 PROCEDURE — 36415 COLL VENOUS BLD VENIPUNCTURE: CPT

## 2019-08-11 PROCEDURE — 6370000000 HC RX 637 (ALT 250 FOR IP): Performed by: INTERNAL MEDICINE

## 2019-08-11 PROCEDURE — APPSS30 APP SPLIT SHARED TIME 16-30 MINUTES: Performed by: NURSE PRACTITIONER

## 2019-08-11 RX ORDER — DIPHENHYDRAMINE HCL 25 MG
25 TABLET ORAL ONCE
Status: COMPLETED | OUTPATIENT
Start: 2019-08-11 | End: 2019-08-11

## 2019-08-11 RX ORDER — HYDROXYZINE HYDROCHLORIDE 25 MG/1
12.5 TABLET, FILM COATED ORAL 2 TIMES DAILY PRN
Qty: 30 TABLET | Refills: 0 | Status: SHIPPED | OUTPATIENT
Start: 2019-08-11 | End: 2019-09-10

## 2019-08-11 RX ORDER — FUROSEMIDE 40 MG/1
40 TABLET ORAL 2 TIMES DAILY
Status: DISCONTINUED | OUTPATIENT
Start: 2019-08-11 | End: 2019-08-11 | Stop reason: HOSPADM

## 2019-08-11 RX ADMIN — POTASSIUM CHLORIDE 20 MEQ: 10 CAPSULE, COATED, EXTENDED RELEASE ORAL at 09:55

## 2019-08-11 RX ADMIN — METOPROLOL SUCCINATE 25 MG: 25 TABLET, FILM COATED, EXTENDED RELEASE ORAL at 09:55

## 2019-08-11 RX ADMIN — RIVAROXABAN 20 MG: 20 TABLET, FILM COATED ORAL at 09:52

## 2019-08-11 RX ADMIN — SPIRONOLACTONE 25 MG: 25 TABLET ORAL at 09:55

## 2019-08-11 RX ADMIN — DIPHENHYDRAMINE HCL 25 MG: 25 TABLET ORAL at 09:53

## 2019-08-11 RX ADMIN — THERA TABS 1 TABLET: TAB at 09:55

## 2019-08-11 RX ADMIN — SACUBITRIL AND VALSARTAN 1 TABLET: 24; 26 TABLET, FILM COATED ORAL at 09:55

## 2019-08-11 RX ADMIN — HYDROXYZINE HYDROCHLORIDE 25 MG: 25 TABLET ORAL at 00:01

## 2019-08-11 RX ADMIN — AMIODARONE HYDROCHLORIDE 200 MG: 200 TABLET ORAL at 09:55

## 2019-08-11 NOTE — DISCHARGE INSTR - COC
{[facility] DME GOGI:[id number]}  Feeding  {[facility] DME IMBW:192973727}  Med Admin  {[facility] DME CBWK:043544052}  Med Delivery   {[facility] [place] [facility] MED Delivery:[id number]}    Wound Care Documentation and Therapy:  Wound 03/22/19 Leg Right; Lower;Medial (Active)   Number of days: 142       Wound 03/22/19 Leg Right; Lower; Posterior; Lateral (Active)   Number of days: 142       Wound 03/23/19 Leg Left; Lower (Active)   Number of days: 141       Wound 04/30/19 Leg Left; Lower; Posterior (Active)   Number of days: 102       Wound 04/30/19 Coccyx (Active)   Number of days: 103       Wound 07/20/19 Leg Anterior; Left (Active)   Dressing Status Changed 7/23/2019  4:00 PM   Dressing Changed Changed/New 7/23/2019  4:00 PM   Dressing/Treatment Silver Dressing; Other (comment) 7/23/2019  4:00 PM   Wound Cleansed Rinsed/Irrigated with saline 7/23/2019  4:00 PM   Dressing Change Due 07/24/19 7/23/2019  4:00 PM   Wound Assessment Brown;Red 7/23/2019  4:00 PM   Drainage Amount None 7/23/2019  4:00 PM   Drainage Description ERIKA 7/23/2019 12:00 PM   Odor None 7/23/2019  4:00 PM   Margins ERIKA 7/23/2019 12:00 PM   Susanne-wound Assessment Other (Comment) 7/23/2019  4:00 PM   Culture Taken No 7/23/2019  4:00 PM   Number of days: 21       Wound 07/20/19 Thigh Left;Distal (Active)   Wound Venous 8/7/2019  3:23 PM   Dressing Status Clean;Dry; Intact 8/11/2019  4:00 AM   Dressing Changed Changed/New 8/7/2019  3:23 PM   Dressing/Treatment Moisturizing cream;Dry Dressing; Ace Wrap 8/7/2019  3:23 PM   Wound Cleansed Rinsed/Irrigated with saline 7/22/2019  8:00 PM   Dressing Change Due 08/09/19 8/7/2019  3:23 PM   Wound Assessment ERIKA 8/11/2019  4:00 AM   Drainage Amount ERIKA 8/11/2019  4:00 AM   Drainage Description ERIKA 7/23/2019 12:00 PM   Odor None 8/7/2019  3:23 PM   Margins ERIKA 8/11/2019  4:00 AM   Susanne-wound Assessment Dry;Edema 8/7/2019  3:23 PM   Culture Taken No 7/23/2019  4:00 PM   Number of days: 21       Wound 08/09/19 Leg Lower;Left;Mid;Lateral (Active)   Wound Venous INTERVENTION:[id number]}  Weight Bearing Status/Restrictions: Rafiq Dumont [facility] Weight Bearin}  Other Medical Equipment (for information only, NOT a DME order):  {EQUIPMENT:[id number]}  Other Treatments: ***    Patient's personal belongings (please select all that are sent with patient):  {[facility] DME Belongings:920495947}    RN SIGNATURE:  {Esignature:[id number]}    CASE MANAGEMENT/SOCIAL WORK SECTION    Inpatient Status Date: ***    Readmission Risk Assessment Score:  Readmission Risk              Risk of Unplanned Readmission:        33           Discharging to Facility/ Agency   · Name:   · Address:  · Phone:  · Fax:    Dialysis Facility (if applicable)   · Name:  · Address:  · Dialysis Schedule:  · Phone:  · Fax:    / signature: {Esignature:[id number]}    PHYSICIAN SECTION    Prognosis: Fair    Condition at Discharge: Stable    Recommended Labs or Other Treatments After Discharge:     Follow up with cardiologist as scheduled outpatient  Ensure taking all cardiac medications daily as prescribed-- do not miss doses unless advised by physician    Update Admission H&P: No change in H&P    PHYSICIAN SIGNATURE:  {Esignature:[id number]}

## 2019-08-11 NOTE — DISCHARGE SUMMARY
Yue Fernandez 19     Discharge Summary     Patient ID: Erica Hughes  :  1950             MRN: [id number]                                     ACCOUNT:  [de-identified]   Patient's PCP: Janis Everett MD  Admit Date: 2019   Discharge Date: 2019     Length of Stay: 4  Code Status:  Full Code  Admitting Physician: Rosibel Alfaro MD  Discharge Physician: Zachary Hoskins, APRN - NP      Active Discharge Diagnoses:      Hospital Problem Lists:  Principal Problem:    CHF (congestive heart failure), NYHA class I, acute on chronic, combined ([facility]  Active Problems:    Hypertension    S/P implantation of automatic cardioverter/defibrillator (AICD)    Elevated brain natriuretic peptide (BNP) level    Chronic respiratory failure with hypoxia, on home O2 therapy ([facility])    Pruritus  Resolved Problems:    Leg swelling    Pulmonary embolism, bilateral ([facility])    Elevated troponin    On home oxygen therapy    FABIAN (acute kidney injury) ([facility]    Itch        Admission Condition:  poor      Discharged Condition: stable     Hospital Stay:   [place] Course:       The patient is a 79 y.o.  Non-/non  female who presents with Shortness of Breath (SOB x3 days, on 3L at home)   and she is admitted to the hospital for the management of Acute on Chronic Systolic Congestive Heart Failure.     Treatment with IV diuretics and oxygen therapy during her hospital stay.  Cardiology following the case, trialed bumex as opposed to lasix secondary to pruritis-- patient ultimately requested to return home on Lasix despite her urine output increasing on Bumex.   PRN doses of hydroxyzine 30 minutes prior to diuretics if needed.       PT/OT evaluated patient during her admission, recommended SNF, however, patient refused and elected to return home with her  and son.     AICD present, V-Paced, and patient without an arrythmias on this admission.      Significant TO DIETITIAN  IP CONSULT TO CARDIOLOGY  IP CONSULT TO SOCIAL WORK  IP CONSULT TO SPIRITUAL SERVICES  IP CONSULT TO SOCIAL WORK        The patient was seen and examined on day of discharge and this discharge summary is in conjunction with any daily progress note from day of discharge.     PE on day of Discharge:   General appearance:  alert, cooperative and no distress  Mental Status:  oriented to person, place and time and normal affect  Lungs:  Fine rales to posterior bases bilaterally, normal effort  Heart:  regular rate and rhythm, no murmur (AICD)  Abdomen:  soft, nontender, nondistended, normal bowel sounds, no masses, hepatomegaly, splenomegaly  Extremities:  BLE edema with ACE wraps in place, wound care dressing, edema is improved from admission, pulses palpable  Skin: Varying stages of ulcerations noted to BLE, wound care is managing dressing recs     Discharge plan:      Disposition: Home     Physician Follow Up:      Follow up with cardiology as routine after MD Nasra Kent 23  [address] [employer] [place]     On 8/16/2019  appt time 10:45 with yanira clemons        Requiring Further Evaluation/Follow Up POST HOSPITALIZATION/Incidental Findings:   Cardiology follow up as routine     Diet: cardiac diet     Activity: As tolerated     Instructions to Patient:   Please ensure all medications are taken as prescribed, especially diuretics/cardiac medications. Missed or skipped doses should only be authorized by physician.   Missing doses of diuretics and cardiac medications is dangerous and can be life threatening-- writer personally discussed the importance of medication compliance with the patient.     Discharge Medications:       Medication List       START taking these medications    hydrOXYzine 25 MG tablet  Commonly known as:  ATARAX  Take 0.5 tablets by mouth 2 times daily as needed for Itching (2 times daily as needed 30 minutes prior to taking

## 2019-08-11 NOTE — PLAN OF CARE
Problem: Falls - Risk of:  Goal: Will remain free from falls  Description  Will remain free from falls  Outcome: Ongoing  Goal: Absence of physical injury  Description  Absence of physical injury  Outcome: Ongoing     Problem: Musculor/Skeletal Functional Status  Goal: Highest potential functional level  Outcome: Ongoing  Goal: Absence of falls  Outcome: Ongoing     Problem: IP BALANCE  Goal: BALANCE EDUCATION  Description  Educate patients on maintaining dynamic/static standing/sitting balance, with/without upper extremity support.   Outcome: Ongoing

## 2019-08-12 ENCOUNTER — TELEPHONE (OUTPATIENT)
Dept: FAMILY MEDICINE CLINIC | Age: 69
End: 2019-08-12

## 2019-08-13 ENCOUNTER — TELEPHONE (OUTPATIENT)
Dept: SURGERY | Age: 69
End: 2019-08-13

## 2019-08-16 ENCOUNTER — HOSPITAL ENCOUNTER (INPATIENT)
Age: 69
LOS: 6 days | Discharge: HOME HEALTH | DRG: 208 | End: 2019-08-22
Attending: INTERNAL MEDICINE | Admitting: INTERNAL MEDICINE
Payer: MEDICARE

## 2019-08-16 ENCOUNTER — APPOINTMENT (OUTPATIENT)
Dept: GENERAL RADIOLOGY | Facility: CLINIC | Age: 69
End: 2019-08-16
Payer: MEDICARE

## 2019-08-16 ENCOUNTER — HOSPITAL ENCOUNTER (EMERGENCY)
Facility: CLINIC | Age: 69
Discharge: ANOTHER HOSPITAL | End: 2019-08-16
Attending: EMERGENCY MEDICINE | Admitting: INTERNAL MEDICINE
Payer: MEDICARE

## 2019-08-16 VITALS
BODY MASS INDEX: 33.59 KG/M2 | TEMPERATURE: 97.2 F | OXYGEN SATURATION: 97 % | RESPIRATION RATE: 20 BRPM | DIASTOLIC BLOOD PRESSURE: 71 MMHG | SYSTOLIC BLOOD PRESSURE: 123 MMHG | HEART RATE: 99 BPM | WEIGHT: 189.6 LBS | HEIGHT: 63 IN

## 2019-08-16 DIAGNOSIS — I50.23 ACUTE ON CHRONIC SYSTOLIC CONGESTIVE HEART FAILURE (HCC): ICD-10-CM

## 2019-08-16 DIAGNOSIS — R06.02 SOB (SHORTNESS OF BREATH): ICD-10-CM

## 2019-08-16 DIAGNOSIS — E87.6 HYPOKALEMIA: ICD-10-CM

## 2019-08-16 DIAGNOSIS — J96.01 ACUTE RESPIRATORY FAILURE WITH HYPOXIA (HCC): Primary | ICD-10-CM

## 2019-08-16 DIAGNOSIS — I50.22 CHRONIC SYSTOLIC HEART FAILURE (HCC): Primary | ICD-10-CM

## 2019-08-16 LAB
ABSOLUTE EOS #: 0.08 K/UL (ref 0–0.4)
ABSOLUTE IMMATURE GRANULOCYTE: ABNORMAL K/UL (ref 0–0.3)
ABSOLUTE LYMPH #: 1.98 K/UL (ref 1–4.8)
ABSOLUTE MONO #: 0.32 K/UL (ref 0.1–0.8)
ACTION: NORMAL
ALLEN TEST: POSITIVE
ALLEN TEST: POSITIVE
ANION GAP SERPL CALCULATED.3IONS-SCNC: 20 MMOL/L (ref 9–17)
BASOPHILS # BLD: 1 % (ref 0–2)
BASOPHILS ABSOLUTE: 0.08 K/UL (ref 0–0.2)
BNP INTERPRETATION: ABNORMAL
BUN BLDV-MCNC: 18 MG/DL (ref 8–23)
BUN/CREAT BLD: ABNORMAL (ref 9–20)
CALCIUM SERPL-MCNC: 9.3 MG/DL (ref 8.6–10.4)
CHLORIDE BLD-SCNC: 102 MMOL/L (ref 98–107)
CO2: 21 MMOL/L (ref 20–31)
CREAT SERPL-MCNC: 1.2 MG/DL (ref 0.5–0.9)
DATE AND TIME: NORMAL
DIFFERENTIAL TYPE: ABNORMAL
EOSINOPHILS RELATIVE PERCENT: 1 % (ref 1–4)
FIO2: 60
FIO2: 80
FIO2: ABNORMAL
GFR AFRICAN AMERICAN: 54 ML/MIN
GFR NON-AFRICAN AMERICAN: 45 ML/MIN
GFR SERPL CREATININE-BSD FRML MDRD: ABNORMAL ML/MIN/{1.73_M2}
GFR SERPL CREATININE-BSD FRML MDRD: ABNORMAL ML/MIN/{1.73_M2}
GLUCOSE BLD-MCNC: 145 MG/DL (ref 74–100)
GLUCOSE BLD-MCNC: 147 MG/DL (ref 74–100)
GLUCOSE BLD-MCNC: 294 MG/DL (ref 70–99)
HCO3 VENOUS: 22.8 MMOL/L (ref 24–30)
HCT VFR BLD CALC: 41 % (ref 36–46)
HEMOGLOBIN: 12.5 G/DL (ref 12–16)
IMMATURE GRANULOCYTES: ABNORMAL %
INR BLD: 1.5
LACTIC ACID, SEPSIS WHOLE BLOOD: 2.2 MMOL/L (ref 0.5–1.9)
LACTIC ACID, SEPSIS WHOLE BLOOD: ABNORMAL MMOL/L (ref 0.5–1.9)
LACTIC ACID, SEPSIS: 6 MMOL/L (ref 0.5–1.9)
LACTIC ACID, SEPSIS: ABNORMAL MMOL/L (ref 0.5–1.9)
LYMPHOCYTES # BLD: 25 % (ref 24–44)
MCH RBC QN AUTO: 25.4 PG (ref 26–34)
MCHC RBC AUTO-ENTMCNC: 30.4 G/DL (ref 31–37)
MCV RBC AUTO: 83.8 FL (ref 80–100)
MODE: ABNORMAL
MODE: ABNORMAL
MONOCYTES # BLD: 4 % (ref 1–7)
MORPHOLOGY: ABNORMAL
NEGATIVE BASE EXCESS, ART: 1 (ref 0–2)
NEGATIVE BASE EXCESS, ART: 1 (ref 0–2)
NEGATIVE BASE EXCESS, VEN: 5 (ref 0–2)
NOTIFY: NORMAL
NRBC AUTOMATED: ABNORMAL PER 100 WBC
O2 DEVICE/FLOW/%: ABNORMAL
O2 SAT, VEN: 89 %
PARTIAL THROMBOPLASTIN TIME: 23.4 SEC (ref 21.3–31.3)
PATIENT TEMP: ABNORMAL
PCO2, VEN: 57 MM HG (ref 39–55)
PDW BLD-RTO: 21 % (ref 12.5–15.4)
PH VENOUS: 7.21 (ref 7.32–7.42)
PLATELET # BLD: 288 K/UL (ref 140–450)
PLATELET ESTIMATE: ABNORMAL
PMV BLD AUTO: 8.6 FL (ref 6–12)
PO2, VEN: 70 MM HG (ref 30–50)
POC HCO3: 28.5 MMOL/L (ref 21–28)
POC HCO3: 30.2 MMOL/L (ref 21–28)
POC HEMATOCRIT: 40 % (ref 36–46)
POC HEMOGLOBIN: 13.7 G/DL (ref 12–16)
POC LACTIC ACID: 1.56 MMOL/L (ref 0.56–1.39)
POC O2 SATURATION: 98 % (ref 94–98)
POC O2 SATURATION: 98 % (ref 94–98)
POC PCO2 TEMP: ABNORMAL MM HG
POC PCO2: 69.2 MM HG (ref 35–48)
POC PCO2: 86.8 MM HG (ref 35–48)
POC PH TEMP: ABNORMAL
POC PH: 7.15 (ref 7.35–7.45)
POC PH: 7.22 (ref 7.35–7.45)
POC PO2 TEMP: ABNORMAL MM HG
POC PO2: 123 MM HG (ref 83–108)
POC PO2: 150.2 MM HG (ref 83–108)
POSITIVE BASE EXCESS, ART: ABNORMAL (ref 0–3)
POSITIVE BASE EXCESS, ART: ABNORMAL (ref 0–3)
POSITIVE BASE EXCESS, VEN: ABNORMAL (ref 0–2)
POTASSIUM SERPL-SCNC: 4.3 MMOL/L (ref 3.7–5.3)
PRO-BNP: ABNORMAL PG/ML
PROTHROMBIN TIME: 14.9 SEC (ref 9.4–12.6)
RBC # BLD: 4.9 M/UL (ref 4–5.2)
RBC # BLD: ABNORMAL 10*6/UL
READ BACK: YES
SAMPLE SITE: ABNORMAL
SAMPLE SITE: ABNORMAL
SEG NEUTROPHILS: 69 % (ref 36–66)
SEGMENTED NEUTROPHILS ABSOLUTE COUNT: 5.44 K/UL (ref 1.8–7.7)
SODIUM BLD-SCNC: 143 MMOL/L (ref 135–144)
TCO2 (CALC), ART: 31 MMOL/L (ref 22–29)
TCO2 (CALC), ART: 33 MMOL/L (ref 22–29)
TOTAL CO2, VENOUS: 24 MMOL/L (ref 25–31)
TROPONIN INTERP: ABNORMAL
TROPONIN INTERP: ABNORMAL
TROPONIN T: ABNORMAL NG/ML
TROPONIN T: ABNORMAL NG/ML
TROPONIN, HIGH SENSITIVITY: 37 NG/L (ref 0–14)
TROPONIN, HIGH SENSITIVITY: 71 NG/L (ref 0–14)
WBC # BLD: 7.9 K/UL (ref 3.5–11)
WBC # BLD: ABNORMAL 10*3/UL

## 2019-08-16 PROCEDURE — 87641 MR-STAPH DNA AMP PROBE: CPT

## 2019-08-16 PROCEDURE — 87086 URINE CULTURE/COLONY COUNT: CPT

## 2019-08-16 PROCEDURE — 36600 WITHDRAWAL OF ARTERIAL BLOOD: CPT

## 2019-08-16 PROCEDURE — 96375 TX/PRO/DX INJ NEW DRUG ADDON: CPT

## 2019-08-16 PROCEDURE — 84484 ASSAY OF TROPONIN QUANT: CPT

## 2019-08-16 PROCEDURE — 2580000003 HC RX 258

## 2019-08-16 PROCEDURE — 85025 COMPLETE CBC W/AUTO DIFF WBC: CPT

## 2019-08-16 PROCEDURE — 36415 COLL VENOUS BLD VENIPUNCTURE: CPT

## 2019-08-16 PROCEDURE — 94761 N-INVAS EAR/PLS OXIMETRY MLT: CPT

## 2019-08-16 PROCEDURE — 94002 VENT MGMT INPAT INIT DAY: CPT

## 2019-08-16 PROCEDURE — 82947 ASSAY GLUCOSE BLOOD QUANT: CPT

## 2019-08-16 PROCEDURE — 2580000003 HC RX 258: Performed by: EMERGENCY MEDICINE

## 2019-08-16 PROCEDURE — 2500000003 HC RX 250 WO HCPCS: Performed by: EMERGENCY MEDICINE

## 2019-08-16 PROCEDURE — 2700000000 HC OXYGEN THERAPY PER DAY

## 2019-08-16 PROCEDURE — 99291 CRITICAL CARE FIRST HOUR: CPT

## 2019-08-16 PROCEDURE — 6360000002 HC RX W HCPCS

## 2019-08-16 PROCEDURE — 2580000003 HC RX 258: Performed by: STUDENT IN AN ORGANIZED HEALTH CARE EDUCATION/TRAINING PROGRAM

## 2019-08-16 PROCEDURE — 96365 THER/PROPH/DIAG IV INF INIT: CPT

## 2019-08-16 PROCEDURE — 2500000003 HC RX 250 WO HCPCS: Performed by: STUDENT IN AN ORGANIZED HEALTH CARE EDUCATION/TRAINING PROGRAM

## 2019-08-16 PROCEDURE — 83880 ASSAY OF NATRIURETIC PEPTIDE: CPT

## 2019-08-16 PROCEDURE — 96374 THER/PROPH/DIAG INJ IV PUSH: CPT

## 2019-08-16 PROCEDURE — 82803 BLOOD GASES ANY COMBINATION: CPT

## 2019-08-16 PROCEDURE — 2000000000 HC ICU R&B

## 2019-08-16 PROCEDURE — 85014 HEMATOCRIT: CPT

## 2019-08-16 PROCEDURE — 94770 HC ETCO2 MONITOR DAILY: CPT

## 2019-08-16 PROCEDURE — 80048 BASIC METABOLIC PNL TOTAL CA: CPT

## 2019-08-16 PROCEDURE — 87040 BLOOD CULTURE FOR BACTERIA: CPT

## 2019-08-16 PROCEDURE — 83605 ASSAY OF LACTIC ACID: CPT

## 2019-08-16 PROCEDURE — 6360000002 HC RX W HCPCS: Performed by: EMERGENCY MEDICINE

## 2019-08-16 PROCEDURE — 93005 ELECTROCARDIOGRAM TRACING: CPT | Performed by: STUDENT IN AN ORGANIZED HEALTH CARE EDUCATION/TRAINING PROGRAM

## 2019-08-16 PROCEDURE — 31500 INSERT EMERGENCY AIRWAY: CPT

## 2019-08-16 PROCEDURE — 85610 PROTHROMBIN TIME: CPT

## 2019-08-16 PROCEDURE — 71045 X-RAY EXAM CHEST 1 VIEW: CPT

## 2019-08-16 PROCEDURE — 85730 THROMBOPLASTIN TIME PARTIAL: CPT

## 2019-08-16 PROCEDURE — 5A1945Z RESPIRATORY VENTILATION, 24-96 CONSECUTIVE HOURS: ICD-10-PCS | Performed by: INTERNAL MEDICINE

## 2019-08-16 RX ORDER — PROPOFOL 10 MG/ML
INJECTION, EMULSION INTRAVENOUS
Status: COMPLETED
Start: 2019-08-16 | End: 2019-08-16

## 2019-08-16 RX ORDER — AMIODARONE HYDROCHLORIDE 200 MG/1
200 TABLET ORAL DAILY
Status: DISCONTINUED | OUTPATIENT
Start: 2019-08-17 | End: 2019-08-19

## 2019-08-16 RX ORDER — MAGNESIUM HYDROXIDE 1200 MG/15ML
LIQUID ORAL
Status: COMPLETED
Start: 2019-08-16 | End: 2019-08-16

## 2019-08-16 RX ORDER — ATORVASTATIN CALCIUM 80 MG/1
80 TABLET, FILM COATED ORAL NIGHTLY
Status: DISCONTINUED | OUTPATIENT
Start: 2019-08-16 | End: 2019-08-22 | Stop reason: HOSPADM

## 2019-08-16 RX ORDER — PROPOFOL 10 MG/ML
10 INJECTION, EMULSION INTRAVENOUS ONCE
Status: DISCONTINUED | OUTPATIENT
Start: 2019-08-16 | End: 2019-08-16 | Stop reason: HOSPADM

## 2019-08-16 RX ORDER — FENTANYL CITRATE 50 UG/ML
50 INJECTION, SOLUTION INTRAMUSCULAR; INTRAVENOUS ONCE
Status: COMPLETED | OUTPATIENT
Start: 2019-08-16 | End: 2019-08-16

## 2019-08-16 RX ORDER — NITROGLYCERIN 20 MG/100ML
10 INJECTION INTRAVENOUS CONTINUOUS
Status: DISCONTINUED | OUTPATIENT
Start: 2019-08-16 | End: 2019-08-16 | Stop reason: HOSPADM

## 2019-08-16 RX ORDER — SUCCINYLCHOLINE CHLORIDE 20 MG/ML
100 INJECTION INTRAMUSCULAR; INTRAVENOUS ONCE
Status: COMPLETED | OUTPATIENT
Start: 2019-08-16 | End: 2019-08-16

## 2019-08-16 RX ORDER — PROPOFOL 10 MG/ML
10 INJECTION, EMULSION INTRAVENOUS
Status: DISCONTINUED | OUTPATIENT
Start: 2019-08-16 | End: 2019-08-18

## 2019-08-16 RX ORDER — FUROSEMIDE 10 MG/ML
40 INJECTION INTRAMUSCULAR; INTRAVENOUS ONCE
Status: COMPLETED | OUTPATIENT
Start: 2019-08-16 | End: 2019-08-16

## 2019-08-16 RX ORDER — ENALAPRILAT 2.5 MG/2ML
0.62 INJECTION INTRAVENOUS ONCE
Status: DISCONTINUED | OUTPATIENT
Start: 2019-08-16 | End: 2019-08-16 | Stop reason: HOSPADM

## 2019-08-16 RX ORDER — NITROGLYCERIN 20 MG/100ML
10 INJECTION INTRAVENOUS CONTINUOUS
Status: DISCONTINUED | OUTPATIENT
Start: 2019-08-16 | End: 2019-08-17

## 2019-08-16 RX ORDER — VECURONIUM BROMIDE 1 MG/ML
10 INJECTION, POWDER, LYOPHILIZED, FOR SOLUTION INTRAVENOUS ONCE
Status: COMPLETED | OUTPATIENT
Start: 2019-08-16 | End: 2019-08-16

## 2019-08-16 RX ORDER — BUMETANIDE 0.25 MG/ML
1 INJECTION, SOLUTION INTRAMUSCULAR; INTRAVENOUS ONCE
Status: DISCONTINUED | OUTPATIENT
Start: 2019-08-16 | End: 2019-08-16

## 2019-08-16 RX ORDER — ETOMIDATE 2 MG/ML
20 INJECTION INTRAVENOUS ONCE
Status: DISCONTINUED | OUTPATIENT
Start: 2019-08-16 | End: 2019-08-16

## 2019-08-16 RX ORDER — ONDANSETRON 2 MG/ML
4 INJECTION INTRAMUSCULAR; INTRAVENOUS EVERY 6 HOURS PRN
Status: DISCONTINUED | OUTPATIENT
Start: 2019-08-16 | End: 2019-08-22 | Stop reason: HOSPADM

## 2019-08-16 RX ORDER — MULTIVITAMIN WITH FOLIC ACID 400 MCG
1 TABLET ORAL DAILY
Status: DISCONTINUED | OUTPATIENT
Start: 2019-08-17 | End: 2019-08-22 | Stop reason: HOSPADM

## 2019-08-16 RX ORDER — SODIUM CHLORIDE 0.9 % (FLUSH) 0.9 %
10 SYRINGE (ML) INJECTION EVERY 12 HOURS SCHEDULED
Status: DISCONTINUED | OUTPATIENT
Start: 2019-08-16 | End: 2019-08-22 | Stop reason: HOSPADM

## 2019-08-16 RX ORDER — SODIUM CHLORIDE 0.9 % (FLUSH) 0.9 %
10 SYRINGE (ML) INJECTION PRN
Status: DISCONTINUED | OUTPATIENT
Start: 2019-08-16 | End: 2019-08-22 | Stop reason: HOSPADM

## 2019-08-16 RX ADMIN — PROPOFOL 10 MCG/KG/MIN: 10 INJECTION, EMULSION INTRAVENOUS at 23:08

## 2019-08-16 RX ADMIN — FUROSEMIDE 40 MG: 10 INJECTION, SOLUTION INTRAMUSCULAR; INTRAVENOUS at 18:55

## 2019-08-16 RX ADMIN — NITROGLYCERIN 10 MCG/MIN: 20 INJECTION INTRAVENOUS at 19:28

## 2019-08-16 RX ADMIN — VECURONIUM BROMIDE 10 MG: 1 INJECTION, POWDER, LYOPHILIZED, FOR SOLUTION INTRAVENOUS at 19:38

## 2019-08-16 RX ADMIN — SODIUM CHLORIDE 1000 ML: 900 IRRIGANT IRRIGATION at 23:07

## 2019-08-16 RX ADMIN — PROPOFOL 1000 MG: 10 INJECTION, EMULSION INTRAVENOUS at 18:54

## 2019-08-16 RX ADMIN — ETOMIDATE 20 MG: 2 INJECTION, SOLUTION INTRAVENOUS at 18:33

## 2019-08-16 RX ADMIN — Medication 10 ML: at 22:24

## 2019-08-16 RX ADMIN — NITROGLYCERIN 10 MCG/MIN: 20 INJECTION INTRAVENOUS at 22:20

## 2019-08-16 RX ADMIN — FENTANYL CITRATE 50 MCG: 50 INJECTION INTRAMUSCULAR; INTRAVENOUS at 18:53

## 2019-08-16 RX ADMIN — SUCCINYLCHOLINE CHLORIDE 100 MG: 20 INJECTION, SOLUTION INTRAMUSCULAR; INTRAVENOUS at 18:30

## 2019-08-16 ASSESSMENT — PULMONARY FUNCTION TESTS
PIF_VALUE: 20
PIF_VALUE: 21
PIF_VALUE: 21
PIF_VALUE: 19

## 2019-08-16 ASSESSMENT — PAIN SCALES - GENERAL
PAINLEVEL_OUTOF10: 0
PAINLEVEL_OUTOF10: 0

## 2019-08-16 NOTE — ED NOTES
Increased nitro drip to 10mcg.      Bryn Manjarrez RN  08/16/19 607 [address]  08/16/19 2007

## 2019-08-16 NOTE — ED PROVIDER NOTES
0.5 MG TABLET    Take 1 tablet by mouth every 6 hours as needed for Anxiety for up to 30 days. METOPROLOL SUCCINATE (TOPROL XL) 25 MG EXTENDED RELEASE TABLET    Take 1 tablet by mouth daily    MULTIPLE VITAMIN (MULTIVITAMIN) TABLET    Take 1 tablet by mouth daily    POTASSIUM CHLORIDE (MICRO-K) 10 MEQ EXTENDED RELEASE CAPSULE    Take 2 capsules by mouth daily    RIVAROXABAN (XARELTO) 20 MG TABS TABLET    Take 20 mg by mouth daily    SACUBITRIL-VALSARTAN (ENTRESTO) 24-26 MG PER TABLET    Take 1 tablet by mouth 2 times daily    SPIRONOLACTONE (ALDACTONE) 25 MG TABLET    Take 1 tablet by mouth daily    VITAMIN D (ERGOCALCIFEROL) 68830 UNITS CAPSULE    Take 1 capsule by mouth once a week for 8 doses       ALLERGIES     has No Known Allergies. FAMILY HISTORY     She indicated that her mother is . She indicated that her father is . family history is not on file. SOCIAL HISTORY      reports that she has never smoked. She has never used smokeless tobacco. She reports that she does not drink alcohol or use drugs. PHYSICAL EXAM     INITIAL VITALS:  height is 5' 3\" (1.6 m) and weight is 190.4 kg (419 lb 12.1 oz) (abnormal). Her blood pressure is 110/72 and her pulse is 91. Her respiration is 22 and oxygen saturation is 100%. Patient was conscious but in respiratory distress. HEENT is atraumatic. Pupils are PERRL at 4 mm with normal extraocular motion. Mucous membranes moist.    Neck is supple with no lymphadenopathy. No JVD. No meningismus. Heart sounds regular rate and rhythm with no gallops, murmurs, or rubs. Lungs: rales in all fields. Abdomen: soft, nontender with no pain to palpation. No pulsatile mass. Normal bowel sounds are noted. No rebound or guarding. Musculoskeletal exam shows no evidence of trauma. Compressive dressings noted on both legs. Bandages noted as well, indicating likely underlying skin breakdown. Normal distal pulses in all extremities.   Skin:

## 2019-08-17 ENCOUNTER — APPOINTMENT (OUTPATIENT)
Dept: GENERAL RADIOLOGY | Age: 69
DRG: 208 | End: 2019-08-17
Attending: INTERNAL MEDICINE
Payer: MEDICARE

## 2019-08-17 PROBLEM — I50.21 ACUTE SYSTOLIC CHF (CONGESTIVE HEART FAILURE) (HCC): Status: ACTIVE | Noted: 2019-08-17

## 2019-08-17 PROBLEM — R77.8 ELEVATED TROPONIN: Status: ACTIVE | Noted: 2019-08-17

## 2019-08-17 PROBLEM — E87.0 HYPERNATREMIA: Status: ACTIVE | Noted: 2019-08-17

## 2019-08-17 PROBLEM — E66.01 SEVERE OBESITY (BMI 35.0-39.9) WITH COMORBIDITY (HCC): Status: ACTIVE | Noted: 2018-11-05

## 2019-08-17 PROBLEM — R79.89 ELEVATED TROPONIN: Status: ACTIVE | Noted: 2019-08-17

## 2019-08-17 LAB
ABSOLUTE EOS #: 0 K/UL (ref 0–0.44)
ABSOLUTE IMMATURE GRANULOCYTE: 0 K/UL (ref 0–0.3)
ABSOLUTE LYMPH #: 0.79 K/UL (ref 1.1–3.7)
ABSOLUTE MONO #: 0.68 K/UL (ref 0.1–1.2)
ALLEN TEST: POSITIVE
ANION GAP SERPL CALCULATED.3IONS-SCNC: 13 MMOL/L (ref 9–17)
ANION GAP SERPL CALCULATED.3IONS-SCNC: 17 MMOL/L (ref 9–17)
BASOPHILS # BLD: 0 % (ref 0–2)
BASOPHILS ABSOLUTE: 0 K/UL (ref 0–0.2)
BUN BLDV-MCNC: 18 MG/DL (ref 8–23)
BUN BLDV-MCNC: 19 MG/DL (ref 8–23)
BUN/CREAT BLD: ABNORMAL (ref 9–20)
BUN/CREAT BLD: ABNORMAL (ref 9–20)
CALCIUM SERPL-MCNC: 8.3 MG/DL (ref 8.6–10.4)
CALCIUM SERPL-MCNC: 8.5 MG/DL (ref 8.6–10.4)
CHLORIDE BLD-SCNC: 106 MMOL/L (ref 98–107)
CHLORIDE BLD-SCNC: 108 MMOL/L (ref 98–107)
CO2: 25 MMOL/L (ref 20–31)
CO2: 25 MMOL/L (ref 20–31)
CREAT SERPL-MCNC: 1.09 MG/DL (ref 0.5–0.9)
CREAT SERPL-MCNC: 1.14 MG/DL (ref 0.5–0.9)
CULTURE: NO GROWTH
DIFFERENTIAL TYPE: ABNORMAL
EKG ATRIAL RATE: 208 BPM
EKG P AXIS: -89 DEGREES
EKG Q-T INTERVAL: 394 MS
EKG QRS DURATION: 120 MS
EKG QTC CALCULATION (BAZETT): 445 MS
EKG R AXIS: 174 DEGREES
EKG T AXIS: -141 DEGREES
EKG VENTRICULAR RATE: 77 BPM
EOSINOPHILS RELATIVE PERCENT: 0 % (ref 1–4)
FIO2: 30
GFR AFRICAN AMERICAN: 57 ML/MIN
GFR AFRICAN AMERICAN: >60 ML/MIN
GFR NON-AFRICAN AMERICAN: 47 ML/MIN
GFR NON-AFRICAN AMERICAN: 50 ML/MIN
GFR SERPL CREATININE-BSD FRML MDRD: ABNORMAL ML/MIN/{1.73_M2}
GLUCOSE BLD-MCNC: 101 MG/DL (ref 65–105)
GLUCOSE BLD-MCNC: 108 MG/DL (ref 74–100)
GLUCOSE BLD-MCNC: 110 MG/DL (ref 70–99)
GLUCOSE BLD-MCNC: 88 MG/DL (ref 70–99)
HCT VFR BLD CALC: 35.8 % (ref 36.3–47.1)
HEMOGLOBIN: 9.6 G/DL (ref 11.9–15.1)
IMMATURE GRANULOCYTES: 0 %
LYMPHOCYTES # BLD: 7 % (ref 24–43)
Lab: NORMAL
MAGNESIUM: 1.8 MG/DL (ref 1.6–2.6)
MCH RBC QN AUTO: 24 PG (ref 25.2–33.5)
MCHC RBC AUTO-ENTMCNC: 26.8 G/DL (ref 28.4–34.8)
MCV RBC AUTO: 89.5 FL (ref 82.6–102.9)
MODE: ABNORMAL
MONOCYTES # BLD: 6 % (ref 3–12)
MORPHOLOGY: ABNORMAL
MORPHOLOGY: ABNORMAL
MRSA, DNA, NASAL: NORMAL
NEGATIVE BASE EXCESS, ART: ABNORMAL (ref 0–2)
NRBC AUTOMATED: 0.2 PER 100 WBC
O2 DEVICE/FLOW/%: ABNORMAL
PATIENT TEMP: ABNORMAL
PDW BLD-RTO: 18.6 % (ref 11.8–14.4)
PLATELET # BLD: 211 K/UL (ref 138–453)
PLATELET ESTIMATE: ABNORMAL
PMV BLD AUTO: 12.2 FL (ref 8.1–13.5)
POC HCO3: 29.7 MMOL/L (ref 21–28)
POC O2 SATURATION: 97 % (ref 94–98)
POC PCO2 TEMP: ABNORMAL MM HG
POC PCO2: 45.2 MM HG (ref 35–48)
POC PH TEMP: ABNORMAL
POC PH: 7.43 (ref 7.35–7.45)
POC PO2 TEMP: ABNORMAL MM HG
POC PO2: 89.6 MM HG (ref 83–108)
POSITIVE BASE EXCESS, ART: 5 (ref 0–3)
POTASSIUM SERPL-SCNC: 3.8 MMOL/L (ref 3.7–5.3)
POTASSIUM SERPL-SCNC: 4.4 MMOL/L (ref 3.7–5.3)
RBC # BLD: 4 M/UL (ref 3.95–5.11)
RBC # BLD: ABNORMAL 10*6/UL
SAMPLE SITE: ABNORMAL
SEG NEUTROPHILS: 87 % (ref 36–65)
SEGMENTED NEUTROPHILS ABSOLUTE COUNT: 9.83 K/UL (ref 1.5–8.1)
SODIUM BLD-SCNC: 144 MMOL/L (ref 135–144)
SODIUM BLD-SCNC: 150 MMOL/L (ref 135–144)
SPECIMEN DESCRIPTION: NORMAL
SPECIMEN DESCRIPTION: NORMAL
TCO2 (CALC), ART: 31 MMOL/L (ref 22–29)
TROPONIN INTERP: ABNORMAL
TROPONIN T: ABNORMAL NG/ML
TROPONIN, HIGH SENSITIVITY: 67 NG/L (ref 0–14)
WBC # BLD: 11.3 K/UL (ref 3.5–11.3)
WBC # BLD: ABNORMAL 10*3/UL

## 2019-08-17 PROCEDURE — 6370000000 HC RX 637 (ALT 250 FOR IP): Performed by: STUDENT IN AN ORGANIZED HEALTH CARE EDUCATION/TRAINING PROGRAM

## 2019-08-17 PROCEDURE — 71045 X-RAY EXAM CHEST 1 VIEW: CPT

## 2019-08-17 PROCEDURE — 83735 ASSAY OF MAGNESIUM: CPT

## 2019-08-17 PROCEDURE — 80048 BASIC METABOLIC PNL TOTAL CA: CPT

## 2019-08-17 PROCEDURE — 94770 HC ETCO2 MONITOR DAILY: CPT

## 2019-08-17 PROCEDURE — 99291 CRITICAL CARE FIRST HOUR: CPT | Performed by: INTERNAL MEDICINE

## 2019-08-17 PROCEDURE — 36415 COLL VENOUS BLD VENIPUNCTURE: CPT

## 2019-08-17 PROCEDURE — 85025 COMPLETE CBC W/AUTO DIFF WBC: CPT

## 2019-08-17 PROCEDURE — 2580000003 HC RX 258: Performed by: STUDENT IN AN ORGANIZED HEALTH CARE EDUCATION/TRAINING PROGRAM

## 2019-08-17 PROCEDURE — 96376 TX/PRO/DX INJ SAME DRUG ADON: CPT

## 2019-08-17 PROCEDURE — 2500000003 HC RX 250 WO HCPCS: Performed by: STUDENT IN AN ORGANIZED HEALTH CARE EDUCATION/TRAINING PROGRAM

## 2019-08-17 PROCEDURE — 93010 ELECTROCARDIOGRAM REPORT: CPT | Performed by: INTERNAL MEDICINE

## 2019-08-17 PROCEDURE — 82947 ASSAY GLUCOSE BLOOD QUANT: CPT

## 2019-08-17 PROCEDURE — 94003 VENT MGMT INPAT SUBQ DAY: CPT

## 2019-08-17 PROCEDURE — 82803 BLOOD GASES ANY COMBINATION: CPT

## 2019-08-17 PROCEDURE — 36600 WITHDRAWAL OF ARTERIAL BLOOD: CPT

## 2019-08-17 PROCEDURE — 94761 N-INVAS EAR/PLS OXIMETRY MLT: CPT

## 2019-08-17 PROCEDURE — 2700000000 HC OXYGEN THERAPY PER DAY

## 2019-08-17 PROCEDURE — 6360000002 HC RX W HCPCS: Performed by: STUDENT IN AN ORGANIZED HEALTH CARE EDUCATION/TRAINING PROGRAM

## 2019-08-17 PROCEDURE — 96375 TX/PRO/DX INJ NEW DRUG ADDON: CPT

## 2019-08-17 PROCEDURE — 96368 THER/DIAG CONCURRENT INF: CPT

## 2019-08-17 PROCEDURE — 96366 THER/PROPH/DIAG IV INF ADDON: CPT

## 2019-08-17 PROCEDURE — 84484 ASSAY OF TROPONIN QUANT: CPT

## 2019-08-17 PROCEDURE — 2000000000 HC ICU R&B

## 2019-08-17 PROCEDURE — 6370000000 HC RX 637 (ALT 250 FOR IP): Performed by: NURSE PRACTITIONER

## 2019-08-17 RX ORDER — FUROSEMIDE 10 MG/ML
40 INJECTION INTRAMUSCULAR; INTRAVENOUS 2 TIMES DAILY
Status: DISCONTINUED | OUTPATIENT
Start: 2019-08-17 | End: 2019-08-19

## 2019-08-17 RX ORDER — SPIRONOLACTONE 25 MG/1
25 TABLET ORAL DAILY
Status: DISCONTINUED | OUTPATIENT
Start: 2019-08-17 | End: 2019-08-22 | Stop reason: HOSPADM

## 2019-08-17 RX ORDER — ASPIRIN 81 MG/1
81 TABLET, CHEWABLE ORAL DAILY
Status: DISCONTINUED | OUTPATIENT
Start: 2019-08-17 | End: 2019-08-20

## 2019-08-17 RX ORDER — SODIUM CHLORIDE 9 MG/ML
INJECTION, SOLUTION INTRAVENOUS CONTINUOUS
Status: DISCONTINUED | OUTPATIENT
Start: 2019-08-17 | End: 2019-08-17

## 2019-08-17 RX ORDER — NITROGLYCERIN 20 MG/100ML
10 INJECTION INTRAVENOUS CONTINUOUS
Status: DISCONTINUED | OUTPATIENT
Start: 2019-08-17 | End: 2019-08-18

## 2019-08-17 RX ORDER — SODIUM CHLORIDE 450 MG/100ML
INJECTION, SOLUTION INTRAVENOUS CONTINUOUS
Status: DISCONTINUED | OUTPATIENT
Start: 2019-08-17 | End: 2019-08-17

## 2019-08-17 RX ADMIN — SODIUM CHLORIDE: 9 INJECTION, SOLUTION INTRAVENOUS at 01:19

## 2019-08-17 RX ADMIN — ATORVASTATIN CALCIUM 80 MG: 80 TABLET, FILM COATED ORAL at 21:02

## 2019-08-17 RX ADMIN — METOPROLOL TARTRATE 25 MG: 25 TABLET ORAL at 14:00

## 2019-08-17 RX ADMIN — SODIUM CHLORIDE: 4.5 INJECTION, SOLUTION INTRAVENOUS at 09:08

## 2019-08-17 RX ADMIN — FAMOTIDINE 20 MG: 10 INJECTION, SOLUTION INTRAVENOUS at 08:10

## 2019-08-17 RX ADMIN — ASPIRIN 81 MG: 81 TABLET, CHEWABLE ORAL at 14:00

## 2019-08-17 RX ADMIN — METOPROLOL TARTRATE 25 MG: 25 TABLET ORAL at 21:02

## 2019-08-17 RX ADMIN — RIVAROXABAN 20 MG: 20 TABLET, FILM COATED ORAL at 08:17

## 2019-08-17 RX ADMIN — Medication 10 ML: at 08:09

## 2019-08-17 RX ADMIN — THERA TABS 1 TABLET: TAB at 08:17

## 2019-08-17 RX ADMIN — FUROSEMIDE 40 MG: 10 INJECTION, SOLUTION INTRAMUSCULAR; INTRAVENOUS at 18:31

## 2019-08-17 RX ADMIN — AMIODARONE HYDROCHLORIDE 200 MG: 200 TABLET ORAL at 08:17

## 2019-08-17 RX ADMIN — FUROSEMIDE 40 MG: 10 INJECTION, SOLUTION INTRAMUSCULAR; INTRAVENOUS at 08:09

## 2019-08-17 RX ADMIN — Medication 10 ML: at 21:03

## 2019-08-17 RX ADMIN — SPIRONOLACTONE 25 MG: 25 TABLET ORAL at 10:23

## 2019-08-17 ASSESSMENT — PULMONARY FUNCTION TESTS
PIF_VALUE: 22
PIF_VALUE: 21
PIF_VALUE: 20
PIF_VALUE: 19
PIF_VALUE: 20
PIF_VALUE: 15
PIF_VALUE: 23
PIF_VALUE: 22
PIF_VALUE: 15
PIF_VALUE: 21
PIF_VALUE: 14
PIF_VALUE: 21
PIF_VALUE: 21
PIF_VALUE: 16
PIF_VALUE: 21
PIF_VALUE: 20
PIF_VALUE: 22
PIF_VALUE: 14
PIF_VALUE: 11
PIF_VALUE: 15
PIF_VALUE: 20
PIF_VALUE: 16
PIF_VALUE: 16
PIF_VALUE: 19

## 2019-08-17 ASSESSMENT — PAIN SCALES - GENERAL
PAINLEVEL_OUTOF10: 0

## 2019-08-17 NOTE — PROGRESS NOTES
08/17/19 0921   Vent Information   Vt Ordered 400 mL   Rate Set 18 bmp   PEEP/CPAP 8     Rounding with Dr Randy Post; vent changes made.

## 2019-08-17 NOTE — ED TRIAGE NOTES
PT presents to ER with decreased LOC/ resp failure / hypoxia. Pt transported by [facility] . Pt minimally responsive to CPAP . Dr Janet Marques at bedside room 2 upon arrival. Intubation supplies prepared, suction prepared. Pt intubated shortly after arrival via  Glidescope.

## 2019-08-17 NOTE — PROGRESS NOTES
08/16/19 2159   Vent Information   Rate Set 26 bmp   FiO2  60 %   I Time/ I Time % 0.8 s     Post ABG vent changes.

## 2019-08-17 NOTE — PROGRESS NOTES
Nutrition Assessment    Type and Reason for Visit: Initial(Protocol Day 1)    Nutrition Recommendations:   -Continue NPO status  -Start diet vs nutrition support within 24-72 hrs   -If tube feeding is desired, recommend Vital AD 1.2 (Semi-elemental) @ 40 mL/hr x 24 hrs ~>1152 kcals, 72 gms protein  -Will monitor nutrition progression    Nutrition Assessment:  Pt nutritionally compromised aeb NPO status 2/2 intubated. No significant wt loss noted in EMR. Tube feed rec's above if desired, will monitor. Malnutrition Assessment:  · Malnutrition Status: Insufficient data  · Context: Acute illness or injury  · Findings of the 6 clinical characteristics of malnutrition (Minimum of 2 out of 6 clinical characteristics is required to make the diagnosis of moderate or severe Protein Calorie Malnutrition based on AND/ASPEN Guidelines):  1. Energy Intake-Less than or equal to 75% of estimated energy requirement, (x 1 day )    2. Weight Loss-No significant weight loss,    3. Fat Loss-Unable to assess,    4. Muscle Loss-Unable to assess,    5. Fluid Accumulation-Mild fluid accumulation, Extremities  6.   Strength-Not measured    Nutrition Risk Level: High    Nutrient Needs:  · Estimated Daily Total Kcal: 11-14 kcal/kg ~>950-1200 kcals/d   · Estimated Daily Protein (g): 2.0 gm/kg ~>88 gms/d     Nutrition Diagnosis:   · Problem: Inadequate oral intake  · Etiology: related to Insufficient energy/nutrient consumption, Impaired respiratory function-inability to consume food     Signs and symptoms:  as evidenced by NPO status due to medical condition, Intubation    Objective Information:  · Wound Type: Multiple, Venous Stasis  · Current Nutrition Therapies:  · Oral Diet Orders: NPO   · Additional calories: propofol off @ this time  · Anthropometric Measures:  · Ht: 4' 11\" (149.9 cm)   · Current Body Wt: 187 lb (84.8 kg)  · Admission Body Wt: 187 lb (84.8 kg)  · % Weight Change:  ,  181-189 lbs over 5 mo per EMR   · Ideal Body Wt: 98 lb (44.5 kg), % Ideal Body 191% (Adm/ideal)  · BMI Classification: BMI 35.0 - 39.9 Obese Class II    Nutrition Interventions:   Continue NPO(Start diet vs nutrition support as able )  Continued Inpatient Monitoring, Education Not Indicated    Nutrition Evaluation:   · Evaluation: Goals set   · Goals: Start diet vs nutrition support within 24-72 hrs     · Monitoring: Nutrition Progression, I&O, Weight, Pertinent Labs, Monitor Bowel Function      Electronically signed by Connie Wesley RD, LD on 8/17/19 at 11:27 AM    Contact Number: [phone]
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
## 2019-08-17 NOTE — CONSULTS
insufficiency, patient denies angina  · Renal insufficiency, managed by others  · Persistent atrial fibrillation/flutter with controlled VR on Xarelto  · History of bilateral pulmonary embolisms - managed by others  · History of subdural hematoma - managed by others  · Hypokalemia, replaced and managed by others    RECOMMENDATIONS:     Continue current cardiac medications. Patient is currently diuresing well on IV Lasix. She reportedly was diuresing well on oral Bumex her last admission but the patient refused to take Bumex due to it made her go to the bathroom too often and was changed back to Lasix oral prior to discharge. The patient reports she was compliant with her diuretics at home. Restart carvedilol and Entresto if blood pressure remains stable. Add Aspirin 81 mg po daily. Pt has been off dual antiplatelet therapy for months and has had no evidence of stent thrombosis, will restart Aspirin only. Echocardiogram is pending. Hope for extubation today. Management plan was discussed with patient, RN, and Dr. Pelon Santos. Thank you for the consultation.     Electronically signed by PRICILA Dsouza CNP on 8/17/2019 at 11:51 AM     CC: Nelson Benoit MD

## 2019-08-18 ENCOUNTER — APPOINTMENT (OUTPATIENT)
Dept: GENERAL RADIOLOGY | Age: 69
DRG: 208 | End: 2019-08-18
Attending: INTERNAL MEDICINE
Payer: MEDICARE

## 2019-08-18 LAB
ABSOLUTE EOS #: <0.03 K/UL (ref 0–0.44)
ABSOLUTE IMMATURE GRANULOCYTE: <0.03 K/UL (ref 0–0.3)
ABSOLUTE LYMPH #: 0.83 K/UL (ref 1.1–3.7)
ABSOLUTE MONO #: 0.43 K/UL (ref 0.1–1.2)
ALLEN TEST: POSITIVE
ANION GAP SERPL CALCULATED.3IONS-SCNC: 12 MMOL/L (ref 9–17)
ANION GAP SERPL CALCULATED.3IONS-SCNC: 14 MMOL/L (ref 9–17)
BASOPHILS # BLD: 1 % (ref 0–2)
BASOPHILS ABSOLUTE: 0.07 K/UL (ref 0–0.2)
BUN BLDV-MCNC: 21 MG/DL (ref 8–23)
BUN BLDV-MCNC: 24 MG/DL (ref 8–23)
BUN/CREAT BLD: ABNORMAL (ref 9–20)
BUN/CREAT BLD: ABNORMAL (ref 9–20)
CALCIUM SERPL-MCNC: 8.7 MG/DL (ref 8.6–10.4)
CALCIUM SERPL-MCNC: 8.9 MG/DL (ref 8.6–10.4)
CHLORIDE BLD-SCNC: 100 MMOL/L (ref 98–107)
CHLORIDE BLD-SCNC: 102 MMOL/L (ref 98–107)
CO2: 28 MMOL/L (ref 20–31)
CO2: 29 MMOL/L (ref 20–31)
CREAT SERPL-MCNC: 1.11 MG/DL (ref 0.5–0.9)
CREAT SERPL-MCNC: 1.2 MG/DL (ref 0.5–0.9)
DIFFERENTIAL TYPE: ABNORMAL
EOSINOPHILS RELATIVE PERCENT: 0 % (ref 1–4)
FIO2: 30
GFR AFRICAN AMERICAN: 54 ML/MIN
GFR AFRICAN AMERICAN: 59 ML/MIN
GFR NON-AFRICAN AMERICAN: 45 ML/MIN
GFR NON-AFRICAN AMERICAN: 49 ML/MIN
GFR SERPL CREATININE-BSD FRML MDRD: ABNORMAL ML/MIN/{1.73_M2}
GLUCOSE BLD-MCNC: 116 MG/DL (ref 70–99)
GLUCOSE BLD-MCNC: 79 MG/DL (ref 70–99)
HCT VFR BLD CALC: 33.6 % (ref 36.3–47.1)
HEMOGLOBIN: 9.6 G/DL (ref 11.9–15.1)
IMMATURE GRANULOCYTES: 0 %
LYMPHOCYTES # BLD: 15 % (ref 24–43)
MCH RBC QN AUTO: 25 PG (ref 25.2–33.5)
MCHC RBC AUTO-ENTMCNC: 28.6 G/DL (ref 28.4–34.8)
MCV RBC AUTO: 87.5 FL (ref 82.6–102.9)
MODE: ABNORMAL
MONOCYTES # BLD: 8 % (ref 3–12)
NEGATIVE BASE EXCESS, ART: ABNORMAL (ref 0–2)
NRBC AUTOMATED: 0.4 PER 100 WBC
O2 DEVICE/FLOW/%: ABNORMAL
PATIENT TEMP: ABNORMAL
PDW BLD-RTO: 18.9 % (ref 11.8–14.4)
PLATELET # BLD: 194 K/UL (ref 138–453)
PLATELET ESTIMATE: ABNORMAL
PMV BLD AUTO: 11.4 FL (ref 8.1–13.5)
POC HCO3: 33.2 MMOL/L (ref 21–28)
POC O2 SATURATION: 97 % (ref 94–98)
POC PCO2 TEMP: ABNORMAL MM HG
POC PCO2: 46.6 MM HG (ref 35–48)
POC PH TEMP: ABNORMAL
POC PH: 7.46 (ref 7.35–7.45)
POC PO2 TEMP: ABNORMAL MM HG
POC PO2: 83.6 MM HG (ref 83–108)
POSITIVE BASE EXCESS, ART: 8 (ref 0–3)
POTASSIUM SERPL-SCNC: 3.3 MMOL/L (ref 3.7–5.3)
POTASSIUM SERPL-SCNC: 3.9 MMOL/L (ref 3.7–5.3)
RBC # BLD: 3.84 M/UL (ref 3.95–5.11)
RBC # BLD: ABNORMAL 10*6/UL
SAMPLE SITE: ABNORMAL
SEG NEUTROPHILS: 75 % (ref 36–65)
SEGMENTED NEUTROPHILS ABSOLUTE COUNT: 4.13 K/UL (ref 1.5–8.1)
SODIUM BLD-SCNC: 142 MMOL/L (ref 135–144)
SODIUM BLD-SCNC: 143 MMOL/L (ref 135–144)
TCO2 (CALC), ART: 35 MMOL/L (ref 22–29)
WBC # BLD: 5.5 K/UL (ref 3.5–11.3)
WBC # BLD: ABNORMAL 10*3/UL

## 2019-08-18 PROCEDURE — 2580000003 HC RX 258: Performed by: STUDENT IN AN ORGANIZED HEALTH CARE EDUCATION/TRAINING PROGRAM

## 2019-08-18 PROCEDURE — 6370000000 HC RX 637 (ALT 250 FOR IP): Performed by: NURSE PRACTITIONER

## 2019-08-18 PROCEDURE — 82803 BLOOD GASES ANY COMBINATION: CPT

## 2019-08-18 PROCEDURE — 94770 HC ETCO2 MONITOR DAILY: CPT

## 2019-08-18 PROCEDURE — 6370000000 HC RX 637 (ALT 250 FOR IP): Performed by: STUDENT IN AN ORGANIZED HEALTH CARE EDUCATION/TRAINING PROGRAM

## 2019-08-18 PROCEDURE — 80048 BASIC METABOLIC PNL TOTAL CA: CPT

## 2019-08-18 PROCEDURE — 99291 CRITICAL CARE FIRST HOUR: CPT | Performed by: INTERNAL MEDICINE

## 2019-08-18 PROCEDURE — 2500000003 HC RX 250 WO HCPCS: Performed by: STUDENT IN AN ORGANIZED HEALTH CARE EDUCATION/TRAINING PROGRAM

## 2019-08-18 PROCEDURE — 36600 WITHDRAWAL OF ARTERIAL BLOOD: CPT

## 2019-08-18 PROCEDURE — 94003 VENT MGMT INPAT SUBQ DAY: CPT

## 2019-08-18 PROCEDURE — 2000000000 HC ICU R&B

## 2019-08-18 PROCEDURE — 96376 TX/PRO/DX INJ SAME DRUG ADON: CPT

## 2019-08-18 PROCEDURE — 36415 COLL VENOUS BLD VENIPUNCTURE: CPT

## 2019-08-18 PROCEDURE — 90792 PSYCH DIAG EVAL W/MED SRVCS: CPT | Performed by: NURSE PRACTITIONER

## 2019-08-18 PROCEDURE — 85025 COMPLETE CBC W/AUTO DIFF WBC: CPT

## 2019-08-18 PROCEDURE — 2700000000 HC OXYGEN THERAPY PER DAY

## 2019-08-18 PROCEDURE — 94761 N-INVAS EAR/PLS OXIMETRY MLT: CPT

## 2019-08-18 PROCEDURE — 71045 X-RAY EXAM CHEST 1 VIEW: CPT

## 2019-08-18 PROCEDURE — 6360000002 HC RX W HCPCS: Performed by: STUDENT IN AN ORGANIZED HEALTH CARE EDUCATION/TRAINING PROGRAM

## 2019-08-18 RX ORDER — POTASSIUM CHLORIDE 20 MEQ/1
40 TABLET, EXTENDED RELEASE ORAL PRN
Status: DISCONTINUED | OUTPATIENT
Start: 2019-08-18 | End: 2019-08-22 | Stop reason: HOSPADM

## 2019-08-18 RX ORDER — POTASSIUM CHLORIDE 7.45 MG/ML
10 INJECTION INTRAVENOUS PRN
Status: DISCONTINUED | OUTPATIENT
Start: 2019-08-18 | End: 2019-08-22 | Stop reason: HOSPADM

## 2019-08-18 RX ORDER — METOPROLOL SUCCINATE 25 MG/1
25 TABLET, EXTENDED RELEASE ORAL DAILY
Status: DISCONTINUED | OUTPATIENT
Start: 2019-08-19 | End: 2019-08-22 | Stop reason: HOSPADM

## 2019-08-18 RX ADMIN — ATORVASTATIN CALCIUM 80 MG: 80 TABLET, FILM COATED ORAL at 19:54

## 2019-08-18 RX ADMIN — METOPROLOL TARTRATE 25 MG: 25 TABLET ORAL at 08:32

## 2019-08-18 RX ADMIN — AMIODARONE HYDROCHLORIDE 200 MG: 200 TABLET ORAL at 08:32

## 2019-08-18 RX ADMIN — ASPIRIN 81 MG: 81 TABLET, CHEWABLE ORAL at 08:32

## 2019-08-18 RX ADMIN — Medication 10 ML: at 19:54

## 2019-08-18 RX ADMIN — SACUBITRIL AND VALSARTAN 1 TABLET: 24; 26 TABLET, FILM COATED ORAL at 09:40

## 2019-08-18 RX ADMIN — THERA TABS 1 TABLET: TAB at 08:32

## 2019-08-18 RX ADMIN — FUROSEMIDE 40 MG: 10 INJECTION, SOLUTION INTRAMUSCULAR; INTRAVENOUS at 17:34

## 2019-08-18 RX ADMIN — SACUBITRIL AND VALSARTAN 1 TABLET: 24; 26 TABLET, FILM COATED ORAL at 19:53

## 2019-08-18 RX ADMIN — METOPROLOL TARTRATE 25 MG: 25 TABLET ORAL at 19:54

## 2019-08-18 RX ADMIN — Medication 10 ML: at 08:32

## 2019-08-18 RX ADMIN — POTASSIUM BICARBONATE 40 MEQ: 782 TABLET, EFFERVESCENT ORAL at 08:31

## 2019-08-18 RX ADMIN — RIVAROXABAN 20 MG: 20 TABLET, FILM COATED ORAL at 08:31

## 2019-08-18 RX ADMIN — SPIRONOLACTONE 25 MG: 25 TABLET ORAL at 08:32

## 2019-08-18 RX ADMIN — FAMOTIDINE 20 MG: 10 INJECTION, SOLUTION INTRAVENOUS at 08:32

## 2019-08-18 RX ADMIN — FUROSEMIDE 40 MG: 10 INJECTION, SOLUTION INTRAMUSCULAR; INTRAVENOUS at 08:32

## 2019-08-18 ASSESSMENT — PULMONARY FUNCTION TESTS
PIF_VALUE: 11
PIF_VALUE: 15
PIF_VALUE: 16
PIF_VALUE: 14
PIF_VALUE: 17
PIF_VALUE: 16
PIF_VALUE: 17
PIF_VALUE: 13
PIF_VALUE: 15

## 2019-08-18 ASSESSMENT — PAIN DESCRIPTION - LOCATION: LOCATION: BACK

## 2019-08-18 ASSESSMENT — PAIN DESCRIPTION - PAIN TYPE: TYPE: CHRONIC PAIN

## 2019-08-18 ASSESSMENT — PAIN SCALES - GENERAL
PAINLEVEL_OUTOF10: 5
PAINLEVEL_OUTOF10: 0
PAINLEVEL_OUTOF10: 0

## 2019-08-18 NOTE — PLAN OF CARE
Problem: OXYGENATION/RESPIRATORY FUNCTION  Goal: Patient will maintain patent airway  8/18/2019 0805 by Eusebio Brown RCP  Outcome: Ongoing  8/18/2019 0035 by Sandip Hardwick RN  Outcome: Ongoing  8/17/2019 1922 by Margy Willams  Outcome: Ongoing     Problem: OXYGENATION/RESPIRATORY FUNCTION  Goal: Patient will achieve/maintain normal respiratory rate/effort  Description  Respiratory rate and effort will be within normal limits for the patient  8/18/2019 0805 by Eusebio Brown RCP  Outcome: Ongoing  8/18/2019 0035 by Sandip Hardwick RN  Outcome: Ongoing  8/17/2019 1922 by Margy Willams  Outcome: Ongoing     Problem: MECHANICAL VENTILATION  Goal: Patient will maintain patent airway  8/18/2019 0805 by Eusebio Brown RCP  Outcome: Ongoing  8/18/2019 0035 by Sandip Hardwick RN  Outcome: Ongoing  8/17/2019 1922 by Margy Willams  Outcome: Ongoing     Problem: MECHANICAL VENTILATION  Goal: Oral health is maintained or improved  8/18/2019 0805 by Eusebio Brown RCP  Outcome: Ongoing  8/18/2019 0035 by Sandip Hardwick RN  Outcome: Ongoing  8/17/2019 1922 by Margy Willams  Outcome: Ongoing     Problem: MECHANICAL VENTILATION  Goal: ET tube will be managed safely  8/18/2019 0805 by Eusebio Brown RCP  Outcome: Ongoing  8/18/2019 0035 by Sandip Hardwick RN  Outcome: Ongoing  8/17/2019 1922 by Margy Willams  Outcome: Ongoing     Problem: MECHANICAL VENTILATION  Goal: Ability to express needs and understand communication  8/18/2019 0805 by Eusebio Brown RCP  Outcome: Ongoing  8/18/2019 0035 by Sandip Hardwick RN  Outcome: Ongoing  8/17/2019 1922 by Margy Willams  Outcome: Ongoing
Absence of physical injury  Outcome: Ongoing     Problem: Risk for Impaired Skin Integrity  Goal: Tissue integrity - skin and mucous membranes  8/18/2019 0035 by Kelly Villegas RN  Outcome: Ongoing  8/17/2019 1118 by Luisa Dent RN  Outcome: Ongoing     Problem: Nutrition  Goal: Optimal nutrition therapy  8/18/2019 0035 by Kelly Villegas RN  Outcome: Ongoing  8/17/2019 1130 by Shraddha Lara RD, LD  Note:   Nutrition Problem: Inadequate oral intake  Intervention: Food and/or Nutrient Delivery: Continue NPO(Start diet vs nutrition support as able )  Nutritional Goals: Start diet vs nutrition support within 24-72 hrs

## 2019-08-18 NOTE — VIRTUAL HEALTH
Socioeconomic History    Marital status:      Spouse name: Not on file    Number of children: Not on file    Years of education: Not on file    Highest education level: Not on file   Occupational History    Not on file   Social Needs    Financial resource strain: Not on file    Food insecurity:     Worry: Not on file     Inability: Not on file    Transportation needs:     Medical: Not on file     Non-medical: Not on file   Tobacco Use    Smoking status: Never Smoker    Smokeless tobacco: Never Used   Substance and Sexual Activity    Alcohol use: No    Drug use: No    Sexual activity: Not on file   Lifestyle    Physical activity:     Days per week: Not on file     Minutes per session: Not on file    Stress: Not on file   Relationships    Social connections:     Talks on phone: Not on file     Gets together: Not on file     Attends Alevism service: Not on file     Active member of club or organization: Not on file     Attends meetings of clubs or organizations: Not on file     Relationship status: Not on file    Intimate partner violence:     Fear of current or ex partner: Not on file     Emotionally abused: Not on file     Physically abused: Not on file     Forced sexual activity: Not on file   Other Topics Concern    Not on file   Social History Narrative    Not on file       Family Psychiatric History:  Father - depression and anxiety without formal diagnosis. Family History:   No family history on file.       Physical  /71   Pulse 63   Temp 97.5 °F (36.4 °C) (Oral)   Resp 18   Ht 5' 2\" (1.575 m)   Wt 187 lb 13.3 oz (85.2 kg)   SpO2 98%   BMI 34.35 kg/m²     MENTAL STATUS EXAM:  Level of consciousness:  Alert  Appearance: Age Appropriate  Behavior/Motor:  Restless and fidgety  Attitude toward examiner:  Friendly and cooperative  Speech: Normal rate and volume  Mood: Depressed  Affect:  Flat, worried  Thought processes: Circumstantial, focused on health issues  Thought

## 2019-08-18 NOTE — PROGRESS NOTES
Critical Care Team - Daily Progress Note      Date and time: 8/18/2019 11:41 AM  Patient's name:  Mitesh Hernandez Record Number: [id number]  Patient's account/billing number: [de-identified]  Patient's YOB: 1950  Age: 71 y.o. Date of Admission: 8/16/2019  9:08 PM  Length of stay during current admission: 2      Primary Care Physician: Krys Peoples MD  ICU Attending Physician: Dr. Seymour Must Status: Full Code    Reason for ICU admission: No chief complaint on file. SUBJECTIVE:     OVERNIGHT EVENTS:         Patient is seen and examined. No acute events overnight. On 40 mg IV Lasix twice daily. Was feeling well. Patient extubated today. Tolerated  well. Urine output 2.4 L in the last 24-hour. 30% FiO2 and PEEP of 5. On metoprolol. Will restart Entresto. Leg swelling significantly improved. She was feeling anxious. Will consult psychiatry.     AWAKE & FOLLOWING COMMANDS:  [] No   [x] Yes    CURRENT VENTILATION STATUS:     [] Ventilator  [] BIPAP  [x] Nasal Cannula [] Room Air      IF INTUBATED, ET TUBE MARKING AT LOWER LIP:       cms    SECRETIONS Amount:  [] Small [] Moderate  [] Large  [x] None  Color:     [] White [] Colored  [] Bloody    SEDATION:  RAAS Score:  [] Propofol gtt  [] Versed gtt  [] Ativan gtt   [x] No Sedation    PARALYZED:  [x] No    [] Yes    DIARRHEA:                [x] No                [] Yes  (C. Difficile status: [] positive                                                                                                                       [] negative                                                                                                                     [] pending)    VASOPRESSORS:  [x] No    [] Yes    If yes -   [] Levophed       [] Dopamine     [] Vasopressin       [] Dobutamine  [] Phenylephrine         [] Epinephrine    CENTRAL LINES:     [x] No   [] Yes   (Date of Insertion:   )           If yes -     [] Right IJ     [] Left IJ []

## 2019-08-19 LAB
ABSOLUTE EOS #: 0.1 K/UL (ref 0–0.44)
ABSOLUTE IMMATURE GRANULOCYTE: 0 K/UL (ref 0–0.3)
ABSOLUTE LYMPH #: 1.12 K/UL (ref 1.1–3.7)
ABSOLUTE MONO #: 0.51 K/UL (ref 0.1–1.2)
ANION GAP SERPL CALCULATED.3IONS-SCNC: 14 MMOL/L (ref 9–17)
BASOPHILS # BLD: 1 % (ref 0–2)
BASOPHILS ABSOLUTE: 0.05 K/UL (ref 0–0.2)
BUN BLDV-MCNC: 27 MG/DL (ref 8–23)
BUN/CREAT BLD: ABNORMAL (ref 9–20)
CALCIUM SERPL-MCNC: 8.5 MG/DL (ref 8.6–10.4)
CHLORIDE BLD-SCNC: 100 MMOL/L (ref 98–107)
CO2: 28 MMOL/L (ref 20–31)
CREAT SERPL-MCNC: 1.16 MG/DL (ref 0.5–0.9)
DIFFERENTIAL TYPE: ABNORMAL
EOSINOPHILS RELATIVE PERCENT: 2 % (ref 1–4)
GFR AFRICAN AMERICAN: 56 ML/MIN
GFR NON-AFRICAN AMERICAN: 46 ML/MIN
GFR SERPL CREATININE-BSD FRML MDRD: ABNORMAL ML/MIN/{1.73_M2}
GFR SERPL CREATININE-BSD FRML MDRD: ABNORMAL ML/MIN/{1.73_M2}
GLUCOSE BLD-MCNC: 100 MG/DL (ref 70–99)
HCT VFR BLD CALC: 37.8 % (ref 36.3–47.1)
HEMOGLOBIN: 10.4 G/DL (ref 11.9–15.1)
IMMATURE GRANULOCYTES: 0 %
LYMPHOCYTES # BLD: 22 % (ref 24–43)
MAGNESIUM: 2 MG/DL (ref 1.6–2.6)
MCH RBC QN AUTO: 24.6 PG (ref 25.2–33.5)
MCHC RBC AUTO-ENTMCNC: 27.5 G/DL (ref 28.4–34.8)
MCV RBC AUTO: 89.6 FL (ref 82.6–102.9)
MONOCYTES # BLD: 10 % (ref 3–12)
MORPHOLOGY: ABNORMAL
MORPHOLOGY: ABNORMAL
NRBC AUTOMATED: 0 PER 100 WBC
PDW BLD-RTO: 18.8 % (ref 11.8–14.4)
PLATELET # BLD: 198 K/UL (ref 138–453)
PLATELET ESTIMATE: ABNORMAL
PMV BLD AUTO: 11.5 FL (ref 8.1–13.5)
POTASSIUM SERPL-SCNC: 3.5 MMOL/L (ref 3.7–5.3)
RBC # BLD: 4.22 M/UL (ref 3.95–5.11)
RBC # BLD: ABNORMAL 10*6/UL
SEG NEUTROPHILS: 65 % (ref 36–65)
SEGMENTED NEUTROPHILS ABSOLUTE COUNT: 3.32 K/UL (ref 1.5–8.1)
SODIUM BLD-SCNC: 142 MMOL/L (ref 135–144)
WBC # BLD: 5.1 K/UL (ref 3.5–11.3)
WBC # BLD: ABNORMAL 10*3/UL

## 2019-08-19 PROCEDURE — 6370000000 HC RX 637 (ALT 250 FOR IP): Performed by: NURSE PRACTITIONER

## 2019-08-19 PROCEDURE — 99212 OFFICE O/P EST SF 10 MIN: CPT

## 2019-08-19 PROCEDURE — 76937 US GUIDE VASCULAR ACCESS: CPT

## 2019-08-19 PROCEDURE — 97162 PT EVAL MOD COMPLEX 30 MIN: CPT

## 2019-08-19 PROCEDURE — 96367 TX/PROPH/DG ADDL SEQ IV INF: CPT

## 2019-08-19 PROCEDURE — 97535 SELF CARE MNGMENT TRAINING: CPT

## 2019-08-19 PROCEDURE — 6370000000 HC RX 637 (ALT 250 FOR IP): Performed by: STUDENT IN AN ORGANIZED HEALTH CARE EDUCATION/TRAINING PROGRAM

## 2019-08-19 PROCEDURE — 83735 ASSAY OF MAGNESIUM: CPT

## 2019-08-19 PROCEDURE — 93005 ELECTROCARDIOGRAM TRACING: CPT | Performed by: STUDENT IN AN ORGANIZED HEALTH CARE EDUCATION/TRAINING PROGRAM

## 2019-08-19 PROCEDURE — 80048 BASIC METABOLIC PNL TOTAL CA: CPT

## 2019-08-19 PROCEDURE — 97530 THERAPEUTIC ACTIVITIES: CPT

## 2019-08-19 PROCEDURE — 36415 COLL VENOUS BLD VENIPUNCTURE: CPT

## 2019-08-19 PROCEDURE — 2580000003 HC RX 258: Performed by: STUDENT IN AN ORGANIZED HEALTH CARE EDUCATION/TRAINING PROGRAM

## 2019-08-19 PROCEDURE — 97166 OT EVAL MOD COMPLEX 45 MIN: CPT

## 2019-08-19 PROCEDURE — 99233 SBSQ HOSP IP/OBS HIGH 50: CPT | Performed by: INTERNAL MEDICINE

## 2019-08-19 PROCEDURE — 85025 COMPLETE CBC W/AUTO DIFF WBC: CPT

## 2019-08-19 PROCEDURE — 6360000002 HC RX W HCPCS: Performed by: STUDENT IN AN ORGANIZED HEALTH CARE EDUCATION/TRAINING PROGRAM

## 2019-08-19 PROCEDURE — 1200000000 HC SEMI PRIVATE

## 2019-08-19 PROCEDURE — 90792 PSYCH DIAG EVAL W/MED SRVCS: CPT | Performed by: NURSE PRACTITIONER

## 2019-08-19 RX ORDER — MAGNESIUM SULFATE 1 G/100ML
1 INJECTION INTRAVENOUS ONCE
Status: COMPLETED | OUTPATIENT
Start: 2019-08-19 | End: 2019-08-19

## 2019-08-19 RX ORDER — FAMOTIDINE 20 MG/1
20 TABLET, FILM COATED ORAL DAILY
Status: DISCONTINUED | OUTPATIENT
Start: 2019-08-19 | End: 2019-08-19

## 2019-08-19 RX ORDER — POTASSIUM CHLORIDE 20 MEQ/1
40 TABLET, EXTENDED RELEASE ORAL 2 TIMES DAILY WITH MEALS
Status: COMPLETED | OUTPATIENT
Start: 2019-08-19 | End: 2019-08-19

## 2019-08-19 RX ORDER — FUROSEMIDE 40 MG/1
40 TABLET ORAL 2 TIMES DAILY
Status: DISCONTINUED | OUTPATIENT
Start: 2019-08-19 | End: 2019-08-22 | Stop reason: HOSPADM

## 2019-08-19 RX ADMIN — RIVAROXABAN 20 MG: 20 TABLET, FILM COATED ORAL at 08:43

## 2019-08-19 RX ADMIN — THERA TABS 1 TABLET: TAB at 08:43

## 2019-08-19 RX ADMIN — SACUBITRIL AND VALSARTAN 1 TABLET: 49; 51 TABLET, FILM COATED ORAL at 20:11

## 2019-08-19 RX ADMIN — Medication 10 ML: at 08:43

## 2019-08-19 RX ADMIN — SACUBITRIL AND VALSARTAN 1 TABLET: 49; 51 TABLET, FILM COATED ORAL at 08:43

## 2019-08-19 RX ADMIN — Medication 10 ML: at 20:11

## 2019-08-19 RX ADMIN — FUROSEMIDE 40 MG: 40 TABLET ORAL at 17:44

## 2019-08-19 RX ADMIN — ASPIRIN 81 MG: 81 TABLET, CHEWABLE ORAL at 08:43

## 2019-08-19 RX ADMIN — FAMOTIDINE 20 MG: 20 TABLET, FILM COATED ORAL at 09:13

## 2019-08-19 RX ADMIN — METOPROLOL SUCCINATE 25 MG: 25 TABLET, FILM COATED, EXTENDED RELEASE ORAL at 08:43

## 2019-08-19 RX ADMIN — MAGNESIUM SULFATE HEPTAHYDRATE 1 G: 1 INJECTION, SOLUTION INTRAVENOUS at 09:16

## 2019-08-19 RX ADMIN — AMIODARONE HYDROCHLORIDE 200 MG: 200 TABLET ORAL at 08:43

## 2019-08-19 RX ADMIN — POTASSIUM CHLORIDE 40 MEQ: 1500 TABLET, EXTENDED RELEASE ORAL at 17:44

## 2019-08-19 RX ADMIN — ATORVASTATIN CALCIUM 80 MG: 80 TABLET, FILM COATED ORAL at 20:11

## 2019-08-19 RX ADMIN — SPIRONOLACTONE 25 MG: 25 TABLET ORAL at 08:43

## 2019-08-19 RX ADMIN — POTASSIUM CHLORIDE 40 MEQ: 1500 TABLET, EXTENDED RELEASE ORAL at 09:13

## 2019-08-19 RX ADMIN — POTASSIUM CHLORIDE 40 MEQ: 1500 TABLET, EXTENDED RELEASE ORAL at 06:15

## 2019-08-19 RX ADMIN — FUROSEMIDE 40 MG: 40 TABLET ORAL at 09:13

## 2019-08-19 ASSESSMENT — PAIN DESCRIPTION - FREQUENCY: FREQUENCY: CONTINUOUS

## 2019-08-19 ASSESSMENT — PAIN DESCRIPTION - DESCRIPTORS: DESCRIPTORS: ACHING

## 2019-08-19 ASSESSMENT — PAIN SCALES - GENERAL
PAINLEVEL_OUTOF10: 0
PAINLEVEL_OUTOF10: 4
PAINLEVEL_OUTOF10: 4
PAINLEVEL_OUTOF10: 0

## 2019-08-19 ASSESSMENT — PAIN DESCRIPTION - PROGRESSION: CLINICAL_PROGRESSION: NOT CHANGED

## 2019-08-19 ASSESSMENT — PAIN DESCRIPTION - LOCATION: LOCATION: NECK

## 2019-08-19 ASSESSMENT — PAIN - FUNCTIONAL ASSESSMENT: PAIN_FUNCTIONAL_ASSESSMENT: PREVENTS OR INTERFERES SOME ACTIVE ACTIVITIES AND ADLS

## 2019-08-19 ASSESSMENT — PAIN DESCRIPTION - PAIN TYPE: TYPE: CHRONIC PAIN

## 2019-08-19 ASSESSMENT — PAIN DESCRIPTION - ONSET: ONSET: ON-GOING

## 2019-08-19 NOTE — PROGRESS NOTES
Section of Cardiology  CARDIOLOGY PROGRESS NOTE          Date:  8/19/2019  Patient: Fariba Wahl  Admission:  8/16/2019  9:08 PM                           LOS: 3 days   Admit DX: Acute respiratory failure with hypoxia ([facility] [J96.01]  Age:  71 y.o., 1950          REASON OF EVALUATION   atrial fibrillation, cardiomyopathy, CHF and coronary artery disease      SUBJECTIVE     The patient was seen and examined. Notes and labs reviewed. There were not complications over night. Patient feels SOB is slowly improving. RN states QTc was 527 this AM. EKG shows ST/T changes lateral leads compared to EKG done 8/16. Patient's cardiac review of systems: negative. The patient is generally feeling improved. Patient denies any chest pain, palpitations, dizziness or syncope. MEDICATONS     Current Inpatient Medications:   furosemide  40 mg Oral BID    potassium chloride  40 mEq Oral BID WC    famotidine  20 mg Oral Daily    magnesium sulfate  1 g Intravenous Once    metoprolol succinate  25 mg Oral Daily    sacubitril-valsartan  1 tablet Oral BID    spironolactone  25 mg Oral Daily    aspirin  81 mg Per NG tube Daily    multivitamin  1 tablet Oral Daily    atorvastatin  80 mg Oral Nightly    rivaroxaban  20 mg Oral Daily    amiodarone  200 mg Oral Daily    sodium chloride flush  10 mL Intravenous 2 times per day       IV Infusions (if any):      OBJECTIVE     Vitals:    Vitals:    08/19/19 0600 08/19/19 0700 08/19/19 0800 08/19/19 0900   BP: 133/63 131/70 134/87 135/73   Pulse: 66 61 61 64   Resp: 14 18 15 17   Temp:   97.7 °F (36.5 °C)    TempSrc:   Oral    SpO2: 99% 96% 97% 100%   Weight: 175 lb 7.8 oz (79.6 kg)      Height:             Intake/Output Summary (Last 24 hours) at 8/19/2019 0926  Last data filed at 8/19/2019 0300  Gross per 24 hour   Intake 1090 ml   Output 3815 ml   Net -2725 ml       Exam:  CONSTITUTIONAL:  awake, alert, cooperative, no apparent distress, and appears stated age.   HEENT:

## 2019-08-19 NOTE — PROGRESS NOTES
11.3 5.5   HGB 9.6* 9.6*   HCT 35.8* 33.6*    194     Magnesium:  Recent Labs     08/17/19  1039   MG 1.8     BMP:  Recent Labs     08/18/19  0500 08/18/19  1924    142   K 3.3* 3.9   CALCIUM 8.7 8.9   CO2 29 28   BUN 21 24*   CREATININE 1.11* 1.20*   LABGLOM 49* 45*   GLUCOSE 79 116*     BNP:  Recent Labs     08/16/19 1846   PROBNP 13,550*     PT/INR:  Recent Labs     08/16/19 1846   PROTIME 14.9*   INR 1.5     APTT:  Recent Labs     08/16/19  1846   APTT 23.4     CARDIAC ENZYMES:  Recent Labs     08/16/19  1846 08/16/19  2240 08/17/19  0426   TROPHS 37* 71* 67*   TROPONINT NOT REPORTED NOT REPORTED NOT REPORTED     FASTING LIPID PANEL:  Lab Results   Component Value Date    HDL 28 08/08/2019    TRIG 62 08/08/2019     LIVER PROFILE:No results for input(s): AST, ALT, LABALBU, ALKPHOS, BILITOT, BILIDIR, IBILI, PROT, GLOB, ALBUMIN in the last 72 hours. ASSESSMENT:  · Acute on chronic systolic heart failure, Class IV NYHA, recent admissions to the hospital and noncompliance with diuretics during her last hospitalization but reports she was compliant at home -clinically improved and diuresing well  · Severe ischemic cardiomyopathy with EF 25% on echo 3/2019 s/p ICD, ICD shock 7/2019 none since that time, no ventricular arrhythmia on telemetry alarm review  · CAD history of MI with PCI to LAD in 10/2018 at [facility] by Dr. Peter Hernandez,  no clinical angina   · Borderline troponin elevation is chronic and most likely secondary to CHF and renal insufficiency, patient denies angina  · Renal insufficiency, managed by others  · Persistent atrial fibrillation/flutter with controlled VR on Xarelto  · History of bilateral pulmonary embolisms - managed by others  · History of subdural hematoma - managed by others  · Hypokalemia, resolved    PLAN:  1. Continue current cardiac medications. 2. Patient is now taking oral medications change metoprolol back to Toprol-XL 25 mg oral daily and increase Entresto.   Continue IV diuretics for now. Check BMP in am. Increase activity as tolerated. Fluid restriction and low-salt diet. Discussed with patient, RN, and Dr. Leighann Del Cid.     Marce Verduzco, APRN - CNP

## 2019-08-19 NOTE — PROGRESS NOTES
Vitamin D deficiency 07/20/2019    Incontinence in female 05/23/2019    History of subdural hematoma 05/13/2019    Congestive heart failure ([facility] 05/12/2019    Acute congestive heart failure ([facility] 05/12/2019    Venous stasis dermatitis of both lower extremities     Cellulitis 04/29/2019    Hypotension 04/03/2019    Dizziness 04/03/2019    Dyslipidemia 04/03/2019    Coronary atherosclerosis of native coronary artery 04/03/2019    Hypertension 04/03/2019    Chronic atrial fibrillation ([facility] 04/03/2019    S/P implantation of automatic cardioverter/defibrillator (AICD) 04/03/2019    History of percutaneous coronary intervention 04/03/2019    SDH (subdural hematoma) (HCC) 04/02/2019    Panic disorder 03/23/2019    Acute on chronic congestive heart failure ([facility]     Acute on chronic systolic heart failure ([facility] 03/21/2019    Lymphedema of both lower extremities 03/21/2019    Cellulitis of right leg 03/21/2019    Coronary artery disease involving native coronary artery of native heart without angina pectoris 03/21/2019    Major depressive disorder 03/21/2019    Paroxysmal atrial fibrillation ([facility] 03/21/2019    NSTEMI (non-ST elevated myocardial infarction) ([facility] 03/21/2019    Severe obesity (BMI 35.0-39. 9) with comorbidity ([facility] 11/05/2018    Chronic systolic heart failure ([facility] 10/19/2018    Acute on chronic systolic congestive heart failure ([facility]     Hypertensive urgency 08/07/2017       Additional assessment:    1. Recommended Follow-up:        PLAN:      1. Acute on chronic CHF- Systolic dysfunction s/p AICD: Last EF 25% with grade 3 diastolic dysfunction. Repeat echo pending. Extubated yesterday. Cardiac diet with fluid restriction. ASA, Statin, Toprol XL, Aldactone and Enteresto. Lasix switched to oral 40 mg oral BID.     2. Prolonged qTC: Received 1 gm Mg this morning. Will repeat EKG in the evening. 2. Anxiety and depression: Was seen by psychiatry yesterday,.  Bus[place] and

## 2019-08-19 NOTE — PROGRESS NOTES
Occupational Therapy   Occupational Therapy Initial Assessment  Date: 2019   Patient Name: Chevy Tam  MRN: [id number]     : 1950    Date of Service: 2019    Discharge Recommendations:  Further therapy recommended at discharge. Pt currently requires supervision to min A for functional mobility/transfers and basic activities of daily living. Pt expressed feeling weak compared to baseline function and will benefit from further therapy to increase independence, participation in ADLs, and endurance. Pt to require 24 hr assistance to ensure safety. Assessment   Performance deficits / Impairments: Decreased functional mobility ; Decreased ADL status; Decreased strength;Decreased balance;Decreased endurance;Decreased high-level IADLs  Prognosis: Fair  Decision Making: Medium Complexity  OT Education: OT Role;Plan of Care;Transfer Training;ADL Adaptive Strategies  Patient Education: Good return   REQUIRES OT FOLLOW UP: Yes  Activity Tolerance  Activity Tolerance: Patient Tolerated treatment well  Safety Devices  Safety Devices in place: Yes(Pt's son at bedside for pt safety )  Type of devices: Left in bed;Call light within reach;Nurse notified           Patient Diagnosis(es): There were no encounter diagnoses. has a past medical history of Acute on chronic systolic congestive heart failure ([address] Anxiety, Cardiomyopathy ([address] Depression, H/O heart artery stent, [place] (hard of hearing), Hyperlipidemia, Hypertension, Hypertensive urgency, and Leg swelling.   has a past surgical history that includes Cataract removal; Corneal transplant; Nasal sinus surgery; eye surgery; Tonsillectomy and adenoidectomy; and Cardiac defibrillator placement (2019).            Restrictions  Restrictions/Precautions  Restrictions/Precautions: Cardiac, Fall Risk, Up as Tolerated  Required Braces or Orthoses?: No    Subjective   General  Chart Reviewed: Orders, Progress Notes, History and Physical, Imaging, Labs  Patient Balance  Sitting Balance: Supervision  Standing Balance: Stand by assistance  Standing Balance  Time: ~2 minutes total   Activity: static standing at bedside      ADL  Feeding: Setup  Grooming: Setup; Increased time to complete  UE Bathing: Contact guard assistance; Increased time to complete  LE Bathing: Increased time to complete;Minimal assistance  UE Dressing: Increased time to complete;Supervision  LE Dressing: Increased time to complete;Minimal assistance(Pt simulated doffing socks seated EOB )  Toileting: Dependent/Total(Pt w/use of external cath )     Tone RUE  RUE Tone: Normotonic  Tone LUE  LUE Tone: Normotonic  Coordination  Movements Are Fluid And Coordinated: Yes        Bed mobility  Supine to Sit: Stand by assistance  Sit to Supine: Stand by assistance  Scooting: Stand by assistance     Transfers  Sit to stand: Contact guard assistance  Stand to sit: Stand by assistance        Cognition  Overall Cognitive Status: WFL           Sensation  Overall Sensation Status: WFL(Pt reports prior numbness and tingling intermittently )        LUE AROM (degrees)  LUE AROM : WFL  Left Hand AROM (degrees)  Left Hand AROM: WFL  RUE AROM (degrees)  RUE AROM : WFL  Right Hand AROM (degrees)  Right Hand AROM: WFL  LUE Strength  Gross LUE Strength: Exceptions to [facility]  L Hand General: 4-/5  LUE Strength Comment: Grossly 4-/5  RUE Strength  Gross RUE Strength: Exceptions to [facility]  R Hand General: 4-/5  RUE Strength Comment: Grossly 4-/5      Plan   Plan  Times per week: 3-4x   Times per day: Daily  Current Treatment Recommendations: Strengthening, Safety Education & Training, Self-Care / ADL, Functional Mobility Training, Endurance Training, Equipment Evaluation, Education, & procurement, Home Management Training, Balance Training    AM-PAC Score  AM-PAC Inpatient Daily Activity Raw Score: 16 (08/19/19 1507)  AM-PAC Inpatient ADL T-Scale Score : 35.96 (08/19/19 1507)  ADL Inpatient CMS 0-100% Score: 53.32 (08/19/19 1507)  ADL

## 2019-08-19 NOTE — PROGRESS NOTES
Component Value Date    RFI5ZLI 35 08/18/2019    FIO2 30.0 08/18/2019     Lactic Acid:   Lab Results   Component Value Date    LACTA 2.7 03/21/2019         DATA:  Complete Blood Count:   Recent Labs     08/17/19  0426 08/18/19  0500 08/19/19  0436   WBC 11.3 5.5 5.1   HGB 9.6* 9.6* 10.4*   MCV 89.5 87.5 89.6    194 198   RBC 4.00 3.84* 4.22   HCT 35.8* 33.6* 37.8   MCH 24.0* 25.0* 24.6*   MCHC 26.8* 28.6 27.5*   RDW 18.6* 18.9* 18.8*   MPV 12.2 11.4 11.5        PT/INR:    Lab Results   Component Value Date    PROTIME 14.9 08/16/2019    INR 1.5 08/16/2019     PTT:    Lab Results   Component Value Date    APTT 23.4 08/16/2019       Basal Metabolic Profile:   Recent Labs     08/18/19  0500 08/18/19  1924 08/19/19  0436    142 142   K 3.3* 3.9 3.5*   BUN 21 24* 27*   CREATININE 1.11* 1.20* 1.16*    100 100   CO2 29 28 28      Magnesium:   Lab Results   Component Value Date    MG 1.8 08/17/2019    MG 1.9 08/08/2019    MG 1.8 07/23/2019     Phosphorus:   Lab Results   Component Value Date    PHOS 2.5 08/22/2017     S. Calcium:  Recent Labs     08/19/19 0436   CALCIUM 8.5*     S. Ionized Calcium:No results for input(s): IONCA in the last 72 hours. Urinalysis:   Lab Results   Component Value Date    NITRU NEGATIVE 05/12/2019    COLORU YELLOW 05/12/2019    PHUR 6.0 05/12/2019    WBCUA 0 TO 2 05/12/2019    RBCUA 0 TO 2 05/12/2019    MUCUS 1+ 05/12/2019    TRICHOMONAS NOT REPORTED 05/12/2019    YEAST NOT REPORTED 05/12/2019    BACTERIA None 05/12/2019    SPECGRAV 1.025 05/12/2019    LEUKOCYTESUR NEGATIVE 05/12/2019    UROBILINOGEN Normal 05/12/2019    BILIRUBINUR NEGATIVE 05/12/2019    GLUCOSEU NEGATIVE 05/12/2019    KETUA NEGATIVE 05/12/2019    AMORPHOUS NOT REPORTED 05/12/2019       CARDIAC ENZYMES: No results for input(s): CKMB, CKMBINDEX, TROPONINI in the last 72 hours. Invalid input(s): CKTOTAL;3  BNP: No results for input(s): BNP in the last 72 hours.     LFTS  No results for input(s):
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
## 2019-08-19 NOTE — PROGRESS NOTES
Physical Therapy    Facility/Department: [address] MICU  Initial Assessment    NAME: Maria Ines Calderon  : 1950  MRN: [id number]    Date of Service: 2019  Maria Ines Calderon is a 71 y.o. history of systolic heart failure, cardiomyopathy, CAD status post stents, hypertension. She also has a history of AICD placement.     Recent admitted from -  for CHF exacerbation, was recommended to go to SNF at that time but patient and family refused. She was treated with IV lasix, bumex, nitro drip. Patient's son reports that patient was doing well yesterday after being discharged on . He states that he left for work today and thought that she was doing fine, but received a phone call that she had called the ambulance due to shortness of breath. At the outside hospital patient found to be minimally responsive and intubated for airway protection. She had an elevated BNP much above her baseline, chest x-ray concerning for pulmonary congestion. She was placed on a nitro drip, given a dose of Lasix and transferred here for further management.     Discharge Recommendations:  Patient would benefit from continued therapy after discharge        Assessment   Body structures, Functions, Activity limitations: Decreased functional mobility   Assessment: Pt with increasing weakness over last few weeks. Pt would benefit from continued therapy after discharge. Prognosis: Good  Decision Making: Medium Complexity  PT Education: Plan of Care;General Safety  REQUIRES PT FOLLOW UP: Yes  Activity Tolerance  Activity Tolerance: Patient Tolerated treatment well       Patient Diagnosis(es): There were no encounter diagnoses.      has a past medical history of Acute on chronic systolic congestive heart failure ([address] Anxiety, Cardiomyopathy ([address] Depression, H/O heart artery stent, [place] (hard of hearing), Hyperlipidemia, Hypertension, Hypertensive urgency, and Leg swelling.   has a past surgical history that includes Cataract few steps to [facility]. pt needed to stay on monitor. Balance  Sitting - Static: Good  Sitting - Dynamic: Good;-  Standing - Static: Good;-  Standing - Dynamic: Fair;+  Comments: Standing balance with rolling walker    Supine and sitting: BUE/LE AROM exercises 5-10 reps,    Plan   Plan  Times per week: 5x/week  Current Treatment Recommendations: Strengthening, Gait Training, Stair training, Balance Training, Functional Mobility Training, Endurance Training  Safety Devices  Type of devices: Call light within reach, Gait belt, Left in bed, Nurse notified    Goals  Short term goals  Time Frame for Short term goals: 14 visits  Short term goal 1: Independent bed mobility  Short term goal 2: Independent transfers  Short term goal 3: Independent ambulation with least restricitve device 200 ft  Short term goal 4: Pt to navigate 4 stairs with 1 rail SBA  Short term goal 5: Pt to tolerate 30 minutes of activity to improve endurance. Patient Goals   Patient goals : Feel better.        Therapy Time   Individual Concurrent Group Co-treatment   Time In 1000         Time Out 1025         Minutes 25         Timed Code Treatment Minutes: [address] [facility],[address]  [place]
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
## 2019-08-20 LAB
ABSOLUTE EOS #: 0.13 K/UL (ref 0–0.44)
ABSOLUTE IMMATURE GRANULOCYTE: <0.03 K/UL (ref 0–0.3)
ABSOLUTE LYMPH #: 1.02 K/UL (ref 1.1–3.7)
ABSOLUTE MONO #: 0.49 K/UL (ref 0.1–1.2)
ANION GAP SERPL CALCULATED.3IONS-SCNC: 10 MMOL/L (ref 9–17)
BASOPHILS # BLD: 1 % (ref 0–2)
BASOPHILS ABSOLUTE: 0.06 K/UL (ref 0–0.2)
BUN BLDV-MCNC: 26 MG/DL (ref 8–23)
BUN/CREAT BLD: ABNORMAL (ref 9–20)
CALCIUM SERPL-MCNC: 8.5 MG/DL (ref 8.6–10.4)
CHLORIDE BLD-SCNC: 100 MMOL/L (ref 98–107)
CO2: 29 MMOL/L (ref 20–31)
CREAT SERPL-MCNC: 1.03 MG/DL (ref 0.5–0.9)
DIFFERENTIAL TYPE: ABNORMAL
EKG ATRIAL RATE: 220 BPM
EKG ATRIAL RATE: 227 BPM
EKG Q-T INTERVAL: 520 MS
EKG Q-T INTERVAL: 522 MS
EKG QRS DURATION: 110 MS
EKG QRS DURATION: 116 MS
EKG QTC CALCULATION (BAZETT): 522 MS
EKG QTC CALCULATION (BAZETT): 527 MS
EKG R AXIS: -37 DEGREES
EKG R AXIS: -48 DEGREES
EKG T AXIS: -139 DEGREES
EKG T AXIS: -155 DEGREES
EKG VENTRICULAR RATE: 60 BPM
EKG VENTRICULAR RATE: 62 BPM
EOSINOPHILS RELATIVE PERCENT: 2 % (ref 1–4)
GFR AFRICAN AMERICAN: >60 ML/MIN
GFR NON-AFRICAN AMERICAN: 53 ML/MIN
GFR SERPL CREATININE-BSD FRML MDRD: ABNORMAL ML/MIN/{1.73_M2}
GFR SERPL CREATININE-BSD FRML MDRD: ABNORMAL ML/MIN/{1.73_M2}
GLUCOSE BLD-MCNC: 117 MG/DL (ref 70–99)
HCT VFR BLD CALC: 40.1 % (ref 36.3–47.1)
HEMOGLOBIN: 11.5 G/DL (ref 11.9–15.1)
IMMATURE GRANULOCYTES: 0 %
LYMPHOCYTES # BLD: 19 % (ref 24–43)
MAGNESIUM: 2.1 MG/DL (ref 1.6–2.6)
MCH RBC QN AUTO: 24.7 PG (ref 25.2–33.5)
MCHC RBC AUTO-ENTMCNC: 28.7 G/DL (ref 28.4–34.8)
MCV RBC AUTO: 86.1 FL (ref 82.6–102.9)
MONOCYTES # BLD: 9 % (ref 3–12)
NRBC AUTOMATED: 0 PER 100 WBC
PDW BLD-RTO: 19 % (ref 11.8–14.4)
PLATELET # BLD: 273 K/UL (ref 138–453)
PLATELET ESTIMATE: ABNORMAL
PMV BLD AUTO: 12.7 FL (ref 8.1–13.5)
POTASSIUM SERPL-SCNC: 3.8 MMOL/L (ref 3.7–5.3)
RBC # BLD: 4.66 M/UL (ref 3.95–5.11)
RBC # BLD: ABNORMAL 10*6/UL
SEG NEUTROPHILS: 69 % (ref 36–65)
SEGMENTED NEUTROPHILS ABSOLUTE COUNT: 3.63 K/UL (ref 1.5–8.1)
SODIUM BLD-SCNC: 139 MMOL/L (ref 135–144)
WBC # BLD: 5.4 K/UL (ref 3.5–11.3)
WBC # BLD: ABNORMAL 10*3/UL

## 2019-08-20 PROCEDURE — 2580000003 HC RX 258: Performed by: STUDENT IN AN ORGANIZED HEALTH CARE EDUCATION/TRAINING PROGRAM

## 2019-08-20 PROCEDURE — 99233 SBSQ HOSP IP/OBS HIGH 50: CPT | Performed by: INTERNAL MEDICINE

## 2019-08-20 PROCEDURE — 36415 COLL VENOUS BLD VENIPUNCTURE: CPT

## 2019-08-20 PROCEDURE — 83735 ASSAY OF MAGNESIUM: CPT

## 2019-08-20 PROCEDURE — 97116 GAIT TRAINING THERAPY: CPT

## 2019-08-20 PROCEDURE — 6370000000 HC RX 637 (ALT 250 FOR IP): Performed by: STUDENT IN AN ORGANIZED HEALTH CARE EDUCATION/TRAINING PROGRAM

## 2019-08-20 PROCEDURE — 93005 ELECTROCARDIOGRAM TRACING: CPT | Performed by: NURSE PRACTITIONER

## 2019-08-20 PROCEDURE — 97110 THERAPEUTIC EXERCISES: CPT

## 2019-08-20 PROCEDURE — 80048 BASIC METABOLIC PNL TOTAL CA: CPT

## 2019-08-20 PROCEDURE — 1200000000 HC SEMI PRIVATE

## 2019-08-20 PROCEDURE — 85025 COMPLETE CBC W/AUTO DIFF WBC: CPT

## 2019-08-20 PROCEDURE — 6370000000 HC RX 637 (ALT 250 FOR IP): Performed by: NURSE PRACTITIONER

## 2019-08-20 RX ORDER — POTASSIUM CHLORIDE 20 MEQ/1
40 TABLET, EXTENDED RELEASE ORAL 2 TIMES DAILY WITH MEALS
Status: DISCONTINUED | OUTPATIENT
Start: 2019-08-20 | End: 2019-08-22 | Stop reason: HOSPADM

## 2019-08-20 RX ORDER — DIPHENHYDRAMINE HCL 25 MG
25 TABLET ORAL ONCE
Status: COMPLETED | OUTPATIENT
Start: 2019-08-20 | End: 2019-08-20

## 2019-08-20 RX ORDER — DIPHENHYDRAMINE HCL 25 MG
25 TABLET ORAL EVERY 6 HOURS PRN
Status: DISCONTINUED | OUTPATIENT
Start: 2019-08-20 | End: 2019-08-22 | Stop reason: HOSPADM

## 2019-08-20 RX ORDER — ASPIRIN 81 MG/1
81 TABLET, CHEWABLE ORAL DAILY
Status: DISCONTINUED | OUTPATIENT
Start: 2019-08-20 | End: 2019-08-22 | Stop reason: HOSPADM

## 2019-08-20 RX ORDER — POTASSIUM CHLORIDE 40 MEQ/30ML
40 LIQUID ORAL 2 TIMES DAILY
Status: DISCONTINUED | OUTPATIENT
Start: 2019-08-20 | End: 2019-08-20

## 2019-08-20 RX ADMIN — ATORVASTATIN CALCIUM 80 MG: 80 TABLET, FILM COATED ORAL at 21:29

## 2019-08-20 RX ADMIN — ASPIRIN 81 MG: 81 TABLET, CHEWABLE ORAL at 21:29

## 2019-08-20 RX ADMIN — POTASSIUM CHLORIDE 40 MEQ: 1500 TABLET, EXTENDED RELEASE ORAL at 09:29

## 2019-08-20 RX ADMIN — DIPHENHYDRAMINE HCL 25 MG: 25 TABLET ORAL at 18:08

## 2019-08-20 RX ADMIN — SACUBITRIL AND VALSARTAN 1 TABLET: 49; 51 TABLET, FILM COATED ORAL at 08:29

## 2019-08-20 RX ADMIN — METOPROLOL SUCCINATE 25 MG: 25 TABLET, FILM COATED, EXTENDED RELEASE ORAL at 08:30

## 2019-08-20 RX ADMIN — FUROSEMIDE 40 MG: 40 TABLET ORAL at 15:27

## 2019-08-20 RX ADMIN — SPIRONOLACTONE 25 MG: 25 TABLET ORAL at 08:29

## 2019-08-20 RX ADMIN — Medication 10 ML: at 08:30

## 2019-08-20 RX ADMIN — DIPHENHYDRAMINE HCL 25 MG: 25 TABLET ORAL at 23:52

## 2019-08-20 RX ADMIN — POTASSIUM CHLORIDE 40 MEQ: 1500 TABLET, EXTENDED RELEASE ORAL at 16:29

## 2019-08-20 RX ADMIN — Medication 10 ML: at 21:29

## 2019-08-20 RX ADMIN — RIVAROXABAN 20 MG: 20 TABLET, FILM COATED ORAL at 08:30

## 2019-08-20 RX ADMIN — DIPHENHYDRAMINE HCL 25 MG: 25 TABLET ORAL at 03:02

## 2019-08-20 RX ADMIN — ASPIRIN 81 MG: 81 TABLET, CHEWABLE ORAL at 08:30

## 2019-08-20 RX ADMIN — FUROSEMIDE 40 MG: 40 TABLET ORAL at 08:30

## 2019-08-20 RX ADMIN — THERA TABS 1 TABLET: TAB at 08:30

## 2019-08-20 RX ADMIN — SACUBITRIL AND VALSARTAN 1 TABLET: 49; 51 TABLET, FILM COATED ORAL at 21:29

## 2019-08-20 ASSESSMENT — PAIN SCALES - GENERAL
PAINLEVEL_OUTOF10: 0
PAINLEVEL_OUTOF10: 1

## 2019-08-20 ASSESSMENT — PAIN DESCRIPTION - PROGRESSION: CLINICAL_PROGRESSION: NOT CHANGED

## 2019-08-20 ASSESSMENT — PAIN DESCRIPTION - LOCATION: LOCATION: NECK;BACK

## 2019-08-20 ASSESSMENT — PAIN DESCRIPTION - DESCRIPTORS: DESCRIPTORS: DISCOMFORT;SORE

## 2019-08-20 ASSESSMENT — PAIN DESCRIPTION - ONSET: ONSET: ON-GOING

## 2019-08-20 ASSESSMENT — PAIN DESCRIPTION - FREQUENCY: FREQUENCY: CONTINUOUS

## 2019-08-20 ASSESSMENT — PAIN DESCRIPTION - PAIN TYPE: TYPE: CHRONIC PAIN

## 2019-08-20 NOTE — PROGRESS NOTES
Time   Individual Concurrent Group Co-treatment   Time In 65         Time Out 1608         Minutes [address] [place], [place]

## 2019-08-20 NOTE — PROGRESS NOTES
tenderness  Cardiovascular: regular rate and rhythm and 2+ pulses throughout  Abdomen: soft, nontender, nondistended, no masses or organomegaly  Extremities:  peripheral pulses normal, no pedal edema, no clubbing or cyanosis, BLE in wound dressing    MEDICATIONS:  Scheduled Meds:   potassium chloride  40 mEq Oral BID WC    furosemide  40 mg Oral BID    metoprolol succinate  25 mg Oral Daily    sacubitril-valsartan  1 tablet Oral BID    spironolactone  25 mg Oral Daily    aspirin  81 mg Per NG tube Daily    multivitamin  1 tablet Oral Daily    atorvastatin  80 mg Oral Nightly    rivaroxaban  20 mg Oral Daily    sodium chloride flush  10 mL Intravenous 2 times per day       Continuous Infusions:      PRN Meds:     potassium chloride 10 mEq PRN   potassium chloride 40 mEq PRN   Or     potassium alternative oral replacement 40 mEq PRN   Or     potassium chloride 10 mEq PRN   sodium chloride flush 10 mL PRN   magnesium hydroxide 30 mL Daily PRN   ondansetron 4 mg Q6H PRN       SUPPORT DEVICES: [] Ventilator [] BIPAP  [x] Nasal Cannula [] Room Air    VENT SETTINGS (Comprehensive) (if applicable):    Vent Information  $Ventilation: $Subsequent Day  Ventilator Started: Yes  Ventilator Stopped: (S) Yes  Ventilation Day(s): 1  Skin Assessment: Clean, dry, & intact  Equipment Changed: HME  Vent Type: Servo i  Vent Mode: PRVC  Vt Ordered: 400 mL  Rate Set: 18 bmp  Pressure Support: 6 cmH20  FiO2 : 30 %  Sensitivity: 1  PEEP/CPAP: 5  I Time/ I Time %: 0.95 s  Cuff Pressure (cm H2O): 22 cm H2O  Humidification Source: E  Additional Respiratory  Assessments  Pulse: 62  Resp: 17  SpO2: 99 %  End Tidal CO2: 40 (%)  Position: Semi-Beasley's  Humidification Source: E  Oral Care Completed?: Yes  Oral Care: Teeth brushed, Mouthwash, Suction toothette  Subglottic Suction Done?: Yes  Cuff Pressure (cm H2O): 22 cm H2O  Lab Results   Component Value Date    MODE NOT REPORTED 08/18/2019       ABGs:     No results found for: PH, PCO2, PO2, HCO3, O2SAT    DATA:  Complete Blood Count:   Recent Labs     08/18/19  0500 08/19/19  0436 08/20/19  0418   WBC 5.5 5.1 5.4   RBC 3.84* 4.22 4.66   HGB 9.6* 10.4* 11.5*   HCT 33.6* 37.8 40.1   MCV 87.5 89.6 86.1   MCH 25.0* 24.6* 24.7*   MCHC 28.6 27.5* 28.7   RDW 18.9* 18.8* 19.0*    198 273   MPV 11.4 11.5 12.7        Last 3 Blood Glucose:   Recent Labs     08/17/19  1413 08/18/19  0500 08/18/19 1924 08/19/19  0436 08/20/19  0418   GLUCOSE 88 79 116* 100* 117*        PT/INR:    Lab Results   Component Value Date    PROTIME 14.9 08/16/2019    INR 1.5 08/16/2019     PTT:    Lab Results   Component Value Date    APTT 23.4 08/16/2019       Basic Metabolic Profile:   Recent Labs     08/18/19 1924 08/19/19  0436 08/20/19  0418    142 139   K 3.9 3.5* 3.8    100 100   CO2 28 28 29   BUN 24* 27* 26*   CREATININE 1.20* 1.16* 1.03*   GLUCOSE 116* 100* 117*       Liver Function:  No results for input(s): PROT, LABALBU, ALT, AST, GGT, ALKPHOS, BILITOT in the last 72 hours.     Magnesium:   Lab Results   Component Value Date    MG 2.1 08/20/2019    MG 2.0 08/19/2019    MG 1.8 08/17/2019     Phosphorus:   Lab Results   Component Value Date    PHOS 2.5 08/22/2017     Ionized Calcium:   Lab Results   Component Value Date    CAION 1.11 03/24/2019        Urinalysis: Lab Results   Component Value Date    NITRU NEGATIVE 05/12/2019    COLORU YELLOW 05/12/2019    PHUR 6.0 05/12/2019    WBCUA 0 TO 2 05/12/2019    RBCUA 0 TO 2 05/12/2019    MUCUS 1+ 05/12/2019    TRICHOMONAS NOT REPORTED 05/12/2019    YEAST NOT REPORTED 05/12/2019    BACTERIA None 05/12/2019    SPECGRAV 1.025 05/12/2019    LEUKOCYTESUR NEGATIVE 05/12/2019    UROBILINOGEN Normal 05/12/2019    BILIRUBINUR NEGATIVE 05/12/2019    GLUCOSEU NEGATIVE 05/12/2019    KETUA NEGATIVE 05/12/2019    AMORPHOUS NOT REPORTED 05/12/2019       HgBA1c:    Lab Results   Component Value Date    LABA1C 5.8 05/13/2019     TSH:    Lab Results   Component Value 0.79 (L) 1.10 - 3.70 k/uL    Absolute Mono # 0.68 0.10 - 1.20 k/uL    Absolute Eos # 0.00 0.00 - 0.44 k/uL    Basophils # 0.00 0.00 - 0.20 k/uL    Morphology ANISOCYTOSIS PRESENT     Morphology 1+ ELLIPTOCYTES    Basic Metabolic Panel   Result Value Ref Range    Glucose 110 (H) 70 - 99 mg/dL    BUN 19 8 - 23 mg/dL    CREATININE 1.14 (H) 0.50 - 0.90 mg/dL    Bun/Cre Ratio NOT REPORTED 9 - 20    Calcium 8.3 (L) 8.6 - 10.4 mg/dL    Sodium 150 (H) 135 - 144 mmol/L    Potassium 4.4 3.7 - 5.3 mmol/L    Chloride 108 (H) 98 - 107 mmol/L    CO2 25 20 - 31 mmol/L    Anion Gap 17 9 - 17 mmol/L    GFR Non-African American 47 (L) >60 mL/min    GFR  57 (L) >60 mL/min    GFR Comment          GFR Staging NOT REPORTED    Troponin   Result Value Ref Range    Troponin, High Sensitivity 67 (HH) 0 - 14 ng/L    Troponin T NOT REPORTED <0.03 ng/mL    Troponin Interp NOT REPORTED    MAGNESIUM   Result Value Ref Range    Magnesium 1.8 1.6 - 2.6 mg/dL   Basic Metabolic Panel w/ Reflex to MG   Result Value Ref Range    Glucose 88 70 - 99 mg/dL    BUN 18 8 - 23 mg/dL    CREATININE 1.09 (H) 0.50 - 0.90 mg/dL    Bun/Cre Ratio NOT REPORTED 9 - 20    Calcium 8.5 (L) 8.6 - 10.4 mg/dL    Sodium 144 135 - 144 mmol/L    Potassium 3.8 3.7 - 5.3 mmol/L    Chloride 106 98 - 107 mmol/L    CO2 25 20 - 31 mmol/L    Anion Gap 13 9 - 17 mmol/L    GFR Non-African American 50 (L) >60 mL/min    GFR African American >60 >60 mL/min    GFR Comment          GFR Staging NOT REPORTED    CBC Auto Differential   Result Value Ref Range    WBC 5.5 3.5 - 11.3 k/uL    RBC 3.84 (L) 3.95 - 5.11 m/uL    Hemoglobin 9.6 (L) 11.9 - 15.1 g/dL    Hematocrit 33.6 (L) 36.3 - 47.1 %    MCV 87.5 82.6 - 102.9 fL    MCH 25.0 (L) 25.2 - 33.5 pg    MCHC 28.6 28.4 - 34.8 g/dL    RDW 18.9 (H) 11.8 - 14.4 %    Platelets 912 406 - 767 k/uL    MPV 11.4 8.1 - 13.5 fL    NRBC Automated 0.4 (H) 0.0 per 100 WBC    Differential Type NOT REPORTED     WBC Morphology NOT REPORTED RBC Morphology ANISOCYTOSIS PRESENT     Platelet Estimate NOT REPORTED     Seg Neutrophils 75 (H) 36 - 65 %    Lymphocytes 15 (L) 24 - 43 %    Monocytes 8 3 - 12 %    Eosinophils % 0 (L) 1 - 4 %    Basophils 1 0 - 2 %    Immature Granulocytes 0 0 %    Segs Absolute 4.13 1.50 - 8.10 k/uL    Absolute Lymph # 0.83 (L) 1.10 - 3.70 k/uL    Absolute Mono # 0.43 0.10 - 1.20 k/uL    Absolute Eos # <0.03 0.00 - 0.44 k/uL    Basophils # 0.07 0.00 - 0.20 k/uL    Absolute Immature Granulocyte <0.03 0.00 - 0.30 k/uL   Basic Metabolic Panel   Result Value Ref Range    Glucose 79 70 - 99 mg/dL    BUN 21 8 - 23 mg/dL    CREATININE 1.11 (H) 0.50 - 0.90 mg/dL    Bun/Cre Ratio NOT REPORTED 9 - 20    Calcium 8.7 8.6 - 10.4 mg/dL    Sodium 143 135 - 144 mmol/L    Potassium 3.3 (L) 3.7 - 5.3 mmol/L    Chloride 102 98 - 107 mmol/L    CO2 29 20 - 31 mmol/L    Anion Gap 12 9 - 17 mmol/L    GFR Non-African American 49 (L) >60 mL/min    GFR  59 (L) >60 mL/min    GFR Comment          GFR Staging NOT REPORTED    Basic Metabolic Panel w/ Reflex to MG   Result Value Ref Range    Glucose 116 (H) 70 - 99 mg/dL    BUN 24 (H) 8 - 23 mg/dL    CREATININE 1.20 (H) 0.50 - 0.90 mg/dL    Bun/Cre Ratio NOT REPORTED 9 - 20    Calcium 8.9 8.6 - 10.4 mg/dL    Sodium 142 135 - 144 mmol/L    Potassium 3.9 3.7 - 5.3 mmol/L    Chloride 100 98 - 107 mmol/L    CO2 28 20 - 31 mmol/L    Anion Gap 14 9 - 17 mmol/L    GFR Non-African American 45 (L) >60 mL/min    GFR  54 (L) >60 mL/min    GFR Comment          GFR Staging NOT REPORTED    CBC Auto Differential   Result Value Ref Range    WBC 5.1 3.5 - 11.3 k/uL    RBC 4.22 3.95 - 5.11 m/uL    Hemoglobin 10.4 (L) 11.9 - 15.1 g/dL    Hematocrit 37.8 36.3 - 47.1 %    MCV 89.6 82.6 - 102.9 fL    MCH 24.6 (L) 25.2 - 33.5 pg    MCHC 27.5 (L) 28.4 - 34.8 g/dL    RDW 18.8 (H) 11.8 - 14.4 %    Platelets 322 606 - 530 k/uL    MPV 11.5 8.1 - 13.5 fL    NRBC Automated 0.0 0.0 per 100 WBC Eos # 0.13 0.00 - 0.44 k/uL    Basophils # 0.06 0.00 - 0.20 k/uL    Absolute Immature Granulocyte <0.03 0.00 - 0.30 k/uL    WBC Morphology NOT REPORTED     RBC Morphology ANISOCYTOSIS PRESENT     Platelet Estimate NOT REPORTED    Basic Metabolic Panel   Result Value Ref Range    Glucose 117 (H) 70 - 99 mg/dL    BUN 26 (H) 8 - 23 mg/dL    CREATININE 1.03 (H) 0.50 - 0.90 mg/dL    Bun/Cre Ratio NOT REPORTED 9 - 20    Calcium 8.5 (L) 8.6 - 10.4 mg/dL    Sodium 139 135 - 144 mmol/L    Potassium 3.8 3.7 - 5.3 mmol/L    Chloride 100 98 - 107 mmol/L    CO2 29 20 - 31 mmol/L    Anion Gap 10 9 - 17 mmol/L    GFR Non-African American 53 (L) >60 mL/min    GFR African American >60 >60 mL/min    GFR Comment          GFR Staging NOT REPORTED    MAGNESIUM   Result Value Ref Range    Magnesium 2.1 1.6 - 2.6 mg/dL   Arterial Blood Gas, POC   Result Value Ref Range    POC pH 7.149 (LL) 7.350 - 7.450    POC pCO2 86.8 (HH) 35.0 - 48.0 mm Hg    POC PO2 150.2 (H) 83.0 - 108.0 mm Hg    POC HCO3 30.2 (H) 21.0 - 28.0 mmol/L    TCO2 (calc), Art 33 (H) 22.0 - 29.0 mmol/L    Negative Base Excess, Art 1 0.0 - 2.0    Positive Base Excess, Art NOT REPORTED 0.0 - 3.0    POC O2 SAT 98 94.0 - 98.0 %    O2 Device/Flow/% Adult Ventilator     Perez Test POSITIVE     Sample Site Right Radial Artery     Mode PRVC     FIO2 80.0     Pt Temp NOT REPORTED     POC pH Temp NOT REPORTED     POC pCO2 Temp NOT REPORTED mm Hg    POC pO2 Temp NOT REPORTED mm Hg   Lactic Acid, POC   Result Value Ref Range    POC Lactic Acid 1.56 (H) 0.56 - 1.39 mmol/L   POCT Glucose   Result Value Ref Range    POC Glucose 145 (H) 74 - 100 mg/dL   Notification Panel, POC   Result Value Ref Range    Action Notifyphysician     NOTIFY quin     READ BACK Yes     Date/Time 08/16/201921:59:00    Arterial Blood Gas, POC   Result Value Ref Range    POC pH 7.223 (L) 7.350 - 7.450    POC pCO2 69.2 (H) 35.0 - 48.0 mm Hg    POC PO2 123.0 (H) 83.0 - 108.0 mm Hg    POC HCO3 28.5 (H) 21.0 - 28.0 mmol/L    TCO2 (calc), Art 31 (H) 22.0 - 29.0 mmol/L    Negative Base Excess, Art 1 0.0 - 2.0    Positive Base Excess, Art NOT REPORTED 0.0 - 3.0    POC O2 SAT 98 94.0 - 98.0 %    O2 Device/Flow/% Adult Ventilator     Perez Test POSITIVE     Sample Site Right Radial Artery     Mode PRVC     FIO2 60.0     Pt Temp NOT REPORTED     POC pH Temp NOT REPORTED     POC pCO2 Temp NOT REPORTED mm Hg    POC pO2 Temp NOT REPORTED mm Hg   POCT Glucose   Result Value Ref Range    POC Glucose 147 (H) 74 - 100 mg/dL   POC Glucose Fingerstick   Result Value Ref Range    POC Glucose 101 65 - 105 mg/dL   Arterial Blood Gas, POC   Result Value Ref Range    POC pH 7.427 7.350 - 7.450    POC pCO2 45.2 35.0 - 48.0 mm Hg    POC PO2 89.6 83.0 - 108.0 mm Hg    POC HCO3 29.7 (H) 21.0 - 28.0 mmol/L    TCO2 (calc), Art 31 (H) 22.0 - 29.0 mmol/L    Negative Base Excess, Art NOT REPORTED 0.0 - 2.0    Positive Base Excess, Art 5 (H) 0.0 - 3.0    POC O2 SAT 97 94.0 - 98.0 %    O2 Device/Flow/% Adult Ventilator     Perez Test POSITIVE     Sample Site Left Radial Artery     Mode PRVC     FIO2 30.0     Pt Temp NOT REPORTED     POC pH Temp NOT REPORTED     POC pCO2 Temp NOT REPORTED mm Hg    POC pO2 Temp NOT REPORTED mm Hg   POCT Glucose   Result Value Ref Range    POC Glucose 108 (H) 74 - 100 mg/dL   Arterial Blood Gas, POC   Result Value Ref Range    POC pH 7.461 (H) 7.350 - 7.450    POC pCO2 46.6 35.0 - 48.0 mm Hg    POC PO2 83.6 83.0 - 108.0 mm Hg    POC HCO3 33.2 (H) 21.0 - 28.0 mmol/L    TCO2 (calc), Art 35 (H) 22.0 - 29.0 mmol/L    Negative Base Excess, Art NOT REPORTED 0.0 - 2.0    Positive Base Excess, Art 8 (H) 0.0 - 3.0    POC O2 SAT 97 94.0 - 98.0 %    O2 Device/Flow/% Adult Ventilator     Perez Test POSITIVE     Sample Site Right Radial Artery     Mode NOT REPORTED     FIO2 30.0     Pt Temp NOT REPORTED     POC pH Temp NOT REPORTED     POC pCO2 Temp NOT REPORTED mm Hg    POC pO2 Temp NOT REPORTED mm Hg   EKG 12 Lead   Result Value Ref No  [x]   Yes                           TRANSFER OUT OF ICU:  []   No  [x]   Yes             PLAN/MEDICAL DECISION MAKIN. Acute on chronic CHF- Systolic dysfunction s/p AICD: Last EF 25% with grade 3 diastolic dysfunction. Repeat echo pending. Extubated yesterday. Cardiac diet with fluid restriction. ASA, Statin, Toprol XL, Aldactone and Entresto. Lasix switched to oral 40 mg oral BID.     2. Anxiety and depression: Was seen by psychiatry again yesterday,. Buspar and Zoloft if qtc is normal. Recommends home with outpatient follow up which patient is agreeable to.      3. B/l Lower extremity wounds     4. Afib: BB for rate control and Xarelto for AC. Restarted home dose of Toprol XL. CODE STATUS: Full Code      DISPOSITION:  [] To remain ICU:   [x] OK for out of ICU from [address] [place] [address] MD Alaina  Emergency Medicine Resident  [address]  2019, 11:25 AM      Attending Physician Statement  I have discussed the care of Camille Barlow, including pertinent history and exam findings with the resident. I have reviewed the key elements of all parts of the encounter with the resident. I have seen and examined the patient with the resident. I agree with the assessment and plan and status of the problem list as documented. I seen the patient during my round this morning, I have reviewed the labs, medications seen chart reviewed. Overnight no acute events she remains on 2 L nasal cannula and she is pending saturation and she is 100% she has mild cough denies sputum production denies chest pain overall edema has improved her urine output is 2300 and last 24-hour creatinine is 1.03 and she is on Lasix 40 mg twice daily  She has not been moved out of bed to chair yet and had bedside physical therapy yesterday. On Lasix Aldactone. Continue with Entresto beta-blocker on Xarelto aspirin and statin.   Repeat echocardiogram is still not done  We will

## 2019-08-20 NOTE — CARE COORDINATION
Advised that pt is no longer going to I at discharge and will need OP psych f/u  Met with pt to discuss OP psych f/u  Pt shared she has seen many psychiatrists and therapists over the years  Discussed where she would like to f/u and pt shared her son goes to [facility] in [facility] she talked with him about it and would like to f/u there as well  Offered to make appt for pt and she stated she would make it herself  - provided pt with phone # and list of mental health resources in the area (added to AVS)  Discussed coordinating appts with her son's so they could go together as transportation is an issue  Pt plans to go to SNF at discharge    [address] [facility], [address] [place] [address] Avenue ([phone]) and updated on above

## 2019-08-20 NOTE — PROGRESS NOTES
Internal medicine  Progress note     60-year-old female with past medical history of hypertension, systolic heart failure post status AICD, coronary artery disease post status stent placement, chronic lymphedema, cardiomyopathy with ejection fraction of 25% in March 2019, atrial fibrillation on amiodarone and Xarelto   was brought to [facility] by ambulance due to shortness of breath. Patient was admitted with acute respiratory distress  Along with AMS likely from hypoxia and hypercapnia. She was started on nitro drip along with Lasix. Patient had mild FABIAN on admission with creatinine of 1.20. Chest x-ray was done which was consistent with acute bilateral pulmonary infiltrate consistent with acute pulmonary edema. Patient was intubated on admission and  extubated on 18th August.      Patient was continued with IV Lasix, wean down off nitro drip, cardiology was consulted, and 2D echo was done. Patient was continued with Entresto, beta-blocker along with Aldactone and Xarelto during her stay in ICU. Echo ordered  during ICU was still pending. An echo done in March 2019 which showed moderate MR, ejection fraction 25%. Patient had an AICD placement in March 2019. Patient had PCI to LAD in October 2018 at [address]. Patient had a history of bilateral pulmonary embolism on Xarelto. Patient had prolonged QTC so amiodarone was discontinued today follows up with [facility] cardiology  clinic    Was evaluated by psychiatry during her ICU stay for anxiety and they recommended to follow-up as an outpatient as patient did not meet criteria for involuntary transfer to [facility]. Has a history of depression, anxiety and obsessive-compulsive disorder. Patient has used Prozac, Risperdal, Zoloft, Ativan and Xanax in the past.    Was recently discharged on 11th August after admission for acute on chronic systolic congestive heart failure.   She was recommended to go to a skilled nursing facility but patient denied it and return

## 2019-08-21 LAB
ABSOLUTE EOS #: 0.17 K/UL (ref 0–0.44)
ABSOLUTE IMMATURE GRANULOCYTE: <0.03 K/UL (ref 0–0.3)
ABSOLUTE LYMPH #: 1.16 K/UL (ref 1.1–3.7)
ABSOLUTE MONO #: 0.46 K/UL (ref 0.1–1.2)
ANION GAP SERPL CALCULATED.3IONS-SCNC: 14 MMOL/L (ref 9–17)
BASOPHILS # BLD: 1 % (ref 0–2)
BASOPHILS ABSOLUTE: 0.07 K/UL (ref 0–0.2)
BUN BLDV-MCNC: 23 MG/DL (ref 8–23)
BUN/CREAT BLD: ABNORMAL (ref 9–20)
CALCIUM SERPL-MCNC: 8.8 MG/DL (ref 8.6–10.4)
CHLORIDE BLD-SCNC: 103 MMOL/L (ref 98–107)
CO2: 27 MMOL/L (ref 20–31)
CREAT SERPL-MCNC: 0.97 MG/DL (ref 0.5–0.9)
DIFFERENTIAL TYPE: ABNORMAL
EKG ATRIAL RATE: 227 BPM
EKG Q-T INTERVAL: 504 MS
EKG QRS DURATION: 100 MS
EKG QTC CALCULATION (BAZETT): 511 MS
EKG R AXIS: -30 DEGREES
EKG T AXIS: -177 DEGREES
EKG VENTRICULAR RATE: 62 BPM
EOSINOPHILS RELATIVE PERCENT: 3 % (ref 1–4)
GFR AFRICAN AMERICAN: >60 ML/MIN
GFR NON-AFRICAN AMERICAN: 57 ML/MIN
GFR SERPL CREATININE-BSD FRML MDRD: ABNORMAL ML/MIN/{1.73_M2}
GFR SERPL CREATININE-BSD FRML MDRD: ABNORMAL ML/MIN/{1.73_M2}
GLUCOSE BLD-MCNC: 118 MG/DL (ref 70–99)
HCT VFR BLD CALC: 40.5 % (ref 36.3–47.1)
HEMOGLOBIN: 11.7 G/DL (ref 11.9–15.1)
IMMATURE GRANULOCYTES: 0 %
LYMPHOCYTES # BLD: 23 % (ref 24–43)
MAGNESIUM: 2.1 MG/DL (ref 1.6–2.6)
MCH RBC QN AUTO: 24.7 PG (ref 25.2–33.5)
MCHC RBC AUTO-ENTMCNC: 28.9 G/DL (ref 28.4–34.8)
MCV RBC AUTO: 85.6 FL (ref 82.6–102.9)
MONOCYTES # BLD: 9 % (ref 3–12)
NRBC AUTOMATED: 0 PER 100 WBC
PDW BLD-RTO: 19.4 % (ref 11.8–14.4)
PLATELET # BLD: 281 K/UL (ref 138–453)
PLATELET ESTIMATE: ABNORMAL
PMV BLD AUTO: 11.7 FL (ref 8.1–13.5)
POTASSIUM SERPL-SCNC: 4 MMOL/L (ref 3.7–5.3)
RBC # BLD: 4.73 M/UL (ref 3.95–5.11)
RBC # BLD: ABNORMAL 10*6/UL
SEG NEUTROPHILS: 64 % (ref 36–65)
SEGMENTED NEUTROPHILS ABSOLUTE COUNT: 3.28 K/UL (ref 1.5–8.1)
SODIUM BLD-SCNC: 144 MMOL/L (ref 135–144)
WBC # BLD: 5.2 K/UL (ref 3.5–11.3)
WBC # BLD: ABNORMAL 10*3/UL

## 2019-08-21 PROCEDURE — 80048 BASIC METABOLIC PNL TOTAL CA: CPT

## 2019-08-21 PROCEDURE — 97110 THERAPEUTIC EXERCISES: CPT

## 2019-08-21 PROCEDURE — 6370000000 HC RX 637 (ALT 250 FOR IP): Performed by: STUDENT IN AN ORGANIZED HEALTH CARE EDUCATION/TRAINING PROGRAM

## 2019-08-21 PROCEDURE — 36415 COLL VENOUS BLD VENIPUNCTURE: CPT

## 2019-08-21 PROCEDURE — 6370000000 HC RX 637 (ALT 250 FOR IP): Performed by: NURSE PRACTITIONER

## 2019-08-21 PROCEDURE — 1200000000 HC SEMI PRIVATE

## 2019-08-21 PROCEDURE — 2580000003 HC RX 258: Performed by: STUDENT IN AN ORGANIZED HEALTH CARE EDUCATION/TRAINING PROGRAM

## 2019-08-21 PROCEDURE — 97116 GAIT TRAINING THERAPY: CPT

## 2019-08-21 PROCEDURE — 83735 ASSAY OF MAGNESIUM: CPT

## 2019-08-21 PROCEDURE — 94761 N-INVAS EAR/PLS OXIMETRY MLT: CPT

## 2019-08-21 PROCEDURE — 99232 SBSQ HOSP IP/OBS MODERATE 35: CPT | Performed by: INTERNAL MEDICINE

## 2019-08-21 PROCEDURE — 85025 COMPLETE CBC W/AUTO DIFF WBC: CPT

## 2019-08-21 RX ADMIN — Medication 10 ML: at 21:19

## 2019-08-21 RX ADMIN — FUROSEMIDE 40 MG: 40 TABLET ORAL at 07:59

## 2019-08-21 RX ADMIN — SACUBITRIL AND VALSARTAN 1 TABLET: 49; 51 TABLET, FILM COATED ORAL at 08:45

## 2019-08-21 RX ADMIN — Medication 10 ML: at 08:45

## 2019-08-21 RX ADMIN — ATORVASTATIN CALCIUM 80 MG: 80 TABLET, FILM COATED ORAL at 21:19

## 2019-08-21 RX ADMIN — RIVAROXABAN 20 MG: 20 TABLET, FILM COATED ORAL at 08:45

## 2019-08-21 RX ADMIN — DIPHENHYDRAMINE HCL 25 MG: 25 TABLET ORAL at 13:50

## 2019-08-21 RX ADMIN — POTASSIUM CHLORIDE 40 MEQ: 1500 TABLET, EXTENDED RELEASE ORAL at 07:59

## 2019-08-21 RX ADMIN — POTASSIUM CHLORIDE 40 MEQ: 1500 TABLET, EXTENDED RELEASE ORAL at 16:59

## 2019-08-21 RX ADMIN — SACUBITRIL AND VALSARTAN 1 TABLET: 49; 51 TABLET, FILM COATED ORAL at 21:19

## 2019-08-21 RX ADMIN — METOPROLOL SUCCINATE 25 MG: 25 TABLET, FILM COATED, EXTENDED RELEASE ORAL at 08:45

## 2019-08-21 RX ADMIN — ASPIRIN 81 MG: 81 TABLET, CHEWABLE ORAL at 08:45

## 2019-08-21 RX ADMIN — SPIRONOLACTONE 25 MG: 25 TABLET ORAL at 08:45

## 2019-08-21 RX ADMIN — FUROSEMIDE 40 MG: 40 TABLET ORAL at 14:58

## 2019-08-21 RX ADMIN — THERA TABS 1 TABLET: TAB at 08:45

## 2019-08-21 ASSESSMENT — PAIN SCALES - GENERAL
PAINLEVEL_OUTOF10: 0

## 2019-08-21 NOTE — PROGRESS NOTES
normal.  LUNGS: Good respiratory effort On auscultation: diminished bilaterally  CARDIOVASCULAR:  Normal apical impulse, irregular rate and rhythm, normal S2, no S3 or S4, and no rub noted, but 1/6 systolic apical murmur  ABDOMEN: Soft, nontender, nondistended. Bowel sounds present. No masses or tenderness. No bruit. SKIN: Warm and dry. EXTREMITIES: 1+bilateral lower extremity edema. Motor movement grossly intact.  No cyanosis or clubbing.       DIAGNOSTICS     Studies:  Telemetry: V-pacing with underlying aflutter    EKG from 8/21/19: reviewed    Laboratory testing:  Lab Results   Component Value Date     08/21/2019     08/20/2019     08/19/2019    K 4.0 08/21/2019    K 3.8 08/20/2019    K 3.5 (L) 08/19/2019     08/21/2019     08/20/2019     08/19/2019    CO2 27 08/21/2019    CO2 29 08/20/2019    CO2 28 08/19/2019    BUN 23 08/21/2019    BUN 26 (H) 08/20/2019    BUN 27 (H) 08/19/2019    CREATININE 0.97 (H) 08/21/2019    CREATININE 1.03 (H) 08/20/2019    CREATININE 1.16 (H) 08/19/2019    CALCIUM 8.8 08/21/2019    CALCIUM 8.5 (L) 08/20/2019    CALCIUM 8.5 (L) 08/19/2019    LABALBU 3.2 (L) 08/09/2019    LABALBU 3.3 (L) 07/22/2019    LABALBU 3.8 05/12/2019    PROT 7.1 08/09/2019    PROT 6.6 07/22/2019    PROT 7.8 05/12/2019    BILITOT 0.58 08/09/2019    BILITOT 0.50 07/22/2019    BILITOT 1.20 05/12/2019    ALKPHOS 87 08/09/2019    ALKPHOS 81 07/22/2019    ALKPHOS 100 05/12/2019    ALT 18 08/09/2019    ALT 15 07/22/2019    ALT 27 05/12/2019    AST 33 (H) 08/09/2019    AST 26 07/22/2019    AST 29 05/12/2019    GLUCOSE 118 (H) 08/21/2019    GLUCOSE 117 (H) 08/20/2019    GLUCOSE 100 (H) 08/19/2019     Lab Results   Component Value Date    WBC 5.2 08/21/2019    WBC 5.4 08/20/2019    WBC 5.1 08/19/2019    HGB 11.7 (L) 08/21/2019    HGB 11.5 (L) 08/20/2019    HGB 10.4 (L) 08/19/2019    HCT 40.5 08/21/2019    HCT 40.1 08/20/2019    HCT 37.8 08/19/2019     08/21/2019      Incontinence in female    Ventricular fibrillation ([facility] [address]    Vitamin D deficiency    Venous insufficiency of both lower extremities    CHF (congestive heart failure), NYHA class I, acute on chronic, combined (HCC)    Elevated brain natriuretic peptide (BNP) level    Elevated serum creatinine    Hyperlipidemia    Chronic hypoxemic respiratory failure (HCC)    Chronic respiratory failure with hypoxia, on home O2 therapy (HCC)    Pruritus    Acute [facility] failure with hypoxia [facility]    Hypernatremia    Acute systolic CHF (congestive heart failure) ([facility]    Severe obesity (BMI 35.0-39. 9) with comorbidity ([facility])    Elevated troponin    Acute pulmonary edema (HCC)    Acute respiratory failure with hypoxia and hypercapnia ([facility])    Generalized anxiety disorder         ASSESSMENT and PLAN     · Acute on chronic systolic heart failure, Class IV NYHA, recent admissions to the hospital and noncompliance with diuretics during her last hospitalization but reports she was compliant at home -clinically improved and diuresing well  · Abnormal EKG with ST/T changes consistent with probable anterolateral ischemia (changed from EKG 8/16/19)- unchanged on EKG today 8/20/19- angina free  · Severe ischemic cardiomyopathy with EF 25% on echo 3/2019 s/p ICD, ICD shock 7/2019 none since that time, no ventricular arrhythmia on telemetry alarm review  · CAD history of MI with PCI to LAD in 10/2018 at [facility] by Dr. Luciano Glaser clinical angina   · Borderline troponin elevation is chronic and most likely secondary to CHF and renal insufficiency, patient denies angina  · Renal insufficiency, managed by others  · Persistent atrial fibrillation/flutter with controlled VR on Xarelto  · History of bilateral pulmonary embolisms - managed by others  · History of subdural hematoma - managed by others  · Hypokalemia-correct to 4.0  · Prolonged QTc- amiodarone discontinued and QTc is slowly improving; avoid any QTc prolonging medications     PLAN:  Continue ASA 81mg, Lipitor 80mg, Lasix 40mg PO BID, Toprol XL 25mg, Xarelto 20mg, Aldactone 25mg, and Entresto 49/51  CHF education, Strict I/O's  Encouraged compliance with follow-up and medications  Continue supportive care  Discharge planning  Discussed with Dr. Robyn Lawrence and patient may benefit from RFA ablation for her atrial flutter in the future; discussed with patient and she is hesitant but will consider and wants to discuss with Dr. Sal Eastman as outpatient. The note was completed by using EMR. However, inadvertent computerized transcription errors may be present. Although every effort was made to ensure accuracy, no guarantees can be provided that every mistake has been identified and corrected by editing.       Yamile Luo, PRICILA-CNP

## 2019-08-21 NOTE — PROGRESS NOTES
referred to [facility] once medically stable. Psych meds on hold due to patient's prolonged QTc. 11. Hypernatremia. Resolved. From 150 on 8/16 to 139 on 8/20.  12. Hypokalemia. Resolved.      DVT prophylaxis: Xarelto  GI prophylaxis: Zofran     PT OT: Consulted  : Consulted for discharge planning. Jeremy Ruvalcaba MD  Internal Medicine Resident, PGY-1  [facility]; [place], [place]  8/21/2019, 11:45 AM    Patient seen. Appears euvolemic at this point. Amiodarone stopped. Prolonged TC  Some psychiatric meds started  BP stable  Renal function improving  On xarelto  Repeated admissions for CHF- Noncompliance? Echo pending  Attending Physician Statement  I have discussed the care of Glo Cabral, including pertinent history and exam findings,  with the resident. I have seen and examined the patient and the key elements of all parts of the encounter have been performed by me. I agree with the assessment, plan and orders as documented by the resident.    Winter Malagon MD

## 2019-08-21 NOTE — DISCHARGE INSTR - COC
Care  · Address:  · Phone:[phone]  · Fax:    Dialysis Facility (if applicable)   · Name:  · Address:  · Dialysis Schedule:  · Phone:  · Fax:    / signature: Electronically signed by Alysha Aviles RN on 8/22/2019 at 4:17 PM      PHYSICIAN SECTION    Prognosis: Fair    Condition at Discharge: Stable    Rehab Potential (if transferring to Rehab): Fair    Recommended Labs or Other Treatments After Discharge:     Physician Certification: I certify the above information and transfer of Marcella Castro  is necessary for the continuing treatment of the diagnosis listed and that she requires Home Care for greater 30 days.      Update Admission H&P: No change in H&P    PHYSICIAN SIGNATURE:  Electronically signed by Jesse Hawley MD on 8/22/19 at 1:42 PM

## 2019-08-22 VITALS
HEART RATE: 62 BPM | OXYGEN SATURATION: 96 % | WEIGHT: 163.6 LBS | TEMPERATURE: 98 F | DIASTOLIC BLOOD PRESSURE: 55 MMHG | BODY MASS INDEX: 30.11 KG/M2 | HEIGHT: 62 IN | SYSTOLIC BLOOD PRESSURE: 95 MMHG | RESPIRATION RATE: 16 BRPM

## 2019-08-22 LAB
ABSOLUTE EOS #: 0.18 K/UL (ref 0–0.44)
ABSOLUTE IMMATURE GRANULOCYTE: <0.03 K/UL (ref 0–0.3)
ABSOLUTE LYMPH #: 1.72 K/UL (ref 1.1–3.7)
ABSOLUTE MONO #: 0.68 K/UL (ref 0.1–1.2)
ANION GAP SERPL CALCULATED.3IONS-SCNC: 14 MMOL/L (ref 9–17)
BASOPHILS # BLD: 2 % (ref 0–2)
BASOPHILS ABSOLUTE: 0.11 K/UL (ref 0–0.2)
BUN BLDV-MCNC: 22 MG/DL (ref 8–23)
BUN/CREAT BLD: ABNORMAL (ref 9–20)
CALCIUM SERPL-MCNC: 9.1 MG/DL (ref 8.6–10.4)
CHLORIDE BLD-SCNC: 102 MMOL/L (ref 98–107)
CO2: 26 MMOL/L (ref 20–31)
CREAT SERPL-MCNC: 1.11 MG/DL (ref 0.5–0.9)
CULTURE: NORMAL
CULTURE: NORMAL
DIFFERENTIAL TYPE: ABNORMAL
EOSINOPHILS RELATIVE PERCENT: 3 % (ref 1–4)
GFR AFRICAN AMERICAN: 59 ML/MIN
GFR NON-AFRICAN AMERICAN: 49 ML/MIN
GFR SERPL CREATININE-BSD FRML MDRD: ABNORMAL ML/MIN/{1.73_M2}
GFR SERPL CREATININE-BSD FRML MDRD: ABNORMAL ML/MIN/{1.73_M2}
GLUCOSE BLD-MCNC: 118 MG/DL (ref 70–99)
HCT VFR BLD CALC: 45.3 % (ref 36.3–47.1)
HEMOGLOBIN: 12.8 G/DL (ref 11.9–15.1)
IMMATURE GRANULOCYTES: 0 %
LYMPHOCYTES # BLD: 26 % (ref 24–43)
Lab: NORMAL
Lab: NORMAL
MAGNESIUM: 2.1 MG/DL (ref 1.6–2.6)
MCH RBC QN AUTO: 25.2 PG (ref 25.2–33.5)
MCHC RBC AUTO-ENTMCNC: 28.3 G/DL (ref 28.4–34.8)
MCV RBC AUTO: 89.2 FL (ref 82.6–102.9)
MONOCYTES # BLD: 10 % (ref 3–12)
NRBC AUTOMATED: 0 PER 100 WBC
PDW BLD-RTO: 19.5 % (ref 11.8–14.4)
PLATELET # BLD: 291 K/UL (ref 138–453)
PLATELET ESTIMATE: ABNORMAL
PMV BLD AUTO: 11.6 FL (ref 8.1–13.5)
POTASSIUM SERPL-SCNC: 4.4 MMOL/L (ref 3.7–5.3)
RBC # BLD: 5.08 M/UL (ref 3.95–5.11)
RBC # BLD: ABNORMAL 10*6/UL
SEG NEUTROPHILS: 60 % (ref 36–65)
SEGMENTED NEUTROPHILS ABSOLUTE COUNT: 3.98 K/UL (ref 1.5–8.1)
SODIUM BLD-SCNC: 142 MMOL/L (ref 135–144)
SPECIMEN DESCRIPTION: NORMAL
SPECIMEN DESCRIPTION: NORMAL
WBC # BLD: 6.7 K/UL (ref 3.5–11.3)
WBC # BLD: ABNORMAL 10*3/UL

## 2019-08-22 PROCEDURE — 83735 ASSAY OF MAGNESIUM: CPT

## 2019-08-22 PROCEDURE — 6370000000 HC RX 637 (ALT 250 FOR IP): Performed by: STUDENT IN AN ORGANIZED HEALTH CARE EDUCATION/TRAINING PROGRAM

## 2019-08-22 PROCEDURE — 99232 SBSQ HOSP IP/OBS MODERATE 35: CPT | Performed by: INTERNAL MEDICINE

## 2019-08-22 PROCEDURE — 2580000003 HC RX 258: Performed by: STUDENT IN AN ORGANIZED HEALTH CARE EDUCATION/TRAINING PROGRAM

## 2019-08-22 PROCEDURE — 85025 COMPLETE CBC W/AUTO DIFF WBC: CPT

## 2019-08-22 PROCEDURE — 36415 COLL VENOUS BLD VENIPUNCTURE: CPT

## 2019-08-22 PROCEDURE — 93005 ELECTROCARDIOGRAM TRACING: CPT | Performed by: STUDENT IN AN ORGANIZED HEALTH CARE EDUCATION/TRAINING PROGRAM

## 2019-08-22 PROCEDURE — 97116 GAIT TRAINING THERAPY: CPT

## 2019-08-22 PROCEDURE — 80048 BASIC METABOLIC PNL TOTAL CA: CPT

## 2019-08-22 PROCEDURE — 97110 THERAPEUTIC EXERCISES: CPT

## 2019-08-22 RX ORDER — ASPIRIN 81 MG/1
81 TABLET, CHEWABLE ORAL DAILY
Qty: 30 TABLET | Refills: 3 | Status: SHIPPED | OUTPATIENT
Start: 2019-08-23

## 2019-08-22 RX ORDER — POTASSIUM CHLORIDE 750 MG/1
20 CAPSULE, EXTENDED RELEASE ORAL DAILY
Qty: 30 CAPSULE | Refills: 0 | Status: ON HOLD | OUTPATIENT
Start: 2019-08-22 | End: 2019-12-19 | Stop reason: HOSPADM

## 2019-08-22 RX ORDER — SPIRONOLACTONE 25 MG/1
25 TABLET ORAL DAILY
Qty: 30 TABLET | Refills: 3 | Status: ON HOLD | OUTPATIENT
Start: 2019-08-23 | End: 2019-10-28 | Stop reason: HOSPADM

## 2019-08-22 RX ORDER — FUROSEMIDE 40 MG/1
40 TABLET ORAL 2 TIMES DAILY
Qty: 60 TABLET | Refills: 3 | Status: ON HOLD | OUTPATIENT
Start: 2019-08-22 | End: 2019-10-25 | Stop reason: SDUPTHER

## 2019-08-22 RX ADMIN — SPIRONOLACTONE 25 MG: 25 TABLET ORAL at 09:01

## 2019-08-22 RX ADMIN — Medication 10 ML: at 08:22

## 2019-08-22 RX ADMIN — FUROSEMIDE 40 MG: 40 TABLET ORAL at 09:00

## 2019-08-22 RX ADMIN — RIVAROXABAN 20 MG: 20 TABLET, FILM COATED ORAL at 09:01

## 2019-08-22 RX ADMIN — ASPIRIN 81 MG: 81 TABLET, CHEWABLE ORAL at 09:00

## 2019-08-22 ASSESSMENT — PAIN SCALES - GENERAL
PAINLEVEL_OUTOF10: 0
PAINLEVEL_OUTOF10: 8

## 2019-08-22 ASSESSMENT — PAIN DESCRIPTION - LOCATION: LOCATION: BACK

## 2019-08-22 ASSESSMENT — PAIN DESCRIPTION - PAIN TYPE: TYPE: CHRONIC PAIN

## 2019-08-22 ASSESSMENT — PAIN DESCRIPTION - ORIENTATION: ORIENTATION: LOWER

## 2019-08-22 NOTE — PROGRESS NOTES
[E78.5] 04/03/2019    S/P implantation of automatic cardioverter/defibrillator (AICD) [Z95.810] 04/03/2019    Coronary artery disease involving native coronary artery of native heart without angina pectoris [I25.10] 03/21/2019    Lymphedema of both lower extremities [I89.0] 03/21/2019    Chronic systolic heart failure ([facility] [address] [I50.22] 10/19/2018       PLAN:     1. Acute on chronic respiratory failure altered mental status. Resolved. Received nitro drip and Lasix.  Intubated.  Extubated on 8/18.  Resolved.  Patient off nasal cannula. Is doing well. 2. chronic systolic and diastolic congestive heart failure status post AICD.  EF of 25% and grade 3 diastolic dysfunction.  proBNP at time of admission was 13,000.  Troponin was 71 but was trending down.  Strict I's and O's, daily weights, potassium to be greater than 4 and magnesium to be greater than 2 as per cardiology.  Follow-up on 2D echo.              On Lasix 40 mg oral twice daily, Entresto 49-51 mg twice daily, Aldactone 25 mg oral daily  3. Chronic atrial fibrillation.  Amiodarone discontinued because of prolonged QTC of 522.  Controlled.  Metoprolol XL 25 mg oral daily, Xarelto 20 mg oral daily. EKG done today, QTC of 564.  4. Coronary artery disease status post stent placement in October 2018.  2 stents were placed.  Aspirin 81 mg oral daily, Xarelto 20 mg oral daily, metoprolol 25 mg oral daily, Entresto twice daily. 5. Acute kidney injury on chronic kidney disease.  Creatinine baseline is around 1.  Patient's creatinine since admission ranged between 1.09-1.20 with last creatinine of 1.11. Will monitor for now. 6. Essential hypertension.  Controlled. Mae Meckel, metoprolol, Lasix, Aldactone. 7. Chronic lymphedema with venous ulcers.  Wound care on board.  Compression stockings on.  8. Hyperlipidemia.  Lipid profile was normal as on 8/8/2019.  Controlled.  Lipitor 80 mg daily  9.  Anemia.  Hemoglobin went from 12.5 at the time of admission to 9.6 and

## 2019-08-22 NOTE — CARE COORDINATION
Transition planning: On unit, informed pt does not want to transition to SNF per bedside nurse report. Pt is reluctant to agree to home care as does not want anyone in her home. CM will meet with pt this afternoon to discuss [facility] and offer list.  1553 Received call from [facility], bedside nurse informed pt with d/c orders and pt is agreeable to [facility], [facility].  1615 CM on unit met with pt and son at bedside provided [facility], [facility]. list, will return for choice. 214 Trinity Turner with Jannie Ashford at [facility] informed pt is declining SNF placement. She informs they just received auth. Return to pt's room informed SNF received auth, pt is tearful and verbalizes \"I don't know what to do\" Pt's son at bedside. Provides emotional support. Pt and son decide to return home with [facility]. provided freedom of choice. Pt and son choose [address]. 406 [facility] with [employer] answering service for [address] she connected to clinical director Jessica - informed of care needs-CHF, wound care required, therapy services, and APS following. Provided SSN on e-referral as requested and faxed via Epic. Provided Jessica with Kashif SHARMA, contact information ([phone]). Jessica requests if pt can have leg dressings changed prior to d/c this evening and they will plan to see pt on Sat. They will contact pt in the AM.    1655 Attempt to reach Kashif Roth with [facility]. APS, left VM that pt declined transfer to [facility] and requests home with [facility], [facility]., provided [facility] contact number. [address] [employer], with facesheet to [address].

## 2019-08-22 NOTE — PROGRESS NOTES
Occupational Therapy    Occupational Therapy Not Seen Note    DATE: 2019  Name: Floyd Samuel  : 1950  MRN: [id number]    Patient not available for Occupational Therapy due to:    Patient Declined \"Im not doing any more therapy today, my back hurts and I just got [email or web address]    Next Scheduled Treatment: 19    Electronically signed by MANISH Mobley on 2019 at 4:10 PM

## 2019-08-22 NOTE — PROGRESS NOTES
Physical Therapy  Facility/Department: [address] BURN UNIT  Daily Treatment Note  NAME: Maria Ines Calderon  : 1950  MRN: [id number]    Date of Service: 2019    Discharge Recommendations:  Patient would benefit from continued therapy after discharge   PT Equipment Recommendations  Equipment Needed: Yes  Mobility Devices: Sergo Grout: Rolling    Assessment   Body structures, Functions, Activity limitations: Decreased functional mobility ; Decreased endurance  Assessment: Pt amb 100ft with RW and CGA to maintain stability. Moderate encouragement required to continue with exercise session. Pt able to perform step ups onto a 6\" box with no LOB noted and BUE support. Pt has increased posterior sway while performing seated UE exercises and was able to self correct. Pt would benefit from continued PT services to improve on endurance and returning to PLOF. Prognosis: Good  Decision Making: Medium Complexity  PT Education: Home Exercise Program;Plan of Care  Patient Education: Postural awareness   REQUIRES PT FOLLOW UP: Yes  Activity Tolerance  Activity Tolerance: Patient Tolerated treatment well;Patient limited by endurance  Activity Tolerance: Pt reported feeling lightheaded while performing standing exercises. Pt sat down and BP was 92/52mmHg, pt reports feeling better after taking a seat. After 2 minutes, BP recovered to 108/55. Patient Diagnosis(es): There were no encounter diagnoses. has a past medical history of Acute on chronic systolic congestive heart failure ([address] Anxiety, Cardiomyopathy ([address] Depression, H/O heart artery stent, [place] (hard of hearing), Hyperlipidemia, Hypertension, Hypertensive urgency, and Leg swelling.   has a past surgical history that includes Cataract removal; Corneal transplant; Nasal sinus surgery; eye surgery; Tonsillectomy and adenoidectomy; and Cardiac defibrillator placement (2019).     Restrictions  Restrictions/Precautions  Restrictions/Precautions: Cardiac, Fall

## 2019-08-23 LAB
EKG ATRIAL RATE: 227 BPM
EKG Q-T INTERVAL: 564 MS
EKG QRS DURATION: 156 MS
EKG QTC CALCULATION (BAZETT): 564 MS
EKG R AXIS: -22 DEGREES
EKG T AXIS: -172 DEGREES
EKG VENTRICULAR RATE: 60 BPM

## 2019-08-23 PROCEDURE — 93010 ELECTROCARDIOGRAM REPORT: CPT | Performed by: INTERNAL MEDICINE

## 2019-08-23 NOTE — TELEPHONE ENCOUNTER
Attempted to reach patient to schedule screening colonoscopy visit with provider. Advised to contact the office to schedule at [phone].  Attempt 3

## 2019-08-24 NOTE — DISCHARGE SUMMARY
[address]     Department of Internal Medicine - Staff Internal Medicine Teaching Service    INPATIENT DISCHARGE SUMMARY      Patient Identification:  Erica Hughes is a 71 y.o. female. :  1950  MRN: [id number]     Acct: [de-identified]   PCP: Janis Everett MD  Admit Date:  2019  Discharge date and time: 2019  6:45 PM   Attending Provider: No att. providers found                                     [address] Problem Lists:  Principal Problem:    Acute systolic CHF (congestive heart failure) ([facility] [address]  Active Problems:    Lymphedema of both lower extremities    Coronary artery disease involving native coronary artery of native heart without angina pectoris    Dyslipidemia    Chronic systolic heart failure (HCC)    Chronic atrial fibrillation (HCC)    S/P implantation of automatic cardioverter/defibrillator (AICD)    Venous insufficiency of both lower extremities    Acute respiratory failure with hypoxia (HCC)    Hypernatremia    Elevated troponin    Acute pulmonary edema (HCC)    Acute respiratory failure with hypoxia and hypercapnia (HCC)    Generalized anxiety disorder  Resolved Problems:    * No resolved hospital problems. *      HOSPITAL STAY     Brief Inpatient course:   Erica Hughes is a 71 y.o. female who was admitted for the management of Acute systolic CHF (congestive heart failure) ([facility] [address] presented to the emergency department with shortness of breath.  Patient was at work when she developed shortness of breath, ambulance was called, EMS found that the patient was minimally responsive and they intubated her for airway protection.  On admission patient was found to have elevated BNP, chest x-ray showed pulmonary congestion.  She was started on nitro drip, given IV Lasix in the ED and was transferred to the ICU.      Patient was recently discharged on 2019 after CHF exacerbation, was referred to the rehab, but the patient was [id number]  [phone]    Schedule an appointment as soon as possible for a visit  conrado Garcia MD  [facility], [address] [address] [facility]  [phone]    Schedule an appointment as soon as possible for a visit      [facility]  [address] [facility] [phone]  Schedule an appointment as soon as possible for a visit      [address] [place] [address] Road  [address] [facility]  [phone]          Patient Instructions: none  Follow up labs: none  Follow up imaging: none    Note that over 30 minutes was spent in preparing discharge papers, discussing discharge with patient, medication review, etc.      Wenceslao Tang MD, MD  Internal Medicine Resident, PGY-1  [facility];  [place], [place]  8/24/2019, 1:37 PM

## 2019-08-26 ENCOUNTER — OFFICE VISIT (OUTPATIENT)
Dept: FAMILY MEDICINE CLINIC | Age: 69
End: 2019-08-26
Payer: MEDICARE

## 2019-08-26 ENCOUNTER — HOSPITAL ENCOUNTER (EMERGENCY)
Age: 69
Discharge: HOME | End: 2019-08-26
Attending: EMERGENCY MEDICINE
Payer: MEDICARE

## 2019-08-26 VITALS
HEART RATE: 76 BPM | HEIGHT: 61 IN | BODY MASS INDEX: 31 KG/M2 | WEIGHT: 164.2 LBS | TEMPERATURE: 97.7 F | SYSTOLIC BLOOD PRESSURE: 112 MMHG | OXYGEN SATURATION: 98 % | DIASTOLIC BLOOD PRESSURE: 74 MMHG

## 2019-08-26 VITALS
HEIGHT: 61 IN | SYSTOLIC BLOOD PRESSURE: 121 MMHG | OXYGEN SATURATION: 98 % | WEIGHT: 164 LBS | HEART RATE: 92 BPM | TEMPERATURE: 97.5 F | BODY MASS INDEX: 30.96 KG/M2 | DIASTOLIC BLOOD PRESSURE: 85 MMHG | RESPIRATION RATE: 16 BRPM

## 2019-08-26 DIAGNOSIS — F41.9 ANXIETY AND DEPRESSION: ICD-10-CM

## 2019-08-26 DIAGNOSIS — F41.1 ANXIETY STATE: Primary | ICD-10-CM

## 2019-08-26 DIAGNOSIS — F32.A DEPRESSION, UNSPECIFIED DEPRESSION TYPE: ICD-10-CM

## 2019-08-26 DIAGNOSIS — F32.A ANXIETY AND DEPRESSION: ICD-10-CM

## 2019-08-26 DIAGNOSIS — I87.2 VENOUS INSUFFICIENCY OF BOTH LOWER EXTREMITIES: Chronic | ICD-10-CM

## 2019-08-26 DIAGNOSIS — S81.802D OPEN WOUND OF BOTH LOWER EXTREMITIES WITH COMPLICATION, SUBSEQUENT ENCOUNTER: Primary | ICD-10-CM

## 2019-08-26 DIAGNOSIS — S81.801D OPEN WOUND OF BOTH LOWER EXTREMITIES WITH COMPLICATION, SUBSEQUENT ENCOUNTER: Primary | ICD-10-CM

## 2019-08-26 PROCEDURE — 99282 EMERGENCY DEPT VISIT SF MDM: CPT

## 2019-08-26 PROCEDURE — 99204 OFFICE O/P NEW MOD 45 MIN: CPT | Performed by: NURSE PRACTITIONER

## 2019-08-26 RX ORDER — LORAZEPAM 0.5 MG/1
0.5 TABLET ORAL EVERY 6 HOURS PRN
COMMUNITY
End: 2019-08-26

## 2019-08-26 ASSESSMENT — PATIENT HEALTH QUESTIONNAIRE - PHQ9
10. IF YOU CHECKED OFF ANY PROBLEMS, HOW DIFFICULT HAVE THESE PROBLEMS MADE IT FOR YOU TO DO YOUR WORK, TAKE CARE OF THINGS AT HOME, OR GET ALONG WITH OTHER PEOPLE: 3
8. MOVING OR SPEAKING SO SLOWLY THAT OTHER PEOPLE COULD HAVE NOTICED. OR THE OPPOSITE, BEING SO FIGETY OR RESTLESS THAT YOU HAVE BEEN MOVING AROUND A LOT MORE THAN USUAL: 3
1. LITTLE INTEREST OR PLEASURE IN DOING THINGS: 3
7. TROUBLE CONCENTRATING ON THINGS, SUCH AS READING THE NEWSPAPER OR WATCHING TELEVISION: 3
5. POOR APPETITE OR OVEREATING: 0
SUM OF ALL RESPONSES TO PHQ QUESTIONS 1-9: 22
SUM OF ALL RESPONSES TO PHQ QUESTIONS 1-9: 22
2. FEELING DOWN, DEPRESSED OR HOPELESS: 3
9. THOUGHTS THAT YOU WOULD BE BETTER OFF DEAD, OR OF HURTING YOURSELF: 1
4. FEELING TIRED OR HAVING LITTLE ENERGY: 3
SUM OF ALL RESPONSES TO PHQ9 QUESTIONS 1 & 2: 6
3. TROUBLE FALLING OR STAYING ASLEEP: 3
6. FEELING BAD ABOUT YOURSELF - OR THAT YOU ARE A FAILURE OR HAVE LET YOURSELF OR YOUR FAMILY DOWN: 3

## 2019-08-26 ASSESSMENT — PAIN DESCRIPTION - LOCATION: LOCATION: BACK

## 2019-08-26 ASSESSMENT — ENCOUNTER SYMPTOMS
EYE PAIN: 0
ABDOMINAL PAIN: 0
SINUS PAIN: 0
EYE DISCHARGE: 0
WHEEZING: 0
COLOR CHANGE: 0
SORE THROAT: 0
DIARRHEA: 0
SINUS PRESSURE: 0
CHEST TIGHTNESS: 0
CONSTIPATION: 0
SHORTNESS OF BREATH: 1
VOMITING: 0
COUGH: 0
NAUSEA: 0

## 2019-08-26 ASSESSMENT — PAIN DESCRIPTION - PAIN TYPE: TYPE: CHRONIC PAIN

## 2019-08-26 NOTE — PROGRESS NOTES
(AICD)     History of percutaneous coronary intervention     Cellulitis     Venous stasis dermatitis of both lower extremities     Congestive heart failure (HCC)     Acute congestive heart failure (HCC)     History of subdural hematoma     Incontinence in female     Ventricular fibrillation (HCC)     Vitamin D deficiency     Venous insufficiency of both lower extremities     CHF (congestive heart failure), NYHA class I, acute on chronic, combined (HCC)     Elevated brain natriuretic peptide (BNP) level     Elevated serum creatinine     Hyperlipidemia     Chronic hypoxemic respiratory failure (HCC)     Chronic respiratory failure with hypoxia, on home O2 therapy (HCC)     Pruritus     Acute respiratory failure with hypoxia (HCC)     Hypernatremia     Acute systolic CHF (congestive heart failure) (HCC)     Severe obesity (BMI 35.0-39. 9) with comorbidity (HCC)     Elevated troponin     Acute pulmonary edema (HCC)     Acute respiratory failure with hypoxia and hypercapnia (HCC)     Generalized anxiety disorder     Past Medical History:   Diagnosis Date    Acute on chronic systolic congestive heart failure ([address] 8/8/2017    Anxiety     Cardiomyopathy ([address]     Depression     H/O heart artery stent     [place] (hard of hearing)     Hyperlipidemia     Hypertension     Hypertensive urgency 8/7/2017    Leg swelling 8/7/2017    OCD (obsessive compulsive disorder)       Past Surgical History:   Procedure Laterality Date    CARDIAC DEFIBRILLATOR PLACEMENT  04/2019    CATARACT REMOVAL      CORNEAL TRANSPLANT      EYE SURGERY      NASAL SINUS SURGERY      TONSILLECTOMY AND ADENOIDECTOMY       History reviewed. No pertinent family history.   Social History     Tobacco Use    Smoking status: Never Smoker    Smokeless tobacco: Never Used   Substance Use Topics    Alcohol use: No     ALLERGIES:  No Known Allergies       Subjective     · Constitutional:  Negative for activity change, appetite change,unexpected weight

## 2019-09-16 PROBLEM — R79.89 ELEVATED TROPONIN: Status: RESOLVED | Noted: 2019-08-17 | Resolved: 2019-09-16

## 2019-09-16 PROBLEM — R77.8 ELEVATED TROPONIN: Status: RESOLVED | Noted: 2019-08-17 | Resolved: 2019-09-16

## 2019-10-19 ENCOUNTER — HOSPITAL ENCOUNTER (INPATIENT)
Age: 69
LOS: 9 days | Discharge: NURSING, REHAB OR LONG-TERM CARE | DRG: 265 | End: 2019-10-28
Attending: EMERGENCY MEDICINE | Admitting: INTERNAL MEDICINE
Payer: MEDICARE

## 2019-10-19 ENCOUNTER — APPOINTMENT (OUTPATIENT)
Dept: GENERAL RADIOLOGY | Age: 69
DRG: 265 | End: 2019-10-19
Payer: MEDICARE

## 2019-10-19 DIAGNOSIS — D50.9 IRON DEFICIENCY ANEMIA, UNSPECIFIED IRON DEFICIENCY ANEMIA TYPE: ICD-10-CM

## 2019-10-19 DIAGNOSIS — F42.9 OBSESSIVE-COMPULSIVE DISORDER, UNSPECIFIED TYPE: Chronic | ICD-10-CM

## 2019-10-19 DIAGNOSIS — I10 ESSENTIAL HYPERTENSION: ICD-10-CM

## 2019-10-19 DIAGNOSIS — I47.20 VENTRICULAR TACHYCARDIA: Primary | ICD-10-CM

## 2019-10-19 DIAGNOSIS — E87.6 HYPOKALEMIA: ICD-10-CM

## 2019-10-19 DIAGNOSIS — T82.118D MALFUNCTION OF IMPLANTABLE CARDIOVERTER-DEFIBRILLATOR (ICD), SUBSEQUENT ENCOUNTER: ICD-10-CM

## 2019-10-19 DIAGNOSIS — Z45.02 AICD DISCHARGE: ICD-10-CM

## 2019-10-19 PROBLEM — T82.118A AICD MALFUNCTION: Status: ACTIVE | Noted: 2019-10-19

## 2019-10-19 LAB
ABSOLUTE EOS #: 0.04 K/UL (ref 0–0.44)
ABSOLUTE IMMATURE GRANULOCYTE: 0 K/UL (ref 0–0.3)
ABSOLUTE LYMPH #: 0.55 K/UL (ref 1.1–3.7)
ABSOLUTE MONO #: 0.29 K/UL (ref 0.1–1.2)
ANION GAP SERPL CALCULATED.3IONS-SCNC: 16 MMOL/L (ref 9–17)
BASOPHILS # BLD: 1 % (ref 0–2)
BASOPHILS ABSOLUTE: 0.04 K/UL (ref 0–0.2)
BNP INTERPRETATION: ABNORMAL
BUN BLDV-MCNC: 17 MG/DL (ref 8–23)
BUN/CREAT BLD: 18 (ref 9–20)
CALCIUM SERPL-MCNC: 8.3 MG/DL (ref 8.6–10.4)
CHLORIDE BLD-SCNC: 107 MMOL/L (ref 98–107)
CO2: 22 MMOL/L (ref 20–31)
CREAT SERPL-MCNC: 0.93 MG/DL (ref 0.5–0.9)
DIFFERENTIAL TYPE: ABNORMAL
EOSINOPHILS RELATIVE PERCENT: 1 % (ref 1–4)
GFR AFRICAN AMERICAN: >60 ML/MIN
GFR NON-AFRICAN AMERICAN: 60 ML/MIN
GFR SERPL CREATININE-BSD FRML MDRD: ABNORMAL ML/MIN/{1.73_M2}
GFR SERPL CREATININE-BSD FRML MDRD: ABNORMAL ML/MIN/{1.73_M2}
GLUCOSE BLD-MCNC: 115 MG/DL (ref 70–99)
HCT VFR BLD CALC: 34.4 % (ref 36.3–47.1)
HEMOGLOBIN: 9.5 G/DL (ref 11.9–15.1)
IMMATURE GRANULOCYTES: 0 %
INR BLD: 1.5
LYMPHOCYTES # BLD: 13 % (ref 24–43)
MAGNESIUM: 2 MG/DL (ref 1.6–2.6)
MCH RBC QN AUTO: 24.4 PG (ref 25.2–33.5)
MCHC RBC AUTO-ENTMCNC: 27.6 G/DL (ref 28.4–34.8)
MCV RBC AUTO: 88.4 FL (ref 82.6–102.9)
MONOCYTES # BLD: 7 % (ref 3–12)
MORPHOLOGY: ABNORMAL
MYOGLOBIN: 206 NG/ML (ref 25–58)
MYOGLOBIN: 216 NG/ML (ref 25–58)
NRBC AUTOMATED: 0 PER 100 WBC
PARTIAL THROMBOPLASTIN TIME: 28.4 SEC (ref 23–31)
PDW BLD-RTO: 19.8 % (ref 11.8–14.4)
PLATELET # BLD: 215 K/UL (ref 138–453)
PLATELET ESTIMATE: ABNORMAL
PMV BLD AUTO: 10.7 FL (ref 8.1–13.5)
POTASSIUM SERPL-SCNC: 3 MMOL/L (ref 3.7–5.3)
PRO-BNP: 8885 PG/ML
PROTHROMBIN TIME: 15.2 SEC (ref 9.7–11.6)
RBC # BLD: 3.89 M/UL (ref 3.95–5.11)
RBC # BLD: ABNORMAL 10*6/UL
SEG NEUTROPHILS: 78 % (ref 36–65)
SEGMENTED NEUTROPHILS ABSOLUTE COUNT: 3.28 K/UL (ref 1.5–8.1)
SODIUM BLD-SCNC: 145 MMOL/L (ref 135–144)
TROPONIN INTERP: ABNORMAL
TROPONIN INTERP: ABNORMAL
TROPONIN T: ABNORMAL NG/ML
TROPONIN T: ABNORMAL NG/ML
TROPONIN, HIGH SENSITIVITY: 132 NG/L (ref 0–14)
TROPONIN, HIGH SENSITIVITY: 99 NG/L (ref 0–14)
WBC # BLD: 4.2 K/UL (ref 3.5–11.3)
WBC # BLD: ABNORMAL 10*3/UL

## 2019-10-19 PROCEDURE — 2580000003 HC RX 258: Performed by: INTERNAL MEDICINE

## 2019-10-19 PROCEDURE — 85730 THROMBOPLASTIN TIME PARTIAL: CPT

## 2019-10-19 PROCEDURE — 83874 ASSAY OF MYOGLOBIN: CPT

## 2019-10-19 PROCEDURE — 71045 X-RAY EXAM CHEST 1 VIEW: CPT

## 2019-10-19 PROCEDURE — 99285 EMERGENCY DEPT VISIT HI MDM: CPT

## 2019-10-19 PROCEDURE — 85610 PROTHROMBIN TIME: CPT

## 2019-10-19 PROCEDURE — 93005 ELECTROCARDIOGRAM TRACING: CPT | Performed by: EMERGENCY MEDICINE

## 2019-10-19 PROCEDURE — 6370000000 HC RX 637 (ALT 250 FOR IP): Performed by: INTERNAL MEDICINE

## 2019-10-19 PROCEDURE — 83880 ASSAY OF NATRIURETIC PEPTIDE: CPT

## 2019-10-19 PROCEDURE — 99223 1ST HOSP IP/OBS HIGH 75: CPT | Performed by: INTERNAL MEDICINE

## 2019-10-19 PROCEDURE — 84484 ASSAY OF TROPONIN QUANT: CPT

## 2019-10-19 PROCEDURE — 36415 COLL VENOUS BLD VENIPUNCTURE: CPT

## 2019-10-19 PROCEDURE — 85025 COMPLETE CBC W/AUTO DIFF WBC: CPT

## 2019-10-19 PROCEDURE — 80048 BASIC METABOLIC PNL TOTAL CA: CPT

## 2019-10-19 PROCEDURE — 6370000000 HC RX 637 (ALT 250 FOR IP): Performed by: EMERGENCY MEDICINE

## 2019-10-19 PROCEDURE — 2000000000 HC ICU R&B

## 2019-10-19 PROCEDURE — 83735 ASSAY OF MAGNESIUM: CPT

## 2019-10-19 RX ORDER — ATORVASTATIN CALCIUM 80 MG/1
80 TABLET, FILM COATED ORAL NIGHTLY
Status: DISCONTINUED | OUTPATIENT
Start: 2019-10-19 | End: 2019-10-28 | Stop reason: HOSPADM

## 2019-10-19 RX ORDER — SERTRALINE HYDROCHLORIDE 25 MG/1
25 TABLET, FILM COATED ORAL EVERY MORNING
Status: ON HOLD | COMMUNITY
End: 2019-10-25 | Stop reason: HOSPADM

## 2019-10-19 RX ORDER — FAMOTIDINE 20 MG/1
20 TABLET, FILM COATED ORAL 2 TIMES DAILY
Status: DISCONTINUED | OUTPATIENT
Start: 2019-10-19 | End: 2019-10-21

## 2019-10-19 RX ORDER — METOPROLOL SUCCINATE 25 MG/1
25 TABLET, EXTENDED RELEASE ORAL DAILY
Status: DISCONTINUED | OUTPATIENT
Start: 2019-10-19 | End: 2019-10-28 | Stop reason: HOSPADM

## 2019-10-19 RX ORDER — ONDANSETRON 2 MG/ML
4 INJECTION INTRAMUSCULAR; INTRAVENOUS EVERY 6 HOURS PRN
Status: DISCONTINUED | OUTPATIENT
Start: 2019-10-19 | End: 2019-10-19

## 2019-10-19 RX ORDER — ASPIRIN 81 MG/1
81 TABLET, CHEWABLE ORAL DAILY
Status: DISCONTINUED | OUTPATIENT
Start: 2019-10-19 | End: 2019-10-28 | Stop reason: HOSPADM

## 2019-10-19 RX ORDER — SERTRALINE HYDROCHLORIDE 25 MG/1
TABLET, FILM COATED ORAL
Refills: 1 | COMMUNITY
Start: 2019-10-02 | End: 2019-10-19 | Stop reason: SDUPTHER

## 2019-10-19 RX ORDER — AMIODARONE HYDROCHLORIDE 200 MG/1
400 TABLET ORAL 2 TIMES DAILY
Status: DISCONTINUED | OUTPATIENT
Start: 2019-10-19 | End: 2019-10-20

## 2019-10-19 RX ORDER — ONDANSETRON 4 MG/1
4 TABLET, ORALLY DISINTEGRATING ORAL EVERY 6 HOURS PRN
Status: DISCONTINUED | OUTPATIENT
Start: 2019-10-19 | End: 2019-10-28 | Stop reason: HOSPADM

## 2019-10-19 RX ORDER — SODIUM CHLORIDE 0.9 % (FLUSH) 0.9 %
10 SYRINGE (ML) INJECTION EVERY 12 HOURS SCHEDULED
Status: DISCONTINUED | OUTPATIENT
Start: 2019-10-19 | End: 2019-10-24 | Stop reason: SDUPTHER

## 2019-10-19 RX ORDER — NICOTINE 21 MG/24HR
1 PATCH, TRANSDERMAL 24 HOURS TRANSDERMAL DAILY PRN
Status: DISCONTINUED | OUTPATIENT
Start: 2019-10-19 | End: 2019-10-28 | Stop reason: HOSPADM

## 2019-10-19 RX ORDER — AMIODARONE HYDROCHLORIDE 200 MG/1
400 TABLET ORAL ONCE
Status: COMPLETED | OUTPATIENT
Start: 2019-10-19 | End: 2019-10-19

## 2019-10-19 RX ORDER — SPIRONOLACTONE 25 MG/1
25 TABLET ORAL DAILY
Status: DISCONTINUED | OUTPATIENT
Start: 2019-10-19 | End: 2019-10-25

## 2019-10-19 RX ORDER — FUROSEMIDE 40 MG/1
40 TABLET ORAL 2 TIMES DAILY
Status: DISCONTINUED | OUTPATIENT
Start: 2019-10-19 | End: 2019-10-22

## 2019-10-19 RX ORDER — ACETAMINOPHEN 325 MG/1
650 TABLET ORAL EVERY 4 HOURS PRN
Status: DISCONTINUED | OUTPATIENT
Start: 2019-10-19 | End: 2019-10-24 | Stop reason: SDUPTHER

## 2019-10-19 RX ORDER — POTASSIUM CHLORIDE 750 MG/1
20 CAPSULE, EXTENDED RELEASE ORAL DAILY
Status: DISCONTINUED | OUTPATIENT
Start: 2019-10-19 | End: 2019-10-22

## 2019-10-19 RX ORDER — ONDANSETRON 2 MG/ML
4 INJECTION INTRAMUSCULAR; INTRAVENOUS EVERY 6 HOURS PRN
Status: DISCONTINUED | OUTPATIENT
Start: 2019-10-19 | End: 2019-10-28 | Stop reason: HOSPADM

## 2019-10-19 RX ORDER — SODIUM CHLORIDE 0.9 % (FLUSH) 0.9 %
10 SYRINGE (ML) INJECTION PRN
Status: DISCONTINUED | OUTPATIENT
Start: 2019-10-19 | End: 2019-10-24 | Stop reason: SDUPTHER

## 2019-10-19 RX ADMIN — POTASSIUM BICARBONATE 50 MEQ: 391 TABLET, EFFERVESCENT ORAL at 16:04

## 2019-10-19 RX ADMIN — FAMOTIDINE 20 MG: 20 TABLET, FILM COATED ORAL at 21:02

## 2019-10-19 RX ADMIN — POTASSIUM CHLORIDE 20 MEQ: 750 CAPSULE, EXTENDED RELEASE ORAL at 21:02

## 2019-10-19 RX ADMIN — FUROSEMIDE 40 MG: 40 TABLET ORAL at 21:03

## 2019-10-19 RX ADMIN — AMIODARONE HYDROCHLORIDE 400 MG: 200 TABLET ORAL at 17:18

## 2019-10-19 RX ADMIN — ATORVASTATIN CALCIUM 80 MG: 80 TABLET, FILM COATED ORAL at 21:03

## 2019-10-19 RX ADMIN — SODIUM CHLORIDE, PRESERVATIVE FREE 10 ML: 5 INJECTION INTRAVENOUS at 21:07

## 2019-10-19 RX ADMIN — ASPIRIN 81 MG 81 MG: 81 TABLET ORAL at 21:03

## 2019-10-19 RX ADMIN — AMIODARONE HYDROCHLORIDE 400 MG: 200 TABLET ORAL at 21:03

## 2019-10-19 RX ADMIN — RIVAROXABAN 20 MG: 20 TABLET, FILM COATED ORAL at 21:08

## 2019-10-19 ASSESSMENT — ENCOUNTER SYMPTOMS
COUGH: 0
DIARRHEA: 0
FACIAL SWELLING: 0
EYE DISCHARGE: 0
CONSTIPATION: 0
VOMITING: 0
ABDOMINAL PAIN: 0
SHORTNESS OF BREATH: 0
COLOR CHANGE: 0
EYE REDNESS: 0

## 2019-10-19 ASSESSMENT — PAIN SCALES - GENERAL
PAINLEVEL_OUTOF10: 0
PAINLEVEL_OUTOF10: 0

## 2019-10-20 ENCOUNTER — APPOINTMENT (OUTPATIENT)
Dept: GENERAL RADIOLOGY | Age: 69
DRG: 265 | End: 2019-10-20
Payer: MEDICARE

## 2019-10-20 LAB
ANION GAP SERPL CALCULATED.3IONS-SCNC: 11 MMOL/L (ref 9–17)
BNP INTERPRETATION: ABNORMAL
BUN BLDV-MCNC: 20 MG/DL (ref 8–23)
BUN/CREAT BLD: 17 (ref 9–20)
CALCIUM SERPL-MCNC: 8.2 MG/DL (ref 8.6–10.4)
CHLORIDE BLD-SCNC: 108 MMOL/L (ref 98–107)
CHOLESTEROL/HDL RATIO: 3.1
CHOLESTEROL: 95 MG/DL
CO2: 24 MMOL/L (ref 20–31)
CREAT SERPL-MCNC: 1.17 MG/DL (ref 0.5–0.9)
EKG ATRIAL RATE: 56 BPM
EKG ATRIAL RATE: 56 BPM
EKG Q-T INTERVAL: 444 MS
EKG Q-T INTERVAL: 454 MS
EKG QRS DURATION: 110 MS
EKG QRS DURATION: 110 MS
EKG QTC CALCULATION (BAZETT): 465 MS
EKG QTC CALCULATION (BAZETT): 549 MS
EKG R AXIS: -16 DEGREES
EKG R AXIS: -34 DEGREES
EKG T AXIS: -174 DEGREES
EKG T AXIS: 177 DEGREES
EKG VENTRICULAR RATE: 66 BPM
EKG VENTRICULAR RATE: 88 BPM
GFR AFRICAN AMERICAN: 56 ML/MIN
GFR NON-AFRICAN AMERICAN: 46 ML/MIN
GFR SERPL CREATININE-BSD FRML MDRD: ABNORMAL ML/MIN/{1.73_M2}
GFR SERPL CREATININE-BSD FRML MDRD: ABNORMAL ML/MIN/{1.73_M2}
GLUCOSE BLD-MCNC: 107 MG/DL (ref 70–99)
HCT VFR BLD CALC: 32.8 % (ref 36.3–47.1)
HDLC SERPL-MCNC: 31 MG/DL
HEMOGLOBIN: 8.9 G/DL (ref 11.9–15.1)
LDL CHOLESTEROL: 48 MG/DL (ref 0–130)
MAGNESIUM: 1.9 MG/DL (ref 1.6–2.6)
MCH RBC QN AUTO: 24 PG (ref 25.2–33.5)
MCHC RBC AUTO-ENTMCNC: 27.1 G/DL (ref 28.4–34.8)
MCV RBC AUTO: 88.4 FL (ref 82.6–102.9)
NRBC AUTOMATED: 0 PER 100 WBC
PDW BLD-RTO: 19.9 % (ref 11.8–14.4)
PLATELET # BLD: 216 K/UL (ref 138–453)
PMV BLD AUTO: 11.2 FL (ref 8.1–13.5)
POTASSIUM SERPL-SCNC: 4.2 MMOL/L (ref 3.7–5.3)
PRO-BNP: 6831 PG/ML
RBC # BLD: 3.71 M/UL (ref 3.95–5.11)
SODIUM BLD-SCNC: 143 MMOL/L (ref 135–144)
TRIGL SERPL-MCNC: 80 MG/DL
TSH SERPL DL<=0.05 MIU/L-ACNC: 4.82 MIU/L (ref 0.3–5)
VLDLC SERPL CALC-MCNC: ABNORMAL MG/DL (ref 1–30)
WBC # BLD: 4.3 K/UL (ref 3.5–11.3)

## 2019-10-20 PROCEDURE — 85027 COMPLETE CBC AUTOMATED: CPT

## 2019-10-20 PROCEDURE — 80061 LIPID PANEL: CPT

## 2019-10-20 PROCEDURE — 84443 ASSAY THYROID STIM HORMONE: CPT

## 2019-10-20 PROCEDURE — 6360000002 HC RX W HCPCS: Performed by: NURSE PRACTITIONER

## 2019-10-20 PROCEDURE — 36415 COLL VENOUS BLD VENIPUNCTURE: CPT

## 2019-10-20 PROCEDURE — 6370000000 HC RX 637 (ALT 250 FOR IP): Performed by: NURSE PRACTITIONER

## 2019-10-20 PROCEDURE — 83880 ASSAY OF NATRIURETIC PEPTIDE: CPT

## 2019-10-20 PROCEDURE — 83735 ASSAY OF MAGNESIUM: CPT

## 2019-10-20 PROCEDURE — 93005 ELECTROCARDIOGRAM TRACING: CPT | Performed by: INTERNAL MEDICINE

## 2019-10-20 PROCEDURE — 2060000000 HC ICU INTERMEDIATE R&B

## 2019-10-20 PROCEDURE — 94761 N-INVAS EAR/PLS OXIMETRY MLT: CPT

## 2019-10-20 PROCEDURE — 2580000003 HC RX 258: Performed by: INTERNAL MEDICINE

## 2019-10-20 PROCEDURE — 6370000000 HC RX 637 (ALT 250 FOR IP): Performed by: INTERNAL MEDICINE

## 2019-10-20 PROCEDURE — 2700000000 HC OXYGEN THERAPY PER DAY

## 2019-10-20 PROCEDURE — 71045 X-RAY EXAM CHEST 1 VIEW: CPT

## 2019-10-20 PROCEDURE — 97163 PT EVAL HIGH COMPLEX 45 MIN: CPT

## 2019-10-20 PROCEDURE — 97530 THERAPEUTIC ACTIVITIES: CPT

## 2019-10-20 PROCEDURE — 80048 BASIC METABOLIC PNL TOTAL CA: CPT

## 2019-10-20 PROCEDURE — 99232 SBSQ HOSP IP/OBS MODERATE 35: CPT | Performed by: INTERNAL MEDICINE

## 2019-10-20 RX ORDER — ALPRAZOLAM 0.25 MG/1
0.25 TABLET ORAL 4 TIMES DAILY PRN
Status: DISCONTINUED | OUTPATIENT
Start: 2019-10-20 | End: 2019-10-22

## 2019-10-20 RX ORDER — MAGNESIUM SULFATE 1 G/100ML
1 INJECTION INTRAVENOUS
Status: COMPLETED | OUTPATIENT
Start: 2019-10-20 | End: 2019-10-20

## 2019-10-20 RX ADMIN — AMIODARONE HYDROCHLORIDE 400 MG: 200 TABLET ORAL at 08:31

## 2019-10-20 RX ADMIN — SPIRONOLACTONE 25 MG: 25 TABLET ORAL at 08:30

## 2019-10-20 RX ADMIN — SODIUM CHLORIDE, PRESERVATIVE FREE 10 ML: 5 INJECTION INTRAVENOUS at 21:39

## 2019-10-20 RX ADMIN — SACUBITRIL AND VALSARTAN 1 TABLET: 49; 51 TABLET, FILM COATED ORAL at 08:31

## 2019-10-20 RX ADMIN — FAMOTIDINE 20 MG: 20 TABLET, FILM COATED ORAL at 08:31

## 2019-10-20 RX ADMIN — METOPROLOL SUCCINATE 25 MG: 25 TABLET, EXTENDED RELEASE ORAL at 08:31

## 2019-10-20 RX ADMIN — MAGNESIUM SULFATE HEPTAHYDRATE 1 G: 1 INJECTION, SOLUTION INTRAVENOUS at 14:28

## 2019-10-20 RX ADMIN — FUROSEMIDE 40 MG: 40 TABLET ORAL at 08:30

## 2019-10-20 RX ADMIN — ASPIRIN 81 MG 81 MG: 81 TABLET ORAL at 08:30

## 2019-10-20 RX ADMIN — MAGNESIUM SULFATE HEPTAHYDRATE 1 G: 1 INJECTION, SOLUTION INTRAVENOUS at 13:16

## 2019-10-20 RX ADMIN — ALPRAZOLAM 0.25 MG: 0.25 TABLET ORAL at 22:42

## 2019-10-20 RX ADMIN — POTASSIUM CHLORIDE 20 MEQ: 750 CAPSULE, EXTENDED RELEASE ORAL at 08:30

## 2019-10-20 RX ADMIN — FAMOTIDINE 20 MG: 20 TABLET, FILM COATED ORAL at 21:36

## 2019-10-20 RX ADMIN — SODIUM CHLORIDE, PRESERVATIVE FREE 10 ML: 5 INJECTION INTRAVENOUS at 08:31

## 2019-10-20 RX ADMIN — ATORVASTATIN CALCIUM 80 MG: 80 TABLET, FILM COATED ORAL at 21:36

## 2019-10-20 RX ADMIN — ALPRAZOLAM 0.25 MG: 0.25 TABLET ORAL at 12:47

## 2019-10-20 RX ADMIN — FUROSEMIDE 40 MG: 40 TABLET ORAL at 17:50

## 2019-10-20 RX ADMIN — RIVAROXABAN 20 MG: 20 TABLET, FILM COATED ORAL at 17:50

## 2019-10-20 RX ADMIN — SERTRALINE HYDROCHLORIDE 25 MG: 50 TABLET ORAL at 08:30

## 2019-10-20 ASSESSMENT — PAIN SCALES - GENERAL
PAINLEVEL_OUTOF10: 0
PAINLEVEL_OUTOF10: 4

## 2019-10-20 ASSESSMENT — PAIN DESCRIPTION - LOCATION: LOCATION: COCCYX

## 2019-10-20 ASSESSMENT — PAIN DESCRIPTION - PAIN TYPE: TYPE: ACUTE PAIN

## 2019-10-21 ENCOUNTER — FOLLOWUP TELEPHONE ENCOUNTER (OUTPATIENT)
Dept: WOUND CARE | Age: 69
End: 2019-10-21

## 2019-10-21 PROBLEM — F41.9 ANXIETY: Status: ACTIVE | Noted: 2019-10-21

## 2019-10-21 PROBLEM — F42.9 OCD (OBSESSIVE COMPULSIVE DISORDER): Status: ACTIVE | Noted: 2019-10-21

## 2019-10-21 PROBLEM — R94.31: Status: ACTIVE | Noted: 2019-10-21

## 2019-10-21 LAB
ANION GAP SERPL CALCULATED.3IONS-SCNC: 10 MMOL/L (ref 9–17)
BUN BLDV-MCNC: 25 MG/DL (ref 8–23)
BUN/CREAT BLD: 19 (ref 9–20)
CALCIUM SERPL-MCNC: 8.4 MG/DL (ref 8.6–10.4)
CHLORIDE BLD-SCNC: 105 MMOL/L (ref 98–107)
CO2: 29 MMOL/L (ref 20–31)
CREAT SERPL-MCNC: 1.31 MG/DL (ref 0.5–0.9)
GFR AFRICAN AMERICAN: 49 ML/MIN
GFR NON-AFRICAN AMERICAN: 40 ML/MIN
GFR SERPL CREATININE-BSD FRML MDRD: ABNORMAL ML/MIN/{1.73_M2}
GFR SERPL CREATININE-BSD FRML MDRD: ABNORMAL ML/MIN/{1.73_M2}
GLUCOSE BLD-MCNC: 79 MG/DL (ref 70–99)
MAGNESIUM: 2 MG/DL (ref 1.6–2.6)
POTASSIUM SERPL-SCNC: 4.7 MMOL/L (ref 3.7–5.3)
SODIUM BLD-SCNC: 144 MMOL/L (ref 135–144)

## 2019-10-21 PROCEDURE — 6370000000 HC RX 637 (ALT 250 FOR IP): Performed by: INTERNAL MEDICINE

## 2019-10-21 PROCEDURE — 99232 SBSQ HOSP IP/OBS MODERATE 35: CPT | Performed by: INTERNAL MEDICINE

## 2019-10-21 PROCEDURE — 6370000000 HC RX 637 (ALT 250 FOR IP): Performed by: EMERGENCY MEDICINE

## 2019-10-21 PROCEDURE — 97535 SELF CARE MNGMENT TRAINING: CPT

## 2019-10-21 PROCEDURE — 97530 THERAPEUTIC ACTIVITIES: CPT

## 2019-10-21 PROCEDURE — 97116 GAIT TRAINING THERAPY: CPT

## 2019-10-21 PROCEDURE — 36415 COLL VENOUS BLD VENIPUNCTURE: CPT

## 2019-10-21 PROCEDURE — 80048 BASIC METABOLIC PNL TOTAL CA: CPT

## 2019-10-21 PROCEDURE — 97110 THERAPEUTIC EXERCISES: CPT

## 2019-10-21 PROCEDURE — 97164 PT RE-EVAL EST PLAN CARE: CPT

## 2019-10-21 PROCEDURE — 93005 ELECTROCARDIOGRAM TRACING: CPT | Performed by: NURSE PRACTITIONER

## 2019-10-21 PROCEDURE — 2060000000 HC ICU INTERMEDIATE R&B

## 2019-10-21 PROCEDURE — 83735 ASSAY OF MAGNESIUM: CPT

## 2019-10-21 PROCEDURE — 97166 OT EVAL MOD COMPLEX 45 MIN: CPT

## 2019-10-21 PROCEDURE — 94762 N-INVAS EAR/PLS OXIMTRY CONT: CPT

## 2019-10-21 PROCEDURE — 2700000000 HC OXYGEN THERAPY PER DAY

## 2019-10-21 PROCEDURE — 2580000003 HC RX 258: Performed by: INTERNAL MEDICINE

## 2019-10-21 RX ORDER — FAMOTIDINE 20 MG/1
20 TABLET, FILM COATED ORAL DAILY
Status: DISCONTINUED | OUTPATIENT
Start: 2019-10-22 | End: 2019-10-24

## 2019-10-21 RX ADMIN — ACETAMINOPHEN 650 MG: 325 TABLET ORAL at 09:14

## 2019-10-21 RX ADMIN — SODIUM CHLORIDE, PRESERVATIVE FREE 10 ML: 5 INJECTION INTRAVENOUS at 20:45

## 2019-10-21 RX ADMIN — ASPIRIN 81 MG 81 MG: 81 TABLET ORAL at 08:29

## 2019-10-21 RX ADMIN — POTASSIUM BICARBONATE 50 MEQ: 391 TABLET, EFFERVESCENT ORAL at 08:29

## 2019-10-21 RX ADMIN — METOPROLOL SUCCINATE 25 MG: 25 TABLET, EXTENDED RELEASE ORAL at 08:29

## 2019-10-21 RX ADMIN — POTASSIUM CHLORIDE 20 MEQ: 750 CAPSULE, EXTENDED RELEASE ORAL at 08:29

## 2019-10-21 RX ADMIN — SACUBITRIL AND VALSARTAN 1 TABLET: 49; 51 TABLET, FILM COATED ORAL at 20:44

## 2019-10-21 RX ADMIN — ATORVASTATIN CALCIUM 80 MG: 80 TABLET, FILM COATED ORAL at 20:44

## 2019-10-21 RX ADMIN — FUROSEMIDE 40 MG: 40 TABLET ORAL at 17:18

## 2019-10-21 RX ADMIN — SODIUM CHLORIDE, PRESERVATIVE FREE 10 ML: 5 INJECTION INTRAVENOUS at 08:30

## 2019-10-21 RX ADMIN — FAMOTIDINE 20 MG: 20 TABLET, FILM COATED ORAL at 08:29

## 2019-10-21 RX ADMIN — SPIRONOLACTONE 25 MG: 25 TABLET ORAL at 08:29

## 2019-10-21 RX ADMIN — SACUBITRIL AND VALSARTAN 1 TABLET: 49; 51 TABLET, FILM COATED ORAL at 08:29

## 2019-10-21 RX ADMIN — FUROSEMIDE 40 MG: 40 TABLET ORAL at 08:30

## 2019-10-21 RX ADMIN — RIVAROXABAN 20 MG: 20 TABLET, FILM COATED ORAL at 17:18

## 2019-10-21 ASSESSMENT — PAIN DESCRIPTION - LOCATION
LOCATION: COCCYX

## 2019-10-21 ASSESSMENT — PAIN SCALES - GENERAL
PAINLEVEL_OUTOF10: 0
PAINLEVEL_OUTOF10: 9
PAINLEVEL_OUTOF10: 7
PAINLEVEL_OUTOF10: 0
PAINLEVEL_OUTOF10: 3
PAINLEVEL_OUTOF10: 7

## 2019-10-21 ASSESSMENT — PAIN DESCRIPTION - FREQUENCY: FREQUENCY: CONTINUOUS

## 2019-10-21 ASSESSMENT — PAIN DESCRIPTION - PAIN TYPE
TYPE: ACUTE PAIN
TYPE: ACUTE PAIN

## 2019-10-21 ASSESSMENT — PAIN DESCRIPTION - DESCRIPTORS: DESCRIPTORS: ACHING

## 2019-10-22 ENCOUNTER — APPOINTMENT (OUTPATIENT)
Dept: GENERAL RADIOLOGY | Age: 69
DRG: 265 | End: 2019-10-22
Payer: MEDICARE

## 2019-10-22 PROBLEM — D50.9 IRON DEFICIENCY ANEMIA: Status: ACTIVE | Noted: 2019-10-22

## 2019-10-22 PROBLEM — I47.29 NSVT (NONSUSTAINED VENTRICULAR TACHYCARDIA): Status: ACTIVE | Noted: 2019-10-22

## 2019-10-22 PROBLEM — I47.20 VENTRICULAR TACHYCARDIA: Status: ACTIVE | Noted: 2019-10-22

## 2019-10-22 PROBLEM — E87.6 HYPOKALEMIA: Status: ACTIVE | Noted: 2019-10-22

## 2019-10-22 LAB
ABSOLUTE RETIC #: 0.06 M/UL (ref 0.03–0.08)
ANION GAP SERPL CALCULATED.3IONS-SCNC: 13 MMOL/L (ref 9–17)
BUN BLDV-MCNC: 27 MG/DL (ref 8–23)
BUN/CREAT BLD: 24 (ref 9–20)
CALCIUM IONIZED: 1.13 MMOL/L (ref 1.13–1.33)
CALCIUM SERPL-MCNC: 8.5 MG/DL (ref 8.6–10.4)
CHLORIDE BLD-SCNC: 100 MMOL/L (ref 98–107)
CO2: 29 MMOL/L (ref 20–31)
CREAT SERPL-MCNC: 1.11 MG/DL (ref 0.5–0.9)
EKG ATRIAL RATE: 61 BPM
EKG ATRIAL RATE: 85 BPM
EKG Q-T INTERVAL: 408 MS
EKG Q-T INTERVAL: 418 MS
EKG QRS DURATION: 104 MS
EKG QRS DURATION: 108 MS
EKG QTC CALCULATION (BAZETT): 433 MS
EKG QTC CALCULATION (BAZETT): 460 MS
EKG R AXIS: -11 DEGREES
EKG R AXIS: 11 DEGREES
EKG T AXIS: -166 DEGREES
EKG T AXIS: -170 DEGREES
EKG VENTRICULAR RATE: 68 BPM
EKG VENTRICULAR RATE: 73 BPM
FERRITIN: 60 UG/L (ref 13–150)
FOLATE: 7.7 NG/ML
GFR AFRICAN AMERICAN: 59 ML/MIN
GFR NON-AFRICAN AMERICAN: 49 ML/MIN
GFR SERPL CREATININE-BSD FRML MDRD: ABNORMAL ML/MIN/{1.73_M2}
GFR SERPL CREATININE-BSD FRML MDRD: ABNORMAL ML/MIN/{1.73_M2}
GLUCOSE BLD-MCNC: 118 MG/DL (ref 70–99)
HCT VFR BLD CALC: 33.9 % (ref 36.3–47.1)
HEMOGLOBIN: 9.5 G/DL (ref 11.9–15.1)
IMMATURE RETIC FRACT: 26.8 % (ref 2.7–18.3)
IRON SATURATION: 7 % (ref 20–55)
IRON: 26 UG/DL (ref 37–145)
MAGNESIUM: 1.8 MG/DL (ref 1.6–2.6)
MAGNESIUM: 2.3 MG/DL (ref 1.6–2.6)
POTASSIUM SERPL-SCNC: 3.4 MMOL/L (ref 3.7–5.3)
POTASSIUM SERPL-SCNC: 4.4 MMOL/L (ref 3.7–5.3)
RETIC %: 1.5 % (ref 0.5–1.9)
RETIC HEMOGLOBIN: 21 PG (ref 28.2–35.7)
SODIUM BLD-SCNC: 142 MMOL/L (ref 135–144)
TOTAL IRON BINDING CAPACITY: 371 UG/DL (ref 250–450)
UNSATURATED IRON BINDING CAPACITY: 345 UG/DL (ref 112–347)
VITAMIN B-12: 561 PG/ML (ref 232–1245)

## 2019-10-22 PROCEDURE — 82746 ASSAY OF FOLIC ACID SERUM: CPT

## 2019-10-22 PROCEDURE — 85018 HEMOGLOBIN: CPT

## 2019-10-22 PROCEDURE — 6370000000 HC RX 637 (ALT 250 FOR IP): Performed by: INTERNAL MEDICINE

## 2019-10-22 PROCEDURE — 36415 COLL VENOUS BLD VENIPUNCTURE: CPT

## 2019-10-22 PROCEDURE — 83550 IRON BINDING TEST: CPT

## 2019-10-22 PROCEDURE — 97110 THERAPEUTIC EXERCISES: CPT

## 2019-10-22 PROCEDURE — 84132 ASSAY OF SERUM POTASSIUM: CPT

## 2019-10-22 PROCEDURE — 6370000000 HC RX 637 (ALT 250 FOR IP): Performed by: EMERGENCY MEDICINE

## 2019-10-22 PROCEDURE — 97530 THERAPEUTIC ACTIVITIES: CPT

## 2019-10-22 PROCEDURE — 2060000000 HC ICU INTERMEDIATE R&B

## 2019-10-22 PROCEDURE — 2580000003 HC RX 258: Performed by: INTERNAL MEDICINE

## 2019-10-22 PROCEDURE — 85045 AUTOMATED RETICULOCYTE COUNT: CPT

## 2019-10-22 PROCEDURE — 99232 SBSQ HOSP IP/OBS MODERATE 35: CPT | Performed by: INTERNAL MEDICINE

## 2019-10-22 PROCEDURE — 93005 ELECTROCARDIOGRAM TRACING: CPT | Performed by: INTERNAL MEDICINE

## 2019-10-22 PROCEDURE — 80048 BASIC METABOLIC PNL TOTAL CA: CPT

## 2019-10-22 PROCEDURE — 82607 VITAMIN B-12: CPT

## 2019-10-22 PROCEDURE — 6360000002 HC RX W HCPCS: Performed by: INTERNAL MEDICINE

## 2019-10-22 PROCEDURE — 90792 PSYCH DIAG EVAL W/MED SRVCS: CPT | Performed by: NURSE PRACTITIONER

## 2019-10-22 PROCEDURE — 83540 ASSAY OF IRON: CPT

## 2019-10-22 PROCEDURE — 97535 SELF CARE MNGMENT TRAINING: CPT

## 2019-10-22 PROCEDURE — 82330 ASSAY OF CALCIUM: CPT

## 2019-10-22 PROCEDURE — 85014 HEMATOCRIT: CPT

## 2019-10-22 PROCEDURE — 83735 ASSAY OF MAGNESIUM: CPT

## 2019-10-22 PROCEDURE — 82728 ASSAY OF FERRITIN: CPT

## 2019-10-22 PROCEDURE — 71045 X-RAY EXAM CHEST 1 VIEW: CPT

## 2019-10-22 RX ORDER — MAGNESIUM SULFATE 1 G/100ML
1 INJECTION INTRAVENOUS
Status: DISPENSED | OUTPATIENT
Start: 2019-10-22 | End: 2019-10-22

## 2019-10-22 RX ORDER — POTASSIUM CHLORIDE 20 MEQ/1
40 TABLET, EXTENDED RELEASE ORAL PRN
Status: DISCONTINUED | OUTPATIENT
Start: 2019-10-22 | End: 2019-10-28 | Stop reason: HOSPADM

## 2019-10-22 RX ORDER — MAGNESIUM SULFATE 1 G/100ML
1 INJECTION INTRAVENOUS
Status: COMPLETED | OUTPATIENT
Start: 2019-10-22 | End: 2019-10-22

## 2019-10-22 RX ORDER — FUROSEMIDE 40 MG/1
40 TABLET ORAL 2 TIMES DAILY
Status: DISCONTINUED | OUTPATIENT
Start: 2019-10-22 | End: 2019-10-25

## 2019-10-22 RX ORDER — POTASSIUM CHLORIDE 20 MEQ/1
40 TABLET, EXTENDED RELEASE ORAL ONCE
Status: COMPLETED | OUTPATIENT
Start: 2019-10-22 | End: 2019-10-22

## 2019-10-22 RX ORDER — POTASSIUM CHLORIDE 7.45 MG/ML
10 INJECTION INTRAVENOUS PRN
Status: DISCONTINUED | OUTPATIENT
Start: 2019-10-22 | End: 2019-10-28 | Stop reason: HOSPADM

## 2019-10-22 RX ORDER — CLONAZEPAM 0.5 MG/1
0.5 TABLET ORAL 2 TIMES DAILY PRN
Status: DISCONTINUED | OUTPATIENT
Start: 2019-10-22 | End: 2019-10-28 | Stop reason: HOSPADM

## 2019-10-22 RX ADMIN — SPIRONOLACTONE 25 MG: 25 TABLET ORAL at 08:00

## 2019-10-22 RX ADMIN — FUROSEMIDE 40 MG: 40 TABLET ORAL at 08:00

## 2019-10-22 RX ADMIN — POTASSIUM BICARBONATE 30 MEQ: 391 TABLET, EFFERVESCENT ORAL at 20:51

## 2019-10-22 RX ADMIN — POTASSIUM CHLORIDE 20 MEQ: 750 CAPSULE, EXTENDED RELEASE ORAL at 07:48

## 2019-10-22 RX ADMIN — MAGNESIUM SULFATE HEPTAHYDRATE 1 G: 1 INJECTION, SOLUTION INTRAVENOUS at 11:42

## 2019-10-22 RX ADMIN — SACUBITRIL AND VALSARTAN 1 TABLET: 49; 51 TABLET, FILM COATED ORAL at 08:00

## 2019-10-22 RX ADMIN — ASPIRIN 81 MG 81 MG: 81 TABLET ORAL at 08:00

## 2019-10-22 RX ADMIN — MAGNESIUM SULFATE HEPTAHYDRATE 1 G: 1 INJECTION, SOLUTION INTRAVENOUS at 10:28

## 2019-10-22 RX ADMIN — POTASSIUM CHLORIDE 40 MEQ: 20 TABLET, EXTENDED RELEASE ORAL at 10:42

## 2019-10-22 RX ADMIN — SODIUM CHLORIDE, PRESERVATIVE FREE 10 ML: 5 INJECTION INTRAVENOUS at 08:00

## 2019-10-22 RX ADMIN — SACUBITRIL AND VALSARTAN 1 TABLET: 49; 51 TABLET, FILM COATED ORAL at 20:51

## 2019-10-22 RX ADMIN — MAGNESIUM GLUCONATE 500 MG ORAL TABLET 800 MG: 500 TABLET ORAL at 10:41

## 2019-10-22 RX ADMIN — SODIUM CHLORIDE, PRESERVATIVE FREE 10 ML: 5 INJECTION INTRAVENOUS at 20:56

## 2019-10-22 RX ADMIN — FAMOTIDINE 20 MG: 20 TABLET, FILM COATED ORAL at 08:00

## 2019-10-22 RX ADMIN — METOPROLOL SUCCINATE 25 MG: 25 TABLET, EXTENDED RELEASE ORAL at 08:00

## 2019-10-22 RX ADMIN — RIVAROXABAN 20 MG: 20 TABLET, FILM COATED ORAL at 17:32

## 2019-10-22 RX ADMIN — IRON SUCROSE 300 MG: 20 INJECTION, SOLUTION INTRAVENOUS at 09:47

## 2019-10-22 RX ADMIN — ATORVASTATIN CALCIUM 80 MG: 80 TABLET, FILM COATED ORAL at 20:51

## 2019-10-22 RX ADMIN — POTASSIUM BICARBONATE 50 MEQ: 391 TABLET, EFFERVESCENT ORAL at 07:46

## 2019-10-22 RX ADMIN — MAGNESIUM GLUCONATE 500 MG ORAL TABLET 800 MG: 500 TABLET ORAL at 20:54

## 2019-10-22 ASSESSMENT — PAIN SCALES - GENERAL
PAINLEVEL_OUTOF10: 0

## 2019-10-22 ASSESSMENT — PAIN DESCRIPTION - FREQUENCY: FREQUENCY: CONTINUOUS

## 2019-10-22 ASSESSMENT — PAIN DESCRIPTION - DESCRIPTORS: DESCRIPTORS: ACHING

## 2019-10-23 ENCOUNTER — ANESTHESIA (OUTPATIENT)
Dept: CARDIAC CATH/INVASIVE PROCEDURES | Age: 69
End: 2019-10-23

## 2019-10-23 ENCOUNTER — HOSPITAL ENCOUNTER (OUTPATIENT)
Dept: CARDIAC CATH/INVASIVE PROCEDURES | Age: 69
Discharge: HOME | End: 2019-10-23
Payer: MEDICARE

## 2019-10-23 ENCOUNTER — ANESTHESIA EVENT (OUTPATIENT)
Dept: CARDIAC CATH/INVASIVE PROCEDURES | Age: 69
End: 2019-10-23

## 2019-10-23 VITALS
TEMPERATURE: 97.1 F | WEIGHT: 189 LBS | DIASTOLIC BLOOD PRESSURE: 66 MMHG | RESPIRATION RATE: 20 BRPM | SYSTOLIC BLOOD PRESSURE: 115 MMHG | OXYGEN SATURATION: 94 % | HEIGHT: 60 IN | HEART RATE: 87 BPM | BODY MASS INDEX: 37.11 KG/M2

## 2019-10-23 VITALS — OXYGEN SATURATION: 91 % | TEMPERATURE: 97 F | SYSTOLIC BLOOD PRESSURE: 102 MMHG | DIASTOLIC BLOOD PRESSURE: 56 MMHG

## 2019-10-23 LAB
ACTIVATED CLOTTING TIME: 324 SEC (ref 79–149)
ACTIVATED CLOTTING TIME: 328 SEC (ref 79–149)
ANION GAP SERPL CALCULATED.3IONS-SCNC: 10 MMOL/L (ref 9–17)
BUN BLDV-MCNC: 25 MG/DL (ref 8–23)
BUN/CREAT BLD: 25 (ref 9–20)
CALCIUM IONIZED: 1.2 MMOL/L (ref 1.13–1.33)
CALCIUM SERPL-MCNC: 9 MG/DL (ref 8.6–10.4)
CHLORIDE BLD-SCNC: 104 MMOL/L (ref 98–107)
CO2: 30 MMOL/L (ref 20–31)
CREAT SERPL-MCNC: 1.01 MG/DL (ref 0.5–0.9)
EKG ATRIAL RATE: 182 BPM
EKG Q-T INTERVAL: 410 MS
EKG QRS DURATION: 94 MS
EKG QTC CALCULATION (BAZETT): 419 MS
EKG R AXIS: 147 DEGREES
EKG T AXIS: -154 DEGREES
EKG VENTRICULAR RATE: 63 BPM
GFR AFRICAN AMERICAN: >60 ML/MIN
GFR NON-AFRICAN AMERICAN: 54 ML/MIN
GFR SERPL CREATININE-BSD FRML MDRD: ABNORMAL ML/MIN/{1.73_M2}
GFR SERPL CREATININE-BSD FRML MDRD: ABNORMAL ML/MIN/{1.73_M2}
GLUCOSE BLD-MCNC: 119 MG/DL (ref 70–99)
MAGNESIUM: 2.3 MG/DL (ref 1.6–2.6)
POTASSIUM SERPL-SCNC: 4.7 MMOL/L (ref 3.7–5.3)
SODIUM BLD-SCNC: 144 MMOL/L (ref 135–144)

## 2019-10-23 PROCEDURE — C1732 CATH, EP, DIAG/ABL, 3D/VECT: HCPCS

## 2019-10-23 PROCEDURE — 2060000000 HC ICU INTERMEDIATE R&B

## 2019-10-23 PROCEDURE — 93656 COMPRE EP EVAL ABLTJ ATR FIB: CPT | Performed by: INTERNAL MEDICINE

## 2019-10-23 PROCEDURE — 99232 SBSQ HOSP IP/OBS MODERATE 35: CPT | Performed by: NURSE PRACTITIONER

## 2019-10-23 PROCEDURE — 93325 DOPPLER ECHO COLOR FLOW MAPG: CPT

## 2019-10-23 PROCEDURE — 7100000001 HC PACU RECOVERY - ADDTL 15 MIN

## 2019-10-23 PROCEDURE — 3700000000 HC ANESTHESIA ATTENDED CARE

## 2019-10-23 PROCEDURE — C1894 INTRO/SHEATH, NON-LASER: HCPCS

## 2019-10-23 PROCEDURE — 2500000003 HC RX 250 WO HCPCS: Performed by: NURSE ANESTHETIST, CERTIFIED REGISTERED

## 2019-10-23 PROCEDURE — 93312 ECHO TRANSESOPHAGEAL: CPT

## 2019-10-23 PROCEDURE — 7100000000 HC PACU RECOVERY - FIRST 15 MIN

## 2019-10-23 PROCEDURE — 2700000000 HC OXYGEN THERAPY PER DAY

## 2019-10-23 PROCEDURE — 6360000002 HC RX W HCPCS

## 2019-10-23 PROCEDURE — 2580000003 HC RX 258: Performed by: INTERNAL MEDICINE

## 2019-10-23 PROCEDURE — 2720000010 HC SURG SUPPLY STERILE

## 2019-10-23 PROCEDURE — C1759 CATH, INTRA ECHOCARDIOGRAPHY: HCPCS

## 2019-10-23 PROCEDURE — 85347 COAGULATION TIME ACTIVATED: CPT

## 2019-10-23 PROCEDURE — 94660 CPAP INITIATION&MGMT: CPT

## 2019-10-23 PROCEDURE — 3700000001 HC ADD 15 MINUTES (ANESTHESIA)

## 2019-10-23 PROCEDURE — 93613 INTRACARDIAC EPHYS 3D MAPG: CPT | Performed by: INTERNAL MEDICINE

## 2019-10-23 PROCEDURE — 2709999900 HC NON-CHARGEABLE SUPPLY

## 2019-10-23 PROCEDURE — 6370000000 HC RX 637 (ALT 250 FOR IP): Performed by: INTERNAL MEDICINE

## 2019-10-23 PROCEDURE — 36415 COLL VENOUS BLD VENIPUNCTURE: CPT

## 2019-10-23 PROCEDURE — 93655 ICAR CATH ABLTJ DSCRT ARRHYT: CPT | Performed by: INTERNAL MEDICINE

## 2019-10-23 PROCEDURE — 6360000002 HC RX W HCPCS: Performed by: NURSE ANESTHETIST, CERTIFIED REGISTERED

## 2019-10-23 PROCEDURE — 2580000003 HC RX 258: Performed by: NURSE ANESTHETIST, CERTIFIED REGISTERED

## 2019-10-23 PROCEDURE — 2580000003 HC RX 258

## 2019-10-23 PROCEDURE — 93005 ELECTROCARDIOGRAM TRACING: CPT | Performed by: INTERNAL MEDICINE

## 2019-10-23 PROCEDURE — C1730 CATH, EP, 19 OR FEW ELECT: HCPCS

## 2019-10-23 PROCEDURE — 93662 INTRACARDIAC ECG (ICE): CPT | Performed by: INTERNAL MEDICINE

## 2019-10-23 PROCEDURE — C1769 GUIDE WIRE: HCPCS

## 2019-10-23 PROCEDURE — 83735 ASSAY OF MAGNESIUM: CPT

## 2019-10-23 PROCEDURE — 80048 BASIC METABOLIC PNL TOTAL CA: CPT

## 2019-10-23 PROCEDURE — 82330 ASSAY OF CALCIUM: CPT

## 2019-10-23 PROCEDURE — 94761 N-INVAS EAR/PLS OXIMETRY MLT: CPT

## 2019-10-23 RX ORDER — PROTAMINE SULFATE 10 MG/ML
INJECTION, SOLUTION INTRAVENOUS PRN
Status: DISCONTINUED | OUTPATIENT
Start: 2019-10-23 | End: 2019-10-23 | Stop reason: SDUPTHER

## 2019-10-23 RX ORDER — HEPARIN SODIUM 1000 [USP'U]/ML
INJECTION, SOLUTION INTRAVENOUS; SUBCUTANEOUS PRN
Status: DISCONTINUED | OUTPATIENT
Start: 2019-10-23 | End: 2019-10-23 | Stop reason: SDUPTHER

## 2019-10-23 RX ORDER — FENTANYL CITRATE 50 UG/ML
50 INJECTION, SOLUTION INTRAMUSCULAR; INTRAVENOUS EVERY 5 MIN PRN
Status: DISCONTINUED | OUTPATIENT
Start: 2019-10-23 | End: 2019-10-24 | Stop reason: HOSPADM

## 2019-10-23 RX ORDER — ROCURONIUM BROMIDE 10 MG/ML
INJECTION, SOLUTION INTRAVENOUS PRN
Status: DISCONTINUED | OUTPATIENT
Start: 2019-10-23 | End: 2019-10-23 | Stop reason: SDUPTHER

## 2019-10-23 RX ORDER — ONDANSETRON 2 MG/ML
4 INJECTION INTRAMUSCULAR; INTRAVENOUS
Status: ACTIVE | OUTPATIENT
Start: 2019-10-23 | End: 2019-10-23

## 2019-10-23 RX ORDER — ATROPINE SULFATE 0.1 MG/ML
INJECTION INTRAVENOUS PRN
Status: DISCONTINUED | OUTPATIENT
Start: 2019-10-23 | End: 2019-10-23 | Stop reason: SDUPTHER

## 2019-10-23 RX ORDER — FENTANYL CITRATE 50 UG/ML
INJECTION, SOLUTION INTRAMUSCULAR; INTRAVENOUS PRN
Status: DISCONTINUED | OUTPATIENT
Start: 2019-10-23 | End: 2019-10-23 | Stop reason: SDUPTHER

## 2019-10-23 RX ORDER — DEXAMETHASONE SODIUM PHOSPHATE 10 MG/ML
INJECTION INTRAMUSCULAR; INTRAVENOUS PRN
Status: DISCONTINUED | OUTPATIENT
Start: 2019-10-23 | End: 2019-10-23 | Stop reason: SDUPTHER

## 2019-10-23 RX ORDER — PROPOFOL 10 MG/ML
INJECTION, EMULSION INTRAVENOUS PRN
Status: DISCONTINUED | OUTPATIENT
Start: 2019-10-23 | End: 2019-10-23 | Stop reason: SDUPTHER

## 2019-10-23 RX ORDER — SODIUM CHLORIDE 9 MG/ML
INJECTION, SOLUTION INTRAVENOUS CONTINUOUS PRN
Status: DISCONTINUED | OUTPATIENT
Start: 2019-10-23 | End: 2019-10-23 | Stop reason: SDUPTHER

## 2019-10-23 RX ORDER — LIDOCAINE HYDROCHLORIDE 10 MG/ML
INJECTION, SOLUTION EPIDURAL; INFILTRATION; INTRACAUDAL; PERINEURAL PRN
Status: DISCONTINUED | OUTPATIENT
Start: 2019-10-23 | End: 2019-10-23 | Stop reason: SDUPTHER

## 2019-10-23 RX ORDER — SODIUM CHLORIDE 9 MG/ML
INJECTION, SOLUTION INTRAVENOUS CONTINUOUS
Status: DISCONTINUED | OUTPATIENT
Start: 2019-10-23 | End: 2019-10-24 | Stop reason: HOSPADM

## 2019-10-23 RX ORDER — FENTANYL CITRATE 50 UG/ML
25 INJECTION, SOLUTION INTRAMUSCULAR; INTRAVENOUS EVERY 5 MIN PRN
Status: DISCONTINUED | OUTPATIENT
Start: 2019-10-23 | End: 2019-10-24 | Stop reason: HOSPADM

## 2019-10-23 RX ADMIN — PROTAMINE SULFATE 10 MG: 10 INJECTION, SOLUTION INTRAVENOUS at 15:24

## 2019-10-23 RX ADMIN — PROPOFOL 100 MG: 10 INJECTION, EMULSION INTRAVENOUS at 13:01

## 2019-10-23 RX ADMIN — PHENYLEPHRINE HYDROCHLORIDE 25 MCG/MIN: 10 INJECTION INTRAVENOUS at 13:10

## 2019-10-23 RX ADMIN — ROCURONIUM BROMIDE 50 MG: 10 INJECTION INTRAVENOUS at 13:01

## 2019-10-23 RX ADMIN — RIVAROXABAN 20 MG: 20 TABLET, FILM COATED ORAL at 23:30

## 2019-10-23 RX ADMIN — MAGNESIUM GLUCONATE 500 MG ORAL TABLET 400 MG: 500 TABLET ORAL at 22:50

## 2019-10-23 RX ADMIN — FENTANYL CITRATE 50 MCG: 50 INJECTION INTRAMUSCULAR; INTRAVENOUS at 14:11

## 2019-10-23 RX ADMIN — ATORVASTATIN CALCIUM 80 MG: 80 TABLET, FILM COATED ORAL at 22:50

## 2019-10-23 RX ADMIN — PROTAMINE SULFATE 10 MG: 10 INJECTION, SOLUTION INTRAVENOUS at 15:31

## 2019-10-23 RX ADMIN — ATROPINE SULFATE 0.8 MG: 0.1 INJECTION, SOLUTION INTRAVENOUS at 15:30

## 2019-10-23 RX ADMIN — PROTAMINE SULFATE 10 MG: 10 INJECTION, SOLUTION INTRAVENOUS at 15:23

## 2019-10-23 RX ADMIN — PROTAMINE SULFATE 10 MG: 10 INJECTION, SOLUTION INTRAVENOUS at 15:29

## 2019-10-23 RX ADMIN — DEXAMETHASONE SODIUM PHOSPHATE 10 MG: 10 INJECTION INTRAMUSCULAR; INTRAVENOUS at 13:10

## 2019-10-23 RX ADMIN — PROTAMINE SULFATE 10 MG: 10 INJECTION, SOLUTION INTRAVENOUS at 15:26

## 2019-10-23 RX ADMIN — SODIUM CHLORIDE, PRESERVATIVE FREE 10 ML: 5 INJECTION INTRAVENOUS at 22:51

## 2019-10-23 RX ADMIN — PROTAMINE SULFATE 10 MG: 10 INJECTION, SOLUTION INTRAVENOUS at 15:25

## 2019-10-23 RX ADMIN — ATROPINE SULFATE 0.1 MG: 0.1 INJECTION, SOLUTION INTRAVENOUS at 15:28

## 2019-10-23 RX ADMIN — HEPARIN SODIUM 10000 UNITS: 1000 INJECTION INTRAVENOUS; SUBCUTANEOUS at 13:38

## 2019-10-23 RX ADMIN — ROCURONIUM BROMIDE 10 MG: 10 INJECTION INTRAVENOUS at 15:13

## 2019-10-23 RX ADMIN — PROTAMINE SULFATE 10 MG: 10 INJECTION, SOLUTION INTRAVENOUS at 15:27

## 2019-10-23 RX ADMIN — SODIUM CHLORIDE: 900 INJECTION INTRAVENOUS at 12:42

## 2019-10-23 RX ADMIN — SODIUM CHLORIDE: 900 INJECTION INTRAVENOUS at 12:47

## 2019-10-23 RX ADMIN — ATROPINE SULFATE 0.1 MG: 0.1 INJECTION, SOLUTION INTRAVENOUS at 15:29

## 2019-10-23 RX ADMIN — SACUBITRIL AND VALSARTAN 1 TABLET: 49; 51 TABLET, FILM COATED ORAL at 22:50

## 2019-10-23 RX ADMIN — POTASSIUM BICARBONATE 20 MEQ: 782 TABLET, EFFERVESCENT ORAL at 22:50

## 2019-10-23 RX ADMIN — PHENYLEPHRINE HYDROCHLORIDE 200 MCG: 10 INJECTION INTRAVENOUS at 13:01

## 2019-10-23 RX ADMIN — FENTANYL CITRATE 50 MCG: 50 INJECTION INTRAMUSCULAR; INTRAVENOUS at 13:01

## 2019-10-23 RX ADMIN — LIDOCAINE HYDROCHLORIDE 50 MG: 10 INJECTION, SOLUTION EPIDURAL; INFILTRATION; INTRACAUDAL at 13:01

## 2019-10-23 RX ADMIN — ROCURONIUM BROMIDE 20 MG: 10 INJECTION INTRAVENOUS at 14:12

## 2019-10-23 RX ADMIN — PROTAMINE SULFATE 10 MG: 10 INJECTION, SOLUTION INTRAVENOUS at 15:30

## 2019-10-23 RX ADMIN — PROTAMINE SULFATE 10 MG: 10 INJECTION, SOLUTION INTRAVENOUS at 15:28

## 2019-10-23 RX ADMIN — PROTAMINE SULFATE 10 MG: 10 INJECTION, SOLUTION INTRAVENOUS at 15:22

## 2019-10-23 ASSESSMENT — PULMONARY FUNCTION TESTS
PIF_VALUE: 22
PIF_VALUE: 23
PIF_VALUE: 17
PIF_VALUE: 23
PIF_VALUE: 19
PIF_VALUE: 22
PIF_VALUE: 23
PIF_VALUE: 20
PIF_VALUE: 22
PIF_VALUE: 22
PIF_VALUE: 23
PIF_VALUE: 19
PIF_VALUE: 0
PIF_VALUE: 23
PIF_VALUE: 23
PIF_VALUE: 1
PIF_VALUE: 21
PIF_VALUE: 23
PIF_VALUE: 21
PIF_VALUE: 21
PIF_VALUE: 3
PIF_VALUE: 21
PIF_VALUE: 23
PIF_VALUE: 22
PIF_VALUE: 22
PIF_VALUE: 21
PIF_VALUE: 21
PIF_VALUE: 22
PIF_VALUE: 21
PIF_VALUE: 21
PIF_VALUE: 22
PIF_VALUE: 21
PIF_VALUE: 22
PIF_VALUE: 23
PIF_VALUE: 22
PIF_VALUE: 22
PIF_VALUE: 23
PIF_VALUE: 22
PIF_VALUE: 22
PIF_VALUE: 23
PIF_VALUE: 23
PIF_VALUE: 21
PIF_VALUE: 0
PIF_VALUE: 23
PIF_VALUE: 21
PIF_VALUE: 22
PIF_VALUE: 23
PIF_VALUE: 20
PIF_VALUE: 22
PIF_VALUE: 22
PIF_VALUE: 21
PIF_VALUE: 2
PIF_VALUE: 23
PIF_VALUE: 23
PIF_VALUE: 22
PIF_VALUE: 22
PIF_VALUE: 16
PIF_VALUE: 22
PIF_VALUE: 21
PIF_VALUE: 17
PIF_VALUE: 23
PIF_VALUE: 1
PIF_VALUE: 21
PIF_VALUE: 21
PIF_VALUE: 23
PIF_VALUE: 22
PIF_VALUE: 23
PIF_VALUE: 21
PIF_VALUE: 3
PIF_VALUE: 22
PIF_VALUE: 23
PIF_VALUE: 1
PIF_VALUE: 23
PIF_VALUE: 23
PIF_VALUE: 22
PIF_VALUE: 23
PIF_VALUE: 21
PIF_VALUE: 2
PIF_VALUE: 22
PIF_VALUE: 2
PIF_VALUE: 20
PIF_VALUE: 3
PIF_VALUE: 22
PIF_VALUE: 23
PIF_VALUE: 23
PIF_VALUE: 22
PIF_VALUE: 21
PIF_VALUE: 23
PIF_VALUE: 22
PIF_VALUE: 22
PIF_VALUE: 1
PIF_VALUE: 22
PIF_VALUE: 21
PIF_VALUE: 22
PIF_VALUE: 23
PIF_VALUE: 22
PIF_VALUE: 23
PIF_VALUE: 22
PIF_VALUE: 23
PIF_VALUE: 22
PIF_VALUE: 22
PIF_VALUE: 1
PIF_VALUE: 22
PIF_VALUE: 1
PIF_VALUE: 23
PIF_VALUE: 22
PIF_VALUE: 23
PIF_VALUE: 22
PIF_VALUE: 22
PIF_VALUE: 1
PIF_VALUE: 1
PIF_VALUE: 22
PIF_VALUE: 23
PIF_VALUE: 22
PIF_VALUE: 21
PIF_VALUE: 5
PIF_VALUE: 4
PIF_VALUE: 22
PIF_VALUE: 4
PIF_VALUE: 23
PIF_VALUE: 22
PIF_VALUE: 17
PIF_VALUE: 22
PIF_VALUE: 22
PIF_VALUE: 23
PIF_VALUE: 20
PIF_VALUE: 21
PIF_VALUE: 1
PIF_VALUE: 20
PIF_VALUE: 21
PIF_VALUE: 24
PIF_VALUE: 21
PIF_VALUE: 19
PIF_VALUE: 22
PIF_VALUE: 23
PIF_VALUE: 21
PIF_VALUE: 2
PIF_VALUE: 22
PIF_VALUE: 21
PIF_VALUE: 21
PIF_VALUE: 22
PIF_VALUE: 23
PIF_VALUE: 22
PIF_VALUE: 21
PIF_VALUE: 22
PIF_VALUE: 3
PIF_VALUE: 20
PIF_VALUE: 3
PIF_VALUE: 26
PIF_VALUE: 23
PIF_VALUE: 19
PIF_VALUE: 22
PIF_VALUE: 23
PIF_VALUE: 22
PIF_VALUE: 21
PIF_VALUE: 1
PIF_VALUE: 22
PIF_VALUE: 1
PIF_VALUE: 22
PIF_VALUE: 23
PIF_VALUE: 21
PIF_VALUE: 22
PIF_VALUE: 22
PIF_VALUE: 8
PIF_VALUE: 0
PIF_VALUE: 23

## 2019-10-23 ASSESSMENT — PAIN SCALES - GENERAL
PAINLEVEL_OUTOF10: 4
PAINLEVEL_OUTOF10: 0

## 2019-10-23 ASSESSMENT — PAIN DESCRIPTION - FREQUENCY: FREQUENCY: INTERMITTENT

## 2019-10-23 ASSESSMENT — PAIN DESCRIPTION - ORIENTATION: ORIENTATION: RIGHT

## 2019-10-23 ASSESSMENT — PAIN DESCRIPTION - DESCRIPTORS: DESCRIPTORS: ACHING;DISCOMFORT

## 2019-10-23 ASSESSMENT — PAIN DESCRIPTION - ONSET: ONSET: GRADUAL

## 2019-10-23 ASSESSMENT — PAIN DESCRIPTION - PAIN TYPE: TYPE: SURGICAL PAIN

## 2019-10-23 ASSESSMENT — PAIN DESCRIPTION - LOCATION: LOCATION: GROIN

## 2019-10-24 ENCOUNTER — APPOINTMENT (OUTPATIENT)
Dept: GENERAL RADIOLOGY | Age: 69
DRG: 265 | End: 2019-10-24
Payer: MEDICARE

## 2019-10-24 LAB
ANION GAP SERPL CALCULATED.3IONS-SCNC: 10 MMOL/L (ref 9–17)
BUN BLDV-MCNC: 24 MG/DL (ref 8–23)
BUN/CREAT BLD: 25 (ref 9–20)
CALCIUM IONIZED: 1.17 MMOL/L (ref 1.13–1.33)
CALCIUM SERPL-MCNC: 9 MG/DL (ref 8.6–10.4)
CHLORIDE BLD-SCNC: 104 MMOL/L (ref 98–107)
CO2: 28 MMOL/L (ref 20–31)
CREAT SERPL-MCNC: 0.95 MG/DL (ref 0.5–0.9)
GFR AFRICAN AMERICAN: >60 ML/MIN
GFR NON-AFRICAN AMERICAN: 58 ML/MIN
GFR SERPL CREATININE-BSD FRML MDRD: ABNORMAL ML/MIN/{1.73_M2}
GFR SERPL CREATININE-BSD FRML MDRD: ABNORMAL ML/MIN/{1.73_M2}
GLUCOSE BLD-MCNC: 136 MG/DL (ref 70–99)
MAGNESIUM: 2.3 MG/DL (ref 1.6–2.6)
POTASSIUM SERPL-SCNC: 5.1 MMOL/L (ref 3.7–5.3)
POTASSIUM SERPL-SCNC: 5.4 MMOL/L (ref 3.7–5.3)
SODIUM BLD-SCNC: 142 MMOL/L (ref 135–144)

## 2019-10-24 PROCEDURE — 2060000000 HC ICU INTERMEDIATE R&B

## 2019-10-24 PROCEDURE — 2500000003 HC RX 250 WO HCPCS

## 2019-10-24 PROCEDURE — 80048 BASIC METABOLIC PNL TOTAL CA: CPT

## 2019-10-24 PROCEDURE — 33216 INSERT 1 ELECTRODE PM-DEFIB: CPT | Performed by: INTERNAL MEDICINE

## 2019-10-24 PROCEDURE — 97530 THERAPEUTIC ACTIVITIES: CPT

## 2019-10-24 PROCEDURE — 2580000003 HC RX 258: Performed by: INTERNAL MEDICINE

## 2019-10-24 PROCEDURE — 02H63KZ INSERTION OF DEFIBRILLATOR LEAD INTO RIGHT ATRIUM, PERCUTANEOUS APPROACH: ICD-10-PCS | Performed by: INTERNAL MEDICINE

## 2019-10-24 PROCEDURE — 93005 ELECTROCARDIOGRAM TRACING: CPT | Performed by: INTERNAL MEDICINE

## 2019-10-24 PROCEDURE — 02PA3MZ REMOVAL OF CARDIAC LEAD FROM HEART, PERCUTANEOUS APPROACH: ICD-10-PCS | Performed by: INTERNAL MEDICINE

## 2019-10-24 PROCEDURE — 97535 SELF CARE MNGMENT TRAINING: CPT

## 2019-10-24 PROCEDURE — 82330 ASSAY OF CALCIUM: CPT

## 2019-10-24 PROCEDURE — 6370000000 HC RX 637 (ALT 250 FOR IP): Performed by: INTERNAL MEDICINE

## 2019-10-24 PROCEDURE — 2580000003 HC RX 258: Performed by: NURSE PRACTITIONER

## 2019-10-24 PROCEDURE — 33244 REMOVE ELCTRD TRANSVENOUSLY: CPT | Performed by: INTERNAL MEDICINE

## 2019-10-24 PROCEDURE — 99232 SBSQ HOSP IP/OBS MODERATE 35: CPT | Performed by: INTERNAL MEDICINE

## 2019-10-24 PROCEDURE — 83735 ASSAY OF MAGNESIUM: CPT

## 2019-10-24 PROCEDURE — 6360000002 HC RX W HCPCS

## 2019-10-24 PROCEDURE — 71045 X-RAY EXAM CHEST 1 VIEW: CPT

## 2019-10-24 PROCEDURE — 84132 ASSAY OF SERUM POTASSIUM: CPT

## 2019-10-24 PROCEDURE — 6360000002 HC RX W HCPCS: Performed by: INTERNAL MEDICINE

## 2019-10-24 PROCEDURE — 36415 COLL VENOUS BLD VENIPUNCTURE: CPT

## 2019-10-24 RX ORDER — ACETAMINOPHEN 325 MG/1
650 TABLET ORAL EVERY 4 HOURS PRN
Status: DISCONTINUED | OUTPATIENT
Start: 2019-10-24 | End: 2019-10-28 | Stop reason: HOSPADM

## 2019-10-24 RX ORDER — HYDROCODONE BITARTRATE AND ACETAMINOPHEN 5; 325 MG/1; MG/1
2 TABLET ORAL EVERY 4 HOURS PRN
Status: DISCONTINUED | OUTPATIENT
Start: 2019-10-24 | End: 2019-10-28 | Stop reason: HOSPADM

## 2019-10-24 RX ORDER — SODIUM CHLORIDE 0.9 % (FLUSH) 0.9 %
10 SYRINGE (ML) INJECTION EVERY 12 HOURS SCHEDULED
Status: DISCONTINUED | OUTPATIENT
Start: 2019-10-24 | End: 2019-10-28 | Stop reason: HOSPADM

## 2019-10-24 RX ORDER — FAMOTIDINE 20 MG/1
20 TABLET, FILM COATED ORAL 2 TIMES DAILY
Status: DISCONTINUED | OUTPATIENT
Start: 2019-10-24 | End: 2019-10-28 | Stop reason: HOSPADM

## 2019-10-24 RX ORDER — 0.9 % SODIUM CHLORIDE 0.9 %
250 INTRAVENOUS SOLUTION INTRAVENOUS ONCE
Status: COMPLETED | OUTPATIENT
Start: 2019-10-24 | End: 2019-10-24

## 2019-10-24 RX ORDER — HYDROCODONE BITARTRATE AND ACETAMINOPHEN 5; 325 MG/1; MG/1
1 TABLET ORAL EVERY 4 HOURS PRN
Status: DISCONTINUED | OUTPATIENT
Start: 2019-10-24 | End: 2019-10-28 | Stop reason: HOSPADM

## 2019-10-24 RX ORDER — SODIUM CHLORIDE 0.9 % (FLUSH) 0.9 %
10 SYRINGE (ML) INJECTION PRN
Status: DISCONTINUED | OUTPATIENT
Start: 2019-10-24 | End: 2019-10-28 | Stop reason: HOSPADM

## 2019-10-24 RX ADMIN — VANCOMYCIN HYDROCHLORIDE 1500 MG: 1 INJECTION, POWDER, LYOPHILIZED, FOR SOLUTION INTRAVENOUS at 22:48

## 2019-10-24 RX ADMIN — FUROSEMIDE 40 MG: 40 TABLET ORAL at 08:05

## 2019-10-24 RX ADMIN — FAMOTIDINE 20 MG: 20 TABLET, FILM COATED ORAL at 20:59

## 2019-10-24 RX ADMIN — MAGNESIUM GLUCONATE 500 MG ORAL TABLET 400 MG: 500 TABLET ORAL at 20:58

## 2019-10-24 RX ADMIN — ATORVASTATIN CALCIUM 80 MG: 80 TABLET, FILM COATED ORAL at 20:59

## 2019-10-24 RX ADMIN — SPIRONOLACTONE 25 MG: 25 TABLET ORAL at 08:04

## 2019-10-24 RX ADMIN — SODIUM CHLORIDE 250 ML: 0.9 INJECTION, SOLUTION INTRAVENOUS at 19:56

## 2019-10-24 RX ADMIN — RIVAROXABAN 20 MG: 20 TABLET, FILM COATED ORAL at 17:51

## 2019-10-24 RX ADMIN — ASPIRIN 81 MG 81 MG: 81 TABLET ORAL at 08:05

## 2019-10-24 RX ADMIN — FAMOTIDINE 20 MG: 20 TABLET, FILM COATED ORAL at 08:05

## 2019-10-24 RX ADMIN — VANCOMYCIN HYDROCHLORIDE 1000 MG: 1 INJECTION, POWDER, LYOPHILIZED, FOR SOLUTION INTRAVENOUS at 16:45

## 2019-10-24 RX ADMIN — ACETAMINOPHEN 650 MG: 325 TABLET ORAL at 19:54

## 2019-10-24 RX ADMIN — SACUBITRIL AND VALSARTAN 1 TABLET: 49; 51 TABLET, FILM COATED ORAL at 20:59

## 2019-10-24 RX ADMIN — SODIUM CHLORIDE, PRESERVATIVE FREE 10 ML: 5 INJECTION INTRAVENOUS at 08:06

## 2019-10-24 RX ADMIN — CLONAZEPAM 0.5 MG: 0.5 TABLET ORAL at 13:35

## 2019-10-24 RX ADMIN — METOPROLOL SUCCINATE 25 MG: 25 TABLET, EXTENDED RELEASE ORAL at 08:05

## 2019-10-24 RX ADMIN — SACUBITRIL AND VALSARTAN 1 TABLET: 49; 51 TABLET, FILM COATED ORAL at 08:04

## 2019-10-24 ASSESSMENT — ENCOUNTER SYMPTOMS
ABDOMINAL PAIN: 0
VOMITING: 0
COUGH: 0
DIARRHEA: 0
CONSTIPATION: 0
NAUSEA: 0
CHEST TIGHTNESS: 0
SHORTNESS OF BREATH: 0
COLOR CHANGE: 0
BLOOD IN STOOL: 0
WHEEZING: 0

## 2019-10-24 ASSESSMENT — PAIN SCALES - GENERAL
PAINLEVEL_OUTOF10: 0
PAINLEVEL_OUTOF10: 0
PAINLEVEL_OUTOF10: 9
PAINLEVEL_OUTOF10: 9
PAINLEVEL_OUTOF10: 0
PAINLEVEL_OUTOF10: 0

## 2019-10-25 PROBLEM — F42.9 OCD (OBSESSIVE COMPULSIVE DISORDER): Chronic | Status: ACTIVE | Noted: 2019-10-21

## 2019-10-25 PROBLEM — T82.118A AICD MALFUNCTION: Status: RESOLVED | Noted: 2019-10-19 | Resolved: 2019-10-25

## 2019-10-25 PROBLEM — R42 DIZZINESS: Status: RESOLVED | Noted: 2019-04-03 | Resolved: 2019-10-25

## 2019-10-25 PROBLEM — Z95.810 S/P IMPLANTATION OF AUTOMATIC CARDIOVERTER/DEFIBRILLATOR (AICD): Chronic | Status: ACTIVE | Noted: 2019-04-03

## 2019-10-25 PROBLEM — F41.9 ANXIETY: Chronic | Status: ACTIVE | Noted: 2019-10-21

## 2019-10-25 PROBLEM — E78.5 DYSLIPIDEMIA: Chronic | Status: ACTIVE | Noted: 2019-04-03

## 2019-10-25 PROBLEM — R94.31: Status: RESOLVED | Noted: 2019-10-21 | Resolved: 2019-10-25

## 2019-10-25 PROBLEM — I50.43 CHF (CONGESTIVE HEART FAILURE), NYHA CLASS I, ACUTE ON CHRONIC, COMBINED (HCC): Status: RESOLVED | Noted: 2019-08-07 | Resolved: 2019-10-25

## 2019-10-25 PROBLEM — J96.11 CHRONIC RESPIRATORY FAILURE WITH HYPOXIA, ON HOME O2 THERAPY (HCC): Chronic | Status: ACTIVE | Noted: 2019-08-08

## 2019-10-25 PROBLEM — E87.6 HYPOKALEMIA: Status: RESOLVED | Noted: 2019-10-22 | Resolved: 2019-10-25

## 2019-10-25 PROBLEM — I10 HYPERTENSION: Chronic | Status: ACTIVE | Noted: 2019-04-03

## 2019-10-25 PROBLEM — I47.20 VENTRICULAR TACHYCARDIA: Status: RESOLVED | Noted: 2019-10-22 | Resolved: 2019-10-25

## 2019-10-25 PROBLEM — I48.0 PAROXYSMAL ATRIAL FIBRILLATION (HCC): Status: RESOLVED | Noted: 2019-03-21 | Resolved: 2019-10-25

## 2019-10-25 PROBLEM — I48.20 CHRONIC ATRIAL FIBRILLATION (HCC): Chronic | Status: ACTIVE | Noted: 2019-04-03

## 2019-10-25 PROBLEM — Z99.81 CHRONIC RESPIRATORY FAILURE WITH HYPOXIA, ON HOME O2 THERAPY (HCC): Chronic | Status: ACTIVE | Noted: 2019-08-08

## 2019-10-25 PROBLEM — E66.01 SEVERE OBESITY (BMI 35.0-39.9) WITH COMORBIDITY (HCC): Chronic | Status: ACTIVE | Noted: 2018-11-05

## 2019-10-25 LAB
ANION GAP SERPL CALCULATED.3IONS-SCNC: 9 MMOL/L (ref 9–17)
BUN BLDV-MCNC: 25 MG/DL (ref 8–23)
BUN/CREAT BLD: 21 (ref 9–20)
CALCIUM IONIZED: 1.18 MMOL/L (ref 1.13–1.33)
CALCIUM SERPL-MCNC: 8.1 MG/DL (ref 8.6–10.4)
CHLORIDE BLD-SCNC: 103 MMOL/L (ref 98–107)
CO2: 28 MMOL/L (ref 20–31)
CREAT SERPL-MCNC: 1.2 MG/DL (ref 0.5–0.9)
EKG ATRIAL RATE: 84 BPM
EKG Q-T INTERVAL: 412 MS
EKG QRS DURATION: 102 MS
EKG QTC CALCULATION (BAZETT): 457 MS
EKG R AXIS: -11 DEGREES
EKG T AXIS: -160 DEGREES
EKG VENTRICULAR RATE: 74 BPM
GFR AFRICAN AMERICAN: 54 ML/MIN
GFR NON-AFRICAN AMERICAN: 45 ML/MIN
GFR SERPL CREATININE-BSD FRML MDRD: ABNORMAL ML/MIN/{1.73_M2}
GFR SERPL CREATININE-BSD FRML MDRD: ABNORMAL ML/MIN/{1.73_M2}
GLUCOSE BLD-MCNC: 130 MG/DL (ref 70–99)
MAGNESIUM: 2.2 MG/DL (ref 1.6–2.6)
POTASSIUM SERPL-SCNC: 4.7 MMOL/L (ref 3.7–5.3)
SODIUM BLD-SCNC: 140 MMOL/L (ref 135–144)

## 2019-10-25 PROCEDURE — 36415 COLL VENOUS BLD VENIPUNCTURE: CPT

## 2019-10-25 PROCEDURE — C1898 LEAD, PMKR, OTHER THAN TRANS: HCPCS

## 2019-10-25 PROCEDURE — 97535 SELF CARE MNGMENT TRAINING: CPT

## 2019-10-25 PROCEDURE — 2580000003 HC RX 258: Performed by: INTERNAL MEDICINE

## 2019-10-25 PROCEDURE — 97530 THERAPEUTIC ACTIVITIES: CPT

## 2019-10-25 PROCEDURE — 2060000000 HC ICU INTERMEDIATE R&B

## 2019-10-25 PROCEDURE — 6370000000 HC RX 637 (ALT 250 FOR IP): Performed by: INTERNAL MEDICINE

## 2019-10-25 PROCEDURE — 80048 BASIC METABOLIC PNL TOTAL CA: CPT

## 2019-10-25 PROCEDURE — 93005 ELECTROCARDIOGRAM TRACING: CPT | Performed by: INTERNAL MEDICINE

## 2019-10-25 PROCEDURE — 97116 GAIT TRAINING THERAPY: CPT

## 2019-10-25 PROCEDURE — 83735 ASSAY OF MAGNESIUM: CPT

## 2019-10-25 PROCEDURE — 82330 ASSAY OF CALCIUM: CPT

## 2019-10-25 PROCEDURE — C1894 INTRO/SHEATH, NON-LASER: HCPCS

## 2019-10-25 PROCEDURE — 99232 SBSQ HOSP IP/OBS MODERATE 35: CPT | Performed by: INTERNAL MEDICINE

## 2019-10-25 RX ORDER — METOPROLOL SUCCINATE 25 MG/1
25 TABLET, EXTENDED RELEASE ORAL DAILY
Qty: 90 TABLET | Refills: 3 | Status: SHIPPED | OUTPATIENT
Start: 2019-10-25

## 2019-10-25 RX ORDER — CLONAZEPAM 0.5 MG/1
0.5 TABLET ORAL 2 TIMES DAILY PRN
Qty: 14 TABLET | Refills: 0 | Status: SHIPPED | OUTPATIENT
Start: 2019-10-25 | End: 2019-11-21 | Stop reason: ALTCHOICE

## 2019-10-25 RX ORDER — HYDROCODONE BITARTRATE AND ACETAMINOPHEN 5; 325 MG/1; MG/1
1 TABLET ORAL EVERY 4 HOURS PRN
Qty: 10 TABLET | Refills: 0 | Status: SHIPPED | OUTPATIENT
Start: 2019-10-25 | End: 2019-10-28

## 2019-10-25 RX ORDER — FUROSEMIDE 40 MG/1
40 TABLET ORAL DAILY
Status: DISCONTINUED | OUTPATIENT
Start: 2019-10-26 | End: 2019-10-28 | Stop reason: HOSPADM

## 2019-10-25 RX ORDER — FUROSEMIDE 40 MG/1
40 TABLET ORAL DAILY
Qty: 60 TABLET | Refills: 0 | Status: ON HOLD
Start: 2019-10-25 | End: 2019-11-25 | Stop reason: SDUPTHER

## 2019-10-25 RX ADMIN — FAMOTIDINE 20 MG: 20 TABLET, FILM COATED ORAL at 10:04

## 2019-10-25 RX ADMIN — SODIUM CHLORIDE, PRESERVATIVE FREE 10 ML: 5 INJECTION INTRAVENOUS at 20:52

## 2019-10-25 RX ADMIN — MAGNESIUM GLUCONATE 500 MG ORAL TABLET 400 MG: 500 TABLET ORAL at 20:50

## 2019-10-25 RX ADMIN — HYDROCODONE BITARTRATE AND ACETAMINOPHEN 1 TABLET: 5; 325 TABLET ORAL at 20:51

## 2019-10-25 RX ADMIN — RIVAROXABAN 20 MG: 20 TABLET, FILM COATED ORAL at 18:57

## 2019-10-25 RX ADMIN — ASPIRIN 81 MG 81 MG: 81 TABLET ORAL at 10:06

## 2019-10-25 RX ADMIN — SACUBITRIL AND VALSARTAN 1 TABLET: 49; 51 TABLET, FILM COATED ORAL at 10:05

## 2019-10-25 RX ADMIN — SPIRONOLACTONE 25 MG: 25 TABLET ORAL at 10:04

## 2019-10-25 RX ADMIN — ATORVASTATIN CALCIUM 80 MG: 80 TABLET, FILM COATED ORAL at 20:50

## 2019-10-25 RX ADMIN — FAMOTIDINE 20 MG: 20 TABLET, FILM COATED ORAL at 20:50

## 2019-10-25 RX ADMIN — SODIUM CHLORIDE, PRESERVATIVE FREE 10 ML: 5 INJECTION INTRAVENOUS at 10:04

## 2019-10-25 ASSESSMENT — PAIN SCALES - GENERAL
PAINLEVEL_OUTOF10: 0
PAINLEVEL_OUTOF10: 6

## 2019-10-26 LAB
ANION GAP SERPL CALCULATED.3IONS-SCNC: 9 MMOL/L (ref 9–17)
BUN BLDV-MCNC: 24 MG/DL (ref 8–23)
BUN/CREAT BLD: 25 (ref 9–20)
CALCIUM IONIZED: 1.19 MMOL/L (ref 1.13–1.33)
CALCIUM SERPL-MCNC: 8.5 MG/DL (ref 8.6–10.4)
CHLORIDE BLD-SCNC: 104 MMOL/L (ref 98–107)
CO2: 28 MMOL/L (ref 20–31)
CREAT SERPL-MCNC: 0.97 MG/DL (ref 0.5–0.9)
EKG ATRIAL RATE: 535 BPM
EKG ATRIAL RATE: 71 BPM
EKG P AXIS: -38 DEGREES
EKG P-R INTERVAL: 232 MS
EKG P-R INTERVAL: 250 MS
EKG Q-T INTERVAL: 390 MS
EKG Q-T INTERVAL: 412 MS
EKG QRS DURATION: 104 MS
EKG QRS DURATION: 104 MS
EKG QTC CALCULATION (BAZETT): 421 MS
EKG QTC CALCULATION (BAZETT): 444 MS
EKG R AXIS: 61 DEGREES
EKG R AXIS: 80 DEGREES
EKG T AXIS: 151 DEGREES
EKG T AXIS: 154 DEGREES
EKG VENTRICULAR RATE: 70 BPM
EKG VENTRICULAR RATE: 70 BPM
GFR AFRICAN AMERICAN: >60 ML/MIN
GFR NON-AFRICAN AMERICAN: 57 ML/MIN
GFR SERPL CREATININE-BSD FRML MDRD: ABNORMAL ML/MIN/{1.73_M2}
GFR SERPL CREATININE-BSD FRML MDRD: ABNORMAL ML/MIN/{1.73_M2}
GLUCOSE BLD-MCNC: 135 MG/DL (ref 70–99)
MAGNESIUM: 2.1 MG/DL (ref 1.6–2.6)
POTASSIUM SERPL-SCNC: 4.8 MMOL/L (ref 3.7–5.3)
SODIUM BLD-SCNC: 141 MMOL/L (ref 135–144)

## 2019-10-26 PROCEDURE — 2580000003 HC RX 258: Performed by: INTERNAL MEDICINE

## 2019-10-26 PROCEDURE — 6370000000 HC RX 637 (ALT 250 FOR IP): Performed by: INTERNAL MEDICINE

## 2019-10-26 PROCEDURE — 97535 SELF CARE MNGMENT TRAINING: CPT

## 2019-10-26 PROCEDURE — 97530 THERAPEUTIC ACTIVITIES: CPT

## 2019-10-26 PROCEDURE — 99232 SBSQ HOSP IP/OBS MODERATE 35: CPT | Performed by: FAMILY MEDICINE

## 2019-10-26 PROCEDURE — 2060000000 HC ICU INTERMEDIATE R&B

## 2019-10-26 PROCEDURE — 93005 ELECTROCARDIOGRAM TRACING: CPT | Performed by: INTERNAL MEDICINE

## 2019-10-26 PROCEDURE — 6360000002 HC RX W HCPCS: Performed by: NURSE PRACTITIONER

## 2019-10-26 PROCEDURE — 97110 THERAPEUTIC EXERCISES: CPT

## 2019-10-26 PROCEDURE — 83735 ASSAY OF MAGNESIUM: CPT

## 2019-10-26 PROCEDURE — 82330 ASSAY OF CALCIUM: CPT

## 2019-10-26 PROCEDURE — 80048 BASIC METABOLIC PNL TOTAL CA: CPT

## 2019-10-26 PROCEDURE — 97116 GAIT TRAINING THERAPY: CPT

## 2019-10-26 PROCEDURE — 36415 COLL VENOUS BLD VENIPUNCTURE: CPT

## 2019-10-26 RX ORDER — FUROSEMIDE 10 MG/ML
20 INJECTION INTRAMUSCULAR; INTRAVENOUS ONCE
Status: COMPLETED | OUTPATIENT
Start: 2019-10-26 | End: 2019-10-26

## 2019-10-26 RX ORDER — AMIODARONE HYDROCHLORIDE 200 MG/1
200 TABLET ORAL DAILY
Status: DISCONTINUED | OUTPATIENT
Start: 2019-10-26 | End: 2019-10-28 | Stop reason: HOSPADM

## 2019-10-26 RX ADMIN — FAMOTIDINE 20 MG: 20 TABLET, FILM COATED ORAL at 20:58

## 2019-10-26 RX ADMIN — FAMOTIDINE 20 MG: 20 TABLET, FILM COATED ORAL at 09:13

## 2019-10-26 RX ADMIN — METOPROLOL SUCCINATE 25 MG: 25 TABLET, EXTENDED RELEASE ORAL at 09:13

## 2019-10-26 RX ADMIN — MAGNESIUM GLUCONATE 500 MG ORAL TABLET 400 MG: 500 TABLET ORAL at 20:58

## 2019-10-26 RX ADMIN — FUROSEMIDE 40 MG: 40 TABLET ORAL at 09:13

## 2019-10-26 RX ADMIN — SODIUM CHLORIDE, PRESERVATIVE FREE 10 ML: 5 INJECTION INTRAVENOUS at 09:18

## 2019-10-26 RX ADMIN — RIVAROXABAN 20 MG: 20 TABLET, FILM COATED ORAL at 17:29

## 2019-10-26 RX ADMIN — ASPIRIN 81 MG 81 MG: 81 TABLET ORAL at 09:13

## 2019-10-26 RX ADMIN — FUROSEMIDE 20 MG: 10 INJECTION, SOLUTION INTRAMUSCULAR; INTRAVENOUS at 15:02

## 2019-10-26 RX ADMIN — SODIUM CHLORIDE, PRESERVATIVE FREE 10 ML: 5 INJECTION INTRAVENOUS at 21:00

## 2019-10-26 RX ADMIN — ATORVASTATIN CALCIUM 80 MG: 80 TABLET, FILM COATED ORAL at 20:58

## 2019-10-26 RX ADMIN — SACUBITRIL AND VALSARTAN 1 TABLET: 49; 51 TABLET, FILM COATED ORAL at 09:13

## 2019-10-26 RX ADMIN — CLONAZEPAM 0.5 MG: 0.5 TABLET ORAL at 20:58

## 2019-10-26 RX ADMIN — HYDROCODONE BITARTRATE AND ACETAMINOPHEN 1 TABLET: 5; 325 TABLET ORAL at 20:58

## 2019-10-26 ASSESSMENT — PAIN DESCRIPTION - PAIN TYPE
TYPE: SURGICAL PAIN
TYPE: SURGICAL PAIN

## 2019-10-26 ASSESSMENT — PAIN SCALES - GENERAL
PAINLEVEL_OUTOF10: 0
PAINLEVEL_OUTOF10: 4
PAINLEVEL_OUTOF10: 6
PAINLEVEL_OUTOF10: 5
PAINLEVEL_OUTOF10: 0

## 2019-10-26 ASSESSMENT — PAIN DESCRIPTION - LOCATION
LOCATION: CHEST
LOCATION: CHEST

## 2019-10-26 ASSESSMENT — PAIN DESCRIPTION - ORIENTATION
ORIENTATION: LEFT
ORIENTATION: LEFT

## 2019-10-26 ASSESSMENT — ENCOUNTER SYMPTOMS: ABDOMINAL PAIN: 0

## 2019-10-27 LAB
ANION GAP SERPL CALCULATED.3IONS-SCNC: 7 MMOL/L (ref 9–17)
BUN BLDV-MCNC: 23 MG/DL (ref 8–23)
BUN/CREAT BLD: 23 (ref 9–20)
CALCIUM IONIZED: 1.17 MMOL/L (ref 1.13–1.33)
CALCIUM SERPL-MCNC: 8.5 MG/DL (ref 8.6–10.4)
CHLORIDE BLD-SCNC: 101 MMOL/L (ref 98–107)
CO2: 32 MMOL/L (ref 20–31)
CREAT SERPL-MCNC: 0.98 MG/DL (ref 0.5–0.9)
GFR AFRICAN AMERICAN: >60 ML/MIN
GFR NON-AFRICAN AMERICAN: 56 ML/MIN
GFR SERPL CREATININE-BSD FRML MDRD: ABNORMAL ML/MIN/{1.73_M2}
GFR SERPL CREATININE-BSD FRML MDRD: ABNORMAL ML/MIN/{1.73_M2}
GLUCOSE BLD-MCNC: 118 MG/DL (ref 70–99)
MAGNESIUM: 2.1 MG/DL (ref 1.6–2.6)
POTASSIUM SERPL-SCNC: 4.4 MMOL/L (ref 3.7–5.3)
SODIUM BLD-SCNC: 140 MMOL/L (ref 135–144)

## 2019-10-27 PROCEDURE — 36415 COLL VENOUS BLD VENIPUNCTURE: CPT

## 2019-10-27 PROCEDURE — 6370000000 HC RX 637 (ALT 250 FOR IP): Performed by: INTERNAL MEDICINE

## 2019-10-27 PROCEDURE — 83735 ASSAY OF MAGNESIUM: CPT

## 2019-10-27 PROCEDURE — 82330 ASSAY OF CALCIUM: CPT

## 2019-10-27 PROCEDURE — 80048 BASIC METABOLIC PNL TOTAL CA: CPT

## 2019-10-27 PROCEDURE — 93005 ELECTROCARDIOGRAM TRACING: CPT | Performed by: INTERNAL MEDICINE

## 2019-10-27 PROCEDURE — 6370000000 HC RX 637 (ALT 250 FOR IP): Performed by: NURSE PRACTITIONER

## 2019-10-27 PROCEDURE — 2060000000 HC ICU INTERMEDIATE R&B

## 2019-10-27 PROCEDURE — 2580000003 HC RX 258: Performed by: INTERNAL MEDICINE

## 2019-10-27 PROCEDURE — 99232 SBSQ HOSP IP/OBS MODERATE 35: CPT | Performed by: FAMILY MEDICINE

## 2019-10-27 RX ORDER — DOXYCYCLINE 100 MG/1
100 CAPSULE ORAL EVERY 12 HOURS SCHEDULED
Status: DISCONTINUED | OUTPATIENT
Start: 2019-10-27 | End: 2019-10-28 | Stop reason: HOSPADM

## 2019-10-27 RX ADMIN — FAMOTIDINE 20 MG: 20 TABLET, FILM COATED ORAL at 20:24

## 2019-10-27 RX ADMIN — DOXYCYCLINE 100 MG: 100 CAPSULE ORAL at 20:23

## 2019-10-27 RX ADMIN — FAMOTIDINE 20 MG: 20 TABLET, FILM COATED ORAL at 08:51

## 2019-10-27 RX ADMIN — ATORVASTATIN CALCIUM 80 MG: 80 TABLET, FILM COATED ORAL at 20:23

## 2019-10-27 RX ADMIN — SACUBITRIL AND VALSARTAN 1 TABLET: 49; 51 TABLET, FILM COATED ORAL at 08:51

## 2019-10-27 RX ADMIN — SACUBITRIL AND VALSARTAN 1 TABLET: 49; 51 TABLET, FILM COATED ORAL at 20:24

## 2019-10-27 RX ADMIN — METOPROLOL SUCCINATE 25 MG: 25 TABLET, EXTENDED RELEASE ORAL at 08:51

## 2019-10-27 RX ADMIN — FUROSEMIDE 40 MG: 40 TABLET ORAL at 08:51

## 2019-10-27 RX ADMIN — MAGNESIUM GLUCONATE 500 MG ORAL TABLET 400 MG: 500 TABLET ORAL at 20:24

## 2019-10-27 RX ADMIN — DOXYCYCLINE 100 MG: 100 CAPSULE ORAL at 11:22

## 2019-10-27 RX ADMIN — ASPIRIN 81 MG 81 MG: 81 TABLET ORAL at 08:51

## 2019-10-27 RX ADMIN — AMIODARONE HYDROCHLORIDE 200 MG: 200 TABLET ORAL at 08:51

## 2019-10-27 RX ADMIN — SODIUM CHLORIDE, PRESERVATIVE FREE 10 ML: 5 INJECTION INTRAVENOUS at 20:24

## 2019-10-27 ASSESSMENT — PAIN SCALES - GENERAL
PAINLEVEL_OUTOF10: 0

## 2019-10-27 ASSESSMENT — ENCOUNTER SYMPTOMS: ABDOMINAL PAIN: 0

## 2019-10-28 VITALS
BODY MASS INDEX: 37.6 KG/M2 | OXYGEN SATURATION: 96 % | WEIGHT: 191.5 LBS | SYSTOLIC BLOOD PRESSURE: 106 MMHG | RESPIRATION RATE: 16 BRPM | TEMPERATURE: 96.6 F | HEART RATE: 70 BPM | DIASTOLIC BLOOD PRESSURE: 64 MMHG | HEIGHT: 60 IN

## 2019-10-28 LAB
ANION GAP SERPL CALCULATED.3IONS-SCNC: 7 MMOL/L (ref 9–17)
BUN BLDV-MCNC: 22 MG/DL (ref 8–23)
BUN/CREAT BLD: 24 (ref 9–20)
CALCIUM IONIZED: 1.04 MMOL/L (ref 1.13–1.33)
CALCIUM SERPL-MCNC: 8.4 MG/DL (ref 8.6–10.4)
CHLORIDE BLD-SCNC: 102 MMOL/L (ref 98–107)
CO2: 32 MMOL/L (ref 20–31)
CREAT SERPL-MCNC: 0.91 MG/DL (ref 0.5–0.9)
GFR AFRICAN AMERICAN: >60 ML/MIN
GFR NON-AFRICAN AMERICAN: >60 ML/MIN
GFR SERPL CREATININE-BSD FRML MDRD: ABNORMAL ML/MIN/{1.73_M2}
GFR SERPL CREATININE-BSD FRML MDRD: ABNORMAL ML/MIN/{1.73_M2}
GLUCOSE BLD-MCNC: 130 MG/DL (ref 70–99)
MAGNESIUM: 2 MG/DL (ref 1.6–2.6)
POTASSIUM SERPL-SCNC: 4.3 MMOL/L (ref 3.7–5.3)
SODIUM BLD-SCNC: 141 MMOL/L (ref 135–144)

## 2019-10-28 PROCEDURE — 83735 ASSAY OF MAGNESIUM: CPT

## 2019-10-28 PROCEDURE — 97110 THERAPEUTIC EXERCISES: CPT

## 2019-10-28 PROCEDURE — 99239 HOSP IP/OBS DSCHRG MGMT >30: CPT | Performed by: INTERNAL MEDICINE

## 2019-10-28 PROCEDURE — 6370000000 HC RX 637 (ALT 250 FOR IP): Performed by: INTERNAL MEDICINE

## 2019-10-28 PROCEDURE — 82330 ASSAY OF CALCIUM: CPT

## 2019-10-28 PROCEDURE — 97530 THERAPEUTIC ACTIVITIES: CPT

## 2019-10-28 PROCEDURE — 97535 SELF CARE MNGMENT TRAINING: CPT

## 2019-10-28 PROCEDURE — 80048 BASIC METABOLIC PNL TOTAL CA: CPT

## 2019-10-28 PROCEDURE — 2580000003 HC RX 258: Performed by: INTERNAL MEDICINE

## 2019-10-28 PROCEDURE — 97116 GAIT TRAINING THERAPY: CPT

## 2019-10-28 PROCEDURE — 36415 COLL VENOUS BLD VENIPUNCTURE: CPT

## 2019-10-28 PROCEDURE — 6370000000 HC RX 637 (ALT 250 FOR IP): Performed by: NURSE PRACTITIONER

## 2019-10-28 RX ORDER — SERTRALINE HYDROCHLORIDE 25 MG/1
25 TABLET, FILM COATED ORAL DAILY
Qty: 30 TABLET | Refills: 3 | DISCHARGE
Start: 2019-10-28 | End: 2019-11-21 | Stop reason: ALTCHOICE

## 2019-10-28 RX ORDER — AMIODARONE HYDROCHLORIDE 200 MG/1
200 TABLET ORAL DAILY
Qty: 30 TABLET | Refills: 3 | DISCHARGE
Start: 2019-10-29 | End: 2019-10-28

## 2019-10-28 RX ORDER — AMIODARONE HYDROCHLORIDE 200 MG/1
200 TABLET ORAL DAILY
Qty: 30 TABLET | Refills: 0 | Status: ON HOLD | DISCHARGE
Start: 2019-10-29 | End: 2019-11-22 | Stop reason: SDDI

## 2019-10-28 RX ORDER — DOXYCYCLINE 100 MG/1
100 CAPSULE ORAL EVERY 12 HOURS SCHEDULED
Refills: 0 | DISCHARGE
Start: 2019-10-28 | End: 2019-11-03

## 2019-10-28 RX ADMIN — FAMOTIDINE 20 MG: 20 TABLET, FILM COATED ORAL at 08:21

## 2019-10-28 RX ADMIN — AMIODARONE HYDROCHLORIDE 200 MG: 200 TABLET ORAL at 08:21

## 2019-10-28 RX ADMIN — DOXYCYCLINE 100 MG: 100 CAPSULE ORAL at 08:21

## 2019-10-28 RX ADMIN — ASPIRIN 81 MG 81 MG: 81 TABLET ORAL at 08:21

## 2019-10-28 RX ADMIN — SACUBITRIL AND VALSARTAN 1 TABLET: 49; 51 TABLET, FILM COATED ORAL at 08:21

## 2019-10-28 RX ADMIN — METOPROLOL SUCCINATE 25 MG: 25 TABLET, EXTENDED RELEASE ORAL at 08:21

## 2019-10-28 RX ADMIN — HYDROCODONE BITARTRATE AND ACETAMINOPHEN 1 TABLET: 5; 325 TABLET ORAL at 05:12

## 2019-10-28 RX ADMIN — SODIUM CHLORIDE, PRESERVATIVE FREE 10 ML: 5 INJECTION INTRAVENOUS at 08:22

## 2019-10-28 RX ADMIN — FUROSEMIDE 40 MG: 40 TABLET ORAL at 08:21

## 2019-10-28 ASSESSMENT — PAIN SCALES - GENERAL
PAINLEVEL_OUTOF10: 5
PAINLEVEL_OUTOF10: 5
PAINLEVEL_OUTOF10: 0
PAINLEVEL_OUTOF10: 0

## 2019-10-28 ASSESSMENT — PAIN DESCRIPTION - LOCATION: LOCATION: BACK

## 2019-10-28 ASSESSMENT — PAIN DESCRIPTION - PAIN TYPE: TYPE: CHRONIC PAIN

## 2019-10-28 ASSESSMENT — PAIN DESCRIPTION - ORIENTATION: ORIENTATION: MID;LOWER

## 2019-10-29 ENCOUNTER — TELEPHONE (OUTPATIENT)
Dept: OTHER | Age: 69
End: 2019-10-29

## 2019-10-29 LAB
EKG ATRIAL RATE: 500 BPM
EKG P-R INTERVAL: 250 MS
EKG Q-T INTERVAL: 416 MS
EKG QRS DURATION: 110 MS
EKG QTC CALCULATION (BAZETT): 449 MS
EKG R AXIS: -2 DEGREES
EKG T AXIS: 132 DEGREES
EKG VENTRICULAR RATE: 70 BPM

## 2019-10-29 PROCEDURE — 93010 ELECTROCARDIOGRAM REPORT: CPT | Performed by: INTERNAL MEDICINE

## 2019-11-21 ENCOUNTER — HOSPITAL ENCOUNTER (INPATIENT)
Age: 69
LOS: 4 days | Discharge: NURSING, REHAB OR LONG-TERM CARE | DRG: 308 | End: 2019-11-25
Attending: EMERGENCY MEDICINE | Admitting: INTERNAL MEDICINE
Payer: MEDICARE

## 2019-11-21 ENCOUNTER — APPOINTMENT (OUTPATIENT)
Dept: GENERAL RADIOLOGY | Facility: CLINIC | Age: 69
DRG: 308 | End: 2019-11-21
Payer: MEDICARE

## 2019-11-21 DIAGNOSIS — D64.9 ANEMIA, UNSPECIFIED TYPE: ICD-10-CM

## 2019-11-21 DIAGNOSIS — Z45.02 AICD DISCHARGE: Primary | ICD-10-CM

## 2019-11-21 PROBLEM — I47.20 VENTRICULAR TACHYCARDIA: Status: ACTIVE | Noted: 2019-11-21

## 2019-11-21 PROBLEM — I47.1 SVT (SUPRAVENTRICULAR TACHYCARDIA) (HCC): Status: ACTIVE | Noted: 2019-11-21

## 2019-11-21 PROBLEM — I47.10 SVT (SUPRAVENTRICULAR TACHYCARDIA): Status: ACTIVE | Noted: 2019-11-21

## 2019-11-21 LAB
ABSOLUTE EOS #: 0.07 K/UL (ref 0–0.4)
ABSOLUTE IMMATURE GRANULOCYTE: ABNORMAL K/UL (ref 0–0.3)
ABSOLUTE LYMPH #: 0.44 K/UL (ref 1–4.8)
ABSOLUTE MONO #: 0.17 K/UL (ref 0.1–0.8)
ALBUMIN SERPL-MCNC: 3.5 G/DL (ref 3.5–5.2)
ALBUMIN/GLOBULIN RATIO: 1.3 (ref 1–2.5)
ALP BLD-CCNC: 88 U/L (ref 35–104)
ALT SERPL-CCNC: 7 U/L (ref 5–33)
ANION GAP SERPL CALCULATED.3IONS-SCNC: 13 MMOL/L (ref 9–17)
AST SERPL-CCNC: 13 U/L
BASOPHILS # BLD: 2 % (ref 0–2)
BASOPHILS ABSOLUTE: 0.07 K/UL (ref 0–0.2)
BILIRUB SERPL-MCNC: 1 MG/DL (ref 0.3–1.2)
BILIRUBIN DIRECT: <0.08 MG/DL
BILIRUBIN, INDIRECT: ABNORMAL MG/DL (ref 0–1)
BNP INTERPRETATION: ABNORMAL
BUN BLDV-MCNC: 20 MG/DL (ref 8–23)
BUN/CREAT BLD: ABNORMAL (ref 9–20)
CALCIUM SERPL-MCNC: 8.8 MG/DL (ref 8.6–10.4)
CHLORIDE BLD-SCNC: 111 MMOL/L (ref 98–107)
CO2: 23 MMOL/L (ref 20–31)
CREAT SERPL-MCNC: 0.9 MG/DL (ref 0.5–0.9)
DIFFERENTIAL TYPE: ABNORMAL
EKG ATRIAL RATE: 71 BPM
EKG P-R INTERVAL: 238 MS
EKG Q-T INTERVAL: 458 MS
EKG QRS DURATION: 108 MS
EKG QTC CALCULATION (BAZETT): 497 MS
EKG R AXIS: -65 DEGREES
EKG T AXIS: 140 DEGREES
EKG VENTRICULAR RATE: 71 BPM
EOSINOPHILS RELATIVE PERCENT: 2 % (ref 1–4)
GFR AFRICAN AMERICAN: >60 ML/MIN
GFR NON-AFRICAN AMERICAN: >60 ML/MIN
GFR SERPL CREATININE-BSD FRML MDRD: ABNORMAL ML/MIN/{1.73_M2}
GFR SERPL CREATININE-BSD FRML MDRD: ABNORMAL ML/MIN/{1.73_M2}
GLOBULIN: ABNORMAL G/DL (ref 1.5–3.8)
GLUCOSE BLD-MCNC: 87 MG/DL (ref 70–99)
HCT VFR BLD CALC: 29.9 % (ref 36–46)
HEMOGLOBIN: 8.7 G/DL (ref 12–16)
IMMATURE GRANULOCYTES: ABNORMAL %
INR BLD: 1.8
LYMPHOCYTES # BLD: 13 % (ref 24–44)
MAGNESIUM: 2 MG/DL (ref 1.6–2.6)
MCH RBC QN AUTO: 24.7 PG (ref 26–34)
MCHC RBC AUTO-ENTMCNC: 29.2 G/DL (ref 31–37)
MCV RBC AUTO: 84.6 FL (ref 80–100)
MONOCYTES # BLD: 5 % (ref 1–7)
MORPHOLOGY: ABNORMAL
MYOGLOBIN: 36 NG/ML (ref 25–58)
MYOGLOBIN: 38 NG/ML (ref 25–58)
MYOGLOBIN: 39 NG/ML (ref 25–58)
NRBC AUTOMATED: ABNORMAL PER 100 WBC
PARTIAL THROMBOPLASTIN TIME: 32.4 SEC (ref 21.3–31.3)
PDW BLD-RTO: 22.3 % (ref 12.5–15.4)
PLATELET # BLD: 177 K/UL (ref 140–450)
PLATELET ESTIMATE: ABNORMAL
PMV BLD AUTO: 8.3 FL (ref 6–12)
POTASSIUM SERPL-SCNC: 3.5 MMOL/L (ref 3.7–5.3)
PRO-BNP: ABNORMAL PG/ML
PROTHROMBIN TIME: 18.4 SEC (ref 9.4–12.6)
RBC # BLD: 3.53 M/UL (ref 4–5.2)
RBC # BLD: ABNORMAL 10*6/UL
SEG NEUTROPHILS: 78 % (ref 36–66)
SEGMENTED NEUTROPHILS ABSOLUTE COUNT: 2.65 K/UL (ref 1.8–7.7)
SODIUM BLD-SCNC: 147 MMOL/L (ref 135–144)
TOTAL PROTEIN: 6.1 G/DL (ref 6.4–8.3)
TROPONIN INTERP: ABNORMAL
TROPONIN T: ABNORMAL NG/ML
TROPONIN, HIGH SENSITIVITY: 41 NG/L (ref 0–14)
TROPONIN, HIGH SENSITIVITY: 44 NG/L (ref 0–14)
TROPONIN, HIGH SENSITIVITY: 52 NG/L (ref 0–14)
WBC # BLD: 3.4 K/UL (ref 3.5–11)
WBC # BLD: ABNORMAL 10*3/UL

## 2019-11-21 PROCEDURE — 99223 1ST HOSP IP/OBS HIGH 75: CPT | Performed by: FAMILY MEDICINE

## 2019-11-21 PROCEDURE — 36415 COLL VENOUS BLD VENIPUNCTURE: CPT

## 2019-11-21 PROCEDURE — 97116 GAIT TRAINING THERAPY: CPT

## 2019-11-21 PROCEDURE — 85610 PROTHROMBIN TIME: CPT

## 2019-11-21 PROCEDURE — 6360000002 HC RX W HCPCS: Performed by: EMERGENCY MEDICINE

## 2019-11-21 PROCEDURE — 2580000003 HC RX 258: Performed by: EMERGENCY MEDICINE

## 2019-11-21 PROCEDURE — 99285 EMERGENCY DEPT VISIT HI MDM: CPT

## 2019-11-21 PROCEDURE — 2060000000 HC ICU INTERMEDIATE R&B

## 2019-11-21 PROCEDURE — 97166 OT EVAL MOD COMPLEX 45 MIN: CPT

## 2019-11-21 PROCEDURE — 83874 ASSAY OF MYOGLOBIN: CPT

## 2019-11-21 PROCEDURE — 80076 HEPATIC FUNCTION PANEL: CPT

## 2019-11-21 PROCEDURE — 6370000000 HC RX 637 (ALT 250 FOR IP): Performed by: FAMILY MEDICINE

## 2019-11-21 PROCEDURE — 2580000003 HC RX 258: Performed by: FAMILY MEDICINE

## 2019-11-21 PROCEDURE — 71045 X-RAY EXAM CHEST 1 VIEW: CPT

## 2019-11-21 PROCEDURE — 6360000002 HC RX W HCPCS: Performed by: FAMILY MEDICINE

## 2019-11-21 PROCEDURE — 85025 COMPLETE CBC W/AUTO DIFF WBC: CPT

## 2019-11-21 PROCEDURE — 96365 THER/PROPH/DIAG IV INF INIT: CPT

## 2019-11-21 PROCEDURE — 83880 ASSAY OF NATRIURETIC PEPTIDE: CPT

## 2019-11-21 PROCEDURE — 84484 ASSAY OF TROPONIN QUANT: CPT

## 2019-11-21 PROCEDURE — 80048 BASIC METABOLIC PNL TOTAL CA: CPT

## 2019-11-21 PROCEDURE — 85730 THROMBOPLASTIN TIME PARTIAL: CPT

## 2019-11-21 PROCEDURE — 83735 ASSAY OF MAGNESIUM: CPT

## 2019-11-21 PROCEDURE — 97163 PT EVAL HIGH COMPLEX 45 MIN: CPT

## 2019-11-21 PROCEDURE — 97535 SELF CARE MNGMENT TRAINING: CPT

## 2019-11-21 PROCEDURE — 93005 ELECTROCARDIOGRAM TRACING: CPT | Performed by: EMERGENCY MEDICINE

## 2019-11-21 RX ORDER — MAGNESIUM SULFATE 1 G/100ML
1 INJECTION INTRAVENOUS PRN
Status: DISCONTINUED | OUTPATIENT
Start: 2019-11-21 | End: 2019-11-25 | Stop reason: HOSPADM

## 2019-11-21 RX ORDER — ONDANSETRON 4 MG/1
4 TABLET, ORALLY DISINTEGRATING ORAL EVERY 6 HOURS PRN
Status: DISCONTINUED | OUTPATIENT
Start: 2019-11-21 | End: 2019-11-25 | Stop reason: HOSPADM

## 2019-11-21 RX ORDER — ATORVASTATIN CALCIUM 80 MG/1
80 TABLET, FILM COATED ORAL NIGHTLY
Status: DISCONTINUED | OUTPATIENT
Start: 2019-11-21 | End: 2019-11-25 | Stop reason: HOSPADM

## 2019-11-21 RX ORDER — METOPROLOL SUCCINATE 25 MG/1
25 TABLET, EXTENDED RELEASE ORAL DAILY
Status: DISCONTINUED | OUTPATIENT
Start: 2019-11-21 | End: 2019-11-25 | Stop reason: HOSPADM

## 2019-11-21 RX ORDER — ASPIRIN 81 MG/1
81 TABLET, CHEWABLE ORAL DAILY
Status: DISCONTINUED | OUTPATIENT
Start: 2019-11-21 | End: 2019-11-25 | Stop reason: HOSPADM

## 2019-11-21 RX ORDER — FUROSEMIDE 40 MG/1
40 TABLET ORAL 2 TIMES DAILY
Status: DISCONTINUED | OUTPATIENT
Start: 2019-11-22 | End: 2019-11-23

## 2019-11-21 RX ORDER — SODIUM CHLORIDE 0.9 % (FLUSH) 0.9 %
10 SYRINGE (ML) INJECTION EVERY 12 HOURS SCHEDULED
Status: DISCONTINUED | OUTPATIENT
Start: 2019-11-21 | End: 2019-11-25 | Stop reason: HOSPADM

## 2019-11-21 RX ORDER — 0.9 % SODIUM CHLORIDE 0.9 %
500 INTRAVENOUS SOLUTION INTRAVENOUS ONCE
Status: COMPLETED | OUTPATIENT
Start: 2019-11-21 | End: 2019-11-21

## 2019-11-21 RX ORDER — POTASSIUM CHLORIDE 750 MG/1
20 CAPSULE, EXTENDED RELEASE ORAL DAILY
Status: DISCONTINUED | OUTPATIENT
Start: 2019-11-21 | End: 2019-11-25 | Stop reason: HOSPADM

## 2019-11-21 RX ORDER — NICOTINE 21 MG/24HR
1 PATCH, TRANSDERMAL 24 HOURS TRANSDERMAL DAILY PRN
Status: DISCONTINUED | OUTPATIENT
Start: 2019-11-21 | End: 2019-11-21

## 2019-11-21 RX ORDER — POTASSIUM CHLORIDE 20 MEQ/1
40 TABLET, EXTENDED RELEASE ORAL PRN
Status: DISCONTINUED | OUTPATIENT
Start: 2019-11-21 | End: 2019-11-25 | Stop reason: HOSPADM

## 2019-11-21 RX ORDER — ONDANSETRON 2 MG/ML
4 INJECTION INTRAMUSCULAR; INTRAVENOUS EVERY 6 HOURS PRN
Status: DISCONTINUED | OUTPATIENT
Start: 2019-11-21 | End: 2019-11-21 | Stop reason: ALTCHOICE

## 2019-11-21 RX ORDER — POTASSIUM CHLORIDE 7.45 MG/ML
10 INJECTION INTRAVENOUS PRN
Status: DISCONTINUED | OUTPATIENT
Start: 2019-11-21 | End: 2019-11-25 | Stop reason: HOSPADM

## 2019-11-21 RX ORDER — SODIUM CHLORIDE 9 MG/ML
INJECTION, SOLUTION INTRAVENOUS CONTINUOUS
Status: DISCONTINUED | OUTPATIENT
Start: 2019-11-21 | End: 2019-11-23

## 2019-11-21 RX ORDER — FUROSEMIDE 40 MG/1
40 TABLET ORAL DAILY
Status: DISCONTINUED | OUTPATIENT
Start: 2019-11-21 | End: 2019-11-21

## 2019-11-21 RX ORDER — ONDANSETRON 2 MG/ML
4 INJECTION INTRAMUSCULAR; INTRAVENOUS EVERY 6 HOURS PRN
Status: DISCONTINUED | OUTPATIENT
Start: 2019-11-21 | End: 2019-11-25 | Stop reason: HOSPADM

## 2019-11-21 RX ORDER — SODIUM CHLORIDE 0.9 % (FLUSH) 0.9 %
10 SYRINGE (ML) INJECTION PRN
Status: DISCONTINUED | OUTPATIENT
Start: 2019-11-21 | End: 2019-11-25 | Stop reason: HOSPADM

## 2019-11-21 RX ORDER — ACETAMINOPHEN 325 MG/1
650 TABLET ORAL EVERY 4 HOURS PRN
Status: DISCONTINUED | OUTPATIENT
Start: 2019-11-21 | End: 2019-11-25 | Stop reason: HOSPADM

## 2019-11-21 RX ADMIN — RIVAROXABAN 20 MG: 20 TABLET, FILM COATED ORAL at 16:44

## 2019-11-21 RX ADMIN — AMIODARONE HYDROCHLORIDE 1 MG/MIN: 50 INJECTION, SOLUTION INTRAVENOUS at 09:04

## 2019-11-21 RX ADMIN — METOPROLOL SUCCINATE 25 MG: 25 TABLET, FILM COATED, EXTENDED RELEASE ORAL at 13:51

## 2019-11-21 RX ADMIN — SODIUM CHLORIDE 500 ML: 9 INJECTION, SOLUTION INTRAVENOUS at 08:01

## 2019-11-21 RX ADMIN — MAGNESIUM SULFATE HEPTAHYDRATE 2 G: 500 INJECTION, SOLUTION INTRAMUSCULAR; INTRAVENOUS at 08:10

## 2019-11-21 RX ADMIN — SACUBITRIL AND VALSARTAN 1 TABLET: 49; 51 TABLET, FILM COATED ORAL at 20:47

## 2019-11-21 RX ADMIN — POTASSIUM CHLORIDE 20 MEQ: 750 CAPSULE, EXTENDED RELEASE ORAL at 13:52

## 2019-11-21 RX ADMIN — FUROSEMIDE 40 MG: 40 TABLET ORAL at 13:51

## 2019-11-21 RX ADMIN — ASPIRIN 81 MG 81 MG: 81 TABLET ORAL at 13:51

## 2019-11-21 RX ADMIN — ATORVASTATIN CALCIUM 80 MG: 80 TABLET, FILM COATED ORAL at 20:47

## 2019-11-21 RX ADMIN — SACUBITRIL AND VALSARTAN 1 TABLET: 49; 51 TABLET, FILM COATED ORAL at 13:51

## 2019-11-21 RX ADMIN — AMIODARONE HYDROCHLORIDE 0.5 MG/MIN: 50 INJECTION, SOLUTION INTRAVENOUS at 16:44

## 2019-11-21 RX ADMIN — AMIODARONE HYDROCHLORIDE 150 MG: 50 INJECTION, SOLUTION INTRAVENOUS at 08:52

## 2019-11-21 RX ADMIN — ACETAMINOPHEN 650 MG: 325 TABLET ORAL at 13:51

## 2019-11-21 RX ADMIN — SODIUM CHLORIDE: 9 INJECTION, SOLUTION INTRAVENOUS at 09:07

## 2019-11-21 ASSESSMENT — PAIN SCALES - GENERAL
PAINLEVEL_OUTOF10: 3
PAINLEVEL_OUTOF10: 5

## 2019-11-21 ASSESSMENT — ENCOUNTER SYMPTOMS
NAUSEA: 0
COUGH: 0
ABDOMINAL PAIN: 0
WHEEZING: 0
VOMITING: 0
CONSTIPATION: 0
SHORTNESS OF BREATH: 0
DIARRHEA: 0
CHEST TIGHTNESS: 0
BLOOD IN STOOL: 0
RHINORRHEA: 0

## 2019-11-21 ASSESSMENT — PAIN DESCRIPTION - PAIN TYPE: TYPE: CHRONIC PAIN

## 2019-11-21 ASSESSMENT — PAIN DESCRIPTION - LOCATION: LOCATION: BACK

## 2019-11-22 ENCOUNTER — APPOINTMENT (OUTPATIENT)
Dept: GENERAL RADIOLOGY | Age: 69
DRG: 308 | End: 2019-11-22
Payer: MEDICARE

## 2019-11-22 LAB
ANION GAP SERPL CALCULATED.3IONS-SCNC: 12 MMOL/L (ref 9–17)
BUN BLDV-MCNC: 26 MG/DL (ref 8–23)
BUN/CREAT BLD: 22 (ref 9–20)
CALCIUM SERPL-MCNC: 8.5 MG/DL (ref 8.6–10.4)
CHLORIDE BLD-SCNC: 110 MMOL/L (ref 98–107)
CO2: 20 MMOL/L (ref 20–31)
CREAT SERPL-MCNC: 1.2 MG/DL (ref 0.5–0.9)
EKG ATRIAL RATE: 441 BPM
EKG P AXIS: -61 DEGREES
EKG P-R INTERVAL: 252 MS
EKG Q-T INTERVAL: 472 MS
EKG QRS DURATION: 104 MS
EKG QTC CALCULATION (BAZETT): 509 MS
EKG R AXIS: 108 DEGREES
EKG T AXIS: 133 DEGREES
EKG VENTRICULAR RATE: 70 BPM
GFR AFRICAN AMERICAN: 54 ML/MIN
GFR NON-AFRICAN AMERICAN: 45 ML/MIN
GFR SERPL CREATININE-BSD FRML MDRD: ABNORMAL ML/MIN/{1.73_M2}
GFR SERPL CREATININE-BSD FRML MDRD: ABNORMAL ML/MIN/{1.73_M2}
GLUCOSE BLD-MCNC: 135 MG/DL (ref 70–99)
HCT VFR BLD CALC: 36.4 % (ref 36.3–47.1)
HEMOGLOBIN: 10.3 G/DL (ref 11.9–15.1)
INR BLD: 2.1
MAGNESIUM: 2.2 MG/DL (ref 1.6–2.6)
MCH RBC QN AUTO: 24.9 PG (ref 25.2–33.5)
MCHC RBC AUTO-ENTMCNC: 28.3 G/DL (ref 28.4–34.8)
MCV RBC AUTO: 88.1 FL (ref 82.6–102.9)
NRBC AUTOMATED: 0 PER 100 WBC
PDW BLD-RTO: 20.4 % (ref 11.8–14.4)
PLATELET # BLD: 251 K/UL (ref 138–453)
PMV BLD AUTO: 10.5 FL (ref 8.1–13.5)
POTASSIUM SERPL-SCNC: 4.1 MMOL/L (ref 3.7–5.3)
POTASSIUM SERPL-SCNC: 4.6 MMOL/L (ref 3.7–5.3)
PROTHROMBIN TIME: 20.7 SEC (ref 9.7–11.6)
RBC # BLD: 4.13 M/UL (ref 3.95–5.11)
SODIUM BLD-SCNC: 142 MMOL/L (ref 135–144)
WBC # BLD: 5.8 K/UL (ref 3.5–11.3)

## 2019-11-22 PROCEDURE — 97110 THERAPEUTIC EXERCISES: CPT

## 2019-11-22 PROCEDURE — 2580000003 HC RX 258: Performed by: FAMILY MEDICINE

## 2019-11-22 PROCEDURE — 97116 GAIT TRAINING THERAPY: CPT

## 2019-11-22 PROCEDURE — 93010 ELECTROCARDIOGRAM REPORT: CPT | Performed by: INTERNAL MEDICINE

## 2019-11-22 PROCEDURE — 93005 ELECTROCARDIOGRAM TRACING: CPT | Performed by: NURSE PRACTITIONER

## 2019-11-22 PROCEDURE — 2060000000 HC ICU INTERMEDIATE R&B

## 2019-11-22 PROCEDURE — 85027 COMPLETE CBC AUTOMATED: CPT

## 2019-11-22 PROCEDURE — 6370000000 HC RX 637 (ALT 250 FOR IP): Performed by: INTERNAL MEDICINE

## 2019-11-22 PROCEDURE — 6360000002 HC RX W HCPCS: Performed by: FAMILY MEDICINE

## 2019-11-22 PROCEDURE — 85610 PROTHROMBIN TIME: CPT

## 2019-11-22 PROCEDURE — 99232 SBSQ HOSP IP/OBS MODERATE 35: CPT | Performed by: FAMILY MEDICINE

## 2019-11-22 PROCEDURE — 97530 THERAPEUTIC ACTIVITIES: CPT

## 2019-11-22 PROCEDURE — 74230 X-RAY XM SWLNG FUNCJ C+: CPT

## 2019-11-22 PROCEDURE — 83735 ASSAY OF MAGNESIUM: CPT

## 2019-11-22 PROCEDURE — 80048 BASIC METABOLIC PNL TOTAL CA: CPT

## 2019-11-22 PROCEDURE — 92611 MOTION FLUOROSCOPY/SWALLOW: CPT

## 2019-11-22 PROCEDURE — 36415 COLL VENOUS BLD VENIPUNCTURE: CPT

## 2019-11-22 PROCEDURE — 6370000000 HC RX 637 (ALT 250 FOR IP): Performed by: FAMILY MEDICINE

## 2019-11-22 PROCEDURE — 84132 ASSAY OF SERUM POTASSIUM: CPT

## 2019-11-22 PROCEDURE — 6370000000 HC RX 637 (ALT 250 FOR IP): Performed by: NURSE PRACTITIONER

## 2019-11-22 RX ORDER — AMIODARONE HYDROCHLORIDE 200 MG/1
200 TABLET ORAL 2 TIMES DAILY
Status: DISCONTINUED | OUTPATIENT
Start: 2019-11-22 | End: 2019-11-23

## 2019-11-22 RX ORDER — LANOLIN ALCOHOL/MO/W.PET/CERES
3 CREAM (GRAM) TOPICAL ONCE
Status: COMPLETED | OUTPATIENT
Start: 2019-11-22 | End: 2019-11-22

## 2019-11-22 RX ADMIN — FUROSEMIDE 40 MG: 40 TABLET ORAL at 09:42

## 2019-11-22 RX ADMIN — RIVAROXABAN 20 MG: 20 TABLET, FILM COATED ORAL at 17:12

## 2019-11-22 RX ADMIN — SACUBITRIL AND VALSARTAN 1 TABLET: 49; 51 TABLET, FILM COATED ORAL at 09:41

## 2019-11-22 RX ADMIN — Medication 10 ML: at 20:54

## 2019-11-22 RX ADMIN — FUROSEMIDE 40 MG: 40 TABLET ORAL at 17:12

## 2019-11-22 RX ADMIN — METOPROLOL SUCCINATE 25 MG: 25 TABLET, FILM COATED, EXTENDED RELEASE ORAL at 09:42

## 2019-11-22 RX ADMIN — ATORVASTATIN CALCIUM 80 MG: 80 TABLET, FILM COATED ORAL at 20:54

## 2019-11-22 RX ADMIN — AMIODARONE HYDROCHLORIDE 200 MG: 200 TABLET ORAL at 20:54

## 2019-11-22 RX ADMIN — SACUBITRIL AND VALSARTAN 1 TABLET: 49; 51 TABLET, FILM COATED ORAL at 20:54

## 2019-11-22 RX ADMIN — ASPIRIN 81 MG 81 MG: 81 TABLET ORAL at 09:41

## 2019-11-22 RX ADMIN — AMIODARONE HYDROCHLORIDE 0.5 MG/MIN: 50 INJECTION, SOLUTION INTRAVENOUS at 06:15

## 2019-11-22 RX ADMIN — POTASSIUM CHLORIDE 20 MEQ: 750 CAPSULE, EXTENDED RELEASE ORAL at 09:41

## 2019-11-22 RX ADMIN — AMIODARONE HYDROCHLORIDE 200 MG: 200 TABLET ORAL at 14:16

## 2019-11-22 RX ADMIN — MELATONIN TAB 3 MG 3 MG: 3 TAB at 03:14

## 2019-11-22 ASSESSMENT — ENCOUNTER SYMPTOMS
VOMITING: 0
CONSTIPATION: 0
RHINORRHEA: 0
WHEEZING: 0
CHEST TIGHTNESS: 0
COUGH: 0
BLOOD IN STOOL: 0
NAUSEA: 0
ABDOMINAL PAIN: 0
SHORTNESS OF BREATH: 0
DIARRHEA: 0

## 2019-11-22 ASSESSMENT — PAIN SCALES - GENERAL: PAINLEVEL_OUTOF10: 6

## 2019-11-23 ENCOUNTER — APPOINTMENT (OUTPATIENT)
Dept: GENERAL RADIOLOGY | Age: 69
DRG: 308 | End: 2019-11-23
Payer: MEDICARE

## 2019-11-23 LAB
ABSOLUTE EOS #: 0.23 K/UL (ref 0–0.44)
ABSOLUTE IMMATURE GRANULOCYTE: 0 K/UL (ref 0–0.3)
ABSOLUTE LYMPH #: 1.14 K/UL (ref 1.1–3.7)
ABSOLUTE MONO #: 0.46 K/UL (ref 0.1–1.2)
ANION GAP SERPL CALCULATED.3IONS-SCNC: 9 MMOL/L (ref 9–17)
BASOPHILS # BLD: 1 % (ref 0–2)
BASOPHILS ABSOLUTE: 0.06 K/UL (ref 0–0.2)
BUN BLDV-MCNC: 25 MG/DL (ref 8–23)
BUN/CREAT BLD: 20 (ref 9–20)
CALCIUM SERPL-MCNC: 7.9 MG/DL (ref 8.6–10.4)
CHLORIDE BLD-SCNC: 108 MMOL/L (ref 98–107)
CO2: 25 MMOL/L (ref 20–31)
CREAT SERPL-MCNC: 1.22 MG/DL (ref 0.5–0.9)
DIFFERENTIAL TYPE: ABNORMAL
EOSINOPHILS RELATIVE PERCENT: 4 % (ref 1–4)
GFR AFRICAN AMERICAN: 53 ML/MIN
GFR NON-AFRICAN AMERICAN: 44 ML/MIN
GFR SERPL CREATININE-BSD FRML MDRD: ABNORMAL ML/MIN/{1.73_M2}
GFR SERPL CREATININE-BSD FRML MDRD: ABNORMAL ML/MIN/{1.73_M2}
GLUCOSE BLD-MCNC: 130 MG/DL (ref 70–99)
HCT VFR BLD CALC: 38.3 % (ref 36.3–47.1)
HEMOGLOBIN: 10.5 G/DL (ref 11.9–15.1)
IMMATURE GRANULOCYTES: 0 %
LYMPHOCYTES # BLD: 20 % (ref 24–43)
MAGNESIUM: 2 MG/DL (ref 1.6–2.6)
MCH RBC QN AUTO: 24.6 PG (ref 25.2–33.5)
MCHC RBC AUTO-ENTMCNC: 27.4 G/DL (ref 28.4–34.8)
MCV RBC AUTO: 89.7 FL (ref 82.6–102.9)
MONOCYTES # BLD: 8 % (ref 3–12)
MORPHOLOGY: ABNORMAL
MYOGLOBIN: 25 NG/ML (ref 25–58)
MYOGLOBIN: 34 NG/ML (ref 25–58)
NRBC AUTOMATED: 0 PER 100 WBC
PDW BLD-RTO: 20 % (ref 11.8–14.4)
PLATELET # BLD: 250 K/UL (ref 138–453)
PLATELET ESTIMATE: ABNORMAL
PMV BLD AUTO: 11.2 FL (ref 8.1–13.5)
POTASSIUM SERPL-SCNC: 3.8 MMOL/L (ref 3.7–5.3)
RBC # BLD: 4.27 M/UL (ref 3.95–5.11)
RBC # BLD: ABNORMAL 10*6/UL
SEG NEUTROPHILS: 67 % (ref 36–65)
SEGMENTED NEUTROPHILS ABSOLUTE COUNT: 3.81 K/UL (ref 1.5–8.1)
SODIUM BLD-SCNC: 142 MMOL/L (ref 135–144)
TROPONIN INTERP: ABNORMAL
TROPONIN INTERP: ABNORMAL
TROPONIN T: ABNORMAL NG/ML
TROPONIN T: ABNORMAL NG/ML
TROPONIN, HIGH SENSITIVITY: 43 NG/L (ref 0–14)
TROPONIN, HIGH SENSITIVITY: 44 NG/L (ref 0–14)
WBC # BLD: 5.7 K/UL (ref 3.5–11.3)
WBC # BLD: ABNORMAL 10*3/UL

## 2019-11-23 PROCEDURE — 2580000003 HC RX 258: Performed by: FAMILY MEDICINE

## 2019-11-23 PROCEDURE — 36415 COLL VENOUS BLD VENIPUNCTURE: CPT

## 2019-11-23 PROCEDURE — 6360000002 HC RX W HCPCS: Performed by: INTERNAL MEDICINE

## 2019-11-23 PROCEDURE — 83735 ASSAY OF MAGNESIUM: CPT

## 2019-11-23 PROCEDURE — 85025 COMPLETE CBC W/AUTO DIFF WBC: CPT

## 2019-11-23 PROCEDURE — 2000000000 HC ICU R&B

## 2019-11-23 PROCEDURE — 80048 BASIC METABOLIC PNL TOTAL CA: CPT

## 2019-11-23 PROCEDURE — 71045 X-RAY EXAM CHEST 1 VIEW: CPT

## 2019-11-23 PROCEDURE — 99233 SBSQ HOSP IP/OBS HIGH 50: CPT | Performed by: FAMILY MEDICINE

## 2019-11-23 PROCEDURE — 6370000000 HC RX 637 (ALT 250 FOR IP): Performed by: FAMILY MEDICINE

## 2019-11-23 PROCEDURE — 6370000000 HC RX 637 (ALT 250 FOR IP): Performed by: INTERNAL MEDICINE

## 2019-11-23 PROCEDURE — 83874 ASSAY OF MYOGLOBIN: CPT

## 2019-11-23 PROCEDURE — 84484 ASSAY OF TROPONIN QUANT: CPT

## 2019-11-23 PROCEDURE — 6360000002 HC RX W HCPCS: Performed by: FAMILY MEDICINE

## 2019-11-23 PROCEDURE — 2580000003 HC RX 258: Performed by: INTERNAL MEDICINE

## 2019-11-23 PROCEDURE — 93005 ELECTROCARDIOGRAM TRACING: CPT | Performed by: FAMILY MEDICINE

## 2019-11-23 RX ORDER — FUROSEMIDE 10 MG/ML
20 INJECTION INTRAMUSCULAR; INTRAVENOUS 2 TIMES DAILY
Status: DISCONTINUED | OUTPATIENT
Start: 2019-11-23 | End: 2019-11-24

## 2019-11-23 RX ORDER — MAGNESIUM SULFATE 1 G/100ML
1 INJECTION INTRAVENOUS
Status: COMPLETED | OUTPATIENT
Start: 2019-11-23 | End: 2019-11-23

## 2019-11-23 RX ORDER — AMIODARONE HYDROCHLORIDE 200 MG/1
200 TABLET ORAL 2 TIMES DAILY
Status: DISCONTINUED | OUTPATIENT
Start: 2019-11-23 | End: 2019-11-25 | Stop reason: HOSPADM

## 2019-11-23 RX ADMIN — ASPIRIN 81 MG 81 MG: 81 TABLET ORAL at 08:54

## 2019-11-23 RX ADMIN — FUROSEMIDE 20 MG: 10 INJECTION, SOLUTION INTRAMUSCULAR; INTRAVENOUS at 18:05

## 2019-11-23 RX ADMIN — ATORVASTATIN CALCIUM 80 MG: 80 TABLET, FILM COATED ORAL at 21:11

## 2019-11-23 RX ADMIN — METOPROLOL SUCCINATE 25 MG: 25 TABLET, FILM COATED, EXTENDED RELEASE ORAL at 08:54

## 2019-11-23 RX ADMIN — MAGNESIUM SULFATE HEPTAHYDRATE 1 G: 1 INJECTION, SOLUTION INTRAVENOUS at 08:54

## 2019-11-23 RX ADMIN — MAGNESIUM SULFATE HEPTAHYDRATE 1 G: 1 INJECTION, SOLUTION INTRAVENOUS at 07:44

## 2019-11-23 RX ADMIN — Medication 10 ML: at 21:11

## 2019-11-23 RX ADMIN — FUROSEMIDE 20 MG: 10 INJECTION, SOLUTION INTRAMUSCULAR; INTRAVENOUS at 07:43

## 2019-11-23 RX ADMIN — RIVAROXABAN 20 MG: 20 TABLET, FILM COATED ORAL at 18:04

## 2019-11-23 RX ADMIN — POTASSIUM CHLORIDE 20 MEQ: 750 CAPSULE, EXTENDED RELEASE ORAL at 08:54

## 2019-11-23 RX ADMIN — AMIODARONE HYDROCHLORIDE 150 MG: 50 INJECTION, SOLUTION INTRAVENOUS at 06:48

## 2019-11-23 RX ADMIN — AMIODARONE HYDROCHLORIDE 200 MG: 200 TABLET ORAL at 08:54

## 2019-11-23 RX ADMIN — AMIODARONE HYDROCHLORIDE 200 MG: 200 TABLET ORAL at 21:11

## 2019-11-23 ASSESSMENT — ENCOUNTER SYMPTOMS
VOMITING: 0
WHEEZING: 0
BLOOD IN STOOL: 0
ABDOMINAL PAIN: 0
COUGH: 0
NAUSEA: 0
SHORTNESS OF BREATH: 1
CHEST TIGHTNESS: 0
RHINORRHEA: 0
CONSTIPATION: 0
DIARRHEA: 0

## 2019-11-23 ASSESSMENT — PAIN SCALES - GENERAL: PAINLEVEL_OUTOF10: 0

## 2019-11-24 LAB
-: ABNORMAL
ABSOLUTE EOS #: 0.2 K/UL (ref 0–0.4)
ABSOLUTE IMMATURE GRANULOCYTE: 0 K/UL (ref 0–0.3)
ABSOLUTE LYMPH #: 0.9 K/UL (ref 1–4.8)
ABSOLUTE MONO #: 0.45 K/UL (ref 0.2–0.8)
AMORPHOUS: ABNORMAL
ANION GAP SERPL CALCULATED.3IONS-SCNC: 11 MMOL/L (ref 9–17)
BACTERIA: ABNORMAL
BASOPHILS # BLD: 1 %
BASOPHILS ABSOLUTE: 0.05 K/UL (ref 0–0.2)
BILIRUBIN URINE: NEGATIVE
BUN BLDV-MCNC: 27 MG/DL (ref 8–23)
BUN/CREAT BLD: 19 (ref 9–20)
CALCIUM SERPL-MCNC: 7.8 MG/DL (ref 8.6–10.4)
CASTS UA: ABNORMAL /LPF
CHLORIDE BLD-SCNC: 104 MMOL/L (ref 98–107)
CO2: 27 MMOL/L (ref 20–31)
COLOR: YELLOW
COMMENT UA: ABNORMAL
CREAT SERPL-MCNC: 1.39 MG/DL (ref 0.5–0.9)
CRYSTALS, UA: ABNORMAL /HPF
DIFFERENTIAL TYPE: ABNORMAL
EOSINOPHILS RELATIVE PERCENT: 4 % (ref 1–4)
EPITHELIAL CELLS UA: ABNORMAL /HPF (ref 0–5)
GFR AFRICAN AMERICAN: 46 ML/MIN
GFR NON-AFRICAN AMERICAN: 38 ML/MIN
GFR SERPL CREATININE-BSD FRML MDRD: ABNORMAL ML/MIN/{1.73_M2}
GFR SERPL CREATININE-BSD FRML MDRD: ABNORMAL ML/MIN/{1.73_M2}
GLUCOSE BLD-MCNC: 103 MG/DL (ref 70–99)
GLUCOSE URINE: NEGATIVE
HCT VFR BLD CALC: 37.7 % (ref 36.3–47.1)
HEMOGLOBIN: 10.3 G/DL (ref 11.9–15.1)
IMMATURE GRANULOCYTES: 0 %
KETONES, URINE: NEGATIVE
LEUKOCYTE ESTERASE, URINE: ABNORMAL
LYMPHOCYTES # BLD: 18 % (ref 24–44)
MCH RBC QN AUTO: 24.4 PG (ref 25.2–33.5)
MCHC RBC AUTO-ENTMCNC: 27.3 G/DL (ref 28.4–34.8)
MCV RBC AUTO: 89.3 FL (ref 82.6–102.9)
MONOCYTES # BLD: 9 % (ref 1–7)
MORPHOLOGY: ABNORMAL
MORPHOLOGY: ABNORMAL
MUCUS: ABNORMAL
NITRITE, URINE: NEGATIVE
NRBC AUTOMATED: 0 PER 100 WBC
OTHER OBSERVATIONS UA: ABNORMAL
PDW BLD-RTO: 20.1 % (ref 11.8–14.4)
PH UA: 6 (ref 5–8)
PLATELET # BLD: 225 K/UL (ref 138–453)
PLATELET ESTIMATE: ABNORMAL
PMV BLD AUTO: 11.3 FL (ref 8.1–13.5)
POTASSIUM SERPL-SCNC: 3.5 MMOL/L (ref 3.7–5.3)
PROTEIN UA: NEGATIVE
RBC # BLD: 4.22 M/UL (ref 3.95–5.11)
RBC # BLD: ABNORMAL 10*6/UL
RBC UA: ABNORMAL /HPF (ref 0–2)
RENAL EPITHELIAL, UA: ABNORMAL /HPF
SEG NEUTROPHILS: 68 % (ref 36–66)
SEGMENTED NEUTROPHILS ABSOLUTE COUNT: 3.4 K/UL (ref 1.8–7.7)
SODIUM BLD-SCNC: 142 MMOL/L (ref 135–144)
SPECIFIC GRAVITY UA: 1.01 (ref 1–1.03)
TRICHOMONAS: ABNORMAL
TURBIDITY: ABNORMAL
URINE HGB: ABNORMAL
UROBILINOGEN, URINE: NORMAL
WBC # BLD: 5 K/UL (ref 3.5–11.3)
WBC # BLD: ABNORMAL 10*3/UL
WBC UA: ABNORMAL /HPF (ref 0–5)
YEAST: ABNORMAL

## 2019-11-24 PROCEDURE — 99232 SBSQ HOSP IP/OBS MODERATE 35: CPT | Performed by: FAMILY MEDICINE

## 2019-11-24 PROCEDURE — 6370000000 HC RX 637 (ALT 250 FOR IP): Performed by: FAMILY MEDICINE

## 2019-11-24 PROCEDURE — 97110 THERAPEUTIC EXERCISES: CPT

## 2019-11-24 PROCEDURE — 85025 COMPLETE CBC W/AUTO DIFF WBC: CPT

## 2019-11-24 PROCEDURE — 6370000000 HC RX 637 (ALT 250 FOR IP): Performed by: INTERNAL MEDICINE

## 2019-11-24 PROCEDURE — 81001 URINALYSIS AUTO W/SCOPE: CPT

## 2019-11-24 PROCEDURE — 36415 COLL VENOUS BLD VENIPUNCTURE: CPT

## 2019-11-24 PROCEDURE — 97530 THERAPEUTIC ACTIVITIES: CPT

## 2019-11-24 PROCEDURE — 2060000000 HC ICU INTERMEDIATE R&B

## 2019-11-24 PROCEDURE — 80048 BASIC METABOLIC PNL TOTAL CA: CPT

## 2019-11-24 PROCEDURE — 2580000003 HC RX 258: Performed by: FAMILY MEDICINE

## 2019-11-24 PROCEDURE — 6360000002 HC RX W HCPCS: Performed by: FAMILY MEDICINE

## 2019-11-24 RX ORDER — FUROSEMIDE 20 MG/1
20 TABLET ORAL DAILY
Status: DISCONTINUED | OUTPATIENT
Start: 2019-11-24 | End: 2019-11-25 | Stop reason: HOSPADM

## 2019-11-24 RX ADMIN — RIVAROXABAN 20 MG: 20 TABLET, FILM COATED ORAL at 17:39

## 2019-11-24 RX ADMIN — ATORVASTATIN CALCIUM 80 MG: 80 TABLET, FILM COATED ORAL at 21:00

## 2019-11-24 RX ADMIN — POTASSIUM CHLORIDE 20 MEQ: 750 CAPSULE, EXTENDED RELEASE ORAL at 09:26

## 2019-11-24 RX ADMIN — Medication 10 ML: at 21:00

## 2019-11-24 RX ADMIN — FUROSEMIDE 20 MG: 10 INJECTION, SOLUTION INTRAMUSCULAR; INTRAVENOUS at 09:44

## 2019-11-24 RX ADMIN — FUROSEMIDE 20 MG: 20 TABLET ORAL at 16:16

## 2019-11-24 RX ADMIN — AMIODARONE HYDROCHLORIDE 200 MG: 200 TABLET ORAL at 09:27

## 2019-11-24 RX ADMIN — AMIODARONE HYDROCHLORIDE 200 MG: 200 TABLET ORAL at 21:00

## 2019-11-24 RX ADMIN — ASPIRIN 81 MG 81 MG: 81 TABLET ORAL at 09:26

## 2019-11-24 RX ADMIN — METOPROLOL SUCCINATE 25 MG: 25 TABLET, FILM COATED, EXTENDED RELEASE ORAL at 09:26

## 2019-11-24 ASSESSMENT — ENCOUNTER SYMPTOMS
RHINORRHEA: 0
WHEEZING: 0
COUGH: 0
CHEST TIGHTNESS: 0
ABDOMINAL PAIN: 0
DIARRHEA: 0
BLOOD IN STOOL: 0
CONSTIPATION: 0
NAUSEA: 0
VOMITING: 0
SHORTNESS OF BREATH: 1

## 2019-11-24 ASSESSMENT — PAIN SCALES - GENERAL: PAINLEVEL_OUTOF10: 0

## 2019-11-25 VITALS
DIASTOLIC BLOOD PRESSURE: 55 MMHG | RESPIRATION RATE: 16 BRPM | TEMPERATURE: 98.6 F | OXYGEN SATURATION: 97 % | SYSTOLIC BLOOD PRESSURE: 124 MMHG | WEIGHT: 185.1 LBS | BODY MASS INDEX: 36.34 KG/M2 | HEART RATE: 71 BPM | HEIGHT: 60 IN

## 2019-11-25 LAB
ABSOLUTE EOS #: 0.15 K/UL (ref 0–0.4)
ABSOLUTE IMMATURE GRANULOCYTE: 0 K/UL (ref 0–0.3)
ABSOLUTE LYMPH #: 0.9 K/UL (ref 1–4.8)
ABSOLUTE MONO #: 0.45 K/UL (ref 0.2–0.8)
ANION GAP SERPL CALCULATED.3IONS-SCNC: 10 MMOL/L (ref 9–17)
ANION GAP SERPL CALCULATED.3IONS-SCNC: 9 MMOL/L (ref 9–17)
BASOPHILS # BLD: 1 %
BASOPHILS ABSOLUTE: 0.05 K/UL (ref 0–0.2)
BUN BLDV-MCNC: 19 MG/DL (ref 8–23)
BUN BLDV-MCNC: 21 MG/DL (ref 8–23)
BUN/CREAT BLD: 21 (ref 9–20)
BUN/CREAT BLD: 21 (ref 9–20)
CALCIUM SERPL-MCNC: 7.9 MG/DL (ref 8.6–10.4)
CALCIUM SERPL-MCNC: 8.2 MG/DL (ref 8.6–10.4)
CHLORIDE BLD-SCNC: 103 MMOL/L (ref 98–107)
CHLORIDE BLD-SCNC: 104 MMOL/L (ref 98–107)
CO2: 28 MMOL/L (ref 20–31)
CO2: 31 MMOL/L (ref 20–31)
CREAT SERPL-MCNC: 0.91 MG/DL (ref 0.5–0.9)
CREAT SERPL-MCNC: 1.01 MG/DL (ref 0.5–0.9)
DIFFERENTIAL TYPE: ABNORMAL
EKG ATRIAL RATE: 69 BPM
EKG Q-T INTERVAL: 312 MS
EKG QRS DURATION: 180 MS
EKG QTC CALCULATION (BAZETT): 392 MS
EKG R AXIS: 125 DEGREES
EKG T AXIS: -95 DEGREES
EKG VENTRICULAR RATE: 95 BPM
EOSINOPHILS RELATIVE PERCENT: 3 % (ref 1–4)
GFR AFRICAN AMERICAN: >60 ML/MIN
GFR AFRICAN AMERICAN: >60 ML/MIN
GFR NON-AFRICAN AMERICAN: 54 ML/MIN
GFR NON-AFRICAN AMERICAN: >60 ML/MIN
GFR SERPL CREATININE-BSD FRML MDRD: ABNORMAL ML/MIN/{1.73_M2}
GLUCOSE BLD-MCNC: 115 MG/DL (ref 70–99)
GLUCOSE BLD-MCNC: 117 MG/DL (ref 70–99)
HCT VFR BLD CALC: 35.6 % (ref 36.3–47.1)
HEMOGLOBIN: 9.8 G/DL (ref 11.9–15.1)
IMMATURE GRANULOCYTES: 0 %
LYMPHOCYTES # BLD: 18 % (ref 24–44)
MAGNESIUM: 1.7 MG/DL (ref 1.6–2.6)
MCH RBC QN AUTO: 24.4 PG (ref 25.2–33.5)
MCHC RBC AUTO-ENTMCNC: 27.5 G/DL (ref 28.4–34.8)
MCV RBC AUTO: 88.8 FL (ref 82.6–102.9)
MONOCYTES # BLD: 9 % (ref 1–7)
MORPHOLOGY: ABNORMAL
MORPHOLOGY: ABNORMAL
NRBC AUTOMATED: 0 PER 100 WBC
PDW BLD-RTO: 19.6 % (ref 11.8–14.4)
PLATELET # BLD: 213 K/UL (ref 138–453)
PLATELET ESTIMATE: ABNORMAL
PMV BLD AUTO: 10.9 FL (ref 8.1–13.5)
POTASSIUM SERPL-SCNC: 3.1 MMOL/L (ref 3.7–5.3)
POTASSIUM SERPL-SCNC: 3.4 MMOL/L (ref 3.7–5.3)
RBC # BLD: 4.01 M/UL (ref 3.95–5.11)
RBC # BLD: ABNORMAL 10*6/UL
SEG NEUTROPHILS: 69 % (ref 36–66)
SEGMENTED NEUTROPHILS ABSOLUTE COUNT: 3.45 K/UL (ref 1.8–7.7)
SODIUM BLD-SCNC: 142 MMOL/L (ref 135–144)
SODIUM BLD-SCNC: 143 MMOL/L (ref 135–144)
WBC # BLD: 5 K/UL (ref 3.5–11.3)
WBC # BLD: ABNORMAL 10*3/UL

## 2019-11-25 PROCEDURE — 36415 COLL VENOUS BLD VENIPUNCTURE: CPT

## 2019-11-25 PROCEDURE — 93005 ELECTROCARDIOGRAM TRACING: CPT | Performed by: NURSE PRACTITIONER

## 2019-11-25 PROCEDURE — 83735 ASSAY OF MAGNESIUM: CPT

## 2019-11-25 PROCEDURE — 97535 SELF CARE MNGMENT TRAINING: CPT

## 2019-11-25 PROCEDURE — 99239 HOSP IP/OBS DSCHRG MGMT >30: CPT | Performed by: INTERNAL MEDICINE

## 2019-11-25 PROCEDURE — 6370000000 HC RX 637 (ALT 250 FOR IP): Performed by: FAMILY MEDICINE

## 2019-11-25 PROCEDURE — 6370000000 HC RX 637 (ALT 250 FOR IP): Performed by: INTERNAL MEDICINE

## 2019-11-25 PROCEDURE — 2580000003 HC RX 258: Performed by: FAMILY MEDICINE

## 2019-11-25 PROCEDURE — 85025 COMPLETE CBC W/AUTO DIFF WBC: CPT

## 2019-11-25 PROCEDURE — 80048 BASIC METABOLIC PNL TOTAL CA: CPT

## 2019-11-25 PROCEDURE — 93010 ELECTROCARDIOGRAM REPORT: CPT | Performed by: INTERNAL MEDICINE

## 2019-11-25 RX ORDER — FUROSEMIDE 40 MG/1
20 TABLET ORAL DAILY
Qty: 15 TABLET | Refills: 0 | Status: ON HOLD | DISCHARGE
Start: 2019-11-25 | End: 2019-12-13

## 2019-11-25 RX ORDER — AMIODARONE HYDROCHLORIDE 200 MG/1
200 TABLET ORAL 2 TIMES DAILY
Qty: 60 TABLET | Refills: 0 | Status: ON HOLD | DISCHARGE
Start: 2019-11-25 | End: 2019-12-19 | Stop reason: HOSPADM

## 2019-11-25 RX ADMIN — POTASSIUM CHLORIDE 40 MEQ: 20 TABLET, EXTENDED RELEASE ORAL at 08:50

## 2019-11-25 RX ADMIN — METOPROLOL SUCCINATE 25 MG: 25 TABLET, FILM COATED, EXTENDED RELEASE ORAL at 08:44

## 2019-11-25 RX ADMIN — ASPIRIN 81 MG 81 MG: 81 TABLET ORAL at 08:44

## 2019-11-25 RX ADMIN — POTASSIUM CHLORIDE 40 MEQ: 20 TABLET, EXTENDED RELEASE ORAL at 16:29

## 2019-11-25 RX ADMIN — AMIODARONE HYDROCHLORIDE 200 MG: 200 TABLET ORAL at 08:44

## 2019-11-25 RX ADMIN — Medication 10 ML: at 08:45

## 2019-11-25 RX ADMIN — POTASSIUM CHLORIDE 20 MEQ: 750 CAPSULE, EXTENDED RELEASE ORAL at 08:44

## 2019-11-25 RX ADMIN — FUROSEMIDE 20 MG: 20 TABLET ORAL at 08:44

## 2019-11-25 ASSESSMENT — PAIN SCALES - GENERAL
PAINLEVEL_OUTOF10: 3
PAINLEVEL_OUTOF10: 0

## 2019-11-25 ASSESSMENT — PAIN DESCRIPTION - PAIN TYPE: TYPE: ACUTE PAIN

## 2019-11-25 ASSESSMENT — PAIN DESCRIPTION - LOCATION: LOCATION: COCCYX

## 2019-11-26 LAB
EKG ATRIAL RATE: 500 BPM
EKG Q-T INTERVAL: 436 MS
EKG QRS DURATION: 112 MS
EKG QTC CALCULATION (BAZETT): 477 MS
EKG R AXIS: 126 DEGREES
EKG T AXIS: 112 DEGREES
EKG VENTRICULAR RATE: 72 BPM

## 2019-12-12 ENCOUNTER — HOSPITAL ENCOUNTER (INPATIENT)
Age: 69
LOS: 12 days | Discharge: NURSING, REHAB OR LONG-TERM CARE | DRG: 853 | End: 2019-12-24
Attending: EMERGENCY MEDICINE | Admitting: INTERNAL MEDICINE
Payer: MEDICARE

## 2019-12-12 ENCOUNTER — APPOINTMENT (OUTPATIENT)
Dept: CT IMAGING | Age: 69
DRG: 853 | End: 2019-12-12
Payer: MEDICARE

## 2019-12-12 ENCOUNTER — APPOINTMENT (OUTPATIENT)
Dept: GENERAL RADIOLOGY | Age: 69
DRG: 853 | End: 2019-12-12
Payer: MEDICARE

## 2019-12-12 DIAGNOSIS — R09.02 HYPOXEMIA: ICD-10-CM

## 2019-12-12 DIAGNOSIS — N30.00 ACUTE CYSTITIS WITHOUT HEMATURIA: ICD-10-CM

## 2019-12-12 DIAGNOSIS — I50.23 ACUTE ON CHRONIC SYSTOLIC HEART FAILURE (HCC): Primary | ICD-10-CM

## 2019-12-12 PROBLEM — R79.89 ELEVATED LACTIC ACID LEVEL: Status: ACTIVE | Noted: 2019-12-12

## 2019-12-12 PROBLEM — J90 PLEURAL EFFUSION: Status: ACTIVE | Noted: 2019-12-12

## 2019-12-12 PROBLEM — I31.39 PERICARDIAL EFFUSION: Status: ACTIVE | Noted: 2019-12-12

## 2019-12-12 LAB
-: ABNORMAL
ALBUMIN SERPL-MCNC: 4.1 G/DL (ref 3.5–5.2)
ALBUMIN/GLOBULIN RATIO: ABNORMAL (ref 1–2.5)
ALP BLD-CCNC: 103 U/L (ref 35–104)
ALT SERPL-CCNC: 17 U/L (ref 5–33)
AMORPHOUS: ABNORMAL
ANION GAP SERPL CALCULATED.3IONS-SCNC: 16 MMOL/L (ref 9–17)
AST SERPL-CCNC: 23 U/L
BACTERIA: ABNORMAL
BILIRUB SERPL-MCNC: 0.74 MG/DL (ref 0.3–1.2)
BILIRUBIN URINE: ABNORMAL
BNP INTERPRETATION: ABNORMAL
BUN BLDV-MCNC: 28 MG/DL (ref 8–23)
BUN/CREAT BLD: 23 (ref 9–20)
CALCIUM SERPL-MCNC: 9 MG/DL (ref 8.6–10.4)
CASTS UA: ABNORMAL /LPF
CHLORIDE BLD-SCNC: 105 MMOL/L (ref 98–107)
CO2: 22 MMOL/L (ref 20–31)
COLOR: YELLOW
COMMENT UA: ABNORMAL
CREAT SERPL-MCNC: 1.21 MG/DL (ref 0.5–0.9)
CRYSTALS, UA: ABNORMAL /HPF
EPITHELIAL CELLS UA: ABNORMAL /HPF (ref 0–5)
FIO2: 40
GFR AFRICAN AMERICAN: 53 ML/MIN
GFR NON-AFRICAN AMERICAN: 44 ML/MIN
GFR SERPL CREATININE-BSD FRML MDRD: ABNORMAL ML/MIN/{1.73_M2}
GFR SERPL CREATININE-BSD FRML MDRD: ABNORMAL ML/MIN/{1.73_M2}
GLUCOSE BLD-MCNC: 122 MG/DL (ref 70–99)
GLUCOSE URINE: NEGATIVE
INR BLD: 1.9
KETONES, URINE: NEGATIVE
LACTIC ACID, SEPSIS WHOLE BLOOD: ABNORMAL MMOL/L (ref 0.5–1.9)
LACTIC ACID, SEPSIS: 2.7 MMOL/L (ref 0.5–1.9)
LACTIC ACID, SEPSIS: 4.5 MMOL/L (ref 0.5–1.9)
LACTIC ACID, SEPSIS: 4.7 MMOL/L (ref 0.5–1.9)
LEUKOCYTE ESTERASE, URINE: ABNORMAL
LIPASE: 60 U/L (ref 13–60)
MUCUS: ABNORMAL
NEGATIVE BASE EXCESS, ART: 4 (ref 0–2)
NITRITE, URINE: POSITIVE
O2 DEVICE/FLOW/%: ABNORMAL
OTHER OBSERVATIONS UA: ABNORMAL
PATIENT TEMP: 37
PH UA: 6 (ref 5–8)
POC HCO3: 20.9 MMOL/L (ref 22–27)
POC O2 SATURATION: 99 %
POC PCO2 TEMP: ABNORMAL MM HG
POC PCO2: 33 MM HG (ref 32–45)
POC PH TEMP: ABNORMAL
POC PH: 7.41 (ref 7.35–7.45)
POC PO2 TEMP: ABNORMAL MM HG
POC PO2: 137 MM HG (ref 75–95)
POSITIVE BASE EXCESS, ART: ABNORMAL (ref 0–2)
POTASSIUM SERPL-SCNC: 4 MMOL/L (ref 3.7–5.3)
PRO-BNP: ABNORMAL PG/ML
PROTEIN UA: ABNORMAL
PROTHROMBIN TIME: 19.3 SEC (ref 9.7–11.6)
RBC UA: ABNORMAL /HPF (ref 0–2)
RENAL EPITHELIAL, UA: ABNORMAL /HPF
SODIUM BLD-SCNC: 143 MMOL/L (ref 135–144)
SPECIFIC GRAVITY UA: 1.02 (ref 1–1.03)
TCO2 (CALC), ART: 22 MMOL/L (ref 23–28)
TOTAL PROTEIN: 7.3 G/DL (ref 6.4–8.3)
TRICHOMONAS: ABNORMAL
TROPONIN INTERP: ABNORMAL
TROPONIN T: ABNORMAL NG/ML
TROPONIN, HIGH SENSITIVITY: 40 NG/L (ref 0–14)
TROPONIN, HIGH SENSITIVITY: 43 NG/L (ref 0–14)
TROPONIN, HIGH SENSITIVITY: 44 NG/L (ref 0–14)
TURBIDITY: ABNORMAL
URINE HGB: ABNORMAL
UROBILINOGEN, URINE: NORMAL
WBC UA: ABNORMAL /HPF (ref 0–5)
YEAST: ABNORMAL

## 2019-12-12 PROCEDURE — 85610 PROTHROMBIN TIME: CPT

## 2019-12-12 PROCEDURE — 2700000000 HC OXYGEN THERAPY PER DAY

## 2019-12-12 PROCEDURE — 2060000000 HC ICU INTERMEDIATE R&B

## 2019-12-12 PROCEDURE — 94660 CPAP INITIATION&MGMT: CPT

## 2019-12-12 PROCEDURE — 87040 BLOOD CULTURE FOR BACTERIA: CPT

## 2019-12-12 PROCEDURE — 84484 ASSAY OF TROPONIN QUANT: CPT

## 2019-12-12 PROCEDURE — 71260 CT THORAX DX C+: CPT

## 2019-12-12 PROCEDURE — 93005 ELECTROCARDIOGRAM TRACING: CPT | Performed by: EMERGENCY MEDICINE

## 2019-12-12 PROCEDURE — 2500000003 HC RX 250 WO HCPCS: Performed by: NURSE PRACTITIONER

## 2019-12-12 PROCEDURE — 83605 ASSAY OF LACTIC ACID: CPT

## 2019-12-12 PROCEDURE — 36415 COLL VENOUS BLD VENIPUNCTURE: CPT

## 2019-12-12 PROCEDURE — 96365 THER/PROPH/DIAG IV INF INIT: CPT

## 2019-12-12 PROCEDURE — 2580000003 HC RX 258: Performed by: EMERGENCY MEDICINE

## 2019-12-12 PROCEDURE — 87086 URINE CULTURE/COLONY COUNT: CPT

## 2019-12-12 PROCEDURE — 99223 1ST HOSP IP/OBS HIGH 75: CPT | Performed by: NURSE PRACTITIONER

## 2019-12-12 PROCEDURE — 86403 PARTICLE AGGLUT ANTBDY SCRN: CPT

## 2019-12-12 PROCEDURE — 6370000000 HC RX 637 (ALT 250 FOR IP): Performed by: NURSE PRACTITIONER

## 2019-12-12 PROCEDURE — 85025 COMPLETE CBC W/AUTO DIFF WBC: CPT

## 2019-12-12 PROCEDURE — 6360000002 HC RX W HCPCS: Performed by: EMERGENCY MEDICINE

## 2019-12-12 PROCEDURE — 81001 URINALYSIS AUTO W/SCOPE: CPT

## 2019-12-12 PROCEDURE — 82803 BLOOD GASES ANY COMBINATION: CPT

## 2019-12-12 PROCEDURE — 87205 SMEAR GRAM STAIN: CPT

## 2019-12-12 PROCEDURE — 82805 BLOOD GASES W/O2 SATURATION: CPT

## 2019-12-12 PROCEDURE — 80053 COMPREHEN METABOLIC PANEL: CPT

## 2019-12-12 PROCEDURE — 83880 ASSAY OF NATRIURETIC PEPTIDE: CPT

## 2019-12-12 PROCEDURE — 99285 EMERGENCY DEPT VISIT HI MDM: CPT

## 2019-12-12 PROCEDURE — 87077 CULTURE AEROBIC IDENTIFY: CPT

## 2019-12-12 PROCEDURE — 87186 SC STD MICRODIL/AGAR DIL: CPT

## 2019-12-12 PROCEDURE — 71045 X-RAY EXAM CHEST 1 VIEW: CPT

## 2019-12-12 PROCEDURE — 6360000004 HC RX CONTRAST MEDICATION: Performed by: EMERGENCY MEDICINE

## 2019-12-12 PROCEDURE — 87150 DNA/RNA AMPLIFIED PROBE: CPT

## 2019-12-12 PROCEDURE — 83690 ASSAY OF LIPASE: CPT

## 2019-12-12 PROCEDURE — 36600 WITHDRAWAL OF ARTERIAL BLOOD: CPT

## 2019-12-12 RX ORDER — SODIUM CHLORIDE 0.9 % (FLUSH) 0.9 %
10 SYRINGE (ML) INJECTION EVERY 12 HOURS SCHEDULED
Status: DISCONTINUED | OUTPATIENT
Start: 2019-12-12 | End: 2019-12-16 | Stop reason: SDUPTHER

## 2019-12-12 RX ORDER — AMIODARONE HYDROCHLORIDE 200 MG/1
200 TABLET ORAL 2 TIMES DAILY
Status: DISCONTINUED | OUTPATIENT
Start: 2019-12-12 | End: 2019-12-15

## 2019-12-12 RX ORDER — FUROSEMIDE 10 MG/ML
40 INJECTION INTRAMUSCULAR; INTRAVENOUS 2 TIMES DAILY
Status: DISCONTINUED | OUTPATIENT
Start: 2019-12-12 | End: 2019-12-13

## 2019-12-12 RX ORDER — METOPROLOL SUCCINATE 25 MG/1
25 TABLET, EXTENDED RELEASE ORAL DAILY
Status: DISCONTINUED | OUTPATIENT
Start: 2019-12-13 | End: 2019-12-24 | Stop reason: HOSPADM

## 2019-12-12 RX ORDER — ACETAMINOPHEN 325 MG/1
650 TABLET ORAL EVERY 4 HOURS PRN
Status: DISCONTINUED | OUTPATIENT
Start: 2019-12-12 | End: 2019-12-24 | Stop reason: HOSPADM

## 2019-12-12 RX ORDER — POTASSIUM CHLORIDE 750 MG/1
20 CAPSULE, EXTENDED RELEASE ORAL DAILY
Status: DISCONTINUED | OUTPATIENT
Start: 2019-12-13 | End: 2019-12-24 | Stop reason: HOSPADM

## 2019-12-12 RX ORDER — FUROSEMIDE 10 MG/ML
40 INJECTION INTRAMUSCULAR; INTRAVENOUS ONCE
Status: COMPLETED | OUTPATIENT
Start: 2019-12-12 | End: 2019-12-12

## 2019-12-12 RX ORDER — ASPIRIN 81 MG/1
81 TABLET, CHEWABLE ORAL DAILY
Status: DISCONTINUED | OUTPATIENT
Start: 2019-12-13 | End: 2019-12-24 | Stop reason: HOSPADM

## 2019-12-12 RX ORDER — 0.9 % SODIUM CHLORIDE 0.9 %
80 INTRAVENOUS SOLUTION INTRAVENOUS ONCE
Status: DISCONTINUED | OUTPATIENT
Start: 2019-12-12 | End: 2019-12-19

## 2019-12-12 RX ORDER — SODIUM CHLORIDE 0.9 % (FLUSH) 0.9 %
10 SYRINGE (ML) INJECTION PRN
Status: DISCONTINUED | OUTPATIENT
Start: 2019-12-12 | End: 2019-12-16

## 2019-12-12 RX ORDER — ATORVASTATIN CALCIUM 80 MG/1
80 TABLET, FILM COATED ORAL NIGHTLY
Status: DISCONTINUED | OUTPATIENT
Start: 2019-12-12 | End: 2019-12-24 | Stop reason: HOSPADM

## 2019-12-12 RX ORDER — SODIUM CHLORIDE 0.9 % (FLUSH) 0.9 %
10 SYRINGE (ML) INJECTION PRN
Status: DISCONTINUED | OUTPATIENT
Start: 2019-12-12 | End: 2019-12-16 | Stop reason: SDUPTHER

## 2019-12-12 RX ORDER — HYDROCODONE BITARTRATE AND ACETAMINOPHEN 5; 325 MG/1; MG/1
1 TABLET ORAL EVERY 4 HOURS PRN
Status: DISCONTINUED | OUTPATIENT
Start: 2019-12-12 | End: 2019-12-24 | Stop reason: HOSPADM

## 2019-12-12 RX ORDER — HYDROCODONE BITARTRATE AND ACETAMINOPHEN 5; 325 MG/1; MG/1
2 TABLET ORAL EVERY 4 HOURS PRN
Status: DISCONTINUED | OUTPATIENT
Start: 2019-12-12 | End: 2019-12-24 | Stop reason: HOSPADM

## 2019-12-12 RX ORDER — HYOSCYAMINE SULFATE 0.125 MG
125 TABLET ORAL EVERY 4 HOURS PRN
COMMUNITY

## 2019-12-12 RX ADMIN — HYDROCODONE BITARTRATE AND ACETAMINOPHEN 2 TABLET: 5; 325 TABLET ORAL at 23:43

## 2019-12-12 RX ADMIN — Medication 10 ML: at 18:33

## 2019-12-12 RX ADMIN — AMIODARONE HYDROCHLORIDE 200 MG: 200 TABLET ORAL at 23:39

## 2019-12-12 RX ADMIN — ATORVASTATIN CALCIUM 80 MG: 80 TABLET, FILM COATED ORAL at 23:39

## 2019-12-12 RX ADMIN — VANCOMYCIN HYDROCHLORIDE 2500 MG: 5 INJECTION, POWDER, LYOPHILIZED, FOR SOLUTION INTRAVENOUS at 20:35

## 2019-12-12 RX ADMIN — IOPAMIDOL 75 ML: 755 INJECTION, SOLUTION INTRAVENOUS at 18:32

## 2019-12-12 RX ADMIN — FUROSEMIDE 40 MG: 10 INJECTION, SOLUTION INTRAMUSCULAR; INTRAVENOUS at 21:10

## 2019-12-12 RX ADMIN — ANTI-FUNGAL POWDER MICONAZOLE NITRATE TALC FREE: 1.42 POWDER TOPICAL at 23:40

## 2019-12-12 RX ADMIN — CEFEPIME HYDROCHLORIDE 2 G: 2 INJECTION, POWDER, FOR SOLUTION INTRAVENOUS at 19:49

## 2019-12-12 RX ADMIN — RIVAROXABAN 20 MG: 20 TABLET, FILM COATED ORAL at 23:39

## 2019-12-12 ASSESSMENT — PAIN - FUNCTIONAL ASSESSMENT: PAIN_FUNCTIONAL_ASSESSMENT: PREVENTS OR INTERFERES SOME ACTIVE ACTIVITIES AND ADLS

## 2019-12-12 ASSESSMENT — PAIN DESCRIPTION - FREQUENCY: FREQUENCY: CONTINUOUS

## 2019-12-12 ASSESSMENT — PAIN DESCRIPTION - LOCATION
LOCATION: BACK
LOCATION: BACK

## 2019-12-12 ASSESSMENT — ENCOUNTER SYMPTOMS
HEMOPTYSIS: 0
VOMITING: 0
ABDOMINAL PAIN: 0
SHORTNESS OF BREATH: 1
SORE THROAT: 0
DIARRHEA: 0
SPUTUM PRODUCTION: 1
CONSTIPATION: 0
COUGH: 1

## 2019-12-12 ASSESSMENT — PAIN SCALES - GENERAL
PAINLEVEL_OUTOF10: 7
PAINLEVEL_OUTOF10: 10
PAINLEVEL_OUTOF10: 10

## 2019-12-12 ASSESSMENT — PAIN DESCRIPTION - PROGRESSION: CLINICAL_PROGRESSION: NOT CHANGED

## 2019-12-12 ASSESSMENT — PAIN DESCRIPTION - PAIN TYPE
TYPE: CHRONIC PAIN
TYPE: ACUTE PAIN
TYPE: CHRONIC PAIN

## 2019-12-12 ASSESSMENT — PAIN DESCRIPTION - DESCRIPTORS: DESCRIPTORS: DULL;ACHING;DISCOMFORT

## 2019-12-12 ASSESSMENT — PAIN DESCRIPTION - ORIENTATION: ORIENTATION: MID;LOWER

## 2019-12-12 ASSESSMENT — PAIN DESCRIPTION - ONSET: ONSET: ON-GOING

## 2019-12-13 ENCOUNTER — APPOINTMENT (OUTPATIENT)
Dept: GENERAL RADIOLOGY | Age: 69
DRG: 853 | End: 2019-12-13
Payer: MEDICARE

## 2019-12-13 ENCOUNTER — APPOINTMENT (OUTPATIENT)
Dept: INTERVENTIONAL RADIOLOGY/VASCULAR | Age: 69
DRG: 853 | End: 2019-12-13
Payer: MEDICARE

## 2019-12-13 PROBLEM — J96.21 ACUTE AND CHRONIC RESPIRATORY FAILURE WITH HYPOXIA (HCC): Status: ACTIVE | Noted: 2019-12-13

## 2019-12-13 PROBLEM — E86.0 DEHYDRATION: Status: ACTIVE | Noted: 2019-12-13

## 2019-12-13 PROBLEM — B95.61 BACTEREMIA DUE TO STAPHYLOCOCCUS AUREUS: Status: ACTIVE | Noted: 2019-12-13

## 2019-12-13 PROBLEM — R78.81 BACTEREMIA DUE TO STAPHYLOCOCCUS AUREUS: Status: ACTIVE | Noted: 2019-12-13

## 2019-12-13 PROBLEM — A41.9 SEPSIS (HCC): Status: ACTIVE | Noted: 2019-12-12

## 2019-12-13 PROBLEM — R65.20 SIRS DUE TO INFECTIOUS PROCESS WITH ACUTE ORGAN DYSFUNCTION (HCC): Status: ACTIVE | Noted: 2019-12-13

## 2019-12-13 PROBLEM — A41.9 SIRS DUE TO INFECTIOUS PROCESS WITH ACUTE ORGAN DYSFUNCTION (HCC): Status: ACTIVE | Noted: 2019-12-13

## 2019-12-13 LAB
ABSOLUTE EOS #: 0 K/UL (ref 0–0.4)
ABSOLUTE EOS #: 0.12 K/UL (ref 0–0.4)
ABSOLUTE IMMATURE GRANULOCYTE: 0 K/UL (ref 0–0.3)
ABSOLUTE IMMATURE GRANULOCYTE: 0.13 K/UL (ref 0–0.3)
ABSOLUTE LYMPH #: 0.38 K/UL (ref 1–4.8)
ABSOLUTE LYMPH #: 0.97 K/UL (ref 1–4.8)
ABSOLUTE MONO #: 0.61 K/UL (ref 0.2–0.8)
ABSOLUTE MONO #: 0.64 K/UL (ref 0.2–0.8)
ANION GAP SERPL CALCULATED.3IONS-SCNC: 11 MMOL/L (ref 9–17)
BASOPHILS # BLD: 0 %
BASOPHILS # BLD: 0 %
BASOPHILS ABSOLUTE: 0 K/UL (ref 0–0.2)
BASOPHILS ABSOLUTE: 0 K/UL (ref 0–0.2)
BNP INTERPRETATION: ABNORMAL
BUN BLDV-MCNC: 28 MG/DL (ref 8–23)
BUN/CREAT BLD: 22 (ref 9–20)
CALCIUM IONIZED: 1.24 MMOL/L (ref 1.13–1.33)
CALCIUM SERPL-MCNC: 8.5 MG/DL (ref 8.6–10.4)
CHLORIDE BLD-SCNC: 107 MMOL/L (ref 98–107)
CO2: 27 MMOL/L (ref 20–31)
CREAT SERPL-MCNC: 1.29 MG/DL (ref 0.5–0.9)
DIFFERENTIAL TYPE: ABNORMAL
DIFFERENTIAL TYPE: ABNORMAL
EKG ATRIAL RATE: 69 BPM
EKG P-R INTERVAL: 254 MS
EKG Q-T INTERVAL: 436 MS
EKG QRS DURATION: 108 MS
EKG QTC CALCULATION (BAZETT): 470 MS
EKG R AXIS: -25 DEGREES
EKG T AXIS: 137 DEGREES
EKG VENTRICULAR RATE: 70 BPM
EOSINOPHILS RELATIVE PERCENT: 0 % (ref 1–4)
EOSINOPHILS RELATIVE PERCENT: 1 % (ref 1–4)
GFR AFRICAN AMERICAN: 50 ML/MIN
GFR NON-AFRICAN AMERICAN: 41 ML/MIN
GFR SERPL CREATININE-BSD FRML MDRD: ABNORMAL ML/MIN/{1.73_M2}
GFR SERPL CREATININE-BSD FRML MDRD: ABNORMAL ML/MIN/{1.73_M2}
GLUCOSE BLD-MCNC: 112 MG/DL (ref 70–99)
GLUCOSE, FLUID: 168 MG/DL
HCT VFR BLD CALC: 33.6 % (ref 36.3–47.1)
HCT VFR BLD CALC: 38 % (ref 36.3–47.1)
HEMOGLOBIN: 10.3 G/DL (ref 11.9–15.1)
HEMOGLOBIN: 9.1 G/DL (ref 11.9–15.1)
IMMATURE GRANULOCYTES: 0 %
IMMATURE GRANULOCYTES: 1 %
INR BLD: 2.3
LACTATE DEHYDROGENASE, FLUID: 103 U/L
LACTIC ACID: 1.6 MMOL/L (ref 0.5–2.2)
LV EF: 45 %
LVEF MODALITY: NORMAL
LYMPHOCYTES # BLD: 3 % (ref 24–44)
LYMPHOCYTES # BLD: 8 % (ref 24–44)
MAGNESIUM: 2.1 MG/DL (ref 1.6–2.6)
MCH RBC QN AUTO: 23.6 PG (ref 25.2–33.5)
MCH RBC QN AUTO: 23.6 PG (ref 25.2–33.5)
MCHC RBC AUTO-ENTMCNC: 27.1 G/DL (ref 28.4–34.8)
MCHC RBC AUTO-ENTMCNC: 27.1 G/DL (ref 28.4–34.8)
MCV RBC AUTO: 87 FL (ref 82.6–102.9)
MCV RBC AUTO: 87 FL (ref 82.6–102.9)
MONOCYTES # BLD: 5 % (ref 1–7)
MONOCYTES # BLD: 5 % (ref 1–7)
MORPHOLOGY: ABNORMAL
NRBC AUTOMATED: 0 PER 100 WBC
NRBC AUTOMATED: 0.2 PER 100 WBC
NUCLEATED RED BLOOD CELLS: 1 PER 100 WBC
PDW BLD-RTO: 19.8 % (ref 11.8–14.4)
PDW BLD-RTO: 19.9 % (ref 11.8–14.4)
PH FLUID: 8.2
PLATELET # BLD: 237 K/UL (ref 138–453)
PLATELET # BLD: 333 K/UL (ref 138–453)
PLATELET ESTIMATE: ABNORMAL
PLATELET ESTIMATE: ABNORMAL
PMV BLD AUTO: 10.2 FL (ref 8.1–13.5)
PMV BLD AUTO: 10.5 FL (ref 8.1–13.5)
POTASSIUM SERPL-SCNC: 3.6 MMOL/L (ref 3.7–5.3)
PRO-BNP: ABNORMAL PG/ML
PROCALCITONIN: 0.18 NG/ML
PROTHROMBIN TIME: 22.7 SEC (ref 9.7–11.6)
RBC # BLD: 3.86 M/UL (ref 3.95–5.11)
RBC # BLD: 4.37 M/UL (ref 3.95–5.11)
RBC # BLD: ABNORMAL 10*6/UL
RBC # BLD: ABNORMAL 10*6/UL
SEG NEUTROPHILS: 86 % (ref 36–66)
SEG NEUTROPHILS: 91 % (ref 36–66)
SEGMENTED NEUTROPHILS ABSOLUTE COUNT: 10.4 K/UL (ref 1.8–7.7)
SEGMENTED NEUTROPHILS ABSOLUTE COUNT: 11.55 K/UL (ref 1.8–7.7)
SODIUM BLD-SCNC: 145 MMOL/L (ref 135–144)
SPECIMEN TYPE: NORMAL
TOTAL PROTEIN, BODY FLUID: 2.1 G/DL
TROPONIN INTERP: ABNORMAL
TROPONIN T: ABNORMAL NG/ML
TROPONIN, HIGH SENSITIVITY: 44 NG/L (ref 0–14)
WBC # BLD: 12.1 K/UL (ref 3.5–11.3)
WBC # BLD: 12.7 K/UL (ref 3.5–11.3)
WBC # BLD: ABNORMAL 10*3/UL
WBC # BLD: ABNORMAL 10*3/UL

## 2019-12-13 PROCEDURE — 99233 SBSQ HOSP IP/OBS HIGH 50: CPT | Performed by: INTERNAL MEDICINE

## 2019-12-13 PROCEDURE — 6370000000 HC RX 637 (ALT 250 FOR IP): Performed by: NURSE PRACTITIONER

## 2019-12-13 PROCEDURE — 2580000003 HC RX 258: Performed by: INTERNAL MEDICINE

## 2019-12-13 PROCEDURE — 83735 ASSAY OF MAGNESIUM: CPT

## 2019-12-13 PROCEDURE — 82945 GLUCOSE OTHER FLUID: CPT

## 2019-12-13 PROCEDURE — 83605 ASSAY OF LACTIC ACID: CPT

## 2019-12-13 PROCEDURE — 83615 LACTATE (LD) (LDH) ENZYME: CPT

## 2019-12-13 PROCEDURE — C1729 CATH, DRAINAGE: HCPCS

## 2019-12-13 PROCEDURE — 84145 PROCALCITONIN (PCT): CPT

## 2019-12-13 PROCEDURE — 94761 N-INVAS EAR/PLS OXIMETRY MLT: CPT

## 2019-12-13 PROCEDURE — 32555 ASPIRATE PLEURA W/ IMAGING: CPT

## 2019-12-13 PROCEDURE — 99222 1ST HOSP IP/OBS MODERATE 55: CPT | Performed by: INTERNAL MEDICINE

## 2019-12-13 PROCEDURE — 94660 CPAP INITIATION&MGMT: CPT

## 2019-12-13 PROCEDURE — 87116 MYCOBACTERIA CULTURE: CPT

## 2019-12-13 PROCEDURE — 2580000003 HC RX 258: Performed by: NURSE PRACTITIONER

## 2019-12-13 PROCEDURE — 83986 ASSAY PH BODY FLUID NOS: CPT

## 2019-12-13 PROCEDURE — 80048 BASIC METABOLIC PNL TOTAL CA: CPT

## 2019-12-13 PROCEDURE — 0W994ZZ DRAINAGE OF RIGHT PLEURAL CAVITY, PERCUTANEOUS ENDOSCOPIC APPROACH: ICD-10-PCS | Performed by: RADIOLOGY

## 2019-12-13 PROCEDURE — 36415 COLL VENOUS BLD VENIPUNCTURE: CPT

## 2019-12-13 PROCEDURE — 6360000002 HC RX W HCPCS: Performed by: INTERNAL MEDICINE

## 2019-12-13 PROCEDURE — 88112 CYTOPATH CELL ENHANCE TECH: CPT

## 2019-12-13 PROCEDURE — 2060000000 HC ICU INTERMEDIATE R&B

## 2019-12-13 PROCEDURE — 84484 ASSAY OF TROPONIN QUANT: CPT

## 2019-12-13 PROCEDURE — 87070 CULTURE OTHR SPECIMN AEROBIC: CPT

## 2019-12-13 PROCEDURE — 6360000002 HC RX W HCPCS: Performed by: NURSE PRACTITIONER

## 2019-12-13 PROCEDURE — 51702 INSERT TEMP BLADDER CATH: CPT

## 2019-12-13 PROCEDURE — 89051 BODY FLUID CELL COUNT: CPT

## 2019-12-13 PROCEDURE — 83880 ASSAY OF NATRIURETIC PEPTIDE: CPT

## 2019-12-13 PROCEDURE — 93010 ELECTROCARDIOGRAM REPORT: CPT | Performed by: INTERNAL MEDICINE

## 2019-12-13 PROCEDURE — 87205 SMEAR GRAM STAIN: CPT

## 2019-12-13 PROCEDURE — 87102 FUNGUS ISOLATION CULTURE: CPT

## 2019-12-13 PROCEDURE — 2700000000 HC OXYGEN THERAPY PER DAY

## 2019-12-13 PROCEDURE — 82330 ASSAY OF CALCIUM: CPT

## 2019-12-13 PROCEDURE — 84157 ASSAY OF PROTEIN OTHER: CPT

## 2019-12-13 PROCEDURE — 87075 CULTR BACTERIA EXCEPT BLOOD: CPT

## 2019-12-13 PROCEDURE — 88305 TISSUE EXAM BY PATHOLOGIST: CPT

## 2019-12-13 PROCEDURE — 87206 SMEAR FLUORESCENT/ACID STAI: CPT

## 2019-12-13 PROCEDURE — 93306 TTE W/DOPPLER COMPLETE: CPT

## 2019-12-13 PROCEDURE — 71045 X-RAY EXAM CHEST 1 VIEW: CPT

## 2019-12-13 PROCEDURE — 85025 COMPLETE CBC W/AUTO DIFF WBC: CPT

## 2019-12-13 PROCEDURE — 85610 PROTHROMBIN TIME: CPT

## 2019-12-13 RX ORDER — FUROSEMIDE 40 MG/1
40 TABLET ORAL DAILY
COMMUNITY

## 2019-12-13 RX ORDER — FUROSEMIDE 10 MG/ML
20 INJECTION INTRAMUSCULAR; INTRAVENOUS 2 TIMES DAILY
Status: DISCONTINUED | OUTPATIENT
Start: 2019-12-13 | End: 2019-12-17

## 2019-12-13 RX ORDER — ACETAMINOPHEN 500 MG
1000 TABLET ORAL DAILY PRN
COMMUNITY

## 2019-12-13 RX ADMIN — POTASSIUM CHLORIDE 20 MEQ: 750 CAPSULE, EXTENDED RELEASE ORAL at 08:43

## 2019-12-13 RX ADMIN — METOPROLOL SUCCINATE 25 MG: 25 TABLET, FILM COATED, EXTENDED RELEASE ORAL at 08:42

## 2019-12-13 RX ADMIN — VANCOMYCIN HYDROCHLORIDE 1250 MG: 5 INJECTION, POWDER, LYOPHILIZED, FOR SOLUTION INTRAVENOUS at 20:50

## 2019-12-13 RX ADMIN — HYDROCODONE BITARTRATE AND ACETAMINOPHEN 2 TABLET: 5; 325 TABLET ORAL at 08:51

## 2019-12-13 RX ADMIN — ANTI-FUNGAL POWDER MICONAZOLE NITRATE TALC FREE: 1.42 POWDER TOPICAL at 10:45

## 2019-12-13 RX ADMIN — FUROSEMIDE 20 MG: 10 INJECTION, SOLUTION INTRAMUSCULAR; INTRAVENOUS at 17:36

## 2019-12-13 RX ADMIN — CEFEPIME HYDROCHLORIDE 2 G: 2 INJECTION, POWDER, FOR SOLUTION INTRAVENOUS at 08:43

## 2019-12-13 RX ADMIN — ANTI-FUNGAL POWDER MICONAZOLE NITRATE TALC FREE: 1.42 POWDER TOPICAL at 20:51

## 2019-12-13 RX ADMIN — RIVAROXABAN 20 MG: 20 TABLET, FILM COATED ORAL at 17:36

## 2019-12-13 RX ADMIN — Medication 10 ML: at 20:51

## 2019-12-13 RX ADMIN — Medication 10 ML: at 08:43

## 2019-12-13 RX ADMIN — AMIODARONE HYDROCHLORIDE 200 MG: 200 TABLET ORAL at 08:42

## 2019-12-13 RX ADMIN — ASPIRIN 81 MG 81 MG: 81 TABLET ORAL at 08:42

## 2019-12-13 RX ADMIN — ATORVASTATIN CALCIUM 80 MG: 80 TABLET, FILM COATED ORAL at 20:50

## 2019-12-13 ASSESSMENT — PAIN SCALES - GENERAL
PAINLEVEL_OUTOF10: 5
PAINLEVEL_OUTOF10: 0
PAINLEVEL_OUTOF10: 5
PAINLEVEL_OUTOF10: 6
PAINLEVEL_OUTOF10: 10

## 2019-12-13 ASSESSMENT — ENCOUNTER SYMPTOMS
COUGH: 1
WHEEZING: 0
SHORTNESS OF BREATH: 1
STRIDOR: 0
CONSTIPATION: 0
ABDOMINAL PAIN: 0
NAUSEA: 1
BLOOD IN STOOL: 0
DIARRHEA: 0
VOMITING: 0

## 2019-12-14 ENCOUNTER — APPOINTMENT (OUTPATIENT)
Dept: GENERAL RADIOLOGY | Age: 69
DRG: 853 | End: 2019-12-14
Payer: MEDICARE

## 2019-12-14 LAB
ANION GAP SERPL CALCULATED.3IONS-SCNC: 8 MMOL/L (ref 9–17)
BUN BLDV-MCNC: 27 MG/DL (ref 8–23)
BUN/CREAT BLD: 21 (ref 9–20)
CALCIUM IONIZED: 1.15 MMOL/L (ref 1.13–1.33)
CALCIUM SERPL-MCNC: 7.8 MG/DL (ref 8.6–10.4)
CHLORIDE BLD-SCNC: 107 MMOL/L (ref 98–107)
CO2: 27 MMOL/L (ref 20–31)
CREAT SERPL-MCNC: 1.27 MG/DL (ref 0.5–0.9)
CULTURE: ABNORMAL
GFR AFRICAN AMERICAN: 51 ML/MIN
GFR NON-AFRICAN AMERICAN: 42 ML/MIN
GFR SERPL CREATININE-BSD FRML MDRD: ABNORMAL ML/MIN/{1.73_M2}
GFR SERPL CREATININE-BSD FRML MDRD: ABNORMAL ML/MIN/{1.73_M2}
GLUCOSE BLD-MCNC: 109 MG/DL (ref 70–99)
HCT VFR BLD CALC: 29.4 % (ref 36.3–47.1)
HEMOGLOBIN: 7.9 G/DL (ref 11.9–15.1)
Lab: ABNORMAL
Lab: ABNORMAL
MAGNESIUM: 1.9 MG/DL (ref 1.6–2.6)
MCH RBC QN AUTO: 23.7 PG (ref 25.2–33.5)
MCHC RBC AUTO-ENTMCNC: 26.9 G/DL (ref 28.4–34.8)
MCV RBC AUTO: 88 FL (ref 82.6–102.9)
NRBC AUTOMATED: 0 PER 100 WBC
PDW BLD-RTO: 19.9 % (ref 11.8–14.4)
PLATELET # BLD: 184 K/UL (ref 138–453)
PMV BLD AUTO: 10.9 FL (ref 8.1–13.5)
POTASSIUM SERPL-SCNC: 3.7 MMOL/L (ref 3.7–5.3)
PROCALCITONIN: 0.21 NG/ML
RBC # BLD: 3.34 M/UL (ref 3.95–5.11)
SODIUM BLD-SCNC: 142 MMOL/L (ref 135–144)
SPECIMEN DESCRIPTION: ABNORMAL
SPECIMEN DESCRIPTION: ABNORMAL
VANCOMYCIN TROUGH DATE LAST DOSE: ABNORMAL
VANCOMYCIN TROUGH DOSE AMOUNT: ABNORMAL
VANCOMYCIN TROUGH TIME LAST DOSE: ABNORMAL
VANCOMYCIN TROUGH: 20.6 UG/ML (ref 10–20)
WBC # BLD: 5.6 K/UL (ref 3.5–11.3)

## 2019-12-14 PROCEDURE — 82330 ASSAY OF CALCIUM: CPT

## 2019-12-14 PROCEDURE — 2060000000 HC ICU INTERMEDIATE R&B

## 2019-12-14 PROCEDURE — 80048 BASIC METABOLIC PNL TOTAL CA: CPT

## 2019-12-14 PROCEDURE — 6360000002 HC RX W HCPCS: Performed by: INTERNAL MEDICINE

## 2019-12-14 PROCEDURE — 94761 N-INVAS EAR/PLS OXIMETRY MLT: CPT

## 2019-12-14 PROCEDURE — 2580000003 HC RX 258: Performed by: INTERNAL MEDICINE

## 2019-12-14 PROCEDURE — 99222 1ST HOSP IP/OBS MODERATE 55: CPT | Performed by: INTERNAL MEDICINE

## 2019-12-14 PROCEDURE — 83735 ASSAY OF MAGNESIUM: CPT

## 2019-12-14 PROCEDURE — 85027 COMPLETE CBC AUTOMATED: CPT

## 2019-12-14 PROCEDURE — 6370000000 HC RX 637 (ALT 250 FOR IP): Performed by: INTERNAL MEDICINE

## 2019-12-14 PROCEDURE — 80202 ASSAY OF VANCOMYCIN: CPT

## 2019-12-14 PROCEDURE — 2580000003 HC RX 258: Performed by: NURSE PRACTITIONER

## 2019-12-14 PROCEDURE — 2700000000 HC OXYGEN THERAPY PER DAY

## 2019-12-14 PROCEDURE — 94660 CPAP INITIATION&MGMT: CPT

## 2019-12-14 PROCEDURE — 71045 X-RAY EXAM CHEST 1 VIEW: CPT

## 2019-12-14 PROCEDURE — 84145 PROCALCITONIN (PCT): CPT

## 2019-12-14 PROCEDURE — 99232 SBSQ HOSP IP/OBS MODERATE 35: CPT | Performed by: INTERNAL MEDICINE

## 2019-12-14 PROCEDURE — 6370000000 HC RX 637 (ALT 250 FOR IP): Performed by: NURSE PRACTITIONER

## 2019-12-14 PROCEDURE — 36415 COLL VENOUS BLD VENIPUNCTURE: CPT

## 2019-12-14 RX ORDER — DOCUSATE SODIUM 100 MG/1
100 CAPSULE, LIQUID FILLED ORAL 2 TIMES DAILY
Status: DISCONTINUED | OUTPATIENT
Start: 2019-12-14 | End: 2019-12-24 | Stop reason: HOSPADM

## 2019-12-14 RX ADMIN — POTASSIUM CHLORIDE 20 MEQ: 750 CAPSULE, EXTENDED RELEASE ORAL at 09:01

## 2019-12-14 RX ADMIN — DOCUSATE SODIUM 100 MG: 100 CAPSULE, LIQUID FILLED ORAL at 13:18

## 2019-12-14 RX ADMIN — DOCUSATE SODIUM 100 MG: 100 CAPSULE, LIQUID FILLED ORAL at 21:19

## 2019-12-14 RX ADMIN — AMIODARONE HYDROCHLORIDE 200 MG: 200 TABLET ORAL at 21:19

## 2019-12-14 RX ADMIN — FUROSEMIDE 20 MG: 10 INJECTION, SOLUTION INTRAMUSCULAR; INTRAVENOUS at 17:56

## 2019-12-14 RX ADMIN — ASPIRIN 81 MG 81 MG: 81 TABLET ORAL at 09:02

## 2019-12-14 RX ADMIN — IRON SUCROSE 200 MG: 20 INJECTION, SOLUTION INTRAVENOUS at 13:19

## 2019-12-14 RX ADMIN — FUROSEMIDE 20 MG: 10 INJECTION, SOLUTION INTRAMUSCULAR; INTRAVENOUS at 09:02

## 2019-12-14 RX ADMIN — ATORVASTATIN CALCIUM 80 MG: 80 TABLET, FILM COATED ORAL at 21:19

## 2019-12-14 RX ADMIN — ANTI-FUNGAL POWDER MICONAZOLE NITRATE TALC FREE: 1.42 POWDER TOPICAL at 09:02

## 2019-12-14 RX ADMIN — VANCOMYCIN HYDROCHLORIDE 1250 MG: 5 INJECTION, POWDER, LYOPHILIZED, FOR SOLUTION INTRAVENOUS at 21:35

## 2019-12-14 RX ADMIN — ANTI-FUNGAL POWDER MICONAZOLE NITRATE TALC FREE: 1.42 POWDER TOPICAL at 21:20

## 2019-12-14 RX ADMIN — Medication 10 ML: at 09:02

## 2019-12-14 RX ADMIN — METOPROLOL SUCCINATE 25 MG: 25 TABLET, FILM COATED, EXTENDED RELEASE ORAL at 09:00

## 2019-12-14 RX ADMIN — AMIODARONE HYDROCHLORIDE 200 MG: 200 TABLET ORAL at 09:01

## 2019-12-14 RX ADMIN — Medication 10 ML: at 21:20

## 2019-12-14 RX ADMIN — RIVAROXABAN 20 MG: 20 TABLET, FILM COATED ORAL at 17:56

## 2019-12-14 ASSESSMENT — PAIN DESCRIPTION - LOCATION: LOCATION: COCCYX

## 2019-12-14 ASSESSMENT — PAIN DESCRIPTION - PROGRESSION: CLINICAL_PROGRESSION: RAPIDLY IMPROVING

## 2019-12-14 ASSESSMENT — PAIN DESCRIPTION - PAIN TYPE: TYPE: ACUTE PAIN

## 2019-12-14 ASSESSMENT — PAIN SCALES - GENERAL
PAINLEVEL_OUTOF10: 4
PAINLEVEL_OUTOF10: 6

## 2019-12-14 ASSESSMENT — PAIN DESCRIPTION - ONSET: ONSET: GRADUAL

## 2019-12-15 LAB
ANION GAP SERPL CALCULATED.3IONS-SCNC: 9 MMOL/L (ref 9–17)
BUN BLDV-MCNC: 27 MG/DL (ref 8–23)
BUN/CREAT BLD: 21 (ref 9–20)
CALCIUM IONIZED: 1.15 MMOL/L (ref 1.13–1.33)
CALCIUM SERPL-MCNC: 8 MG/DL (ref 8.6–10.4)
CHLORIDE BLD-SCNC: 103 MMOL/L (ref 98–107)
CO2: 30 MMOL/L (ref 20–31)
CREAT SERPL-MCNC: 1.26 MG/DL (ref 0.5–0.9)
CULTURE: ABNORMAL
CULTURE: ABNORMAL
GFR AFRICAN AMERICAN: 51 ML/MIN
GFR NON-AFRICAN AMERICAN: 42 ML/MIN
GFR SERPL CREATININE-BSD FRML MDRD: ABNORMAL ML/MIN/{1.73_M2}
GFR SERPL CREATININE-BSD FRML MDRD: ABNORMAL ML/MIN/{1.73_M2}
GLUCOSE BLD-MCNC: 101 MG/DL (ref 70–99)
HCT VFR BLD CALC: 30.1 % (ref 36–46)
HEMOGLOBIN: 8.1 G/DL (ref 12–16)
INR BLD: 1.6
Lab: ABNORMAL
MAGNESIUM: 1.9 MG/DL (ref 1.6–2.6)
MCH RBC QN AUTO: 23.4 PG (ref 26–34)
MCHC RBC AUTO-ENTMCNC: 26.9 G/DL (ref 31–37)
MCV RBC AUTO: 87 FL (ref 80–100)
NRBC AUTOMATED: ABNORMAL PER 100 WBC
PDW BLD-RTO: 19.9 % (ref 11.5–14.5)
PLATELET # BLD: 192 K/UL (ref 130–400)
PMV BLD AUTO: ABNORMAL FL (ref 6–12)
POTASSIUM SERPL-SCNC: 3.5 MMOL/L (ref 3.7–5.3)
PROTHROMBIN TIME: 16.1 SEC (ref 9.7–11.6)
RBC # BLD: 3.46 M/UL (ref 4–5.2)
SODIUM BLD-SCNC: 142 MMOL/L (ref 135–144)
SPECIMEN DESCRIPTION: ABNORMAL
WBC # BLD: 5 K/UL (ref 3.5–11)

## 2019-12-15 PROCEDURE — 94761 N-INVAS EAR/PLS OXIMETRY MLT: CPT

## 2019-12-15 PROCEDURE — 82330 ASSAY OF CALCIUM: CPT

## 2019-12-15 PROCEDURE — 99232 SBSQ HOSP IP/OBS MODERATE 35: CPT | Performed by: NURSE PRACTITIONER

## 2019-12-15 PROCEDURE — 36415 COLL VENOUS BLD VENIPUNCTURE: CPT

## 2019-12-15 PROCEDURE — 85045 AUTOMATED RETICULOCYTE COUNT: CPT

## 2019-12-15 PROCEDURE — 2580000003 HC RX 258: Performed by: INTERNAL MEDICINE

## 2019-12-15 PROCEDURE — 85027 COMPLETE CBC AUTOMATED: CPT

## 2019-12-15 PROCEDURE — 6370000000 HC RX 637 (ALT 250 FOR IP): Performed by: NURSE PRACTITIONER

## 2019-12-15 PROCEDURE — 85610 PROTHROMBIN TIME: CPT

## 2019-12-15 PROCEDURE — 83735 ASSAY OF MAGNESIUM: CPT

## 2019-12-15 PROCEDURE — 6360000002 HC RX W HCPCS: Performed by: NURSE PRACTITIONER

## 2019-12-15 PROCEDURE — 6360000002 HC RX W HCPCS: Performed by: INTERNAL MEDICINE

## 2019-12-15 PROCEDURE — 2580000003 HC RX 258: Performed by: NURSE PRACTITIONER

## 2019-12-15 PROCEDURE — 99232 SBSQ HOSP IP/OBS MODERATE 35: CPT | Performed by: INTERNAL MEDICINE

## 2019-12-15 PROCEDURE — 94660 CPAP INITIATION&MGMT: CPT

## 2019-12-15 PROCEDURE — 80048 BASIC METABOLIC PNL TOTAL CA: CPT

## 2019-12-15 PROCEDURE — 2700000000 HC OXYGEN THERAPY PER DAY

## 2019-12-15 PROCEDURE — 85025 COMPLETE CBC W/AUTO DIFF WBC: CPT

## 2019-12-15 PROCEDURE — 6370000000 HC RX 637 (ALT 250 FOR IP): Performed by: INTERNAL MEDICINE

## 2019-12-15 PROCEDURE — 2060000000 HC ICU INTERMEDIATE R&B

## 2019-12-15 RX ORDER — AMIODARONE HYDROCHLORIDE 200 MG/1
200 TABLET ORAL DAILY
Status: DISCONTINUED | OUTPATIENT
Start: 2019-12-16 | End: 2019-12-24 | Stop reason: HOSPADM

## 2019-12-15 RX ADMIN — FUROSEMIDE 20 MG: 10 INJECTION, SOLUTION INTRAMUSCULAR; INTRAVENOUS at 09:01

## 2019-12-15 RX ADMIN — Medication 10 ML: at 09:02

## 2019-12-15 RX ADMIN — ANTI-FUNGAL POWDER MICONAZOLE NITRATE TALC FREE: 1.42 POWDER TOPICAL at 20:12

## 2019-12-15 RX ADMIN — ANTI-FUNGAL POWDER MICONAZOLE NITRATE TALC FREE: 1.42 POWDER TOPICAL at 09:02

## 2019-12-15 RX ADMIN — METOPROLOL SUCCINATE 25 MG: 25 TABLET, FILM COATED, EXTENDED RELEASE ORAL at 09:01

## 2019-12-15 RX ADMIN — DOCUSATE SODIUM 100 MG: 100 CAPSULE, LIQUID FILLED ORAL at 09:01

## 2019-12-15 RX ADMIN — RIVAROXABAN 20 MG: 20 TABLET, FILM COATED ORAL at 17:20

## 2019-12-15 RX ADMIN — CEFAZOLIN SODIUM 2 G: 10 INJECTION, POWDER, FOR SOLUTION INTRAVENOUS at 17:20

## 2019-12-15 RX ADMIN — DOCUSATE SODIUM 100 MG: 100 CAPSULE, LIQUID FILLED ORAL at 20:12

## 2019-12-15 RX ADMIN — AMIODARONE HYDROCHLORIDE 200 MG: 200 TABLET ORAL at 09:02

## 2019-12-15 RX ADMIN — ATORVASTATIN CALCIUM 80 MG: 80 TABLET, FILM COATED ORAL at 20:12

## 2019-12-15 RX ADMIN — FUROSEMIDE 20 MG: 10 INJECTION, SOLUTION INTRAMUSCULAR; INTRAVENOUS at 17:20

## 2019-12-15 RX ADMIN — POTASSIUM CHLORIDE 20 MEQ: 750 CAPSULE, EXTENDED RELEASE ORAL at 09:01

## 2019-12-15 RX ADMIN — ASPIRIN 81 MG 81 MG: 81 TABLET ORAL at 09:01

## 2019-12-15 RX ADMIN — IRON SUCROSE 200 MG: 20 INJECTION, SOLUTION INTRAVENOUS at 12:59

## 2019-12-15 ASSESSMENT — PAIN SCALES - GENERAL
PAINLEVEL_OUTOF10: 0
PAINLEVEL_OUTOF10: 2
PAINLEVEL_OUTOF10: 2

## 2019-12-16 ENCOUNTER — APPOINTMENT (OUTPATIENT)
Dept: CARDIAC CATH/INVASIVE PROCEDURES | Age: 69
DRG: 853 | End: 2019-12-16
Payer: MEDICARE

## 2019-12-16 LAB
ABSOLUTE EOS #: 0.1 K/UL (ref 0–0.44)
ABSOLUTE IMMATURE GRANULOCYTE: 0.05 K/UL (ref 0–0.3)
ABSOLUTE LYMPH #: 0.77 K/UL (ref 1.1–3.7)
ABSOLUTE MONO #: 0.56 K/UL (ref 0.1–1.2)
ABSOLUTE RETIC #: 0.06 M/UL (ref 0.03–0.08)
ABSOLUTE RETIC #: 0.07 M/UL (ref 0.03–0.08)
ANION GAP SERPL CALCULATED.3IONS-SCNC: 7 MMOL/L (ref 9–17)
APPEARANCE FLUID: NORMAL
BASO FLUID: NORMAL %
BASOPHILS # BLD: 1 % (ref 0–2)
BASOPHILS ABSOLUTE: 0.05 K/UL (ref 0–0.2)
BUN BLDV-MCNC: 21 MG/DL (ref 8–23)
BUN/CREAT BLD: 21 (ref 9–20)
CALCIUM IONIZED: 1.12 MMOL/L (ref 1.13–1.33)
CALCIUM SERPL-MCNC: 7.9 MG/DL (ref 8.6–10.4)
CASE NUMBER:: NORMAL
CHLORIDE BLD-SCNC: 101 MMOL/L (ref 98–107)
CO2: 32 MMOL/L (ref 20–31)
COLOR FLUID: NORMAL
CREAT SERPL-MCNC: 1.01 MG/DL (ref 0.5–0.9)
DIFFERENTIAL TYPE: ABNORMAL
EOSINOPHIL FLUID: NORMAL %
EOSINOPHILS RELATIVE PERCENT: 2 % (ref 1–4)
FERRITIN: 402 UG/L (ref 13–150)
FIBRINOGEN: 251 MG/DL (ref 170–400)
FLUID DIFF COMMENT: NORMAL
FOLATE: 7 NG/ML
GFR AFRICAN AMERICAN: >60 ML/MIN
GFR NON-AFRICAN AMERICAN: 54 ML/MIN
GFR SERPL CREATININE-BSD FRML MDRD: ABNORMAL ML/MIN/{1.73_M2}
GFR SERPL CREATININE-BSD FRML MDRD: ABNORMAL ML/MIN/{1.73_M2}
GLUCOSE BLD-MCNC: 108 MG/DL (ref 70–99)
HCT VFR BLD CALC: 31.4 % (ref 36.3–47.1)
HCT VFR BLD CALC: 31.8 % (ref 36.3–47.1)
HCT VFR BLD CALC: 33 % (ref 36.3–47.1)
HEMOGLOBIN: 8.5 G/DL (ref 11.9–15.1)
HEMOGLOBIN: 8.9 G/DL (ref 11.9–15.1)
HEMOGLOBIN: 9 G/DL (ref 11.9–15.1)
IMMATURE GRANULOCYTES: 1 %
IMMATURE RETIC FRACT: 34.6 % (ref 2.7–18.3)
IMMATURE RETIC FRACT: 50.8 % (ref 2.7–18.3)
IRON SATURATION: 16 % (ref 20–55)
IRON: 50 UG/DL (ref 37–145)
LV EF: 45 %
LVEF MODALITY: NORMAL
LYMPHOCYTES # BLD: 15 % (ref 24–43)
LYMPHOCYTES, BODY FLUID: 34 %
MAGNESIUM: 1.6 MG/DL (ref 1.6–2.6)
MAGNESIUM: 2 MG/DL (ref 1.6–2.6)
MCH RBC QN AUTO: 23.3 PG (ref 25.2–33.5)
MCH RBC QN AUTO: 24.2 PG (ref 25.2–33.5)
MCHC RBC AUTO-ENTMCNC: 27.1 G/DL (ref 28.4–34.8)
MCHC RBC AUTO-ENTMCNC: 28 G/DL (ref 28.4–34.8)
MCV RBC AUTO: 86 FL (ref 82.6–102.9)
MCV RBC AUTO: 86.4 FL (ref 82.6–102.9)
MONOCYTE, FLUID: NORMAL %
MONOCYTES # BLD: 11 % (ref 3–12)
MORPHOLOGY: ABNORMAL
NEUTROPHIL, FLUID: 16 %
NRBC AUTOMATED: 0.6 PER 100 WBC
NRBC AUTOMATED: 0.8 PER 100 WBC
OTHER CELLS FLUID: NORMAL %
PATHOLOGIST REVIEW: NORMAL
PDW BLD-RTO: 19.9 % (ref 11.8–14.4)
PDW BLD-RTO: 20 % (ref 11.8–14.4)
PLATELET # BLD: 196 K/UL (ref 138–453)
PLATELET # BLD: 220 K/UL (ref 138–453)
PLATELET ESTIMATE: ABNORMAL
PMV BLD AUTO: 11 FL (ref 8.1–13.5)
PMV BLD AUTO: 12.4 FL (ref 8.1–13.5)
POTASSIUM SERPL-SCNC: 3.1 MMOL/L (ref 3.7–5.3)
POTASSIUM SERPL-SCNC: 3.6 MMOL/L (ref 3.7–5.3)
RBC # BLD: 3.65 M/UL (ref 3.95–5.11)
RBC # BLD: 3.68 M/UL (ref 3.95–5.11)
RBC # BLD: ABNORMAL 10*6/UL
RBC FLUID: <3000 /MM3
RETIC %: 1.7 % (ref 0.5–1.9)
RETIC %: 1.9 % (ref 0.5–1.9)
RETIC HEMOGLOBIN: 20 PG (ref 28.2–35.7)
RETIC HEMOGLOBIN: 23.2 PG (ref 28.2–35.7)
SEG NEUTROPHILS: 70 % (ref 36–65)
SEGMENTED NEUTROPHILS ABSOLUTE COUNT: 3.57 K/UL (ref 1.5–8.1)
SODIUM BLD-SCNC: 140 MMOL/L (ref 135–144)
SPECIMEN DESCRIPTION: NORMAL
SPECIMEN TYPE: NORMAL
SURGICAL PATHOLOGY REPORT: NORMAL
TOTAL IRON BINDING CAPACITY: 322 UG/DL (ref 250–450)
UNSATURATED IRON BINDING CAPACITY: 272 UG/DL (ref 112–347)
VITAMIN B-12: 657 PG/ML (ref 232–1245)
WBC # BLD: 4.9 K/UL (ref 3.5–11.3)
WBC # BLD: 5.1 K/UL (ref 3.5–11.3)
WBC # BLD: ABNORMAL 10*3/UL
WBC FLUID: 398 /MM3

## 2019-12-16 PROCEDURE — 83735 ASSAY OF MAGNESIUM: CPT

## 2019-12-16 PROCEDURE — 2700000000 HC OXYGEN THERAPY PER DAY

## 2019-12-16 PROCEDURE — 83550 IRON BINDING TEST: CPT

## 2019-12-16 PROCEDURE — 2580000003 HC RX 258: Performed by: NURSE PRACTITIONER

## 2019-12-16 PROCEDURE — 87040 BLOOD CULTURE FOR BACTERIA: CPT

## 2019-12-16 PROCEDURE — 2580000003 HC RX 258: Performed by: INTERNAL MEDICINE

## 2019-12-16 PROCEDURE — 85384 FIBRINOGEN ACTIVITY: CPT

## 2019-12-16 PROCEDURE — 6370000000 HC RX 637 (ALT 250 FOR IP): Performed by: NURSE PRACTITIONER

## 2019-12-16 PROCEDURE — 85045 AUTOMATED RETICULOCYTE COUNT: CPT

## 2019-12-16 PROCEDURE — 82607 VITAMIN B-12: CPT

## 2019-12-16 PROCEDURE — 85018 HEMOGLOBIN: CPT

## 2019-12-16 PROCEDURE — 36415 COLL VENOUS BLD VENIPUNCTURE: CPT

## 2019-12-16 PROCEDURE — 80048 BASIC METABOLIC PNL TOTAL CA: CPT

## 2019-12-16 PROCEDURE — 83540 ASSAY OF IRON: CPT

## 2019-12-16 PROCEDURE — 99232 SBSQ HOSP IP/OBS MODERATE 35: CPT | Performed by: INTERNAL MEDICINE

## 2019-12-16 PROCEDURE — 6360000002 HC RX W HCPCS: Performed by: NURSE PRACTITIONER

## 2019-12-16 PROCEDURE — 85014 HEMATOCRIT: CPT

## 2019-12-16 PROCEDURE — 2060000000 HC ICU INTERMEDIATE R&B

## 2019-12-16 PROCEDURE — 84132 ASSAY OF SERUM POTASSIUM: CPT

## 2019-12-16 PROCEDURE — 6360000002 HC RX W HCPCS

## 2019-12-16 PROCEDURE — 7100000010 HC PHASE II RECOVERY - FIRST 15 MIN

## 2019-12-16 PROCEDURE — 94761 N-INVAS EAR/PLS OXIMETRY MLT: CPT

## 2019-12-16 PROCEDURE — 82330 ASSAY OF CALCIUM: CPT

## 2019-12-16 PROCEDURE — 82746 ASSAY OF FOLIC ACID SERUM: CPT

## 2019-12-16 PROCEDURE — 85027 COMPLETE CBC AUTOMATED: CPT

## 2019-12-16 PROCEDURE — 6370000000 HC RX 637 (ALT 250 FOR IP)

## 2019-12-16 PROCEDURE — 93306 TTE W/DOPPLER COMPLETE: CPT

## 2019-12-16 PROCEDURE — 7100000011 HC PHASE II RECOVERY - ADDTL 15 MIN

## 2019-12-16 PROCEDURE — 6360000002 HC RX W HCPCS: Performed by: INTERNAL MEDICINE

## 2019-12-16 PROCEDURE — 82728 ASSAY OF FERRITIN: CPT

## 2019-12-16 PROCEDURE — 93312 ECHO TRANSESOPHAGEAL: CPT

## 2019-12-16 RX ORDER — POTASSIUM CHLORIDE 7.45 MG/ML
10 INJECTION INTRAVENOUS
Status: DISPENSED | OUTPATIENT
Start: 2019-12-16 | End: 2019-12-16

## 2019-12-16 RX ORDER — POTASSIUM CHLORIDE 7.45 MG/ML
10 INJECTION INTRAVENOUS PRN
Status: DISCONTINUED | OUTPATIENT
Start: 2019-12-16 | End: 2019-12-24 | Stop reason: HOSPADM

## 2019-12-16 RX ORDER — POTASSIUM CHLORIDE 20 MEQ/1
40 TABLET, EXTENDED RELEASE ORAL PRN
Status: DISCONTINUED | OUTPATIENT
Start: 2019-12-16 | End: 2019-12-24 | Stop reason: HOSPADM

## 2019-12-16 RX ORDER — MAGNESIUM SULFATE 1 G/100ML
1 INJECTION INTRAVENOUS PRN
Status: DISCONTINUED | OUTPATIENT
Start: 2019-12-16 | End: 2019-12-24 | Stop reason: HOSPADM

## 2019-12-16 RX ORDER — SODIUM CHLORIDE 0.9 % (FLUSH) 0.9 %
10 SYRINGE (ML) INJECTION PRN
Status: DISCONTINUED | OUTPATIENT
Start: 2019-12-16 | End: 2019-12-24 | Stop reason: HOSPADM

## 2019-12-16 RX ORDER — IBUPROFEN 200 MG
1250 CAPSULE ORAL 2 TIMES DAILY
Status: DISCONTINUED | OUTPATIENT
Start: 2019-12-16 | End: 2019-12-16 | Stop reason: CLARIF

## 2019-12-16 RX ORDER — POTASSIUM CHLORIDE 7.45 MG/ML
10 INJECTION INTRAVENOUS ONCE
Status: COMPLETED | OUTPATIENT
Start: 2019-12-16 | End: 2019-12-16

## 2019-12-16 RX ORDER — SODIUM CHLORIDE 0.9 % (FLUSH) 0.9 %
10 SYRINGE (ML) INJECTION EVERY 12 HOURS SCHEDULED
Status: DISCONTINUED | OUTPATIENT
Start: 2019-12-16 | End: 2019-12-24 | Stop reason: HOSPADM

## 2019-12-16 RX ORDER — CALCIUM CARBONATE/VITAMIN D3 250-3.125
500 TABLET ORAL 2 TIMES DAILY
Status: DISCONTINUED | OUTPATIENT
Start: 2019-12-16 | End: 2019-12-18

## 2019-12-16 RX ADMIN — FUROSEMIDE 20 MG: 10 INJECTION, SOLUTION INTRAMUSCULAR; INTRAVENOUS at 18:02

## 2019-12-16 RX ADMIN — ASPIRIN 81 MG 81 MG: 81 TABLET ORAL at 18:20

## 2019-12-16 RX ADMIN — FUROSEMIDE 20 MG: 10 INJECTION, SOLUTION INTRAMUSCULAR; INTRAVENOUS at 11:42

## 2019-12-16 RX ADMIN — CEFAZOLIN SODIUM 2 G: 10 INJECTION, POWDER, FOR SOLUTION INTRAVENOUS at 05:27

## 2019-12-16 RX ADMIN — MAGNESIUM SULFATE HEPTAHYDRATE 1 G: 1 INJECTION, SOLUTION INTRAVENOUS at 12:12

## 2019-12-16 RX ADMIN — CALCIUM CARBONATE-CHOLECALCIFEROL TAB 250 MG-125 UNIT 500 MG: 250-125 TAB at 22:26

## 2019-12-16 RX ADMIN — POTASSIUM CHLORIDE 10 MEQ: 7.46 INJECTION, SOLUTION INTRAVENOUS at 16:06

## 2019-12-16 RX ADMIN — ATORVASTATIN CALCIUM 80 MG: 80 TABLET, FILM COATED ORAL at 22:27

## 2019-12-16 RX ADMIN — MAGNESIUM SULFATE HEPTAHYDRATE 1 G: 1 INJECTION, SOLUTION INTRAVENOUS at 13:50

## 2019-12-16 RX ADMIN — CEFAZOLIN SODIUM 2 G: 10 INJECTION, POWDER, FOR SOLUTION INTRAVENOUS at 18:02

## 2019-12-16 RX ADMIN — AMIODARONE HYDROCHLORIDE 200 MG: 200 TABLET ORAL at 18:20

## 2019-12-16 RX ADMIN — Medication 10 ML: at 11:43

## 2019-12-16 RX ADMIN — DOCUSATE SODIUM 100 MG: 100 CAPSULE, LIQUID FILLED ORAL at 22:26

## 2019-12-16 RX ADMIN — POTASSIUM CHLORIDE 10 MEQ: 7.46 INJECTION, SOLUTION INTRAVENOUS at 17:14

## 2019-12-16 RX ADMIN — ANTI-FUNGAL POWDER MICONAZOLE NITRATE TALC FREE: 1.42 POWDER TOPICAL at 22:36

## 2019-12-16 RX ADMIN — METOPROLOL SUCCINATE 25 MG: 25 TABLET, FILM COATED, EXTENDED RELEASE ORAL at 18:21

## 2019-12-16 RX ADMIN — RIVAROXABAN 20 MG: 20 TABLET, FILM COATED ORAL at 18:02

## 2019-12-16 RX ADMIN — POTASSIUM CHLORIDE 10 MEQ: 7.46 INJECTION, SOLUTION INTRAVENOUS at 13:44

## 2019-12-16 RX ADMIN — ANTI-FUNGAL POWDER MICONAZOLE NITRATE TALC FREE: 1.42 POWDER TOPICAL at 08:30

## 2019-12-16 RX ADMIN — POTASSIUM CHLORIDE 10 MEQ: 7.46 INJECTION, SOLUTION INTRAVENOUS at 14:34

## 2019-12-16 RX ADMIN — IRON SUCROSE 200 MG: 20 INJECTION, SOLUTION INTRAVENOUS at 20:45

## 2019-12-16 RX ADMIN — POTASSIUM CHLORIDE 10 MEQ: 7.46 INJECTION, SOLUTION INTRAVENOUS at 18:45

## 2019-12-16 ASSESSMENT — PAIN SCALES - GENERAL: PAINLEVEL_OUTOF10: 0

## 2019-12-16 ASSESSMENT — ENCOUNTER SYMPTOMS
RESPIRATORY NEGATIVE: 1
GASTROINTESTINAL NEGATIVE: 1

## 2019-12-17 ENCOUNTER — APPOINTMENT (OUTPATIENT)
Dept: INTERVENTIONAL RADIOLOGY/VASCULAR | Age: 69
DRG: 853 | End: 2019-12-17
Payer: MEDICARE

## 2019-12-17 LAB
ANION GAP SERPL CALCULATED.3IONS-SCNC: 8 MMOL/L (ref 9–17)
BUN BLDV-MCNC: 21 MG/DL (ref 8–23)
BUN/CREAT BLD: 22 (ref 9–20)
CALCIUM IONIZED: 1.19 MMOL/L (ref 1.13–1.33)
CALCIUM SERPL-MCNC: 8.5 MG/DL (ref 8.6–10.4)
CHLORIDE BLD-SCNC: 100 MMOL/L (ref 98–107)
CO2: 33 MMOL/L (ref 20–31)
CREAT SERPL-MCNC: 0.97 MG/DL (ref 0.5–0.9)
GFR AFRICAN AMERICAN: >60 ML/MIN
GFR NON-AFRICAN AMERICAN: 57 ML/MIN
GFR SERPL CREATININE-BSD FRML MDRD: ABNORMAL ML/MIN/{1.73_M2}
GFR SERPL CREATININE-BSD FRML MDRD: ABNORMAL ML/MIN/{1.73_M2}
GLUCOSE BLD-MCNC: 116 MG/DL (ref 70–99)
HCT VFR BLD CALC: 33.7 % (ref 36.3–47.1)
HEMOGLOBIN: 9.3 G/DL (ref 11.9–15.1)
MAGNESIUM: 1.9 MG/DL (ref 1.6–2.6)
MCH RBC QN AUTO: 23.7 PG (ref 25.2–33.5)
MCHC RBC AUTO-ENTMCNC: 27.6 G/DL (ref 28.4–34.8)
MCV RBC AUTO: 86 FL (ref 82.6–102.9)
NRBC AUTOMATED: 0.9 PER 100 WBC
PDW BLD-RTO: 20.1 % (ref 11.8–14.4)
PLATELET # BLD: 209 K/UL (ref 138–453)
PMV BLD AUTO: 10.7 FL (ref 8.1–13.5)
POTASSIUM SERPL-SCNC: 3.5 MMOL/L (ref 3.7–5.3)
RBC # BLD: 3.92 M/UL (ref 3.95–5.11)
SODIUM BLD-SCNC: 141 MMOL/L (ref 135–144)
SURGICAL PATHOLOGY REPORT: NORMAL
WBC # BLD: 5.8 K/UL (ref 3.5–11.3)

## 2019-12-17 PROCEDURE — 2580000003 HC RX 258: Performed by: RADIOLOGY

## 2019-12-17 PROCEDURE — 97162 PT EVAL MOD COMPLEX 30 MIN: CPT

## 2019-12-17 PROCEDURE — 85027 COMPLETE CBC AUTOMATED: CPT

## 2019-12-17 PROCEDURE — 2580000003 HC RX 258: Performed by: NURSE PRACTITIONER

## 2019-12-17 PROCEDURE — 02HV33Z INSERTION OF INFUSION DEVICE INTO SUPERIOR VENA CAVA, PERCUTANEOUS APPROACH: ICD-10-PCS | Performed by: RADIOLOGY

## 2019-12-17 PROCEDURE — 2580000003 HC RX 258: Performed by: INTERNAL MEDICINE

## 2019-12-17 PROCEDURE — 6360000002 HC RX W HCPCS: Performed by: INTERNAL MEDICINE

## 2019-12-17 PROCEDURE — 6370000000 HC RX 637 (ALT 250 FOR IP): Performed by: NURSE PRACTITIONER

## 2019-12-17 PROCEDURE — 6360000002 HC RX W HCPCS: Performed by: NURSE PRACTITIONER

## 2019-12-17 PROCEDURE — 36415 COLL VENOUS BLD VENIPUNCTURE: CPT

## 2019-12-17 PROCEDURE — 6370000000 HC RX 637 (ALT 250 FOR IP): Performed by: INTERNAL MEDICINE

## 2019-12-17 PROCEDURE — 82330 ASSAY OF CALCIUM: CPT

## 2019-12-17 PROCEDURE — 97530 THERAPEUTIC ACTIVITIES: CPT

## 2019-12-17 PROCEDURE — 2060000000 HC ICU INTERMEDIATE R&B

## 2019-12-17 PROCEDURE — 80048 BASIC METABOLIC PNL TOTAL CA: CPT

## 2019-12-17 PROCEDURE — 97110 THERAPEUTIC EXERCISES: CPT

## 2019-12-17 PROCEDURE — 2709999900 IR INSERT PICC VAD W SQ PORT >5 YEARS

## 2019-12-17 PROCEDURE — 99233 SBSQ HOSP IP/OBS HIGH 50: CPT | Performed by: INTERNAL MEDICINE

## 2019-12-17 PROCEDURE — 99232 SBSQ HOSP IP/OBS MODERATE 35: CPT | Performed by: INTERNAL MEDICINE

## 2019-12-17 PROCEDURE — B24BZZ4 ULTRASONOGRAPHY OF HEART WITH AORTA, TRANSESOPHAGEAL: ICD-10-PCS | Performed by: INTERNAL MEDICINE

## 2019-12-17 PROCEDURE — 83735 ASSAY OF MAGNESIUM: CPT

## 2019-12-17 PROCEDURE — 36573 INSJ PICC RS&I 5 YR+: CPT | Performed by: RADIOLOGY

## 2019-12-17 RX ORDER — FUROSEMIDE 40 MG/1
40 TABLET ORAL DAILY
Status: DISCONTINUED | OUTPATIENT
Start: 2019-12-18 | End: 2019-12-24 | Stop reason: HOSPADM

## 2019-12-17 RX ORDER — SODIUM CHLORIDE 0.9 % (FLUSH) 0.9 %
10 SYRINGE (ML) INJECTION PRN
Status: DISCONTINUED | OUTPATIENT
Start: 2019-12-17 | End: 2019-12-24 | Stop reason: HOSPADM

## 2019-12-17 RX ORDER — SPIRONOLACTONE 25 MG/1
25 TABLET ORAL DAILY
Status: DISCONTINUED | OUTPATIENT
Start: 2019-12-17 | End: 2019-12-24 | Stop reason: HOSPADM

## 2019-12-17 RX ORDER — SODIUM CHLORIDE 0.9 % (FLUSH) 0.9 %
10 SYRINGE (ML) INJECTION EVERY 12 HOURS SCHEDULED
Status: DISCONTINUED | OUTPATIENT
Start: 2019-12-17 | End: 2019-12-24 | Stop reason: HOSPADM

## 2019-12-17 RX ADMIN — ATORVASTATIN CALCIUM 80 MG: 80 TABLET, FILM COATED ORAL at 20:32

## 2019-12-17 RX ADMIN — CALCIUM CARBONATE-CHOLECALCIFEROL TAB 250 MG-125 UNIT 500 MG: 250-125 TAB at 20:30

## 2019-12-17 RX ADMIN — DOCUSATE SODIUM 100 MG: 100 CAPSULE, LIQUID FILLED ORAL at 20:32

## 2019-12-17 RX ADMIN — METOPROLOL SUCCINATE 25 MG: 25 TABLET, FILM COATED, EXTENDED RELEASE ORAL at 09:44

## 2019-12-17 RX ADMIN — RIVAROXABAN 20 MG: 20 TABLET, FILM COATED ORAL at 17:32

## 2019-12-17 RX ADMIN — SPIRONOLACTONE 25 MG: 25 TABLET ORAL at 13:18

## 2019-12-17 RX ADMIN — ANTI-FUNGAL POWDER MICONAZOLE NITRATE TALC FREE: 1.42 POWDER TOPICAL at 21:42

## 2019-12-17 RX ADMIN — ASPIRIN 81 MG 81 MG: 81 TABLET ORAL at 09:44

## 2019-12-17 RX ADMIN — CEFAZOLIN SODIUM 2 G: 10 INJECTION, POWDER, FOR SOLUTION INTRAVENOUS at 17:32

## 2019-12-17 RX ADMIN — POTASSIUM CHLORIDE 20 MEQ: 750 CAPSULE, EXTENDED RELEASE ORAL at 09:44

## 2019-12-17 RX ADMIN — AMIODARONE HYDROCHLORIDE 200 MG: 200 TABLET ORAL at 09:44

## 2019-12-17 RX ADMIN — IRON SUCROSE 400 MG: 20 INJECTION, SOLUTION INTRAVENOUS at 13:18

## 2019-12-17 RX ADMIN — SODIUM CHLORIDE, PRESERVATIVE FREE 10 ML: 5 INJECTION INTRAVENOUS at 21:39

## 2019-12-17 RX ADMIN — ANTI-FUNGAL POWDER MICONAZOLE NITRATE TALC FREE: 1.42 POWDER TOPICAL at 09:34

## 2019-12-17 RX ADMIN — FUROSEMIDE 20 MG: 10 INJECTION, SOLUTION INTRAMUSCULAR; INTRAVENOUS at 09:44

## 2019-12-17 RX ADMIN — CEFAZOLIN SODIUM 2 G: 10 INJECTION, POWDER, FOR SOLUTION INTRAVENOUS at 06:40

## 2019-12-17 RX ADMIN — CALCIUM CARBONATE-CHOLECALCIFEROL TAB 250 MG-125 UNIT 500 MG: 250-125 TAB at 09:44

## 2019-12-17 ASSESSMENT — PAIN DESCRIPTION - PAIN TYPE: TYPE: CHRONIC PAIN

## 2019-12-17 ASSESSMENT — ENCOUNTER SYMPTOMS
SHORTNESS OF BREATH: 1
GASTROINTESTINAL NEGATIVE: 1

## 2019-12-17 NOTE — PROGRESS NOTES
From: Emili Menard  To: Shruthi Shaffer DO  Sent: 12/17/2019 1:29 PM CST  Subject: Non-Urgent Medical Question    Hi,    Just wondering if I should have a prescribed prenatal vitamin versus over the counter? Also wondering what I should and should not have between now and my appointment on the 30th?     Thank you so much! If calling is preferred versus messaging me back feel free to leave a detailed message.   Exam:  CONSTITUTIONAL:  awake, alert, cooperative, no apparent distress, and appears stated age. HEENT: Normal jugular venous pulsations, no carotid bruits. Head is atraumatic, normocephalic. Eyes and oral mucosa are normal.  LUNGS: Good respiratory effort On auscultation: clear to auscultation bilaterally  CARDIOVASCULAR:  Normal apical impulse, irregular rate and rhythm, normal S2, no S3 or S4, and no murmur or rub noted. ABDOMEN: Soft, nontender, nondistended. Bowel sounds present. No masses or tenderness. No bruit. SKIN: Warm and dry. EXTREMITIES: 2+bilateral lower extremity edema. Motor movement grossly intact. No cyanosis or clubbing.       DIAGNOSTICS     Studies:    Telemetry: afib, V-pacing     EKG: afib, PVC's     Echocardiogram: EF 25%, severe global hypokinesis, LVH (m/M), DD3, MR (M)      Laboratory testing:  Lab Results   Component Value Date     03/29/2019     03/28/2019     03/27/2019    K 3.9 03/29/2019    K 3.2 (L) 03/28/2019    K 4.0 03/27/2019     03/29/2019     03/28/2019     03/27/2019    CO2 28 03/29/2019    CO2 30 03/28/2019    CO2 29 03/27/2019    BUN 19 03/29/2019    BUN 20 03/28/2019    BUN 25 (H) 03/27/2019    CREATININE 0.72 03/29/2019    CREATININE 0.77 03/28/2019    CREATININE 0.79 03/27/2019    CALCIUM 8.8 03/29/2019    CALCIUM 8.5 (L) 03/28/2019    CALCIUM 8.8 03/27/2019    LABALBU 2.8 (L) 03/21/2019    LABALBU 4.0 08/22/2017    LABALBU 3.8 08/07/2017    PROT 5.6 (L) 03/21/2019    PROT 6.9 08/07/2017    PROT 7.4 08/06/2017    BILITOT 0.90 03/21/2019    BILITOT 0.57 08/07/2017    BILITOT 0.60 08/06/2017    ALKPHOS 82 03/21/2019    ALKPHOS 72 08/07/2017    ALKPHOS 74 08/06/2017    ALT 13 03/21/2019    ALT 9 08/07/2017    ALT 11 08/06/2017    AST 20 03/21/2019    AST 18 08/07/2017    AST 18 08/06/2017    GLUCOSE 120 (H) 03/29/2019    GLUCOSE 111 (H) 03/28/2019    GLUCOSE 116 (H) 03/27/2019     Lab Results   Component Value Date    WBC 7.6 03/29/2019    WBC 5.3 03/28/2019    WBC 6.4 03/27/2019    HGB 13.1 03/29/2019    HGB 12.5 03/28/2019    HGB 12.4 03/27/2019    HCT 39.7 03/29/2019    HCT 38.6 03/28/2019    HCT 39.2 03/27/2019     03/29/2019     03/28/2019     03/27/2019     Lab Results   Component Value Date    MG 2.0 03/25/2019    MG 1.8 03/24/2019    MG 1.9 03/22/2019     Lab Results   Component Value Date    TROPONINT NOT REPORTED 03/21/2019    TROPONINT NOT REPORTED 03/21/2019    TROPONINT NOT REPORTED 03/21/2019     Lab Results   Component Value Date    PROBNP 3,462 (H) 03/26/2019    PROBNP 3,418 (H) 03/24/2019    PROBNP 10,467 (H) 03/22/2019     Lab Results   Component Value Date    CHOL 116 03/22/2019    HDL 31 03/22/2019    TRIG 84 03/22/2019     Lab Results   Component Value Date    INR 1.4 03/21/2019    TSH 2.14 03/22/2019    TSH 2.43 08/07/2017    TSH 3.29 08/06/2017     Patient Active Problem List   Diagnosis    Leg swelling    Hypertensive urgency    Acute on chronic systolic congestive heart failure (HCC)    Acute on chronic systolic congestive heart failure ([facility])    Lymphedema of both lower extremities    Cellulitis of right leg    Coronary artery disease involving native coronary artery of native heart without angina pectoris    Major depressive disorder    New onset [facility]-fib [facility] [address]    NSTEMI (non-ST elevated myocardial infarction) ([facility] [address]    Acute on chronic congestive heart failure ([facility])    Panic disorder         ASSESSMENT and PLAN     · S/P ICD implantation on 3/28/19  · Nonsustained ventricular tachycardia, recurrent; IVD amiodarone discontinued due to hypotension and bradycardia  · Hypotension, on IVD amiodarone and symptomatic- improved with IV fluid bolus  · Acute on chronic systolic heart failure- improved with good urine output  · Severe ischemic cardiomyopathy with EF 25% on current echo  · Coronary artery disease without angina  · H/O recent PCI with stenting to LAD (10/2018) by Dr. Nicole Moreira at [facility]   · H/O angioplasty to branch of [facility] (9/2018)  · H/O NSTEMI (9/2018)  · Atrial fibrillation of unknown duration  · Chronic anticoagulation on Eliquis  · Hypokalemia-improved  · Hyperlipidemia  · H/O noncompliance     Continue Lasix 40mg BID, Toprol XL 25mg, Entresto 49/51mg BID, Lipitor 80mg and Brilinta 90mg BID  Discussed with Dr. Kristyn Monson and ok to resume Eliquis 5mg BID tonight  Ok to discharge and f/u with Dr. Kristyn Monson on 4/17/19 at 1445pm (per patient request to f/u with Kritsyn Monson)  Continue supportive care  Discussed with RN and patient  Will discuss with Dr. Leslie Sevilla    The note was completed by using EMR. However, inadvertent computerized transcription errors may be present. Although every effort was made to ensure accuracy, no guarantees can be provided that every mistake has been identified and corrected by editing.       Desirae Fountain, APRN-CNP

## 2019-12-18 PROBLEM — R04.0 EPISTAXIS: Status: ACTIVE | Noted: 2019-12-18

## 2019-12-18 LAB
ANION GAP SERPL CALCULATED.3IONS-SCNC: 6 MMOL/L (ref 9–17)
BUN BLDV-MCNC: 21 MG/DL (ref 8–23)
BUN/CREAT BLD: 22 (ref 9–20)
CALCIUM IONIZED: 1.28 MMOL/L (ref 1.13–1.33)
CALCIUM SERPL-MCNC: 8.9 MG/DL (ref 8.6–10.4)
CHLORIDE BLD-SCNC: 100 MMOL/L (ref 98–107)
CO2: 36 MMOL/L (ref 20–31)
CREAT SERPL-MCNC: 0.97 MG/DL (ref 0.5–0.9)
GFR AFRICAN AMERICAN: >60 ML/MIN
GFR NON-AFRICAN AMERICAN: 57 ML/MIN
GFR SERPL CREATININE-BSD FRML MDRD: ABNORMAL ML/MIN/{1.73_M2}
GFR SERPL CREATININE-BSD FRML MDRD: ABNORMAL ML/MIN/{1.73_M2}
GLUCOSE BLD-MCNC: 116 MG/DL (ref 70–99)
HCT VFR BLD CALC: 33.4 % (ref 36.3–47.1)
HEMOGLOBIN: 9.1 G/DL (ref 11.9–15.1)
MAGNESIUM: 1.8 MG/DL (ref 1.6–2.6)
MCH RBC QN AUTO: 23.6 PG (ref 25.2–33.5)
MCHC RBC AUTO-ENTMCNC: 27.2 G/DL (ref 28.4–34.8)
MCV RBC AUTO: 86.5 FL (ref 82.6–102.9)
NRBC AUTOMATED: 0.8 PER 100 WBC
PDW BLD-RTO: 21 % (ref 11.8–14.4)
PLATELET # BLD: 200 K/UL (ref 138–453)
PMV BLD AUTO: 10.8 FL (ref 8.1–13.5)
POTASSIUM SERPL-SCNC: 3.6 MMOL/L (ref 3.7–5.3)
RBC # BLD: 3.86 M/UL (ref 3.95–5.11)
SODIUM BLD-SCNC: 142 MMOL/L (ref 135–144)
WBC # BLD: 6.6 K/UL (ref 3.5–11.3)

## 2019-12-18 PROCEDURE — 6360000002 HC RX W HCPCS: Performed by: INTERNAL MEDICINE

## 2019-12-18 PROCEDURE — 80048 BASIC METABOLIC PNL TOTAL CA: CPT

## 2019-12-18 PROCEDURE — 6370000000 HC RX 637 (ALT 250 FOR IP): Performed by: NURSE PRACTITIONER

## 2019-12-18 PROCEDURE — 97535 SELF CARE MNGMENT TRAINING: CPT

## 2019-12-18 PROCEDURE — 2060000000 HC ICU INTERMEDIATE R&B

## 2019-12-18 PROCEDURE — 85027 COMPLETE CBC AUTOMATED: CPT

## 2019-12-18 PROCEDURE — 36415 COLL VENOUS BLD VENIPUNCTURE: CPT

## 2019-12-18 PROCEDURE — 99232 SBSQ HOSP IP/OBS MODERATE 35: CPT | Performed by: INTERNAL MEDICINE

## 2019-12-18 PROCEDURE — 97530 THERAPEUTIC ACTIVITIES: CPT

## 2019-12-18 PROCEDURE — 97166 OT EVAL MOD COMPLEX 45 MIN: CPT

## 2019-12-18 PROCEDURE — 2580000003 HC RX 258: Performed by: INTERNAL MEDICINE

## 2019-12-18 PROCEDURE — 2580000003 HC RX 258: Performed by: NURSE PRACTITIONER

## 2019-12-18 PROCEDURE — 82330 ASSAY OF CALCIUM: CPT

## 2019-12-18 PROCEDURE — 2580000003 HC RX 258: Performed by: RADIOLOGY

## 2019-12-18 PROCEDURE — 6360000002 HC RX W HCPCS: Performed by: NURSE PRACTITIONER

## 2019-12-18 PROCEDURE — 6370000000 HC RX 637 (ALT 250 FOR IP): Performed by: INTERNAL MEDICINE

## 2019-12-18 PROCEDURE — 83735 ASSAY OF MAGNESIUM: CPT

## 2019-12-18 RX ORDER — CALCIUM CARBONATE/VITAMIN D3 250-3.125
500 TABLET ORAL DAILY
Status: DISCONTINUED | OUTPATIENT
Start: 2019-12-19 | End: 2019-12-24 | Stop reason: HOSPADM

## 2019-12-18 RX ADMIN — ANTI-FUNGAL POWDER MICONAZOLE NITRATE TALC FREE: 1.42 POWDER TOPICAL at 09:02

## 2019-12-18 RX ADMIN — SODIUM CHLORIDE, PRESERVATIVE FREE 10 ML: 5 INJECTION INTRAVENOUS at 08:59

## 2019-12-18 RX ADMIN — MAGNESIUM GLUCONATE 500 MG ORAL TABLET 800 MG: 500 TABLET ORAL at 18:10

## 2019-12-18 RX ADMIN — CALCIUM CARBONATE-CHOLECALCIFEROL TAB 250 MG-125 UNIT 500 MG: 250-125 TAB at 08:59

## 2019-12-18 RX ADMIN — CEFAZOLIN SODIUM 2 G: 10 INJECTION, POWDER, FOR SOLUTION INTRAVENOUS at 18:10

## 2019-12-18 RX ADMIN — FUROSEMIDE 40 MG: 40 TABLET ORAL at 09:00

## 2019-12-18 RX ADMIN — CEFAZOLIN SODIUM 2 G: 10 INJECTION, POWDER, FOR SOLUTION INTRAVENOUS at 05:56

## 2019-12-18 RX ADMIN — ASPIRIN 81 MG 81 MG: 81 TABLET ORAL at 09:00

## 2019-12-18 RX ADMIN — DOCUSATE SODIUM 100 MG: 100 CAPSULE, LIQUID FILLED ORAL at 20:38

## 2019-12-18 RX ADMIN — SODIUM CHLORIDE, PRESERVATIVE FREE 10 ML: 5 INJECTION INTRAVENOUS at 22:58

## 2019-12-18 RX ADMIN — IRON SUCROSE 400 MG: 20 INJECTION, SOLUTION INTRAVENOUS at 12:37

## 2019-12-18 RX ADMIN — METOPROLOL SUCCINATE 25 MG: 25 TABLET, FILM COATED, EXTENDED RELEASE ORAL at 08:59

## 2019-12-18 RX ADMIN — SODIUM CHLORIDE, PRESERVATIVE FREE 10 ML: 5 INJECTION INTRAVENOUS at 20:39

## 2019-12-18 RX ADMIN — POTASSIUM CHLORIDE 20 MEQ: 750 CAPSULE, EXTENDED RELEASE ORAL at 09:00

## 2019-12-18 RX ADMIN — AMIODARONE HYDROCHLORIDE 200 MG: 200 TABLET ORAL at 09:00

## 2019-12-18 RX ADMIN — RIVAROXABAN 20 MG: 20 TABLET, FILM COATED ORAL at 18:10

## 2019-12-18 RX ADMIN — ATORVASTATIN CALCIUM 80 MG: 80 TABLET, FILM COATED ORAL at 20:38

## 2019-12-18 RX ADMIN — ANTI-FUNGAL POWDER MICONAZOLE NITRATE TALC FREE: 1.42 POWDER TOPICAL at 20:39

## 2019-12-18 RX ADMIN — DOCUSATE SODIUM 100 MG: 100 CAPSULE, LIQUID FILLED ORAL at 09:00

## 2019-12-18 RX ADMIN — SPIRONOLACTONE 25 MG: 25 TABLET ORAL at 08:59

## 2019-12-18 ASSESSMENT — ENCOUNTER SYMPTOMS
GASTROINTESTINAL NEGATIVE: 1
SHORTNESS OF BREATH: 1

## 2019-12-18 ASSESSMENT — PAIN SCALES - GENERAL
PAINLEVEL_OUTOF10: 0

## 2019-12-19 PROBLEM — R09.02 HYPOXIA: Status: RESOLVED | Noted: 2019-12-12 | Resolved: 2019-12-19

## 2019-12-19 PROBLEM — R79.89 ELEVATED SERUM CREATININE: Status: RESOLVED | Noted: 2019-08-07 | Resolved: 2019-12-19

## 2019-12-19 PROBLEM — A41.9 SIRS DUE TO INFECTIOUS PROCESS WITH ACUTE ORGAN DYSFUNCTION (HCC): Status: RESOLVED | Noted: 2019-12-13 | Resolved: 2019-12-19

## 2019-12-19 PROBLEM — J96.21 ACUTE AND CHRONIC RESPIRATORY FAILURE WITH HYPOXIA (HCC): Status: RESOLVED | Noted: 2019-12-13 | Resolved: 2019-12-19

## 2019-12-19 PROBLEM — R65.20 SIRS DUE TO INFECTIOUS PROCESS WITH ACUTE ORGAN DYSFUNCTION (HCC): Status: RESOLVED | Noted: 2019-12-13 | Resolved: 2019-12-19

## 2019-12-19 PROBLEM — R79.89 ELEVATED LACTIC ACID LEVEL: Status: RESOLVED | Noted: 2019-12-12 | Resolved: 2019-12-19

## 2019-12-19 PROBLEM — R04.0 EPISTAXIS: Status: RESOLVED | Noted: 2019-12-18 | Resolved: 2019-12-19

## 2019-12-19 PROBLEM — A41.9 SEPSIS (HCC): Status: RESOLVED | Noted: 2019-12-12 | Resolved: 2019-12-19

## 2019-12-19 LAB
ANION GAP SERPL CALCULATED.3IONS-SCNC: 9 MMOL/L (ref 9–17)
BUN BLDV-MCNC: 21 MG/DL (ref 8–23)
BUN/CREAT BLD: 23 (ref 9–20)
CALCIUM SERPL-MCNC: 8.8 MG/DL (ref 8.6–10.4)
CHLORIDE BLD-SCNC: 99 MMOL/L (ref 98–107)
CO2: 33 MMOL/L (ref 20–31)
CREAT SERPL-MCNC: 0.92 MG/DL (ref 0.5–0.9)
GFR AFRICAN AMERICAN: >60 ML/MIN
GFR NON-AFRICAN AMERICAN: >60 ML/MIN
GFR SERPL CREATININE-BSD FRML MDRD: ABNORMAL ML/MIN/{1.73_M2}
GFR SERPL CREATININE-BSD FRML MDRD: ABNORMAL ML/MIN/{1.73_M2}
GLUCOSE BLD-MCNC: 108 MG/DL (ref 70–99)
HCT VFR BLD CALC: 34.2 % (ref 36.3–47.1)
HEMOGLOBIN: 9.2 G/DL (ref 11.9–15.1)
INR BLD: 1.7
POTASSIUM SERPL-SCNC: 3.7 MMOL/L (ref 3.7–5.3)
PROTHROMBIN TIME: 16.9 SEC (ref 9.7–11.6)
SODIUM BLD-SCNC: 141 MMOL/L (ref 135–144)

## 2019-12-19 PROCEDURE — 2580000003 HC RX 258: Performed by: RADIOLOGY

## 2019-12-19 PROCEDURE — 2060000000 HC ICU INTERMEDIATE R&B

## 2019-12-19 PROCEDURE — 6360000002 HC RX W HCPCS: Performed by: NURSE PRACTITIONER

## 2019-12-19 PROCEDURE — 36415 COLL VENOUS BLD VENIPUNCTURE: CPT

## 2019-12-19 PROCEDURE — 6370000000 HC RX 637 (ALT 250 FOR IP): Performed by: NURSE PRACTITIONER

## 2019-12-19 PROCEDURE — 85014 HEMATOCRIT: CPT

## 2019-12-19 PROCEDURE — 99231 SBSQ HOSP IP/OBS SF/LOW 25: CPT | Performed by: INTERNAL MEDICINE

## 2019-12-19 PROCEDURE — 2580000003 HC RX 258: Performed by: INTERNAL MEDICINE

## 2019-12-19 PROCEDURE — 85610 PROTHROMBIN TIME: CPT

## 2019-12-19 PROCEDURE — 99232 SBSQ HOSP IP/OBS MODERATE 35: CPT | Performed by: INTERNAL MEDICINE

## 2019-12-19 PROCEDURE — 80048 BASIC METABOLIC PNL TOTAL CA: CPT

## 2019-12-19 PROCEDURE — 6370000000 HC RX 637 (ALT 250 FOR IP): Performed by: INTERNAL MEDICINE

## 2019-12-19 PROCEDURE — 85018 HEMOGLOBIN: CPT

## 2019-12-19 PROCEDURE — 2580000003 HC RX 258: Performed by: NURSE PRACTITIONER

## 2019-12-19 RX ORDER — CEFAZOLIN SODIUM 1 G/3ML
2 INJECTION, POWDER, FOR SOLUTION INTRAMUSCULAR; INTRAVENOUS EVERY 12 HOURS
Qty: 1000 MG | Refills: 0 | DISCHARGE
Start: 2019-12-19 | End: 2019-12-24 | Stop reason: SDUPTHER

## 2019-12-19 RX ORDER — POTASSIUM CHLORIDE 750 MG/1
20 CAPSULE, EXTENDED RELEASE ORAL DAILY
Qty: 60 CAPSULE | Refills: 3 | DISCHARGE
Start: 2019-12-20

## 2019-12-19 RX ORDER — CALCIUM CARBONATE/VITAMIN D3 250-3.125
2 TABLET ORAL DAILY
Qty: 30 TABLET | DISCHARGE
Start: 2019-12-20

## 2019-12-19 RX ORDER — SPIRONOLACTONE 25 MG/1
25 TABLET ORAL DAILY
Qty: 30 TABLET | Refills: 3 | DISCHARGE
Start: 2019-12-20

## 2019-12-19 RX ORDER — PSEUDOEPHEDRINE HCL 30 MG
100 TABLET ORAL 2 TIMES DAILY
DISCHARGE
Start: 2019-12-19

## 2019-12-19 RX ORDER — AMIODARONE HYDROCHLORIDE 200 MG/1
200 TABLET ORAL DAILY
Qty: 30 TABLET | Refills: 3 | DISCHARGE
Start: 2019-12-20

## 2019-12-19 RX ADMIN — SODIUM CHLORIDE, PRESERVATIVE FREE 10 ML: 5 INJECTION INTRAVENOUS at 08:42

## 2019-12-19 RX ADMIN — SPIRONOLACTONE 25 MG: 25 TABLET ORAL at 08:41

## 2019-12-19 RX ADMIN — DOCUSATE SODIUM 100 MG: 100 CAPSULE, LIQUID FILLED ORAL at 22:59

## 2019-12-19 RX ADMIN — METOPROLOL SUCCINATE 25 MG: 25 TABLET, FILM COATED, EXTENDED RELEASE ORAL at 08:42

## 2019-12-19 RX ADMIN — RIVAROXABAN 20 MG: 20 TABLET, FILM COATED ORAL at 17:43

## 2019-12-19 RX ADMIN — ANTI-FUNGAL POWDER MICONAZOLE NITRATE TALC FREE: 1.42 POWDER TOPICAL at 20:00

## 2019-12-19 RX ADMIN — MAGNESIUM GLUCONATE 500 MG ORAL TABLET 800 MG: 500 TABLET ORAL at 17:43

## 2019-12-19 RX ADMIN — CALCIUM CARBONATE-CHOLECALCIFEROL TAB 250 MG-125 UNIT 500 MG: 250-125 TAB at 08:42

## 2019-12-19 RX ADMIN — FUROSEMIDE 40 MG: 40 TABLET ORAL at 08:41

## 2019-12-19 RX ADMIN — CEFAZOLIN SODIUM 2 G: 10 INJECTION, POWDER, FOR SOLUTION INTRAVENOUS at 04:24

## 2019-12-19 RX ADMIN — AMIODARONE HYDROCHLORIDE 200 MG: 200 TABLET ORAL at 08:41

## 2019-12-19 RX ADMIN — CEFAZOLIN SODIUM 2 G: 10 INJECTION, POWDER, FOR SOLUTION INTRAVENOUS at 17:43

## 2019-12-19 RX ADMIN — ANTI-FUNGAL POWDER MICONAZOLE NITRATE TALC FREE: 1.42 POWDER TOPICAL at 08:42

## 2019-12-19 RX ADMIN — ASPIRIN 81 MG 81 MG: 81 TABLET ORAL at 08:42

## 2019-12-19 RX ADMIN — POTASSIUM CHLORIDE 20 MEQ: 750 CAPSULE, EXTENDED RELEASE ORAL at 08:41

## 2019-12-19 RX ADMIN — DOCUSATE SODIUM 100 MG: 100 CAPSULE, LIQUID FILLED ORAL at 08:42

## 2019-12-19 RX ADMIN — ATORVASTATIN CALCIUM 80 MG: 80 TABLET, FILM COATED ORAL at 22:59

## 2019-12-19 RX ADMIN — SODIUM CHLORIDE, PRESERVATIVE FREE 10 ML: 5 INJECTION INTRAVENOUS at 23:01

## 2019-12-19 RX ADMIN — SODIUM CHLORIDE, PRESERVATIVE FREE 10 ML: 5 INJECTION INTRAVENOUS at 20:00

## 2019-12-19 ASSESSMENT — PAIN SCALES - GENERAL
PAINLEVEL_OUTOF10: 0

## 2019-12-19 ASSESSMENT — ENCOUNTER SYMPTOMS
GASTROINTESTINAL NEGATIVE: 1
SHORTNESS OF BREATH: 1

## 2019-12-20 LAB
CULTURE: ABNORMAL
DIRECT EXAM: ABNORMAL
Lab: ABNORMAL
SPECIMEN DESCRIPTION: ABNORMAL

## 2019-12-20 PROCEDURE — 6370000000 HC RX 637 (ALT 250 FOR IP): Performed by: INTERNAL MEDICINE

## 2019-12-20 PROCEDURE — 2700000000 HC OXYGEN THERAPY PER DAY

## 2019-12-20 PROCEDURE — 6370000000 HC RX 637 (ALT 250 FOR IP): Performed by: NURSE PRACTITIONER

## 2019-12-20 PROCEDURE — 2580000003 HC RX 258: Performed by: RADIOLOGY

## 2019-12-20 PROCEDURE — 6360000002 HC RX W HCPCS: Performed by: NURSE PRACTITIONER

## 2019-12-20 PROCEDURE — 94760 N-INVAS EAR/PLS OXIMETRY 1: CPT

## 2019-12-20 PROCEDURE — 2580000003 HC RX 258: Performed by: INTERNAL MEDICINE

## 2019-12-20 PROCEDURE — 99232 SBSQ HOSP IP/OBS MODERATE 35: CPT | Performed by: INTERNAL MEDICINE

## 2019-12-20 PROCEDURE — 2580000003 HC RX 258: Performed by: NURSE PRACTITIONER

## 2019-12-20 PROCEDURE — 2060000000 HC ICU INTERMEDIATE R&B

## 2019-12-20 PROCEDURE — 99239 HOSP IP/OBS DSCHRG MGMT >30: CPT | Performed by: INTERNAL MEDICINE

## 2019-12-20 PROCEDURE — 94761 N-INVAS EAR/PLS OXIMETRY MLT: CPT

## 2019-12-20 PROCEDURE — 97535 SELF CARE MNGMENT TRAINING: CPT

## 2019-12-20 RX ADMIN — DOCUSATE SODIUM 100 MG: 100 CAPSULE, LIQUID FILLED ORAL at 09:16

## 2019-12-20 RX ADMIN — SALINE NASAL SPRAY 1 SPRAY: 1.5 SOLUTION NASAL at 17:43

## 2019-12-20 RX ADMIN — ASPIRIN 81 MG 81 MG: 81 TABLET ORAL at 09:16

## 2019-12-20 RX ADMIN — FUROSEMIDE 40 MG: 40 TABLET ORAL at 09:16

## 2019-12-20 RX ADMIN — DOCUSATE SODIUM 100 MG: 100 CAPSULE, LIQUID FILLED ORAL at 20:40

## 2019-12-20 RX ADMIN — RIVAROXABAN 20 MG: 20 TABLET, FILM COATED ORAL at 17:41

## 2019-12-20 RX ADMIN — CEFAZOLIN SODIUM 2 G: 10 INJECTION, POWDER, FOR SOLUTION INTRAVENOUS at 04:33

## 2019-12-20 RX ADMIN — AMIODARONE HYDROCHLORIDE 200 MG: 200 TABLET ORAL at 09:16

## 2019-12-20 RX ADMIN — SODIUM CHLORIDE, PRESERVATIVE FREE 10 ML: 5 INJECTION INTRAVENOUS at 20:40

## 2019-12-20 RX ADMIN — CEFAZOLIN SODIUM 2 G: 10 INJECTION, POWDER, FOR SOLUTION INTRAVENOUS at 17:41

## 2019-12-20 RX ADMIN — MAGNESIUM GLUCONATE 500 MG ORAL TABLET 800 MG: 500 TABLET ORAL at 17:41

## 2019-12-20 RX ADMIN — ANTI-FUNGAL POWDER MICONAZOLE NITRATE TALC FREE: 1.42 POWDER TOPICAL at 20:40

## 2019-12-20 RX ADMIN — SPIRONOLACTONE 25 MG: 25 TABLET ORAL at 09:16

## 2019-12-20 RX ADMIN — CALCIUM CARBONATE-CHOLECALCIFEROL TAB 250 MG-125 UNIT 500 MG: 250-125 TAB at 09:16

## 2019-12-20 RX ADMIN — POTASSIUM CHLORIDE 20 MEQ: 750 CAPSULE, EXTENDED RELEASE ORAL at 09:16

## 2019-12-20 RX ADMIN — SALINE NASAL SPRAY 1 SPRAY: 1.5 SOLUTION NASAL at 09:26

## 2019-12-20 RX ADMIN — METOPROLOL SUCCINATE 25 MG: 25 TABLET, FILM COATED, EXTENDED RELEASE ORAL at 09:16

## 2019-12-20 RX ADMIN — SODIUM CHLORIDE, PRESERVATIVE FREE 10 ML: 5 INJECTION INTRAVENOUS at 09:27

## 2019-12-20 RX ADMIN — SALINE NASAL SPRAY 1 SPRAY: 1.5 SOLUTION NASAL at 20:40

## 2019-12-20 RX ADMIN — ANTI-FUNGAL POWDER MICONAZOLE NITRATE TALC FREE: 1.42 POWDER TOPICAL at 09:16

## 2019-12-20 RX ADMIN — ATORVASTATIN CALCIUM 80 MG: 80 TABLET, FILM COATED ORAL at 20:40

## 2019-12-20 ASSESSMENT — PAIN SCALES - GENERAL
PAINLEVEL_OUTOF10: 0

## 2019-12-21 LAB
ANION GAP SERPL CALCULATED.3IONS-SCNC: 4 MMOL/L (ref 9–17)
BUN BLDV-MCNC: 20 MG/DL (ref 8–23)
BUN/CREAT BLD: 22 (ref 9–20)
CALCIUM IONIZED: 1.22 MMOL/L (ref 1.13–1.33)
CALCIUM SERPL-MCNC: 9 MG/DL (ref 8.6–10.4)
CHLORIDE BLD-SCNC: 99 MMOL/L (ref 98–107)
CO2: 39 MMOL/L (ref 20–31)
CREAT SERPL-MCNC: 0.9 MG/DL (ref 0.5–0.9)
GFR AFRICAN AMERICAN: >60 ML/MIN
GFR NON-AFRICAN AMERICAN: >60 ML/MIN
GFR SERPL CREATININE-BSD FRML MDRD: ABNORMAL ML/MIN/{1.73_M2}
GFR SERPL CREATININE-BSD FRML MDRD: ABNORMAL ML/MIN/{1.73_M2}
GLUCOSE BLD-MCNC: 106 MG/DL (ref 70–99)
HCT VFR BLD CALC: 33.5 % (ref 36.3–47.1)
HEMOGLOBIN: 9 G/DL (ref 11.9–15.1)
INR BLD: 1.4
MAGNESIUM: 2.2 MG/DL (ref 1.6–2.6)
MCH RBC QN AUTO: 24.2 PG (ref 25.2–33.5)
MCHC RBC AUTO-ENTMCNC: 26.9 G/DL (ref 28.4–34.8)
MCV RBC AUTO: 90.1 FL (ref 82.6–102.9)
NRBC AUTOMATED: 0 PER 100 WBC
PDW BLD-RTO: 22.8 % (ref 11.8–14.4)
PHOSPHORUS: 3.5 MG/DL (ref 2.6–4.5)
PLATELET # BLD: 175 K/UL (ref 138–453)
PMV BLD AUTO: 10.3 FL (ref 8.1–13.5)
POTASSIUM SERPL-SCNC: 4.5 MMOL/L (ref 3.7–5.3)
PROTHROMBIN TIME: 14.4 SEC (ref 9.7–11.6)
RBC # BLD: 3.72 M/UL (ref 3.95–5.11)
SODIUM BLD-SCNC: 142 MMOL/L (ref 135–144)
WBC # BLD: 5.3 K/UL (ref 3.5–11.3)

## 2019-12-21 PROCEDURE — 6370000000 HC RX 637 (ALT 250 FOR IP): Performed by: NURSE PRACTITIONER

## 2019-12-21 PROCEDURE — 2580000003 HC RX 258: Performed by: NURSE PRACTITIONER

## 2019-12-21 PROCEDURE — 2580000003 HC RX 258: Performed by: INTERNAL MEDICINE

## 2019-12-21 PROCEDURE — 97530 THERAPEUTIC ACTIVITIES: CPT

## 2019-12-21 PROCEDURE — 2060000000 HC ICU INTERMEDIATE R&B

## 2019-12-21 PROCEDURE — 6370000000 HC RX 637 (ALT 250 FOR IP): Performed by: INTERNAL MEDICINE

## 2019-12-21 PROCEDURE — 85610 PROTHROMBIN TIME: CPT

## 2019-12-21 PROCEDURE — 2580000003 HC RX 258: Performed by: RADIOLOGY

## 2019-12-21 PROCEDURE — 97110 THERAPEUTIC EXERCISES: CPT

## 2019-12-21 PROCEDURE — 80048 BASIC METABOLIC PNL TOTAL CA: CPT

## 2019-12-21 PROCEDURE — 83735 ASSAY OF MAGNESIUM: CPT

## 2019-12-21 PROCEDURE — 99232 SBSQ HOSP IP/OBS MODERATE 35: CPT | Performed by: FAMILY MEDICINE

## 2019-12-21 PROCEDURE — 85027 COMPLETE CBC AUTOMATED: CPT

## 2019-12-21 PROCEDURE — 6360000002 HC RX W HCPCS: Performed by: NURSE PRACTITIONER

## 2019-12-21 PROCEDURE — 84100 ASSAY OF PHOSPHORUS: CPT

## 2019-12-21 PROCEDURE — 82330 ASSAY OF CALCIUM: CPT

## 2019-12-21 PROCEDURE — 36415 COLL VENOUS BLD VENIPUNCTURE: CPT

## 2019-12-21 PROCEDURE — 99231 SBSQ HOSP IP/OBS SF/LOW 25: CPT | Performed by: INTERNAL MEDICINE

## 2019-12-21 RX ADMIN — SODIUM CHLORIDE, PRESERVATIVE FREE 10 ML: 5 INJECTION INTRAVENOUS at 09:25

## 2019-12-21 RX ADMIN — SODIUM CHLORIDE, PRESERVATIVE FREE 10 ML: 5 INJECTION INTRAVENOUS at 09:24

## 2019-12-21 RX ADMIN — AMIODARONE HYDROCHLORIDE 200 MG: 200 TABLET ORAL at 09:28

## 2019-12-21 RX ADMIN — ANTI-FUNGAL POWDER MICONAZOLE NITRATE TALC FREE: 1.42 POWDER TOPICAL at 09:28

## 2019-12-21 RX ADMIN — FUROSEMIDE 40 MG: 40 TABLET ORAL at 09:22

## 2019-12-21 RX ADMIN — SODIUM CHLORIDE, PRESERVATIVE FREE 10 ML: 5 INJECTION INTRAVENOUS at 19:53

## 2019-12-21 RX ADMIN — ATORVASTATIN CALCIUM 80 MG: 80 TABLET, FILM COATED ORAL at 19:53

## 2019-12-21 RX ADMIN — CEFAZOLIN SODIUM 2 G: 10 INJECTION, POWDER, FOR SOLUTION INTRAVENOUS at 17:14

## 2019-12-21 RX ADMIN — CALCIUM CARBONATE-CHOLECALCIFEROL TAB 250 MG-125 UNIT 500 MG: 250-125 TAB at 09:28

## 2019-12-21 RX ADMIN — DOCUSATE SODIUM 100 MG: 100 CAPSULE, LIQUID FILLED ORAL at 09:20

## 2019-12-21 RX ADMIN — ASPIRIN 81 MG 81 MG: 81 TABLET ORAL at 09:20

## 2019-12-21 RX ADMIN — SALINE NASAL SPRAY 1 SPRAY: 1.5 SOLUTION NASAL at 14:09

## 2019-12-21 RX ADMIN — SALINE NASAL SPRAY 1 SPRAY: 1.5 SOLUTION NASAL at 17:14

## 2019-12-21 RX ADMIN — METOPROLOL SUCCINATE 25 MG: 25 TABLET, FILM COATED, EXTENDED RELEASE ORAL at 09:20

## 2019-12-21 RX ADMIN — SALINE NASAL SPRAY 1 SPRAY: 1.5 SOLUTION NASAL at 19:53

## 2019-12-21 RX ADMIN — CEFAZOLIN SODIUM 2 G: 10 INJECTION, POWDER, FOR SOLUTION INTRAVENOUS at 05:46

## 2019-12-21 RX ADMIN — SPIRONOLACTONE 25 MG: 25 TABLET ORAL at 09:20

## 2019-12-21 RX ADMIN — SALINE NASAL SPRAY 1 SPRAY: 1.5 SOLUTION NASAL at 09:23

## 2019-12-21 RX ADMIN — DOCUSATE SODIUM 100 MG: 100 CAPSULE, LIQUID FILLED ORAL at 19:52

## 2019-12-21 RX ADMIN — POTASSIUM CHLORIDE 20 MEQ: 750 CAPSULE, EXTENDED RELEASE ORAL at 09:20

## 2019-12-21 RX ADMIN — ANTI-FUNGAL POWDER MICONAZOLE NITRATE TALC FREE: 1.42 POWDER TOPICAL at 19:52

## 2019-12-21 RX ADMIN — RIVAROXABAN 20 MG: 20 TABLET, FILM COATED ORAL at 17:12

## 2019-12-21 RX ADMIN — MAGNESIUM GLUCONATE 500 MG ORAL TABLET 800 MG: 500 TABLET ORAL at 17:12

## 2019-12-21 ASSESSMENT — PAIN DESCRIPTION - ORIENTATION
ORIENTATION: LOWER;MID
ORIENTATION: LOWER
ORIENTATION: LOWER

## 2019-12-21 ASSESSMENT — PAIN SCALES - GENERAL
PAINLEVEL_OUTOF10: 6
PAINLEVEL_OUTOF10: 6
PAINLEVEL_OUTOF10: 5
PAINLEVEL_OUTOF10: 2

## 2019-12-21 ASSESSMENT — PAIN DESCRIPTION - PAIN TYPE
TYPE: CHRONIC PAIN

## 2019-12-21 ASSESSMENT — PAIN - FUNCTIONAL ASSESSMENT
PAIN_FUNCTIONAL_ASSESSMENT: PREVENTS OR INTERFERES SOME ACTIVE ACTIVITIES AND ADLS
PAIN_FUNCTIONAL_ASSESSMENT: PREVENTS OR INTERFERES SOME ACTIVE ACTIVITIES AND ADLS

## 2019-12-21 ASSESSMENT — PAIN DESCRIPTION - DESCRIPTORS
DESCRIPTORS: ACHING
DESCRIPTORS: ACHING

## 2019-12-21 ASSESSMENT — ENCOUNTER SYMPTOMS
CHEST TIGHTNESS: 0
ABDOMINAL PAIN: 0
VOMITING: 0
BLOOD IN STOOL: 0
DIARRHEA: 0
GASTROINTESTINAL NEGATIVE: 1
RHINORRHEA: 0
RESPIRATORY NEGATIVE: 1
WHEEZING: 0
SHORTNESS OF BREATH: 0
NAUSEA: 0
CONSTIPATION: 0
COUGH: 0

## 2019-12-21 ASSESSMENT — PAIN DESCRIPTION - LOCATION
LOCATION: BACK

## 2019-12-21 ASSESSMENT — PAIN DESCRIPTION - ONSET
ONSET: GRADUAL
ONSET: GRADUAL

## 2019-12-21 ASSESSMENT — PAIN DESCRIPTION - FREQUENCY
FREQUENCY: INTERMITTENT
FREQUENCY: INTERMITTENT

## 2019-12-21 ASSESSMENT — PAIN DESCRIPTION - PROGRESSION
CLINICAL_PROGRESSION: GRADUALLY WORSENING
CLINICAL_PROGRESSION: NOT CHANGED

## 2019-12-22 LAB
CULTURE: NORMAL
CULTURE: NORMAL
Lab: NORMAL
Lab: NORMAL
SPECIMEN DESCRIPTION: NORMAL
SPECIMEN DESCRIPTION: NORMAL

## 2019-12-22 PROCEDURE — 2060000000 HC ICU INTERMEDIATE R&B

## 2019-12-22 PROCEDURE — 99232 SBSQ HOSP IP/OBS MODERATE 35: CPT | Performed by: INTERNAL MEDICINE

## 2019-12-22 PROCEDURE — 6370000000 HC RX 637 (ALT 250 FOR IP): Performed by: NURSE PRACTITIONER

## 2019-12-22 PROCEDURE — 6360000002 HC RX W HCPCS: Performed by: NURSE PRACTITIONER

## 2019-12-22 PROCEDURE — 2580000003 HC RX 258: Performed by: RADIOLOGY

## 2019-12-22 PROCEDURE — 2580000003 HC RX 258: Performed by: INTERNAL MEDICINE

## 2019-12-22 PROCEDURE — 99232 SBSQ HOSP IP/OBS MODERATE 35: CPT | Performed by: FAMILY MEDICINE

## 2019-12-22 PROCEDURE — 2580000003 HC RX 258: Performed by: NURSE PRACTITIONER

## 2019-12-22 PROCEDURE — 6370000000 HC RX 637 (ALT 250 FOR IP): Performed by: INTERNAL MEDICINE

## 2019-12-22 RX ADMIN — FUROSEMIDE 40 MG: 40 TABLET ORAL at 09:39

## 2019-12-22 RX ADMIN — AMIODARONE HYDROCHLORIDE 200 MG: 200 TABLET ORAL at 09:38

## 2019-12-22 RX ADMIN — CEFAZOLIN SODIUM 2 G: 10 INJECTION, POWDER, FOR SOLUTION INTRAVENOUS at 05:55

## 2019-12-22 RX ADMIN — METOPROLOL SUCCINATE 25 MG: 25 TABLET, FILM COATED, EXTENDED RELEASE ORAL at 09:39

## 2019-12-22 RX ADMIN — SODIUM CHLORIDE, PRESERVATIVE FREE 10 ML: 5 INJECTION INTRAVENOUS at 09:46

## 2019-12-22 RX ADMIN — RIVAROXABAN 20 MG: 20 TABLET, FILM COATED ORAL at 18:05

## 2019-12-22 RX ADMIN — SODIUM CHLORIDE, PRESERVATIVE FREE 10 ML: 5 INJECTION INTRAVENOUS at 20:49

## 2019-12-22 RX ADMIN — ANTI-FUNGAL POWDER MICONAZOLE NITRATE TALC FREE: 1.42 POWDER TOPICAL at 09:40

## 2019-12-22 RX ADMIN — SALINE NASAL SPRAY 1 SPRAY: 1.5 SOLUTION NASAL at 09:41

## 2019-12-22 RX ADMIN — CEFAZOLIN SODIUM 2 G: 10 INJECTION, POWDER, FOR SOLUTION INTRAVENOUS at 18:05

## 2019-12-22 RX ADMIN — SPIRONOLACTONE 25 MG: 25 TABLET ORAL at 09:38

## 2019-12-22 RX ADMIN — ATORVASTATIN CALCIUM 80 MG: 80 TABLET, FILM COATED ORAL at 20:48

## 2019-12-22 RX ADMIN — ASPIRIN 81 MG 81 MG: 81 TABLET ORAL at 09:39

## 2019-12-22 RX ADMIN — DOCUSATE SODIUM 100 MG: 100 CAPSULE, LIQUID FILLED ORAL at 09:39

## 2019-12-22 RX ADMIN — CALCIUM CARBONATE-CHOLECALCIFEROL TAB 250 MG-125 UNIT 500 MG: 250-125 TAB at 09:44

## 2019-12-22 RX ADMIN — MAGNESIUM GLUCONATE 500 MG ORAL TABLET 800 MG: 500 TABLET ORAL at 18:05

## 2019-12-22 RX ADMIN — POTASSIUM CHLORIDE 20 MEQ: 750 CAPSULE, EXTENDED RELEASE ORAL at 09:39

## 2019-12-22 RX ADMIN — ANTI-FUNGAL POWDER MICONAZOLE NITRATE TALC FREE: 1.42 POWDER TOPICAL at 20:48

## 2019-12-22 ASSESSMENT — ENCOUNTER SYMPTOMS
RHINORRHEA: 0
VOMITING: 0
ABDOMINAL PAIN: 0
COUGH: 0
DIARRHEA: 0
CHEST TIGHTNESS: 0
WHEEZING: 0
SHORTNESS OF BREATH: 0
NAUSEA: 0
CONSTIPATION: 0
BLOOD IN STOOL: 0

## 2019-12-22 ASSESSMENT — PAIN SCALES - GENERAL
PAINLEVEL_OUTOF10: 0
PAINLEVEL_OUTOF10: 0

## 2019-12-23 ENCOUNTER — APPOINTMENT (OUTPATIENT)
Dept: GENERAL RADIOLOGY | Age: 69
DRG: 853 | End: 2019-12-23
Payer: MEDICARE

## 2019-12-23 PROCEDURE — 99231 SBSQ HOSP IP/OBS SF/LOW 25: CPT | Performed by: INTERNAL MEDICINE

## 2019-12-23 PROCEDURE — 99239 HOSP IP/OBS DSCHRG MGMT >30: CPT | Performed by: INTERNAL MEDICINE

## 2019-12-23 PROCEDURE — 2580000003 HC RX 258: Performed by: INTERNAL MEDICINE

## 2019-12-23 PROCEDURE — 97535 SELF CARE MNGMENT TRAINING: CPT

## 2019-12-23 PROCEDURE — 6370000000 HC RX 637 (ALT 250 FOR IP): Performed by: NURSE PRACTITIONER

## 2019-12-23 PROCEDURE — 6360000002 HC RX W HCPCS: Performed by: NURSE PRACTITIONER

## 2019-12-23 PROCEDURE — 6370000000 HC RX 637 (ALT 250 FOR IP): Performed by: INTERNAL MEDICINE

## 2019-12-23 PROCEDURE — 6370000000 HC RX 637 (ALT 250 FOR IP): Performed by: FAMILY MEDICINE

## 2019-12-23 PROCEDURE — 97530 THERAPEUTIC ACTIVITIES: CPT

## 2019-12-23 PROCEDURE — 71045 X-RAY EXAM CHEST 1 VIEW: CPT

## 2019-12-23 PROCEDURE — 2580000003 HC RX 258: Performed by: NURSE PRACTITIONER

## 2019-12-23 PROCEDURE — 2580000003 HC RX 258: Performed by: RADIOLOGY

## 2019-12-23 PROCEDURE — 97110 THERAPEUTIC EXERCISES: CPT

## 2019-12-23 PROCEDURE — 6360000002 HC RX W HCPCS: Performed by: INTERNAL MEDICINE

## 2019-12-23 PROCEDURE — 2060000000 HC ICU INTERMEDIATE R&B

## 2019-12-23 RX ORDER — GUAIFENESIN 600 MG/1
600 TABLET, EXTENDED RELEASE ORAL 2 TIMES DAILY
Status: DISCONTINUED | OUTPATIENT
Start: 2019-12-23 | End: 2019-12-23

## 2019-12-23 RX ORDER — GUAIFENESIN 600 MG/1
1200 TABLET, EXTENDED RELEASE ORAL 2 TIMES DAILY
Status: DISCONTINUED | OUTPATIENT
Start: 2019-12-23 | End: 2019-12-24 | Stop reason: HOSPADM

## 2019-12-23 RX ORDER — FUROSEMIDE 10 MG/ML
40 INJECTION INTRAMUSCULAR; INTRAVENOUS ONCE
Status: COMPLETED | OUTPATIENT
Start: 2019-12-23 | End: 2019-12-23

## 2019-12-23 RX ADMIN — FUROSEMIDE 40 MG: 10 INJECTION, SOLUTION INTRAMUSCULAR; INTRAVENOUS at 11:36

## 2019-12-23 RX ADMIN — GUAIFENESIN 1200 MG: 600 TABLET, EXTENDED RELEASE ORAL at 21:06

## 2019-12-23 RX ADMIN — METOPROLOL SUCCINATE 25 MG: 25 TABLET, FILM COATED, EXTENDED RELEASE ORAL at 08:47

## 2019-12-23 RX ADMIN — ASPIRIN 81 MG 81 MG: 81 TABLET ORAL at 08:48

## 2019-12-23 RX ADMIN — POTASSIUM CHLORIDE 20 MEQ: 750 CAPSULE, EXTENDED RELEASE ORAL at 08:47

## 2019-12-23 RX ADMIN — SALINE NASAL SPRAY 1 SPRAY: 1.5 SOLUTION NASAL at 02:40

## 2019-12-23 RX ADMIN — ANTI-FUNGAL POWDER MICONAZOLE NITRATE TALC FREE: 1.42 POWDER TOPICAL at 09:03

## 2019-12-23 RX ADMIN — SODIUM CHLORIDE, PRESERVATIVE FREE 10 ML: 5 INJECTION INTRAVENOUS at 08:49

## 2019-12-23 RX ADMIN — ANTI-FUNGAL POWDER MICONAZOLE NITRATE TALC FREE: 1.42 POWDER TOPICAL at 21:07

## 2019-12-23 RX ADMIN — MAGNESIUM GLUCONATE 500 MG ORAL TABLET 800 MG: 500 TABLET ORAL at 18:02

## 2019-12-23 RX ADMIN — FUROSEMIDE 40 MG: 40 TABLET ORAL at 08:48

## 2019-12-23 RX ADMIN — RIVAROXABAN 20 MG: 20 TABLET, FILM COATED ORAL at 18:02

## 2019-12-23 RX ADMIN — SODIUM CHLORIDE, PRESERVATIVE FREE 10 ML: 5 INJECTION INTRAVENOUS at 21:10

## 2019-12-23 RX ADMIN — SALINE NASAL SPRAY 1 SPRAY: 1.5 SOLUTION NASAL at 08:49

## 2019-12-23 RX ADMIN — GUAIFENESIN 600 MG: 600 TABLET, EXTENDED RELEASE ORAL at 03:15

## 2019-12-23 RX ADMIN — CALCIUM CARBONATE-CHOLECALCIFEROL TAB 250 MG-125 UNIT 500 MG: 250-125 TAB at 08:48

## 2019-12-23 RX ADMIN — AMIODARONE HYDROCHLORIDE 200 MG: 200 TABLET ORAL at 08:48

## 2019-12-23 RX ADMIN — CEFAZOLIN SODIUM 2 G: 10 INJECTION, POWDER, FOR SOLUTION INTRAVENOUS at 06:34

## 2019-12-23 RX ADMIN — CEFAZOLIN SODIUM 2 G: 10 INJECTION, POWDER, FOR SOLUTION INTRAVENOUS at 18:03

## 2019-12-23 RX ADMIN — ATORVASTATIN CALCIUM 80 MG: 80 TABLET, FILM COATED ORAL at 21:06

## 2019-12-23 RX ADMIN — SPIRONOLACTONE 25 MG: 25 TABLET ORAL at 08:48

## 2019-12-23 ASSESSMENT — ENCOUNTER SYMPTOMS
GASTROINTESTINAL NEGATIVE: 1
RESPIRATORY NEGATIVE: 1

## 2019-12-24 VITALS
OXYGEN SATURATION: 95 % | TEMPERATURE: 97.5 F | HEART RATE: 71 BPM | RESPIRATION RATE: 20 BRPM | HEIGHT: 65 IN | BODY MASS INDEX: 30.1 KG/M2 | DIASTOLIC BLOOD PRESSURE: 68 MMHG | WEIGHT: 180.7 LBS | SYSTOLIC BLOOD PRESSURE: 121 MMHG

## 2019-12-24 LAB
ANION GAP SERPL CALCULATED.3IONS-SCNC: 6 MMOL/L (ref 9–17)
BUN BLDV-MCNC: 23 MG/DL (ref 8–23)
BUN/CREAT BLD: 26 (ref 9–20)
CALCIUM SERPL-MCNC: 9 MG/DL (ref 8.6–10.4)
CHLORIDE BLD-SCNC: 98 MMOL/L (ref 98–107)
CO2: 34 MMOL/L (ref 20–31)
CREAT SERPL-MCNC: 0.89 MG/DL (ref 0.5–0.9)
GFR AFRICAN AMERICAN: >60 ML/MIN
GFR NON-AFRICAN AMERICAN: >60 ML/MIN
GFR SERPL CREATININE-BSD FRML MDRD: ABNORMAL ML/MIN/{1.73_M2}
GFR SERPL CREATININE-BSD FRML MDRD: ABNORMAL ML/MIN/{1.73_M2}
GLUCOSE BLD-MCNC: 100 MG/DL (ref 70–99)
HCT VFR BLD CALC: 34.8 % (ref 36.3–47.1)
HEMOGLOBIN: 9.5 G/DL (ref 11.9–15.1)
MAGNESIUM: 2.2 MG/DL (ref 1.6–2.6)
MCH RBC QN AUTO: 24.8 PG (ref 25.2–33.5)
MCHC RBC AUTO-ENTMCNC: 27.3 G/DL (ref 28.4–34.8)
MCV RBC AUTO: 90.9 FL (ref 82.6–102.9)
NRBC AUTOMATED: 0 PER 100 WBC
PDW BLD-RTO: 23.9 % (ref 11.8–14.4)
PLATELET # BLD: 153 K/UL (ref 138–453)
PMV BLD AUTO: 11.8 FL (ref 8.1–13.5)
POTASSIUM SERPL-SCNC: 5 MMOL/L (ref 3.7–5.3)
RBC # BLD: 3.83 M/UL (ref 3.95–5.11)
SODIUM BLD-SCNC: 138 MMOL/L (ref 135–144)
WBC # BLD: 5.1 K/UL (ref 3.5–11.3)

## 2019-12-24 PROCEDURE — 36415 COLL VENOUS BLD VENIPUNCTURE: CPT

## 2019-12-24 PROCEDURE — 6360000002 HC RX W HCPCS: Performed by: NURSE PRACTITIONER

## 2019-12-24 PROCEDURE — 97116 GAIT TRAINING THERAPY: CPT

## 2019-12-24 PROCEDURE — 97110 THERAPEUTIC EXERCISES: CPT

## 2019-12-24 PROCEDURE — 85027 COMPLETE CBC AUTOMATED: CPT

## 2019-12-24 PROCEDURE — 99239 HOSP IP/OBS DSCHRG MGMT >30: CPT | Performed by: INTERNAL MEDICINE

## 2019-12-24 PROCEDURE — 97530 THERAPEUTIC ACTIVITIES: CPT

## 2019-12-24 PROCEDURE — 6370000000 HC RX 637 (ALT 250 FOR IP): Performed by: NURSE PRACTITIONER

## 2019-12-24 PROCEDURE — 2580000003 HC RX 258: Performed by: RADIOLOGY

## 2019-12-24 PROCEDURE — 2580000003 HC RX 258: Performed by: NURSE PRACTITIONER

## 2019-12-24 PROCEDURE — 80048 BASIC METABOLIC PNL TOTAL CA: CPT

## 2019-12-24 PROCEDURE — 6370000000 HC RX 637 (ALT 250 FOR IP): Performed by: INTERNAL MEDICINE

## 2019-12-24 PROCEDURE — 83735 ASSAY OF MAGNESIUM: CPT

## 2019-12-24 RX ORDER — GUAIFENESIN 600 MG/1
1200 TABLET, EXTENDED RELEASE ORAL 2 TIMES DAILY
DISCHARGE
Start: 2019-12-24

## 2019-12-24 RX ORDER — CEFAZOLIN SODIUM 1 G/3ML
2 INJECTION, POWDER, FOR SOLUTION INTRAMUSCULAR; INTRAVENOUS EVERY 12 HOURS
Qty: 72000 MG | Refills: 0 | DISCHARGE
Start: 2019-12-24 | End: 2020-01-11

## 2019-12-24 RX ADMIN — ANTI-FUNGAL POWDER MICONAZOLE NITRATE TALC FREE: 1.42 POWDER TOPICAL at 09:43

## 2019-12-24 RX ADMIN — GUAIFENESIN 1200 MG: 600 TABLET, EXTENDED RELEASE ORAL at 09:42

## 2019-12-24 RX ADMIN — FUROSEMIDE 40 MG: 40 TABLET ORAL at 09:43

## 2019-12-24 RX ADMIN — ASPIRIN 81 MG 81 MG: 81 TABLET ORAL at 09:43

## 2019-12-24 RX ADMIN — METOPROLOL SUCCINATE 25 MG: 25 TABLET, FILM COATED, EXTENDED RELEASE ORAL at 09:42

## 2019-12-24 RX ADMIN — SODIUM CHLORIDE, PRESERVATIVE FREE 10 ML: 5 INJECTION INTRAVENOUS at 09:45

## 2019-12-24 RX ADMIN — AMIODARONE HYDROCHLORIDE 200 MG: 200 TABLET ORAL at 09:43

## 2019-12-24 RX ADMIN — DOCUSATE SODIUM 100 MG: 100 CAPSULE, LIQUID FILLED ORAL at 09:42

## 2019-12-24 RX ADMIN — CALCIUM CARBONATE-CHOLECALCIFEROL TAB 250 MG-125 UNIT 500 MG: 250-125 TAB at 09:43

## 2019-12-24 RX ADMIN — POTASSIUM CHLORIDE 20 MEQ: 750 CAPSULE, EXTENDED RELEASE ORAL at 09:42

## 2019-12-24 RX ADMIN — CEFAZOLIN SODIUM 2 G: 10 INJECTION, POWDER, FOR SOLUTION INTRAVENOUS at 05:36

## 2019-12-24 RX ADMIN — SPIRONOLACTONE 25 MG: 25 TABLET ORAL at 09:42

## 2019-12-24 ASSESSMENT — PAIN SCALES - GENERAL
PAINLEVEL_OUTOF10: 0
PAINLEVEL_OUTOF10: 0

## 2020-01-13 LAB
CULTURE: NORMAL
Lab: NORMAL
SPECIMEN DESCRIPTION: NORMAL

## 2020-01-27 LAB
CULTURE: NORMAL
DIRECT EXAM: NORMAL
Lab: NORMAL
SPECIMEN DESCRIPTION: NORMAL

## 2020-02-18 ENCOUNTER — TELEPHONE (OUTPATIENT)
Dept: FAMILY MEDICINE CLINIC | Age: 70
End: 2020-02-18

## 2020-03-25 PROBLEM — E78.5 DYSLIPIDEMIA: Status: RESOLVED | Noted: 2019-04-03 | Resolved: 2020-03-24

## 2020-05-01 LAB
AVERAGE GLUCOSE: 154
HBA1C MFR BLD: 7 %

## 2020-06-08 ENCOUNTER — TELEPHONE (OUTPATIENT)
Dept: FAMILY MEDICINE CLINIC | Age: 70
End: 2020-06-08

## 2020-06-08 NOTE — TELEPHONE ENCOUNTER
Called and spoke with Tr Rodriguez . He states that she is currently in rehab but should be coming home soon. States that his son would know more about her medical care and to call back in a couple of days.

## 2020-06-10 ENCOUNTER — TELEPHONE (OUTPATIENT)
Dept: FAMILY MEDICINE CLINIC | Age: 70
End: 2020-06-10

## 2020-06-15 ENCOUNTER — TELEPHONE (OUTPATIENT)
Dept: INFECTIOUS DISEASES | Age: 70
End: 2020-06-15

## 2020-06-15 NOTE — TELEPHONE ENCOUNTER
----- Message from Lorie Trent sent at 6/15/2020 12:05 PM EDT -----  Chyrl Plants, per note scanned into Media patient needs a hospital follow up appointment with Dr Donaldo Matos. Please call to schedule. Thank you.

## 2020-06-18 ENCOUNTER — TELEPHONE (OUTPATIENT)
Dept: INFECTIOUS DISEASES | Age: 70
End: 2020-06-18

## 2020-07-14 PROBLEM — I25.5 ISCHEMIC CARDIOMYOPATHY: Status: ACTIVE | Noted: 2020-06-08

## 2020-07-14 PROBLEM — I33.0 ACUTE BACTERIAL ENDOCARDITIS: Status: ACTIVE | Noted: 2020-05-12

## 2020-07-14 PROBLEM — I38 ENDOCARDITIS: Status: ACTIVE | Noted: 2020-04-30

## 2020-07-14 PROBLEM — R06.00 DYSPNEA: Status: ACTIVE | Noted: 2020-07-14

## 2020-07-14 RX ORDER — LOSARTAN POTASSIUM 25 MG/1
12.5 TABLET ORAL DAILY
COMMUNITY
Start: 2020-05-24

## 2020-07-14 RX ORDER — PAROXETINE HYDROCHLORIDE 20 MG/1
20 TABLET, FILM COATED ORAL DAILY
COMMUNITY

## 2020-07-14 RX ORDER — GABAPENTIN 100 MG/1
100 CAPSULE ORAL 3 TIMES DAILY PRN
COMMUNITY

## 2020-07-14 RX ORDER — LANOLIN ALCOHOL/MO/W.PET/CERES
400 CREAM (GRAM) TOPICAL DAILY
COMMUNITY
End: 2020-07-14

## 2020-07-24 ENCOUNTER — TELEPHONE (OUTPATIENT)
Dept: FAMILY MEDICINE CLINIC | Age: 70
End: 2020-07-24

## 2020-08-05 NOTE — ED PROVIDER NOTES
[facility]     Emergency Department     Faculty Attestation    I performed a history and physical examination of the patient and discussed management with the resident. I reviewed the residents note and agree with the documented findings and plan of care. Any areas of disagreement are noted on the chart. I was personally present for the key portions of any procedures. I have documented in the chart those procedures where I was not present during the key portions. I have reviewed the emergency nurses triage note. I agree with the chief complaint, past medical history, past surgical history, allergies, medications, social and family history as documented unless otherwise noted below. For Physician Assistant/ Nurse Practitioner cases/documentation I have personally evaluated this patient and have completed at least one if not all key elements of the E/M (history, physical exam, and MDM). Additional findings are as noted. I have personally seen and evaluated the patient. I find the patient's history and physical exam are consistent with the NP/PA documentation. I agree with the care provided, treatment rendered, disposition and follow-up plan. Transfer for evaluation of lightheadedness and hypotension and subdural      Critical Care     Endy Lance M.D.   Attending Emergency  Physician              Norris De La Rosa MD  04/02/19 9912 No

## 2020-10-28 ENCOUNTER — CARE COORDINATION (OUTPATIENT)
Dept: CARE COORDINATION | Age: 70
End: 2020-10-28

## 2020-11-03 PROBLEM — I25.10 CORONARY ATHEROSCLEROSIS OF NATIVE CORONARY ARTERY: Status: RESOLVED | Noted: 2019-04-03 | Resolved: 2020-11-03

## 2020-11-04 ENCOUNTER — CARE COORDINATION (OUTPATIENT)
Dept: CARE COORDINATION | Age: 70
End: 2020-11-04

## 2020-11-04 NOTE — LETTER
11/4/2020    Yang Ortega  [address] [address]      Dear Yang Ortega,    My name is Kina Donohue and I am a registered nurse who partners with [facility] ([facility]) to improve patients' health. [facility] ([facility]) believes you would benefit from working with me. As a member of your health care team, I would work with other providers involved in your care, offer education for your specific health conditions, and connect you with additional resources as needed. I will collaborate with [facility] ([facility])  to support you in following your treatment plan. The additional support I provide is no additional cost to you. My primary focus is to help you achieve specific goals and improve your health. We are committed to walk with you on this journey and look forward to working with you. Please call me to further discuss your healthcare needs. I am available by phone, you can reach me at [phone].     In good health,     Kina Donohue RN

## 2020-11-11 ENCOUNTER — CARE COORDINATION (OUTPATIENT)
Dept: CARE COORDINATION | Age: 70
End: 2020-11-11

## 2025-03-24 NOTE — ED NOTES
Pt connected to cardiac monitor at this time.       Golden Akhtar RN  04/02/19 [id number] The patient is a 61y Female complaining of foot pain/injury.

## 2025-04-16 NOTE — PROGRESS NOTES
Marli KONG David  :  1964  DOS: 2025  MRN: [id number]    Won Zacarias DO  Authorized by: Won Zacarias Won Zacarias DO, CAQSM  [facility] - Sports Medicine  [facility] - Department of Orthopedic Surgery     Disclaimer:  This note was prepared and written using Dragon Medical dictation software. As a result, there may be errors in the script that have gone undetected. Please consider this when interpreting the information in this note.      MCV 83.8   MCH 25.4*   MCHC 30.4*   RDW 21.0*      MPV 8.6        Last 3 Blood Glucose:   Recent Labs     08/16/19  1846   GLUCOSE 294*        PT/INR:    Lab Results   Component Value Date    PROTIME 14.9 08/16/2019    INR 1.5 08/16/2019     PTT:    Lab Results   Component Value Date    APTT 23.4 08/16/2019       Basic Metabolic Profile:   Recent Labs     08/16/19  1846      K 4.3      CO2 21   BUN 18   CREATININE 1.20*   GLUCOSE 294*       Liver Function:  No results for input(s): PROT, LABALBU, ALT, AST, GGT, ALKPHOS, BILITOT in the last 72 hours. Magnesium:   Lab Results   Component Value Date    MG 1.9 08/08/2019    MG 1.8 07/23/2019    MG 2.0 07/20/2019     Phosphorus:   Lab Results   Component Value Date    PHOS 2.5 08/22/2017     Ionized Calcium:   Lab Results   Component Value Date    CAION 1.11 03/24/2019        Urinalysis: Lab Results   Component Value Date    NITRU NEGATIVE 05/12/2019    COLORU YELLOW 05/12/2019    PHUR 6.0 05/12/2019    WBCUA 0 TO 2 05/12/2019    RBCUA 0 TO 2 05/12/2019    MUCUS 1+ 05/12/2019    TRICHOMONAS NOT REPORTED 05/12/2019    YEAST NOT REPORTED 05/12/2019    BACTERIA None 05/12/2019    SPECGRAV 1.025 05/12/2019    LEUKOCYTESUR NEGATIVE 05/12/2019    UROBILINOGEN Normal 05/12/2019    BILIRUBINUR NEGATIVE 05/12/2019    GLUCOSEU NEGATIVE 05/12/2019    KETUA NEGATIVE 05/12/2019    AMORPHOUS NOT REPORTED 05/12/2019       HgBA1c:    Lab Results   Component Value Date    LABA1C 5.8 05/13/2019     TSH:    Lab Results   Component Value Date    TSH 2.28 08/08/2019     Lactic Acid:   Lab Results   Component Value Date    LACTA 2.7 03/21/2019      Troponin: No results for input(s): TROPONINI in the last 72 hours.     Microbiology:      Other Labs:  Results for orders placed or performed during the hospital encounter of 08/16/19   Troponin   Result Value Ref Range    Troponin, High Sensitivity 71 (HH) 0 - 14 ng/L    Troponin T NOT REPORTED <0.03 ng/mL    Troponin Interp EKG 12 Lead   Result Value Ref Range    Ventricular Rate 77 BPM    Atrial Rate 208 BPM    QRS Duration 120 ms    Q-T Interval 394 ms    QTc Calculation (Bazett) 445 ms    P Axis -89 degrees    R Axis 174 degrees    T Axis -141 degrees       Radiology/Imaging:  No orders to display       ASSESSMENT:     Patient Active Problem List    Diagnosis Date Noted    Acute respiratory failure with hypoxia ([facility] [address] 08/16/2019    Pruritus 08/11/2019    Chronic respiratory failure with hypoxia, on home O2 therapy ([address] 08/08/2019    CHF (congestive heart failure), NYHA class I, acute on chronic, combined ([facility] [address] 08/07/2019    Elevated brain natriuretic peptide (BNP) level 08/07/2019    Elevated serum creatinine 08/07/2019    Chronic hypoxemic respiratory failure (HCC) 08/07/2019    Hyperlipidemia     Venous insufficiency of both lower extremities 07/21/2019    Ventricular fibrillation ([facility] [address] 07/20/2019    Vitamin D deficiency 07/20/2019    Incontinence in female 05/23/2019    History of subdural hematoma 05/13/2019    Congestive heart failure ([address] 05/12/2019    Acute congestive heart failure ([facility] [address] 05/12/2019    Venous stasis dermatitis of both lower extremities     Cellulitis 04/29/2019    Hypotension 04/03/2019    Dizziness 04/03/2019    Dyslipidemia 04/03/2019    Coronary atherosclerosis of native coronary artery 04/03/2019    Hypertension 04/03/2019    Chronic atrial fibrillation ([address] 04/03/2019    S/P implantation of automatic cardioverter/defibrillator (AICD) 04/03/2019    History of percutaneous coronary intervention 04/03/2019    SDH (subdural hematoma) ([facility] [address] 04/02/2019    Panic disorder 03/23/2019    Acute on chronic congestive heart failure ([address]     Acute on chronic systolic heart failure ([facility] [address] 03/21/2019    Lymphedema of both lower extremities 03/21/2019    Cellulitis of right leg 03/21/2019    Coronary artery disease involving native coronary artery of native heart without angina pectoris -continue diuresis  -wean vent as able  -daily ABGs while intubated     RENAL/FLUID/ELECTROLYTE: Na has risen overnight. Cr is downtrending, switched fluids to 1/2 NS. -judicious fluids given EF  -baseline CR is ~0.8    GI/NUTRITION:  NUTRITION:  Diet NPO Effective Now  -start TFs today if not able to be extubated     ID: s/p rocephin x1. If febrile, will broaden coverage to treat HCAP as patient recently hospitalized  -monitor WBC  -Monitor fever trend    HEMATOLOGIC: stable  -monitor CBC    ENDOCRINE:   PROBLEMS:  - monitor blood glucose      OTHER: per cards noted on 8/8, concern for spouse abuse, will d/w SW  -    PROPHYLAXIS:   Stress ulcer: H2 blocker   VTE: SCDs, already on xarelto     DISPOSITION:   Continue ICU      Trina Powell MD  Emergency Medicine Resident  [address]  8/17/2019, 1:12 AM      Attending Physician Statement  I have discussed the care of Irma Yang, including pertinent history and exam findings with the resident. I have reviewed the key elements of all parts of the encounter with the resident. I have seen and examined the patient with the resident. I agree with the assessment and plan and status of the problem list as documented.   See H&P attested today      Denisse Ayers MD  8/17/2019 6:29 PM